# Patient Record
Sex: FEMALE | Race: WHITE | NOT HISPANIC OR LATINO | Employment: OTHER | URBAN - METROPOLITAN AREA
[De-identification: names, ages, dates, MRNs, and addresses within clinical notes are randomized per-mention and may not be internally consistent; named-entity substitution may affect disease eponyms.]

---

## 2017-01-24 ENCOUNTER — OFFICE VISIT (OUTPATIENT)
Dept: ENDOCRINOLOGY | Facility: CLINIC | Age: 82
End: 2017-01-24
Payer: MEDICARE

## 2017-01-24 ENCOUNTER — LAB VISIT (OUTPATIENT)
Dept: LAB | Facility: HOSPITAL | Age: 82
End: 2017-01-24
Attending: INTERNAL MEDICINE
Payer: MEDICARE

## 2017-01-24 VITALS
SYSTOLIC BLOOD PRESSURE: 135 MMHG | HEIGHT: 64 IN | WEIGHT: 210.56 LBS | RESPIRATION RATE: 18 BRPM | DIASTOLIC BLOOD PRESSURE: 76 MMHG | BODY MASS INDEX: 35.95 KG/M2 | HEART RATE: 84 BPM

## 2017-01-24 DIAGNOSIS — I10 ESSENTIAL HYPERTENSION: ICD-10-CM

## 2017-01-24 DIAGNOSIS — E07.9 THYROID DISEASE: ICD-10-CM

## 2017-01-24 DIAGNOSIS — E66.9 OBESITY (BMI 35.0-39.9 WITHOUT COMORBIDITY): ICD-10-CM

## 2017-01-24 DIAGNOSIS — E83.52 HYPERCALCEMIA: ICD-10-CM

## 2017-01-24 DIAGNOSIS — N18.30 CHRONIC KIDNEY DISEASE (CKD), STAGE III (MODERATE): ICD-10-CM

## 2017-01-24 DIAGNOSIS — M85.80 OSTEOPENIA WITH HIGH RISK OF FRACTURE: ICD-10-CM

## 2017-01-24 DIAGNOSIS — E87.6 HYPOKALEMIA: ICD-10-CM

## 2017-01-24 DIAGNOSIS — E55.9 VITAMIN D DEFICIENCY DISEASE: ICD-10-CM

## 2017-01-24 DIAGNOSIS — I10 ESSENTIAL HYPERTENSION: Primary | ICD-10-CM

## 2017-01-24 DIAGNOSIS — E04.1 THYROID NODULE: ICD-10-CM

## 2017-01-24 DIAGNOSIS — E21.0 PRIMARY HYPERPARATHYROIDISM: Primary | ICD-10-CM

## 2017-01-24 DIAGNOSIS — E03.9 ACQUIRED HYPOTHYROIDISM: ICD-10-CM

## 2017-01-24 DIAGNOSIS — E21.0 PRIMARY HYPERPARATHYROIDISM: ICD-10-CM

## 2017-01-24 DIAGNOSIS — E03.8 OTHER SPECIFIED HYPOTHYROIDISM: ICD-10-CM

## 2017-01-24 LAB
25(OH)D3+25(OH)D2 SERPL-MCNC: 66 NG/ML
ALBUMIN SERPL BCP-MCNC: 3.8 G/DL
ALP SERPL-CCNC: 80 U/L
ALT SERPL W/O P-5'-P-CCNC: 22 U/L
ANION GAP SERPL CALC-SCNC: 10 MMOL/L
AST SERPL-CCNC: 34 U/L
BASOPHILS # BLD AUTO: 0.06 K/UL
BASOPHILS NFR BLD: 1 %
BILIRUB SERPL-MCNC: 0.7 MG/DL
BUN SERPL-MCNC: 11 MG/DL
CA-I BLDV-SCNC: 1.29 MMOL/L
CALCIUM SERPL-MCNC: 10.1 MG/DL
CHLORIDE SERPL-SCNC: 96 MMOL/L
CO2 SERPL-SCNC: 29 MMOL/L
CREAT SERPL-MCNC: 0.8 MG/DL
DIFFERENTIAL METHOD: ABNORMAL
EOSINOPHIL # BLD AUTO: 0.2 K/UL
EOSINOPHIL NFR BLD: 3.7 %
ERYTHROCYTE [DISTWIDTH] IN BLOOD BY AUTOMATED COUNT: 13 %
EST. GFR  (AFRICAN AMERICAN): >60 ML/MIN/1.73 M^2
EST. GFR  (NON AFRICAN AMERICAN): >60 ML/MIN/1.73 M^2
GLUCOSE SERPL-MCNC: 116 MG/DL
HCT VFR BLD AUTO: 44.9 %
HGB BLD-MCNC: 15.5 G/DL
LYMPHOCYTES # BLD AUTO: 2.3 K/UL
LYMPHOCYTES NFR BLD: 36.6 %
MAGNESIUM SERPL-MCNC: 1.6 MG/DL
MCH RBC QN AUTO: 32.6 PG
MCHC RBC AUTO-ENTMCNC: 34.5 %
MCV RBC AUTO: 94 FL
MONOCYTES # BLD AUTO: 0.7 K/UL
MONOCYTES NFR BLD: 11.3 %
NEUTROPHILS # BLD AUTO: 3 K/UL
NEUTROPHILS NFR BLD: 47.2 %
PHOSPHATE SERPL-MCNC: 2.5 MG/DL
PLATELET # BLD AUTO: 328 K/UL
PMV BLD AUTO: 10.8 FL
POTASSIUM SERPL-SCNC: 3.2 MMOL/L
PROT SERPL-MCNC: 8.2 G/DL
PTH-INTACT SERPL-MCNC: 150 PG/ML
RBC # BLD AUTO: 4.76 M/UL
SODIUM SERPL-SCNC: 135 MMOL/L
T3 SERPL-MCNC: 99 NG/DL
T3 SERPL-MCNC: 99 NG/DL
T4 FREE SERPL-MCNC: 1.48 NG/DL
T4 FREE SERPL-MCNC: 1.48 NG/DL
TSH SERPL DL<=0.005 MIU/L-ACNC: 2 UIU/ML
URATE SERPL-MCNC: 7.4 MG/DL
WBC # BLD AUTO: 6.26 K/UL

## 2017-01-24 PROCEDURE — 84550 ASSAY OF BLOOD/URIC ACID: CPT

## 2017-01-24 PROCEDURE — 84480 ASSAY TRIIODOTHYRONINE (T3): CPT

## 2017-01-24 PROCEDURE — 36415 COLL VENOUS BLD VENIPUNCTURE: CPT | Mod: PO

## 2017-01-24 PROCEDURE — 82652 VIT D 1 25-DIHYDROXY: CPT

## 2017-01-24 PROCEDURE — 84100 ASSAY OF PHOSPHORUS: CPT

## 2017-01-24 PROCEDURE — 99999 PR PBB SHADOW E&M-EST. PATIENT-LVL III: CPT | Mod: PBBFAC,,, | Performed by: INTERNAL MEDICINE

## 2017-01-24 PROCEDURE — 84439 ASSAY OF FREE THYROXINE: CPT

## 2017-01-24 PROCEDURE — 1160F RVW MEDS BY RX/DR IN RCRD: CPT | Mod: S$GLB,,, | Performed by: INTERNAL MEDICINE

## 2017-01-24 PROCEDURE — 1157F ADVNC CARE PLAN IN RCRD: CPT | Mod: S$GLB,,, | Performed by: INTERNAL MEDICINE

## 2017-01-24 PROCEDURE — 84443 ASSAY THYROID STIM HORMONE: CPT

## 2017-01-24 PROCEDURE — 1125F AMNT PAIN NOTED PAIN PRSNT: CPT | Mod: S$GLB,,, | Performed by: INTERNAL MEDICINE

## 2017-01-24 PROCEDURE — 1159F MED LIST DOCD IN RCRD: CPT | Mod: S$GLB,,, | Performed by: INTERNAL MEDICINE

## 2017-01-24 PROCEDURE — 85025 COMPLETE CBC W/AUTO DIFF WBC: CPT

## 2017-01-24 PROCEDURE — 82306 VITAMIN D 25 HYDROXY: CPT

## 2017-01-24 PROCEDURE — 83735 ASSAY OF MAGNESIUM: CPT

## 2017-01-24 PROCEDURE — 82308 ASSAY OF CALCITONIN: CPT

## 2017-01-24 PROCEDURE — 82330 ASSAY OF CALCIUM: CPT

## 2017-01-24 PROCEDURE — 99499 UNLISTED E&M SERVICE: CPT | Mod: S$GLB,,, | Performed by: INTERNAL MEDICINE

## 2017-01-24 PROCEDURE — 80053 COMPREHEN METABOLIC PANEL: CPT

## 2017-01-24 PROCEDURE — 84432 ASSAY OF THYROGLOBULIN: CPT

## 2017-01-24 PROCEDURE — 83970 ASSAY OF PARATHORMONE: CPT

## 2017-01-24 PROCEDURE — 99214 OFFICE O/P EST MOD 30 MIN: CPT | Mod: S$GLB,,, | Performed by: INTERNAL MEDICINE

## 2017-01-24 RX ORDER — LEVOTHYROXINE SODIUM 88 UG/1
88 TABLET ORAL DAILY
Qty: 90 TABLET | Refills: 3 | Status: SHIPPED | OUTPATIENT
Start: 2017-01-24 | End: 2017-09-19 | Stop reason: SDUPTHER

## 2017-01-24 RX ORDER — POTASSIUM CHLORIDE 20 MEQ/1
20 TABLET, EXTENDED RELEASE ORAL 2 TIMES DAILY
Qty: 180 TABLET | Refills: 3 | Status: SHIPPED | OUTPATIENT
Start: 2017-01-24 | End: 2018-02-09 | Stop reason: SDUPTHER

## 2017-01-24 RX ORDER — HYDROCHLOROTHIAZIDE 25 MG/1
TABLET ORAL
Qty: 90 TABLET | Refills: 3 | Status: SHIPPED | OUTPATIENT
Start: 2017-01-24 | End: 2020-03-02 | Stop reason: CLARIF

## 2017-01-24 RX ORDER — ALENDRONATE SODIUM 70 MG/1
70 TABLET ORAL
Qty: 12 TABLET | Refills: 3 | Status: SHIPPED | OUTPATIENT
Start: 2017-01-24 | End: 2018-01-22 | Stop reason: SDUPTHER

## 2017-01-24 NOTE — PROGRESS NOTES
Subjective:      Patient ID: Glenda Gimenez is a 89 y.o. female.    Chief Complaint:  89 yr old lady with hypothyroidism on thyroid hormone repletion as well as presumed primary hyperparathyroidism seen in [facility] today.    History of Present Illness    Patient is an 89 yr old lady with hyperparathyroidism and hypercalcemia seen in [facility] today. Patient has however been seen by Dr Perez for these same problems in the past and had been undergoing clinical surviellance only. She in addition has hypothyroidism on thyroid hormone replacement; 88mcg QD, essential hypertension, thyroid nodular disease, hypovitaminosis D, Obesity and CKD stage 3.  Patient has had a prior kidney stone in the past ~ 5 yrs ago and this was spontaneously passed.  She denies any significant dyspepsia and has no local neck symptoms.  She does have long standing lumbago with recent right sided sciatica and small joint arthralgias.  Patients most recent thyroid USS was from 12/15 and showed stable nodular disease with no evidence of interval nodular growth. Her next thyroid sonogram would be in another 1-2 yrs.  Her most recent DEXA from 12/15 showed progressive osteopenia with a high frax score based on which she was commenced on fosamax. Her [facility] DEXA thus is to be for ~ 12/17.    Patient has had no falls. Patient has not had any episodes of passing kidney stones and has no hematuria.      Patient admits that she often forgets to take her fosamax.  She indicates that her ambulation is more limited. She indicates that this is mainly due to myalgias in both limbs.    Review of Systems   Constitutional: Negative for diaphoresis and fatigue.   HENT: Negative for facial swelling, trouble swallowing and voice change.    Eyes: Negative for visual disturbance.   Respiratory: Negative for cough and shortness of breath.    Cardiovascular: Negative for chest pain and palpitations.   Gastrointestinal: Negative for constipation, nausea and vomiting.  "  Endocrine: Negative for polyuria.   Genitourinary: Negative for flank pain and hematuria.   Musculoskeletal: Positive for arthralgias (Mild chronic lower back pain and knee arthralgias.) and back pain (chronic). Negative for myalgias.   Skin: Negative for color change, pallor and rash.   Neurological: Negative for dizziness, numbness and headaches.   Hematological: Does not bruise/bleed easily.   Psychiatric/Behavioral: Negative for confusion. The patient is not nervous/anxious.        Objective:  Visit Vitals    /76    Pulse 84    Resp 18    Ht 5' 4" (1.626 m)    Wt 95.5 kg (210 lb 8.6 oz)    BMI 36.14 kg/m2        Physical Exam   Constitutional: She is oriented to person, place, and time. She appears well-developed and well-nourished. No distress.   Pleasant elderly lady. Clinically comfortable. Not chronically ill looking and not in apparent acute distress. Not pale, anicteric, afebrile. In excellent spirits   HENT:   Head: Normocephalic and atraumatic.   Eyes: Conjunctivae and EOM are normal. Pupils are equal, round, and reactive to light. No scleral icterus.   Neck: Normal range of motion. Neck supple. No JVD present.   Cardiovascular: Normal rate, regular rhythm and normal heart sounds.    Pulmonary/Chest: Effort normal and breath sounds normal. No respiratory distress. She has no wheezes.   Abdominal: Soft. There is no tenderness.   Mildly obese anterior abdominal wall.    Musculoskeletal: Normal range of motion. 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   Gaite is slow but steady.   Neurological: She is alert and oriented to person, place, and time. No cranial nerve deficit.   Skin: Skin is warm and dry. No rash noted. She is not diaphoretic. No erythema. No pallor.   Diffuse cutaneous atrophy   Psychiatric: She has a normal mood and affect. Her behavior is normal. Judgment and thought content normal. "   Vitals reviewed.      Lab Review:     Results for JENNIFER QURESHI (MRN [id number]) as of 1/24/2017 08:12   Ref. Range 9/2/2016 08:53 12/21/2016 09:05   Sodium Latest Ref Range: 136 - 145 mmol/L 137    Potassium Latest Ref Range: 3.5 - 5.1 mmol/L 3.6    Chloride Latest Ref Range: 95 - 110 mmol/L 98    CO2 Latest Ref Range: 23 - 29 mmol/L 29    Anion Gap Latest Ref Range: 8 - 16 mmol/L 10    BUN, Bld Latest Ref Range: 8 - 23 mg/dL 15    Creatinine Latest Ref Range: 0.5 - 1.4 mg/dL 0.8    eGFR if non African American Latest Ref Range: >60 mL/min/1.73 m^2 >60.0    eGFR if African American Latest Ref Range: >60 mL/min/1.73 m^2 >60.0    Glucose Latest Ref Range: 70 - 110 mg/dL 118 (H)    Calcium Latest Ref Range: 8.7 - 10.5 mg/dL 10.2    Calcium, Ion Latest Ref Range: 1.06 - 1.42 mmol/L 1.30    Phosphorus Latest Ref Range: 2.7 - 4.5 mg/dL 2.8    Magnesium Latest Ref Range: 1.6 - 2.6 mg/dL 1.9    Alkaline Phosphatase Latest Ref Range: 55 - 135 U/L 78    Total Protein Latest Ref Range: 6.0 - 8.4 g/dL 8.0    Albumin Latest Ref Range: 3.5 - 5.2 g/dL 3.8    Uric Acid Latest Ref Range: 2.4 - 5.7 mg/dL 7.5 (H)    Total Bilirubin Latest Ref Range: 0.1 - 1.0 mg/dL 0.9    AST Latest Ref Range: 10 - 40 U/L 33    ALT Latest Ref Range: 10 - 44 U/L 27    Vit D, 1,25-Dihydroxy Latest Ref Range: 20 - 79 pg/mL 54    Vit D, 25-Hydroxy Latest Ref Range: 30 - 96 ng/mL 67    Hemoglobin A1C Latest Ref Range: 4.5 - 6.2 % 5.5    Estimated Avg Glucose Latest Ref Range: 68 - 131 mg/dL 111    TSH Latest Ref Range: 0.400 - 4.000 uIU/mL 1.488    PTH Latest Ref Range: 9.0 - 77.0 pg/mL 116.0 (H)        Assessment:     1. Primary hyperparathyroidism  PTH, intact    Magnesium    Phosphorus    Comprehensive metabolic panel    US Soft Tissue Head Neck Thyroid    Calcium, urine, random    Phosphorus, urine, random    Creatinine, urine, random    alendronate (FOSAMAX) 70 MG tablet    Vitamin D    Calcitriol   2. Acquired hypothyroidism  TSH    T4, free     T3    Thyroglobulin    Uric acid    CBC auto differential    US Soft Tissue Head Neck Thyroid   3. Essential hypertension     4. Chronic kidney disease (CKD), stage III (moderate)  CBC auto differential   5. Thyroid disease  Calcitonin    Calcium, ionized    CBC auto differential   6. Thyroid nodule  Calcitonin    Calcium, ionized    US Soft Tissue Head Neck Thyroid   7. Hypercalcemia  PTH, intact    Magnesium    Phosphorus    Comprehensive metabolic panel    Calcium, urine, random    Phosphorus, urine, random    Creatinine, urine, random    alendronate (FOSAMAX) 70 MG tablet   8. Vitamin D deficiency disease  PTH, intact    Magnesium    Phosphorus    Comprehensive metabolic panel    Vitamin D    Calcitriol   9. Osteopenia with high risk of fracture  alendronate (FOSAMAX) 70 MG tablet    Vitamin D    Calcitriol   10. Obesity (BMI 35.0-39.9 without comorbidity)          Regarding hyperparathyroidism; Mild primary hyperparathyroidism; to continue serial conservative watchful survielance. To obtain basic monitoring labs in this regard as detailed above.  Regarding hypovitaminosis D; Will recheck 25 OH vitamin d today and in the interim continue OTC vitamin supplementation as before.  In view of muscle cramps to check full electrolytes and urate levels. Also advised to discuss with her PCP regarding possible physical therapy consultation to assist with improving exercise tolerance and ambulation.  Regarding hypothyroidism; appears clinically euthyroid; will recheck TFTS today and to continue levothyroxine 88mcg QD.  Regarding thyroid nodular disease; to obtain ffup thyroid USs to evaluate the prior noted thyroid nodules.  Regarding hypertension; presently well controlled. To continue present antihypertensive regimen and to continue serial tracking of BP trends. Encouraged patient to continuing ensuring copious free water intake; ~ 100 fl oz daily.  Regarding high risk osteopenia; reiterated fall risk reduction  strategies including obtaining a life alert alarm.  To continue fosamax as before.  Regarding Grade 2 obesity; weight essentially stable. No active interventions regarding weight loss at this time but to continue prior efforts at portion size control to enable weight maintenance as before.     Plan:     FFup in ~ 6mths

## 2017-01-24 NOTE — PROGRESS NOTES
Patient, Glenda Gimenez (MRN #[id number]), presented with a recorded BMI of 36.14 kg/m^2 and a documented comorbidity(s):  - Hypertension  to which the severe obesity is a contributing factor. This is consistent with the definition of severe obesity (BMI 35.0-35.9) with comorbidity (ICD-10 E66.01, Z68.35). The patient's severe obesity was monitored, evaluated, addressed and/or treated. This addendum to the medical record is made on 01/24/2017.

## 2017-01-24 NOTE — MR AVS SNAPSHOT
[place] [facility]  [address]  [place] [address]  Phone: [phone]                  Glenda Gimenez   2017 8:00 AM   Office Visit    Description:  Female : 1927   Provider:  Damir Fraire MD   Department:  [place] [facility]           Diagnoses this Visit        Comments    Primary hyperparathyroidism    -  Primary     Acquired hypothyroidism         Essential hypertension         Chronic kidney disease (CKD), stage III (moderate)         Thyroid disease         Thyroid nodule         Hypercalcemia         Vitamin D deficiency disease         Osteopenia with high risk of fracture                To Do List           Future Appointments        Provider Department Dept Phone    2017 10:45 AM LAB, [facility] Lab [phone]    2017 11:00 AM LAB, [facility] - Lab [phone]    2017 1:00 PM [facility] Ultrasound [phone]    2017 11:30 AM Damir Fraire MD [place] [facility] [phone]      Goals (5 Years of Data)     None       These Medications        Disp Refills Start End    levothyroxine (SYNTHROID) 88 MCG tablet 90 tablet 3 2017    Take 1 tablet (88 mcg total) by mouth once daily. - Oral    [facility]: [facility] [address] [place] [address] [address] [facility] Ph #: [phone]       alendronate (FOSAMAX) 70 MG tablet 12 tablet 3 2017    Take 1 tablet (70 mg total) by mouth every 7 days. - Oral    Pharmacy: [id number] - [place], [place] - [address] AT [facility] Ph #: [phone]         [facility]     [facility] On Call Nurse Care Line -  Assistance  Registered nurses in the [facility] Center provide clinical advisement, health education, appointment booking, and other advisory services.  Call for this free service at [phone].             Medications          "  Message regarding Medications     Verify the changes and/or additions to your medication regime listed below are the same as discussed with your clinician today.  If any of these changes or additions are incorrect, please notify your healthcare provider.             Verify that the below list of medications is an accurate representation of the medications you are currently taking.  If none reported, the list may be blank. If incorrect, please contact your healthcare provider. Carry this list with you in case of emergency.           Current Medications     acidophilus (LACTINEX) 100 million cell packet Take 1 tablet by mouth 2 (two) times daily.    amlodipine (NORVASC) 5 MG tablet Take 1 tablet (5 mg total) by mouth once daily.    aspirin 81 MG Chew Take 81 mg by mouth once daily.    conjugated estrogens (PREMARIN) vaginal cream Place 0.5 g vaginally twice a week.    hydrochlorothiazide (HYDRODIURIL) 25 MG tablet 1-2 tablets PO daily.    hydrocortisone (PROCTOZONE-HC) 2.5 % rectal cream Place rectally 2 (two) times daily.    MAGNESIUM ORAL Take 1 tablet by mouth once daily.    potassium chloride (KLOR-CON) 10 MEQ TbSR Take 1 tablet (10 mEq total) by mouth 2 (two) times daily.    alendronate (FOSAMAX) 70 MG tablet Take 1 tablet (70 mg total) by mouth every 7 days.    levothyroxine (SYNTHROID) 88 MCG tablet Take 1 tablet (88 mcg total) by mouth once daily.           Clinical Reference Information           Vital Signs - Last Recorded  Most recent update: 1/24/2017  8:01 AM by Maryan Morales RN    BP Pulse Resp Ht Wt BMI    135/76 84 18 5' 4" (1.626 m) 95.5 kg (210 lb 8.6 oz) 36.14 kg/m2      Blood Pressure          Most Recent Value    BP  135/76      Allergies as of 1/24/2017     Atenolol    Betamethasone Sodium Phosphate    Cortisone    Lisinopril    Naproxen    Triamterene-hydrochlorothiazid      Immunizations Administered on Date of Encounter - 1/24/2017     None      Orders Placed During Today's Visit     Future " Labs/Procedures Expected by Expires    Calcitonin  1/24/2017 3/25/2018    Calcitriol  1/24/2017 3/25/2018    Calcium, ionized  1/24/2017 3/25/2018    Calcium, urine, random  1/24/2017 3/25/2018    CBC auto differential  1/24/2017 3/25/2018    Comprehensive metabolic panel  1/24/2017 3/25/2018    Creatinine, urine, random  1/24/2017 3/25/2018    Magnesium  1/24/2017 3/25/2018    Phosphorus  1/24/2017 3/25/2018    PTH, intact  1/24/2017 3/25/2018    T3  1/24/2017 3/25/2018    T4, free  1/24/2017 3/25/2018    Thyroglobulin  1/24/2017 3/25/2018    TSH  1/24/2017 3/25/2018    Uric acid  1/24/2017 3/25/2018    US Soft Tissue Head Neck Thyroid  1/24/2017 1/24/2018    Vitamin D  1/24/2017 3/25/2018    Phosphorus, urine, random  As directed 1/24/2018

## 2017-01-25 LAB
CALCIT SERPL-MCNC: <5 PG/ML
THRYOGLOBULIN INTERPRETATION: ABNORMAL
THYROGLOB AB SERPL-ACNC: 14 IU/ML
THYROGLOB SERPL-MCNC: 0.8 NG/ML

## 2017-01-26 LAB — 1,25(OH)2D3 SERPL-MCNC: 55 PG/ML

## 2017-01-27 ENCOUNTER — TELEPHONE (OUTPATIENT)
Dept: ENDOCRINOLOGY | Facility: CLINIC | Age: 82
End: 2017-01-27

## 2017-01-27 ENCOUNTER — HOSPITAL ENCOUNTER (OUTPATIENT)
Dept: RADIOLOGY | Facility: CLINIC | Age: 82
Discharge: HOME | End: 2017-01-27
Attending: INTERNAL MEDICINE
Payer: MEDICARE

## 2017-01-27 DIAGNOSIS — E04.1 THYROID NODULE: ICD-10-CM

## 2017-01-27 DIAGNOSIS — E21.0 PRIMARY HYPERPARATHYROIDISM: ICD-10-CM

## 2017-01-27 DIAGNOSIS — E03.9 ACQUIRED HYPOTHYROIDISM: ICD-10-CM

## 2017-01-27 DIAGNOSIS — E87.6 HYPOKALEMIA: Primary | ICD-10-CM

## 2017-01-27 PROCEDURE — 76536 US EXAM OF HEAD AND NECK: CPT | Mod: 26,,, | Performed by: RADIOLOGY

## 2017-01-27 PROCEDURE — 76536 US EXAM OF HEAD AND NECK: CPT | Mod: TC,PO

## 2017-01-27 NOTE — TELEPHONE ENCOUNTER
Patient came in today stated she is currently on Potassium 10 meq bid. She would like to confirm that she is to take Potassium 20 meq twice a day. Stated she does not want to take too much potassium.. I advised it was low.. Would like to know if will be rechecked..

## 2017-02-27 ENCOUNTER — LAB VISIT (OUTPATIENT)
Dept: LAB | Facility: HOSPITAL | Age: 82
End: 2017-02-27
Attending: INTERNAL MEDICINE
Payer: MEDICARE

## 2017-02-27 DIAGNOSIS — E87.6 HYPOKALEMIA: ICD-10-CM

## 2017-02-27 LAB
ANION GAP SERPL CALC-SCNC: 9 MMOL/L
BUN SERPL-MCNC: 16 MG/DL
CALCIUM SERPL-MCNC: 10.2 MG/DL
CHLORIDE SERPL-SCNC: 98 MMOL/L
CO2 SERPL-SCNC: 29 MMOL/L
CREAT SERPL-MCNC: 0.8 MG/DL
EST. GFR  (AFRICAN AMERICAN): >60 ML/MIN/1.73 M^2
EST. GFR  (NON AFRICAN AMERICAN): >60 ML/MIN/1.73 M^2
GLUCOSE SERPL-MCNC: 106 MG/DL
POTASSIUM SERPL-SCNC: 3.8 MMOL/L
SODIUM SERPL-SCNC: 136 MMOL/L

## 2017-02-27 PROCEDURE — 36415 COLL VENOUS BLD VENIPUNCTURE: CPT | Mod: PO

## 2017-02-27 PROCEDURE — 80048 BASIC METABOLIC PNL TOTAL CA: CPT

## 2017-07-05 ENCOUNTER — TELEPHONE (OUTPATIENT)
Dept: ENDOCRINOLOGY | Facility: CLINIC | Age: 82
End: 2017-07-05

## 2017-07-05 NOTE — TELEPHONE ENCOUNTER
----- Message from Ashlie Wan sent at 7/5/2017  9:00 AM CDT -----  Patient is returning nurse's (Maryan) call/please call patient back at [phone] or [phone].

## 2017-07-12 ENCOUNTER — TELEPHONE (OUTPATIENT)
Dept: ENDOCRINOLOGY | Facility: CLINIC | Age: 82
End: 2017-07-12

## 2017-07-12 DIAGNOSIS — E04.1 NODULAR THYROID DISEASE: ICD-10-CM

## 2017-07-12 DIAGNOSIS — E21.3 HYPERPARATHYROIDISM: ICD-10-CM

## 2017-07-12 DIAGNOSIS — E03.9 HYPOTHYROIDISM (ACQUIRED): Primary | ICD-10-CM

## 2017-07-12 NOTE — TELEPHONE ENCOUNTER
----- Message from Bibiana Guzman sent at 7/12/2017 12:00 PM CDT -----  Contact: Patient  Patient states that labs were to be done for an appointment that was cancelled for Friday, 07/14/2017 but there weren't any labs scheduled.   Can you please put the orders in the system so the labs can be scheduled and please call the patient back [phone].  Thank you

## 2017-07-13 DIAGNOSIS — E79.0 HYPERURICEMIA: Primary | ICD-10-CM

## 2017-07-18 ENCOUNTER — LAB VISIT (OUTPATIENT)
Dept: LAB | Facility: HOSPITAL | Age: 82
End: 2017-07-18
Attending: INTERNAL MEDICINE
Payer: MEDICARE

## 2017-07-18 DIAGNOSIS — E04.1 NODULAR THYROID DISEASE: ICD-10-CM

## 2017-07-18 DIAGNOSIS — E79.0 HYPERURICEMIA: ICD-10-CM

## 2017-07-18 DIAGNOSIS — E21.3 HYPERPARATHYROIDISM: ICD-10-CM

## 2017-07-18 DIAGNOSIS — E03.9 HYPOTHYROIDISM (ACQUIRED): ICD-10-CM

## 2017-07-18 LAB
25(OH)D3+25(OH)D2 SERPL-MCNC: 51 NG/ML
ALBUMIN SERPL BCP-MCNC: 3.7 G/DL
ALP SERPL-CCNC: 77 U/L
ALT SERPL W/O P-5'-P-CCNC: 21 U/L
ANION GAP SERPL CALC-SCNC: 7 MMOL/L
AST SERPL-CCNC: 29 U/L
BILIRUB SERPL-MCNC: 0.7 MG/DL
BUN SERPL-MCNC: 10 MG/DL
CA-I BLDV-SCNC: 1.34 MMOL/L
CALCIUM SERPL-MCNC: 10.3 MG/DL
CHLORIDE SERPL-SCNC: 99 MMOL/L
CHOLEST/HDLC SERPL: 3 {RATIO}
CO2 SERPL-SCNC: 31 MMOL/L
CREAT SERPL-MCNC: 0.8 MG/DL
EST. GFR  (AFRICAN AMERICAN): >60 ML/MIN/1.73 M^2
EST. GFR  (NON AFRICAN AMERICAN): >60 ML/MIN/1.73 M^2
GLUCOSE SERPL-MCNC: 102 MG/DL
HDL/CHOLESTEROL RATIO: 33.5 %
HDLC SERPL-MCNC: 197 MG/DL
HDLC SERPL-MCNC: 66 MG/DL
LDLC SERPL CALC-MCNC: 109.4 MG/DL
MAGNESIUM SERPL-MCNC: 1.7 MG/DL
NONHDLC SERPL-MCNC: 131 MG/DL
PHOSPHATE SERPL-MCNC: 2.4 MG/DL
POTASSIUM SERPL-SCNC: 3.6 MMOL/L
PROT SERPL-MCNC: 8.1 G/DL
PTH-INTACT SERPL-MCNC: 110 PG/ML
SODIUM SERPL-SCNC: 137 MMOL/L
T3 SERPL-MCNC: 85 NG/DL
T4 FREE SERPL-MCNC: 1.4 NG/DL
TRIGL SERPL-MCNC: 108 MG/DL
TSH SERPL DL<=0.005 MIU/L-ACNC: 1.82 UIU/ML
URATE SERPL-MCNC: 6.6 MG/DL

## 2017-07-18 PROCEDURE — 82330 ASSAY OF CALCIUM: CPT

## 2017-07-18 PROCEDURE — 86316 IMMUNOASSAY TUMOR OTHER: CPT

## 2017-07-18 PROCEDURE — 84100 ASSAY OF PHOSPHORUS: CPT

## 2017-07-18 PROCEDURE — 82306 VITAMIN D 25 HYDROXY: CPT

## 2017-07-18 PROCEDURE — 84439 ASSAY OF FREE THYROXINE: CPT

## 2017-07-18 PROCEDURE — 83970 ASSAY OF PARATHORMONE: CPT

## 2017-07-18 PROCEDURE — 80053 COMPREHEN METABOLIC PANEL: CPT

## 2017-07-18 PROCEDURE — 84550 ASSAY OF BLOOD/URIC ACID: CPT

## 2017-07-18 PROCEDURE — 84443 ASSAY THYROID STIM HORMONE: CPT

## 2017-07-18 PROCEDURE — 83735 ASSAY OF MAGNESIUM: CPT

## 2017-07-18 PROCEDURE — 84480 ASSAY TRIIODOTHYRONINE (T3): CPT

## 2017-07-18 PROCEDURE — 80061 LIPID PANEL: CPT

## 2017-07-18 PROCEDURE — 86800 THYROGLOBULIN ANTIBODY: CPT

## 2017-07-19 LAB
THRYOGLOBULIN INTERPRETATION: ABNORMAL
THYROGLOB AB SERPL-ACNC: 6.3 IU/ML
THYROGLOB SERPL-MCNC: 0.8 NG/ML

## 2017-07-22 LAB — CGA SERPL-MCNC: 14 NG/ML

## 2017-09-13 ENCOUNTER — OFFICE VISIT (OUTPATIENT)
Dept: PODIATRY | Facility: CLINIC | Age: 82
End: 2017-09-13
Payer: MEDICARE

## 2017-09-13 ENCOUNTER — HOSPITAL ENCOUNTER (OUTPATIENT)
Dept: RADIOLOGY | Facility: CLINIC | Age: 82
Discharge: HOME | End: 2017-09-13
Attending: PODIATRIST
Payer: MEDICARE

## 2017-09-13 VITALS — WEIGHT: 205.5 LBS | BODY MASS INDEX: 35.08 KG/M2 | HEIGHT: 64 IN

## 2017-09-13 DIAGNOSIS — M77.9 CAPSULITIS: Primary | ICD-10-CM

## 2017-09-13 DIAGNOSIS — M19.079 ARTHRITIS OF FOOT: ICD-10-CM

## 2017-09-13 DIAGNOSIS — M77.9 CAPSULITIS: ICD-10-CM

## 2017-09-13 DIAGNOSIS — M24.573 EQUINUS CONTRACTURE OF ANKLE: ICD-10-CM

## 2017-09-13 PROCEDURE — 99999 PR PBB SHADOW E&M-EST. PATIENT-LVL III: CPT | Mod: PBBFAC,,, | Performed by: PODIATRIST

## 2017-09-13 PROCEDURE — 73630 X-RAY EXAM OF FOOT: CPT | Mod: 26,LT,S$GLB, | Performed by: RADIOLOGY

## 2017-09-13 PROCEDURE — 73630 X-RAY EXAM OF FOOT: CPT | Mod: 50,TC,PO

## 2017-09-13 PROCEDURE — 73630 X-RAY EXAM OF FOOT: CPT | Mod: 26,RT,S$GLB, | Performed by: RADIOLOGY

## 2017-09-13 PROCEDURE — 29540 STRAPPING ANKLE &/FOOT: CPT | Mod: 50,S$GLB,, | Performed by: PODIATRIST

## 2017-09-13 PROCEDURE — 3008F BODY MASS INDEX DOCD: CPT | Mod: S$GLB,,, | Performed by: PODIATRIST

## 2017-09-13 PROCEDURE — 99203 OFFICE O/P NEW LOW 30 MIN: CPT | Mod: 25,S$GLB,, | Performed by: PODIATRIST

## 2017-09-13 PROCEDURE — 73610 X-RAY EXAM OF ANKLE: CPT | Mod: 26,LT,S$GLB, | Performed by: RADIOLOGY

## 2017-09-13 PROCEDURE — 73610 X-RAY EXAM OF ANKLE: CPT | Mod: 50,TC,PO

## 2017-09-13 PROCEDURE — 73610 X-RAY EXAM OF ANKLE: CPT | Mod: 26,RT,S$GLB, | Performed by: RADIOLOGY

## 2017-09-13 PROCEDURE — 1159F MED LIST DOCD IN RCRD: CPT | Mod: S$GLB,,, | Performed by: PODIATRIST

## 2017-09-13 PROCEDURE — 1125F AMNT PAIN NOTED PAIN PRSNT: CPT | Mod: S$GLB,,, | Performed by: PODIATRIST

## 2017-09-13 RX ORDER — LIDOCAINE HYDROCHLORIDE 20 MG/ML
JELLY TOPICAL
Qty: 30 ML | Refills: 2 | Status: SHIPPED | OUTPATIENT
Start: 2017-09-13 | End: 2018-01-22

## 2017-09-13 NOTE — PROGRESS NOTES
Subjective:      Patient ID: Glenda Gimenez is a 89 y.o. female.    Chief Complaint: Foot Pain (denae ); Ankle Pain (denae ); and Nail Problem (fungus Lt great toe )    Deep aching sometimes sharp pain forefoot and ankle bilateral.  Gradual onset, worsening over past several weeks, aggravated by increased weight bearing, shoe gear, pressure.  No previous medical treatment.  OTC pain med not helping.  Denies trauma.  Relates toe surgery right years ago.      Review of Systems   Constitution: Negative for chills, diaphoresis, fever, malaise/fatigue and night sweats.   Cardiovascular: Negative for claudication, cyanosis, leg swelling and syncope.   Skin: Negative for color change, dry skin, nail changes, rash, suspicious lesions and unusual hair distribution.   Musculoskeletal: Positive for joint pain. Negative for falls, joint swelling, muscle cramps, muscle weakness and stiffness.   Gastrointestinal: Negative for constipation, diarrhea, nausea and vomiting.   Neurological: Negative for brief paralysis, disturbances in coordination, focal weakness, numbness, paresthesias, sensory change and tremors.           Objective:      Physical Exam   Constitutional: She is oriented to person, place, and time. She appears well-developed and well-nourished. She is cooperative.   Oriented to time, place, and person.   Cardiovascular:   Pulses:       Dorsalis pedis pulses are 1+ on the right side, and 1+ on the left side.        Posterior tibial pulses are 1+ on the right side, and 1+ on the left side.   Capillary fill time 3-5 seconds.  All toes warm to touch.      Negative lower extremity edema bilateral.    Negative elevational pallor and dependent rubor bilateral.     Musculoskeletal:   Pain to palpation inferior mtpj 1-5 bilateral without evidence of trauma or infection.     Deep aching pain both ankles with prolonged stance and ambulation without pain to palpation or being elicited in wb or nwb office exam today.    Ankle  dorsiflexion decreased at <10 degrees bilateral with moderate increase with knee flexion bilateral.    Otherwise, Normal angle, base, station of gait. All ten toes without clubbing, cyanosis, or signs of ischemia.  No pain to palpation bilateral lower extremities.  Range of motion, stability, muscle strength, and muscle tone normal bilateral feet and legs.    Ankle bilateral has negative anterior drawer sign, negative posterior drawer sign, negative external rotation test, negative squeeze test.       Lymphadenopathy:   Negative lymphadenopathy bilateral popliteal fossa and tarsal tunnel.     Neurological: She is alert and oriented to person, place, and time. She has normal strength. She is not disoriented. She displays no atrophy and no tremor. No sensory deficit. She exhibits normal muscle tone.   Reflex Scores:       Patellar reflexes are 2+ on the right side and 2+ on the left side.       Achilles reflexes are 2+ on the right side and 2+ on the left side.  Negative tinel sign to percussion sural, superficial peroneal, deep peroneal, saphenous, and posterior tibial nerves right and left ankles and feet.       Skin: Skin is warm, dry and intact. No abrasion, no bruising, no burn, no ecchymosis, no laceration, no lesion, no petechiae and no rash noted. She is not diaphoretic. No cyanosis or erythema. No pallor. Nails show no clubbing.   Skin thin, atrophic, with decreased density and distribution of pedal hair bilateral, but without hyperpigmentation, yoana discoloration,  ulcers, masses, nodules or cords palpated bilateral feet and legs.                   Assessment:       Encounter Diagnoses   Name Primary?    Capsulitis Yes    Equinus contracture of ankle     Arthritis of foot          Plan:       Glenda was seen today for foot pain, ankle pain and nail problem.    Diagnoses and all orders for this visit:    Capsulitis  -     X-Ray Foot Complete Bilateral; Future  -     X-Ray Ankle Complete Bilateral;  Future    Equinus contracture of ankle  -     X-Ray Foot Complete Bilateral; Future  -     X-Ray Ankle Complete Bilateral; Future    Arthritis of foot  -     X-Ray Foot Complete Bilateral; Future  -     X-Ray Ankle Complete Bilateral; Future    Other orders  -     lidocaine HCL 2% (XYLOCAINE) 2 % jelly; Apply topically as needed. Apply topically once nightly to affected area right and left ankles.      I counseled the patient on her conditions, their implications and medical management.    Patient will stretch the tendo achilles complex three times daily as demonstrated in the office.  Literature was dispensed illustrating proper stretching technique.    I applied a plantar rest strapping to the patient's right and left foot to offload symptomatic area, support the arch, and relieve pain.    Patient will obtain over the counter arch supports and wear them in shoes whenever possible.  Athletic shoes intended for walking or running are usually best.    Discussed conservative treatment with shoes of adequate dimensions, material, and style to alleviate symptoms and delay or prevent surgical intervention.    Rx xrays, lidocaine gel.            Return in about 1 month (around 10/13/2017).

## 2017-09-13 NOTE — LETTER
September 13, 2017      James Yao MD  [address]  [place] [address]           [place] [facility]  [address]  [facility] [address]  Phone: [phone]          Patient: Glenda Gimenez   MR Number: [id number]   YOB: 1927   Date of Visit: 9/13/2017       Dear Dr. James Yao:    Thank you for referring Glenda Gimenez to me for evaluation. Attached you will find relevant portions of my assessment and plan of care.    If you have questions, please do not hesitate to call me. I look forward to following Glenda Gimenez along with you.    Sincerely,    Marco Antonio Mcclain, DPHUMZA    Enclosure  CC:  No Recipients    If you would like to receive this communication electronically, please contact [email or web address] or [phone] to request more information on [employer] access.    For providers and/or their staff who would like to refer a patient to [facility], please contact us through our one-stop-shop provider referral line, [facility] , at [phone].    If you feel you have received this communication in error or would no longer like to receive these types of communications, please e-mail [email or web address]

## 2017-09-19 RX ORDER — LEVOTHYROXINE SODIUM 88 UG/1
TABLET ORAL
Qty: 90 TABLET | Refills: 3 | Status: SHIPPED | OUTPATIENT
Start: 2017-09-19 | End: 2018-09-05 | Stop reason: SDUPTHER

## 2017-10-18 ENCOUNTER — OFFICE VISIT (OUTPATIENT)
Dept: PODIATRY | Facility: CLINIC | Age: 82
End: 2017-10-18
Payer: MEDICARE

## 2017-10-18 VITALS — BODY MASS INDEX: 35.08 KG/M2 | WEIGHT: 205.5 LBS | HEIGHT: 64 IN

## 2017-10-18 DIAGNOSIS — M24.573 EQUINUS CONTRACTURE OF ANKLE: ICD-10-CM

## 2017-10-18 DIAGNOSIS — M79.672 FOOT PAIN, LEFT: ICD-10-CM

## 2017-10-18 DIAGNOSIS — M19.079 ARTHRITIS OF FOOT: Primary | ICD-10-CM

## 2017-10-18 PROCEDURE — 99999 PR PBB SHADOW E&M-EST. PATIENT-LVL III: CPT | Mod: PBBFAC,,, | Performed by: PODIATRIST

## 2017-10-18 PROCEDURE — 99212 OFFICE O/P EST SF 10 MIN: CPT | Mod: S$GLB,,, | Performed by: PODIATRIST

## 2017-10-18 RX ORDER — POTASSIUM CHLORIDE 20 MEQ/1
TABLET, EXTENDED RELEASE ORAL
COMMUNITY
Start: 2017-09-19 | End: 2018-06-11 | Stop reason: SDUPTHER

## 2017-10-18 NOTE — PROGRESS NOTES
Subjective:      Patient ID: Glenda Gimenez is a 89 y.o. female.    Chief Complaint: Follow-up    Deep aching sometimes sharp pain forefoot and ankle bilateral.  Forefoot symptoms resolved with stretches, inserts, athletic type shoes.  Rearfoot/ankle pain remains, though improved.  xrays negative acute injury.    Review of Systems   Constitution: Negative for chills, diaphoresis, fever, malaise/fatigue and night sweats.   Cardiovascular: Negative for claudication, cyanosis, leg swelling and syncope.   Skin: Negative for color change, dry skin, nail changes, rash, suspicious lesions and unusual hair distribution.   Musculoskeletal: Positive for joint pain. Negative for falls, joint swelling, muscle cramps, muscle weakness and stiffness.   Gastrointestinal: Negative for constipation, diarrhea, nausea and vomiting.   Neurological: Negative for brief paralysis, disturbances in coordination, focal weakness, numbness, paresthesias, sensory change and tremors.           Objective:      Physical Exam   Constitutional: She is oriented to person, place, and time. She appears well-developed and well-nourished. She is cooperative.   Oriented to time, place, and person.   Cardiovascular:   Pulses:       Dorsalis pedis pulses are 1+ on the right side, and 1+ on the left side.        Posterior tibial pulses are 1+ on the right side, and 1+ on the left side.   Capillary fill time 3-5 seconds.  All toes warm to touch.      Negative lower extremity edema bilateral.    Negative elevational pallor and dependent rubor bilateral.     Musculoskeletal:   No pain to palpation inferior mtpj 1-5 bilateral without evidence of trauma or infection.     Deep aching pain both rearfoot/ankles with prolonged stance and ambulation without pain to palpation or being elicited in wb or nwb office exam today.    Ankle dorsiflexion decreased at <10 degrees bilateral with moderate increase with knee flexion bilateral.    Otherwise, Normal angle, base,  station of gait. All ten toes without clubbing, cyanosis, or signs of ischemia.  No pain to palpation bilateral lower extremities.  Range of motion, stability, muscle strength, and muscle tone normal bilateral feet and legs.    Ankle bilateral has negative anterior drawer sign, negative posterior drawer sign, negative external rotation test, negative squeeze test.       Lymphadenopathy:   Negative lymphadenopathy bilateral popliteal fossa and tarsal tunnel.     Neurological: She is alert and oriented to person, place, and time. She has normal strength. She is not disoriented. She displays no atrophy and no tremor. No sensory deficit. She exhibits normal muscle tone.   Reflex Scores:       Patellar reflexes are 2+ on the right side and 2+ on the left side.       Achilles reflexes are 2+ on the right side and 2+ on the left side.  Negative tinel sign to percussion sural, superficial peroneal, deep peroneal, saphenous, and posterior tibial nerves right and left ankles and feet.       Skin: Skin is warm, dry and intact. No abrasion, no bruising, no burn, no ecchymosis, no laceration, no lesion, no petechiae and no rash noted. She is not diaphoretic. No cyanosis or erythema. No pallor. Nails show no clubbing.   Skin thin, atrophic, with decreased density and distribution of pedal hair bilateral, but without hyperpigmentation, yoana discoloration,  ulcers, masses, nodules or cords palpated bilateral feet and legs.                   Assessment:       Encounter Diagnoses   Name Primary?    Arthritis of foot Yes    Equinus contracture of ankle     Foot pain, left          Plan:       Glenda was seen today for follow-up.    Diagnoses and all orders for this visit:    Arthritis of foot    Equinus contracture of ankle    Foot pain, left      I counseled the patient on her conditions, their implications and medical management.    Continue stretches, inserts, athletic shoes.  Continue, lidocaine gel.    Declines orthotics,  PT.        Return in about 1 month (around 11/18/2017).

## 2017-11-07 ENCOUNTER — HOSPITAL ENCOUNTER (EMERGENCY)
Facility: HOSPITAL | Age: 82
Discharge: HOME | End: 2017-11-07
Attending: EMERGENCY MEDICINE
Payer: MEDICARE

## 2017-11-07 VITALS
SYSTOLIC BLOOD PRESSURE: 154 MMHG | WEIGHT: 198 LBS | OXYGEN SATURATION: 96 % | HEIGHT: 64 IN | DIASTOLIC BLOOD PRESSURE: 67 MMHG | RESPIRATION RATE: 14 BRPM | HEART RATE: 88 BPM | TEMPERATURE: 98 F | BODY MASS INDEX: 33.8 KG/M2

## 2017-11-07 DIAGNOSIS — S02.2XXA FRACTURE OF NASAL BONES, INITIAL ENCOUNTER FOR CLOSED FRACTURE: ICD-10-CM

## 2017-11-07 DIAGNOSIS — S00.83XA TRAUMATIC HEMATOMA OF FOREHEAD, INITIAL ENCOUNTER: ICD-10-CM
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
## 2017-11-07 DIAGNOSIS — T07.XXXA MULTIPLE ABRASIONS: ICD-10-CM

## 2017-11-07 DIAGNOSIS — S61.411A LACERATION OF RIGHT HAND WITHOUT FOREIGN BODY, INITIAL ENCOUNTER: ICD-10-CM

## 2017-11-07 PROCEDURE — 25000003 PHARM REV CODE 250: Performed by: EMERGENCY MEDICINE

## 2017-11-07 PROCEDURE — 63600175 PHARM REV CODE 636 W HCPCS: Performed by: EMERGENCY MEDICINE

## 2017-11-07 PROCEDURE — 12041 INTMD RPR N-HF/GENIT 2.5CM/<: CPT

## 2017-11-07 PROCEDURE — 99284 EMERGENCY DEPT VISIT MOD MDM: CPT | Mod: 25

## 2017-11-07 PROCEDURE — 90471 IMMUNIZATION ADMIN: CPT | Performed by: EMERGENCY MEDICINE

## 2017-11-07 PROCEDURE — 90715 TDAP VACCINE 7 YRS/> IM: CPT | Performed by: EMERGENCY MEDICINE

## 2017-11-07 RX ORDER — LIDOCAINE HYDROCHLORIDE 10 MG/ML
INJECTION, SOLUTION EPIDURAL; INFILTRATION; INTRACAUDAL; PERINEURAL
Status: DISCONTINUED
Start: 2017-11-07 | End: 2017-11-07 | Stop reason: HOSPADM

## 2017-11-07 RX ORDER — LIDOCAINE HYDROCHLORIDE 10 MG/ML
10 INJECTION, SOLUTION EPIDURAL; INFILTRATION; INTRACAUDAL; PERINEURAL
Status: COMPLETED | OUTPATIENT
Start: 2017-11-07 | End: 2017-11-07

## 2017-11-07 RX ORDER — CEPHALEXIN 500 MG/1
500 CAPSULE ORAL 4 TIMES DAILY
Qty: 20 CAPSULE | Refills: 0 | Status: SHIPPED | OUTPATIENT
Start: 2017-11-07 | End: 2017-11-12

## 2017-11-07 RX ADMIN — LIDOCAINE HYDROCHLORIDE 100 MG: 10 INJECTION, SOLUTION EPIDURAL; INFILTRATION; INTRACAUDAL; PERINEURAL at 01:11

## 2017-11-07 RX ADMIN — BACITRACIN ZINC NEOMYCIN SULFATE POLYMYXIN B SULFATE 15 G: 400; 3.5; 5 OINTMENT TOPICAL at 11:11

## 2017-11-07 RX ADMIN — CLOSTRIDIUM TETANI TOXOID ANTIGEN (FORMALDEHYDE INACTIVATED), CORYNEBACTERIUM DIPHTHERIAE TOXOID ANTIGEN (FORMALDEHYDE INACTIVATED), BORDETELLA PERTUSSIS TOXOID ANTIGEN (GLUTARALDEHYDE INACTIVATED), BORDETELLA PERTUSSIS FILAMENTOUS HEMAGGLUTININ ANTIGEN (FORMALDEHYDE INACTIVATED), BORDETELLA PERTUSSIS PERTACTIN ANTIGEN, AND BORDETELLA PERTUSSIS FIMBRIAE 2/3 ANTIGEN 0.5 ML: 5; 2; 2.5; 5; 3; 5 INJECTION, SUSPENSION INTRAMUSCULAR at 01:11

## 2017-11-07 NOTE — ED PROVIDER NOTES
Encounter Date: 11/7/2017    SCRIBE #1 NOTE: Celsa RAY, am scribing for, and in the presence of, Dr. Singer.       History     Chief Complaint   Patient presents with    Fall     Tripped on parking bumper falling forward onto face. Denies loc. A, a, o x3.       11/07/2017 11:29 AM     Chief complaint: Fall      Glenda Gimenez is a 89 y.o. female who presents to the ED for evaluation after a fall that occurred immediately PTA. The patient reports that she tripped over a curb in a parking lot and fell forward with outstretched hands. She states that she feels no pain other than burning due to a laceration on her right hand. She denies LOC, neck pain, numbness, visual disturbance, and all other symptoms at this time. She does not know when her most recent tetanus shot occurred, and is not currently taking blood thinners other than a baby aspirin once a day. The patient's PMHx includes HTN and anticoagulant long-term use. There is no pertinent SHx noted.       The history is provided by the patient.     Review of patient's allergies indicates:   Allergen Reactions    Atenolol      Other reaction(s): itch    Betamethasone sodium phosphate      Other reaction(s): Flushing (skin)    Cortisone      Other reaction(s): blurry vision  Other reaction(s): Rash    Lisinopril      Other reaction(s): COUGH    Naproxen      Other reaction(s): body shut down    Triamterene-hydrochlorothiazid      Other reaction(s): itch     Past Medical History:   Diagnosis Date    Anticoagulant long-term use     Dislocation of right shoulder joint     Hypertension     Shoulder disorder     right    Thyroid disease      Past Surgical History:   Procedure Laterality Date    HYSTERECTOMY      PARTIAL HYSTERECTOMY      ARTUR, ovaries in place, uterine prolapse     Family History   Problem Relation Age of Onset    Cancer Mother      breast cancer    Cancer Brother      lung cancer    Cancer Maternal Aunt      unknown cancer     Cancer Maternal Uncle      pancreatic cancer    Heart disease Neg Hx      Social History   Substance Use Topics    Smoking status: Never Smoker    Smokeless tobacco: Never Used    Alcohol use Yes      Comment: wine occasionally      Review of Systems   Constitutional: Negative for fever.   HENT: Negative for congestion.    Eyes: Negative for visual disturbance.   Respiratory: Negative for wheezing.    Cardiovascular: Negative for chest pain.   Gastrointestinal: Negative for abdominal pain.   Genitourinary: Negative for dysuria.   Musculoskeletal: Negative for joint swelling and neck pain.   Skin: Positive for wound (right hand, forehead, nose). Negative for rash.   Neurological: Negative for syncope and numbness.   Hematological: Does not bruise/bleed easily.   Psychiatric/Behavioral: Negative for confusion.       Physical Exam     Initial Vitals [11/07/17 1103]   BP Pulse Resp Temp SpO2   (!) 154/67 88 14 97.9 °F (36.6 °C) 96 %      MAP       96         Physical Exam    Nursing note and vitals reviewed.  Constitutional: She appears well-nourished.   HENT:   Mouth/Throat: Oropharynx is clear and moist.   Hematoma over right medial forehead. Abrasion and swelling to nasal bridge without severe lateral deviation.  No septal hematoma   Eyes: Conjunctivae and EOM are normal.   Neck: Normal range of motion. Neck supple. No thyroid mass present.   Cardiovascular: Normal rate and regular rhythm.   Abdominal: Soft. Normal appearance and bowel sounds are normal. There is no tenderness.   Musculoskeletal: Normal range of motion. She exhibits edema and tenderness.   Neurological: She is alert and oriented to person, place, and time. She has normal strength. No cranial nerve deficit or sensory deficit.   Skin: Skin is warm and dry. No rash noted.   Superficial abrasion to right knee with no bleeding. Laceration to lateral aspect of right 5th metacarpal joint.    Psychiatric: She has a normal mood and affect. Her speech is  normal. Cognition and memory are normal.         ED Course   Lac Repair  Date/Time: 11/10/2017 12:32 PM  Performed by: KEYLA GOODSON  Authorized by: KEYLA GOODSON   Consent Done: Yes  Consent: Verbal consent obtained.  Risks and benefits: risks, benefits and alternatives were discussed  Consent given by: patient  Body area: upper extremity  Location details: right hand  Laceration length: 2 cm  Foreign bodies: no foreign bodies  Tendon involvement: none  Nerve involvement: none  Vascular damage: no  Anesthesia: local infiltration    Anesthesia:  Local Anesthetic: lidocaine 1% without epinephrine  Anesthetic total: 15 mL  Patient sedated: no  Preparation: Patient was prepped and draped in the usual sterile fashion.  Irrigation solution: saline  Irrigation method: jet lavage  Amount of cleaning: extensive  Debridement: none  Degree of undermining: none  Skin closure: 4-0 Prolene  Number of sutures: 6  Technique: simple  Approximation: close  Approximation difficulty: simple  Dressing: antibiotic ointment and adhesive bandage  Patient tolerance: Patient tolerated the procedure well with no immediate complications        Labs Reviewed - No data to display     Imaging Results          CT Maxillofacial Without Contrast (Final result)  Result time 11/07/17 12:41:21    Final result by Blas Lux MD (11/07/17 12:41:21)                 Impression:      1. Bilateral minimally displaced nasal bone fractures.  2. Large frontal scalp hematoma.      Electronically signed by: Blas Lux MD  Date:     11/07/17  Time:    12:41              Narrative:    Axial images through the face were acquired without contrast with multiplanar reformatted images created    No comparison    FINDINGS: A large frontal scalp hematoma is present. There are bilateral nasal bone fractures with minimal leftward displacement. The remaining maxillofacial bones are intact. The mandible is intact and located. Heavy calcification of the  intracranial ICAs is noted.                             CT Head Without Contrast (Final result)  Result time 17 12:31:47    Final result by Jordana Keene MD (17 12:31:47)                 Impression:        Large frontal scalp hematoma.  No acute intracranial findings.  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 There is  density in white matter consistent with microvascular ischemic changes, and there are old basal ganglia lacunar infarcts.  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  There is no acute intracranial hemorrhage. There is no intracranial mass effect. There is no acute major vascular territory infarct.  There is large right frontal scalp hematoma.  The calvarium appears intact, visualized paranasal sinuses clear and mastoids pneumatized                                 Medical Decision Making:   History:   Old Medical Records: I decided to obtain old medical records.  Initial Assessment:   This patient was interviewed and examined and is found be in no acute distress.  She has evidence for head hematoma an old right hand laceration.  Right hand laceration was closed without complication.  CT of the head and face will be obtained patient was provided tetanus update and all of her abrasions were cleaned and bacitracin was applied.  The patient only has superficial abrasions on the right knee and I do not think radiographs of the hand with any are currently warranted, as there is no swelling and full range of motion without pain is  intact.  Clinical Tests:   Radiological Study: Ordered and Reviewed  ED Management:  CT of the face indicates a large frontal scalp hematoma with bilaterally minimally displaced nasal bone fractures.  She and her family were informed that she will need to follow-up with ear nose and throat specialist within 7-10 days if she will wish to have the nose reset.  At this point I do not think resetting the patient's nose currently is warranted and will exacerbate pain and  swelling.  Patient was educated about wound care regarding the laceration and will be asked to have the sutures removed within 7-10 days.  She was educated about signs of infection to look out for and is asked to return for any of these.  Because of her age and falling on the dirty pavement, she'll be started on a short course of oral Keflex while these abrasions and lacerations are healing.  She is asked to take over-the-counter pain medications as needed and rest over the next few days.  Otherwise asked to return to the ER for any new, concerning, or worsening symptoms.  Patient and family were agreeable with the plan for follow-up & she was discharged in stable condition with her son and grandson as 's.            Scribe Attestation:   Scribe #1: I performed the above scribed service and the documentation accurately describes the services I performed. I attest to the accuracy of the note.    *   ED Course      Clinical Impression:   The primary encounter diagnosis was Fall on same level from stumbling, initial encounter. Diagnoses of Laceration of right hand without foreign body, initial encounter, Multiple abrasions, Traumatic hematoma of forehead, initial encounter, and Fracture of nasal bones, initial encounter for closed fracture were also pertinent to this visit.    Disposition:   Disposition: Discharged  Condition: Stable       I, Dr. Santiago Singer, personally performed the services described in this documentation. All medical record  entries made by the scribe were at my direction and in my presence.  I have reviewed the chart and agree that the record reflects my personal performance and is accurate and complete. Santiago Singer MD.  12:37 PM 11/10/2017                  Santiago Singer MD  11/10/17 1370

## 2017-12-28 ENCOUNTER — HOSPITAL ENCOUNTER (OUTPATIENT)
Dept: RADIOLOGY | Facility: CLINIC | Age: 82
Discharge: HOME | End: 2017-12-28
Payer: MEDICARE

## 2017-12-28 DIAGNOSIS — Z12.39 ENCOUNTER FOR SPECIAL SCREENING EXAMINATION FOR NEOPLASM OF BREAST: ICD-10-CM

## 2017-12-28 PROCEDURE — 77067 SCR MAMMO BI INCL CAD: CPT | Mod: TC,PO

## 2017-12-28 PROCEDURE — 77067 SCR MAMMO BI INCL CAD: CPT | Mod: 26,,, | Performed by: RADIOLOGY

## 2017-12-28 PROCEDURE — 77063 BREAST TOMOSYNTHESIS BI: CPT | Mod: 26,,, | Performed by: RADIOLOGY

## 2018-01-22 ENCOUNTER — OFFICE VISIT (OUTPATIENT)
Dept: ENDOCRINOLOGY | Facility: CLINIC | Age: 83
End: 2018-01-22
Payer: MEDICARE

## 2018-01-22 ENCOUNTER — LAB VISIT (OUTPATIENT)
Dept: LAB | Facility: HOSPITAL | Age: 83
End: 2018-01-22
Attending: INTERNAL MEDICINE
Payer: MEDICARE

## 2018-01-22 VITALS
WEIGHT: 206.81 LBS | DIASTOLIC BLOOD PRESSURE: 79 MMHG | HEART RATE: 86 BPM | HEIGHT: 64 IN | SYSTOLIC BLOOD PRESSURE: 141 MMHG | RESPIRATION RATE: 18 BRPM | BODY MASS INDEX: 35.31 KG/M2

## 2018-01-22 DIAGNOSIS — N18.30 CHRONIC KIDNEY DISEASE (CKD), STAGE III (MODERATE): ICD-10-CM

## 2018-01-22 DIAGNOSIS — E21.0 PRIMARY HYPERPARATHYROIDISM: ICD-10-CM

## 2018-01-22 DIAGNOSIS — E04.1 NODULAR THYROID DISEASE: ICD-10-CM

## 2018-01-22 DIAGNOSIS — E66.9 OBESITY (BMI 30-39.9): ICD-10-CM

## 2018-01-22 DIAGNOSIS — E04.1 THYROID NODULE: ICD-10-CM

## 2018-01-22 DIAGNOSIS — I10 ESSENTIAL HYPERTENSION: ICD-10-CM

## 2018-01-22 DIAGNOSIS — E55.9 VITAMIN D DEFICIENCY DISEASE: ICD-10-CM

## 2018-01-22 DIAGNOSIS — R73.9 HYPERGLYCEMIA: ICD-10-CM

## 2018-01-22 DIAGNOSIS — E03.9 ACQUIRED HYPOTHYROIDISM: ICD-10-CM

## 2018-01-22 DIAGNOSIS — E88.810 DYSMETABOLIC SYNDROME: ICD-10-CM

## 2018-01-22 DIAGNOSIS — Z78.0 POSTMENOPAUSAL: ICD-10-CM

## 2018-01-22 DIAGNOSIS — M85.80 OSTEOPENIA WITH HIGH RISK OF FRACTURE: ICD-10-CM

## 2018-01-22 DIAGNOSIS — E03.9 ACQUIRED HYPOTHYROIDISM: Primary | ICD-10-CM

## 2018-01-22 DIAGNOSIS — E83.52 HYPERCALCEMIA: ICD-10-CM

## 2018-01-22 LAB
25(OH)D3+25(OH)D2 SERPL-MCNC: 46 NG/ML
CA-I BLDV-SCNC: 1.33 MMOL/L
CHOLEST SERPL-MCNC: 197 MG/DL
CHOLEST/HDLC SERPL: 3 {RATIO}
ESTIMATED AVG GLUCOSE: 97 MG/DL
HBA1C MFR BLD HPLC: 5 %
HDLC SERPL-MCNC: 65 MG/DL
HDLC SERPL: 33 %
LDLC SERPL CALC-MCNC: 115 MG/DL
MAGNESIUM SERPL-MCNC: 1.5 MG/DL
NONHDLC SERPL-MCNC: 132 MG/DL
PHOSPHATE SERPL-MCNC: 2.4 MG/DL
PTH-INTACT SERPL-MCNC: 123 PG/ML
T3 SERPL-MCNC: 91 NG/DL
T4 FREE SERPL-MCNC: 1.19 NG/DL
TRIGL SERPL-MCNC: 85 MG/DL
TSH SERPL DL<=0.005 MIU/L-ACNC: 2.37 UIU/ML
URATE SERPL-MCNC: 6.3 MG/DL

## 2018-01-22 PROCEDURE — 86316 IMMUNOASSAY TUMOR OTHER: CPT

## 2018-01-22 PROCEDURE — 83970 ASSAY OF PARATHORMONE: CPT

## 2018-01-22 PROCEDURE — 36415 COLL VENOUS BLD VENIPUNCTURE: CPT | Mod: PO

## 2018-01-22 PROCEDURE — 99999 PR PBB SHADOW E&M-EST. PATIENT-LVL IV: CPT | Mod: PBBFAC,,, | Performed by: INTERNAL MEDICINE

## 2018-01-22 PROCEDURE — 99214 OFFICE O/P EST MOD 30 MIN: CPT | Mod: S$GLB,,, | Performed by: INTERNAL MEDICINE

## 2018-01-22 PROCEDURE — 84480 ASSAY TRIIODOTHYRONINE (T3): CPT

## 2018-01-22 PROCEDURE — 82330 ASSAY OF CALCIUM: CPT

## 2018-01-22 PROCEDURE — 84100 ASSAY OF PHOSPHORUS: CPT

## 2018-01-22 PROCEDURE — 84550 ASSAY OF BLOOD/URIC ACID: CPT

## 2018-01-22 PROCEDURE — 86800 THYROGLOBULIN ANTIBODY: CPT

## 2018-01-22 PROCEDURE — 83036 HEMOGLOBIN GLYCOSYLATED A1C: CPT

## 2018-01-22 PROCEDURE — 82306 VITAMIN D 25 HYDROXY: CPT

## 2018-01-22 PROCEDURE — 82308 ASSAY OF CALCITONIN: CPT

## 2018-01-22 PROCEDURE — 80061 LIPID PANEL: CPT

## 2018-01-22 PROCEDURE — 84443 ASSAY THYROID STIM HORMONE: CPT

## 2018-01-22 PROCEDURE — 83735 ASSAY OF MAGNESIUM: CPT

## 2018-01-22 PROCEDURE — 84439 ASSAY OF FREE THYROXINE: CPT

## 2018-01-22 RX ORDER — ALENDRONATE SODIUM 70 MG/1
70 TABLET ORAL
Qty: 12 TABLET | Refills: 3 | Status: SHIPPED | OUTPATIENT
Start: 2018-01-22 | End: 2018-06-11 | Stop reason: SDUPTHER

## 2018-01-22 NOTE — PROGRESS NOTES
Patient, Glenda Gimenez (MRN #[id number]), presented with a recorded BMI of 35.5 kg/m^2 and a documented comorbidity(s):  - Hypertension  to which the severe obesity is a contributing factor. This is consistent with the definition of severe obesity (BMI 35.0-35.9) with comorbidity (ICD-10 E66.01, Z68.35). The patient's severe obesity was monitored, evaluated, addressed and/or treated. This addendum to the medical record is made on 01/22/2018.

## 2018-01-23 LAB
THRYOGLOBULIN INTERPRETATION: ABNORMAL
THYROGLOB AB SERPL-ACNC: 7.2 IU/ML
THYROGLOB SERPL-MCNC: 1.7 NG/ML

## 2018-01-24 LAB — CALCIT SERPL-MCNC: <5 PG/ML

## 2018-01-26 ENCOUNTER — HOSPITAL ENCOUNTER (OUTPATIENT)
Dept: RADIOLOGY | Facility: CLINIC | Age: 83
Discharge: HOME | End: 2018-01-26
Attending: INTERNAL MEDICINE
Payer: MEDICARE

## 2018-01-26 ENCOUNTER — TELEPHONE (OUTPATIENT)
Dept: ENDOCRINOLOGY | Facility: CLINIC | Age: 83
End: 2018-01-26

## 2018-01-26 DIAGNOSIS — N18.30 CHRONIC KIDNEY DISEASE (CKD), STAGE III (MODERATE): ICD-10-CM

## 2018-01-26 DIAGNOSIS — M85.80 OSTEOPENIA WITH HIGH RISK OF FRACTURE: ICD-10-CM

## 2018-01-26 DIAGNOSIS — Z78.0 POSTMENOPAUSAL: ICD-10-CM

## 2018-01-26 LAB — CGA SERPL-MCNC: 76 NG/ML (ref 0–95)

## 2018-01-26 PROCEDURE — 77080 DXA BONE DENSITY AXIAL: CPT | Mod: TC,PO

## 2018-01-26 PROCEDURE — 77080 DXA BONE DENSITY AXIAL: CPT | Mod: 26,,, | Performed by: RADIOLOGY

## 2018-01-26 NOTE — TELEPHONE ENCOUNTER
----- Message from Goldie Bellamy sent at 1/26/2018  4:17 PM CST -----  Contact: self   Patient missed a call from office   Please call back [phone] (hgxz)

## 2018-01-26 NOTE — TELEPHONE ENCOUNTER
----- Message from Linnette Atkins sent at 1/25/2018  2:21 PM CST -----  Contact: pt  Pt calling regards to test results.[phone] (fdol)..

## 2018-02-09 DIAGNOSIS — E87.6 HYPOKALEMIA: ICD-10-CM

## 2018-02-09 RX ORDER — POTASSIUM CHLORIDE 20 MEQ/1
TABLET, EXTENDED RELEASE ORAL
Qty: 180 TABLET | Refills: 3 | Status: SHIPPED | OUTPATIENT
Start: 2018-02-09 | End: 2020-08-06 | Stop reason: SDUPTHER

## 2018-02-23 ENCOUNTER — HOSPITAL ENCOUNTER (OUTPATIENT)
Dept: RADIOLOGY | Facility: CLINIC | Age: 83
Discharge: HOME | End: 2018-02-23
Attending: INTERNAL MEDICINE
Payer: MEDICARE

## 2018-02-23 DIAGNOSIS — M54.17 LUMBOSACRAL RADICULOPATHY: ICD-10-CM

## 2018-02-23 DIAGNOSIS — M54.17 RADICULITIS, LUMBOSACRAL: ICD-10-CM

## 2018-02-23 DIAGNOSIS — M54.17 RADICULITIS, LUMBOSACRAL: Primary | ICD-10-CM

## 2018-02-23 PROCEDURE — 73610 X-RAY EXAM OF ANKLE: CPT | Mod: 26,LT,S$GLB, | Performed by: RADIOLOGY

## 2018-02-23 PROCEDURE — 72100 X-RAY EXAM L-S SPINE 2/3 VWS: CPT | Mod: 26,,, | Performed by: RADIOLOGY

## 2018-02-23 PROCEDURE — 73610 X-RAY EXAM OF ANKLE: CPT | Mod: TC,FY,PO,LT

## 2018-02-23 PROCEDURE — 72100 X-RAY EXAM L-S SPINE 2/3 VWS: CPT | Mod: TC,FY,PO

## 2018-03-20 ENCOUNTER — LAB VISIT (OUTPATIENT)
Dept: LAB | Facility: HOSPITAL | Age: 83
End: 2018-03-20
Attending: INTERNAL MEDICINE
Payer: MEDICARE

## 2018-03-20 DIAGNOSIS — E83.42 HYPOMAGNESEMIA: ICD-10-CM

## 2018-03-20 DIAGNOSIS — E83.42 HYPOMAGNESEMIA: Primary | ICD-10-CM

## 2018-03-20 LAB — MAGNESIUM SERPL-MCNC: 1.3 MG/DL

## 2018-03-20 PROCEDURE — 36415 COLL VENOUS BLD VENIPUNCTURE: CPT | Mod: PO

## 2018-03-20 PROCEDURE — 83735 ASSAY OF MAGNESIUM: CPT

## 2018-06-11 ENCOUNTER — LAB VISIT (OUTPATIENT)
Dept: LAB | Facility: HOSPITAL | Age: 83
End: 2018-06-11
Attending: INTERNAL MEDICINE
Payer: MEDICARE

## 2018-06-11 ENCOUNTER — OFFICE VISIT (OUTPATIENT)
Dept: ENDOCRINOLOGY | Facility: CLINIC | Age: 83
End: 2018-06-11
Payer: MEDICARE

## 2018-06-11 VITALS
HEIGHT: 64 IN | DIASTOLIC BLOOD PRESSURE: 71 MMHG | SYSTOLIC BLOOD PRESSURE: 140 MMHG | WEIGHT: 207.69 LBS | RESPIRATION RATE: 18 BRPM | HEART RATE: 85 BPM | BODY MASS INDEX: 35.46 KG/M2

## 2018-06-11 DIAGNOSIS — E21.0 PRIMARY HYPERPARATHYROIDISM: ICD-10-CM

## 2018-06-11 DIAGNOSIS — M85.80 OSTEOPENIA WITH HIGH RISK OF FRACTURE: ICD-10-CM

## 2018-06-11 DIAGNOSIS — E83.52 HYPERCALCEMIA: ICD-10-CM

## 2018-06-11 DIAGNOSIS — I10 ESSENTIAL HYPERTENSION: ICD-10-CM

## 2018-06-11 DIAGNOSIS — Z78.0 POSTMENOPAUSAL: ICD-10-CM

## 2018-06-11 DIAGNOSIS — E03.9 ACQUIRED HYPOTHYROIDISM: ICD-10-CM

## 2018-06-11 DIAGNOSIS — E88.810 DYSMETABOLIC SYNDROME: ICD-10-CM

## 2018-06-11 DIAGNOSIS — E55.9 VITAMIN D DEFICIENCY DISEASE: ICD-10-CM

## 2018-06-11 DIAGNOSIS — E66.9 OBESITY (BMI 35.0-39.9 WITHOUT COMORBIDITY): ICD-10-CM

## 2018-06-11 DIAGNOSIS — N18.30 CHRONIC KIDNEY DISEASE (CKD), STAGE III (MODERATE): ICD-10-CM

## 2018-06-11 DIAGNOSIS — E04.1 NODULAR THYROID DISEASE: ICD-10-CM

## 2018-06-11 DIAGNOSIS — E03.9 ACQUIRED HYPOTHYROIDISM: Primary | ICD-10-CM

## 2018-06-11 LAB
25(OH)D3+25(OH)D2 SERPL-MCNC: 47 NG/ML
ALBUMIN SERPL BCP-MCNC: 3.7 G/DL
ALP SERPL-CCNC: 78 U/L
ALT SERPL W/O P-5'-P-CCNC: 23 U/L
ANION GAP SERPL CALC-SCNC: 11 MMOL/L
AST SERPL-CCNC: 30 U/L
BASOPHILS # BLD AUTO: 0.06 K/UL
BASOPHILS NFR BLD: 1.3 %
BILIRUB SERPL-MCNC: 0.5 MG/DL
BUN SERPL-MCNC: 16 MG/DL
CA-I BLDV-SCNC: 1.32 MMOL/L
CALCIUM SERPL-MCNC: 10.4 MG/DL
CHLORIDE SERPL-SCNC: 103 MMOL/L
CO2 SERPL-SCNC: 25 MMOL/L
CREAT SERPL-MCNC: 0.8 MG/DL
DIFFERENTIAL METHOD: ABNORMAL
EOSINOPHIL # BLD AUTO: 0.2 K/UL
EOSINOPHIL NFR BLD: 5.1 %
ERYTHROCYTE [DISTWIDTH] IN BLOOD BY AUTOMATED COUNT: 13.4 %
EST. GFR  (AFRICAN AMERICAN): >60 ML/MIN/1.73 M^2
EST. GFR  (NON AFRICAN AMERICAN): >60 ML/MIN/1.73 M^2
GLUCOSE SERPL-MCNC: 106 MG/DL
HCT VFR BLD AUTO: 44 %
HGB BLD-MCNC: 14.7 G/DL
IMM GRANULOCYTES # BLD AUTO: 0 K/UL
IMM GRANULOCYTES NFR BLD AUTO: 0 %
LYMPHOCYTES # BLD AUTO: 1.9 K/UL
LYMPHOCYTES NFR BLD: 41.4 %
MAGNESIUM SERPL-MCNC: 1.7 MG/DL
MCH RBC QN AUTO: 31.7 PG
MCHC RBC AUTO-ENTMCNC: 33.4 G/DL
MCV RBC AUTO: 95 FL
MONOCYTES # BLD AUTO: 0.6 K/UL
MONOCYTES NFR BLD: 12.6 %
NEUTROPHILS # BLD AUTO: 1.8 K/UL
NEUTROPHILS NFR BLD: 39.6 %
NRBC BLD-RTO: 0 /100 WBC
PHOSPHATE SERPL-MCNC: 2.8 MG/DL
PLATELET # BLD AUTO: 304 K/UL
PMV BLD AUTO: 10.6 FL
POTASSIUM SERPL-SCNC: 3.7 MMOL/L
PROT SERPL-MCNC: 7.9 G/DL
PTH-INTACT SERPL-MCNC: 80 PG/ML
RBC # BLD AUTO: 4.63 M/UL
SODIUM SERPL-SCNC: 139 MMOL/L
T3 SERPL-MCNC: 80 NG/DL
T4 FREE SERPL-MCNC: 1.22 NG/DL
TSH SERPL DL<=0.005 MIU/L-ACNC: 1.81 UIU/ML
URATE SERPL-MCNC: 6.9 MG/DL
WBC # BLD AUTO: 4.54 K/UL

## 2018-06-11 PROCEDURE — 82308 ASSAY OF CALCITONIN: CPT

## 2018-06-11 PROCEDURE — 99214 OFFICE O/P EST MOD 30 MIN: CPT | Mod: S$GLB,,, | Performed by: INTERNAL MEDICINE

## 2018-06-11 PROCEDURE — 82306 VITAMIN D 25 HYDROXY: CPT

## 2018-06-11 PROCEDURE — 83735 ASSAY OF MAGNESIUM: CPT

## 2018-06-11 PROCEDURE — 99999 PR PBB SHADOW E&M-EST. PATIENT-LVL III: CPT | Mod: PBBFAC,,, | Performed by: INTERNAL MEDICINE

## 2018-06-11 PROCEDURE — 84550 ASSAY OF BLOOD/URIC ACID: CPT

## 2018-06-11 PROCEDURE — 84100 ASSAY OF PHOSPHORUS: CPT

## 2018-06-11 PROCEDURE — 83018 HEAVY METAL QUAN EACH NES: CPT

## 2018-06-11 PROCEDURE — 80053 COMPREHEN METABOLIC PANEL: CPT

## 2018-06-11 PROCEDURE — 84480 ASSAY TRIIODOTHYRONINE (T3): CPT

## 2018-06-11 PROCEDURE — 84443 ASSAY THYROID STIM HORMONE: CPT

## 2018-06-11 PROCEDURE — 84439 ASSAY OF FREE THYROXINE: CPT

## 2018-06-11 PROCEDURE — 85025 COMPLETE CBC W/AUTO DIFF WBC: CPT

## 2018-06-11 PROCEDURE — 82330 ASSAY OF CALCIUM: CPT

## 2018-06-11 PROCEDURE — 86316 IMMUNOASSAY TUMOR OTHER: CPT

## 2018-06-11 PROCEDURE — 84432 ASSAY OF THYROGLOBULIN: CPT

## 2018-06-11 PROCEDURE — 83970 ASSAY OF PARATHORMONE: CPT

## 2018-06-11 RX ORDER — ALENDRONATE SODIUM 70 MG/1
70 TABLET ORAL
Qty: 12 TABLET | Refills: 3 | Status: SHIPPED | OUTPATIENT
Start: 2018-06-11 | End: 2018-11-26 | Stop reason: SDUPTHER

## 2018-06-11 NOTE — PROGRESS NOTES
Subjective:      Patient ID: Glenda Gimenez is a 89 y.o. female.    Chief Complaint:     89 yr old postmenopausal lady with hypothyroidism on thyroid hormone repletion as well as presumed primary hyperparathyroidism seen in ffup today    History of Present Illness    Patient is an 89 yr old postmenopausal  lady with hyperparathyroidism and hypercalcemia seen in ffup today. Patient has however been seen by Dr Perez for these same problems in the past and had been undergoing clinical surviellance only. She in addition has hypothyroidism on thyroid hormone replacement; 88mcg QD, essential hypertension, thyroid nodular disease, hypovitaminosis D, Obesity and CKD stage 3.  Patient has had a prior kidney stone in the past ~ 5 yrs ago and this was spontaneously passed.  She denies any significant dyspepsia and has no local neck symptoms.  She does have long standing lumbago with recent right sided sciatica and small joint arthralgias.  Patients most recent thyroid USS was from 01/17 and showed stable nodular disease with no evidence of interval nodular growth. Her next thyroid sonogram would be in another 1-2 yrs.    Her DEXA from 12/15 showed progressive osteopenia with a high frax score based on which she was commenced on fosamax. Her most recent  ffup DEXA from 01/18 showed stable osteopenia with non significant evidence of progression and thus her next ffup DEXA should be for  thus is to be for ~ 01/20.     Patients last fall was in November 2017 but no associated fracture. Patient has not had any episodes of passing kidney stones and has no hematuria.  Patient had a recent fall in November 2017 when she had a concussion and nasal fracture. She has also been having left sided hip and radicular pain.         She indicates that her ambulation is more limited. She indicates that this is mainly due to myalgias in both limbs.    Review of Systems   Constitutional: Negative for diaphoresis and fatigue.   HENT:  "Negative for facial swelling, trouble swallowing and voice change.    Eyes: Negative for visual disturbance.   Respiratory: Negative for cough and shortness of breath.    Cardiovascular: Negative for chest pain and palpitations.   Gastrointestinal: Negative for constipation, nausea and vomiting.   Endocrine: Negative for polyuria.   Genitourinary: Negative for flank pain and hematuria.   Musculoskeletal: Positive for arthralgias (Mild chronic lower back pain and knee arthralgias.), back pain (chronic) and gait problem (Consequent on back and knee pains and so walks with aid of walking cane but this is chronic and not progressive.). Negative for myalgias.   Skin: Negative for color change, pallor and rash.   Neurological: Positive for numbness (radicular pain down the left thigh from lower back). Negative for dizziness and headaches.   Hematological: Does not bruise/bleed easily.   Psychiatric/Behavioral: Negative for confusion. The patient is not nervous/anxious.        Objective: Ht 5' 4" (1.626 m)   Wt 94.2 kg (207 lb 10.8 oz)   BMI 35.65 kg/m²  Body surface area is 2.06 meters squared. BP (!) 140/71   Pulse 85   Resp 18   Ht 5' 4" (1.626 m)   Wt 94.2 kg (207 lb 10.8 oz)   BMI 35.65 kg/m²              Physical Exam   Constitutional: She is oriented to person, place, and time. She appears well-developed and well-nourished. No distress.   Pleasant elderly lady. Clinically comfortable. Not chronically ill looking and not in apparent acute distress. Not pale, anicteric, afebrile. In excellent spirits   HENT:   Head: Normocephalic and atraumatic.   Eyes: Conjunctivae and EOM are normal. Pupils are equal, round, and reactive to light. No scleral icterus.   Neck: Normal range of motion. Neck supple. No JVD present. No thyromegaly present.   Cardiovascular: Normal rate, regular rhythm and normal heart sounds.    No murmur heard.  Pulmonary/Chest: Effort normal and breath sounds normal. No respiratory distress. She " has no wheezes.   Abdominal: Soft. There is no tenderness.   Mildly obese anterior abdominal wall.    Musculoskeletal: Normal range of motion. 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 She exhibits no edema or tenderness.   Gaite is slow but steady. Walks with the aid of a 4 pronged walking stick.   Neurological: She is alert and oriented to person, place, and time. No cranial nerve deficit.   Skin: Skin is warm and dry. No rash noted. She is not diaphoretic. No erythema. No pallor.   Diffuse cutaneous atrophy   Psychiatric: She has a normal mood and affect. Her behavior is normal. Judgment and thought content normal.   Vitals reviewed.      Lab Review:     Results for JENNIFER QURESHI (MRN [id number]) as of 6/11/2018 08:38   Ref. Range 1/22/2018 09:28 1/26/2018 13:29 2/23/2018 10:05 2/23/2018 10:07 3/20/2018 09:08   Calcium, Ion Latest Ref Range: 1.06 - 1.42 mmol/L 1.33       Phosphorus Latest Ref Range: 2.7 - 4.5 mg/dL 2.4 (L)       Magnesium Latest Ref Range: 1.6 - 2.6 mg/dL 1.5 (L)    1.3 (L)   Uric Acid Latest Ref Range: 2.4 - 5.7 mg/dL 6.3 (H)       Triglycerides Latest Ref Range: 30 - 150 mg/dL 85       Cholesterol Latest Ref Range: 120 - 199 mg/dL 197       HDL Latest Ref Range: 40 - 75 mg/dL 65       LDL Cholesterol Latest Ref Range: 63.0 - 159.0 mg/dL 115.0       Total Cholesterol/HDL Ratio Latest Ref Range: 2.0 - 5.0  3.0       Vit D, 25-Hydroxy Latest Ref Range: 30 - 96 ng/mL 46       Hemoglobin A1C Latest Ref Range: 4.0 - 5.6 % 5.0       Estimated Avg Glucose Latest Ref Range: 68 - 131 mg/dL 97       TSH Latest Ref Range: 0.400 - 4.000 uIU/mL 2.372       T3, Total Latest Ref Range: 60 - 180 ng/dL 91       Free T4 Latest Ref Range: 0.71 - 1.51 ng/dL 1.19       Thyroglobulin Interpretation Unknown SEE BELOW       Thyroglobulin Antibody Screen Latest Ref Range: <4.0 IU/mL 7.2 (H)       Thyroglobulin, Tumor Marker Latest  Units: ng/mL 1.7 (H)       PTH Latest Ref Range: 9.0 - 77.0 pg/mL 123.0 (H)       Calcitonin Latest Ref Range: <=7.6 pg/mL <5.0       Chromogranin A Latest Ref Range: 0 - 95 ng/mL 76       XR ANKLE COMPLETE 3 VIEW LEFT Unknown    Rpt    XR LUMBAR SPINE AP AND LATERAL Unknown   Rpt     DEXA BONE DENSITY SPINE HIP Unknown  Rpt      HDL/Chol Ratio Latest Ref Range: 20.0 - 50.0 % 33.0       Non-HDL Cholesterol Latest Units: mg/dL 132           Assessment:     1. Acquired hypothyroidism  CBC auto differential    Uric acid    T4, free    T3    TSH    Thyroglobulin   2. Essential hypertension  Comprehensive metabolic panel    Uric acid   3. Dysmetabolic syndrome     4. Vitamin D deficiency disease  Vitamin D   5. Primary hyperparathyroidism  Calcium, ionized    Phosphorus    Magnesium    PTH, intact    Comprehensive metabolic panel   6. Obesity (BMI 35.0-39.9 without comorbidity)     7. Nodular thyroid disease  US Soft Tissue Head Neck Thyroid    Thyroglobulin    Calcitonin    Chromogranin A    Iodine, Serum   8. Osteopenia with high risk of fracture     9. Postmenopausal     10. Chronic kidney disease (CKD), stage III (moderate)          Regarding hyperparathyroidism; Mild primary hyperparathyroidism; to continue serial conservative watchful survielance. To obtain basic monitoring labs in this regard as detailed above.  Regarding hypovitaminosis D; Will recheck 25 OH vitamin d today and in the interim continue OTC vitamin supplementation as before.  Regarding hypothyroidism; appears clinically euthyroid; will recheck TFTS today and to continue levothyroxine 88mcg QD.  Regarding thyroid nodular disease; to obtain ffup thyroid USs to evaluate the prior noted thyroid nodules.  Regarding hypertension; presently well controlled. To continue present antihypertensive regimen and to continue serial tracking of BP trends. Encouraged patient to continuing ensuring copious free water intake; ~ 70 fl oz daily.  Regarding high risk  osteopenia; reiterated fall risk reduction strategies including obtaining a life alert alarm.  To continue fosamax as before.  Regarding essential hypertension; BP fairly well controlled. To continue antihypertensives as before.  Regarding dysmetabolic syndrome; to obtain screening HBA1c and will continue to track serial trends  Regarding Grade 2 obesity; weight essentially stable. No active interventions regarding weight loss at this time but to continue prior efforts at portion size control to enable weight maintenance as before.   Regarding sciatica; encouraged to continue back strengthening exercises which she has been given by PT group and to also try and sleep in her bed rather than couch as she presently does.    Plan:     FFup in ~ 6mths

## 2018-06-12 LAB
CALCIT SERPL-MCNC: <5 PG/ML
THRYOGLOBULIN INTERPRETATION: ABNORMAL
THYROGLOB AB SERPL-ACNC: 16 IU/ML
THYROGLOB SERPL-MCNC: 1 NG/ML

## 2018-06-13 LAB — IODINE SERPL-MCNC: 78 NG/ML (ref 40–92)

## 2018-06-14 LAB — CGA SERPL-MCNC: 72 NG/ML (ref 0–95)

## 2018-06-18 ENCOUNTER — TELEPHONE (OUTPATIENT)
Dept: ENDOCRINOLOGY | Facility: CLINIC | Age: 83
End: 2018-06-18

## 2018-06-18 NOTE — TELEPHONE ENCOUNTER
----- Message from Goldie Bellamy sent at 6/18/2018  1:38 PM CDT -----  Contact: Self   [Patient needs to Maryan about her test results      Type:  Test Results    Who Called:  Patient   Name of Test (Labs   Date of Test:  06/11  Ordering Provider:  Juno   Where the test was performed:  [facility]   Best Call Back Number:    [phone] or   [phone]     Additional Information:

## 2018-08-07 ENCOUNTER — LAB VISIT (OUTPATIENT)
Dept: LAB | Facility: HOSPITAL | Age: 83
End: 2018-08-07
Attending: INTERNAL MEDICINE
Payer: MEDICARE

## 2018-08-07 DIAGNOSIS — E83.42 HYPOMAGNESEMIA: ICD-10-CM

## 2018-08-07 DIAGNOSIS — I10 ESSENTIAL HYPERTENSION, MALIGNANT: ICD-10-CM

## 2018-08-07 DIAGNOSIS — R73.9 BLOOD GLUCOSE ELEVATED: ICD-10-CM

## 2018-08-07 DIAGNOSIS — I10 ESSENTIAL HYPERTENSION, MALIGNANT: Primary | ICD-10-CM

## 2018-08-07 LAB
ALBUMIN SERPL BCP-MCNC: 3.8 G/DL
ALP SERPL-CCNC: 82 U/L
ALT SERPL W/O P-5'-P-CCNC: 27 U/L
ANION GAP SERPL CALC-SCNC: 7 MMOL/L
AST SERPL-CCNC: 33 U/L
BASOPHILS # BLD AUTO: 0.06 K/UL
BASOPHILS NFR BLD: 1.3 %
BILIRUB SERPL-MCNC: 0.8 MG/DL
BUN SERPL-MCNC: 12 MG/DL
CALCIUM SERPL-MCNC: 10.4 MG/DL
CHLORIDE SERPL-SCNC: 102 MMOL/L
CO2 SERPL-SCNC: 29 MMOL/L
CREAT SERPL-MCNC: 0.8 MG/DL
DIFFERENTIAL METHOD: ABNORMAL
EOSINOPHIL # BLD AUTO: 0.2 K/UL
EOSINOPHIL NFR BLD: 3.3 %
ERYTHROCYTE [DISTWIDTH] IN BLOOD BY AUTOMATED COUNT: 12.9 %
EST. GFR  (AFRICAN AMERICAN): >60 ML/MIN/1.73 M^2
EST. GFR  (NON AFRICAN AMERICAN): >60 ML/MIN/1.73 M^2
GLUCOSE SERPL-MCNC: 111 MG/DL
HCT VFR BLD AUTO: 44.7 %
HGB BLD-MCNC: 15 G/DL
IMM GRANULOCYTES # BLD AUTO: 0.02 K/UL
IMM GRANULOCYTES NFR BLD AUTO: 0.4 %
LYMPHOCYTES # BLD AUTO: 1.7 K/UL
LYMPHOCYTES NFR BLD: 36.7 %
MAGNESIUM SERPL-MCNC: 1.9 MG/DL
MCH RBC QN AUTO: 32 PG
MCHC RBC AUTO-ENTMCNC: 33.6 G/DL
MCV RBC AUTO: 95 FL
MONOCYTES # BLD AUTO: 0.7 K/UL
MONOCYTES NFR BLD: 14.2 %
NEUTROPHILS # BLD AUTO: 2 K/UL
NEUTROPHILS NFR BLD: 44.1 %
NRBC BLD-RTO: 0 /100 WBC
PLATELET # BLD AUTO: 288 K/UL
PMV BLD AUTO: 10.7 FL
POTASSIUM SERPL-SCNC: 3.9 MMOL/L
PROT SERPL-MCNC: 8 G/DL
RBC # BLD AUTO: 4.69 M/UL
SODIUM SERPL-SCNC: 138 MMOL/L
WBC # BLD AUTO: 4.58 K/UL

## 2018-08-07 PROCEDURE — 36415 COLL VENOUS BLD VENIPUNCTURE: CPT | Mod: PO

## 2018-08-07 PROCEDURE — 80053 COMPREHEN METABOLIC PANEL: CPT

## 2018-08-07 PROCEDURE — 85025 COMPLETE CBC W/AUTO DIFF WBC: CPT

## 2018-08-07 PROCEDURE — 83735 ASSAY OF MAGNESIUM: CPT

## 2018-09-06 RX ORDER — LEVOTHYROXINE SODIUM 88 UG/1
TABLET ORAL
Qty: 90 TABLET | Refills: 3 | Status: SHIPPED | OUTPATIENT
Start: 2018-09-06 | End: 2019-05-03 | Stop reason: SDUPTHER

## 2018-09-06 RX ORDER — LEVOTHYROXINE SODIUM 88 UG/1
TABLET ORAL
Qty: 90 TABLET | Refills: 3 | Status: SHIPPED | OUTPATIENT
Start: 2018-09-06 | End: 2020-09-16

## 2018-09-06 NOTE — TELEPHONE ENCOUNTER
----- Message from Ambarmanasa Charlton sent at 9/6/2018 10:45 AM CDT -----  Contact: Patient  Patient wants to make sure the refill for levothyroxine (SYNTHROID) 88 MCG tablet 90 tablet   Is sent to her new pharmacy.    [id number] - [place], [place] - [address] AT [facility] [address]  [address] [place] [address]  Phone: [phone] Fax: [phone]    She says she forgot to mention that and wants to make certain the information is correct.  Please call her once its called in so she can feel at ease.  [phone] or [phone].  Thank you

## 2018-09-11 ENCOUNTER — HOSPITAL ENCOUNTER (OUTPATIENT)
Dept: RADIOLOGY | Facility: CLINIC | Age: 83
Discharge: HOME | End: 2018-09-11
Attending: INTERNAL MEDICINE
Payer: MEDICARE

## 2018-09-11 DIAGNOSIS — E04.1 NODULAR THYROID DISEASE: ICD-10-CM

## 2018-09-11 PROCEDURE — 76536 US EXAM OF HEAD AND NECK: CPT | Mod: TC,PO

## 2018-09-11 PROCEDURE — 76536 US EXAM OF HEAD AND NECK: CPT | Mod: 26,,, | Performed by: RADIOLOGY

## 2018-11-26 ENCOUNTER — LAB VISIT (OUTPATIENT)
Dept: LAB | Facility: HOSPITAL | Age: 83
End: 2018-11-26
Attending: INTERNAL MEDICINE
Payer: MEDICARE

## 2018-11-26 ENCOUNTER — OFFICE VISIT (OUTPATIENT)
Dept: ENDOCRINOLOGY | Facility: CLINIC | Age: 83
End: 2018-11-26
Payer: MEDICARE

## 2018-11-26 VITALS
SYSTOLIC BLOOD PRESSURE: 154 MMHG | TEMPERATURE: 97 F | HEART RATE: 90 BPM | HEIGHT: 64 IN | WEIGHT: 212.94 LBS | DIASTOLIC BLOOD PRESSURE: 81 MMHG | BODY MASS INDEX: 36.35 KG/M2

## 2018-11-26 DIAGNOSIS — I10 ESSENTIAL HYPERTENSION: ICD-10-CM

## 2018-11-26 DIAGNOSIS — E03.9 ACQUIRED HYPOTHYROIDISM: Primary | ICD-10-CM

## 2018-11-26 DIAGNOSIS — E21.0 PRIMARY HYPERPARATHYROIDISM: ICD-10-CM

## 2018-11-26 DIAGNOSIS — M85.80 OSTEOPENIA WITH HIGH RISK OF FRACTURE: ICD-10-CM

## 2018-11-26 DIAGNOSIS — E55.9 VITAMIN D DEFICIENCY DISEASE: ICD-10-CM

## 2018-11-26 DIAGNOSIS — E83.52 HYPERCALCEMIA: ICD-10-CM

## 2018-11-26 DIAGNOSIS — E88.810 DYSMETABOLIC SYNDROME: ICD-10-CM

## 2018-11-26 DIAGNOSIS — E66.9 OBESITY (BMI 35.0-39.9 WITHOUT COMORBIDITY): ICD-10-CM

## 2018-11-26 DIAGNOSIS — E04.1 NODULAR THYROID DISEASE: ICD-10-CM

## 2018-11-26 DIAGNOSIS — Z78.0 POSTMENOPAUSAL: ICD-10-CM

## 2018-11-26 DIAGNOSIS — E03.9 ACQUIRED HYPOTHYROIDISM: ICD-10-CM

## 2018-11-26 LAB
25(OH)D3+25(OH)D2 SERPL-MCNC: 43 NG/ML
CA-I BLDV-SCNC: 1.36 MMOL/L
MAGNESIUM SERPL-MCNC: 1.9 MG/DL
PHOSPHATE SERPL-MCNC: 2.4 MG/DL
PTH-INTACT SERPL-MCNC: 76 PG/ML
T3 SERPL-MCNC: 89 NG/DL
T4 FREE SERPL-MCNC: 1.43 NG/DL
TSH SERPL DL<=0.005 MIU/L-ACNC: 1.83 UIU/ML
URATE SERPL-MCNC: 6.4 MG/DL

## 2018-11-26 PROCEDURE — 84439 ASSAY OF FREE THYROXINE: CPT

## 2018-11-26 PROCEDURE — 82306 VITAMIN D 25 HYDROXY: CPT

## 2018-11-26 PROCEDURE — 82330 ASSAY OF CALCIUM: CPT

## 2018-11-26 PROCEDURE — 1101F PT FALLS ASSESS-DOCD LE1/YR: CPT | Mod: CPTII,S$GLB,, | Performed by: INTERNAL MEDICINE

## 2018-11-26 PROCEDURE — 84480 ASSAY TRIIODOTHYRONINE (T3): CPT

## 2018-11-26 PROCEDURE — 99999 PR PBB SHADOW E&M-EST. PATIENT-LVL III: CPT | Mod: PBBFAC,,, | Performed by: INTERNAL MEDICINE

## 2018-11-26 PROCEDURE — 83970 ASSAY OF PARATHORMONE: CPT

## 2018-11-26 PROCEDURE — 84550 ASSAY OF BLOOD/URIC ACID: CPT

## 2018-11-26 PROCEDURE — 99214 OFFICE O/P EST MOD 30 MIN: CPT | Mod: S$GLB,,, | Performed by: INTERNAL MEDICINE

## 2018-11-26 PROCEDURE — 84432 ASSAY OF THYROGLOBULIN: CPT

## 2018-11-26 PROCEDURE — 83735 ASSAY OF MAGNESIUM: CPT

## 2018-11-26 PROCEDURE — 84443 ASSAY THYROID STIM HORMONE: CPT

## 2018-11-26 PROCEDURE — 84100 ASSAY OF PHOSPHORUS: CPT

## 2018-11-26 RX ORDER — ALENDRONATE SODIUM 70 MG/1
70 TABLET ORAL
Qty: 12 TABLET | Refills: 3 | Status: SHIPPED | OUTPATIENT
Start: 2018-11-26 | End: 2020-03-02 | Stop reason: CLARIF

## 2018-11-26 NOTE — PROGRESS NOTES
Subjective:      Patient ID: Glenda Gimenez is a 89 y.o. female.    Chief Complaint:      89 yr old postmenopausal lady with hypothyroidism on thyroid hormone repletion as well as presumed primary hyperparathyroidism seen in ffup today           History of Present Illness;     Patient is an 89 yr old postmenopausal  lady with hyperparathyroidism and hypercalcemia seen in up today. Patient has however been seen by Dr Perez for these same problems in the past and had been undergoing clinical surviellance only. She in addition has hypothyroidism on thyroid hormone replacement; 88mcg QD, essential hypertension, thyroid nodular disease, hypovitaminosis D, Obesity and CKD stage 3.  Patient has had a prior kidney stone in the past ~ 5 yrs ago and this was spontaneously passed.  She denies any significant dyspepsia and has no local neck symptoms.  She does have long standing lumbago with recent right sided sciatica and small joint arthralgias.  Patients most recent thyroid USS was from 01/17 and showed stable nodular disease with no evidence of interval nodular growth. Her next thyroid sonogram would be in another 1-2 yrs.     Her DEXA from 12/15 showed progressive osteopenia with a high frax score based on which she was commenced on fosamax. Her most recent  ffup DEXA from 01/18 showed stable osteopenia with non significant evidence of progression and thus her next ffup DEXA should be for  thus is to be for ~ 01/20.     Patients last fall was in November 2017 but no associated fracture. Patient has not had any episodes of passing kidney stones and has no hematuria.  Patient had a recent fall in November 2017 when she had a concussion and nasal fracture. She has also been having left sided hip and radicular pain.         She indicates that her ambulation is more limited. She indicates that this is mainly due to myalgias in both limbs.        Review of Systems   Constitutional: Negative for diaphoresis and fatigue.  "  HENT: Negative for facial swelling, trouble swallowing and voice change.    Eyes: Negative for visual disturbance.   Respiratory: Negative for cough and shortness of breath.    Cardiovascular: Negative for chest pain and palpitations.   Gastrointestinal: Negative for constipation, nausea and vomiting.   Endocrine: Negative for polyuria.   Genitourinary: Negative for flank pain and hematuria.   Musculoskeletal: Positive for arthralgias (Mild chronic lower back pain and knee arthralgias.), back pain (chronic) and gait problem (Consequent on back and knee pains and so walks with aid of walking cane but this is chronic and not progressive.). Negative for myalgias.   Skin: Negative for color change, pallor and rash.   Neurological: Positive for numbness (radicular pain down the left thigh from lower back). Negative for dizziness and headaches.   Hematological: Does not bruise/bleed easily.   Psychiatric/Behavioral: Negative for confusion. The patient is not nervous/anxious.        Objective:   BP (!) 154/81 (BP Location: Left arm, Patient Position: Sitting, BP Method: Medium (Automatic))   Pulse 90   Temp 97.4 °F (36.3 °C) (Oral) Comment (Src): missed blood pressure medication last night.  Ht 5' 4" (1.626 m)   Wt 96.6 kg (212 lb 15.4 oz)   BMI 36.56 kg/m²  Body surface area is 2.09 meters squared.         Physical Exam   Constitutional: She is oriented to person, place, and time. She appears well-developed and well-nourished. No distress.   Pleasant elderly lady. Clinically comfortable. Not chronically ill looking and not in apparent acute distress. Not pale, anicteric, afebrile. In excellent spirits   HENT:   Head: Normocephalic and atraumatic.   Eyes: Conjunctivae and EOM are normal. Pupils are equal, round, and reactive to light. No scleral icterus.   Neck: Normal range of motion. Neck supple. No JVD present. No thyromegaly present.   Cardiovascular: Normal rate, regular rhythm and normal heart sounds.   No " murmur heard.  Pulmonary/Chest: Effort normal and breath sounds normal. No respiratory distress. She has no wheezes.   Abdominal: Soft. There is no tenderness.   Mildly obese anterior abdominal wall.    Musculoskeletal: Normal range of motion. 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 She exhibits no edema or tenderness.   Gaite is slow but steady. Walks with the aid of a 4 pronged walking stick.   Neurological: She is alert and oriented to person, place, and time. No cranial nerve deficit.   Skin: Skin is warm and dry. No rash noted. She is not diaphoretic. No erythema. No pallor.   Diffuse cutaneous atrophy   Psychiatric: She has a normal mood and affect. Her behavior is normal. Judgment and thought content normal.   Vitals reviewed.      Lab Review:     Results for JENNIFER QURESHI (MRN [id number]) as of 11/26/2018 08:44   Ref. Range 6/11/2018 09:12 8/7/2018 10:22 9/11/2018 09:36   WBC Latest Ref Range: 3.90 - 12.70 K/uL 4.54 4.58    RBC Latest Ref Range: 4.00 - 5.40 M/uL 4.63 4.69    Hemoglobin Latest Ref Range: 12.0 - 16.0 g/dL 14.7 15.0    Hematocrit Latest Ref Range: 37.0 - 48.5 % 44.0 44.7    MCV Latest Ref Range: 82 - 98 fL 95 95    MCH Latest Ref Range: 27.0 - 31.0 pg 31.7 (H) 32.0 (H)    MCHC Latest Ref Range: 32.0 - 36.0 g/dL 33.4 33.6    RDW Latest Ref Range: 11.5 - 14.5 % 13.4 12.9    Platelets Latest Ref Range: 150 - 350 K/uL 304 288    MPV Latest Ref Range: 9.2 - 12.9 fL 10.6 10.7    Gran% Latest Ref Range: 38.0 - 73.0 % 39.6 44.1    Gran # (ANC) Latest Ref Range: 1.8 - 7.7 K/uL 1.8 2.0    Immature Granulocytes Latest Ref Range: 0.0 - 0.5 % 0.0 0.4    Immature Grans (Abs) Latest Ref Range: 0.00 - 0.04 K/uL 0.00 0.02    Lymph% Latest Ref Range: 18.0 - 48.0 % 41.4 36.7    Lymph # Latest Ref Range: 1.0 - 4.8 K/uL 1.9 1.7    Mono% Latest Ref Range: 4.0 - 15.0 % 12.6 14.2    Mono # Latest Ref Range: 0.3 - 1.0 K/uL 0.6  0.7    Eosinophil% Latest Ref Range: 0.0 - 8.0 % 5.1 3.3    Eos # Latest Ref Range: 0.0 - 0.5 K/uL 0.2 0.2    Basophil% Latest Ref Range: 0.0 - 1.9 % 1.3 1.3    Baso # Latest Ref Range: 0.00 - 0.20 K/uL 0.06 0.06    nRBC Latest Ref Range: 0 /100 WBC 0 0    Differential Method Unknown Automated Automated    Sodium Latest Ref Range: 136 - 145 mmol/L 139 138    Potassium Latest Ref Range: 3.5 - 5.1 mmol/L 3.7 3.9    Chloride Latest Ref Range: 95 - 110 mmol/L 103 102    CO2 Latest Ref Range: 23 - 29 mmol/L 25 29    Anion Gap Latest Ref Range: 8 - 16 mmol/L 11 7 (L)    BUN, Bld Latest Ref Range: 10 - 30 mg/dL 16 12    Creatinine Latest Ref Range: 0.5 - 1.4 mg/dL 0.8 0.8    eGFR if non African American Latest Ref Range: >60 mL/min/1.73 m^2 >60.0 >60.0    eGFR if African American Latest Ref Range: >60 mL/min/1.73 m^2 >60.0 >60.0    Glucose Latest Ref Range: 70 - 110 mg/dL 106 111 (H)    Calcium Latest Ref Range: 8.7 - 10.5 mg/dL 10.4 10.4    Calcium, Ion Latest Ref Range: 1.06 - 1.42 mmol/L 1.32     Phosphorus Latest Ref Range: 2.7 - 4.5 mg/dL 2.8     Magnesium Latest Ref Range: 1.6 - 2.6 mg/dL 1.7 1.9    Alkaline Phosphatase Latest Ref Range: 55 - 135 U/L 78 82    Total Protein Latest Ref Range: 6.0 - 8.4 g/dL 7.9 8.0    Albumin Latest Ref Range: 3.5 - 5.2 g/dL 3.7 3.8    Uric Acid Latest Ref Range: 2.4 - 5.7 mg/dL 6.9 (H)     Total Bilirubin Latest Ref Range: 0.1 - 1.0 mg/dL 0.5 0.8    AST Latest Ref Range: 10 - 40 U/L 30 33    ALT Latest Ref Range: 10 - 44 U/L 23 27    Iodine, Serum Latest Ref Range: 40 - 92 ng/mL 78     Vit D, 25-Hydroxy Latest Ref Range: 30 - 96 ng/mL 47     TSH Latest Ref Range: 0.400 - 4.000 uIU/mL 1.808     T3, Total Latest Ref Range: 60 - 180 ng/dL 80     Free T4 Latest Ref Range: 0.71 - 1.51 ng/dL 1.22     Thyroglobulin Interpretation Unknown SEE BELOW     Thyroglobulin Antibody Screen Latest Ref Range: <4.0 IU/mL 16 (H)     Thyroglobulin, Tumor Marker Latest Units: ng/mL 1.0 (H)     PTH Latest  Ref Range: 9.0 - 77.0 pg/mL 80.0 (H)     Calcitonin Latest Ref Range: <=7.6 pg/mL <5.0     Chromogranin A Latest Ref Range: 0 - 95 ng/mL 72     US SOFT TISSUE HEAD NECK THYROID Unknown   Rpt       Assessment:     1. Acquired hypothyroidism  T4, free    Thyroglobulin    T3    TSH   2. Nodular thyroid disease     3. Vitamin D deficiency disease  Calcium, ionized    Magnesium    Phosphorus    PTH, intact    Vitamin D   4. Primary hyperparathyroidism  PTH, intact    alendronate (FOSAMAX) 70 MG tablet   5. Obesity (BMI 35.0-39.9 without comorbidity)     6. Dysmetabolic syndrome  Uric acid   7. Osteopenia with high risk of fracture  Calcium, ionized    Magnesium    Phosphorus    PTH, intact    alendronate (FOSAMAX) 70 MG tablet   8. Postmenopausal     9. Essential hypertension     10. Hypercalcemia  alendronate (FOSAMAX) 70 MG tablet        Regarding hyperparathyroidism; Mild primary hyperparathyroidism; to continue serial conservative watchful survielance. To obtain basic monitoring labs in this regard as detailed above.  Regarding hypovitaminosis D; Will recheck 25 OH vitamin d today and in the interim continue OTC vitamin supplementation as before.  Regarding hypothyroidism; appears clinically euthyroid; will recheck TFTS today and to continue levothyroxine 88mcg QD.  Regarding thyroid nodular disease; to obtain ffup thyroid USs for ~ 09/19  to evaluate the prior noted thyroid nodules.  Regarding hypertension; presently well controlled. To continue present antihypertensive regimen and to continue serial tracking of BP trends. Encouraged patient to continuing ensuring copious free water intake; ~ 70 fl oz daily.  Regarding high risk osteopenia; reiterated fall risk reduction strategies including obtaining a life alert alarm.  To continue fosamax as before.  Regarding essential hypertension; BP fairly well controlled. To continue antihypertensives as before.  Regarding dysmetabolic syndrome; to obtain screening HBA1c and  will continue to track serial trends  Regarding Grade 2 obesity; weight essentially stable. No active interventions regarding weight loss at this time but to continue prior efforts at portion size control to enable weight maintenance as before.   Regarding sciatica; encouraged to continue back strengthening exercises which she has been given by PT group and to also try and sleep in her bed rather than couch as she presently does.        Plan:     FFup in ~ 4mths

## 2018-11-28 LAB
THRYOGLOBULIN INTERPRETATION: ABNORMAL
THYROGLOB AB SERPL-ACNC: 27 IU/ML
THYROGLOB SERPL-MCNC: 0.9 NG/ML

## 2018-12-14 ENCOUNTER — LAB VISIT (OUTPATIENT)
Dept: LAB | Facility: HOSPITAL | Age: 83
End: 2018-12-14
Attending: INTERNAL MEDICINE
Payer: MEDICARE

## 2018-12-14 DIAGNOSIS — M10.9 GOUT, UNSPECIFIED: Primary | ICD-10-CM

## 2018-12-14 LAB
ALBUMIN SERPL BCP-MCNC: 3.9 G/DL
ALP SERPL-CCNC: 89 U/L
ALT SERPL W/O P-5'-P-CCNC: 30 U/L
ANION GAP SERPL CALC-SCNC: 9 MMOL/L
AST SERPL-CCNC: 40 U/L
BILIRUB SERPL-MCNC: 0.8 MG/DL
BUN SERPL-MCNC: 14 MG/DL
CALCIUM SERPL-MCNC: 10.6 MG/DL
CHLORIDE SERPL-SCNC: 99 MMOL/L
CO2 SERPL-SCNC: 30 MMOL/L
CREAT SERPL-MCNC: 0.8 MG/DL
EST. GFR  (AFRICAN AMERICAN): >60 ML/MIN/1.73 M^2
EST. GFR  (NON AFRICAN AMERICAN): >60 ML/MIN/1.73 M^2
GLUCOSE SERPL-MCNC: 120 MG/DL
POTASSIUM SERPL-SCNC: 3.9 MMOL/L
PROT SERPL-MCNC: 8.5 G/DL
SODIUM SERPL-SCNC: 138 MMOL/L
URATE SERPL-MCNC: 6.9 MG/DL

## 2018-12-14 PROCEDURE — 84550 ASSAY OF BLOOD/URIC ACID: CPT

## 2018-12-14 PROCEDURE — 36415 COLL VENOUS BLD VENIPUNCTURE: CPT | Mod: PO

## 2018-12-14 PROCEDURE — 80053 COMPREHEN METABOLIC PANEL: CPT

## 2019-05-03 ENCOUNTER — OFFICE VISIT (OUTPATIENT)
Dept: ENDOCRINOLOGY | Facility: CLINIC | Age: 84
End: 2019-05-03
Payer: MEDICARE

## 2019-05-03 VITALS
DIASTOLIC BLOOD PRESSURE: 81 MMHG | HEIGHT: 64 IN | WEIGHT: 217.81 LBS | SYSTOLIC BLOOD PRESSURE: 141 MMHG | RESPIRATION RATE: 16 BRPM | BODY MASS INDEX: 37.19 KG/M2 | HEART RATE: 92 BPM | TEMPERATURE: 98 F

## 2019-05-03 DIAGNOSIS — E66.9 OBESITY (BMI 35.0-39.9 WITHOUT COMORBIDITY): ICD-10-CM

## 2019-05-03 DIAGNOSIS — E55.9 VITAMIN D DEFICIENCY DISEASE: ICD-10-CM

## 2019-05-03 DIAGNOSIS — R53.82 CHRONIC FATIGUE: ICD-10-CM

## 2019-05-03 DIAGNOSIS — I77.89 OTHER SPECIFIED DISORDERS OF ARTERIES AND ARTERIOLES: ICD-10-CM

## 2019-05-03 DIAGNOSIS — E04.1 NODULAR THYROID DISEASE: ICD-10-CM

## 2019-05-03 DIAGNOSIS — E83.52 HYPERCALCEMIA: ICD-10-CM

## 2019-05-03 DIAGNOSIS — Z78.0 POSTMENOPAUSAL: ICD-10-CM

## 2019-05-03 DIAGNOSIS — R01.1 HEART MURMUR: ICD-10-CM

## 2019-05-03 DIAGNOSIS — R73.9 HYPERGLYCEMIA: ICD-10-CM

## 2019-05-03 DIAGNOSIS — E03.9 ACQUIRED HYPOTHYROIDISM: Primary | ICD-10-CM

## 2019-05-03 DIAGNOSIS — E88.810 DYSMETABOLIC SYNDROME: ICD-10-CM

## 2019-05-03 DIAGNOSIS — E21.0 PRIMARY HYPERPARATHYROIDISM: ICD-10-CM

## 2019-05-03 DIAGNOSIS — I10 ESSENTIAL HYPERTENSION: ICD-10-CM

## 2019-05-03 DIAGNOSIS — E04.9 GOITER: ICD-10-CM

## 2019-05-03 DIAGNOSIS — M85.80 OSTEOPENIA WITH HIGH RISK OF FRACTURE: ICD-10-CM

## 2019-05-03 DIAGNOSIS — Z72.820 SLEEP DEPRIVATION: ICD-10-CM

## 2019-05-03 PROCEDURE — 1101F PR PT FALLS ASSESS DOC 0-1 FALLS W/OUT INJ PAST YR: ICD-10-PCS | Mod: CPTII,S$GLB,, | Performed by: INTERNAL MEDICINE

## 2019-05-03 PROCEDURE — 99999 PR PBB SHADOW E&M-EST. PATIENT-LVL III: ICD-10-PCS | Mod: PBBFAC,,, | Performed by: INTERNAL MEDICINE

## 2019-05-03 PROCEDURE — 99999 PR PBB SHADOW E&M-EST. PATIENT-LVL III: CPT | Mod: PBBFAC,,, | Performed by: INTERNAL MEDICINE

## 2019-05-03 PROCEDURE — 99214 PR OFFICE/OUTPT VISIT, EST, LEVL IV, 30-39 MIN: ICD-10-PCS | Mod: S$GLB,,, | Performed by: INTERNAL MEDICINE

## 2019-05-03 PROCEDURE — 99499 RISK ADDL DX/OHS AUDIT: ICD-10-PCS | Mod: S$GLB,,, | Performed by: INTERNAL MEDICINE

## 2019-05-03 PROCEDURE — 1101F PT FALLS ASSESS-DOCD LE1/YR: CPT | Mod: CPTII,S$GLB,, | Performed by: INTERNAL MEDICINE

## 2019-05-03 PROCEDURE — 99499 UNLISTED E&M SERVICE: CPT | Mod: S$GLB,,, | Performed by: INTERNAL MEDICINE

## 2019-05-03 PROCEDURE — 99214 OFFICE O/P EST MOD 30 MIN: CPT | Mod: S$GLB,,, | Performed by: INTERNAL MEDICINE

## 2019-05-03 NOTE — PROGRESS NOTES
Patient, Glenda Gimenez (MRN #[id number]), presented with a recorded BMI of 37.39 kg/m^2 and a documented comorbidity(s):  - Hypertension  to which the severe obesity is a contributing factor. This is consistent with the definition of severe obesity (BMI 35.0-39.9) with comorbidity (ICD-10 E66.01, Z68.35). The patient's severe obesity was monitored, evaluated, addressed and/or treated. This addendum to the medical record is made on 05/03/2019.

## 2019-05-03 NOTE — PROGRESS NOTES
Subjective:      Patient ID: Glenda Gimenez is a 89 y.o. female.    Chief Complaint:      89 yr old postmenopausal lady with hypothyroidism on thyroid hormone repletion as well as presumed primary hyperparathyroidism seen in up today        History of Present Illness    Patient is an 89 yr old postmenopausal  lady with hyperparathyroidism and hypercalcemia seen in up today. Patient has however been seen by Dr Perez for these same problems in the past and had been undergoing clinical surviellance only. She in addition has hypothyroidism on thyroid hormone replacement; 88mcg QD, essential hypertension, thyroid nodular disease, hypovitaminosis D, Obesity and CKD stage 3.  Patient has had a prior kidney stone in the past ~ 5 yrs ago and this was spontaneously passed.  She denies any significant dyspepsia and has no local neck symptoms.  She does have long standing lumbago with recent right sided sciatica and small joint arthralgias.  Patients most recent thyroid USS was from 09/18 and showed stable nodular disease with no evidence of interval nodular growth. Her next thyroid sonogram would be for ~ 09/20.     Her DEXA from 12/15 showed progressive osteopenia with a high frax score based on which she was commenced on fosamax. Her most recent  ffup DEXA from 01/18 showed stable osteopenia with non significant evidence of progression and thus her next ffup DEXA should be for  thus is to be for ~ 01/20.     Patients last fall was in November 2017 but no associated fracture. Patient has not had any episodes of passing kidney stones and has no hematuria.  Patient hasnt had any had any falls since her last visit.       She indicates that her ambulation is more limited. She indicates that this is mainly due to myalgias in both limbs.        Review of Systems   Constitutional: Negative for diaphoresis and fatigue.   HENT: Negative for facial swelling, trouble swallowing and voice change.    Eyes: Negative for visual  "disturbance.   Respiratory: Negative for cough and shortness of breath.    Cardiovascular: Negative for chest pain and palpitations.   Gastrointestinal: Negative for constipation, nausea and vomiting.   Endocrine: Negative for polyuria.   Genitourinary: Negative for flank pain and hematuria.   Musculoskeletal: Positive for arthralgias (Mild chronic lower back pain and knee arthralgias.), back pain (chronic) and gait problem (Consequent on back and knee pains and so walks with aid of walking cane but this is chronic and not progressive.). Negative for myalgias.   Skin: Negative for color change, pallor and rash.   Neurological: Positive for numbness (radicular pain down the left thigh from lower back). Negative for dizziness and headaches.   Hematological: Does not bruise/bleed easily.   Psychiatric/Behavioral: Negative for confusion. The patient is not nervous/anxious.        Objective: BP (!) 141/81 (BP Location: Right arm, Patient Position: Sitting, BP Method: Medium (Automatic))   Pulse 92   Temp 97.5 °F (36.4 °C) (Oral)   Resp 16   Ht 5' 4" (1.626 m)   Wt 98.8 kg (217 lb 13 oz)   BMI 37.39 kg/m²     Ht 5' 4" (1.626 m)   Wt 98.8 kg (217 lb 13 oz)   BMI 37.39 kg/m²        Physical Exam   Constitutional: She is oriented to person, place, and time. She appears well-developed and well-nourished. No distress.   Pleasant elderly lady. Clinically comfortable. Not chronically ill looking and not in apparent acute distress. Not pale, anicteric, afebrile. In excellent spirits   HENT:   Head: Normocephalic and atraumatic.   Eyes: Pupils are equal, round, and reactive to light. Conjunctivae and EOM are normal. No scleral icterus.   Neck: Trachea normal and normal range of motion. Neck supple. No JVD present. No thyromegaly present.       Cardiovascular: Normal rate and regular rhythm.   Murmur (ESM ~ 2-3/6 with ?? mild radiation to the neck) heard.  Pulmonary/Chest: Effort normal and breath sounds normal. No " respiratory distress. She has no wheezes.   Abdominal: Soft. There is no tenderness.   Mildly obese anterior abdominal wall.    Musculoskeletal: Normal range of motion. 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 She exhibits no edema or tenderness.   Gaite is slow but steady. Walks with the aid of a 4 pronged walking stick.   Neurological: She is alert and oriented to person, place, and time. No cranial nerve deficit.   Skin: Skin is warm and dry. No rash noted. She is not diaphoretic. No erythema. No pallor.   Diffuse cutaneous atrophy   Psychiatric: She has a normal mood and affect. Her behavior is normal. Judgment and thought content normal.   Vitals reviewed.      Lab Review:     Results for JENNIFER QURESHI (MRN [id number]) as of 5/3/2019 11:03   Ref. Range 11/26/2018 10:00 12/14/2018 09:46   Sodium Latest Ref Range: 136 - 145 mmol/L  138   Potassium Latest Ref Range: 3.5 - 5.1 mmol/L  3.9   Chloride Latest Ref Range: 95 - 110 mmol/L  99   CO2 Latest Ref Range: 23 - 29 mmol/L  30 (H)   Anion Gap Latest Ref Range: 8 - 16 mmol/L  9   BUN, Bld Latest Ref Range: 10 - 30 mg/dL  14   Creatinine Latest Ref Range: 0.5 - 1.4 mg/dL  0.8   eGFR if non African American Latest Ref Range: >60 mL/min/1.73 m^2  >60.0   eGFR if African American Latest Ref Range: >60 mL/min/1.73 m^2  >60.0   Glucose Latest Ref Range: 70 - 110 mg/dL  120 (H)   Calcium Latest Ref Range: 8.7 - 10.5 mg/dL  10.6 (H)   Calcium, Ion Latest Ref Range: 1.06 - 1.42 mmol/L 1.36    Phosphorus Latest Ref Range: 2.7 - 4.5 mg/dL 2.4 (L)    Magnesium Latest Ref Range: 1.6 - 2.6 mg/dL 1.9    Alkaline Phosphatase Latest Ref Range: 55 - 135 U/L  89   PROTEIN TOTAL Latest Ref Range: 6.0 - 8.4 g/dL  8.5 (H)   Albumin Latest Ref Range: 3.5 - 5.2 g/dL  3.9   Uric Acid Latest Ref Range: 2.4 - 5.7 mg/dL 6.4 (H) 6.9 (H)   BILIRUBIN TOTAL Latest Ref Range: 0.1 - 1.0 mg/dL  0.8   AST  Latest Ref Range: 10 - 40 U/L  40   ALT Latest Ref Range: 10 - 44 U/L  30   Vit D, 25-Hydroxy Latest Ref Range: 30 - 96 ng/mL 43    TSH Latest Ref Range: 0.400 - 4.000 uIU/mL 1.835    T3, Total Latest Ref Range: 60 - 180 ng/dL 89    Free T4 Latest Ref Range: 0.71 - 1.51 ng/dL 1.43    Thyroglobulin Interpretation Unknown SEE BELOW    Thyroglobulin Antibody Screen Latest Ref Range: <4.0 IU/mL 27 (H)    Thyroglobulin, Tumor Marker Latest Units: ng/mL 0.9 (H)    PTH Latest Ref Range: 9.0 - 77.0 pg/mL 76.0        Assessment:     1. Acquired hypothyroidism  T4, free    T3    TSH    Uric acid    CBC auto differential    Lipid panel   2. Nodular thyroid disease  Chromogranin A    Calcitonin    Iodine, Serum    Thyroglobulin   3. Goiter     4. Primary hyperparathyroidism     5. Vitamin D deficiency disease  Vitamin D    PTH, intact    Calcium, ionized    Comprehensive metabolic panel   6. Dysmetabolic syndrome  Comprehensive metabolic panel    Lipid panel    Hemoglobin A1c   7. Postmenopausal     8. Hypercalcemia     9. Essential hypertension  Microalbumin/creatinine urine ratio    Urinalysis   10. Obesity (BMI 35.0-39.9 without comorbidity)     11. Osteopenia with high risk of fracture     12. Hyperglycemia  Hemoglobin A1c   13. Sleep deprivation  Melatonin        Regarding hyperparathyroidism; Mild primary hyperparathyroidism; to continue serial conservative watchful survielance. To obtain basic monitoring labs in this regard as detailed above.  Regarding hypovitaminosis D; Will recheck 25 OH vitamin d today and in the interim continue OTC vitamin supplementation as before.  Regarding hypothyroidism; appears clinically euthyroid; will recheck TFTS today and to continue levothyroxine 88mcg QD.  Regarding thyroid nodular disease; to obtain ffup thyroid USs for ~ 09/19  to evaluate the prior noted thyroid nodules.  Regarding hypertension; presently well controlled. To continue present antihypertensive regimen and to continue  serial tracking of BP trends. Encouraged patient to continuing ensuring copious free water intake; ~ 70 fl oz daily.  Regarding high risk osteopenia; reiterated fall risk reduction strategies including obtaining a life alert alarm.  To continue fosamax as before.  Regarding essential hypertension; BP fairly well controlled. To continue antihypertensives as before.  Regarding dysmetabolic syndrome; to obtain screening HBA1c and will continue to track serial trends  Regarding Grade 2 obesity; weight essentially stable. No active interventions regarding weight loss at this time but to continue prior efforts at portion size control to enable weight maintenance as before.   Regarding sciatica; encouraged to continue back strengthening exercises which she has been given by PT group and to also try and sleep in her bed rather than couch as she presently does.  Regarding fatigue with newly noted heart murmur and neck bruit; to obtain echo, carotid USS and cardiology consultation.        Plan:       FFup in ~  4mths

## 2019-05-07 ENCOUNTER — TELEPHONE (OUTPATIENT)
Dept: ENDOCRINOLOGY | Facility: CLINIC | Age: 84
End: 2019-05-07

## 2019-05-07 DIAGNOSIS — I25.10 ASCVD (ARTERIOSCLEROTIC CARDIOVASCULAR DISEASE): ICD-10-CM

## 2019-05-07 DIAGNOSIS — I10 ESSENTIAL HYPERTENSION: Primary | ICD-10-CM

## 2019-05-07 NOTE — TELEPHONE ENCOUNTER
----- Message from Juan Cee sent at 5/7/2019  2:22 PM CDT -----  Type: Needs Medical Advice    Who Called:  Patient  Best Call Back Number: [phone] (home)   Additional Information: Is asking that Maryan give her a call back

## 2019-05-08 NOTE — TELEPHONE ENCOUNTER
Spoke to [facility] and the patients order of her Ultrasound has the wrong CPT code. They are requesting a new order be placed with correct CPT code.

## 2019-05-13 ENCOUNTER — HOSPITAL ENCOUNTER (OUTPATIENT)
Dept: RADIOLOGY | Facility: HOSPITAL | Age: 84
Discharge: HOME | End: 2019-05-13
Attending: INTERNAL MEDICINE
Payer: MEDICARE

## 2019-05-13 DIAGNOSIS — I10 ESSENTIAL HYPERTENSION: ICD-10-CM

## 2019-05-13 DIAGNOSIS — I25.10 ASCVD (ARTERIOSCLEROTIC CARDIOVASCULAR DISEASE): ICD-10-CM

## 2019-05-13 DIAGNOSIS — I25.10 ASCVD (ARTERIOSCLEROTIC CARDIOVASCULAR DISEASE): Primary | ICD-10-CM

## 2019-05-13 PROCEDURE — 93880 US CAROTID BILATERAL: ICD-10-PCS | Mod: 26,,, | Performed by: RADIOLOGY

## 2019-05-13 PROCEDURE — 93880 EXTRACRANIAL BILAT STUDY: CPT | Mod: 26,,, | Performed by: RADIOLOGY

## 2019-05-13 PROCEDURE — 93880 EXTRACRANIAL BILAT STUDY: CPT | Mod: TC

## 2019-05-13 NOTE — TELEPHONE ENCOUNTER
Spoke to [facility] and they are requesting a new order for the patients US Coratid bilateral because order needs US in front of order instead of CV. Consulted with Dr. Fraire and new order placed.

## 2019-06-20 ENCOUNTER — LAB VISIT (OUTPATIENT)
Dept: LAB | Facility: HOSPITAL | Age: 84
End: 2019-06-20
Attending: INTERNAL MEDICINE
Payer: MEDICARE

## 2019-06-20 DIAGNOSIS — I10 ESSENTIAL HYPERTENSION, MALIGNANT: Primary | ICD-10-CM

## 2019-06-20 LAB
ALBUMIN SERPL BCP-MCNC: 3.8 G/DL (ref 3.5–5.2)
ALP SERPL-CCNC: 92 U/L (ref 55–135)
ALT SERPL W/O P-5'-P-CCNC: 20 U/L (ref 10–44)
ANION GAP SERPL CALC-SCNC: 11 MMOL/L (ref 8–16)
AST SERPL-CCNC: 30 U/L (ref 10–40)
BASOPHILS # BLD AUTO: 0.06 K/UL (ref 0–0.2)
BASOPHILS NFR BLD: 1.1 % (ref 0–1.9)
BILIRUB SERPL-MCNC: 0.8 MG/DL (ref 0.1–1)
BUN SERPL-MCNC: 15 MG/DL (ref 10–30)
CALCIUM SERPL-MCNC: 10.5 MG/DL (ref 8.7–10.5)
CHLORIDE SERPL-SCNC: 102 MMOL/L (ref 95–110)
CO2 SERPL-SCNC: 25 MMOL/L (ref 23–29)
CREAT SERPL-MCNC: 0.8 MG/DL (ref 0.5–1.4)
DIFFERENTIAL METHOD: ABNORMAL
EOSINOPHIL # BLD AUTO: 0.2 K/UL (ref 0–0.5)
EOSINOPHIL NFR BLD: 3.3 % (ref 0–8)
ERYTHROCYTE [DISTWIDTH] IN BLOOD BY AUTOMATED COUNT: 12.9 % (ref 11.5–14.5)
EST. GFR  (AFRICAN AMERICAN): >60 ML/MIN/1.73 M^2
EST. GFR  (NON AFRICAN AMERICAN): >60 ML/MIN/1.73 M^2
GLUCOSE SERPL-MCNC: 97 MG/DL (ref 70–110)
HCT VFR BLD AUTO: 45.2 % (ref 37–48.5)
HGB BLD-MCNC: 15.2 G/DL (ref 12–16)
IMM GRANULOCYTES # BLD AUTO: 0.01 K/UL (ref 0–0.04)
IMM GRANULOCYTES NFR BLD AUTO: 0.2 % (ref 0–0.5)
LYMPHOCYTES # BLD AUTO: 2.4 K/UL (ref 1–4.8)
LYMPHOCYTES NFR BLD: 44.2 % (ref 18–48)
MCH RBC QN AUTO: 32 PG (ref 27–31)
MCHC RBC AUTO-ENTMCNC: 33.6 G/DL (ref 32–36)
MCV RBC AUTO: 95 FL (ref 82–98)
MONOCYTES # BLD AUTO: 0.7 K/UL (ref 0.3–1)
MONOCYTES NFR BLD: 12 % (ref 4–15)
NEUTROPHILS # BLD AUTO: 2.2 K/UL (ref 1.8–7.7)
NEUTROPHILS NFR BLD: 39.2 % (ref 38–73)
NRBC BLD-RTO: 0 /100 WBC
PLATELET # BLD AUTO: 270 K/UL (ref 150–350)
PMV BLD AUTO: 10.6 FL (ref 9.2–12.9)
POTASSIUM SERPL-SCNC: 4.1 MMOL/L (ref 3.5–5.1)
PROT SERPL-MCNC: 8 G/DL (ref 6–8.4)
RBC # BLD AUTO: 4.75 M/UL (ref 4–5.4)
SODIUM SERPL-SCNC: 138 MMOL/L (ref 136–145)
WBC # BLD AUTO: 5.48 K/UL (ref 3.9–12.7)

## 2019-06-20 PROCEDURE — 36415 COLL VENOUS BLD VENIPUNCTURE: CPT | Mod: PO

## 2019-06-20 PROCEDURE — 80053 COMPREHEN METABOLIC PANEL: CPT

## 2019-06-20 PROCEDURE — 85025 COMPLETE CBC W/AUTO DIFF WBC: CPT

## 2019-07-08 DIAGNOSIS — I50.40: Primary | ICD-10-CM

## 2019-07-09 DIAGNOSIS — R06.02 SOB (SHORTNESS OF BREATH): ICD-10-CM

## 2019-07-09 DIAGNOSIS — J44.9 CHRONIC OBSTRUCTIVE PULMONARY DISEASE, UNSPECIFIED COPD TYPE: Primary | ICD-10-CM

## 2019-07-12 ENCOUNTER — HOSPITAL ENCOUNTER (OUTPATIENT)
Dept: RESPIRATORY THERAPY | Facility: HOSPITAL | Age: 84
Discharge: HOME | End: 2019-07-12
Attending: INTERNAL MEDICINE
Payer: MEDICARE

## 2019-07-12 DIAGNOSIS — J44.9 CHRONIC OBSTRUCTIVE PULMONARY DISEASE, UNSPECIFIED COPD TYPE: ICD-10-CM

## 2019-07-12 DIAGNOSIS — R06.02 SOB (SHORTNESS OF BREATH): ICD-10-CM

## 2019-07-12 LAB
BRPFT: ABNORMAL
DLCO ADJ PRE: 16.99 ML/(MIN*MMHG) (ref 12.44–23.9)
DLCO SINGLE BREATH LLN: 12.44
DLCO SINGLE BREATH PRE REF: 93.5 %
DLCO SINGLE BREATH REF: 18.17
DLCOC SBVA LLN: 2.29
DLCOC SBVA PRE REF: 120.1 %
DLCOC SBVA REF: 3.66
DLCOC SINGLE BREATH LLN: 12.44
DLCOC SINGLE BREATH PRE REF: 93.5 %
DLCOC SINGLE BREATH REF: 18.17
DLCOVA LLN: 2.29
DLCOVA PRE REF: 120.1 %
DLCOVA PRE: 4.39 ML/(MIN*MMHG*L) (ref 2.29–5.02)
DLCOVA REF: 3.66
DLVAADJ PRE: 4.39 ML/(MIN*MMHG*L) (ref 2.29–5.02)
ERVN2 LLN: 0.32
ERVN2 PRE REF: 170.8 %
ERVN2 PRE: 0.55 L (ref 0.32–0.32)
ERVN2 REF: 0.32
FEF 25 75 CHG: 16.3 %
FEF 25 75 LLN: 0.43
FEF 25 75 POST REF: 108.6 %
FEF 25 75 PRE REF: 93.3 %
FEF 25 75 REF: 1.83
FET100 CHG: 5.2 %
FEV1 CHG: 4.9 %
FEV1 FVC CHG: 1 %
FEV1 FVC LLN: 60
FEV1 FVC POST REF: 106.4 %
FEV1 FVC PRE REF: 105.4 %
FEV1 FVC REF: 76
FEV1 LLN: 1.11
FEV1 POST REF: 106.8 %
FEV1 PRE REF: 101.8 %
FEV1 REF: 1.67
FRCN2 LLN: 1.92
FRCN2 PRE REF: 94.6 %
FRCN2 REF: 2.74
FVC CHG: 3.8 %
FVC LLN: 1.5
FVC POST REF: 98.2 %
FVC PRE REF: 94.6 %
FVC REF: 2.25
IVC PRE: 1.91 L (ref 1.5–3.01)
IVC SINGLE BREATH LLN: 1.5
IVC SINGLE BREATH PRE REF: 84.7 %
IVC SINGLE BREATH REF: 2.25
MVV LLN: 52
MVV PRE REF: 74.7 %
MVV REF: 62
PEF CHG: -35.7 %
PEF LLN: 1.8
PEF POST REF: 94.2 %
PEF PRE REF: 146.4 %
PEF REF: 3.52
POST FEF 25 75: 1.99 L/S (ref 0.43–3.23)
POST FET 100: 6.29 SEC
POST FEV1 FVC: 80.7 % (ref 59.56–92.08)
POST FEV1: 1.79 L (ref 1.11–2.23)
POST FVC: 2.21 L (ref 1.5–3.01)
POST PEF: 3.32 L/S (ref 1.8–5.24)
PRE DLCO: 16.99 ML/(MIN*MMHG) (ref 12.44–23.9)
PRE FEF 25 75: 1.71 L/S (ref 0.43–3.23)
PRE FET 100: 5.98 SEC
PRE FEV1 FVC: 79.88 % (ref 59.56–92.08)
PRE FEV1: 1.7 L (ref 1.11–2.23)
PRE FRC N2: 2.6 L
PRE FVC: 2.13 L (ref 1.5–3.01)
PRE MVV: 46 L/MIN (ref 52.31–70.77)
PRE PEF: 5.16 L/S (ref 1.8–5.24)
RVN2 LLN: 1.85
RVN2 PRE REF: 70.8 %
RVN2 PRE: 1.72 L (ref 1.85–3)
RVN2 REF: 2.42
RVN2TLCN2 LLN: 40.65
RVN2TLCN2 PRE REF: 88.8 %
RVN2TLCN2 PRE: 44.59 % (ref 40.65–59.83)
RVN2TLCN2 REF: 50.24
TLCN2 LLN: 3.98
TLCN2 PRE REF: 77.5 %
TLCN2 PRE: 3.85 L (ref 3.98–5.96)
TLCN2 REF: 4.97
VA PRE: 3.87 L (ref 4.82–4.82)
VA SINGLE BREATH LLN: 4.82
VA SINGLE BREATH PRE REF: 80.3 %
VA SINGLE BREATH REF: 4.82
VCMAXN2 LLN: 1.5
VCMAXN2 PRE REF: 94.6 %
VCMAXN2 PRE: 2.13 L (ref 1.5–3.01)
VCMAXN2 REF: 2.25

## 2019-07-12 PROCEDURE — 94727 GAS DIL/WSHOT DETER LNG VOL: CPT | Mod: 26,,, | Performed by: INTERNAL MEDICINE

## 2019-07-12 PROCEDURE — 94729 PR C02/MEMBANE DIFFUSE CAPACITY: ICD-10-PCS | Mod: 26,,, | Performed by: INTERNAL MEDICINE

## 2019-07-12 PROCEDURE — 94060 EVALUATION OF WHEEZING: CPT

## 2019-07-12 PROCEDURE — 94727 PR PULM FUNCTION TEST BY GAS: ICD-10-PCS | Mod: 26,,, | Performed by: INTERNAL MEDICINE

## 2019-07-12 PROCEDURE — 94060 EVALUATION OF WHEEZING: CPT | Mod: 26,,, | Performed by: INTERNAL MEDICINE

## 2019-07-12 PROCEDURE — 94060 PR EVAL OF BRONCHOSPASM: ICD-10-PCS | Mod: 26,,, | Performed by: INTERNAL MEDICINE

## 2019-07-12 PROCEDURE — 94729 DIFFUSING CAPACITY: CPT | Mod: 26,,, | Performed by: INTERNAL MEDICINE

## 2019-07-12 PROCEDURE — 94729 DIFFUSING CAPACITY: CPT

## 2019-07-12 PROCEDURE — 94727 GAS DIL/WSHOT DETER LNG VOL: CPT

## 2019-09-19 RX ORDER — LEVOTHYROXINE SODIUM 88 UG/1
TABLET ORAL
Qty: 90 TABLET | Refills: 3 | Status: SHIPPED | OUTPATIENT
Start: 2019-09-19 | End: 2020-03-02 | Stop reason: CLARIF

## 2019-10-18 ENCOUNTER — LAB VISIT (OUTPATIENT)
Dept: LAB | Facility: HOSPITAL | Age: 84
End: 2019-10-18
Attending: INTERNAL MEDICINE
Payer: MEDICARE

## 2019-10-18 ENCOUNTER — OFFICE VISIT (OUTPATIENT)
Dept: ENDOCRINOLOGY | Facility: CLINIC | Age: 84
End: 2019-10-18
Payer: MEDICARE

## 2019-10-18 VITALS
BODY MASS INDEX: 34.59 KG/M2 | HEIGHT: 64 IN | TEMPERATURE: 98 F | SYSTOLIC BLOOD PRESSURE: 138 MMHG | DIASTOLIC BLOOD PRESSURE: 72 MMHG | WEIGHT: 202.63 LBS | HEART RATE: 88 BPM

## 2019-10-18 DIAGNOSIS — E66.9 OBESITY (BMI 30-39.9): ICD-10-CM

## 2019-10-18 DIAGNOSIS — E04.9 GOITER: ICD-10-CM

## 2019-10-18 DIAGNOSIS — I10 ESSENTIAL HYPERTENSION: ICD-10-CM

## 2019-10-18 DIAGNOSIS — M85.80 OSTEOPENIA WITH HIGH RISK OF FRACTURE: ICD-10-CM

## 2019-10-18 DIAGNOSIS — E88.810 DYSMETABOLIC SYNDROME: ICD-10-CM

## 2019-10-18 DIAGNOSIS — E21.0 PRIMARY HYPERPARATHYROIDISM: ICD-10-CM

## 2019-10-18 DIAGNOSIS — N18.30 CHRONIC KIDNEY DISEASE (CKD), STAGE III (MODERATE): ICD-10-CM

## 2019-10-18 DIAGNOSIS — E03.9 ACQUIRED HYPOTHYROIDISM: ICD-10-CM

## 2019-10-18 DIAGNOSIS — K21.9 GASTROESOPHAGEAL REFLUX DISEASE WITHOUT ESOPHAGITIS: ICD-10-CM

## 2019-10-18 DIAGNOSIS — E55.9 VITAMIN D DEFICIENCY DISEASE: ICD-10-CM

## 2019-10-18 DIAGNOSIS — Z78.0 POSTMENOPAUSAL: ICD-10-CM

## 2019-10-18 DIAGNOSIS — E04.1 NODULAR THYROID DISEASE: ICD-10-CM

## 2019-10-18 DIAGNOSIS — E03.9 ACQUIRED HYPOTHYROIDISM: Primary | ICD-10-CM

## 2019-10-18 LAB
25(OH)D3+25(OH)D2 SERPL-MCNC: 45 NG/ML (ref 30–96)
AMYLASE SERPL-CCNC: 29 U/L (ref 20–110)
CA-I BLDV-SCNC: 1.37 MMOL/L (ref 1.06–1.42)
LIPASE SERPL-CCNC: 8 U/L (ref 4–60)
MAGNESIUM SERPL-MCNC: 1.8 MG/DL (ref 1.6–2.6)
PHOSPHATE SERPL-MCNC: 3.4 MG/DL (ref 2.7–4.5)
PTH-INTACT SERPL-MCNC: 94 PG/ML (ref 9–77)
T3 SERPL-MCNC: 79 NG/DL (ref 60–180)
T4 FREE SERPL-MCNC: 1.28 NG/DL (ref 0.71–1.51)
TSH SERPL DL<=0.005 MIU/L-ACNC: 1.79 UIU/ML (ref 0.4–4)

## 2019-10-18 PROCEDURE — 1101F PR PT FALLS ASSESS DOC 0-1 FALLS W/OUT INJ PAST YR: ICD-10-PCS | Mod: CPTII,S$GLB,, | Performed by: INTERNAL MEDICINE

## 2019-10-18 PROCEDURE — 84443 ASSAY THYROID STIM HORMONE: CPT

## 2019-10-18 PROCEDURE — 82652 VIT D 1 25-DIHYDROXY: CPT

## 2019-10-18 PROCEDURE — 99999 PR PBB SHADOW E&M-EST. PATIENT-LVL III: CPT | Mod: PBBFAC,,, | Performed by: INTERNAL MEDICINE

## 2019-10-18 PROCEDURE — 99999 PR PBB SHADOW E&M-EST. PATIENT-LVL III: ICD-10-PCS | Mod: PBBFAC,,, | Performed by: INTERNAL MEDICINE

## 2019-10-18 PROCEDURE — 82330 ASSAY OF CALCIUM: CPT

## 2019-10-18 PROCEDURE — 84100 ASSAY OF PHOSPHORUS: CPT

## 2019-10-18 PROCEDURE — 1101F PT FALLS ASSESS-DOCD LE1/YR: CPT | Mod: CPTII,S$GLB,, | Performed by: INTERNAL MEDICINE

## 2019-10-18 PROCEDURE — 83690 ASSAY OF LIPASE: CPT

## 2019-10-18 PROCEDURE — 82150 ASSAY OF AMYLASE: CPT

## 2019-10-18 PROCEDURE — 84439 ASSAY OF FREE THYROXINE: CPT

## 2019-10-18 PROCEDURE — 99214 PR OFFICE/OUTPT VISIT, EST, LEVL IV, 30-39 MIN: ICD-10-PCS | Mod: S$GLB,,, | Performed by: INTERNAL MEDICINE

## 2019-10-18 PROCEDURE — 83735 ASSAY OF MAGNESIUM: CPT

## 2019-10-18 PROCEDURE — 84480 ASSAY TRIIODOTHYRONINE (T3): CPT

## 2019-10-18 PROCEDURE — 99214 OFFICE O/P EST MOD 30 MIN: CPT | Mod: S$GLB,,, | Performed by: INTERNAL MEDICINE

## 2019-10-18 PROCEDURE — 84432 ASSAY OF THYROGLOBULIN: CPT

## 2019-10-18 PROCEDURE — 83970 ASSAY OF PARATHORMONE: CPT

## 2019-10-18 PROCEDURE — 82306 VITAMIN D 25 HYDROXY: CPT

## 2019-10-18 RX ORDER — FUROSEMIDE 20 MG/1
20 TABLET ORAL DAILY
Refills: 2 | COMMUNITY
Start: 2019-09-19 | End: 2020-08-06 | Stop reason: SDUPTHER

## 2019-10-18 RX ORDER — OLMESARTAN MEDOXOMIL 5 MG/1
5 TABLET ORAL DAILY
Refills: 1 | COMMUNITY
Start: 2019-10-02 | End: 2020-08-06 | Stop reason: SDUPTHER

## 2019-10-18 RX ORDER — OMEPRAZOLE 20 MG/1
20 CAPSULE, DELAYED RELEASE ORAL DAILY
Qty: 90 CAPSULE | Refills: 3 | Status: SHIPPED | OUTPATIENT
Start: 2019-10-18 | End: 2020-03-02 | Stop reason: CLARIF

## 2019-10-18 RX ORDER — CARVEDILOL 3.12 MG/1
1.56 TABLET ORAL 2 TIMES DAILY
Refills: 1 | COMMUNITY
Start: 2019-10-01 | End: 2022-06-06

## 2019-10-18 NOTE — PROGRESS NOTES
Subjective:      Patient ID: Glenda Gimenez is a 89 y.o. female.    Chief Complaint:  Hypothyroidism    89 yr old postmenopausal lady with hypothyroidism on thyroid hormone repletion as well as presumed primary hyperparathyroidism seen in up today    History of Present Illness    Patient is an 89 yr old postmenopausal  lady with hyperparathyroidism and hypercalcemia seen in up today. Patient has however been seen by Dr Perez for these same problems in the past and had been undergoing clinical surviellance only. She in addition has hypothyroidism on thyroid hormone replacement; 88mcg QD, essential hypertension, thyroid nodular disease, hypovitaminosis D, Obesity and CKD stage 3.  Patient has had a prior kidney stone in the past ~ 5 yrs ago and this was spontaneously passed.  She denies any significant dyspepsia and has no local neck symptoms.  She does have long standing lumbago with recent right sided sciatica and small joint arthralgias.  Patients most recent thyroid USS was from 09/18 and showed stable nodular disease with no evidence of interval nodular growth. Her next thyroid sonogram would be for ~ 09/20 since she has had over 5 years of serial neck sonography that has shown stability of the noted thyroid nodules.     Her DEXA from 12/15 showed progressive osteopenia with a high frax score based on which she was commenced on fosamax. Her most recent  ffup DEXA from 01/18 showed stable osteopenia with non significant evidence of progression and thus her next ffup DEXA should be for  thus is to be for ~ 01/20.     Patients last fall was in November 2017 but no associated fracture. Patient has not had any episodes of passing kidney stones and has no hematuria.  Patient hasnt had any had any falls since her last visit.       She indicates that her ambulation is more limited. She indicates that this is mainly due to myalgias in both limbs.  Patient has lost 15lbs since her last visit on account of  "anorexia. Part of this is related to persistent upper abdominal discomfort that radiates to her back akin to dyspepsia.    Review of Systems   Constitutional: Negative for diaphoresis and fatigue.   HENT: Negative for facial swelling, trouble swallowing and voice change.    Eyes: Negative for visual disturbance.   Respiratory: Negative for cough and shortness of breath.    Cardiovascular: Negative for chest pain and palpitations.   Gastrointestinal: Negative for constipation, nausea and vomiting.   Endocrine: Negative for polyuria.   Genitourinary: Negative for flank pain and hematuria.   Musculoskeletal: Positive for arthralgias (Mild chronic lower back pain and knee arthralgias.), back pain (chronic) and gait problem (Consequent on back and knee pains and so walks with aid of walking cane but this is chronic and not progressive.). Negative for myalgias.   Skin: Negative for color change, pallor and rash.   Neurological: Positive for numbness (radicular pain down the left thigh from lower back). Negative for dizziness and headaches.   Hematological: Does not bruise/bleed easily.   Psychiatric/Behavioral: Negative for confusion. The patient is not nervous/anxious.        Objective: /72 (BP Location: Left arm, Patient Position: Sitting, BP Method: Medium (Manual))   Pulse 88   Temp 97.5 °F (36.4 °C) (Oral)   Ht 5' 4" (1.626 m)   Wt 91.9 kg (202 lb 9.6 oz)   BMI 34.78 kg/m²  Body surface area is 2.04 meters squared.         Physical Exam   Constitutional: She is oriented to person, place, and time. She appears well-developed and well-nourished. No distress.   Pleasant elderly lady. Clinically comfortable. Not chronically ill looking and not in apparent acute distress. Not pale, anicteric, afebrile. In excellent spirits   HENT:   Head: Normocephalic and atraumatic.   Eyes: Pupils are equal, round, and reactive to light. Conjunctivae and EOM are normal. No scleral icterus.   Neck: Trachea normal and normal " range of motion. Neck supple. No JVD present. No thyromegaly present.       Cardiovascular: Normal rate and regular rhythm.   Murmur (ESM ~ 2-3/6 with ?? mild radiation to the neck) heard.  Pulmonary/Chest: Effort normal and breath sounds normal. No respiratory distress. She has no wheezes.   Abdominal: Soft. There is no tenderness.   Mildly obese anterior abdominal wall.    Musculoskeletal: Normal range of motion. 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 She exhibits no edema or tenderness.   Gaite is slow but steady. Walks with the aid of a 4 pronged walking stick.   Neurological: She is alert and oriented to person, place, and time. No cranial nerve deficit.   Skin: Skin is warm and dry. No rash noted. She is not diaphoretic. No erythema. No pallor.   Diffuse cutaneous atrophy   Psychiatric: She has a normal mood and affect. Her behavior is normal. Judgment and thought content normal.   Vitals reviewed.      Lab Review:     Results for JENNIFER QURESHI (MRN [id number]) as of 10/18/2019 11:20   Ref. Range 5/3/2019 11:45 5/3/2019 11:55 5/13/2019 10:31 6/20/2019 10:02 7/12/2019 11:21   WBC Latest Ref Range: 3.90 - 12.70 K/uL  6.71  5.48    RBC Latest Ref Range: 4.00 - 5.40 M/uL  4.59  4.75    Hemoglobin Latest Ref Range: 12.0 - 16.0 g/dL  14.7  15.2    Hematocrit Latest Ref Range: 37.0 - 48.5 %  45.8  45.2    MCV Latest Ref Range: 82 - 98 fL  100 (H)  95    MCH Latest Ref Range: 27.0 - 31.0 pg  32.0 (H)  32.0 (H)    MCHC Latest Ref Range: 32.0 - 36.0 g/dL  32.1  33.6    RDW Latest Ref Range: 11.5 - 14.5 %  13.1  12.9    Platelets Latest Ref Range: 150 - 350 K/uL  283  270    MPV Latest Ref Range: 9.2 - 12.9 fL  10.5  10.6    Gran% Latest Ref Range: 38.0 - 73.0 %  57.0  39.2    Gran # (ANC) Latest Ref Range: 1.8 - 7.7 K/uL  3.8  2.2    Lymph% Latest Ref Range: 18.0 - 48.0 %  28.9  44.2    Lymph # Latest Ref Range: 1.0 - 4.8 K/uL   1.9  2.4    Mono% Latest Ref Range: 4.0 - 15.0 %  10.9  12.0    Mono # Latest Ref Range: 0.3 - 1.0 K/uL  0.7  0.7    Eosinophil% Latest Ref Range: 0.0 - 8.0 %  2.2  3.3    Eos # Latest Ref Range: 0.0 - 0.5 K/uL  0.2  0.2    Basophil% Latest Ref Range: 0.0 - 1.9 %  0.9  1.1    Baso # Latest Ref Range: 0.00 - 0.20 K/uL  0.06  0.06    nRBC Latest Ref Range: 0 /100 WBC  0  0    Differential Method Unknown  Automated  Automated    Immature Grans (Abs) Latest Ref Range: 0.00 - 0.04 K/uL  0.01  0.01    Immature Granulocytes Latest Ref Range: 0.0 - 0.5 %  0.1  0.2    Sodium Latest Ref Range: 136 - 145 mmol/L  137  138    Potassium Latest Ref Range: 3.5 - 5.1 mmol/L  3.7  4.1    Chloride Latest Ref Range: 95 - 110 mmol/L  101  102    CO2 Latest Ref Range: 23 - 29 mmol/L  26  25    Anion Gap Latest Ref Range: 8 - 16 mmol/L  10  11    BUN, Bld Latest Ref Range: 10 - 30 mg/dL  19  15    Creatinine Latest Ref Range: 0.5 - 1.4 mg/dL  0.7  0.8    eGFR if non African American Latest Ref Range: >60 mL/min/1.73 m^2  >60.0  >60.0    eGFR if African American Latest Ref Range: >60 mL/min/1.73 m^2  >60.0  >60.0    Glucose Latest Ref Range: 70 - 110 mg/dL  95  97    Calcium Latest Ref Range: 8.7 - 10.5 mg/dL  10.9 (H)  10.5    Calcium, Ion Latest Ref Range: 1.06 - 1.42 mmol/L  1.30      Alkaline Phosphatase Latest Ref Range: 55 - 135 U/L  89  92    PROTEIN TOTAL Latest Ref Range: 6.0 - 8.4 g/dL  8.0  8.0    Albumin Latest Ref Range: 3.5 - 5.2 g/dL  3.8  3.8    Uric Acid Latest Ref Range: 2.4 - 5.7 mg/dL  4.8      BILIRUBIN TOTAL Latest Ref Range: 0.1 - 1.0 mg/dL  0.8  0.8    AST Latest Ref Range: 10 - 40 U/L  29  30    ALT Latest Ref Range: 10 - 44 U/L  22  20    Triglycerides Latest Ref Range: 30 - 150 mg/dL  141      Cholesterol Latest Ref Range: 120 - 199 mg/dL  203 (H)      HDL Latest Ref Range: 40 - 75 mg/dL  60      Hdl/Cholesterol Ratio Latest Ref Range: 20.0 - 50.0 %  29.6      LDL Cholesterol External Latest Ref Range: 63.0 -  159.0 mg/dL  114.8      Non-HDL Cholesterol Latest Units: mg/dL  143      Total Cholesterol/HDL Ratio Latest Ref Range: 2.0 - 5.0   3.4      Iodine, Serum Latest Ref Range: 40 - 92 ng/mL  78      Vit D, 25-Hydroxy Latest Ref Range: 30 - 96 ng/mL  37      Hemoglobin A1C External Latest Ref Range: 4.0 - 5.6 %  5.4      Estimated Avg Glucose Latest Ref Range: 68 - 131 mg/dL  108      TSH Latest Ref Range: 0.400 - 4.000 uIU/mL  2.374      T3, Total Latest Ref Range: 60 - 180 ng/dL  81      Free T4 Latest Ref Range: 0.71 - 1.51 ng/dL  1.15      Thyroglobulin Interpretation Unknown  SEE BELOW      Thyroglobulin Antibody Screen Latest Ref Range: <4.0 IU/mL  79 (H)      Thyroglobulin, Tumor Marker Latest Units: ng/mL  0.7 (H)      PTH Latest Ref Range: 9.0 - 77.0 pg/mL  103.0 (H)      Calcitonin Latest Ref Range: <=7.6 pg/mL  <5.0      Chromogranin A Latest Ref Range: 0 - 95 ng/mL  88          Assessment:     1. Acquired hypothyroidism  T3    T4, free    Thyroglobulin    TSH   2. Vitamin D deficiency disease  Vitamin D    PTH, intact    Calcium, ionized    Magnesium    Phosphorus   3. Postmenopausal     4. Chronic kidney disease (CKD), stage III (moderate)  Magnesium    Phosphorus    Calcitriol   5. Essential hypertension     6. Dysmetabolic syndrome     7. Nodular thyroid disease     8. Primary hyperparathyroidism  Calcitriol   9. Goiter     10. Obesity (BMI 30-39.9)     11. Osteopenia with high risk of fracture  Calcitriol   12. Gastroesophageal reflux disease without esophagitis  Amylase    Lipase    omeprazole (PRILOSEC) 20 MG capsule        Regarding hyperparathyroidism; Mild primary hyperparathyroidism; to continue serial conservative watchful survielance. To obtain basic monitoring labs in this regard as detailed above.  Regarding hypovitaminosis D; Will recheck 25 OH vitamin d today and in the interim continue OTC vitamin supplementation as before.  Regarding hypothyroidism; appears clinically euthyroid; will  recheck TFTS today and to continue levothyroxine 88mcg QD.  Regarding thyroid nodular disease; to obtain ffup thyroid USs for ~ 09/19  to evaluate the prior noted thyroid nodules.  Regarding hypertension; presently well controlled. To continue present antihypertensive regimen and to continue serial tracking of BP trends. Encouraged patient to continuing ensuring copious free water intake; ~ 70 fl oz daily.  Regarding high risk osteopenia; reiterated fall risk reduction strategies including obtaining a life alert alarm.  To continue fosamax as before.  Regarding essential hypertension; BP fairly well controlled. To continue antihypertensives as before.  Regarding dysmetabolic syndrome; to obtain screening HBA1c and will continue to track serial trends  Regarding Grade 2 obesity; weight essentially stable. No active interventions regarding weight loss at this time but to continue prior efforts at portion size control to enable weight maintenance as before.   Regarding sciatica; encouraged to continue back strengthening exercises which she has been given by PT group and to also try and sleep in her bed rather than couch as she presently does.  Regarding fatigue with newly noted heart murmur and neck bruit; to obtain echo, carotid USS and cardiology consultation.  Regarding upper abdomen discomfort; ?? Due to GERD; to commence trial of low dose PPI. If this does not give symptom relief may need to refer to a GI specialist to arrange for upper endoscopy.  Regarding persistent paraesthesiae in hands; this is related to her chronic OA. I have encouraged her to discuss with Dr Yao for a ffup referral to Dr Reid Bruner who she had seen several years before for nerve stimulation therapy.    Plan:     FFup in ~ 4mths

## 2019-10-21 LAB
1,25(OH)2D3 SERPL-MCNC: 50 PG/ML (ref 20–79)
THRYOGLOBULIN INTERPRETATION: ABNORMAL
THYROGLOB AB SERPL-ACNC: 48 IU/ML
THYROGLOB SERPL-MCNC: 0.3 NG/ML

## 2019-11-13 ENCOUNTER — TELEPHONE (OUTPATIENT)
Dept: ENDOCRINOLOGY | Facility: CLINIC | Age: 84
End: 2019-11-13

## 2019-11-13 NOTE — TELEPHONE ENCOUNTER
----- Message from Brooke Hayden sent at 11/13/2019 11:26 AM CST -----  Contact: pt [phone]  Patient called and asked for a call back from Maryan she is  Having some problems with the pharmacy and she needs your help.

## 2020-03-02 ENCOUNTER — HOSPITAL ENCOUNTER (INPATIENT)
Facility: HOSPITAL | Age: 85
LOS: 4 days | Discharge: HOME HEALTH | DRG: 871 | End: 2020-03-06
Attending: EMERGENCY MEDICINE | Admitting: HOSPITALIST
Payer: MEDICARE

## 2020-03-02 DIAGNOSIS — R06.02 SHORTNESS OF BREATH: ICD-10-CM

## 2020-03-02 DIAGNOSIS — E03.9 ACQUIRED HYPOTHYROIDISM: ICD-10-CM

## 2020-03-02 DIAGNOSIS — I10 ESSENTIAL HYPERTENSION: ICD-10-CM

## 2020-03-02 DIAGNOSIS — J96.11 CHRONIC HYPOXEMIC RESPIRATORY FAILURE: Primary | ICD-10-CM

## 2020-03-02 DIAGNOSIS — I50.32 CHRONIC DIASTOLIC CHF (CONGESTIVE HEART FAILURE): ICD-10-CM

## 2020-03-02 DIAGNOSIS — A41.9 SEPSIS: ICD-10-CM

## 2020-03-02 DIAGNOSIS — E66.9 OBESITY (BMI 30-39.9): ICD-10-CM

## 2020-03-02 DIAGNOSIS — R05.9 COUGH: ICD-10-CM

## 2020-03-02 DIAGNOSIS — J44.0 COPD WITH ACUTE LOWER RESPIRATORY INFECTION: ICD-10-CM

## 2020-03-02 DIAGNOSIS — J13 PNEUMONIA OF RIGHT LUNG DUE TO STREPTOCOCCUS PNEUMONIAE, UNSPECIFIED PART OF LUNG: ICD-10-CM

## 2020-03-02 LAB
ALBUMIN SERPL BCP-MCNC: 3 G/DL (ref 3.5–5.2)
ALP SERPL-CCNC: 263 U/L (ref 55–135)
ALT SERPL W/O P-5'-P-CCNC: 54 U/L (ref 10–44)
ANION GAP SERPL CALC-SCNC: 12 MMOL/L (ref 8–16)
AST SERPL-CCNC: 71 U/L (ref 10–40)
BASOPHILS NFR BLD: 0 % (ref 0–1.9)
BILIRUB SERPL-MCNC: 1.1 MG/DL (ref 0.1–1)
BUN SERPL-MCNC: 28 MG/DL (ref 10–30)
CALCIUM SERPL-MCNC: 10.9 MG/DL (ref 8.7–10.5)
CHLORIDE SERPL-SCNC: 104 MMOL/L (ref 95–110)
CO2 SERPL-SCNC: 23 MMOL/L (ref 23–29)
CREAT SERPL-MCNC: 0.8 MG/DL (ref 0.5–1.4)
DIFFERENTIAL METHOD: ABNORMAL
EOSINOPHIL NFR BLD: 0 % (ref 0–8)
ERYTHROCYTE [DISTWIDTH] IN BLOOD BY AUTOMATED COUNT: 13 % (ref 11.5–14.5)
EST. GFR  (AFRICAN AMERICAN): >60 ML/MIN/1.73 M^2
EST. GFR  (NON AFRICAN AMERICAN): >60 ML/MIN/1.73 M^2
GLUCOSE SERPL-MCNC: 114 MG/DL (ref 70–110)
HCT VFR BLD AUTO: 38.4 % (ref 37–48.5)
HGB BLD-MCNC: 12.7 G/DL (ref 12–16)
IMM GRANULOCYTES # BLD AUTO: ABNORMAL K/UL
INFLUENZA A, MOLECULAR: NEGATIVE
INFLUENZA B, MOLECULAR: NEGATIVE
LACTATE SERPL-SCNC: 1.5 MMOL/L (ref 0.5–2.2)
LACTATE SERPL-SCNC: 1.6 MMOL/L (ref 0.5–2.2)
LYMPHOCYTES NFR BLD: 11 % (ref 18–48)
MCH RBC QN AUTO: 32.4 PG (ref 27–31)
MCHC RBC AUTO-ENTMCNC: 33.1 G/DL (ref 32–36)
MCV RBC AUTO: 98 FL (ref 82–98)
MONOCYTES NFR BLD: 7 % (ref 4–15)
NEUTROPHILS NFR BLD: 79 % (ref 38–73)
NEUTS BAND NFR BLD MANUAL: 3 %
NRBC BLD-RTO: 0 /100 WBC
PLATELET # BLD AUTO: 297 K/UL (ref 150–350)
PMV BLD AUTO: 10.4 FL (ref 9.2–12.9)
POTASSIUM SERPL-SCNC: 3.9 MMOL/L (ref 3.5–5.1)
PROCALCITONIN SERPL IA-MCNC: 0.54 NG/ML
PROT SERPL-MCNC: 8.3 G/DL (ref 6–8.4)
RBC # BLD AUTO: 3.92 M/UL (ref 4–5.4)
SODIUM SERPL-SCNC: 139 MMOL/L (ref 136–145)
SPECIMEN SOURCE: NORMAL
TSH SERPL DL<=0.005 MIU/L-ACNC: 1.02 UIU/ML (ref 0.4–4)
WBC # BLD AUTO: 15.85 K/UL (ref 3.9–12.7)

## 2020-03-02 PROCEDURE — 27000221 HC OXYGEN, UP TO 24 HOURS

## 2020-03-02 PROCEDURE — 85007 BL SMEAR W/DIFF WBC COUNT: CPT | Mod: NCS

## 2020-03-02 PROCEDURE — 94761 N-INVAS EAR/PLS OXIMETRY MLT: CPT

## 2020-03-02 PROCEDURE — 94640 AIRWAY INHALATION TREATMENT: CPT

## 2020-03-02 PROCEDURE — 99285 EMERGENCY DEPT VISIT HI MDM: CPT | Mod: 25

## 2020-03-02 PROCEDURE — 84145 PROCALCITONIN (PCT): CPT

## 2020-03-02 PROCEDURE — 63600175 PHARM REV CODE 636 W HCPCS: Performed by: EMERGENCY MEDICINE

## 2020-03-02 PROCEDURE — 25000242 PHARM REV CODE 250 ALT 637 W/ HCPCS: Performed by: HOSPITALIST

## 2020-03-02 PROCEDURE — 96374 THER/PROPH/DIAG INJ IV PUSH: CPT

## 2020-03-02 PROCEDURE — 83605 ASSAY OF LACTIC ACID: CPT

## 2020-03-02 PROCEDURE — 84443 ASSAY THYROID STIM HORMONE: CPT

## 2020-03-02 PROCEDURE — 87502 INFLUENZA DNA AMP PROBE: CPT

## 2020-03-02 PROCEDURE — 87040 BLOOD CULTURE FOR BACTERIA: CPT

## 2020-03-02 PROCEDURE — 83605 ASSAY OF LACTIC ACID: CPT | Mod: 91

## 2020-03-02 PROCEDURE — 25000003 PHARM REV CODE 250: Performed by: HOSPITALIST

## 2020-03-02 PROCEDURE — 93005 ELECTROCARDIOGRAM TRACING: CPT

## 2020-03-02 PROCEDURE — 12000002 HC ACUTE/MED SURGE SEMI-PRIVATE ROOM

## 2020-03-02 PROCEDURE — 63600175 PHARM REV CODE 636 W HCPCS: Performed by: HOSPITALIST

## 2020-03-02 PROCEDURE — 36415 COLL VENOUS BLD VENIPUNCTURE: CPT

## 2020-03-02 PROCEDURE — 80053 COMPREHEN METABOLIC PANEL: CPT

## 2020-03-02 PROCEDURE — 85027 COMPLETE CBC AUTOMATED: CPT

## 2020-03-02 RX ORDER — ISOSORBIDE MONONITRATE 30 MG/1
30 TABLET, EXTENDED RELEASE ORAL DAILY
COMMUNITY
End: 2020-08-06 | Stop reason: SDUPTHER

## 2020-03-02 RX ORDER — L. ACIDOPHILUS/L.BULGARICUS 100MM CELL
1 GRANULES IN PACKET (EA) ORAL 2 TIMES DAILY
Status: DISCONTINUED | OUTPATIENT
Start: 2020-03-02 | End: 2020-03-06 | Stop reason: HOSPADM

## 2020-03-02 RX ORDER — FUROSEMIDE 20 MG/1
20 TABLET ORAL DAILY
Status: DISCONTINUED | OUTPATIENT
Start: 2020-03-03 | End: 2020-03-06 | Stop reason: HOSPADM

## 2020-03-02 RX ORDER — LEVOTHYROXINE SODIUM 88 UG/1
88 TABLET ORAL
Status: DISCONTINUED | OUTPATIENT
Start: 2020-03-03 | End: 2020-03-06 | Stop reason: HOSPADM

## 2020-03-02 RX ORDER — PROMETHAZINE HYDROCHLORIDE 25 MG/1
25 TABLET ORAL EVERY 6 HOURS PRN
Status: DISCONTINUED | OUTPATIENT
Start: 2020-03-02 | End: 2020-03-06 | Stop reason: HOSPADM

## 2020-03-02 RX ORDER — OLMESARTAN MEDOXOMIL 5 MG/1
5 TABLET ORAL DAILY
Status: DISCONTINUED | OUTPATIENT
Start: 2020-03-03 | End: 2020-03-06 | Stop reason: HOSPADM

## 2020-03-02 RX ORDER — ACETAMINOPHEN 325 MG/1
650 TABLET ORAL EVERY 8 HOURS PRN
Status: DISCONTINUED | OUTPATIENT
Start: 2020-03-02 | End: 2020-03-06 | Stop reason: HOSPADM

## 2020-03-02 RX ORDER — ONDANSETRON 4 MG/1
8 TABLET, ORALLY DISINTEGRATING ORAL EVERY 8 HOURS PRN
Status: DISCONTINUED | OUTPATIENT
Start: 2020-03-02 | End: 2020-03-06 | Stop reason: HOSPADM

## 2020-03-02 RX ORDER — NAPROXEN SODIUM 220 MG/1
81 TABLET, FILM COATED ORAL DAILY
Status: DISCONTINUED | OUTPATIENT
Start: 2020-03-03 | End: 2020-03-02

## 2020-03-02 RX ORDER — ASPIRIN 81 MG/1
81 TABLET ORAL DAILY
COMMUNITY

## 2020-03-02 RX ORDER — IPRATROPIUM BROMIDE AND ALBUTEROL SULFATE 2.5; .5 MG/3ML; MG/3ML
3 SOLUTION RESPIRATORY (INHALATION) EVERY 4 HOURS PRN
Status: DISCONTINUED | OUTPATIENT
Start: 2020-03-02 | End: 2020-03-02

## 2020-03-02 RX ORDER — AMLODIPINE BESYLATE 5 MG/1
5 TABLET ORAL DAILY
Status: DISCONTINUED | OUTPATIENT
Start: 2020-03-03 | End: 2020-03-06 | Stop reason: HOSPADM

## 2020-03-02 RX ORDER — HYDROCHLOROTHIAZIDE 12.5 MG/1
25 TABLET ORAL DAILY
Status: DISCONTINUED | OUTPATIENT
Start: 2020-03-03 | End: 2020-03-02

## 2020-03-02 RX ORDER — IPRATROPIUM BROMIDE AND ALBUTEROL SULFATE 2.5; .5 MG/3ML; MG/3ML
3 SOLUTION RESPIRATORY (INHALATION) EVERY 4 HOURS PRN
Status: DISCONTINUED | OUTPATIENT
Start: 2020-03-02 | End: 2020-03-03

## 2020-03-02 RX ORDER — SODIUM CHLORIDE 9 MG/ML
INJECTION, SOLUTION INTRAVENOUS CONTINUOUS
Status: ACTIVE | OUTPATIENT
Start: 2020-03-02 | End: 2020-03-03

## 2020-03-02 RX ORDER — ENOXAPARIN SODIUM 100 MG/ML
40 INJECTION SUBCUTANEOUS EVERY 24 HOURS
Status: DISCONTINUED | OUTPATIENT
Start: 2020-03-02 | End: 2020-03-06 | Stop reason: HOSPADM

## 2020-03-02 RX ADMIN — ENOXAPARIN SODIUM 40 MG: 100 INJECTION SUBCUTANEOUS at 04:03

## 2020-03-02 RX ADMIN — LACTOBACILLUS ACIDOPHILUS / LACTOBACILLUS BULGARICUS 1 EACH: 100 MILLION CFU STRENGTH GRANULES at 08:03

## 2020-03-02 RX ADMIN — ACETAMINOPHEN 650 MG: 325 TABLET ORAL at 04:03

## 2020-03-02 RX ADMIN — SODIUM CHLORIDE: 0.9 INJECTION, SOLUTION INTRAVENOUS at 05:03

## 2020-03-02 RX ADMIN — AZITHROMYCIN MONOHYDRATE 500 MG: 500 INJECTION, POWDER, LYOPHILIZED, FOR SOLUTION INTRAVENOUS at 03:03

## 2020-03-02 RX ADMIN — IPRATROPIUM BROMIDE AND ALBUTEROL SULFATE 3 ML: .5; 2.5 SOLUTION RESPIRATORY (INHALATION) at 11:03

## 2020-03-02 RX ADMIN — CEFTRIAXONE 1 G: 1 INJECTION, SOLUTION INTRAVENOUS at 02:03

## 2020-03-02 NOTE — ED PROVIDER NOTES
Encounter Date: 3/2/2020    SCRIBE #1 NOTE: IJeancarlos am scribing for, and in the presence of, Ottoniel Trammell MD.       History     Chief Complaint   Patient presents with    Weakness     started last thursday     Cough    Shortness of Breath       Time seen by provider: 1:58 PM on 03/02/2020    Glenda Gimenez is a 89 y.o. female who presents to the ED with an onset of generalized weakness, cough, and shortness of breath for four days. The patient reports falling nine days ago with facial bruising to the right cheek. Her daughter endorses observing confused speech during this time. PMHx includes HTN. History of anti-coagulant use. No cardiopulmonary PSHx. Multiple known drug allergies, including Atenolol, Betamethasone, Cortisone, Lisinopril, Naproxen, and THC.    The history is provided by the patient and a relative.     Review of patient's allergies indicates:   Allergen Reactions    Atenolol      Other reaction(s): itch    Betamethasone sodium phosphate      Other reaction(s): Flushing (skin)    Cortisone      Other reaction(s): blurry vision  Other reaction(s): Rash    Lisinopril      Other reaction(s): COUGH    Naproxen      Other reaction(s): body shut down    Triamterene-hydrochlorothiazid      Other reaction(s): itch     Past Medical History:   Diagnosis Date    Anticoagulant long-term use     Dislocation of right shoulder joint     Hypertension     Shoulder disorder     right    Thyroid disease      Past Surgical History:   Procedure Laterality Date    HYSTERECTOMY      PARTIAL HYSTERECTOMY      ARTUR, ovaries in place, uterine prolapse     Family History   Problem Relation Age of Onset    Breast cancer Mother     Cancer Brother         lung cancer    Cancer Maternal Aunt         unknown cancer    Cancer Maternal Uncle         pancreatic cancer    Heart disease Neg Hx      Social History     Tobacco Use    Smoking status: Never Smoker    Smokeless tobacco: Never Used    Substance Use Topics    Alcohol use: Yes     Comment: wine occasionally     Drug use: No     Review of Systems   Constitutional: Negative for fever.   HENT: Negative for sore throat.    Respiratory: Positive for cough. Negative for shortness of breath.    Cardiovascular: Negative for chest pain.   Gastrointestinal: Negative for nausea.   Genitourinary: Negative for dysuria.   Musculoskeletal: Negative for back pain.   Skin: Positive for color change (Bruising). Negative for rash and wound.   Neurological: Positive for weakness.   Hematological: Bruises/bleeds easily.   Psychiatric/Behavioral: Positive for confusion.       Physical Exam     Initial Vitals [03/02/20 1158]   BP Pulse Resp Temp SpO2   (!) 142/101 106 20 98.6 °F (37 °C) (!) 93 %      MAP       --         Physical Exam    Nursing note and vitals reviewed.  Constitutional: She appears well-developed and well-nourished.  Non-toxic appearance. No distress.   Elderly appearance.   HENT:   Head: Normocephalic and atraumatic.   Eyes: EOM are normal. Pupils are equal, round, and reactive to light.   Neck: Normal range of motion. Neck supple. No neck rigidity. No JVD present.   Cardiovascular: Normal rate, regular rhythm, normal heart sounds and intact distal pulses. Exam reveals no gallop and no friction rub.    No murmur heard.  Pulmonary/Chest: She has no wheezes. She has rhonchi. She has no rales.   Rhonchi present to auscultation of the right lung fields. No wheezes or rales.   Abdominal: Soft. Bowel sounds are normal. She exhibits no distension. There is no tenderness. There is no rebound and no guarding.   Musculoskeletal: Normal range of motion.   Neurological: She is alert. She has normal strength and normal reflexes. No cranial nerve deficit or sensory deficit. She exhibits normal muscle tone. Coordination normal. GCS eye subscore is 4. GCS verbal subscore is 4. GCS motor subscore is 6.   Skin: Skin is warm and dry.   Psychiatric: She has a normal  mood and affect. Her speech is normal and behavior is normal. She is not actively hallucinating.         ED Course   Procedures  Labs Reviewed   CBC W/ AUTO DIFFERENTIAL - Abnormal; Notable for the following components:       Result Value    WBC 15.85 (*)     RBC 3.92 (*)     Mean Corpuscular Hemoglobin 32.4 (*)     Gran% 79.0 (*)     Lymph% 11.0 (*)     All other components within normal limits   COMPREHENSIVE METABOLIC PANEL - Abnormal; Notable for the following components:    Glucose 114 (*)     Calcium 10.9 (*)     Albumin 3.0 (*)     Total Bilirubin 1.1 (*)     Alkaline Phosphatase 263 (*)     AST 71 (*)     ALT 54 (*)     All other components within normal limits   INFLUENZA A & B BY MOLECULAR   TSH       ECG Results          EKG 12-lead (In process)  Result time 03/02/20 14:36:28    In process by Interface, Lab In [facility] (03/02/20 14:36:28)                 Narrative:    Test Reason : R06.02    Vent. Rate : 108 BPM     Atrial Rate : 108 BPM     P-R Int : 194 ms          QRS Dur : 120 ms      QT Int : 330 ms       P-R-T Axes : 055 096 011 degrees     QTc Int : 442 ms    Sinus tachycardia  Right bundle branch block  Abnormal ECG  When compared with ECG of 12-JUN-2013 07:51,  Right bundle branch block is now Present    Referred By: [email or web address]   SELF           Confirmed By:                             Imaging Results          X-Ray Chest PA And Lateral (Final result)  Result time 03/02/20 12:47:00    Final result by Jordana Hernandez MD (03/02/20 12:47:00)                 Impression:      Mild patchy right basilar infiltrate and slight stranding in the left lung base new compared to the prior exam      Electronically signed by: Jordana Hernandez MD  Date:    03/02/2020  Time:    12:47             Narrative:    EXAMINATION:  XR CHEST PA AND LATERAL    CLINICAL HISTORY:  Cough    TECHNIQUE:  PA and lateral views of the chest were performed.    COMPARISON:  07/01/2019    FINDINGS:  The cardiomediastinal silhouette is  stable.  There is mild right basilar infiltrate and there is mild stranding in the left lung base.  No consolidation.  No pleural effusion                                 Medical Decision Making:   History:   Old Medical Records: I decided to obtain old medical records.  Initial Assessment:   89-year-old woman presents with generalized weakness, cough, shortness of breath, confusion.  She is found to have a pneumonia meeting sepsis criteria.  Curb 65 score is 3.  Patient is started on Rocephin and azithromycin for pneumonia.  No evidence of severe sepsis at this time with a normal lactic acid, bilirubin less than 2 and normal renal function. Discussed with Hospital Medicine who agrees to admit the patient for IV antibiotic therapy.  Independently Interpreted Test(s):   I have ordered and independently interpreted EKG Reading(s) - see prior notes  Clinical Tests:   Lab Tests: Ordered and Reviewed  Radiological Study: Ordered and Reviewed  Medical Tests: Ordered and Reviewed            Scribe Attestation:   Scribe #1: I performed the above scribed service and the documentation accurately describes the services I performed. I attest to the accuracy of the note.     I, Jp Alvarenga, personally performed the services described in this documentation. All medical record entries made by the scribe were at my direction and in my presence.  I have reviewed the chart and agree that the record reflects my personal performance and is accurate and complete. Ottoniel Trammell MD.                      Clinical Impression:       ICD-10-CM ICD-9-CM   1. Cough R05 786.2   2. Shortness of breath R06.02 786.05   3. Sepsis A41.9 038.9     995.91         Disposition:   Disposition: Admitted     ED Disposition Condition    Admit                           Ottoniel Trammell MD  03/03/20 0051

## 2020-03-03 PROBLEM — J13 PNEUMONIA OF RIGHT LUNG DUE TO STREPTOCOCCUS PNEUMONIAE: Status: ACTIVE | Noted: 2020-03-03

## 2020-03-03 PROBLEM — J96.01 ACUTE HYPOXEMIC RESPIRATORY FAILURE: Status: ACTIVE | Noted: 2020-03-03

## 2020-03-03 PROBLEM — R05.9 COUGH: Status: ACTIVE | Noted: 2020-03-03

## 2020-03-03 PROBLEM — R74.01 TRANSAMINASEMIA: Status: ACTIVE | Noted: 2020-03-03

## 2020-03-03 LAB
ALBUMIN SERPL BCP-MCNC: 2.5 G/DL (ref 3.5–5.2)
ALP SERPL-CCNC: 244 U/L (ref 55–135)
ALT SERPL W/O P-5'-P-CCNC: 59 U/L (ref 10–44)
ANION GAP SERPL CALC-SCNC: 9 MMOL/L (ref 8–16)
AST SERPL-CCNC: 74 U/L (ref 10–40)
BASOPHILS # BLD AUTO: 0.05 K/UL (ref 0–0.2)
BASOPHILS NFR BLD: 0.4 % (ref 0–1.9)
BILIRUB SERPL-MCNC: 0.9 MG/DL (ref 0.1–1)
BUN SERPL-MCNC: 21 MG/DL (ref 10–30)
CALCIUM SERPL-MCNC: 9.9 MG/DL (ref 8.7–10.5)
CHLORIDE SERPL-SCNC: 108 MMOL/L (ref 95–110)
CO2 SERPL-SCNC: 23 MMOL/L (ref 23–29)
CREAT SERPL-MCNC: 0.7 MG/DL (ref 0.5–1.4)
DIFFERENTIAL METHOD: ABNORMAL
EOSINOPHIL # BLD AUTO: 0.1 K/UL (ref 0–0.5)
EOSINOPHIL NFR BLD: 0.4 % (ref 0–8)
ERYTHROCYTE [DISTWIDTH] IN BLOOD BY AUTOMATED COUNT: 13.1 % (ref 11.5–14.5)
EST. GFR  (AFRICAN AMERICAN): >60 ML/MIN/1.73 M^2
EST. GFR  (NON AFRICAN AMERICAN): >60 ML/MIN/1.73 M^2
GLUCOSE SERPL-MCNC: 114 MG/DL (ref 70–110)
HCT VFR BLD AUTO: 33.9 % (ref 37–48.5)
HGB BLD-MCNC: 11.2 G/DL (ref 12–16)
IMM GRANULOCYTES # BLD AUTO: 0.15 K/UL (ref 0–0.04)
LYMPHOCYTES # BLD AUTO: 1 K/UL (ref 1–4.8)
LYMPHOCYTES NFR BLD: 7.7 % (ref 18–48)
MCH RBC QN AUTO: 31.8 PG (ref 27–31)
MCHC RBC AUTO-ENTMCNC: 33 G/DL (ref 32–36)
MCV RBC AUTO: 96 FL (ref 82–98)
MONOCYTES # BLD AUTO: 1.4 K/UL (ref 0.3–1)
MONOCYTES NFR BLD: 10.7 % (ref 4–15)
NEUTROPHILS # BLD AUTO: 10.5 K/UL (ref 1.8–7.7)
NEUTROPHILS NFR BLD: 79.7 % (ref 38–73)
NRBC BLD-RTO: 0 /100 WBC
PLATELET # BLD AUTO: 293 K/UL (ref 150–350)
PMV BLD AUTO: 10.5 FL (ref 9.2–12.9)
POTASSIUM SERPL-SCNC: 3.7 MMOL/L (ref 3.5–5.1)
PROT SERPL-MCNC: 7.2 G/DL (ref 6–8.4)
RBC # BLD AUTO: 3.52 M/UL (ref 4–5.4)
SODIUM SERPL-SCNC: 140 MMOL/L (ref 136–145)
WBC # BLD AUTO: 13.19 K/UL (ref 3.9–12.7)

## 2020-03-03 PROCEDURE — 85025 COMPLETE CBC W/AUTO DIFF WBC: CPT

## 2020-03-03 PROCEDURE — 12000002 HC ACUTE/MED SURGE SEMI-PRIVATE ROOM

## 2020-03-03 PROCEDURE — 87449 NOS EACH ORGANISM AG IA: CPT

## 2020-03-03 PROCEDURE — 96375 TX/PRO/DX INJ NEW DRUG ADDON: CPT

## 2020-03-03 PROCEDURE — 96372 THER/PROPH/DIAG INJ SC/IM: CPT

## 2020-03-03 PROCEDURE — 25000003 PHARM REV CODE 250: Performed by: HOSPITALIST

## 2020-03-03 PROCEDURE — 94761 N-INVAS EAR/PLS OXIMETRY MLT: CPT

## 2020-03-03 PROCEDURE — 80053 COMPREHEN METABOLIC PANEL: CPT

## 2020-03-03 PROCEDURE — 27000221 HC OXYGEN, UP TO 24 HOURS

## 2020-03-03 PROCEDURE — 63600175 PHARM REV CODE 636 W HCPCS: Performed by: HOSPITALIST

## 2020-03-03 PROCEDURE — 96376 TX/PRO/DX INJ SAME DRUG ADON: CPT

## 2020-03-03 PROCEDURE — G0378 HOSPITAL OBSERVATION PER HR: HCPCS

## 2020-03-03 PROCEDURE — 94640 AIRWAY INHALATION TREATMENT: CPT

## 2020-03-03 PROCEDURE — 25000242 PHARM REV CODE 250 ALT 637 W/ HCPCS: Performed by: HOSPITALIST

## 2020-03-03 PROCEDURE — 36415 COLL VENOUS BLD VENIPUNCTURE: CPT

## 2020-03-03 RX ORDER — IPRATROPIUM BROMIDE AND ALBUTEROL SULFATE 2.5; .5 MG/3ML; MG/3ML
3 SOLUTION RESPIRATORY (INHALATION) EVERY 4 HOURS
Status: DISCONTINUED | OUTPATIENT
Start: 2020-03-03 | End: 2020-03-06 | Stop reason: HOSPADM

## 2020-03-03 RX ADMIN — OLMESARTAN MEDOXOMIL 5 MG: 5 TABLET, COATED ORAL at 08:03

## 2020-03-03 RX ADMIN — CEFTRIAXONE 1 G: 1 INJECTION, SOLUTION INTRAVENOUS at 03:03

## 2020-03-03 RX ADMIN — LACTOBACILLUS ACIDOPHILUS / LACTOBACILLUS BULGARICUS 1 EACH: 100 MILLION CFU STRENGTH GRANULES at 09:03

## 2020-03-03 RX ADMIN — AZITHROMYCIN MONOHYDRATE 500 MG: 500 INJECTION, POWDER, LYOPHILIZED, FOR SOLUTION INTRAVENOUS at 04:03

## 2020-03-03 RX ADMIN — IPRATROPIUM BROMIDE AND ALBUTEROL SULFATE 3 ML: .5; 2.5 SOLUTION RESPIRATORY (INHALATION) at 12:03

## 2020-03-03 RX ADMIN — IPRATROPIUM BROMIDE AND ALBUTEROL SULFATE 3 ML: .5; 2.5 SOLUTION RESPIRATORY (INHALATION) at 07:03

## 2020-03-03 RX ADMIN — AMLODIPINE BESYLATE 5 MG: 5 TABLET ORAL at 08:03

## 2020-03-03 RX ADMIN — ENOXAPARIN SODIUM 40 MG: 100 INJECTION SUBCUTANEOUS at 04:03

## 2020-03-03 RX ADMIN — LACTOBACILLUS ACIDOPHILUS / LACTOBACILLUS BULGARICUS 1 EACH: 100 MILLION CFU STRENGTH GRANULES at 08:03

## 2020-03-03 RX ADMIN — FUROSEMIDE 20 MG: 20 TABLET ORAL at 08:03

## 2020-03-03 RX ADMIN — IPRATROPIUM BROMIDE AND ALBUTEROL SULFATE 3 ML: .5; 3 SOLUTION RESPIRATORY (INHALATION) at 07:03

## 2020-03-03 RX ADMIN — LEVOTHYROXINE SODIUM 88 MCG: 88 TABLET ORAL at 06:03

## 2020-03-03 NOTE — H&P
[facility] Medicine  History & Physical    Patient Name: Glenda Gimenez  MRN: [id number]  Admission Date: 3/2/2020  Attending Physician: Josse Wilkerson MD   Primary Care Provider: Crystal Hahn MD         Patient information was obtained from patient, relative(s), past medical records and ER records.     Subjective:     Principal Problem:Pneumonia of right lung due to Streptococcus pneumoniae    Chief Complaint:   Chief Complaint   Patient presents with    Weakness     started last thursday     Cough    Shortness of Breath        HPI: Patient presented to the emergency today complaining of cough and shortness breath.  Her symptoms started 3-4 days ago.  Several days prior to that, patient fell home due to tripping and inability keeping herself upright.  She denies dizziness, lightheadedness, weakness.  Denies difficulty speaking or unusual headaches.  Daughter, however has noticed mom has been a little more confused since falling.  Cough is dry.  Denies any subjective fever.    Past Medical History:   Diagnosis Date    Anticoagulant long-term use     Dislocation of right shoulder joint     Hypertension     Shoulder disorder     right    Thyroid disease        Past Surgical History:   Procedure Laterality Date    HYSTERECTOMY      PARTIAL HYSTERECTOMY      ARTUR, ovaries in place, uterine prolapse       Review of patient's allergies indicates:   Allergen Reactions    Atenolol      Other reaction(s): itch    Betamethasone sodium phosphate      Other reaction(s): Flushing (skin)    Cortisone      Other reaction(s): blurry vision  Other reaction(s): Rash    Lisinopril      Other reaction(s): COUGH    Naproxen      Other reaction(s): body shut down    Triamterene-hydrochlorothiazid      Other reaction(s): itch       No current facility-administered medications on file prior to encounter.      Current Outpatient Medications on File Prior to Encounter   Medication Sig     aspirin (ECOTRIN) 81 MG EC tablet Take 81 mg by mouth once daily.    carvedilol (COREG) 3.125 MG tablet Take 1.5625 mg by mouth 2 (two) times daily.     furosemide (LASIX) 20 MG tablet Take 20 mg by mouth once daily.     isosorbide mononitrate (IMDUR) 30 MG 24 hr tablet Take 30 mg by mouth once daily.    levothyroxine (SYNTHROID) 88 MCG tablet TAKE 1 TABLET BY MOUTH EVERY DAY (Patient taking differently: Take 88 mcg by mouth before breakfast. )    olmesartan (BENICAR) 5 MG Tab Take 5 mg by mouth once daily.     potassium chloride SA (K-DUR,KLOR-CON) 20 MEQ tablet TAKE 1 TABLET(20 MEQ) BY MOUTH TWICE DAILY (Patient taking differently: Take 20 mEq by mouth once daily. )     Family History     Problem Relation (Age of Onset)    Breast cancer Mother    Cancer Brother, Maternal Aunt, Maternal Uncle        Tobacco Use    Smoking status: Never Smoker    Smokeless tobacco: Never Used   Substance and Sexual Activity    Alcohol use: Yes     Comment: wine occasionally     Drug use: No    Sexual activity: Not on file     Review of Systems   Constitutional: Negative for chills, fatigue and fever.   HENT: Negative for congestion and sinus pressure.    Eyes: Negative for pain and visual disturbance.   Respiratory: Positive for cough and shortness of breath. Negative for wheezing.    Cardiovascular: Negative for chest pain, palpitations and leg swelling.   Gastrointestinal: Negative for abdominal pain, diarrhea, nausea and vomiting.   Genitourinary: Negative for difficulty urinating, hematuria, urgency and vaginal discharge.   Musculoskeletal: Negative for arthralgias, joint swelling and myalgias.   Skin: Negative for rash and wound.   Neurological: Negative for dizziness, weakness, light-headedness and headaches.   Hematological: Negative for adenopathy. Does not bruise/bleed easily.   Psychiatric/Behavioral: Negative for confusion and dysphoric mood. The patient is not nervous/anxious.      Objective:     Vital Signs  (Most Recent):  Temp: 96.8 °F (36 °C) (03/02/20 2324)  Pulse: 86 (03/02/20 2349)  Resp: (!) 24 (03/02/20 2349)  BP: 137/61 (03/02/20 2324)  SpO2: (!) 94 % (03/02/20 2349) Vital Signs (24h Range):  Temp:  [96.8 °F (36 °C)-101.6 °F (38.7 °C)] 96.8 °F (36 °C)  Pulse:  [] 86  Resp:  [17-24] 24  SpO2:  [93 %-98 %] 94 %  BP: (100-192)/() 137/61     Weight: 86.2 kg (190 lb)  Body mass index is 32.61 kg/m².    Physical Exam   Constitutional: She is oriented to person, place, and time. She appears well-nourished. She is cooperative. No distress.   HENT:   Head: Normocephalic and atraumatic.   Right Ear: External ear normal.   Left Ear: External ear normal.   Mouth/Throat: No oropharyngeal exudate.   Ecchymoses right lower cheek and inferior aspect of periorbital region.   Eyes: Conjunctivae are normal. Right eye exhibits no discharge. Left eye exhibits no discharge. No scleral icterus.   Neck: Normal range of motion. Neck supple. No JVD present. No thyromegaly present.   Cardiovascular: Normal rate and regular rhythm. Exam reveals no gallop.   Pulmonary/Chest: Effort normal. No accessory muscle usage. Tachypnea noted. She has no wheezes. She has rhonchi in the right middle field and the right lower field.   Abdominal: Soft. Bowel sounds are normal. She exhibits no distension and no mass. There is no hepatomegaly. There is no tenderness.   Musculoskeletal: She exhibits no edema or deformity.   Lymphadenopathy:     She has no cervical adenopathy.   Neurological: She is alert and oriented to person, place, and time.   Skin: Skin is warm and dry. No rash noted. She is not diaphoretic.   Psychiatric: Her behavior is normal.           Significant Labs:   BMP:   Recent Labs   Lab 03/02/20  1249   *      K 3.9      CO2 23   BUN 28   CREATININE 0.8   CALCIUM 10.9*     CBC:   Recent Labs   Lab 03/02/20  1249   WBC 15.85*   HGB 12.7   HCT 38.4        Lactic Acid:   Recent Labs   Lab 03/02/20  1433  03/02/20  1819   LACTATE 1.5 1.6       Significant Imaging:   Results for orders placed during the hospital encounter of 03/02/20   X-Ray Chest PA And Lateral    Narrative EXAMINATION:  XR CHEST PA AND LATERAL    CLINICAL HISTORY:  Cough    TECHNIQUE:  PA and lateral views of the chest were performed.    COMPARISON:  07/01/2019    FINDINGS:  The cardiomediastinal silhouette is stable.  There is mild right basilar infiltrate and there is mild stranding in the left lung base.  No consolidation.  No pleural effusion      Impression Mild patchy right basilar infiltrate and slight stranding in the left lung base new compared to the prior exam      Electronically signed by: Jordana Hernandez MD  Date:    03/02/2020  Time:    12:47         Assessment/Plan:     * Pneumonia of right lung due to Streptococcus pneumoniae  Associated with sepsis.  IVAB.  Culture any sputum.    Antibiotics (From admission, onward)    Start     Stop Route Frequency Ordered    03/03/20 1500  azithromycin 500 mg in dextrose 5 % 250 mL IVPB (ready to mix system)  (Ceftriaxone IV/ Azithromycin IV Panel)      -- IV Every 24 hours (non-standard times) 03/02/20 1627    03/03/20 1400  cefTRIAXone (ROCEPHIN) 1 g in dextrose 5 % 50 mL IVPB  (Ceftriaxone IV/ Azithromycin IV Panel)      -- IV Every 24 hours (non-standard times) 03/02/20 1627            Transaminasemia  Mild elevation.  Will monitor.      Sepsis  Glenda Gimenez is a 89 y.o. female who presents to the Emergency Department       This patient does have evidence of infective focus  My overall impression is sepsis.  Antibiotics given-   Antibiotics (From admission, onward)    Start     Stop Route Frequency Ordered    03/03/20 1500  azithromycin 500 mg in dextrose 5 % 250 mL IVPB (ready to mix system)  (Ceftriaxone IV/ Azithromycin IV Panel)      -- IV Every 24 hours (non-standard times) 03/02/20 1627    03/03/20 1400  cefTRIAXone (ROCEPHIN) 1 g in dextrose 5 % 50 mL IVPB  (Ceftriaxone IV/  Azithromycin IV Panel)      -- IV Every 24 hours (non-standard times) 03/02/20 1627          Obesity (BMI 30-39.9)  Body mass index is 32.61 kg/m². Morbid obesity complicates all aspects of disease management from diagnostic modalities to treatment. Weight loss encouraged and health benefits explained to patient.    Hypertension  Chronic, controlled.  Latest blood pressure and vitals reviewed-   Temp:  [96.8 °F (36 °C)-101.6 °F (38.7 °C)]   Pulse:  []   Resp:  [17-24]   BP: (100-192)/()   SpO2:  [93 %-98 %] .   Home meds for hypertension were reviewed and noted below. Hospital anti-hypertensive changes were made as shown below.  Outpt Hypertension Medications             carvedilol (COREG) 3.125 MG tablet Take 1.5625 mg by mouth 2 (two) times daily.     furosemide (LASIX) 20 MG tablet Take 20 mg by mouth once daily.     isosorbide mononitrate (IMDUR) 30 MG 24 hr tablet Take 30 mg by mouth once daily.    olmesartan (BENICAR) 5 MG Tab Take 5 mg by mouth once daily.       Hospital Medications             amLODIPine tablet 5 mg 5 mg, Oral, Daily    furosemide tablet 20 mg 20 mg, Oral, Daily    olmesartan tablet 5 mg 5 mg, Oral, Daily        Will utilize p.r.n. blood pressure medication only if patient's blood pressure greater than  180/110 and she develops symptoms such as worsening chest pain or shortness of breath.        Hypothyroid  Stable.  Continue thyroid supplement.  Lab Results   Component Value Date    TSH 1.020 03/02/2020             VTE Risk Mitigation (From admission, onward)         Ordered     enoxaparin injection 40 mg  Daily      03/02/20 1627     IP VTE HIGH RISK PATIENT  Once      03/02/20 1627     Place sequential compression device  Until discontinued      03/02/20 1627                   Josse Wilkerson MD  Department of Hospital Medicine   [facility]

## 2020-03-03 NOTE — ASSESSMENT & PLAN NOTE
Stable.  Continue thyroid supplement.  Lab Results   Component Value Date    TSH 1.020 03/02/2020

## 2020-03-03 NOTE — RESPIRATORY THERAPY
Daughter refused ABG & states her mother needs a continuous pulse ox & an O2 mask in place of the NC. O2 sats on NC 3L are 93%. Pt eating dinner & in no apparent distress. Dr. Wilkerson notified via secure chat.

## 2020-03-03 NOTE — HPI
Patient presented to the emergency today complaining of cough and shortness breath.  Her symptoms started 3-4 days ago.  Several days prior to that, patient fell home due to tripping and inability keeping herself upright.  She denies dizziness, lightheadedness, weakness.  Denies difficulty speaking or unusual headaches.  Daughter, however has noticed mom has been a little more confused since falling.  Cough is dry.  Denies any subjective fever.

## 2020-03-03 NOTE — RESPIRATORY THERAPY
03/02/20 7758   Patient Assessment/Suction   Level of Consciousness (AVPU) alert   Respiratory Effort Normal;Unlabored   Expansion/Accessory Muscles/Retractions no use of accessory muscles   All Lung Fields Breath Sounds coarse;rhonchi;wheezes, expiratory   Rhythm/Pattern, Respiratory depth regular;pattern regular;unlabored   Cough Type good;congested   PRE-TX-O2   O2 Device (Oxygen Therapy) nasal cannula   $ Is the patient on Low Flow Oxygen? Yes   Flow (L/min) 2   SpO2 (!) 94 %   Pulse Oximetry Type Intermittent   $ Pulse Oximetry - Multiple Charge Pulse Oximetry - Multiple   Pulse 86   Resp (!) 24   Aerosol Therapy   $ Aerosol Therapy Charges Aerosol Treatment   Daily Review of Necessity (SVN) completed   Respiratory Treatment Status (SVN) given   Treatment Route (SVN) mask   Patient Position (SVN) semi-Yeager's   Post Treatment Assessment (SVN) breath sounds improved   Signs of Intolerance (SVN) none   Breath Sounds Post-Respiratory Treatment   Post-treatment Heart Rate (beats/min) 88   Post-treatment Resp Rate (breaths/min) 26

## 2020-03-03 NOTE — RESPIRATORY THERAPY
03/03/20 1202   Patient Assessment/Suction   Level of Consciousness (AVPU) responds to voice   All Lung Fields Breath Sounds diminished   Cough Frequency no cough   PRE-TX-O2   O2 Device (Oxygen Therapy) nasal cannula   Flow (L/min) 3   SpO2 95 %   Pulse Oximetry Type Intermittent   Pulse 93   Resp 20   Aerosol Therapy   $ Aerosol Therapy Charges Aerosol Treatment   Respiratory Treatment Status (SVN) given   Treatment Route (SVN) mask   Patient Position (SVN) HOB elevated   Post Treatment Assessment (SVN) breath sounds unchanged   Signs of Intolerance (SVN) none   Breath Sounds Post-Respiratory Treatment   Post-treatment Heart Rate (beats/min) 87   Post-treatment Resp Rate (breaths/min) 20     Pts daughter called for tx. Pt sleeping, in no apparent distress. Tx given @ daughters request

## 2020-03-03 NOTE — ASSESSMENT & PLAN NOTE
Body mass index is 32.61 kg/m². Morbid obesity complicates all aspects of disease management from diagnostic modalities to treatment. Weight loss encouraged and health benefits explained to patient.

## 2020-03-03 NOTE — PLAN OF CARE
Plan of care reviewed with pt,verbalized understanding. IV CDI. O2 continues, shallow breathing. Heart monitoring in place.Call light kept within reach, bed in locked and lowest position, side rails up x 2.

## 2020-03-03 NOTE — ASSESSMENT & PLAN NOTE
Glenda Gimenez is a 89 y.o. female who presents to the Emergency Department       This patient does have evidence of infective focus  My overall impression is sepsis.  Antibiotics given-   Antibiotics (From admission, onward)    Start     Stop Route Frequency Ordered    03/03/20 1500  azithromycin 500 mg in dextrose 5 % 250 mL IVPB (ready to mix system)  (Ceftriaxone IV/ Azithromycin IV Panel)      -- IV Every 24 hours (non-standard times) 03/02/20 1627    03/03/20 1400  cefTRIAXone (ROCEPHIN) 1 g in dextrose 5 % 50 mL IVPB  (Ceftriaxone IV/ Azithromycin IV Panel)      -- IV Every 24 hours (non-standard times) 03/02/20 1627

## 2020-03-03 NOTE — ASSESSMENT & PLAN NOTE
Chronic, controlled.  Latest blood pressure and vitals reviewed-   Temp:  [96.8 °F (36 °C)-101.6 °F (38.7 °C)]   Pulse:  []   Resp:  [17-24]   BP: (100-192)/()   SpO2:  [93 %-98 %] .   Home meds for hypertension were reviewed and noted below. Hospital anti-hypertensive changes were made as shown below.  Outpt Hypertension Medications             carvedilol (COREG) 3.125 MG tablet Take 1.5625 mg by mouth 2 (two) times daily.     furosemide (LASIX) 20 MG tablet Take 20 mg by mouth once daily.     isosorbide mononitrate (IMDUR) 30 MG 24 hr tablet Take 30 mg by mouth once daily.    olmesartan (BENICAR) 5 MG Tab Take 5 mg by mouth once daily.       Hospital Medications             amLODIPine tablet 5 mg 5 mg, Oral, Daily    furosemide tablet 20 mg 20 mg, Oral, Daily    olmesartan tablet 5 mg 5 mg, Oral, Daily        Will utilize p.r.n. blood pressure medication only if patient's blood pressure greater than  180/110 and she develops symptoms such as worsening chest pain or shortness of breath.

## 2020-03-03 NOTE — UM SECONDARY REVIEW
Gurpreet requested patient to be changed to Observation.  Discussed with Dr Wilkerson, who was in agreement.  Condition Code 44 completed with Dr Wilkerson, and Dr Patel.  Juliet GRULLON met with patient to have Lange signed.

## 2020-03-03 NOTE — ASSESSMENT & PLAN NOTE
Associated with sepsis.  IVAB.  Culture any sputum.    Antibiotics (From admission, onward)    Start     Stop Route Frequency Ordered    03/03/20 1500  azithromycin 500 mg in dextrose 5 % 250 mL IVPB (ready to mix system)  (Ceftriaxone IV/ Azithromycin IV Panel)      -- IV Every 24 hours (non-standard times) 03/02/20 1627    03/03/20 1400  cefTRIAXone (ROCEPHIN) 1 g in dextrose 5 % 50 mL IVPB  (Ceftriaxone IV/ Azithromycin IV Panel)      -- IV Every 24 hours (non-standard times) 03/02/20 1627

## 2020-03-03 NOTE — SUBJECTIVE & OBJECTIVE
Past Medical History:   Diagnosis Date    Anticoagulant long-term use     Dislocation of right shoulder joint     Hypertension     Shoulder disorder     right    Thyroid disease        Past Surgical History:   Procedure Laterality Date    HYSTERECTOMY      PARTIAL HYSTERECTOMY      ARUTR, ovaries in place, uterine prolapse       Review of patient's allergies indicates:   Allergen Reactions    Atenolol      Other reaction(s): itch    Betamethasone sodium phosphate      Other reaction(s): Flushing (skin)    Cortisone      Other reaction(s): blurry vision  Other reaction(s): Rash    Lisinopril      Other reaction(s): COUGH    Naproxen      Other reaction(s): body shut down    Triamterene-hydrochlorothiazid      Other reaction(s): itch       No current facility-administered medications on file prior to encounter.      Current Outpatient Medications on File Prior to Encounter   Medication Sig    aspirin (ECOTRIN) 81 MG EC tablet Take 81 mg by mouth once daily.    carvedilol (COREG) 3.125 MG tablet Take 1.5625 mg by mouth 2 (two) times daily.     furosemide (LASIX) 20 MG tablet Take 20 mg by mouth once daily.     isosorbide mononitrate (IMDUR) 30 MG 24 hr tablet Take 30 mg by mouth once daily.    levothyroxine (SYNTHROID) 88 MCG tablet TAKE 1 TABLET BY MOUTH EVERY DAY (Patient taking differently: Take 88 mcg by mouth before breakfast. )    olmesartan (BENICAR) 5 MG Tab Take 5 mg by mouth once daily.     potassium chloride SA (K-DUR,KLOR-CON) 20 MEQ tablet TAKE 1 TABLET(20 MEQ) BY MOUTH TWICE DAILY (Patient taking differently: Take 20 mEq by mouth once daily. )     Family History     Problem Relation (Age of Onset)    Breast cancer Mother    Cancer Brother, Maternal Aunt, Maternal Uncle        Tobacco Use    Smoking status: Never Smoker    Smokeless tobacco: Never Used   Substance and Sexual Activity    Alcohol use: Yes     Comment: wine occasionally     Drug use: No    Sexual activity: Not on file      Review of Systems   Constitutional: Negative for chills, fatigue and fever.   HENT: Negative for congestion and sinus pressure.    Eyes: Negative for pain and visual disturbance.   Respiratory: Positive for cough and shortness of breath. Negative for wheezing.    Cardiovascular: Negative for chest pain, palpitations and leg swelling.   Gastrointestinal: Negative for abdominal pain, diarrhea, nausea and vomiting.   Genitourinary: Negative for difficulty urinating, hematuria, urgency and vaginal discharge.   Musculoskeletal: Negative for arthralgias, joint swelling and myalgias.   Skin: Negative for rash and wound.   Neurological: Negative for dizziness, weakness, light-headedness and headaches.   Hematological: Negative for adenopathy. Does not bruise/bleed easily.   Psychiatric/Behavioral: Negative for confusion and dysphoric mood. The patient is not nervous/anxious.      Objective:     Vital Signs (Most Recent):  Temp: 96.8 °F (36 °C) (03/02/20 2324)  Pulse: 86 (03/02/20 2349)  Resp: (!) 24 (03/02/20 2349)  BP: 137/61 (03/02/20 2324)  SpO2: (!) 94 % (03/02/20 2349) Vital Signs (24h Range):  Temp:  [96.8 °F (36 °C)-101.6 °F (38.7 °C)] 96.8 °F (36 °C)  Pulse:  [] 86  Resp:  [17-24] 24  SpO2:  [93 %-98 %] 94 %  BP: (100-192)/() 137/61     Weight: 86.2 kg (190 lb)  Body mass index is 32.61 kg/m².    Physical Exam   Constitutional: She is oriented to person, place, and time. She appears well-nourished. She is cooperative. No distress.   HENT:   Head: Normocephalic and atraumatic.   Right Ear: External ear normal.   Left Ear: External ear normal.   Mouth/Throat: No oropharyngeal exudate.   Ecchymoses right lower cheek and inferior aspect of periorbital region.   Eyes: Conjunctivae are normal. Right eye exhibits no discharge. Left eye exhibits no discharge. No scleral icterus.   Neck: Normal range of motion. Neck supple. No JVD present. No thyromegaly present.   Cardiovascular: Normal rate and regular  rhythm. Exam reveals no gallop.   Pulmonary/Chest: Effort normal. No accessory muscle usage. Tachypnea noted. She has no wheezes. She has rhonchi in the right middle field and the right lower field.   Abdominal: Soft. Bowel sounds are normal. She exhibits no distension and no mass. There is no hepatomegaly. There is no tenderness.   Musculoskeletal: She exhibits no edema or deformity.   Lymphadenopathy:     She has no cervical adenopathy.   Neurological: She is alert and oriented to person, place, and time.   Skin: Skin is warm and dry. No rash noted. She is not diaphoretic.   Psychiatric: Her behavior is normal.           Significant Labs:   BMP:   Recent Labs   Lab 03/02/20  1249   *      K 3.9      CO2 23   BUN 28   CREATININE 0.8   CALCIUM 10.9*     CBC:   Recent Labs   Lab 03/02/20  1249   WBC 15.85*   HGB 12.7   HCT 38.4        Lactic Acid:   Recent Labs   Lab 03/02/20  1433 03/02/20  1819   LACTATE 1.5 1.6       Significant Imaging:   Results for orders placed during the hospital encounter of 03/02/20   X-Ray Chest PA And Lateral    Narrative EXAMINATION:  XR CHEST PA AND LATERAL    CLINICAL HISTORY:  Cough    TECHNIQUE:  PA and lateral views of the chest were performed.    COMPARISON:  07/01/2019    FINDINGS:  The cardiomediastinal silhouette is stable.  There is mild right basilar infiltrate and there is mild stranding in the left lung base.  No consolidation.  No pleural effusion      Impression Mild patchy right basilar infiltrate and slight stranding in the left lung base new compared to the prior exam      Electronically signed by: Jordana Hernandez MD  Date:    03/02/2020  Time:    12:47

## 2020-03-03 NOTE — RESPIRATORY THERAPY
03/03/20 0719   Patient Assessment/Suction   Level of Consciousness (AVPU) alert   All Lung Fields Breath Sounds diminished   Cough Frequency infrequent   Cough Type good   Secretions Amount scant   Secretions Color white   Secretions Characteristics thin   PRE-TX-O2   O2 Device (Oxygen Therapy) nasal cannula   $ Is the patient on Low Flow Oxygen? Yes   Flow (L/min) 2  (increased to 3L)   SpO2 (!) 93 %   Pulse Oximetry Type Intermittent   $ Pulse Oximetry - Multiple Charge Pulse Oximetry - Multiple   Pulse 80   Resp 16   Aerosol Therapy   $ Aerosol Therapy Charges Aerosol Treatment   Respiratory Treatment Status (SVN) given   Treatment Route (SVN) mask   Patient Position (SVN) HOB elevated   Post Treatment Assessment (SVN) breath sounds unchanged   Signs of Intolerance (SVN) none   Breath Sounds Post-Respiratory Treatment   Post-treatment Heart Rate (beats/min) 83   Post-treatment Resp Rate (breaths/min) 16

## 2020-03-04 PROBLEM — R05.9 COUGH: Status: ACTIVE | Noted: 2020-03-04

## 2020-03-04 LAB
ALBUMIN SERPL BCP-MCNC: 2.3 G/DL (ref 3.5–5.2)
ALP SERPL-CCNC: 232 U/L (ref 55–135)
ALT SERPL W/O P-5'-P-CCNC: 75 U/L (ref 10–44)
ANION GAP SERPL CALC-SCNC: 11 MMOL/L (ref 8–16)
AST SERPL-CCNC: 100 U/L (ref 10–40)
BASOPHILS # BLD AUTO: 0.07 K/UL (ref 0–0.2)
BASOPHILS NFR BLD: 0.7 % (ref 0–1.9)
BILIRUB SERPL-MCNC: 0.7 MG/DL (ref 0.1–1)
BUN SERPL-MCNC: 21 MG/DL (ref 10–30)
CALCIUM SERPL-MCNC: 10.1 MG/DL (ref 8.7–10.5)
CHLORIDE SERPL-SCNC: 107 MMOL/L (ref 95–110)
CO2 SERPL-SCNC: 22 MMOL/L (ref 23–29)
CREAT SERPL-MCNC: 0.7 MG/DL (ref 0.5–1.4)
DIFFERENTIAL METHOD: ABNORMAL
EOSINOPHIL # BLD AUTO: 0.1 K/UL (ref 0–0.5)
EOSINOPHIL NFR BLD: 1.1 % (ref 0–8)
ERYTHROCYTE [DISTWIDTH] IN BLOOD BY AUTOMATED COUNT: 13.2 % (ref 11.5–14.5)
EST. GFR  (AFRICAN AMERICAN): >60 ML/MIN/1.73 M^2
EST. GFR  (NON AFRICAN AMERICAN): >60 ML/MIN/1.73 M^2
GLUCOSE SERPL-MCNC: 109 MG/DL (ref 70–110)
HCT VFR BLD AUTO: 34.1 % (ref 37–48.5)
HGB BLD-MCNC: 11.2 G/DL (ref 12–16)
IMM GRANULOCYTES # BLD AUTO: 0.31 K/UL (ref 0–0.04)
L PNEUMO AG UR QL IA: NOT DETECTED
LYMPHOCYTES # BLD AUTO: 1.4 K/UL (ref 1–4.8)
LYMPHOCYTES NFR BLD: 13.6 % (ref 18–48)
MCH RBC QN AUTO: 32 PG (ref 27–31)
MCHC RBC AUTO-ENTMCNC: 32.8 G/DL (ref 32–36)
MCV RBC AUTO: 97 FL (ref 82–98)
MONOCYTES # BLD AUTO: 1.2 K/UL (ref 0.3–1)
MONOCYTES NFR BLD: 11.4 % (ref 4–15)
NEUTROPHILS # BLD AUTO: 7.3 K/UL (ref 1.8–7.7)
NEUTROPHILS NFR BLD: 70.2 % (ref 38–73)
NRBC BLD-RTO: 0 /100 WBC
PLATELET # BLD AUTO: 289 K/UL (ref 150–350)
PMV BLD AUTO: 10.7 FL (ref 9.2–12.9)
POTASSIUM SERPL-SCNC: 3.6 MMOL/L (ref 3.5–5.1)
PROT SERPL-MCNC: 7 G/DL (ref 6–8.4)
RBC # BLD AUTO: 3.5 M/UL (ref 4–5.4)
SODIUM SERPL-SCNC: 140 MMOL/L (ref 136–145)
WBC # BLD AUTO: 10.43 K/UL (ref 3.9–12.7)

## 2020-03-04 PROCEDURE — 25000003 PHARM REV CODE 250: Performed by: HOSPITALIST

## 2020-03-04 PROCEDURE — 99222 PR INITIAL HOSPITAL CARE,LEVL II: ICD-10-PCS | Mod: ,,, | Performed by: INTERNAL MEDICINE

## 2020-03-04 PROCEDURE — 63600175 PHARM REV CODE 636 W HCPCS: Performed by: HOSPITALIST

## 2020-03-04 PROCEDURE — 94640 AIRWAY INHALATION TREATMENT: CPT

## 2020-03-04 PROCEDURE — 36415 COLL VENOUS BLD VENIPUNCTURE: CPT

## 2020-03-04 PROCEDURE — 27000221 HC OXYGEN, UP TO 24 HOURS

## 2020-03-04 PROCEDURE — 25000003 PHARM REV CODE 250

## 2020-03-04 PROCEDURE — 80053 COMPREHEN METABOLIC PANEL: CPT

## 2020-03-04 PROCEDURE — 25000242 PHARM REV CODE 250 ALT 637 W/ HCPCS: Performed by: HOSPITALIST

## 2020-03-04 PROCEDURE — 94761 N-INVAS EAR/PLS OXIMETRY MLT: CPT

## 2020-03-04 PROCEDURE — 85025 COMPLETE CBC W/AUTO DIFF WBC: CPT

## 2020-03-04 PROCEDURE — 12000002 HC ACUTE/MED SURGE SEMI-PRIVATE ROOM

## 2020-03-04 PROCEDURE — 99222 1ST HOSP IP/OBS MODERATE 55: CPT | Mod: ,,, | Performed by: INTERNAL MEDICINE

## 2020-03-04 RX ORDER — GUAIFENESIN/DEXTROMETHORPHAN 100-10MG/5
5 SYRUP ORAL EVERY 4 HOURS PRN
Status: DISCONTINUED | OUTPATIENT
Start: 2020-03-04 | End: 2020-03-06 | Stop reason: HOSPADM

## 2020-03-04 RX ADMIN — LACTOBACILLUS ACIDOPHILUS / LACTOBACILLUS BULGARICUS 1 EACH: 100 MILLION CFU STRENGTH GRANULES at 10:03

## 2020-03-04 RX ADMIN — IPRATROPIUM BROMIDE AND ALBUTEROL SULFATE 3 ML: .5; 3 SOLUTION RESPIRATORY (INHALATION) at 11:03

## 2020-03-04 RX ADMIN — IPRATROPIUM BROMIDE AND ALBUTEROL SULFATE 3 ML: .5; 3 SOLUTION RESPIRATORY (INHALATION) at 07:03

## 2020-03-04 RX ADMIN — ENOXAPARIN SODIUM 40 MG: 100 INJECTION SUBCUTANEOUS at 04:03

## 2020-03-04 RX ADMIN — IPRATROPIUM BROMIDE AND ALBUTEROL SULFATE 3 ML: .5; 3 SOLUTION RESPIRATORY (INHALATION) at 12:03

## 2020-03-04 RX ADMIN — OLMESARTAN MEDOXOMIL 5 MG: 5 TABLET, COATED ORAL at 08:03

## 2020-03-04 RX ADMIN — CEFTRIAXONE 1 G: 1 INJECTION, SOLUTION INTRAVENOUS at 01:03

## 2020-03-04 RX ADMIN — FUROSEMIDE 20 MG: 20 TABLET ORAL at 08:03

## 2020-03-04 RX ADMIN — LEVOTHYROXINE SODIUM 88 MCG: 88 TABLET ORAL at 05:03

## 2020-03-04 RX ADMIN — IPRATROPIUM BROMIDE AND ALBUTEROL SULFATE 3 ML: .5; 3 SOLUTION RESPIRATORY (INHALATION) at 04:03

## 2020-03-04 RX ADMIN — IPRATROPIUM BROMIDE AND ALBUTEROL SULFATE 3 ML: .5; 3 SOLUTION RESPIRATORY (INHALATION) at 03:03

## 2020-03-04 RX ADMIN — IPRATROPIUM BROMIDE AND ALBUTEROL SULFATE 3 ML: .5; 3 SOLUTION RESPIRATORY (INHALATION) at 08:03

## 2020-03-04 RX ADMIN — AMLODIPINE BESYLATE 5 MG: 5 TABLET ORAL at 08:03

## 2020-03-04 RX ADMIN — LACTOBACILLUS ACIDOPHILUS / LACTOBACILLUS BULGARICUS 1 EACH: 100 MILLION CFU STRENGTH GRANULES at 08:03

## 2020-03-04 RX ADMIN — AZITHROMYCIN MONOHYDRATE 500 MG: 500 INJECTION, POWDER, LYOPHILIZED, FOR SOLUTION INTRAVENOUS at 03:03

## 2020-03-04 RX ADMIN — GUAIFENESIN AND DEXTROMETHORPHAN 5 ML: 100; 10 SYRUP ORAL at 07:03

## 2020-03-04 RX ADMIN — ACETAMINOPHEN 650 MG: 325 TABLET ORAL at 04:03

## 2020-03-04 NOTE — RESPIRATORY THERAPY
"   03/04/20 0710   Patient Assessment/Suction   Level of Consciousness (AVPU) responds to voice   All Lung Fields Breath Sounds diminished   Cough Frequency infrequent   Cough Type good;nonproductive   PRE-TX-O2   O2 Device (Oxygen Therapy) nasal cannula   $ Is the patient on Low Flow Oxygen? Yes   Flow (L/min) 4   SpO2 (!) 94 %   Pulse Oximetry Type Continuous   $ Pulse Oximetry - Multiple Charge Pulse Oximetry - Multiple   Pulse 75   Resp 15   Aerosol Therapy   $ Aerosol Therapy Charges Aerosol Treatment   Respiratory Treatment Status (SVN) given   Treatment Route (SVN) mask   Patient Position (SVN) semi-Yeager's   Post Treatment Assessment (SVN) breath sounds unchanged   Signs of Intolerance (SVN) none   Breath Sounds Post-Respiratory Treatment   Post-treatment Heart Rate (beats/min) 82   Post-treatment Resp Rate (breaths/min) 25     Upon entering room pt resting quietly. RR 15, O2 sats 94% on NC 4L. During tx, pt woke. RR increased to 25. Pt stated " I feel fine. I'm breathing ok".  No family @ bedside during tx  "

## 2020-03-04 NOTE — NURSING
Daughter requesting that we take pts temp and continuous pulse ox. Pt temp took 3x and all are 98.9-99.1. Daughter aware. Continuous pulse ox ordered per md.

## 2020-03-04 NOTE — RESPIRATORY THERAPY
03/03/20 1904   Patient Assessment/Suction   Level of Consciousness (AVPU) alert   Respiratory Effort Normal;Unlabored   Expansion/Accessory Muscles/Retractions no use of accessory muscles   All Lung Fields Breath Sounds diminished   Rhythm/Pattern, Respiratory depth regular;pattern regular;unlabored   Cough Type congested   PRE-TX-O2   O2 Device (Oxygen Therapy) nasal cannula   $ Is the patient on Low Flow Oxygen? Yes   Flow (L/min) 3   SpO2 96 %   Pulse Oximetry Type Intermittent   $ Pulse Oximetry - Multiple Charge Pulse Oximetry - Multiple   Pulse 95   Resp (!) 21   Aerosol Therapy   $ Aerosol Therapy Charges Aerosol Treatment   Daily Review of Necessity (SVN) completed   Respiratory Treatment Status (SVN) given   Treatment Route (SVN) mask   Patient Position (SVN) semi-Yeager's   Post Treatment Assessment (SVN) breath sounds improved   Signs of Intolerance (SVN) none   Breath Sounds Post-Respiratory Treatment   Post-treatment Heart Rate (beats/min) 96   Post-treatment Resp Rate (breaths/min) 20

## 2020-03-04 NOTE — NURSING
Pt alert, sob reported. Oxygen on as ordered. Iv antibiotics given. Hoarse voice. Iv intact. VSS. Safety maintained. Pulmonary consult ordered. Continuous o2 ordered. No new symptoms. Updated daughter and pt at bedside. Will continue to monitor.

## 2020-03-04 NOTE — ASSESSMENT & PLAN NOTE
Supplemental oxygen.  Pt is a mouth-breather, so will switch her to venturi mask at 36% and will titrate to keep her sats > 90.  Monitor resp effort.  Family requesting continuous oximetry; will order.  Family requesting pulmonology consultation; seems reasonable; will order.

## 2020-03-04 NOTE — CONSULTS
03/04/2020      Admit Date: 3/2/2020  Glenda Gimenez  New Patient Consult    Chief Complaint   Patient presents with    Weakness     started last thursday     Cough    Shortness of Breath       History of Present Illness:  Pt is a 89 yo female with hypertension, thyroid disease who presented to the ED on 03/02 complaining of cough and shortness of Breath as well as mechanical fall.  Patient of decreased appetite for 3-4 days and cough which is dry now but was productive of phlegm. She had a fall and hit her face. Denies prior lung disease or asthma. She has had some dyspnea going on which was worked up by PCP by some recent PFTs. Says she has been very healthy w/o recurrent infections, inhaler use. She feels better now.      PFSH:  Past Medical History:   Diagnosis Date    Anticoagulant long-term use     Dislocation of right shoulder joint     Hypertension     Shoulder disorder     right    Thyroid disease      Past Surgical History:   Procedure Laterality Date    HYSTERECTOMY      PARTIAL HYSTERECTOMY      ARTUR, ovaries in place, uterine prolapse     Social History     Tobacco Use    Smoking status: Never Smoker    Smokeless tobacco: Never Used   Substance Use Topics    Alcohol use: Yes     Comment: wine occasionally     Drug use: No     Family History   Problem Relation Age of Onset    Breast cancer Mother     Cancer Brother         lung cancer    Cancer Maternal Aunt         unknown cancer    Cancer Maternal Uncle         pancreatic cancer    Heart disease Neg Hx      Review of patient's allergies indicates:   Allergen Reactions    Atenolol      Other reaction(s): itch    Betamethasone sodium phosphate      Other reaction(s): Flushing (skin)    Cortisone      Other reaction(s): blurry vision  Other reaction(s): Rash    Lisinopril      Other reaction(s): COUGH    Naproxen      Other reaction(s): body shut down    Triamterene-hydrochlorothiazid      Other reaction(s): itch  "      Performance Status:Performance Status:The patient's activity level is functions out of house.    Review of Systems:  a review of eleven systems covering constitutional, Psych, Eye, HEENT, Respiratory, Cardiac, GI, , Musculoskeletal, Endocrine, Dermatologicwas negative except the above mentioned abnormalities and for any pertinent findings as listed below:  All negative with pertinent positives as above       Exam:Comprehensive exam done. /63 (Patient Position: Sitting)   Pulse 80   Temp 97.8 °F (36.6 °C) (Oral)   Resp (!) 26   Ht 5' 4" (1.626 m)   Wt 86.2 kg (190 lb)   LMP  (LMP Unknown)   SpO2 97%   Breastfeeding? No   BMI 32.61 kg/m²   Exam included Vitals as listed, and patient's appearance and affect and alertness and mood, oral exam for yeast and hygiene and pharynx lesions and Mallapatti (M) score, neck with inspection for jvd and masses and thyroid abnormalities and lymph nodes (supraclavicular and infraclavicular nodes also examined and noted if abn), chest exam included symmetry and effort and fremitus and percussion and auscultation, cardiac exam included rhythm and gallops and murmur and rubs and jvd and edema, abdominal exam for mass and hepatosplenomegaly and tenderness and hernias and bowel sounds, Musculoskeletal exam with muscle tone and posture and mobility/gait and  strenght, and skin for rashes and cyanosis and pallor and turgor, extremity for clubbing.  Findings were normal except as listed below:  M2, upper tooth missing  Crackles R lower lung zone.  Sitting up in chair, no acute distress    Radiographs reviewed: view by direct vision   CXR 3/2- mediastinal deviation to the right and volume loss on the right; bibasilar increased interstitial markings    PFT 7/12/19- mild restriction, no obstruction      Labs     Recent Labs   Lab 03/04/20  0607   WBC 10.43   HGB 11.2*   HCT 34.1*        Recent Labs   Lab 03/04/20  0607      K 3.6      CO2 22*   BUN " 21   CREATININE 0.7      CALCIUM 10.1   *   ALT 75*   ALKPHOS 232*   BILITOT 0.7   PROT 7.0   ALBUMIN 2.3*   No results for input(s): PH, PCO2, PO2, HCO3 in the last 24 hours.  Microbiology Results (last 7 days)     Procedure Component Value Units Date/Time    Blood culture x two cultures. Draw prior to antibiotics. [[id number]] Collected:  03/02/20 1417    Order Status:  Completed Specimen:  Blood from Antecubital, Right  Arm Updated:  03/03/20 2312     Blood Culture, Routine No Growth to date      No Growth to date    Narrative:       Aerobic and anaerobic    Blood culture x two cultures. Draw prior to antibiotics. [[id number]] Collected:  03/02/20 1418    Order Status:  Completed Specimen:  Blood from Antecubital, Left  Arm Updated:  03/03/20 2312     Blood Culture, Routine No Growth to date      No Growth to date    Narrative:       Aerobic and anaerobic    Culture, Respiratory [[id number]]     Order Status:  No result Specimen:  Respiratory from Sputum     Gram Stain [[id number]]     Order Status:  No result Specimen:  Sputum     Influenza A & B by Molecular [[id number]] Collected:  03/02/20 1344    Order Status:  Completed Specimen:  Nasopharyngeal Swab Updated:  03/02/20 1512     Influenza A, Molecular Negative     Influenza B, Molecular Negative     Flu A & B Source Nasal swab          Impression:  Active Hospital Problems    Diagnosis  POA    *Pneumonia of right lung due to Streptococcus pneumoniae [J13]  Yes    Cough [R05]  Yes    Transaminasemia [R74.0]  Yes    Acute hypoxemic respiratory failure [J96.01]  Yes    Sepsis [A41.9]  Yes    Obesity (BMI 30-39.9) [E66.9]  Yes    Hypertension [I10]  Yes    Hypothyroid [E03.9]  Yes      Resolved Hospital Problems   No resolved problems to display.               Plan:   Pneumonia RLL  Acute hypoxemic resp failure  Mechanical fall  Obesity  Hypothyroidism  HTN    - continue Rocephin and azithromycin  - culture sputum if able  - continue nebulized  bronchodilators  - continue supplemental O2, titrate to keep sat >92%    Ann Busby MD  Pulmonary & Critical Care Medicine

## 2020-03-04 NOTE — PLAN OF CARE
Plan of care reviewed with pt,verbalized understanding. Had low grade fever all night but stayed below 100. frequently breathing and effort after ambulation to bedside commode was noted.  Hourly and Q2h rounds completed on this pt throughout shift. Call light kept within reach, bed in locked and lowest position, side rails up x2.

## 2020-03-04 NOTE — ASSESSMENT & PLAN NOTE
Mild elevation.  Will monitor.    AST   Date Value Ref Range Status   03/03/2020 74 (H) 10 - 40 U/L Final   07/20/2013 27 10 - 40 U/L Final     ALT   Date Value Ref Range Status   03/03/2020 59 (H) 10 - 44 U/L Final

## 2020-03-04 NOTE — PLAN OF CARE
Plan of care reviewed with patient. Patient verbalized complete understanding. IV antibiotics administered as scheduled. UP in chair throughout shift. Tolerating 4L oxygen (94%). All fall precautions maintained. Bed in lowest position, locked, call light within reach. Side rails up x's 2. Slip resistant socks maintained.

## 2020-03-04 NOTE — SUBJECTIVE & OBJECTIVE
Interval History:  Pt still with shortness of breath and hoarseness.  No fevers recorded.  Good O2 sats while on supplemental oxygen.    Review of Systems   Constitutional: Positive for fatigue. Negative for chills and fever.   Respiratory: Positive for cough and shortness of breath. Negative for wheezing.    Cardiovascular: Negative for chest pain and leg swelling.   Gastrointestinal: Negative for abdominal pain, nausea and vomiting.     Objective:     Vital Signs (Most Recent):  Temp: 99.5 °F (37.5 °C) (03/03/20 2002)  Pulse: 94 (03/03/20 2002)  Resp: 17 (03/03/20 2002)  BP: 139/67 (03/03/20 2002)  SpO2: (!) 90 % (03/03/20 2002) Vital Signs (24h Range):  Temp:  [96.8 °F (36 °C)-99.9 °F (37.7 °C)] 99.5 °F (37.5 °C)  Pulse:  [80-95] 94  Resp:  [15-24] 17  SpO2:  [90 %-96 %] 90 %  BP: (130-165)/(61-70) 139/67     Weight: 86.2 kg (190 lb)  Body mass index is 32.61 kg/m².    Intake/Output Summary (Last 24 hours) at 3/3/2020 2245  Last data filed at 3/3/2020 1639  Gross per 24 hour   Intake 820 ml   Output 600 ml   Net 220 ml      Physical Exam   Constitutional: No distress.   HENT:   Mouth/Throat: Oropharynx is clear and moist.   Eyes: Right eye exhibits no discharge. Left eye exhibits no discharge.   Neck: No JVD present.   Cardiovascular: Normal rate and regular rhythm.   Pulmonary/Chest: Effort normal. No accessory muscle usage. Tachypnea noted. She has no decreased breath sounds. She has no wheezes. She has rales in the right middle field, the right lower field and the left lower field.   Abdominal: Soft. Bowel sounds are normal. She exhibits no distension. There is no tenderness.   Musculoskeletal: She exhibits no edema.   Neurological: She is alert.   Skin: Skin is warm. No rash noted.   Vitals reviewed.      Significant Labs: All pertinent labs within the past 24 hours have been reviewed.    Significant Imaging: I have reviewed all pertinent imaging results/findings within the past 24 hours.

## 2020-03-04 NOTE — PLAN OF CARE
Cm completed the assessment with pt and son- Shola at bedside.  Pt lives alone, but son lives next door to pt  PCP - Pt will need a new PCP, because Dr. Hahn is no longer with [facility].  Disposition:  Pt will discharge to home.  Pt may benefit with HH on discharge. Pending PT/OT evaluation.        03/04/20 1110   Discharge Assessment   Assessment Type Discharge Planning Assessment   Confirmed/corrected address and phone number on facesheet? Yes   Assessment information obtained from? Patient;Caregiver  (son)   Communicated expected length of stay with patient/caregiver yes   Prior to hospitilization cognitive status: Alert/Oriented   Prior to hospitalization functional status: Assistive Equipment;Independent   Current cognitive status: Alert/Oriented   Current Functional Status: Independent;Assistive Equipment   Facility Arrived From: home   Lives With alone   Able to Return to Prior Arrangements yes   Is patient able to care for self after discharge? Yes  (family assistance)   Who are your caregiver(s) and their phone number(s)? Marylou [phone]/ sulema Jolley- [phone]   Patient's perception of discharge disposition home or selfcare   Readmission Within the Last 30 Days no previous admission in last 30 days   Patient currently being followed by outpatient case management? Yes   If yes, name of outpatient case management following: insurance company assigned oupatient case management   Patient currently receives any other outside agency services? No   Equipment Currently Used at Home wheelchair;rollator;cane, quad   Do you have any problems affording any of your prescribed medications? No   Is the patient taking medications as prescribed? yes   Does the patient have transportation home? Yes   Transportation Anticipated family or friend will provide   Dialysis Name and Scheduled days na   Does the patient receive services at the [facility]mad[facility] Clinic? No   Discharge Plan A Home with family;Home Health    DME Needed Upon Discharge  none   Patient/Family in Agreement with Plan yes

## 2020-03-04 NOTE — RESPIRATORY THERAPY
03/03/20 1926   PRE-TX-O2   O2 Device (Oxygen Therapy) nasal cannula   $ Is the patient on Low Flow Oxygen? Yes   Flow (L/min) 3   SpO2 (!) 94 %   Pulse Oximetry Type Intermittent   $ Pulse Oximetry - Multiple Charge Pulse Oximetry - Multiple   initial continous pox s/u

## 2020-03-04 NOTE — PROGRESS NOTES
[facility] Hospital Medicine  Progress Note    Patient Name: Glenda Gimenez  MRN: [id number]  Patient Class: OP- Observation   Admission Date: 3/2/2020  Length of Stay: 1 days  Attending Physician: Josse Wilkerson MD  Primary Care Provider: Crystal Hahn MD        Subjective:     Principal Problem:Pneumonia of right lung due to Streptococcus pneumoniae        HPI:  Patient presented to the emergency today complaining of cough and shortness breath.  Her symptoms started 3-4 days ago.  Several days prior to that, patient fell home due to tripping and inability keeping herself upright.  She denies dizziness, lightheadedness, weakness.  Denies difficulty speaking or unusual headaches.  Daughter, however has noticed mom has been a little more confused since falling.  Cough is dry.  Denies any subjective fever.    Overview/Hospital Course:  No notes on file    Interval History:  Pt still with shortness of breath and hoarseness.  No fevers recorded.  Good O2 sats while on supplemental oxygen.    Review of Systems   Constitutional: Positive for fatigue. Negative for chills and fever.   Respiratory: Positive for cough and shortness of breath. Negative for wheezing.    Cardiovascular: Negative for chest pain and leg swelling.   Gastrointestinal: Negative for abdominal pain, nausea and vomiting.     Objective:     Vital Signs (Most Recent):  Temp: 99.5 °F (37.5 °C) (03/03/20 2002)  Pulse: 94 (03/03/20 2002)  Resp: 17 (03/03/20 2002)  BP: 139/67 (03/03/20 2002)  SpO2: (!) 90 % (03/03/20 2002) Vital Signs (24h Range):  Temp:  [96.8 °F (36 °C)-99.9 °F (37.7 °C)] 99.5 °F (37.5 °C)  Pulse:  [80-95] 94  Resp:  [15-24] 17  SpO2:  [90 %-96 %] 90 %  BP: (130-165)/(61-70) 139/67     Weight: 86.2 kg (190 lb)  Body mass index is 32.61 kg/m².    Intake/Output Summary (Last 24 hours) at 3/3/2020 7861  Last data filed at 3/3/2020 1639  Gross per 24 hour   Intake 820 ml   Output 600 ml   Net 220 ml      Physical Exam    Constitutional: No distress.   HENT:   Mouth/Throat: Oropharynx is clear and moist.   Eyes: Right eye exhibits no discharge. Left eye exhibits no discharge.   Neck: No JVD present.   Cardiovascular: Normal rate and regular rhythm.   Pulmonary/Chest: Effort normal. No accessory muscle usage. Tachypnea noted. She has no decreased breath sounds. She has no wheezes. She has rales in the right middle field, the right lower field and the left lower field.   Abdominal: Soft. Bowel sounds are normal. She exhibits no distension. There is no tenderness.   Musculoskeletal: She exhibits no edema.   Neurological: She is alert.   Skin: Skin is warm. No rash noted.   Vitals reviewed.      Significant Labs: All pertinent labs within the past 24 hours have been reviewed.    Significant Imaging: I have reviewed all pertinent imaging results/findings within the past 24 hours.      Assessment/Plan:      * Pneumonia of right lung due to Streptococcus pneumoniae  Associated with sepsis.  IVAB.  Culture any sputum.    Antibiotics (From admission, onward)    Start     Stop Route Frequency Ordered    03/03/20 1500  azithromycin 500 mg in dextrose 5 % 250 mL IVPB (ready to mix system)  (Ceftriaxone IV/ Azithromycin IV Panel)      -- IV Every 24 hours (non-standard times) 03/02/20 1627    03/03/20 1400  cefTRIAXone (ROCEPHIN) 1 g in dextrose 5 % 50 mL IVPB  (Ceftriaxone IV/ Azithromycin IV Panel)      -- IV Every 24 hours (non-standard times) 03/02/20 1627            Acute hypoxemic respiratory failure  Supplemental oxygen.  Pt is a mouth-breather, so will switch her to venturi mask at 36% and will titrate to keep her sats > 90.  Monitor resp effort.  Family requesting continuous oximetry; will order.  Family requesting pulmonology consultation; seems reasonable; will order.    Transaminasemia  Mild elevation.  Will monitor.    AST   Date Value Ref Range Status   03/03/2020 74 (H) 10 - 40 U/L Final   07/20/2013 27 10 - 40 U/L Final     ALT    Date Value Ref Range Status   03/03/2020 59 (H) 10 - 44 U/L Final         Sepsis  Glenda Gimenez is a 89 y.o. female who presents to the Emergency Department       This patient does have evidence of infective focus  My overall impression is sepsis.  Antibiotics given-   Antibiotics (From admission, onward)    Start     Stop Route Frequency Ordered    03/03/20 1500  azithromycin 500 mg in dextrose 5 % 250 mL IVPB (ready to mix system)  (Ceftriaxone IV/ Azithromycin IV Panel)      -- IV Every 24 hours (non-standard times) 03/02/20 1627    03/03/20 1400  cefTRIAXone (ROCEPHIN) 1 g in dextrose 5 % 50 mL IVPB  (Ceftriaxone IV/ Azithromycin IV Panel)      -- IV Every 24 hours (non-standard times) 03/02/20 1627          Obesity (BMI 30-39.9)  Body mass index is 32.61 kg/m². Morbid obesity complicates all aspects of disease management from diagnostic modalities to treatment. Weight loss encouraged and health benefits explained to patient.    Hypertension  Chronic, controlled.  Latest blood pressure and vitals reviewed-   Temp:  [96.8 °F (36 °C)-99.9 °F (37.7 °C)]   Pulse:  [80-95]   Resp:  [15-24]   BP: (130-165)/(61-70)   SpO2:  [90 %-96 %] .   Home meds for hypertension were reviewed and noted below. Hospital anti-hypertensive changes were made as shown below.  Outpt Hypertension Medications             carvedilol (COREG) 3.125 MG tablet Take 1.5625 mg by mouth 2 (two) times daily.     furosemide (LASIX) 20 MG tablet Take 20 mg by mouth once daily.     isosorbide mononitrate (IMDUR) 30 MG 24 hr tablet Take 30 mg by mouth once daily.    olmesartan (BENICAR) 5 MG Tab Take 5 mg by mouth once daily.       Hospital Medications             amLODIPine tablet 5 mg 5 mg, Oral, Daily    furosemide tablet 20 mg 20 mg, Oral, Daily    olmesartan tablet 5 mg 5 mg, Oral, Daily          Hypothyroid  Stable.  Continue thyroid supplement.  Lab Results   Component Value Date    TSH 1.020 03/02/2020             VTE Risk Mitigation  (From admission, onward)         Ordered     enoxaparin injection 40 mg  Daily      03/02/20 1627     IP VTE HIGH RISK PATIENT  Once      03/02/20 1627     Place sequential compression device  Until discontinued      03/02/20 1627                      Josse Wilkerson MD  Department of Hospital Medicine   [facility]

## 2020-03-04 NOTE — ASSESSMENT & PLAN NOTE
Chronic, controlled.  Latest blood pressure and vitals reviewed-   Temp:  [96.8 °F (36 °C)-99.9 °F (37.7 °C)]   Pulse:  [80-95]   Resp:  [15-24]   BP: (130-165)/(61-70)   SpO2:  [90 %-96 %] .   Home meds for hypertension were reviewed and noted below. Hospital anti-hypertensive changes were made as shown below.  Outpt Hypertension Medications             carvedilol (COREG) 3.125 MG tablet Take 1.5625 mg by mouth 2 (two) times daily.     furosemide (LASIX) 20 MG tablet Take 20 mg by mouth once daily.     isosorbide mononitrate (IMDUR) 30 MG 24 hr tablet Take 30 mg by mouth once daily.    olmesartan (BENICAR) 5 MG Tab Take 5 mg by mouth once daily.       Hospital Medications             amLODIPine tablet 5 mg 5 mg, Oral, Daily    furosemide tablet 20 mg 20 mg, Oral, Daily    olmesartan tablet 5 mg 5 mg, Oral, Daily

## 2020-03-05 LAB
ALBUMIN SERPL BCP-MCNC: 2.3 G/DL (ref 3.5–5.2)
ALP SERPL-CCNC: 257 U/L (ref 55–135)
ALT SERPL W/O P-5'-P-CCNC: 112 U/L (ref 10–44)
ANION GAP SERPL CALC-SCNC: 8 MMOL/L (ref 8–16)
AST SERPL-CCNC: 151 U/L (ref 10–40)
BASOPHILS # BLD AUTO: 0.1 K/UL (ref 0–0.2)
BASOPHILS NFR BLD: 1.1 % (ref 0–1.9)
BILIRUB SERPL-MCNC: 0.4 MG/DL (ref 0.1–1)
BUN SERPL-MCNC: 18 MG/DL (ref 10–30)
CALCIUM SERPL-MCNC: 10 MG/DL (ref 8.7–10.5)
CHLORIDE SERPL-SCNC: 106 MMOL/L (ref 95–110)
CO2 SERPL-SCNC: 26 MMOL/L (ref 23–29)
CREAT SERPL-MCNC: 0.6 MG/DL (ref 0.5–1.4)
DIFFERENTIAL METHOD: ABNORMAL
EOSINOPHIL # BLD AUTO: 0.3 K/UL (ref 0–0.5)
EOSINOPHIL NFR BLD: 2.7 % (ref 0–8)
ERYTHROCYTE [DISTWIDTH] IN BLOOD BY AUTOMATED COUNT: 13 % (ref 11.5–14.5)
EST. GFR  (AFRICAN AMERICAN): >60 ML/MIN/1.73 M^2
EST. GFR  (NON AFRICAN AMERICAN): >60 ML/MIN/1.73 M^2
GLUCOSE SERPL-MCNC: 105 MG/DL (ref 70–110)
HCT VFR BLD AUTO: 34.1 % (ref 37–48.5)
HGB BLD-MCNC: 11.1 G/DL (ref 12–16)
IMM GRANULOCYTES # BLD AUTO: 0.49 K/UL (ref 0–0.04)
LYMPHOCYTES # BLD AUTO: 1.7 K/UL (ref 1–4.8)
LYMPHOCYTES NFR BLD: 18.4 % (ref 18–48)
MCH RBC QN AUTO: 32 PG (ref 27–31)
MCHC RBC AUTO-ENTMCNC: 32.6 G/DL (ref 32–36)
MCV RBC AUTO: 98 FL (ref 82–98)
MONOCYTES # BLD AUTO: 1 K/UL (ref 0.3–1)
MONOCYTES NFR BLD: 10.5 % (ref 4–15)
NEUTROPHILS # BLD AUTO: 5.8 K/UL (ref 1.8–7.7)
NEUTROPHILS NFR BLD: 62.1 % (ref 38–73)
NRBC BLD-RTO: 0 /100 WBC
PLATELET # BLD AUTO: 320 K/UL (ref 150–350)
PMV BLD AUTO: 11 FL (ref 9.2–12.9)
POTASSIUM SERPL-SCNC: 3.8 MMOL/L (ref 3.5–5.1)
PROCALCITONIN SERPL IA-MCNC: 0.21 NG/ML
PROT SERPL-MCNC: 7 G/DL (ref 6–8.4)
RBC # BLD AUTO: 3.47 M/UL (ref 4–5.4)
SODIUM SERPL-SCNC: 140 MMOL/L (ref 136–145)
WBC # BLD AUTO: 9.39 K/UL (ref 3.9–12.7)

## 2020-03-05 PROCEDURE — 27000221 HC OXYGEN, UP TO 24 HOURS

## 2020-03-05 PROCEDURE — 12000002 HC ACUTE/MED SURGE SEMI-PRIVATE ROOM

## 2020-03-05 PROCEDURE — 85025 COMPLETE CBC W/AUTO DIFF WBC: CPT

## 2020-03-05 PROCEDURE — 80053 COMPREHEN METABOLIC PANEL: CPT

## 2020-03-05 PROCEDURE — 99231 PR SUBSEQUENT HOSPITAL CARE,LEVL I: ICD-10-PCS | Mod: ,,, | Performed by: INTERNAL MEDICINE

## 2020-03-05 PROCEDURE — 25000003 PHARM REV CODE 250: Performed by: HOSPITALIST

## 2020-03-05 PROCEDURE — 63600175 PHARM REV CODE 636 W HCPCS: Performed by: HOSPITALIST

## 2020-03-05 PROCEDURE — 25000242 PHARM REV CODE 250 ALT 637 W/ HCPCS: Performed by: HOSPITALIST

## 2020-03-05 PROCEDURE — 94640 AIRWAY INHALATION TREATMENT: CPT

## 2020-03-05 PROCEDURE — 25000003 PHARM REV CODE 250

## 2020-03-05 PROCEDURE — 84145 PROCALCITONIN (PCT): CPT

## 2020-03-05 PROCEDURE — 94761 N-INVAS EAR/PLS OXIMETRY MLT: CPT

## 2020-03-05 PROCEDURE — 36415 COLL VENOUS BLD VENIPUNCTURE: CPT

## 2020-03-05 PROCEDURE — 99231 SBSQ HOSP IP/OBS SF/LOW 25: CPT | Mod: ,,, | Performed by: INTERNAL MEDICINE

## 2020-03-05 RX ADMIN — FUROSEMIDE 20 MG: 20 TABLET ORAL at 09:03

## 2020-03-05 RX ADMIN — IPRATROPIUM BROMIDE AND ALBUTEROL SULFATE 3 ML: .5; 3 SOLUTION RESPIRATORY (INHALATION) at 03:03

## 2020-03-05 RX ADMIN — IPRATROPIUM BROMIDE AND ALBUTEROL SULFATE 3 ML: .5; 3 SOLUTION RESPIRATORY (INHALATION) at 08:03

## 2020-03-05 RX ADMIN — AMLODIPINE BESYLATE 5 MG: 5 TABLET ORAL at 09:03

## 2020-03-05 RX ADMIN — LACTOBACILLUS ACIDOPHILUS / LACTOBACILLUS BULGARICUS 1 EACH: 100 MILLION CFU STRENGTH GRANULES at 09:03

## 2020-03-05 RX ADMIN — IPRATROPIUM BROMIDE AND ALBUTEROL SULFATE 3 ML: .5; 3 SOLUTION RESPIRATORY (INHALATION) at 07:03

## 2020-03-05 RX ADMIN — LEVOTHYROXINE SODIUM 88 MCG: 88 TABLET ORAL at 06:03

## 2020-03-05 RX ADMIN — OLMESARTAN MEDOXOMIL 5 MG: 5 TABLET, COATED ORAL at 09:03

## 2020-03-05 RX ADMIN — AZITHROMYCIN MONOHYDRATE 500 MG: 500 INJECTION, POWDER, LYOPHILIZED, FOR SOLUTION INTRAVENOUS at 03:03

## 2020-03-05 RX ADMIN — CEFTRIAXONE 1 G: 1 INJECTION, SOLUTION INTRAVENOUS at 02:03

## 2020-03-05 RX ADMIN — LACTOBACILLUS ACIDOPHILUS / LACTOBACILLUS BULGARICUS 1 EACH: 100 MILLION CFU STRENGTH GRANULES at 10:03

## 2020-03-05 RX ADMIN — IPRATROPIUM BROMIDE AND ALBUTEROL SULFATE 3 ML: .5; 3 SOLUTION RESPIRATORY (INHALATION) at 11:03

## 2020-03-05 RX ADMIN — ENOXAPARIN SODIUM 40 MG: 100 INJECTION SUBCUTANEOUS at 04:03

## 2020-03-05 NOTE — ASSESSMENT & PLAN NOTE
Slightly worse today.  Will monitor.  Will get US of liver/GB tomorrow if transaminases are higher.    AST   Date Value Ref Range Status   03/04/2020 100 (H) 10 - 40 U/L Final   07/20/2013 27 10 - 40 U/L Final     ALT   Date Value Ref Range Status   03/04/2020 75 (H) 10 - 44 U/L Final

## 2020-03-05 NOTE — PLAN OF CARE
Plan of care reviewed with patient. Patient verbalized complete understanding. Chest xray completed. IV antibiotics administered as ordered. Up in chair throughout shift. Tolerating 4L oxygen. All fall precautions maintained. Wheels on chair locked, call light in reach. Slip resistant socks maintained.

## 2020-03-05 NOTE — RESPIRATORY THERAPY
03/05/20 0741   Patient Assessment/Suction   Level of Consciousness (AVPU) alert   Respiratory Effort Mild;Labored   All Lung Fields Breath Sounds diminished   PRE-TX-O2   O2 Device (Oxygen Therapy) nasal cannula w/ humidification   $ Is the patient on Low Flow Oxygen? Yes   Flow (L/min) 4   SpO2 (!) 94 %   Pulse Oximetry Type Continuous   $ Pulse Oximetry - Multiple Charge Pulse Oximetry - Multiple   Pulse 82   Resp (!) 24   Aerosol Therapy   $ Aerosol Therapy Charges Aerosol Treatment   Respiratory Treatment Status (SVN) given   Treatment Route (SVN) mask   Patient Position (SVN) HOB elevated   Signs of Intolerance (SVN) none   Breath Sounds Post-Respiratory Treatment   Throughout All Fields Post-Treatment All Fields   Throughout All Fields Post-Treatment no change;diminished   Post-treatment Heart Rate (beats/min) 87   Post-treatment Resp Rate (breaths/min) 20   Pt rec neb treatments with Duoneb, continuous POX in use at bedside with alarms set and functioning.

## 2020-03-05 NOTE — PLAN OF CARE
Patient AAO. VSS, afebrile. Tele # [id number] monitored. No SOB reported this shift. O2 at 4 L NC. 1-person assist to BSC. All meds administered as prescribed. Patient resting quietly throughout majority of shift. No complaints noted at this time. Hourly/q2 rounds done to promote safety. Bed in lowest position, brakes locked, call light within reach. Continuing to monitor.

## 2020-03-05 NOTE — PROGRESS NOTES
[facility] Hospital Medicine  Progress Note    Patient Name: Glenda Gimenez  MRN: [id number]  Patient Class: IP- Inpatient   Admission Date: 3/2/2020  Length of Stay: 1 days  Attending Physician: Josse Wilkerson MD  Primary Care Provider: Crystal Hahn MD        Subjective:     Principal Problem:Pneumonia of right lung due to Streptococcus pneumoniae        HPI:  Patient presented to the emergency today complaining of cough and shortness breath.  Her symptoms started 3-4 days ago.  Several days prior to that, patient fell home due to tripping and inability keeping herself upright.  She denies dizziness, lightheadedness, weakness.  Denies difficulty speaking or unusual headaches.  Daughter, however has noticed mom has been a little more confused since falling.  Cough is dry.  Denies any subjective fever.    Overview/Hospital Course:  No notes on file    Interval History:  Shortness of breath is less.  Hoarseness is less.  Voice is coming back.  Seen by pulmonologist.    Review of Systems   Constitutional: Positive for fatigue. Negative for chills and fever.   Respiratory: Positive for cough and shortness of breath. Negative for wheezing.    Cardiovascular: Negative for chest pain and leg swelling.   Gastrointestinal: Negative for abdominal pain, nausea and vomiting.     Objective:     Vital Signs (Most Recent):  Temp: 98.7 °F (37.1 °C) (03/04/20 2154)  Pulse: 96 (03/04/20 2154)  Resp: 20 (03/04/20 2154)  BP: 134/60 (03/04/20 2154)  SpO2: (!) 92 % (03/04/20 2154) Vital Signs (24h Range):  Temp:  [97.6 °F (36.4 °C)-100.1 °F (37.8 °C)] 98.7 °F (37.1 °C)  Pulse:  [73-98] 96  Resp:  [15-40] 20  SpO2:  [92 %-97 %] 92 %  BP: (115-174)/(55-69) 134/60     Weight: 86.2 kg (190 lb)  Body mass index is 32.61 kg/m².    Intake/Output Summary (Last 24 hours) at 3/4/2020 2210  Last data filed at 3/4/2020 0600  Gross per 24 hour   Intake 120 ml   Output 600 ml   Net -480 ml      Physical Exam    Constitutional: No distress.   HENT:   Mouth/Throat: Oropharynx is clear and moist.   Eyes: Right eye exhibits no discharge. Left eye exhibits no discharge.   Neck: No JVD present.   Cardiovascular: Normal rate and regular rhythm.   Pulmonary/Chest: Effort normal. No accessory muscle usage. Tachypnea noted. She has no decreased breath sounds. She has no wheezes. She has rales in the right middle field, the right lower field and the left lower field.   Abdominal: Soft. Bowel sounds are normal. She exhibits no distension. There is no tenderness.   Musculoskeletal: She exhibits no edema.   Neurological: She is alert.   Skin: Skin is warm. No rash noted.   Vitals reviewed.      Significant Labs: All pertinent labs within the past 24 hours have been reviewed.    Significant Imaging: I have reviewed all pertinent imaging results/findings within the past 24 hours.      Assessment/Plan:      * Pneumonia of right lung due to Streptococcus pneumoniae  Associated with sepsis.  IVAB.  Culture any sputum.  Pt not able to produce any.    Antibiotics (From admission, onward)    Start     Stop Route Frequency Ordered    03/03/20 1500  azithromycin 500 mg in dextrose 5 % 250 mL IVPB (ready to mix system)  (Ceftriaxone IV/ Azithromycin IV Panel)      -- IV Every 24 hours (non-standard times) 03/02/20 1627    03/03/20 1400  cefTRIAXone (ROCEPHIN) 1 g in dextrose 5 % 50 mL IVPB  (Ceftriaxone IV/ Azithromycin IV Panel)      -- IV Every 24 hours (non-standard times) 03/02/20 1627            Acute hypoxemic respiratory failure  Supplemental oxygen.  Continue supplementing oxygen.  Continue continuous pulse ox.  Pulmonologist following.    Transaminasemia  Slightly worse today.  Will monitor.  Will get US of liver/GB tomorrow if transaminases are higher.    AST   Date Value Ref Range Status   03/04/2020 100 (H) 10 - 40 U/L Final   07/20/2013 27 10 - 40 U/L Final     ALT   Date Value Ref Range Status   03/04/2020 75 (H) 10 - 44 U/L Final          Sepsis  Sepsis has resolved.    Obesity (BMI 30-39.9)  Body mass index is 32.61 kg/m². Morbid obesity complicates all aspects of disease management from diagnostic modalities to treatment. Weight loss encouraged and health benefits explained to patient.    Hypertension  Chronic, controlled.  Latest blood pressure and vitals reviewed-   Temp:  [96.8 °F (36 °C)-99.9 °F (37.7 °C)]   Pulse:  [80-95]   Resp:  [15-24]   BP: (130-165)/(61-70)   SpO2:  [90 %-96 %] .   Home meds for hypertension were reviewed and noted below. Hospital anti-hypertensive changes were made as shown below.  Outpt Hypertension Medications             carvedilol (COREG) 3.125 MG tablet Take 1.5625 mg by mouth 2 (two) times daily.     furosemide (LASIX) 20 MG tablet Take 20 mg by mouth once daily.     isosorbide mononitrate (IMDUR) 30 MG 24 hr tablet Take 30 mg by mouth once daily.    olmesartan (BENICAR) 5 MG Tab Take 5 mg by mouth once daily.       Hospital Medications             amLODIPine tablet 5 mg 5 mg, Oral, Daily    furosemide tablet 20 mg 20 mg, Oral, Daily    olmesartan tablet 5 mg 5 mg, Oral, Daily          Hypothyroid  Stable.  Continue thyroid supplement.  Lab Results   Component Value Date    TSH 1.020 03/02/2020             VTE Risk Mitigation (From admission, onward)         Ordered     enoxaparin injection 40 mg  Daily      03/02/20 1627     IP VTE HIGH RISK PATIENT  Once      03/02/20 1627     Place sequential compression device  Until discontinued      03/02/20 1627                      Josse Wilkerson MD  Department of Hospital Medicine   [facility]

## 2020-03-05 NOTE — ASSESSMENT & PLAN NOTE
Supplemental oxygen.  Continue supplementing oxygen.  Continue continuous pulse ox.  Pulmonologist following.

## 2020-03-05 NOTE — ASSESSMENT & PLAN NOTE
Associated with sepsis.  IVAB.  Culture any sputum.  Pt not able to produce any.    Antibiotics (From admission, onward)    Start     Stop Route Frequency Ordered    03/03/20 1500  azithromycin 500 mg in dextrose 5 % 250 mL IVPB (ready to mix system)  (Ceftriaxone IV/ Azithromycin IV Panel)      -- IV Every 24 hours (non-standard times) 03/02/20 1627    03/03/20 1400  cefTRIAXone (ROCEPHIN) 1 g in dextrose 5 % 50 mL IVPB  (Ceftriaxone IV/ Azithromycin IV Panel)      -- IV Every 24 hours (non-standard times) 03/02/20 1627

## 2020-03-05 NOTE — SUBJECTIVE & OBJECTIVE
Interval History:  Shortness of breath is less.  Hoarseness is less.  Voice is coming back.  Seen by pulmonologist.    Review of Systems   Constitutional: Positive for fatigue. Negative for chills and fever.   Respiratory: Positive for cough and shortness of breath. Negative for wheezing.    Cardiovascular: Negative for chest pain and leg swelling.   Gastrointestinal: Negative for abdominal pain, nausea and vomiting.     Objective:     Vital Signs (Most Recent):  Temp: 98.7 °F (37.1 °C) (03/04/20 2154)  Pulse: 96 (03/04/20 2154)  Resp: 20 (03/04/20 2154)  BP: 134/60 (03/04/20 2154)  SpO2: (!) 92 % (03/04/20 2154) Vital Signs (24h Range):  Temp:  [97.6 °F (36.4 °C)-100.1 °F (37.8 °C)] 98.7 °F (37.1 °C)  Pulse:  [73-98] 96  Resp:  [15-40] 20  SpO2:  [92 %-97 %] 92 %  BP: (115-174)/(55-69) 134/60     Weight: 86.2 kg (190 lb)  Body mass index is 32.61 kg/m².    Intake/Output Summary (Last 24 hours) at 3/4/2020 2210  Last data filed at 3/4/2020 0600  Gross per 24 hour   Intake 120 ml   Output 600 ml   Net -480 ml      Physical Exam   Constitutional: No distress.   HENT:   Mouth/Throat: Oropharynx is clear and moist.   Eyes: Right eye exhibits no discharge. Left eye exhibits no discharge.   Neck: No JVD present.   Cardiovascular: Normal rate and regular rhythm.   Pulmonary/Chest: Effort normal. No accessory muscle usage. Tachypnea noted. She has no decreased breath sounds. She has no wheezes. She has rales in the right middle field, the right lower field and the left lower field.   Abdominal: Soft. Bowel sounds are normal. She exhibits no distension. There is no tenderness.   Musculoskeletal: She exhibits no edema.   Neurological: She is alert.   Skin: Skin is warm. No rash noted.   Vitals reviewed.      Significant Labs: All pertinent labs within the past 24 hours have been reviewed.    Significant Imaging: I have reviewed all pertinent imaging results/findings within the past 24 hours.

## 2020-03-06 VITALS
HEART RATE: 51 BPM | DIASTOLIC BLOOD PRESSURE: 67 MMHG | RESPIRATION RATE: 16 BRPM | TEMPERATURE: 99 F | SYSTOLIC BLOOD PRESSURE: 131 MMHG | OXYGEN SATURATION: 94 % | HEIGHT: 64 IN | BODY MASS INDEX: 32.44 KG/M2 | WEIGHT: 190 LBS

## 2020-03-06 PROBLEM — A41.9 SEPSIS: Status: RESOLVED | Noted: 2020-03-02 | Resolved: 2020-03-06

## 2020-03-06 PROBLEM — J44.0 COPD WITH ACUTE LOWER RESPIRATORY INFECTION: Status: ACTIVE | Noted: 2020-03-06

## 2020-03-06 PROBLEM — J96.01 ACUTE HYPOXEMIC RESPIRATORY FAILURE: Status: RESOLVED | Noted: 2020-03-03 | Resolved: 2020-03-06

## 2020-03-06 PROBLEM — I50.32 CHRONIC DIASTOLIC CHF (CONGESTIVE HEART FAILURE): Status: ACTIVE | Noted: 2020-03-06

## 2020-03-06 PROBLEM — J96.11 CHRONIC HYPOXEMIC RESPIRATORY FAILURE: Status: ACTIVE | Noted: 2020-03-06

## 2020-03-06 LAB
ALBUMIN SERPL BCP-MCNC: 2.4 G/DL (ref 3.5–5.2)
ALP SERPL-CCNC: 243 U/L (ref 55–135)
ALT SERPL W/O P-5'-P-CCNC: 106 U/L (ref 10–44)
ANION GAP SERPL CALC-SCNC: 9 MMOL/L (ref 8–16)
AST SERPL-CCNC: 112 U/L (ref 10–40)
BASOPHILS NFR BLD: 0 % (ref 0–1.9)
BILIRUB SERPL-MCNC: 0.5 MG/DL (ref 0.1–1)
BUN SERPL-MCNC: 15 MG/DL (ref 10–30)
CALCIUM SERPL-MCNC: 10.3 MG/DL (ref 8.7–10.5)
CHLORIDE SERPL-SCNC: 105 MMOL/L (ref 95–110)
CO2 SERPL-SCNC: 24 MMOL/L (ref 23–29)
CREAT SERPL-MCNC: 0.6 MG/DL (ref 0.5–1.4)
DIFFERENTIAL METHOD: ABNORMAL
EOSINOPHIL NFR BLD: 3 % (ref 0–8)
ERYTHROCYTE [DISTWIDTH] IN BLOOD BY AUTOMATED COUNT: 13.1 % (ref 11.5–14.5)
EST. GFR  (AFRICAN AMERICAN): >60 ML/MIN/1.73 M^2
EST. GFR  (NON AFRICAN AMERICAN): >60 ML/MIN/1.73 M^2
GLUCOSE SERPL-MCNC: 99 MG/DL (ref 70–110)
HCT VFR BLD AUTO: 36.8 % (ref 37–48.5)
HGB BLD-MCNC: 11.7 G/DL (ref 12–16)
IMM GRANULOCYTES # BLD AUTO: ABNORMAL K/UL (ref 0–0.04)
LYMPHOCYTES NFR BLD: 26 % (ref 18–48)
MCH RBC QN AUTO: 31.7 PG (ref 27–31)
MCHC RBC AUTO-ENTMCNC: 31.8 G/DL (ref 32–36)
MCV RBC AUTO: 100 FL (ref 82–98)
MONOCYTES NFR BLD: 6 % (ref 4–15)
NEUTROPHILS NFR BLD: 62 % (ref 38–73)
NEUTS BAND NFR BLD MANUAL: 3 %
NRBC BLD-RTO: 0 /100 WBC
PLATELET # BLD AUTO: 376 K/UL (ref 150–350)
PLATELET BLD QL SMEAR: ABNORMAL
PMV BLD AUTO: 11.1 FL (ref 9.2–12.9)
POTASSIUM SERPL-SCNC: 3.8 MMOL/L (ref 3.5–5.1)
PROT SERPL-MCNC: 7.4 G/DL (ref 6–8.4)
RBC # BLD AUTO: 3.69 M/UL (ref 4–5.4)
SODIUM SERPL-SCNC: 138 MMOL/L (ref 136–145)
WBC # BLD AUTO: 9.32 K/UL (ref 3.9–12.7)

## 2020-03-06 PROCEDURE — 94761 N-INVAS EAR/PLS OXIMETRY MLT: CPT

## 2020-03-06 PROCEDURE — 80053 COMPREHEN METABOLIC PANEL: CPT

## 2020-03-06 PROCEDURE — 27000221 HC OXYGEN, UP TO 24 HOURS

## 2020-03-06 PROCEDURE — 36415 COLL VENOUS BLD VENIPUNCTURE: CPT

## 2020-03-06 PROCEDURE — 25000003 PHARM REV CODE 250

## 2020-03-06 PROCEDURE — 85027 COMPLETE CBC AUTOMATED: CPT

## 2020-03-06 PROCEDURE — 25000003 PHARM REV CODE 250: Performed by: HOSPITALIST

## 2020-03-06 PROCEDURE — 94640 AIRWAY INHALATION TREATMENT: CPT

## 2020-03-06 PROCEDURE — 85007 BL SMEAR W/DIFF WBC COUNT: CPT

## 2020-03-06 PROCEDURE — 25000242 PHARM REV CODE 250 ALT 637 W/ HCPCS: Performed by: HOSPITALIST

## 2020-03-06 PROCEDURE — 97161 PT EVAL LOW COMPLEX 20 MIN: CPT

## 2020-03-06 PROCEDURE — 99231 PR SUBSEQUENT HOSPITAL CARE,LEVL I: ICD-10-PCS | Mod: ,,, | Performed by: INTERNAL MEDICINE

## 2020-03-06 PROCEDURE — 99231 SBSQ HOSP IP/OBS SF/LOW 25: CPT | Mod: ,,, | Performed by: INTERNAL MEDICINE

## 2020-03-06 RX ORDER — IPRATROPIUM BROMIDE AND ALBUTEROL SULFATE 2.5; .5 MG/3ML; MG/3ML
3 SOLUTION RESPIRATORY (INHALATION) EVERY 4 HOURS PRN
Qty: 120 BOX | Refills: 11 | Status: SHIPPED | OUTPATIENT
Start: 2020-03-06 | End: 2021-12-01 | Stop reason: ALTCHOICE

## 2020-03-06 RX ORDER — LEVOFLOXACIN 500 MG/1
500 TABLET, FILM COATED ORAL DAILY
Qty: 10 TABLET | Refills: 0 | Status: SHIPPED | OUTPATIENT
Start: 2020-03-06 | End: 2020-03-16

## 2020-03-06 RX ADMIN — IPRATROPIUM BROMIDE AND ALBUTEROL SULFATE 3 ML: .5; 3 SOLUTION RESPIRATORY (INHALATION) at 01:03

## 2020-03-06 RX ADMIN — FUROSEMIDE 20 MG: 20 TABLET ORAL at 09:03

## 2020-03-06 RX ADMIN — AMLODIPINE BESYLATE 5 MG: 5 TABLET ORAL at 09:03

## 2020-03-06 RX ADMIN — IPRATROPIUM BROMIDE AND ALBUTEROL SULFATE 3 ML: .5; 3 SOLUTION RESPIRATORY (INHALATION) at 07:03

## 2020-03-06 RX ADMIN — IPRATROPIUM BROMIDE AND ALBUTEROL SULFATE 3 ML: .5; 3 SOLUTION RESPIRATORY (INHALATION) at 04:03

## 2020-03-06 RX ADMIN — LEVOTHYROXINE SODIUM 88 MCG: 88 TABLET ORAL at 05:03

## 2020-03-06 RX ADMIN — OLMESARTAN MEDOXOMIL 5 MG: 5 TABLET, COATED ORAL at 09:03

## 2020-03-06 RX ADMIN — LACTOBACILLUS ACIDOPHILUS / LACTOBACILLUS BULGARICUS 1 EACH: 100 MILLION CFU STRENGTH GRANULES at 09:03

## 2020-03-06 RX ADMIN — IPRATROPIUM BROMIDE AND ALBUTEROL SULFATE 3 ML: .5; 3 SOLUTION RESPIRATORY (INHALATION) at 11:03

## 2020-03-06 NOTE — PLAN OF CARE
03/06/20 1453   Final Note   Assessment Type Final Discharge Note   Anticipated Discharge Disposition Home-Health   What phone number can be called within the next 1-3 days to see how you are doing after discharge? [phone]   Hospital Follow Up  Appt(s) scheduled? Yes   Discharge plans and expectations educations in teach back method with documentation complete? Yes   Right Care Referral Info   Post Acute Recommendation Home-care   Facility Name [facility]

## 2020-03-06 NOTE — ASSESSMENT & PLAN NOTE
Supplemental oxygen.  Continue supplementing oxygen.  Will get home o2 eval tomorrow to determine need for chronic oxygen therapy.

## 2020-03-06 NOTE — PLAN OF CARE
Patient AAO. VSS, afebrile. Tele # [id number] monitored. No SOB reported this shift. O2 at 4 L NC. 1-person assist to BR. All meds administered as prescribed. Abdominal U/S scheduled for this AM. Patient resting quietly throughout majority of shift. No complaints noted at this time. Hourly/q2 rounds done to promote safety. Bed in lowest position, brakes locked, call light within reach. Continuing to monitor.

## 2020-03-06 NOTE — RESPIRATORY THERAPY
03/06/20 0718   Patient Assessment/Suction   Level of Consciousness (AVPU) alert   All Lung Fields Breath Sounds clear;crackles, fine   PRE-TX-O2   O2 Device (Oxygen Therapy) nasal cannula   $ Is the patient on Low Flow Oxygen? Yes   Flow (L/min) 4   Oxygen Concentration (%) 36   SpO2 98 %   Pulse Oximetry Type Continuous   $ Pulse Oximetry - Multiple Charge Pulse Oximetry - Multiple   Pulse 79   Resp 17   Aerosol Therapy   $ Aerosol Therapy Charges Aerosol Treatment   Respiratory Treatment Status (SVN) given   Treatment Route (SVN) mask   Patient Position (SVN) sitting in chair   Post Treatment Assessment (SVN) breath sounds unchanged   Signs of Intolerance (SVN) none   Breath Sounds Post-Respiratory Treatment   Throughout All Fields Post-Treatment All Fields   Throughout All Fields Post-Treatment aeration increased   Post-treatment Heart Rate (beats/min) 88   Post-treatment Resp Rate (breaths/min) 17   Ready to Wean/Extubation Screen   FIO2<=50 (chart decimal) 0.36

## 2020-03-06 NOTE — PT/OT/SLP EVAL
Physical Therapy Evaluation and Discharge Note    Patient Name:  Glenda Gimenez   MRN:  [id number]    Recommendations:     Discharge Recommendations:  home, home with home health, home health PT   Discharge Equipment Recommendations: none   Barriers to discharge: None    Assessment:     Glenda Gimenez is a 89 y.o. female admitted with a medical diagnosis of Pneumonia of right lung due to Streptococcus pneumoniae.     Recent Surgery: * No surgery found *      Plan:     D/C PT due to D/C from facility.  Please re-consult if situation changes.      Subjective     Patient/Family Comments/goals: Family describes a recent decline in functional ability over last ~ 6 weeks.      Living Environment:  House with ramp or 3 steps to enter    Prior to admission, patients level of function was household ambul with quad cane.  Equipment used at home: cane, quad, rollator.   Upon discharge, patient will have assistance from son lives next door.    Objective:     Communicated with nurse (Mick) prior to session.  Patient found HOB elevated with pulse ox (continuous) upon PT entry to room.    General Precautions: Standard, fall   Orthopedic Precautions:N/A   Braces: N/A     Functional Mobility:  · Bed Mobility:     · Rolling Left:  stand by assistance  · Supine to Sit: stand by assistance  · Transfers:     · Sit to Stand:  stand by assistance with rolling walker and  x 2 reps  · Gait: 175' with RW with SBA/CGA and cues    AM-PAC 6 CLICK MOBILITY  Total Score:22     Patient left seated at eob with family and RT present.    GOALS:   Multidisciplinary Problems     Physical Therapy Goals     Not on file                History:     Past Medical History:   Diagnosis Date    Anticoagulant long-term use     Dislocation of right shoulder joint     Hypertension     Shoulder disorder     right    Thyroid disease        Past Surgical History:   Procedure Laterality Date    HYSTERECTOMY      PARTIAL HYSTERECTOMY      ARTUR, ovaries in  place, uterine prolapse       Time Tracking:     PT Received On: 03/06/20  PT Start Time: 1000     PT Stop Time: 1010  PT Total Time (min): 10 min     Billable Minutes: Evaluation 10      Rogelio Hanson, PT  03/06/2020

## 2020-03-06 NOTE — PLAN OF CARE
· 3/6/2020 12:30:07 PM Accepted  Liz [email or web address] response.  [facility] delivered nebulizer and home O 2 portable tank to patient's hospital room.  Ankita signed delivery ticket and rental agreement.         03/06/20 1230   Post-Acute Status   Post-Acute Authorization HME;Home Health/Hospice   HME Status Set-up Complete   Home Health/Hospice Status Set-up Complete   Discharge Delays None known at this time

## 2020-03-06 NOTE — SUBJECTIVE & OBJECTIVE
Interval History:  Shortness of breath is less.  Has her full voice.  No fevers.  Eager to be released back home.    Review of Systems   Constitutional: Negative for chills, fatigue and fever.   Respiratory: Positive for cough and shortness of breath. Negative for wheezing.    Cardiovascular: Negative for chest pain and leg swelling.   Gastrointestinal: Negative for abdominal pain, nausea and vomiting.     Objective:     Vital Signs (Most Recent):  Temp: 97.7 °F (36.5 °C) (03/05/20 2043)  Pulse: 89 (03/05/20 2046)  Resp: 20 (03/05/20 2046)  BP: 139/65 (03/05/20 2043)  SpO2: 97 % (03/05/20 2046) Vital Signs (24h Range):  Temp:  [96.8 °F (36 °C)-98.6 °F (37 °C)] 97.7 °F (36.5 °C)  Pulse:  [78-90] 89  Resp:  [16-24] 20  SpO2:  [92 %-97 %] 97 %  BP: (130-149)/(60-77) 139/65     Weight: 86.2 kg (190 lb)  Body mass index is 32.61 kg/m².    Intake/Output Summary (Last 24 hours) at 3/5/2020 1769  Last data filed at 3/5/2020 1045  Gross per 24 hour   Intake 120 ml   Output 1 ml   Net 119 ml      Physical Exam   Constitutional: No distress.   HENT:   Mouth/Throat: Oropharynx is clear and moist.   Eyes: Right eye exhibits no discharge. Left eye exhibits no discharge.   Neck: No JVD present.   Cardiovascular: Normal rate and regular rhythm.   Pulmonary/Chest: Effort normal. No accessory muscle usage. No tachypnea. She has no decreased breath sounds. She has no wheezes. She has rales in the right middle field, the right lower field and the left lower field.   Abdominal: Soft. Bowel sounds are normal. She exhibits no distension. There is no tenderness.   Musculoskeletal: She exhibits no edema.   Neurological: She is alert.   Skin: Skin is warm. No rash noted.   Vitals reviewed.      Significant Labs: All pertinent labs within the past 24 hours have been reviewed.    Significant Imaging: I have reviewed all pertinent imaging results/findings within the past 24 hours.

## 2020-03-06 NOTE — PLAN OF CARE
SW met with patient and patient's daughter at bedside regarding home health, home O 2 w/ portable tank, and nebulizer.  Patient preferred daughter Ankita Gimenez to sign paperwork.  Ankita signed patient choice disclosure choosing [facility].  [facility]RI sent patient information to [facility] via [employer] system for authorization and acceptance.  Ok to pull listed DME from [facility] DME closet per Roland with [facility] [phone].         03/06/20 1220   Post-Acute Status   Post-Acute Authorization HME;Home Health/Hospice   HME Status Referrals Sent   Home Health/Hospice Status Referrals Sent

## 2020-03-06 NOTE — PROGRESS NOTES
[facility] Hospital Medicine  Progress Note    Patient Name: Glenda Gimenez  MRN: [id number]  Patient Class: IP- Inpatient   Admission Date: 3/2/2020  Length of Stay: 2 days  Attending Physician: Josse Wilkerson MD  Primary Care Provider: Crystal Hahn MD        Subjective:     Principal Problem:Pneumonia of right lung due to Streptococcus pneumoniae        HPI:  Patient presented to the emergency today complaining of cough and shortness breath.  Her symptoms started 3-4 days ago.  Several days prior to that, patient fell home due to tripping and inability keeping herself upright.  She denies dizziness, lightheadedness, weakness.  Denies difficulty speaking or unusual headaches.  Daughter, however has noticed mom has been a little more confused since falling.  Cough is dry.  Denies any subjective fever.    Overview/Hospital Course:  No notes on file    Interval History:  Shortness of breath is less.  Has her full voice.  No fevers.  Eager to be released back home.    Review of Systems   Constitutional: Negative for chills, fatigue and fever.   Respiratory: Positive for cough and shortness of breath. Negative for wheezing.    Cardiovascular: Negative for chest pain and leg swelling.   Gastrointestinal: Negative for abdominal pain, nausea and vomiting.     Objective:     Vital Signs (Most Recent):  Temp: 97.7 °F (36.5 °C) (03/05/20 2043)  Pulse: 89 (03/05/20 2046)  Resp: 20 (03/05/20 2046)  BP: 139/65 (03/05/20 2043)  SpO2: 97 % (03/05/20 2046) Vital Signs (24h Range):  Temp:  [96.8 °F (36 °C)-98.6 °F (37 °C)] 97.7 °F (36.5 °C)  Pulse:  [78-90] 89  Resp:  [16-24] 20  SpO2:  [92 %-97 %] 97 %  BP: (130-149)/(60-77) 139/65     Weight: 86.2 kg (190 lb)  Body mass index is 32.61 kg/m².    Intake/Output Summary (Last 24 hours) at 3/5/2020 7487  Last data filed at 3/5/2020 1045  Gross per 24 hour   Intake 120 ml   Output 1 ml   Net 119 ml      Physical Exam   Constitutional: No distress.   HENT:    Mouth/Throat: Oropharynx is clear and moist.   Eyes: Right eye exhibits no discharge. Left eye exhibits no discharge.   Neck: No JVD present.   Cardiovascular: Normal rate and regular rhythm.   Pulmonary/Chest: Effort normal. No accessory muscle usage. No tachypnea. She has no decreased breath sounds. She has no wheezes. She has rales in the right middle field, the right lower field and the left lower field.   Abdominal: Soft. Bowel sounds are normal. She exhibits no distension. There is no tenderness.   Musculoskeletal: She exhibits no edema.   Neurological: She is alert.   Skin: Skin is warm. No rash noted.   Vitals reviewed.      Significant Labs: All pertinent labs within the past 24 hours have been reviewed.    Significant Imaging: I have reviewed all pertinent imaging results/findings within the past 24 hours.      Assessment/Plan:      * Pneumonia of right lung due to Streptococcus pneumoniae  Continue IVAB.  Culture any sputum.  Pt not able to produce any.    Antibiotics (From admission, onward)    Start     Stop Route Frequency Ordered    03/03/20 1500  azithromycin 500 mg in dextrose 5 % 250 mL IVPB (ready to mix system)  (Ceftriaxone IV/ Azithromycin IV Panel)      -- IV Every 24 hours (non-standard times) 03/02/20 1627    03/03/20 1400  cefTRIAXone (ROCEPHIN) 1 g in dextrose 5 % 50 mL IVPB  (Ceftriaxone IV/ Azithromycin IV Panel)      -- IV Every 24 hours (non-standard times) 03/02/20 1627          Chronic diastolic CHF (congestive heart failure)  Patient has CHF with preserved EF.  Will continue her daily oral diuretic for maintenance.  Currently she's euvolemic.  Pt will likely need home oxygen to treat chronic hypoxia due to CHF.      Transaminasemia  Slightly worse.  Will monitor.  Will get US of liver/GB tomorrow morning.  I doubt viral hepatitis.  Could be medication induced.    AST   Date Value Ref Range Status   03/05/2020 151 (H) 10 - 40 U/L Final   07/20/2013 27 10 - 40 U/L Final     ALT    Date Value Ref Range Status   03/05/2020 112 (H) 10 - 44 U/L Final         Obesity (BMI 30-39.9)  Body mass index is 32.61 kg/m². Morbid obesity complicates all aspects of disease management from diagnostic modalities to treatment. Weight loss encouraged and health benefits explained to patient.    Hypertension  Chronic, controlled.  Latest blood pressure and vitals reviewed-   Temp:  [96.8 °F (36 °C)-99.9 °F (37.7 °C)]   Pulse:  [80-95]   Resp:  [15-24]   BP: (130-165)/(61-70)   SpO2:  [90 %-96 %] .   Home meds for hypertension were reviewed and noted below. Hospital anti-hypertensive changes were made as shown below.  Outpt Hypertension Medications             carvedilol (COREG) 3.125 MG tablet Take 1.5625 mg by mouth 2 (two) times daily.     furosemide (LASIX) 20 MG tablet Take 20 mg by mouth once daily.     isosorbide mononitrate (IMDUR) 30 MG 24 hr tablet Take 30 mg by mouth once daily.    olmesartan (BENICAR) 5 MG Tab Take 5 mg by mouth once daily.       Hospital Medications             amLODIPine tablet 5 mg 5 mg, Oral, Daily    furosemide tablet 20 mg 20 mg, Oral, Daily    olmesartan tablet 5 mg 5 mg, Oral, Daily          Hypothyroid  Stable.  Continue thyroid supplement.  Lab Results   Component Value Date    TSH 1.020 03/02/2020           VTE Risk Mitigation (From admission, onward)         Ordered     enoxaparin injection 40 mg  Daily      03/02/20 1627     IP VTE HIGH RISK PATIENT  Once      03/02/20 1627     Place sequential compression device  Until discontinued      03/02/20 1627                      Josse Wilkerson MD  Department of Hospital Medicine   [facility]

## 2020-03-06 NOTE — PLAN OF CARE
Cm put in communication for Respiratory to educate pt and family. William spoke with CECE [facility].       03/06/20 1221   Discharge Reassessment   Assessment Type Discharge Planning Reassessment   Provided patient/caregiver education on the expected discharge date and the discharge plan Yes

## 2020-03-06 NOTE — RESPIRATORY THERAPY
Pt and daughter instructed on use and care of O2 tank and nebulizer daughter gave return demostration given of use and care of O2 tank and nebulizer

## 2020-03-06 NOTE — RESPIRATORY THERAPY
03/06/20 1014   Home Oxygen Qualification   Room Air SpO2 At Rest 90 %   Room Air SpO2 on Exertion (!) 82 %   SpO2 During Exertion on O2 97 %   Heart Rate on O2 92 bpm   Exertion O2 LPM 2 LPM   SpO2 on Recovery 98 %   Recovery Heart Rate 88 bpm   Recovery O2 LPM 98 LPM

## 2020-03-06 NOTE — ASSESSMENT & PLAN NOTE
Continue IVAB.  Culture any sputum.  Pt not able to produce any.    Antibiotics (From admission, onward)    Start     Stop Route Frequency Ordered    03/03/20 1500  azithromycin 500 mg in dextrose 5 % 250 mL IVPB (ready to mix system)  (Ceftriaxone IV/ Azithromycin IV Panel)      -- IV Every 24 hours (non-standard times) 03/02/20 1627    03/03/20 1400  cefTRIAXone (ROCEPHIN) 1 g in dextrose 5 % 50 mL IVPB  (Ceftriaxone IV/ Azithromycin IV Panel)      -- IV Every 24 hours (non-standard times) 03/02/20 1627

## 2020-03-06 NOTE — PROGRESS NOTES
Progress Note  PULMONARY    Admit Date: 3/2/2020   03/06/2020      SUBJECTIVE:      3/6- no c/o    PFSH and Allergies reviewed.    OBJECTIVE:     Vitals (Most recent):  Vitals:    03/06/20 1147   BP: 131/67   Pulse: (!) 51   Resp: 16   Temp: 98.7 °F (37.1 °C)       Vitals (24 hour range):  Temp:  [96.8 °F (36 °C)-98.7 °F (37.1 °C)]   Pulse:  [51-95]   Resp:  [16-24]   BP: (125-139)/(61-67)   SpO2:  [94 %-98 %]     No intake or output data in the 24 hours ending 03/06/20 1507       Physical Exam:  The patient's neuro status (alertness,orientation,cognitive function,motor skills,), pharyngeal exam (oral lesions, hygiene, abn dentition,), Neck (jvd,mass,thyroid,nodes in neck and above/below clavicle),RESPIRATORY(symmetry,effort,fremitus,percussion,auscultation),  Cor(rhythm,heart tones including gallops,perfusion,edema)ABD(distention,hepatic&splenomegaly,tenderness,masses), Skin(rash,cyanosis),Psyc(affect,judgement,).  Exam negative except for these pertinent findings:    Improved breath sounds bilaterally  Awake alert sitting in chair  No edema    Radiographs reviewed: view by direct vision   CXR 3/5- similar appearance R sided increased interstitial markings w/ volume loss on right, bibasilar airspace disease      Labs     Recent Labs   Lab 03/06/20  0539   WBC 9.32   HGB 11.7*   HCT 36.8*   *   BAND 3.0     Recent Labs   Lab 03/06/20  0539      K 3.8      CO2 24   BUN 15   CREATININE 0.6   GLU 99   CALCIUM 10.3   *   *   ALKPHOS 243*   BILITOT 0.5   PROT 7.4   ALBUMIN 2.4*   No results for input(s): PH, PCO2, PO2, HCO3 in the last 24 hours.  Microbiology Results (last 7 days)     Procedure Component Value Units Date/Time    Blood culture x two cultures. Draw prior to antibiotics. [[id number]] Collected:  03/02/20 1418    Order Status:  Completed Specimen:  Blood from Antecubital, Left  Arm Updated:  03/05/20 0492     Blood Culture, Routine No Growth to date      No Growth to date       No Growth to date      No Growth to date    Narrative:       Aerobic and anaerobic    Blood culture x two cultures. Draw prior to antibiotics. [[id number]] Collected:  03/02/20 1417    Order Status:  Completed Specimen:  Blood from Antecubital, Right  Arm Updated:  03/05/20 2312     Blood Culture, Routine No Growth to date      No Growth to date      No Growth to date      No Growth to date    Narrative:       Aerobic and anaerobic    Culture, Respiratory [[id number]]     Order Status:  No result Specimen:  Respiratory from Sputum     Gram Stain [[id number]]     Order Status:  No result Specimen:  Sputum     Influenza A & B by Molecular [[id number]] Collected:  03/02/20 1344    Order Status:  Completed Specimen:  Nasopharyngeal Swab Updated:  03/02/20 1512     Influenza A, Molecular Negative     Influenza B, Molecular Negative     Flu A & B Source Nasal swab          Impression:  Active Hospital Problems    Diagnosis  POA    *Pneumonia of right lung due to Streptococcus pneumoniae [J13]  Yes    Chronic diastolic CHF (congestive heart failure) [I50.32]  Yes    Chronic hypoxemic respiratory failure [J96.11]  Yes    COPD with acute lower respiratory infection [J44.0]  Yes    Transaminasemia [R74.0]  Yes    Obesity (BMI 30-39.9) [E66.9]  Yes    Hypertension [I10]  Yes    Hypothyroid [E03.9]  Yes      Resolved Hospital Problems    Diagnosis Date Resolved POA    Acute hypoxemic respiratory failure [J96.01] 03/06/2020 Yes    Sepsis [A41.9] 03/06/2020 Yes               Plan:   Community acquired pneumonia RLL  Acute hypoxemic resp failure  Mechanical fall  Obesity  Hypothyroidism  HTN     - continue Rocephin and azithromycin  - culture sputum if able  - continue nebulized bronchodilators  - continue supplemental O2, titrate to keep sat >92%  - from a pulmonary standpoint may be okay to DC tomorrow on oral antibiotics  - on discharge please evaluate for home O2 and order if needed    Ann Busby MD  Pulmonary &  Critical Care Medicine    Above from Dr Busby and below from Dr Bullock:    3/6 - ready for outpt.  duoneb sent in.

## 2020-03-06 NOTE — ASSESSMENT & PLAN NOTE
Slightly worse.  Will monitor.  Will get US of liver/GB tomorrow morning.  I doubt viral hepatitis.  Could be medication induced.    AST   Date Value Ref Range Status   03/05/2020 151 (H) 10 - 40 U/L Final   07/20/2013 27 10 - 40 U/L Final     ALT   Date Value Ref Range Status   03/05/2020 112 (H) 10 - 44 U/L Final

## 2020-03-06 NOTE — ASSESSMENT & PLAN NOTE
Patient has CHF with preserved EF.  Will continue her daily oral diuretic for maintenance.  Currently she's euvolemic.  Pt will likely need home oxygen to treat chronic hypoxia due to CHF.

## 2020-03-06 NOTE — CARE UPDATE
03/05/20 2046   Patient Assessment/Suction   Level of Consciousness (AVPU) alert   Respiratory Effort Unlabored   Expansion/Accessory Muscles/Retractions no use of accessory muscles   All Lung Fields Breath Sounds crackles;diminished   PRE-TX-O2   O2 Device (Oxygen Therapy) nasal cannula   Flow (L/min) 4   SpO2 97 %   Pulse Oximetry Type Continuous   Pulse 89   Resp 20   Aerosol Therapy   $ Aerosol Therapy Charges Aerosol Treatment   Respiratory Treatment Status (SVN) given   Treatment Route (SVN) mask   Patient Position (SVN) sitting in chair   Signs of Intolerance (SVN) none   Breath Sounds Post-Respiratory Treatment   Throughout All Fields Post-Treatment All Fields   Throughout All Fields Post-Treatment aeration increased   Post-treatment Heart Rate (beats/min) 88   Post-treatment Resp Rate (breaths/min) 20

## 2020-03-07 LAB
BACTERIA BLD CULT: NORMAL
BACTERIA BLD CULT: NORMAL

## 2020-03-08 NOTE — DISCHARGE SUMMARY
[facility] Hospital Medicine  Discharge Summary      Patient Name: Glenda Gimenez  MRN: [id number]  Admission Date: 3/2/2020  Hospital Length of Stay: 3 days  Discharge Date and Time: 3/6/2020  3:00 PM  Attending Physician: No att. providers found   Discharging Provider: Josse Wilkerson MD  Primary Care Provider: Crystal Hahn MD      HPI:   Patient presented to the emergency today complaining of cough and shortness breath.  Her symptoms started 3-4 days ago.  Several days prior to that, patient fell home due to tripping and inability keeping herself upright.  She denies dizziness, lightheadedness, weakness.  Denies difficulty speaking or unusual headaches.  Daughter, however has noticed mom has been a little more confused since falling.  Cough is dry.  Denies any subjective fever.    * No surgery found *      Hospital Course:   Pt was admitted with pneumonia and sepsis.  Placed on IVAB.  Was given supplemental oxygen.  Consulted with pulmonologist.  The sepsis resolved.  Pt's symptoms resolved after four days in the hospital.  Incidentally, pt's liver transaminases were elevated on day one.  They remained elevated; however on day of discharge they started trending downward.  See below.  US of RUQ was unremarkable.  Pt qualified for home oxygen.  Home health services were ordered.    Physical Exam   Constitutional: No distress.   HENT:   Mouth/Throat: Oropharynx is clear and moist.   Eyes: Right eye exhibits no discharge. Left eye exhibits no discharge.   Neck: No JVD present.   Cardiovascular: Normal rate and regular rhythm.   Pulmonary/Chest: Effort normal. No accessory muscle usage. No tachypnea. She has no decreased breath sounds. She has no wheezes. She has rales in the right middle field, the right lower field and the left lower field.      Consults:   Consults (From admission, onward)        Status Ordering Provider     Inpatient consult to Pulmonology  Once     Provider:  Geoff CHING  MD Kobe    Completed [facility]          Chronic hypoxemic respiratory failure        Chronic diastolic CHF (congestive heart failure)  Patient has CHF with preserved EF.  Will continue her daily oral diuretic for maintenance.  Currently she's euvolemic.  Pt will likely need home oxygen to treat chronic hypoxia due to CHF.        Final Active Diagnoses:    Diagnosis Date Noted POA    PRINCIPAL PROBLEM:  Pneumonia of right lung due to Streptococcus pneumoniae [J13] 03/03/2020 Yes    Chronic diastolic CHF (congestive heart failure) [I50.32] 03/06/2020 Yes    Chronic hypoxemic respiratory failure [J96.11] 03/06/2020 Yes    COPD with acute lower respiratory infection [J44.0] 03/06/2020 Yes    Transaminasemia [R74.0] 03/03/2020 Yes    Obesity (BMI 30-39.9) [E66.9] 01/22/2018 Yes    Hypertension [I10] 05/23/2013 Yes    Hypothyroid [E03.9] 11/02/2012 Yes      Problems Resolved During this Admission:    Diagnosis Date Noted Date Resolved POA    Acute hypoxemic respiratory failure [J96.01] 03/03/2020 03/06/2020 Yes    Sepsis [A41.9] 03/02/2020 03/06/2020 Yes       Discharged Condition: stable    Disposition: Home-Health [facility]    Follow Up:  Follow-up Information     Juancarlos Gilmore MD On 3/20/2020.    Specialty:  Family Medicine  Why:  apt in avs  Contact information:  [address]  [place] [address]  [phone]             Ann Busby MD On 5/7/2020.    Specialties:  Pulmonary Disease, Critical Care Medicine  Why:  # [phone]. apt in avs  Contact information:  [address]  [address]  [place] [address]  [phone]             James Yao MD.    Specialty:  Internal Medicine  Why:  office is closed. Please call office on Monday for appointment  Contact information:  [address]  [place] [address] [phone]             [facility].    Specialty:  Home Health Services  Why:  Home Health  Contact information:  [address] [place]  "[id number]  [phone]                 Patient Instructions:      NEBULIZER FOR HOME USE     Order Specific Question Answer Comments   Height: 5' 4" (1.626 m)    Weight: 86.2 kg (190 lb)    Does patient have medical equipment at home? wheelchair    Does patient have medical equipment at home? rollator    Does patient have medical equipment at home? cane, quad    Length of need (1-99 months): 99      OXYGEN FOR HOME USE     Order Specific Question Answer Comments   Liter Flow 2    Duration With activity    Qualifying SpO2: 82    Testing done at: Exercise/Activity    Route nasal cannula    Portable mode: continuous    Device home concentrator with portable tank   Length of need (in months): 12 mos    Patient condition with qualifying saturation CHF    Height: 5' 4" (1.626 m)    Weight: 86.2 kg (190 lb)    Does patient have medical equipment at home? wheelchair    Does patient have medical equipment at home? rollator    Does patient have medical equipment at home? canramsey quad    Alternative treatment measures have been tried or considered and deemed clinically ineffective. Yes      HOSPITAL BED FOR HOME USE     Order Specific Question Answer Comments   Type: Electric    Length of need (1-99 months): 99    Does patient have medical equipment at home? cane, quad    Does patient have medical equipment at home? rollator    Height: 5' 4" (1.626 m)    Weight: 86.2 kg (190 lb)    Please check all that apply: Patient requires the head of bed to be elevated more than 30 degrees most of the time due to congestive heart failure, chronic pulmonary disease, or aspiration.  Pillows and wedges have been considered and ruled out.    Please check all that apply: Patient requires a bed height different than a fixed height hospital bed to permit transfers to chair, wheelchair, or standing.      Ambulatory referral/consult to Home Health   Standing Status: Future   Referral Priority: Routine Referral Type: Home Health   Referral Reason: " Specialty Services Required   Requested Specialty: Home Health Services   Number of Visits Requested: 1     Ambulatory referral/consult to [facility]   Standing Status: Future   Referral Priority: Routine Referral Type: Consultation   Referral Reason: Specialty Services Required   Number of Visits Requested: 1     Diet Adult Regular     Activity as tolerated       Significant Diagnostic Studies:   BMP  Lab Results   Component Value Date     03/06/2020    K 3.8 03/06/2020     03/06/2020    CO2 24 03/06/2020    BUN 15 03/06/2020    CREATININE 0.6 03/06/2020    CALCIUM 10.3 03/06/2020    ANIONGAP 9 03/06/2020    ESTGFRAFRICA >60 03/06/2020    EGFRNONAA >60 03/06/2020     AST   Date Value Ref Range Status   03/06/2020 112 (H) 10 - 40 U/L Final   03/05/2020 151 (H) 10 - 40 U/L Final   03/04/2020 100 (H) 10 - 40 U/L Final   03/03/2020 74 (H) 10 - 40 U/L Final   07/20/2013 27 10 - 40 U/L Final   07/19/2013 28 10 - 40 U/L Final   ]  ALT   Date Value Ref Range Status   03/06/2020 106 (H) 10 - 44 U/L Final   03/05/2020 112 (H) 10 - 44 U/L Final   03/04/2020 75 (H) 10 - 44 U/L Final   03/03/2020 59 (H) 10 - 44 U/L Final   ]  Lab Results   Component Value Date    WBC 9.32 03/06/2020    HGB 11.7 (L) 03/06/2020    HCT 36.8 (L) 03/06/2020     (H) 03/06/2020     (H) 03/06/2020     Results for orders placed during the hospital encounter of 03/02/20   US Abdomen Limited    Narrative EXAMINATION:  US ABDOMEN LIMITED    CLINICAL HISTORY:  elevated transaminases.  examine RUQ;    TECHNIQUE:  Limited ultrasound of the right upper quadrant of the abdomen (including pancreas, liver, gallbladder, common bile duct, and spleen) was performed.    COMPARISON:  None recent.    FINDINGS:  Liver: Normal in size, measuring 12 cm. Homogeneous echotexture. No focal hepatic lesions.    Gallbladder: Not visualized and could be absent, contracted or obscured    Biliary system: The common duct is not dilated,  measuring 3 mm.  No intrahepatic ductal dilatation.    Spleen: Normal in size and echotexture, measuring 9.5 cm.    Miscellaneous: No upper abdominal ascites.    Gallbladder was visualized on CT 11/26/2013.      Impression Nonvisualization of the gallbladder which could be absent, contracted or obscured.  No biliary duct dilatation.      Electronically signed by: Jordana Hernandez MD  Date:    03/06/2020  Time:    09:51         Pending Diagnostic Studies:     None         Medications:  Reconciled Home Medications:      Medication List      START taking these medications    albuterol-ipratropium 2.5 mg-0.5 mg/3 mL nebulizer solution  Commonly known as:  DUO-NEB  Take 3 mLs by nebulization every 4 (four) hours as needed for Wheezing or Shortness of Breath. Rescue     levoFLOXacin 500 MG tablet  Commonly known as:  LEVAQUIN  Take 1 tablet (500 mg total) by mouth once daily. for 10 days        CHANGE how you take these medications    levothyroxine 88 MCG tablet  Commonly known as:  SYNTHROID  TAKE 1 TABLET BY MOUTH EVERY DAY  What changed:  when to take this     potassium chloride SA 20 MEQ tablet  Commonly known as:  K-DUR,KLOR-CON  TAKE 1 TABLET(20 MEQ) BY MOUTH TWICE DAILY  What changed:  See the new instructions.        CONTINUE taking these medications    aspirin 81 MG EC tablet  Commonly known as:  ECOTRIN  Take 81 mg by mouth once daily.     carvediloL 3.125 MG tablet  Commonly known as:  COREG  Take 1.5625 mg by mouth 2 (two) times daily.     furosemide 20 MG tablet  Commonly known as:  LASIX  Take 20 mg by mouth once daily.     isosorbide mononitrate 30 MG 24 hr tablet  Commonly known as:  IMDUR  Take 30 mg by mouth once daily.     olmesartan 5 MG Tab  Commonly known as:  BENICAR  Take 5 mg by mouth once daily.            Indwelling Lines/Drains at time of discharge:   Lines/Drains/Airways     None                 Time spent on the discharge of patient: 27 minutes  Patient was seen and examined on the date of  discharge and determined to be suitable for discharge.         Josse Wilkerson MD  Department of Hospital Medicine  [facility]

## 2020-03-08 NOTE — HOSPITAL COURSE
Pt was admitted with pneumonia and sepsis.  Placed on IVAB.  Was given supplemental oxygen.  Consulted with pulmonologist.  The sepsis resolved.  Pt's symptoms resolved after four days in the hospital.  Incidentally, pt's liver transaminases were elevated on day one.  They remained elevated; however on day of discharge they started trending downward.  See below.  US of RUQ was unremarkable.  Pt qualified for home oxygen.  Home health services were ordered.    Physical Exam   Constitutional: No distress.   HENT:   Mouth/Throat: Oropharynx is clear and moist.   Eyes: Right eye exhibits no discharge. Left eye exhibits no discharge.   Neck: No JVD present.   Cardiovascular: Normal rate and regular rhythm.   Pulmonary/Chest: Effort normal. No accessory muscle usage. No tachypnea. She has no decreased breath sounds. She has no wheezes. She has rales in the right middle field, the right lower field and the left lower field.

## 2020-03-09 ENCOUNTER — TELEPHONE (OUTPATIENT)
Dept: MEDSURG UNIT | Facility: HOSPITAL | Age: 85
End: 2020-03-09

## 2020-03-10 ENCOUNTER — PATIENT OUTREACH (OUTPATIENT)
Dept: ADMINISTRATIVE | Facility: CLINIC | Age: 85
End: 2020-03-10

## 2020-03-10 NOTE — PATIENT INSTRUCTIONS
Pneumonia (Adult)  Pneumonia is an infection deep within the lungs. It is in the small air sacs (alveoli). Pneumonia may be caused by a virus or bacteria. Pneumonia caused by bacteria is usually treated with an antibiotic. Severe cases may need to be treated in the hospital. Milder cases can be treated at home. Symptoms usually start to get better during the first 2 days of treatment.    Home care  Follow these guidelines when caring for yourself at home:  · Rest at home for the first 2 to 3 days, or until you feel stronger. Dont let yourself get overly tired when you go back to your activities.  · Stay away from cigarette smoke - yours or other peoples.  · You may use acetaminophen or ibuprofen to control fever or pain, unless another medicine was prescribed. If you have chronic liver or kidney disease, talk with your healthcare provider before using these medicines. Also talk with your provider if youve had a stomach ulcer or gastrointestinal bleeding. Dont give aspirin to anyone younger than 18 years of age who is ill with a fever. It may cause severe liver damage.  · Your appetite may be poor, so a light diet is fine.  · Drink 6 to 8 glasses of fluids every day to make sure you are getting enough fluids. Beverages can include water, sport drinks, sodas without caffeine, juices, tea, or soup. Fluids will help loosen secretions in the lung. This will make it easier for you to cough up the phlegm (sputum). If you also have heart or kidney disease, check with your healthcare provider before you drink extra fluids.  · Take antibiotic medicine prescribed until it is all gone, even if you are feeling better after a few days.  Follow-up care  Follow up with your healthcare provider in the next 2 to 3 days, or as advised. This is to be sure the medicine is helping you get better.  If you are 65 or older, you should get a pneumococcal vaccine and a yearly flu (influenza) shot. You should also get these vaccines if  you have chronic lung disease like asthma, emphysema, or COPD. Recently, a second type of pneumonia vaccine has become available for everyone over 65 years old. This is in addition to the previous vaccine. Ask your provider about this.  When to seek medical advice  Call your healthcare provider right away if any of these occur:  · You dont get better within the first 48 hours of treatment  · Shortness of breath gets worse  · Rapid breathing (more than 25 breaths per minute)  · Coughing up blood  · Chest pain gets worse with breathing  · Fever of 100.4°F (38°C) or higher that doesnt get better with fever medicine  · Weakness, dizziness, or fainting that gets worse  · Thirst or dry mouth that gets worse  · Sinus pain, headache, or a stiff neck  · Chest pain not caused by coughing    © 3244-8096 [employer]. [address] [place] [address], [place] [address]. All rights reserved. This information is not intended as a substitute for professional medical care. Always follow your healthcare professional's instructions.

## 2020-03-10 NOTE — PROGRESS NOTES
Progress Note  PULMONARY    Admit Date: 3/2/2020   03/05/2020      SUBJECTIVE:     3/5- no new complaints  3/6- cough is improving.  No phlegm.  Remains on 4 L nasal cannula with saturations in the 90s      PFSH and Allergies reviewed.    OBJECTIVE:     Vitals (Most recent):  Vitals:    03/05/20 1217   BP: 130/77   Pulse: 86   Resp: 16   Temp: 97.9 °F (36.6 °C)       Vitals (24 hour range):  Temp:  [97.9 °F (36.6 °C)-99.9 °F (37.7 °C)]   Pulse:  [78-96]   Resp:  [16-24]   BP: (130-174)/(60-77)   SpO2:  [92 %-96 %]       Intake/Output Summary (Last 24 hours) at 3/5/2020 1217  Last data filed at 3/5/2020 0630  Gross per 24 hour   Intake 120 ml   Output --   Net 120 ml          Physical Exam:  The patient's neuro status (alertness,orientation,cognitive function,motor skills,), pharyngeal exam (oral lesions, hygiene, abn dentition,), Neck (jvd,mass,thyroid,nodes in neck and above/below clavicle),RESPIRATORY(symmetry,effort,fremitus,percussion,auscultation),  Cor(rhythm,heart tones including gallops,perfusion,edema)ABD(distention,hepatic&splenomegaly,tenderness,masses), Skin(rash,cyanosis),Psyc(affect,judgement,).  Exam negative except for these pertinent findings:    Improved breath sounds bilaterally  Awake alert sitting in chair  No edema    Radiographs reviewed: view by direct vision   CXR 3/5- similar appearance R sided increased interstitial markings w/ volume loss on right, bibasilar airspace disease      Labs     Recent Labs   Lab 03/05/20  0518   WBC 9.39   HGB 11.1*   HCT 34.1*        Recent Labs   Lab 03/05/20  0518      K 3.8      CO2 26   BUN 18   CREATININE 0.6      CALCIUM 10.0   *   *   ALKPHOS 257*   BILITOT 0.4   PROT 7.0   ALBUMIN 2.3*   PROCAL 0.21   No results for input(s): PH, PCO2, PO2, HCO3 in the last 24 hours.  Microbiology Results (last 7 days)     Procedure Component Value Units Date/Time    Blood culture x two cultures. Draw prior to antibiotics.  [[id number]] Collected:  03/02/20 1417    Order Status:  Completed Specimen:  Blood from Antecubital, Right  Arm Updated:  03/04/20 2312     Blood Culture, Routine No Growth to date      No Growth to date      No Growth to date    Narrative:       Aerobic and anaerobic    Blood culture x two cultures. Draw prior to antibiotics. [[id number]] Collected:  03/02/20 1418    Order Status:  Completed Specimen:  Blood from Antecubital, Left  Arm Updated:  03/04/20 2312     Blood Culture, Routine No Growth to date      No Growth to date      No Growth to date    Narrative:       Aerobic and anaerobic    Culture, Respiratory [[id number]]     Order Status:  No result Specimen:  Respiratory from Sputum     Gram Stain [[id number]]     Order Status:  No result Specimen:  Sputum     Influenza A & B by Molecular [[id number]] Collected:  03/02/20 1344    Order Status:  Completed Specimen:  Nasopharyngeal Swab Updated:  03/02/20 1512     Influenza A, Molecular Negative     Influenza B, Molecular Negative     Flu A & B Source Nasal swab          Impression:  Active Hospital Problems    Diagnosis  POA    *Pneumonia of right lung due to Streptococcus pneumoniae [J13]  Yes    Transaminasemia [R74.0]  Yes    Acute hypoxemic respiratory failure [J96.01]  Yes    Sepsis [A41.9]  Yes    Obesity (BMI 30-39.9) [E66.9]  Yes    Hypertension [I10]  Yes    Hypothyroid [E03.9]  Yes      Resolved Hospital Problems   No resolved problems to display.               Plan:   Community acquired pneumonia RLL  Acute hypoxemic resp failure  Mechanical fall  Obesity  Hypothyroidism  HTN     - continue Rocephin and azithromycin  - culture sputum if able  - continue nebulized bronchodilators  - continue supplemental O2, titrate to keep sat >92%  - from a pulmonary standpoint may be okay to DC tomorrow on oral antibiotics  - on discharge please evaluate for home O2 and order if needed    Ann Busby MD  Pulmonary & Critical Care Medicine       Cephalexin Counseling: I counseled the patient regarding use of cephalexin as an antibiotic for prophylactic and/or therapeutic purposes. Cephalexin (commonly prescribed under brand name Keflex) is a cephalosporin antibiotic which is active against numerous classes of bacteria, including most skin bacteria. Side effects may include nausea, diarrhea, gastrointestinal upset, rash, hives, yeast infections, and in rare cases, hepatitis, kidney disease, seizures, fever, confusion, neurologic symptoms, and others. Patients with severe allergies to penicillin medications are cautioned that there is about a 10% incidence of cross-reactivity with cephalosporins. When possible, patients with penicillin allergies should use alternatives to cephalosporins for antibiotic therapy.

## 2020-03-16 ENCOUNTER — CARE AT HOME (OUTPATIENT)
Dept: HOME HEALTH SERVICES | Facility: CLINIC | Age: 85
End: 2020-03-16
Payer: MEDICARE

## 2020-03-16 VITALS
RESPIRATION RATE: 16 BRPM | DIASTOLIC BLOOD PRESSURE: 62 MMHG | OXYGEN SATURATION: 98 % | HEART RATE: 90 BPM | WEIGHT: 190 LBS | BODY MASS INDEX: 32.61 KG/M2 | TEMPERATURE: 98 F | SYSTOLIC BLOOD PRESSURE: 102 MMHG

## 2020-03-16 DIAGNOSIS — R53.1 WEAKNESS: ICD-10-CM

## 2020-03-16 DIAGNOSIS — J96.11 CHRONIC HYPOXEMIC RESPIRATORY FAILURE: ICD-10-CM

## 2020-03-16 DIAGNOSIS — A41.9 SEPSIS, DUE TO UNSPECIFIED ORGANISM, UNSPECIFIED WHETHER ACUTE ORGAN DYSFUNCTION PRESENT: ICD-10-CM

## 2020-03-16 DIAGNOSIS — J18.9 PNEUMONIA DUE TO INFECTIOUS ORGANISM, UNSPECIFIED LATERALITY, UNSPECIFIED PART OF LUNG: ICD-10-CM

## 2020-03-16 DIAGNOSIS — J44.0 COPD WITH ACUTE LOWER RESPIRATORY INFECTION: ICD-10-CM

## 2020-03-16 DIAGNOSIS — R63.0 DECREASED APPETITE: ICD-10-CM

## 2020-03-16 DIAGNOSIS — Z09 HOSPITAL DISCHARGE FOLLOW-UP: Primary | ICD-10-CM

## 2020-03-16 DIAGNOSIS — Z99.81 SUPPLEMENTAL OXYGEN DEPENDENT: ICD-10-CM

## 2020-03-16 PROCEDURE — 99495 TRANSJ CARE MGMT MOD F2F 14D: CPT | Mod: S$GLB,,, | Performed by: NURSE PRACTITIONER

## 2020-03-16 PROCEDURE — 99495 TCM SERVICES (MODERATE COMPLEXITY): ICD-10-PCS | Mod: S$GLB,,, | Performed by: NURSE PRACTITIONER

## 2020-03-16 NOTE — PROGRESS NOTES
[facility]  Transition of Care Home Visit    Visit Date: 3/16/2020  Encounter Provider: Lesley Tamez NP  PCP:  James Yao MD    PRESENTING HISTORY      Patient ID: Glenda Gimenez is a 89 y.o. female.    Consult Requested By:  Dr. Josse Wilkerson  Reason for Consult:  [facility]/Hospital Discharge Follow Up    Glenda is being seen at home due to physical limitations/family and patient wishing to avoid public contact due to Coronavirus present in community     Chief Complaint: Hospital Follow Up, Sepsis, Pneumonia      History of Present Illness: Ms. Glenda Gimenez is a 89 y.o. female who was recently admitted to the hospital.    Admit: 3/2/2020    Discharge: 3/6/2020  HPI:   Patient presented to the emergency today complaining of cough and shortness breath.  Her symptoms started 3-4 days ago.  Several days prior to that, patient fell home due to tripping and inability keeping herself upright.  She denies dizziness, lightheadedness, weakness.  Denies difficulty speaking or unusual headaches.  Daughter, however has noticed mom has been a little more confused since falling. Cough is dry.  Denies any subjective fever.       Hospital Course:   Pt was admitted with pneumonia and sepsis.  Placed on IVAB.  Was given supplemental oxygen.  Consulted with pulmonologist.  The sepsis resolved.  Pt's symptoms resolved after four days in the hospital.  Incidentally, pt's liver transaminases were elevated on day one.  They remained elevated; however on day of discharge they started trending downward.  See below.  US of RUQ was unremarkable.  Pt qualified for home oxygen.  Home health services were ordered.    ___________________________________________________________________    Today:    HPI:  Glenda is being seen today 10 days after discharge from a hospitalization for pneumonia and sepsis. She reports that she is feeling much better, has 1 dose of Levaquin left to take but is aggravated with having to wear  "oxygen now. She states that the nasal cannula bothers her and she asks if she must wear it at all times. She is encouraged to do so. She also states that taking the Duo-nebs more than twice a day "makes my heart race" and she doesn't like how it makes her feel. She reports that she is only using the nebulizer treatment once a day. She also reports that her appetite is decreased since hospitalization but that she is making herself eat anyway. She has lost 10 lbs since hospitalization.     She denies any fever or chills since being home. She reports her cough and shortness of breath have also improved.         Review of Systems   Constitutional: Positive for activity change (some residual weakness), appetite change (making self eat despite no appetite since hospitalization), fatigue and unexpected weight change (10 lb weight loss since hospital admission). Negative for fever.   HENT: Negative.    Eyes: Negative.    Respiratory: Positive for cough (mild and non-productive) and shortness of breath (mild and occasional with exertion). Negative for chest tightness and wheezing.    Cardiovascular: Negative.    Gastrointestinal: Negative.    Endocrine: Negative.    Genitourinary: Negative.    Musculoskeletal: Negative.    Skin: Negative.    Allergic/Immunologic: Negative.    Neurological: Positive for weakness (mild). Negative for dizziness, tremors and syncope.   Hematological: Bruises/bleeds easily.   Psychiatric/Behavioral: Negative.        Assessments:  · Environmental: clean, adequate lighting and temperature, no foul odors  · Functional Status: independent  · Safety: no issues identified  · Nutritional: adequate  · Home Health/DME/Supplies: [facility], PT/OT/SNV, rollator, oxygen, nebulizer    PAST HISTORY:     Past Medical History:   Diagnosis Date    Anticoagulant long-term use     Dislocation of right shoulder joint     Hypertension     Shoulder disorder     right    Thyroid disease        Past Surgical " History:   Procedure Laterality Date    HYSTERECTOMY      PARTIAL HYSTERECTOMY      ARTUR, ovaries in place, uterine prolapse       Family History   Problem Relation Age of Onset    Breast cancer Mother     Cancer Brother         lung cancer    Cancer Maternal Aunt         unknown cancer    Cancer Maternal Uncle         pancreatic cancer    Heart disease Neg Hx        Social History     Socioeconomic History    Marital status:      Spouse name: Not on file    Number of children: Not on file    Years of education: Not on file    Highest education level: Not on file   Occupational History    Not on file   Social Needs    Financial resource strain: Not on file    Food insecurity:     Worry: Not on file     Inability: Not on file    Transportation needs:     Medical: Not on file     Non-medical: Not on file   Tobacco Use    Smoking status: Never Smoker    Smokeless tobacco: Never Used   Substance and Sexual Activity    Alcohol use: Yes     Comment: wine occasionally     Drug use: No    Sexual activity: Not on file   Lifestyle    Physical activity:     Days per week: Not on file     Minutes per session: Not on file    Stress: Not on file   Relationships    Social connections:     Talks on phone: Not on file     Gets together: Not on file     Attends Jain service: Not on file     Active member of club or organization: Not on file     Attends meetings of clubs or organizations: Not on file     Relationship status: Not on file   Other Topics Concern    Not on file   Social History Narrative    Not on file       MEDICATIONS & ALLERGIES:     Current Outpatient Medications on File Prior to Visit   Medication Sig Dispense Refill    albuterol-ipratropium (DUO-NEB) 2.5 mg-0.5 mg/3 mL nebulizer solution Take 3 mLs by nebulization every 4 (four) hours as needed for Wheezing or Shortness of Breath. Rescue 120 [address]    aspirin (ECOTRIN) 81 MG EC tablet Take 81 mg by mouth once daily.       carvedilol (COREG) 3.125 MG tablet Take 1.5625 mg by mouth 2 (two) times daily.   1    furosemide (LASIX) 20 MG tablet Take 20 mg by mouth once daily.   2    isosorbide mononitrate (IMDUR) 30 MG 24 hr tablet Take 30 mg by mouth once daily.      levoFLOXacin (LEVAQUIN) 500 MG tablet Take 1 tablet (500 mg total) by mouth once daily. for 10 days 10 tablet 0    levothyroxine (SYNTHROID) 88 MCG tablet TAKE 1 TABLET BY MOUTH EVERY DAY (Patient taking differently: Take 88 mcg by mouth before breakfast. ) 90 tablet 3    olmesartan (BENICAR) 5 MG Tab Take 5 mg by mouth once daily.   1    potassium chloride SA (K-DUR,KLOR-CON) 20 MEQ tablet TAKE 1 TABLET(20 MEQ) BY MOUTH TWICE DAILY (Patient taking differently: Take 20 mEq by mouth once daily. ) 180 tablet 3     No current facility-administered medications on file prior to visit.         Review of patient's allergies indicates:   Allergen Reactions    Atenolol      Other reaction(s): itch    Betamethasone sodium phosphate      Other reaction(s): Flushing (skin)    Cortisone      Other reaction(s): blurry vision  Other reaction(s): Rash    Lisinopril      Other reaction(s): COUGH    Naproxen      Other reaction(s): body shut down    Triamterene-hydrochlorothiazid      Other reaction(s): itch       OBJECTIVE:     Vital Signs:  Vitals:    03/16/20 1137   BP: 102/62   Pulse: 90   Resp: 16   Temp: 98.1 °F (36.7 °C)     Body mass index is 32.61 kg/m².     Physical Exam:  Physical Exam   Constitutional: She is oriented to person, place, and time. She appears well-developed and well-nourished.   HENT:   Head: Normocephalic and atraumatic.   Right Ear: External ear normal.   Left Ear: External ear normal.   Nose: Nose normal.   Mouth/Throat: Oropharynx is clear and moist.   Eyes: Pupils are equal, round, and reactive to light. Conjunctivae and EOM are normal.   Neck: Normal range of motion. Neck supple.   Cardiovascular: Normal rate, regular rhythm, normal heart sounds and  intact distal pulses.   Pulmonary/Chest: Effort normal. No stridor. No respiratory distress. She has no wheezes. She has no rales. She exhibits no tenderness.   Wearing 2 liters oxygen via nasal cannula, o2 sats 98%, no distress, lungs CTA   Abdominal: Soft. Bowel sounds are normal.   Musculoskeletal: Normal range of motion. She exhibits no edema, tenderness or deformity.   Neurological: She is alert and oriented to person, place, and time.   Skin: Skin is warm and dry. Capillary refill takes 2 to 3 seconds.   Psychiatric: She has a normal mood and affect. Her behavior is normal. Judgment and thought content normal.       Laboratory  Lab Results   Component Value Date    WBC 9.32 03/06/2020    HGB 11.7 (L) 03/06/2020    HCT 36.8 (L) 03/06/2020     (H) 03/06/2020     (H) 03/06/2020     Lab Results   Component Value Date    INR 1.18 07/19/2013     Lab Results   Component Value Date    HGBA1C 5.4 05/03/2019     No results for input(s): POCTGLUCOSE in the last 72 hours.      TRANSITION OF CARE:     [facility] Contact Note: 3/10/2020    Family and/or Caretaker present at visit?  Yes.  Diagnostic tests reviewed/disposition: No diagnosic tests pending after this hospitalization.  Disease/illness education: Preventing Pneumonia, Sepsis  Home health/community services discussion/referrals: Patient has home health established at [facility]   Establishment or re-establishment of referral orders for community resources: No other necessary community resources.   Discussion with other health care providers: No discussion with other health care providers necessary.     Transition of Care Visit:     I have reviewed and updated the history and problem list.  I have reconciled the medication list.  I have discussed the hospitalization and current medical issues, prognosis and plans with the patient/family.  I  spent more than 50% of time discussing the care with the patient/family.  Total Face-to-Face Encounter:  60 minutes.    Medications Reconciliation:   I have reconciled the patient's home medications and discharge medications with the patient/family. I have updated all changes.  Refer to After-Visit Medication List.    ASSESSMENT & PLAN:     HIGH RISK CONDITION(S):      Glenad was seen today for hospital follow up.    Diagnoses and all orders for this visit:    Hospital discharge follow-up    Chronic hypoxemic respiratory failure  -     Ambulatory referral/consult to [facility]  Stable on 2 liters oxygen via nasal cannula.  Fall precautions and safety with oxygen tubing.  Oxygen safety discussed.    COPD with acute lower respiratory infection  -     Ambulatory referral/consult to [facility]  Stable. Has 1 dose of Levaquin left.  Continue breathing treatments as ordered and oxygen use.   Pneumonia due to infectious organism, unspecified laterality, unspecified part of lung  Stable. Has 1 dose of Levaquin left.  Continue breathing treatments as ordered and oxygen use.   Sepsis, due to unspecified organism, unspecified whether acute organ dysfunction present  Stable. Has 1 dose of Levaquin left.    Supplemental oxygen dependent  Stable on 2 liters oxygen via nasal cannula.  Fall precautions and safety with oxygen tubing.  Oxygen safety discussed.     Weakness  Stable. Continue working with PT/OT.   Fall precautions and safety advised.    Decreased appetite  Eat 3 healthy meals daily. Report any further weight loss or increases loss of appetite.    [facility] contact information left with patient today.    Were controlled substances prescribed?  No    Instructions for the patient:  Take all medications as prescribed.  Keep all follow up appointment. Discuss with MD necessity of appointments at this time due to Covid-19 prevalence.  Keep away from anyone sick.    Scheduled Follow-up :  Future Appointments   Date Time Provider Department Center   4/7/2020  9:00 AM Ann HEREDIA  [facility] [place] [facility]   4/24/2020  2:30 PM Damir Fraire MD [facility] [place]       After Visit Medication List :     Medication List           Accurate as of March 16, 2020  9:02 PM. If you have any questions, ask your nurse or doctor.               CHANGE how you take these medications    levothyroxine 88 MCG tablet  Commonly known as:  SYNTHROID  TAKE 1 TABLET BY MOUTH EVERY DAY  What changed:  when to take this     potassium chloride SA 20 MEQ tablet  Commonly known as:  K-DUR,KLOR-CON  TAKE 1 TABLET(20 MEQ) BY MOUTH TWICE DAILY  What changed:  See the new instructions.        CONTINUE taking these medications    albuterol-ipratropium 2.5 mg-0.5 mg/3 mL nebulizer solution  Commonly known as:  DUO-NEB  Take 3 mLs by nebulization every 4 (four) hours as needed for Wheezing or Shortness of Breath. Rescue     aspirin 81 MG EC tablet  Commonly known as:  ECOTRIN     carvediloL 3.125 MG tablet  Commonly known as:  COREG     furosemide 20 MG tablet  Commonly known as:  LASIX     isosorbide mononitrate 30 MG 24 hr tablet  Commonly known as:  IMDUR     levoFLOXacin 500 MG tablet  Commonly known as:  LEVAQUIN  Take 1 tablet (500 mg total) by mouth once daily. for 10 days     olmesartan 5 MG Tab  Commonly known as:  BENICAR        Consent for visit obtained from patient today.    Signature:  Lesley Tamez NP     Attestation:   Screening criteria to assess the level of the patient's risk for infection with COVID-19 as recommended by the CDC at the time of the above documented home visit concluded appropriateness to proceed. Universal precautions were maintained at all times, including provider use of 60% alcohol gel hand  immediately prior to entry and upon departure of patient's home as well as cleaning of equipment used in home visit with antibacterial/germicidal disposable wipes.

## 2020-03-17 NOTE — PATIENT INSTRUCTIONS
Sepsis     To treat sepsis, antibiotics and fluids may by given through an intravenous (IV) line.     Sepsis happens when your body responds with widespread inflammation to a bad infection or bacteremia--the presence of bacteria in your bloodstream. Sepsis can be deadly. Blood pressure may drop and the lungs and kidneys may start to fail. Emergency care for sepsis is crucial.  Risk factors  Those most at risk for sepsis are:  · Infants or older adults  · People who have an illness that weakens their immune system, such as cancer, AIDS, or diabetes  · People being treated with chemotherapy medicines or radiation, which weakens the immune system  · People who have had a transplant  · People with a very severe infection such as pneumonia, meningitis, or a urinary tract infection  When to go to the emergency department (ED)  Sepsis is an emergency. Go to the nearest ED if you have a fever with any of these symptoms:  · Chills and shaking  · Rapid heartbeat and breathing  · Trouble breathing  · Severe nausea or uncontrolled vomiting  · Confusion, disorientation, drowsiness, or dizziness  · Decreased urination  · Severe pain, including in the back or joints   What to expect in the ED  · Blood and urine tests are done to look for bacteria. They also check for organ failure.  · Blood, urine, or sputum cultures may be taken. The samples are sent to a lab. They are placed in a special container. Any bacteria should grow in 24 hours.  · X-rays or other imaging tests may be done.  A person with sepsis will be admitted to the hospital and treated with antibiotics. Treatment may also include oxygen and intravenous fluids.  Date Last Reviewed: 10/1/2016  © 9239-6680 [employer]. [address] [place] [address], [place] [address]. All rights reserved. This information is not intended as a substitute for professional medical care. Always follow your healthcare professional's instructions.        Preventing  Pneumonia  Pneumonia is an infection in one or both of the lungs. Those most at risk include older adults, smokers, and people with chronic lung diseases. For example, those with conditions like chronic obstructive pulmonary disease (COPD), including emphysema or chronic bronchitis, asthma, and those with weak immune systems. There are some things you can do to lessen the chance that you will get pneumonia.    Avoid infection  · Wash your hands often:  ¨ Use soap and warm water.  ¨ If soap and water aren't available, use a hand  with alcohol in it.  · Avoid touching your face and mouth with your hands.  · Use disposable tissues instead of a handkerchief. Throw used tissues away.  · Avoid people who have a cold or the flu.  · Try to avoid crowded places.  Get vaccinated  Talk with your healthcare provider about vaccinations and whether you should get a yearly flu shot. There are now 2 different pneumonia vaccines. Both of these are needed if you have a chronic disease or fit into the higher risk category.    · Get a flu shot every year as soon as it's available in your area. The flu shot helps prevent you from getting the flu and complications of the flu, such as pneumonia.  · Get pneumococcal pneumonia vaccines. Talk with your healthcare provider about which pneumococcal vaccines are right for you.  Do suggested breathing exercises  Deep breathing and coughing exercises can help clear your lungs. Your healthcare provider may suggest them. If so, you will be shown how to do them. Do them as often as your healthcare provider instructs.  Take care of your body  · Drink at least 6 to 8 glasses of water a day.  · Eat well-balanced, healthy meals.  · Avoid drinking alcohol.  · Dont smoke. Avoid places where people are smoking.  · Moving around helps keep your lungs clear. Ask your healthcare provider what type of activity is best for you. Walking is often a good choice.  · Get enough rest. Sleep at least 8 hours  each night. Rest or nap during the day as needed.  · Ask your healthcare provider when you should schedule follow-up care to make sure the infection is gone.  Date Last Reviewed: 12/1/2016  © 2732-1377 [employer]. [address] [place] [address], [place] [address]. All rights reserved. This information is not intended as a substitute for professional medical care. Always follow your healthcare professional's instructions.

## 2020-04-09 ENCOUNTER — CARE AT HOME (OUTPATIENT)
Dept: HOME HEALTH SERVICES | Facility: CLINIC | Age: 85
End: 2020-04-09
Payer: MEDICARE

## 2020-04-09 VITALS
SYSTOLIC BLOOD PRESSURE: 118 MMHG | BODY MASS INDEX: 31.41 KG/M2 | HEART RATE: 82 BPM | OXYGEN SATURATION: 98 % | RESPIRATION RATE: 16 BRPM | WEIGHT: 183 LBS | DIASTOLIC BLOOD PRESSURE: 76 MMHG | TEMPERATURE: 98 F

## 2020-04-09 DIAGNOSIS — R53.1 WEAKNESS: ICD-10-CM

## 2020-04-09 DIAGNOSIS — M85.80 OSTEOPENIA WITH HIGH RISK OF FRACTURE: ICD-10-CM

## 2020-04-09 DIAGNOSIS — Z51.5 PALLIATIVE CARE ENCOUNTER: Primary | ICD-10-CM

## 2020-04-09 DIAGNOSIS — Z87.01 HISTORY OF RECENT PNEUMONIA: ICD-10-CM

## 2020-04-09 DIAGNOSIS — J44.9 CHRONIC OBSTRUCTIVE PULMONARY DISEASE, UNSPECIFIED COPD TYPE: ICD-10-CM

## 2020-04-09 DIAGNOSIS — R63.0 DECREASED APPETITE: ICD-10-CM

## 2020-04-09 DIAGNOSIS — Z86.19 HISTORY OF SEPSIS: ICD-10-CM

## 2020-04-09 DIAGNOSIS — E21.0 PRIMARY HYPERPARATHYROIDISM: ICD-10-CM

## 2020-04-09 DIAGNOSIS — R63.4 WEIGHT LOSS, UNINTENTIONAL: ICD-10-CM

## 2020-04-09 DIAGNOSIS — I50.32 CHRONIC DIASTOLIC CHF (CONGESTIVE HEART FAILURE): ICD-10-CM

## 2020-04-09 PROCEDURE — 1159F MED LIST DOCD IN RCRD: CPT | Mod: S$GLB,,, | Performed by: NURSE PRACTITIONER

## 2020-04-09 PROCEDURE — 99349 PR HOME VISIT,ESTAB PATIENT,LEVEL III: ICD-10-PCS | Mod: S$GLB,,, | Performed by: NURSE PRACTITIONER

## 2020-04-09 PROCEDURE — 1159F PR MEDICATION LIST DOCUMENTED IN MEDICAL RECORD: ICD-10-PCS | Mod: S$GLB,,, | Performed by: NURSE PRACTITIONER

## 2020-04-09 PROCEDURE — 1101F PR PT FALLS ASSESS DOC 0-1 FALLS W/OUT INJ PAST YR: ICD-10-PCS | Mod: CPTII,S$GLB,, | Performed by: NURSE PRACTITIONER

## 2020-04-09 PROCEDURE — 99349 HOME/RES VST EST MOD MDM 40: CPT | Mod: S$GLB,,, | Performed by: NURSE PRACTITIONER

## 2020-04-09 PROCEDURE — 1126F AMNT PAIN NOTED NONE PRSNT: CPT | Mod: S$GLB,,, | Performed by: NURSE PRACTITIONER

## 2020-04-09 PROCEDURE — 1101F PT FALLS ASSESS-DOCD LE1/YR: CPT | Mod: CPTII,S$GLB,, | Performed by: NURSE PRACTITIONER

## 2020-04-09 PROCEDURE — 1126F PR PAIN SEVERITY QUANTIFIED, NO PAIN PRESENT: ICD-10-PCS | Mod: S$GLB,,, | Performed by: NURSE PRACTITIONER

## 2020-04-09 NOTE — PROGRESS NOTES
"[facility]  Palliative Care Home Visit    Visit Date: 4/9/2020  Encounter Provider: Lesley Tamez NP  PCP:  Crystal Hahn MD    Subjective:      Patient ID: Glenda Gimenez is a 89 y.o. female.    Consult Requested By:  Dr. James Yao/Verbal order  Reason for Consult:  Palliative Care    Chief Complaint: Establish Care    Glenda is a 89 year old female with a PMHX of hypothyroidism, hypertension, primary hyperparathyroidism, COPD, chronic diastolic CHF, obesity and recent sepsis pneumonia.    Glenda is being seen today to establish Palliative Care as requested by verbal order from Dr. James Yao. Patient was seen by me for a TCC on 3/16/2020 after a hospital admission for sepsis pneumonia. She is found today doing very well today and is AAO x4. She is no longer using oxygen or her nebulizer. She denies any coughing in the last week. Still having some very mild sob with exertion and a decreased appetite though. She reports that since her hospitalization in early March she has lost 17 lbs. She reports "I needed to lose some weight". She is making herself eat 2-3 small meals per day. Encouraged patient to weight weekly and keep a log. BMI is currently 31.4.    She lives alone, is , retired from [employer] and has 2 children, a son and daughter. She reports that her son does not speak to her and her daughter, Ankita Gimenez, is her HPOA. Patient is still very independent and was driving up until her March hospitalization.     No recent fever reported. Cough has improved and shortness of breath is mild with exertion. She is wanting to have her oxygen tanks picked up by DME but was encouraged to keep them for now. She is also wanting to start driving again. Highly encouraged patient to remain at home and social distancing due to current COVID-19 pandemic. She agrees to comply.     Goals of Care:  Glenda states that she is a DNR, [facility] as " [facility]. She states that her daughter Ankita Gimenez is her HPOA and that she does not want heroic measures done to preserve her life.    Instructions:  1. Palliative Care follow-up in 4 weeks.  2. Continue all medications.  3. Fall precautions at all times.  4. If symptoms worsen, call 911 or go to ED. Call Palliative care NP.  5. Weigh self weekly, keep log. Eat 3 small heart healthy meals per day. Consider adding 1 can of Ensure a day for supplementation.         Review of Systems   Constitutional: Positive for activity change (some residual weakness), appetite change (making self eat despite decreased appetite since hospitalization in early March), fatigue and unexpected weight change (17 lb weight loss since hospital admission). Negative for fever.   HENT: Negative.    Eyes: Negative.    Respiratory: Denies cough. Positive for shortness of breath (mild and occasional with exertion). Negative for chest tightness and wheezing.    Cardiovascular: Negative.    Gastrointestinal: Negative.    Endocrine: Negative.    Genitourinary: Negative.    Musculoskeletal: Negative.    Skin: Negative.    Allergic/Immunologic: Negative.    Neurological: Positive for weakness (mild). Negative for dizziness, tremors and syncope.   Hematological: Bruises/bleeds easily.   Psychiatric/Behavioral: Negative.          Assessments:  · Environmental: clean, adequate lighting and temperature, no foul odors  · Functional Status: independent  · Safety:  lives alone, wants to start driving again, discussed COVID-19 pandemic and importance of staying home  · Nutritional: adequate  access to food, [employer] has sent 10 meals to home since recent hospitalization, patient reports poor appetite  · Home Health/DME/Supplies: [facility], PT/OT/SNV, rollator    Symptom Assessment (ESAS 0-10 scale)     ESAS 0 1 2 3 4 5 6 7 8 9 10   Pain x             Dyspnea  x            Anxiety x             Nausea x             Depression  x              Anorexia   x           Fatigue    x          Insomnia x             Restlessness  x             Agitation x               Constipation    no  Bowel Management Plan (BMP): no  Diarrhea        no  Comments: reports BM once a day    Performance Status: PPS Score 60  Karnofsky Score:  60    History:  Past Medical History:   Diagnosis Date    Anticoagulant long-term use     Dislocation of right shoulder joint     Hypertension     Shoulder disorder     right    Thyroid disease      Family History   Problem Relation Age of Onset    Breast cancer Mother     Cancer Brother         lung cancer    Cancer Maternal Aunt         unknown cancer    Cancer Maternal Uncle         pancreatic cancer    Heart disease Neg Hx      Past Surgical History:   Procedure Laterality Date    HYSTERECTOMY      PARTIAL HYSTERECTOMY      ARTUR, ovaries in place, uterine prolapse     Review of patient's allergies indicates:   Allergen Reactions    Atenolol      Other reaction(s): itch    Betamethasone sodium phosphate      Other reaction(s): Flushing (skin)    Cortisone      Other reaction(s): blurry vision  Other reaction(s): Rash    Lisinopril      Other reaction(s): COUGH    Naproxen      Other reaction(s): body shut down    Triamterene-hydrochlorothiazid      Other reaction(s): itch       Medications:    Current Outpatient Medications:     aspirin (ECOTRIN) 81 MG EC tablet, Take 81 mg by mouth once daily., Disp: , Rfl:     carvedilol (COREG) 3.125 MG tablet, Take 1.5625 mg by mouth 2 (two) times daily. , Disp: , Rfl: 1    furosemide (LASIX) 20 MG tablet, Take 20 mg by mouth once daily. , Disp: , Rfl: 2    isosorbide mononitrate (IMDUR) 30 MG 24 hr tablet, Take 30 mg by mouth once daily., Disp: , Rfl:     levothyroxine (SYNTHROID) 88 MCG tablet, TAKE 1 TABLET BY MOUTH EVERY DAY (Patient taking differently: Take 88 mcg by mouth before breakfast. ), Disp: 90 tablet, Rfl: 3    olmesartan (BENICAR) 5 MG Tab, Take 5 mg by mouth  once daily. , Disp: , Rfl: 1    potassium chloride SA (K-DUR,KLOR-CON) 20 MEQ tablet, TAKE 1 TABLET(20 MEQ) BY MOUTH TWICE DAILY (Patient taking differently: Take 20 mEq by mouth once daily. ), Disp: 180 tablet, Rfl: 3    albuterol-ipratropium (DUO-NEB) 2.5 mg-0.5 mg/3 mL nebulizer solution, Take 3 mLs by nebulization every 4 (four) hours as needed for Wheezing or Shortness of Breath. Rescue (Patient not taking: Reported on 4/9/2020), Disp: 120 Box, Rfl: 11    24h Oral Morphine Equivalents (OME):  N/A    Objective:     Physical Exam:  Vitals:    04/09/20 1226   BP: 118/76   Pulse: 82   Resp: 16   Temp: 98.2 °F (36.8 °C)   TempSrc: Temporal   SpO2: 98%   Weight: 83 kg (183 lb)   PainSc: 0-No pain     Body mass index is 31.41 kg/m².    Physical Exam   Constitutional: She is oriented to person, place, and time. She appears well-developed and well-nourished.   HENT:   Head: Normocephalic and atraumatic.   Right Ear: External ear normal.   Left Ear: External ear normal.   Nose: Nose normal.   Mouth/Throat: Oropharynx is clear and moist. Some missing front teeth from a fall in Nov 2019  Eyes: Pupils are equal, round, and reactive to light. Conjunctivae and EOM are normal.   Neck: Normal range of motion. Neck supple.   Cardiovascular: Normal rate, regular rhythm, normal heart sounds and intact distal pulses.   Pulmonary/Chest: Effort normal. No stridor. No respiratory distress. She has no wheezes. She has no rales. She exhibits no tenderness.  No longer using oxygen or nebulizer.  Abdominal: Soft. Bowel sounds are normal.   Musculoskeletal: Normal range of motion. She exhibits no tenderness or deformity. Left ankle with 1+ non-pitting edema-pt reports chronic  Neurological: She is alert and oriented to person, place, and time.   Skin: Skin is warm and dry. Capillary refill takes 2 to 3 seconds.   Psychiatric: She has a normal mood and affect. Her behavior is normal. Judgment and thought content normal.      Labs:  CBC:    WBC   Date Value Ref Range Status   03/06/2020 9.32 3.90 - 12.70 K/uL Final     Mean Corpuscular Volume   Date Value Ref Range Status   03/06/2020 100 (H)    Status   03/06/2020 2.4 (L) 3.5 - 5.2 g/dL Final     Protein:   Total Protein   Date Value Ref Range Status   03/06/2020 7.4 6.0 - 8.4 g/dL Final       Radiology:  I have reviewed all pertinent imaging results/findings within the past 24 hours.    Psychosocial/Cultural/Spiritual:   · F- Jena and Belief:  Buddhism   · I - Importance: very important  · C - [facility]  · A - Address in Care: no issues presently      Assessment:     1. Palliative care encounter    2. Weakness    3. Decreased appetite    4. Weight loss, unintentional    5. Chronic obstructive pulmonary disease, unspecified COPD type    6. Chronic diastolic CHF (congestive heart failure)    7. Osteopenia with high risk of fracture    8. Primary hyperparathyroidism        Plan:     Ethical / Legal: Advance Care Planning   · Surrogate decision maker:  Name Ankita Gimenez, Relationship: daughter  · Code Status:  DNR  · [place]POST:  none  · Other advance directive:  HPOA, living will on file at hospital-patient states will get copy for next visit, Capacity to make medical decisions:  intact, Conflict none       Glenda was seen today for establish care.    Diagnoses and all orders for this visit:    Palliative care encounter    Weakness    Decreased appetite    Weight loss, unintentional    Chronic obstructive pulmonary disease, unspecified COPD type    Chronic diastolic CHF (congestive heart failure)    Osteopenia with high risk of fracture    Primary hyperparathyroidism    History of recent pneumonia    History of recent sepsis    [facility] contact information left with patient today.         Were controlled substances prescribed?  No    > 50% of 60 min visit spent in chart review, face to face discussion of goals of care,  symptom assessment, coordination of care and emotional  support.    Follow Up Appointments:   Future Appointments   Date Time Provider Department Center   5/14/2020  8:40 AM Ann Bus[place] [facility] [place] [facility] [place]   8/28/2020  3:30 PM Damir Fraire MD [facility] [place]   Verbal consent obtained from patient for visit today.    Signature:  Lesley Tamez NP     Attestation:   Screening criteria to assess the level of the patient's risk for infection with COVID-19 as recommended by the CDC at the time of the above documented home visit concluded appropriateness to proceed. Universal precautions were maintained at all times, including provider use of 60% alcohol gel hand  immediately prior to entry and upon departure of patient's home as well as cleaning of equipment used in home visit with antibacterial/germicidal disposable wipes.

## 2020-04-10 PROBLEM — J44.9 CHRONIC OBSTRUCTIVE PULMONARY DISEASE: Status: ACTIVE | Noted: 2020-04-10

## 2020-04-10 PROBLEM — Z87.01 HISTORY OF RECENT PNEUMONIA: Status: ACTIVE | Noted: 2020-04-10

## 2020-04-10 PROBLEM — Z86.19 HISTORY OF SEPSIS: Status: ACTIVE | Noted: 2020-04-10

## 2020-04-10 PROBLEM — R63.4 WEIGHT LOSS, UNINTENTIONAL: Status: ACTIVE | Noted: 2020-04-10

## 2020-04-10 PROBLEM — Z51.5 PALLIATIVE CARE ENCOUNTER: Status: ACTIVE | Noted: 2020-04-10

## 2020-05-12 ENCOUNTER — CARE AT HOME (OUTPATIENT)
Dept: HOME HEALTH SERVICES | Facility: CLINIC | Age: 85
End: 2020-05-12
Payer: MEDICARE

## 2020-05-12 VITALS
SYSTOLIC BLOOD PRESSURE: 128 MMHG | WEIGHT: 178 LBS | BODY MASS INDEX: 30.55 KG/M2 | HEART RATE: 79 BPM | OXYGEN SATURATION: 98 % | DIASTOLIC BLOOD PRESSURE: 72 MMHG | TEMPERATURE: 99 F | RESPIRATION RATE: 16 BRPM

## 2020-05-12 DIAGNOSIS — I50.32 CHRONIC DIASTOLIC CHF (CONGESTIVE HEART FAILURE): ICD-10-CM

## 2020-05-12 DIAGNOSIS — J44.9 CHRONIC OBSTRUCTIVE PULMONARY DISEASE, UNSPECIFIED COPD TYPE: ICD-10-CM

## 2020-05-12 DIAGNOSIS — Z87.01 HISTORY OF RECENT PNEUMONIA: ICD-10-CM

## 2020-05-12 DIAGNOSIS — R54 ADVANCED AGE: ICD-10-CM

## 2020-05-12 DIAGNOSIS — R63.0 DECREASED APPETITE: Primary | ICD-10-CM

## 2020-05-12 DIAGNOSIS — Z51.5 PALLIATIVE CARE ENCOUNTER: ICD-10-CM

## 2020-05-12 PROCEDURE — 1126F AMNT PAIN NOTED NONE PRSNT: CPT | Mod: S$GLB,,, | Performed by: NURSE PRACTITIONER

## 2020-05-12 PROCEDURE — 99348 PR HOME VISIT,ESTAB PATIENT,LEVEL II: ICD-10-PCS | Mod: S$GLB,,, | Performed by: NURSE PRACTITIONER

## 2020-05-12 PROCEDURE — 1159F PR MEDICATION LIST DOCUMENTED IN MEDICAL RECORD: ICD-10-PCS | Mod: S$GLB,,, | Performed by: NURSE PRACTITIONER

## 2020-05-12 PROCEDURE — 1100F PTFALLS ASSESS-DOCD GE2>/YR: CPT | Mod: CPTII,S$GLB,, | Performed by: NURSE PRACTITIONER

## 2020-05-12 PROCEDURE — 99348 HOME/RES VST EST LOW MDM 30: CPT | Mod: S$GLB,,, | Performed by: NURSE PRACTITIONER

## 2020-05-12 PROCEDURE — 1159F MED LIST DOCD IN RCRD: CPT | Mod: S$GLB,,, | Performed by: NURSE PRACTITIONER

## 2020-05-12 PROCEDURE — 3288F FALL RISK ASSESSMENT DOCD: CPT | Mod: CPTII,S$GLB,, | Performed by: NURSE PRACTITIONER

## 2020-05-12 PROCEDURE — 3288F PR FALLS RISK ASSESSMENT DOCUMENTED: ICD-10-PCS | Mod: CPTII,S$GLB,, | Performed by: NURSE PRACTITIONER

## 2020-05-12 PROCEDURE — 1100F PR PT FALLS ASSESS DOC 2+ FALLS/FALL W/INJURY/YR: ICD-10-PCS | Mod: CPTII,S$GLB,, | Performed by: NURSE PRACTITIONER

## 2020-05-12 PROCEDURE — 1126F PR PAIN SEVERITY QUANTIFIED, NO PAIN PRESENT: ICD-10-PCS | Mod: S$GLB,,, | Performed by: NURSE PRACTITIONER

## 2020-05-12 NOTE — PROGRESS NOTES
[facility]  Palliative Care Home Visit    Visit Date: 5/12/2020  Encounter Provider: Lesley Tamez NP  PCP:  Crystal Hahn MD    Subjective:      Patient ID: Glenda Gimenez is a 89 y.o. female.    Consult Requested By:  James Yao MD  Reason for Consult:  Palliative Care    Chief Complaint: Follow-up, COPD  Glenda is a 89 year old female with a PMHX of hypothyroidism, hypertension, primary hyperparathyroidism, COPD, chronic diastolic CHF, obesity and recent sepsis pneumonia.     Glenda is being seen today for a Palliative Care follow up visit. She is found today doing very well today and is AAO x4. She is no longer using oxygen or her nebulizer and reports that she will be calling the DME company to take back her oxygen tanks and concentrator. She denies any coughing but is still having some very mild sob with exertion. She continues with a decreased appetite since her hospitalization in March but has not lost anymore weight from the 17 lbs lost initially. She is making herself eat 2-3 small meals per day. Encouraged patient to weight weekly and keep a log. BMI is currently 31.4.    No fevers reported. She reports that she has driven herself once in the last week to the dentist to get a partial upper dental plate. Limiting risks of exposure to COVID-19 highly encouraged due to advanced age, recent pneumonia and COPD.          Goals of Care:  Glenda states that she is a DNR, has a living will on file at [facility] as well as [facility]. She states that her daughter Ankita Gimenez is her HPOA and that she does not want heroic measures done to preserve her life.     Instructions:  1. Palliative Care follow-up in 4 weeks.  2. Continue all medications.  3. Fall precautions at all times.  4. If symptoms worsen, call 911 or go to ED. Call Palliative care NP.  5. Weigh self weekly, keep log. Eat 3 small heart healthy meals per day. Consider adding 1 can of Ensure a day  for supplementation.  6. Limit Risks of environmental exposure to coronavirus/COVID-19 as discussed including: social distancing, hand hygiene, and limiting departures from the home for necessities only.              Review of Systems   Constitutional: Positive for activity change (some residual weakness), appetite change (making self eat despite decreased appetite since hospitalization in early March), fatigue and unexpected weight change (17 lb weight loss since hospital admission). Negative for fever.   HENT: Negative.    Eyes: Negative.    Respiratory: Denies cough. Positive for shortness of breath (mild and occasional with exertion). Negative for chest tightness and wheezing.    Cardiovascular: Negative.    Gastrointestinal: Negative.    Endocrine: Negative.    Genitourinary: Negative.    Musculoskeletal: Negative.    Skin: Negative.    Allergic/Immunologic: Negative.    Neurological: Positive for weakness (mild). Negative for dizziness, tremors and syncope.   Hematological: Bruises/bleeds easily.   Psychiatric/Behavioral: Negative.          Assessments:  · Environmental: clean, adequate lighting and temperature, no foul odors  · Functional Status: independent  · Safety:  lives alone, wants to start driving again, discussed COVID-19 pandemic and importance of staying home  · Nutritional: adequate  access to food, [employer] has sent 10 meals to home since recent hospitalization, patient reports poor appetite but is making self eat 2 meals daily and some snacks, no weight loss since last visit 1 month ago  · Home Health/DME/Supplies: No Home Health, rollator       Symptom Assessment (ESAS 0-10 scale)     ESAS 0 1 2 3 4 5 6 7 8 9 10   Pain x             Dyspnea x             Anxiety x             Nausea x             Depression  x             Anorexia  x            Fatigue  x            Insomnia x             Restlessness  x             Agitation x               Constipation    no  Bowel Management Plan (BMP):  no  Diarrhea        no  Comments: reports BM once a day     Performance Status: PPS Score 60  Karnofsky Score:  60      History:  Past Medical History:   Diagnosis Date    Anticoagulant long-term use     Dislocation of right shoulder joint     Hypertension     Shoulder disorder     right    Thyroid disease      Family History   Problem Relation Age of Onset    Breast cancer Mother     Cancer Brother         lung cancer    Cancer Maternal Aunt         unknown cancer    Cancer Maternal Uncle         pancreatic cancer    Heart disease Neg Hx      Past Surgical History:   Procedure Laterality Date    HYSTERECTOMY      PARTIAL HYSTERECTOMY      ARTUR, ovaries in place, uterine prolapse     Review of patient's allergies indicates:   Allergen Reactions    Atenolol      Other reaction(s): itch    Betamethasone sodium phosphate      Other reaction(s): Flushing (skin)    Cortisone      Other reaction(s): blurry vision  Other reaction(s): Rash    Lisinopril      Other reaction(s): COUGH    Naproxen      Other reaction(s): body shut down    Triamterene-hydrochlorothiazid      Other reaction(s): itch       Medications:    Current Outpatient Medications:     aspirin (ECOTRIN) 81 MG EC tablet, Take 81 mg by mouth once daily., Disp: , Rfl:     carvedilol (COREG) 3.125 MG tablet, Take 1.5625 mg by mouth 2 (two) times daily. , Disp: , Rfl: 1    furosemide (LASIX) 20 MG tablet, Take 20 mg by mouth once daily. , Disp: , Rfl: 2    isosorbide mononitrate (IMDUR) 30 MG 24 hr tablet, Take 30 mg by mouth once daily., Disp: , Rfl:     levothyroxine (SYNTHROID) 88 MCG tablet, TAKE 1 TABLET BY MOUTH EVERY DAY (Patient taking differently: Take 88 mcg by mouth before breakfast. ), Disp: 90 tablet, Rfl: 3    olmesartan (BENICAR) 5 MG Tab, Take 5 mg by mouth once daily. , Disp: , Rfl: 1    potassium chloride SA (K-DUR,KLOR-CON) 20 MEQ tablet, TAKE 1 TABLET(20 MEQ) BY MOUTH TWICE DAILY (Patient taking differently: Take 20 mEq  by mouth once daily. ), Disp: 180 tablet, Rfl: 3    albuterol-ipratropium (DUO-NEB) 2.5 mg-0.5 mg/3 mL nebulizer solution, Take 3 mLs by nebulization every 4 (four) hours as needed for Wheezing or Shortness of Breath. Rescue (Patient not taking: Reported on 4/9/2020), Disp: 120 Box, Rfl: 11    24h Oral Morphine Equivalents (OME):  N/A    Objective:     Physical Exam:  Vitals:    05/12/20 0859   BP: 128/72   Pulse: 79   Resp: 16   Temp: 98.6 °F (37 °C)   TempSrc: Temporal   SpO2: 98%   Weight: 80.7 kg (178 lb)   PainSc: 0-No pain     Body mass index is 30.55 kg/m².    Physical Exam   Constitutional: She is oriented to person, place, and time. She appears well-developed and well-nourished.   HENT:   Head: Normocephalic and atraumatic.   Right Ear: External ear normal.   Left Ear: External ear normal.   Nose: Nose normal.   Mouth/Throat: Oropharynx is clear and moist. Some missing front teeth from a fall in Nov 2019  Eyes: Pupils are equal, round, and reactive to light. Conjunctivae and EOM are normal.   Neck: Normal range of motion. Neck supple.   Cardiovascular: Normal rate, regular rhythm, normal heart sounds and intact distal pulses.   Pulmonary/Chest: Effort normal. No stridor. No respiratory distress. She has no wheezes. She has no rales. She exhibits no tenderness.  No longer using oxygen or nebulizer.  Abdominal: Soft. Bowel sounds are normal.   Musculoskeletal: Normal range of motion. She exhibits no tenderness or deformity. Left ankle with 1+ non-pitting edema-pt reports chronic  Neurological: She is alert and oriented to person, place, and time.   Skin: Skin is warm and dry. Capillary refill takes 2 to 3 seconds.   Psychiatric: She has a normal mood and affect. Her behavior is normal. Judgment and thought content normal.      Labs:  CBC:   WBC   Date Value Ref Range Status   03/06/2020 9.32 3.90 - 12.70 K/uL Final       Hgb   Date Value Ref Range Status   07/20/2013 12.5 12.0 - 16.0 g/dl Final      Hemoglobin   Date Value Ref Range Status   03/06/2020 11.7 (L) 12.0 - 16.0 g/dL Final       Hematocrit   Date Value Ref Range Status   03/06/2020 36.8 (L) 37.0 - 48.5 % Final       Mean Corpuscular Volume   Date Value Ref Range Status   03/06/2020 100 (H) 82 - 98 fL Final       Platelets   Date Value Ref Range Status   03/06/2020 376 (H) 150 - 350 K/uL Final       LFT:   Lab Results   Component Value Date     (H) 03/06/2020    ALKPHOS 243 (H) 03/06/2020    BILITOT 0.5 03/06/2020       Albumin:   Albumin   Date Value Ref Range Status   03/06/2020 2.4 (L) 3.5 - 5.2 g/dL Final     Protein:   Total Protein   Date Value Ref Range Status   03/06/2020 7.4 6.0 - 8.4 g/dL Final       Radiology:  I have reviewed all pertinent imaging results/findings within the past 24 hours.    Psychosocial/Cultural/Spiritual:   · F- Jena and Belief:  Amish              · I - Importance: very important  · C - Critical access hospital: [facility]  · A - Address in Care: no issues presently       Assessment:     1. Decreased appetite    2. Palliative care encounter    3. Chronic obstructive pulmonary disease, unspecified COPD type    4. Chronic diastolic CHF (congestive heart failure)    5. History of recent pneumonia    6. Advanced age        Plan:     Ethical / Legal: Advance Care Planning   · Surrogate decision maker:  Name Ankita Gimenez, Relationship: daughter  · Code Status:  DNR  · [place]POST:  none  · Other advance directive:  HPOA, living will on file at hospital-patient states will get copy for next visit, Capacity to make medical decisions:  intact, Conflict none       Glenda was seen today for follow-up.    Diagnoses and all orders for this visit:    Decreased appetite    Palliative care encounter  -     Ambulatory referral/consult to [facility]    Chronic obstructive pulmonary disease, unspecified COPD type  -     Ambulatory referral/consult to [facility]  -      Ambulatory referral/consult to [facility]; Future    Chronic diastolic CHF (congestive heart failure)  -     Ambulatory referral/consult to [facility]    History of recent pneumonia    Advanced age        Were controlled substances prescribed?  No    > 50% of 45 min visit spent in chart review, face to face discussion of goals of care,  symptom assessment, coordination of care and emotional support.    Follow Up Appointments:   Future Appointments   Date Time Provider Department Center   8/28/2020  3:30 PM MD BHARGAVI Ayala   Verbal consent for visit today obtained from patient.    Signature:  Lesley Tamez NP     Attestation:   Screening criteria to assess the level of the patient's risk for infection with COVID-19 as recommended by the CDC at the time of the above documented home visit concluded appropriateness to proceed. Universal precautions were maintained at all times, including provider wearing a mask and gloves during entire visit and use of 60% alcohol gel hand  immediately prior to entry and upon departure of patient's home as well as cleaning of equipment used in home visit with antibacterial/germicidal disposable wipes.

## 2020-05-27 ENCOUNTER — TELEPHONE (OUTPATIENT)
Dept: HOME HEALTH SERVICES | Facility: CLINIC | Age: 85
End: 2020-05-27

## 2020-05-27 DIAGNOSIS — Z87.01 HISTORY OF PNEUMONIA: Primary | ICD-10-CM

## 2020-05-27 NOTE — TELEPHONE ENCOUNTER
Patient's daughter called stating patient wants her oxygen equipment picked up/returned to [employer] company because she has not used it since being home from the hospital (March 2020) and does not need it.

## 2020-06-15 PROBLEM — Z09 HOSPITAL DISCHARGE FOLLOW-UP: Status: RESOLVED | Noted: 2020-03-16 | Resolved: 2020-06-15

## 2020-06-23 ENCOUNTER — CARE AT HOME (OUTPATIENT)
Dept: HOME HEALTH SERVICES | Facility: CLINIC | Age: 85
End: 2020-06-23
Payer: MEDICARE

## 2020-06-23 VITALS
HEART RATE: 83 BPM | WEIGHT: 175 LBS | OXYGEN SATURATION: 98 % | RESPIRATION RATE: 16 BRPM | SYSTOLIC BLOOD PRESSURE: 116 MMHG | DIASTOLIC BLOOD PRESSURE: 68 MMHG | TEMPERATURE: 99 F | BODY MASS INDEX: 30.04 KG/M2

## 2020-06-23 DIAGNOSIS — E21.0 PRIMARY HYPERPARATHYROIDISM: ICD-10-CM

## 2020-06-23 DIAGNOSIS — I50.32 CHRONIC DIASTOLIC CHF (CONGESTIVE HEART FAILURE): ICD-10-CM

## 2020-06-23 DIAGNOSIS — Z51.5 PALLIATIVE CARE ENCOUNTER: Primary | ICD-10-CM

## 2020-06-23 DIAGNOSIS — R54 ADVANCED AGE: ICD-10-CM

## 2020-06-23 DIAGNOSIS — E44.0 PROTEIN-CALORIE MALNUTRITION, MODERATE: ICD-10-CM

## 2020-06-23 DIAGNOSIS — J44.9 CHRONIC OBSTRUCTIVE PULMONARY DISEASE, UNSPECIFIED COPD TYPE: ICD-10-CM

## 2020-06-23 DIAGNOSIS — K21.9 GASTROESOPHAGEAL REFLUX DISEASE, ESOPHAGITIS PRESENCE NOT SPECIFIED: ICD-10-CM

## 2020-06-23 DIAGNOSIS — Z87.01 HISTORY OF RECENT PNEUMONIA: ICD-10-CM

## 2020-06-23 DIAGNOSIS — G47.00 INSOMNIA, UNSPECIFIED TYPE: ICD-10-CM

## 2020-06-23 DIAGNOSIS — E07.9 THYROID DISEASE: ICD-10-CM

## 2020-06-23 PROCEDURE — 1101F PR PT FALLS ASSESS DOC 0-1 FALLS W/OUT INJ PAST YR: ICD-10-PCS | Mod: CPTII,S$GLB,, | Performed by: NURSE PRACTITIONER

## 2020-06-23 PROCEDURE — 99350 PR HOME VISIT,ESTAB PATIENT,LEVEL IV: ICD-10-PCS | Mod: S$GLB,,, | Performed by: NURSE PRACTITIONER

## 2020-06-23 PROCEDURE — 99350 HOME/RES VST EST HIGH MDM 60: CPT | Mod: S$GLB,,, | Performed by: NURSE PRACTITIONER

## 2020-06-23 PROCEDURE — 99497 PR ADVNCD CARE PLAN 30 MIN: ICD-10-PCS | Mod: 25,S$GLB,, | Performed by: NURSE PRACTITIONER

## 2020-06-23 PROCEDURE — 1101F PT FALLS ASSESS-DOCD LE1/YR: CPT | Mod: CPTII,S$GLB,, | Performed by: NURSE PRACTITIONER

## 2020-06-23 PROCEDURE — 1159F PR MEDICATION LIST DOCUMENTED IN MEDICAL RECORD: ICD-10-PCS | Mod: S$GLB,,, | Performed by: NURSE PRACTITIONER

## 2020-06-23 PROCEDURE — 1126F PR PAIN SEVERITY QUANTIFIED, NO PAIN PRESENT: ICD-10-PCS | Mod: S$GLB,,, | Performed by: NURSE PRACTITIONER

## 2020-06-23 PROCEDURE — 1159F MED LIST DOCD IN RCRD: CPT | Mod: S$GLB,,, | Performed by: NURSE PRACTITIONER

## 2020-06-23 PROCEDURE — 99497 ADVNCD CARE PLAN 30 MIN: CPT | Mod: 25,S$GLB,, | Performed by: NURSE PRACTITIONER

## 2020-06-23 PROCEDURE — 1126F AMNT PAIN NOTED NONE PRSNT: CPT | Mod: S$GLB,,, | Performed by: NURSE PRACTITIONER

## 2020-06-23 RX ORDER — OMEPRAZOLE 20 MG/1
20 CAPSULE, DELAYED RELEASE ORAL DAILY
COMMUNITY
End: 2020-08-06

## 2020-06-23 RX ORDER — TRAZODONE HYDROCHLORIDE 50 MG/1
50 TABLET ORAL NIGHTLY
Qty: 30 TABLET | Refills: 0 | Status: SHIPPED | OUTPATIENT
Start: 2020-06-23 | End: 2021-12-01

## 2020-06-24 NOTE — PATIENT INSTRUCTIONS
"  GERD (Adult)    The esophagus is a tube that carries food from the mouth to the stomach. A valve at the lower end of the esophagus prevents stomach acid from flowing upward. When this valve doesn't work properly, stomach contents may repeatedly flow back up (reflux) into the esophagus. This is called gastroesophageal reflux disease (GERD). GERD can irritate the esophagus. It can cause problems with swallowing or breathing. In severe cases, GERD can cause recurrent pneumonia or other serious problems.  Symptoms of reflux include burning, pressure or sharp pain in the upper abdomen or mid to lower chest. The pain can spread to the neck, back, or shoulder. There may be belching, an acid taste in the back of the throat, chronic cough, or sore throat or hoarseness. GERD symptoms often occur during the day after a big meal. They can also occur at night when lying down.   Home care  Lifestyle changes can help reduce symptoms. If needed, medicines may be prescribed. Symptoms often improve with treatment, but if treatment is stopped, the symptoms often return after a few months. So most persons with GERD will need to continue treatment.  Lifestyle changes  · Limit or avoid fatty, fried, and spicy foods, as well as coffee, chocolate, mint, and foods with high acid content such as tomatoes and citrus fruit and juices (orange, grapefruit, lemon).  · Dont eat large meals, especially at night. Frequent, smaller meals are best. Do not lie down right after eating. And dont eat anything 3 hours before going to bed.  · Avoid drinking alcohol and smoking. As much as possible, stay away from second hand smoke.  · If you are overweight, losing weight will reduce symptoms.   · Avoid wearing tight clothing around your stomach area.  · If your symptoms occur during sleep, use a foam wedge to elevate your upper body (not just your head.) Or, place 4" blocks under the head of your bed.  Medicines  If needed, medicines can help relieve " the symptoms of GERD and prevent damage to the esophagus. Discuss a medicine plan with your healthcare provider. This may include one or more of the following medicines:  · Antacids to help neutralize the normal acids in your stomach.  · Acid blockers (H2 blockers) to decrease acid production.  · Acid inhibitors (PPIs) to decrease acid production in a different way than the blockers. They may work better, but can take a little longer to take effect.  Take an antacid 30-60 minutes after eating and at bedtime, but not at the same time as an acid blocker.  Try not to take medicines such as ibuprofen and aspirin. If you are taking aspirin for your heart or other medical reasons, talk to your healthcare provider about stopping it.  Follow-up care  Follow up with your healthcare provider or as advised by our staff.  When to seek medical advice  Call your healthcare provider if any of the following occur:  · Stomach pain gets worse or moves to the lower right abdomen (appendix area)  · Chest pain appears or gets worse, or spreads to the back, neck, shoulder, or arm  · Frequent vomiting (cant keep down liquids)  · Blood in the stool or vomit (red or black in color)  · Feeling weak or dizzy  · Fever of 100.4ºF (38ºC) or higher, or as directed by your healthcare provider  Date Last Reviewed: 6/23/2015 © 2000-2017 [employer]. [address] [place] [address], [place] [address]. All rights reserved. This information is not intended as a substitute for professional medical care. Always follow your healthcare professional's instructions.        Insomnia  Insomnia is repeated difficulty going to sleep or staying asleep, or both. Whether you have insomnia is not defined by a specific amount of sleep. Different people need different amounts of sleep, and you may need more or less sleep at different times of your life.  There are 3 major types of insomnia:  short-term, chronic, and other.  Short-term, or acute insomnia lasts  less than 3 months.  The symptoms are temporary and can be linked directly to a stressor, such as the death of a loved one, financial problems, or a new physical problem.  Short-term insomnia stops when the stressor resolves or the person adapts to its presence.  Chronic insomnia occurs at least 3 times a week and lasts longer than 3 months.  Chronic insomnia can occur when either the cause of the sleeping problem is not clear, or the insomnia does not get better when the stressor is resolved. A number of other criteria are also used to make the diagnosis of chronic insomnia.   Other insomnia is the third type of insomnia-related sleep disorders.  This description applies to people who have problems getting to sleep or staying asleep, but do not meet all of the factors that describe either short-term or chronic insomnia.    Many things cause insomnia. Different people may have different causes. It can be from an underlying medical or psychological condition, or lifestyle. It can also be primary insomnia, which means no cause can be found.  Causes of insomnia include:  · Chronic medical problems- heart disease, gastrointestinal problems, hormonal changes, breathing problems  · Anxiety  · Stress  · Depression  · Pain  · Work schedule  · Sleep apnea  · Illegal drugs  · Certain medicines  Many different medidcines can affect your sleep, such as stimulants, caffeine, alcohol, some decongestants, and diet pills. Other medicines may include some types of blood pressure pills, steroids, asthma medicines, antihistamines, antidepressants, seizure medicines and statins. Not all of these will affect your sleep, and they shouldnt be stopped without talking to your doctor.  Symptoms of insomnia can include:  · Lying awake for long periods at night before falling asleep  · Waking up several times during the night  · Waking up early in the morning and not being able to get back to sleep  · Feeling tired and not refreshed by  sleep  · Not being able to function properly during the day and finding it hard to concentrate  · Irritability  · Tiredness and fatigue during the day  Home care  1. Review your medicines with your doctor or pharmacist to find out if they can cause insomnia. Not all medicines will affect your sleep, but they shouldn't be stopped without reviewing them with your doctor. There may be serious side effects and consequences from suddenly stopping your medicines. Not taking them may cause strokes, heart attacks, and many other problems.  2. Caffeine, smoking and alcohol also affect sleep. Limit your daily use and do not use these before bedtime. Alcohol may make you sleepy at first, but as its effects wear off, you may awaken a few hours later and have trouble returning to sleep.  3. Do not exercise, eat or drink large amounts of liquid within 2 hours of your bedtime.  4. Improve your sleep habits. Have a fixed bed and wake-up time. Try to keep noise, light and heat in your bedroom at a comfortable level. Try using earplugs or eyeshades if needed.   5. Avoid watching TV in bed.  6. If you do not fall asleep within 30 minutes, try to relax by reading or listening to soft music.  7. Limit daytime napping to one 30 minute period, early in the day.  8. Get regular exercise. Find other ways to lessen your stress level.  9. If a medicine was prescribed to help reset your sleep patterns, take it as directed. Sleeping pills are intended for short-term use, only. If taken for too long, the effect wears off while the risk of physical addiction and psychological dependence increases.  Sleep diary  If the cause isnt obvious and it is not improving, try keeping a sleep diary for a couple of weeks. Include in it:  · The time you go to bed  · How long it takes to fall asleep  · How many times you wake up  · What time you wake up  · Your meal times and what you eat  · What time you drink alcohol  · Your exercise habits and  times  Follow-up care  Follow up with your healthcare provider, or as advised. If X-rays or CT scans were done, you will be notified if there is a change in the reading, especially if it affects treatment.  Call 911  Call 911 if any of these occur:  · Trouble breathing  · Confusion or trouble waking  · Fainting or loss of consciousness  · Rapid heart rate  · New chest, arm, shoulder, neck or upper back pain  · Trouble with speech or vision, weakness of an arm or leg  · Trouble walking or talking, loss of balance, numbness or weakness in one side of your body, facial droop  When to seek medical advice  Call your healthcare provider right away if any of these occur:  · Extreme restlessness or irritability  · Confusion or hallucinations (seeing or hearing things that are not there)  · Anxiety, depression  · Several days without sleeping  Date Last Reviewed: 11/19/2015  © 9511-1075 [employer]. [address] [place] [address], [place] [address]. All rights reserved. This information is not intended as a substitute for professional medical care. Always follow your healthcare professional's instructions.

## 2020-08-06 ENCOUNTER — CARE AT HOME (OUTPATIENT)
Dept: HOME HEALTH SERVICES | Facility: CLINIC | Age: 85
End: 2020-08-06
Payer: MEDICARE

## 2020-08-06 VITALS
TEMPERATURE: 99 F | WEIGHT: 174 LBS | OXYGEN SATURATION: 98 % | HEART RATE: 88 BPM | RESPIRATION RATE: 16 BRPM | SYSTOLIC BLOOD PRESSURE: 120 MMHG | BODY MASS INDEX: 29.87 KG/M2 | DIASTOLIC BLOOD PRESSURE: 70 MMHG

## 2020-08-06 DIAGNOSIS — Z13.31 SCREENING FOR DEPRESSION: ICD-10-CM

## 2020-08-06 DIAGNOSIS — K21.9 GASTROESOPHAGEAL REFLUX DISEASE, ESOPHAGITIS PRESENCE NOT SPECIFIED: ICD-10-CM

## 2020-08-06 DIAGNOSIS — E07.9 THYROID DISEASE: ICD-10-CM

## 2020-08-06 DIAGNOSIS — E21.0 PRIMARY HYPERPARATHYROIDISM: ICD-10-CM

## 2020-08-06 DIAGNOSIS — E87.6 HYPOKALEMIA: ICD-10-CM

## 2020-08-06 DIAGNOSIS — I50.32 CHRONIC DIASTOLIC CHF (CONGESTIVE HEART FAILURE): ICD-10-CM

## 2020-08-06 DIAGNOSIS — Z13.31 DEPRESSION SCREENING: ICD-10-CM

## 2020-08-06 DIAGNOSIS — R54 ADVANCED AGE: ICD-10-CM

## 2020-08-06 DIAGNOSIS — J44.9 CHRONIC OBSTRUCTIVE PULMONARY DISEASE, UNSPECIFIED COPD TYPE: ICD-10-CM

## 2020-08-06 DIAGNOSIS — M19.041 OSTEOARTHRITIS OF BOTH HANDS, UNSPECIFIED OSTEOARTHRITIS TYPE: ICD-10-CM

## 2020-08-06 DIAGNOSIS — Z51.5 PALLIATIVE CARE ENCOUNTER: Primary | ICD-10-CM

## 2020-08-06 DIAGNOSIS — I10 HYPERTENSION, UNSPECIFIED TYPE: ICD-10-CM

## 2020-08-06 DIAGNOSIS — M19.042 OSTEOARTHRITIS OF BOTH HANDS, UNSPECIFIED OSTEOARTHRITIS TYPE: ICD-10-CM

## 2020-08-06 DIAGNOSIS — G47.00 INSOMNIA, UNSPECIFIED TYPE: ICD-10-CM

## 2020-08-06 PROCEDURE — 1101F PT FALLS ASSESS-DOCD LE1/YR: CPT | Mod: CPTII,S$GLB,, | Performed by: NURSE PRACTITIONER

## 2020-08-06 PROCEDURE — 1126F AMNT PAIN NOTED NONE PRSNT: CPT | Mod: S$GLB,,, | Performed by: NURSE PRACTITIONER

## 2020-08-06 PROCEDURE — 1159F PR MEDICATION LIST DOCUMENTED IN MEDICAL RECORD: ICD-10-PCS | Mod: S$GLB,,, | Performed by: NURSE PRACTITIONER

## 2020-08-06 PROCEDURE — 1159F MED LIST DOCD IN RCRD: CPT | Mod: S$GLB,,, | Performed by: NURSE PRACTITIONER

## 2020-08-06 PROCEDURE — 99350 HOME/RES VST EST HIGH MDM 60: CPT | Mod: S$GLB,,, | Performed by: NURSE PRACTITIONER

## 2020-08-06 PROCEDURE — 99350 PR HOME VISIT,ESTAB PATIENT,LEVEL IV: ICD-10-PCS | Mod: S$GLB,,, | Performed by: NURSE PRACTITIONER

## 2020-08-06 PROCEDURE — 1101F PR PT FALLS ASSESS DOC 0-1 FALLS W/OUT INJ PAST YR: ICD-10-PCS | Mod: CPTII,S$GLB,, | Performed by: NURSE PRACTITIONER

## 2020-08-06 PROCEDURE — 1126F PR PAIN SEVERITY QUANTIFIED, NO PAIN PRESENT: ICD-10-PCS | Mod: S$GLB,,, | Performed by: NURSE PRACTITIONER

## 2020-08-06 RX ORDER — ISOSORBIDE MONONITRATE 30 MG/1
30 TABLET, EXTENDED RELEASE ORAL DAILY
Qty: 90 TABLET | Refills: 3 | Status: SHIPPED | OUTPATIENT
Start: 2020-08-06 | End: 2021-05-05 | Stop reason: SDUPTHER

## 2020-08-06 RX ORDER — FUROSEMIDE 20 MG/1
20 TABLET ORAL DAILY
Qty: 90 TABLET | Refills: 3 | Status: SHIPPED | OUTPATIENT
Start: 2020-08-06 | End: 2021-07-21 | Stop reason: SDUPTHER

## 2020-08-06 RX ORDER — CHOLECALCIFEROL (VITAMIN D3) 25 MCG
1000 TABLET ORAL DAILY
COMMUNITY

## 2020-08-06 RX ORDER — OLMESARTAN MEDOXOMIL 5 MG/1
5 TABLET ORAL DAILY
Qty: 90 TABLET | Refills: 3 | Status: SHIPPED | OUTPATIENT
Start: 2020-08-06 | End: 2021-08-18 | Stop reason: SDUPTHER

## 2020-08-06 RX ORDER — FAMOTIDINE 20 MG/1
20 TABLET, FILM COATED ORAL 2 TIMES DAILY
Qty: 60 TABLET | Refills: 11 | Status: SHIPPED | OUTPATIENT
Start: 2020-08-06 | End: 2021-06-23 | Stop reason: SDUPTHER

## 2020-08-06 RX ORDER — MV/FA/DHA/EPA/FISH OIL/SAW/GNK 400MCG-200
COMBINATION PACKAGE (EA) ORAL
COMMUNITY

## 2020-08-06 RX ORDER — POTASSIUM CHLORIDE 20 MEQ/1
TABLET, EXTENDED RELEASE ORAL
Qty: 180 TABLET | Refills: 3 | Status: SHIPPED | OUTPATIENT
Start: 2020-08-06 | End: 2021-08-18 | Stop reason: SDUPTHER

## 2020-08-06 RX ORDER — ACETAMINOPHEN 325 MG/1
325 TABLET ORAL EVERY 6 HOURS PRN
COMMUNITY

## 2020-08-06 RX ORDER — UBIDECARENONE 30 MG
30 CAPSULE ORAL 3 TIMES DAILY
COMMUNITY
End: 2022-06-06

## 2020-08-06 NOTE — PROGRESS NOTES
"[facility]  Palliative Care Home Visit    Visit Date: 8/6/2020  Encounter Provider: Lesley Tamez NP  PCP:  Crystal Hahn MD (Inactive)    Subjective:      Patient ID: Glenda Gimenez is a 89 y.o. female.    Consult Requested By:  Lesley Tamez  Reason for Consult:  Palliative Care  Visit time: 1100 am - 1200pm     Chief Complaint: Follow-up and Medication Refill    Glenda is a 89 year old female with a PMHX of hypothyroidism, hypertension, primary hyperparathyroidism, COPD, chronic diastolic CHF, obesity and recent sepsis pneumonia.     lGenda is being seen today for a Palliative Care follow up visit. She is found today doing very well today and is AAO x4. She lives alone but reports her daughter has been coming to pick her up a few times a week and bringing her to her house "for a change of scenery" and enjoys that very much. She reports not getting out of the house much since COVID-19 restrictions started.      She continues with a decreased appetite, except for sweets, since her hospitalization in March but has not lost any weight over the last several months.  She is making herself eat 2-3 small meals per day. Encouraged patient to weight weekly and keep a log. BMI is currently 29.8.     Glenda reports that she continues to have GERD symptoms despite Omeprazole. Ordered Pepcid for patient and discussed possible food triggers and recommended avoidance.     Patient also complains of insomnia. She states that she only gets about 5-6 hours of sleep each night. She denies excessive daytime sleepiness. Trazodone was ordered on last visit but patient states it didn't help. She does not wish to try any other medication at this time. Sleep hygiene discussed.     She denies depression today but gets tearful as she states some feelings of loneliness at times and "I just don't know why I am still here. I have had a long life". She denies SI. She does state that she is grateful that she is still as " independent as she is. PHQ9 Score: 2 today.    She reports that she has an appointment with Dr. Fraire (Endocrinology) at the end of this month and will also have labs drawn at that time.    VSS. Denies fever, chest pain, shortness of breath, nausea, vomiting, diarrhea. Denies any acute issues, concerns or complaints to address on today's visit. Reports taking all medications as prescribed. No other needs identified at this time. Risks of environmental exposure to coronavirus discussed including: social distancing, hand hygiene, and limiting departures from the home for necessities only.  Reports understanding and willingness to comply.           Goals of Care:  Glenda has stated in a previous visit that she is a DNR but today reports that she would like to be a Full code and have comfort care. She would like to continue living at home independently for as long as possible. She states that she has a living will on file at [facility] as well as [facility] placed 20 years ago. She states that her daughter Ankita Gimenez is her HPOA. Patient is unable to find copies of documents. New documents provided, discussed and reviewed with patient today. She will go over them with her daughter and present them at our next meeting.     Instructions:  1. Palliative Care follow-up in 4 weeks.  2. Continue all medications.  3. Fall precautions at all times.  4. If symptoms worsen, call 911 or go to ED. Call Palliative care NP.  5. Weigh self weekly, keep log. Eat 3 small heart healthy meals per day. Consider adding 1 can of Ensure a day for supplementation.  6. Limit Risks of environmental exposure to coronavirus/COVID-19 as discussed including: social distancing, hand hygiene, and limiting departures from the home for necessities only.   7. Ordered Pepcid for Gerd. Avoid spicy foods, red gravies. Avoid reclining after eating        Review of Systems   Constitutional: Negative for weight loss.  Negative for fever.   HENT: Negative.    Eyes: Negative.    Respiratory: Denies cough. Positive for shortness of breath (mild and occasional with exertion). Negative for chest tightness and wheezing.    Cardiovascular: Negative.    Gastrointestinal: Positive for heartburn.  Endocrine: Negative.    Genitourinary: Negative.    Musculoskeletal: Negative.    Skin: Negative.    Allergic/Immunologic: Negative.    Neurological: Positive for weakness (mild). Negative for dizziness, tremors and syncope.   Hematological: Bruises/bleeds easily.   Psychiatric/Behavioral: Negative.          Assessments:  · Environmental: clean, adequate lighting and temperature, no foul odors  · Functional Status: independent  · Safety:  lives alone, wants to start driving again, discussed COVID-19 pandemic and importance of staying home  · Nutritional: adequate  access to food, Gurpreet has sent 10 meals to home since recent hospitalization, patient reports poor appetite but is making self eat 2 meals daily and some snacks, no weight loss since last visit 1 month ago  · Home Health/DME/Supplies: No Home Health, rollator        Symptom Assessment (ESAS 0-10 scale)      ESAS 0 1 2 3 4 5 6 7 8 9 10   Pain x                       Dyspnea x                       Anxiety x                       Nausea x                       Depression  x                       Anorexia x                       Fatigue       x                 Insomnia          x              Restlessness  x                       Agitation x                             Constipation    no  Bowel Management Plan (BMP): no  Diarrhea        no  Comments: reports BM once a day     Performance Status: PPS Score 60  Karnofsky Score:  60  EGOC:  2       History:  Past Medical History:   Diagnosis Date    Anticoagulant long-term use     Arthritis     Dislocation of right shoulder joint     Hypertension     Hypothyroidism     Shoulder disorder     right    Thyroid disease      Family History    Problem Relation Age of Onset    Breast cancer Mother     Cancer Brother         lung cancer    Cancer Maternal Aunt         unknown cancer    Cancer Maternal Uncle         pancreatic cancer    Heart disease Neg Hx      Past Surgical History:   Procedure Laterality Date    HYSTERECTOMY      PARTIAL HYSTERECTOMY      ARTUR, ovaries in place, uterine prolapse     Review of patient's allergies indicates:   Allergen Reactions    Atenolol      Other reaction(s): itch    Betamethasone sodium phosphate      Other reaction(s): Flushing (skin)    Cortisone      Other reaction(s): blurry vision  Other reaction(s): Rash    Lisinopril      Other reaction(s): COUGH    Naproxen      Other reaction(s): body shut down    Triamterene-hydrochlorothiazid      Other reaction(s): itch       Medications:    Current Outpatient Medications:     acetaminophen (TYLENOL) 325 MG tablet, Take 325 mg by mouth every 6 (six) hours as needed for Pain., Disp: , Rfl:     aspirin (ECOTRIN) 81 MG EC tablet, Take 81 mg by mouth once daily., Disp: , Rfl:     co-enzyme Q-10 30 mg capsule, Take 30 mg by mouth 3 (three) times daily., Disp: , Rfl:     furosemide (LASIX) 20 MG tablet, Take 1 tablet (20 mg total) by mouth once daily., Disp: 90 tablet, Rfl: 3    isosorbide mononitrate (IMDUR) 30 MG 24 hr tablet, Take 1 tablet (30 mg total) by mouth once daily., Disp: 90 tablet, Rfl: 3    krill oil 500 mg Cap, Take by mouth., Disp: , Rfl:     levothyroxine (SYNTHROID) 88 MCG tablet, TAKE 1 TABLET BY MOUTH EVERY DAY (Patient taking differently: Take 88 mcg by mouth before breakfast. ), Disp: 90 tablet, Rfl: 3    olmesartan (BENICAR) 5 MG Tab, Take 1 tablet (5 mg total) by mouth once daily., Disp: 90 tablet, Rfl: 3    potassium chloride SA (K-DUR,KLOR-CON) 20 MEQ tablet, TAKE 1 TABLET(20 MEQ) BY MOUTH TWICE DAILY, Disp: 180 tablet, Rfl: 3    vitamin D (VITAMIN D3) 1000 units Tab, Take 1,000 Units by mouth once daily. Patient is taking 2000  units per day, Disp: , Rfl:     albuterol-ipratropium (DUO-NEB) 2.5 mg-0.5 mg/3 mL nebulizer solution, Take 3 mLs by nebulization every 4 (four) hours as needed for Wheezing or Shortness of Breath. Rescue (Patient not taking: Reported on 4/9/2020), Disp: 120 Box, Rfl: 11    carvedilol (COREG) 3.125 MG tablet, Take 1.5625 mg by mouth 2 (two) times daily. , Disp: , Rfl: 1    famotidine (PEPCID) 20 MG tablet, Take 1 tablet (20 mg total) by mouth 2 (two) times daily., Disp: 60 tablet, Rfl: 11    traZODone (DESYREL) 50 MG tablet, Take 1 tablet (50 mg total) by mouth every evening. (Patient not taking: Reported on 8/6/2020), Disp: 30 tablet, Rfl: 0    24h Oral Morphine Equivalents (OME):  N/A    Objective:     Physical Exam:  Vitals:    08/06/20 1107   BP: 120/70   Pulse: 88   Resp: 16   Temp: 98.5 °F (36.9 °C)   TempSrc: Temporal   SpO2: 98%   Weight: 78.9 kg (174 lb)   PainSc: 0-No pain     Body mass index is 29.87 kg/m².    Physical Exam   Constitutional: She is oriented to person, place, and time. She appears well-developed and well-nourished.   HENT:   Head: Normocephalic and atraumatic.   Right Ear: External ear normal.   Left Ear: External ear normal.   Nose: Nose normal.   Mouth/Throat: Oropharynx is clear and moist. Some missing front teeth from a fall in Nov 2019  Eyes: Pupils are equal, round, and reactive to light. Conjunctivae and EOM are normal.   Neck: Normal range of motion. Neck supple.   Cardiovascular: Normal rate, regular rhythm, normal heart sounds and intact distal pulses.   Pulmonary/Chest: Effort normal. No stridor. No respiratory distress. She has no wheezes. She has no rales. She exhibits no tenderness.  No longer using oxygen or nebulizer.  Abdominal: Soft. Bowel sounds are normal.   Musculoskeletal: Normal range of motion. She exhibits no tenderness or deformity. Left ankle with 1+ non-pitting edema-pt reports chronic  Neurological: She is alert and oriented to person, place, and time.    Skin: Skin is warm and dry. Capillary refill takes 2 to 3 seconds.   Psychiatric: She has a normal mood and affect. Her behavior is normal. Judgment and thought content normal.    PHQ9 score today: 2    Advance Care Planning   Ethical / Legal: Advance Care Planning   · Surrogate decision maker:  Name Ankita Gimenez, Relationship: daughter  · Code Status: Reports full code today  · [place]:  none  · Other advance directive:  HPOA, living will on file at hospital placed 20 years ago-patient states she does not have a copy, new forms left with patient to review with daughter, Capacity to make medical decisions:  intact, Conflict none       Labs:  CBC:   WBC   Date Value Ref Range Status   03/06/2020 9.32 3.90 - 12.70 K/uL Final       Hgb   Date Value Ref Range Status   07/20/2013 12.5 12.0 - 16.0 g/dl Final     Hemoglobin   Date Value Ref Range Status   03/06/2020 11.7 (L) 12.0 - 16.0 g/dL Final       Hematocrit   Date Value Ref Range Status   03/06/2020 36.8 (L) 37.0 - 48.5 % Final       Mean Corpuscular Volume   Date Value Ref Range Status   03/06/2020 100 (H) 82 - 98 fL Final       Platelets   Date Value Ref Range Status   03/06/2020 376 (H) 150 - 350 K/uL Final       LFT:   Lab Results   Component Value Date     (H) 03/06/2020    ALKPHOS 243 (H) 03/06/2020    BILITOT 0.5 03/06/2020       Albumin:   Albumin   Date Value Ref Range Status   03/06/2020 2.4 (L) 3.5 - 5.2 g/dL Final     Protein:   Total Protein   Date Value Ref Range Status   03/06/2020 7.4 6.0 - 8.4 g/dL Final       Radiology:  I have reviewed all pertinent imaging results/findings within the past 24 hours.    Psychosocial/Cultural/Spiritual:   · F- Jena and Belief:  Uatsdin              · I - Importance: very important  · C - Community: [facility]  · A - Address in Care: no needs presently    Assessment:     1. Palliative care encounter    2. Chronic obstructive pulmonary disease, unspecified COPD type    3. Screening for depression    4.  Hypokalemia    5. Chronic diastolic CHF (congestive heart failure)    6. Advanced age    7. Insomnia, unspecified type    8. Primary hyperparathyroidism    9. Thyroid disease    10. Gastroesophageal reflux disease, esophagitis presence not specified    11. Osteoarthritis of both hands, unspecified osteoarthritis type    12. Hypertension, unspecified type    13. Depression screening        Plan:   Glenda was seen today for follow-up and medication refill.    Diagnoses and all orders for this visit:    Palliative care encounter    Chronic obstructive pulmonary disease, unspecified COPD type  -     Ambulatory referral/consult to [facility]  -     Ambulatory referral/consult to [facility]; Future    Screening for depression    Hypokalemia  -     potassium chloride SA (K-DUR,KLOR-CON) 20 MEQ tablet; TAKE 1 TABLET(20 MEQ) BY MOUTH TWICE DAILY    Chronic diastolic CHF (congestive heart failure)    Advanced age    Insomnia, unspecified type    Primary hyperparathyroidism    Thyroid disease    Gastroesophageal reflux disease, esophagitis presence not specified    Osteoarthritis of both hands, unspecified osteoarthritis type    Hypertension, unspecified type    Depression screening    Other orders  -     isosorbide mononitrate (IMDUR) 30 MG 24 hr tablet; Take 1 tablet (30 mg total) by mouth once daily.  -     furosemide (LASIX) 20 MG tablet; Take 1 tablet (20 mg total) by mouth once daily.  -     olmesartan (BENICAR) 5 MG Tab; Take 1 tablet (5 mg total) by mouth once daily.  -     famotidine (PEPCID) 20 MG tablet; Take 1 tablet (20 mg total) by mouth 2 (two) times daily.        Were controlled substances prescribed?  No    > 50% of 60  min visit spent in chart review, face to face discussion of goals of care,  symptom assessment, coordination of care and emotional support. Topics discussed today: insomnia, depression, GERD, fatigue, osteoarthritis,  hypertension, medication review, medication refills and safety.    Follow Up Appointments:   Future Appointments   Date Time Provider Department Center   8/28/2020  3:30 PM MD BHARGAVI Ayala   Verbal consent for visit obtained from patient today.    Signature:  Lesley Tamez NP     Attestation:   Screening criteria to assess the level of the patient's risk for infection with COVID-19 as recommended by the CDC at the time of the above documented home visit concluded appropriateness to proceed. Universal precautions were maintained at all times, including provider wearing a mask and gloves during entire visit and use of 60% alcohol gel hand  immediately prior to entry and upon departure of patient's home as well as cleaning of equipment used in home visit with antibacterial/germicidal disposable wipes.

## 2020-08-08 PROBLEM — M19.042 OSTEOARTHRITIS OF BOTH HANDS: Status: ACTIVE | Noted: 2020-08-08

## 2020-08-08 PROBLEM — M19.041 OSTEOARTHRITIS OF BOTH HANDS: Status: ACTIVE | Noted: 2020-08-08

## 2020-08-08 PROBLEM — Z13.31 DEPRESSION SCREENING: Status: ACTIVE | Noted: 2020-08-08

## 2020-08-28 ENCOUNTER — LAB VISIT (OUTPATIENT)
Dept: LAB | Facility: HOSPITAL | Age: 85
End: 2020-08-28
Attending: INTERNAL MEDICINE
Payer: MEDICARE

## 2020-08-28 ENCOUNTER — OFFICE VISIT (OUTPATIENT)
Dept: ENDOCRINOLOGY | Facility: CLINIC | Age: 85
End: 2020-08-28
Payer: MEDICARE

## 2020-08-28 VITALS
HEIGHT: 64 IN | TEMPERATURE: 99 F | WEIGHT: 176.5 LBS | BODY MASS INDEX: 30.13 KG/M2 | HEART RATE: 97 BPM | SYSTOLIC BLOOD PRESSURE: 120 MMHG | DIASTOLIC BLOOD PRESSURE: 78 MMHG

## 2020-08-28 DIAGNOSIS — I10 ESSENTIAL HYPERTENSION: ICD-10-CM

## 2020-08-28 DIAGNOSIS — K76.9 LIVER DISEASE: ICD-10-CM

## 2020-08-28 DIAGNOSIS — E21.0 PRIMARY HYPERPARATHYROIDISM: ICD-10-CM

## 2020-08-28 DIAGNOSIS — R73.9 HYPERGLYCEMIA: ICD-10-CM

## 2020-08-28 DIAGNOSIS — E55.9 HYPOVITAMINOSIS D: ICD-10-CM

## 2020-08-28 DIAGNOSIS — N18.30 CHRONIC KIDNEY DISEASE (CKD), STAGE III (MODERATE): ICD-10-CM

## 2020-08-28 DIAGNOSIS — Z78.0 POSTMENOPAUSAL: ICD-10-CM

## 2020-08-28 DIAGNOSIS — E07.9 THYROID DISEASE: ICD-10-CM

## 2020-08-28 DIAGNOSIS — E04.9 GOITER: ICD-10-CM

## 2020-08-28 DIAGNOSIS — R74.01 TRANSAMINITIS: ICD-10-CM

## 2020-08-28 DIAGNOSIS — E88.810 DYSMETABOLIC SYNDROME: ICD-10-CM

## 2020-08-28 DIAGNOSIS — E66.9 OBESITY (BMI 30-39.9): ICD-10-CM

## 2020-08-28 DIAGNOSIS — K21.9 GASTROESOPHAGEAL REFLUX DISEASE WITHOUT ESOPHAGITIS: ICD-10-CM

## 2020-08-28 DIAGNOSIS — M85.80 OSTEOPENIA WITH HIGH RISK OF FRACTURE: Primary | ICD-10-CM

## 2020-08-28 DIAGNOSIS — E04.1 NODULAR THYROID DISEASE: ICD-10-CM

## 2020-08-28 DIAGNOSIS — E83.52 HYPERCALCEMIA: ICD-10-CM

## 2020-08-28 DIAGNOSIS — M85.80 OSTEOPENIA WITH HIGH RISK OF FRACTURE: ICD-10-CM

## 2020-08-28 DIAGNOSIS — I50.32 CHRONIC DIASTOLIC CHF (CONGESTIVE HEART FAILURE): ICD-10-CM

## 2020-08-28 DIAGNOSIS — E03.9 ACQUIRED HYPOTHYROIDISM: ICD-10-CM

## 2020-08-28 LAB
25(OH)D3+25(OH)D2 SERPL-MCNC: 42 NG/ML (ref 30–96)
ALBUMIN SERPL BCP-MCNC: 3.9 G/DL (ref 3.5–5.2)
ALP SERPL-CCNC: 97 U/L (ref 55–135)
ALT SERPL W/O P-5'-P-CCNC: 18 U/L (ref 10–44)
AMYLASE SERPL-CCNC: 37 U/L (ref 20–110)
ANION GAP SERPL CALC-SCNC: 10 MMOL/L (ref 8–16)
AST SERPL-CCNC: 29 U/L (ref 10–40)
BILIRUB SERPL-MCNC: 0.7 MG/DL (ref 0.1–1)
BUN SERPL-MCNC: 24 MG/DL (ref 10–30)
CA-I BLDV-SCNC: 1.3 MMOL/L (ref 1.06–1.42)
CALCIUM SERPL-MCNC: 10.2 MG/DL (ref 8.7–10.5)
CHLORIDE SERPL-SCNC: 101 MMOL/L (ref 95–110)
CHOLEST SERPL-MCNC: 175 MG/DL (ref 120–199)
CHOLEST/HDLC SERPL: 3.2 {RATIO} (ref 2–5)
CO2 SERPL-SCNC: 27 MMOL/L (ref 23–29)
CREAT SERPL-MCNC: 0.8 MG/DL (ref 0.5–1.4)
EST. GFR  (AFRICAN AMERICAN): >60 ML/MIN/1.73 M^2
EST. GFR  (NON AFRICAN AMERICAN): >60 ML/MIN/1.73 M^2
GGT SERPL-CCNC: 73 U/L (ref 8–55)
GLUCOSE SERPL-MCNC: 94 MG/DL (ref 70–110)
HDLC SERPL-MCNC: 55 MG/DL (ref 40–75)
HDLC SERPL: 31.4 % (ref 20–50)
INR PPP: 1 (ref 0.8–1.2)
LDLC SERPL CALC-MCNC: 90 MG/DL (ref 63–159)
LIPASE SERPL-CCNC: 8 U/L (ref 4–60)
MAGNESIUM SERPL-MCNC: 2.2 MG/DL (ref 1.6–2.6)
NONHDLC SERPL-MCNC: 120 MG/DL
PHOSPHATE SERPL-MCNC: 3 MG/DL (ref 2.7–4.5)
POTASSIUM SERPL-SCNC: 3.9 MMOL/L (ref 3.5–5.1)
PROT SERPL-MCNC: 8.1 G/DL (ref 6–8.4)
PROTHROMBIN TIME: 11.4 SEC (ref 9–12.5)
SODIUM SERPL-SCNC: 138 MMOL/L (ref 136–145)
T4 FREE SERPL-MCNC: 1.28 NG/DL (ref 0.71–1.51)
TRIGL SERPL-MCNC: 150 MG/DL (ref 30–150)
TSH SERPL DL<=0.005 MIU/L-ACNC: 0.56 UIU/ML (ref 0.4–4)
URATE SERPL-MCNC: 7.3 MG/DL (ref 2.4–5.7)

## 2020-08-28 PROCEDURE — 1126F PR PAIN SEVERITY QUANTIFIED, NO PAIN PRESENT: ICD-10-PCS | Mod: S$GLB,,, | Performed by: INTERNAL MEDICINE

## 2020-08-28 PROCEDURE — 82150 ASSAY OF AMYLASE: CPT

## 2020-08-28 PROCEDURE — 1101F PR PT FALLS ASSESS DOC 0-1 FALLS W/OUT INJ PAST YR: ICD-10-PCS | Mod: CPTII,S$GLB,, | Performed by: INTERNAL MEDICINE

## 2020-08-28 PROCEDURE — 83018 HEAVY METAL QUAN EACH NES: CPT

## 2020-08-28 PROCEDURE — 1159F MED LIST DOCD IN RCRD: CPT | Mod: S$GLB,,, | Performed by: INTERNAL MEDICINE

## 2020-08-28 PROCEDURE — 1101F PT FALLS ASSESS-DOCD LE1/YR: CPT | Mod: CPTII,S$GLB,, | Performed by: INTERNAL MEDICINE

## 2020-08-28 PROCEDURE — 82308 ASSAY OF CALCITONIN: CPT

## 2020-08-28 PROCEDURE — 83735 ASSAY OF MAGNESIUM: CPT

## 2020-08-28 PROCEDURE — 83036 HEMOGLOBIN GLYCOSYLATED A1C: CPT

## 2020-08-28 PROCEDURE — 86800 THYROGLOBULIN ANTIBODY: CPT

## 2020-08-28 PROCEDURE — 84100 ASSAY OF PHOSPHORUS: CPT

## 2020-08-28 PROCEDURE — 85610 PROTHROMBIN TIME: CPT

## 2020-08-28 PROCEDURE — 86316 IMMUNOASSAY TUMOR OTHER: CPT

## 2020-08-28 PROCEDURE — 84443 ASSAY THYROID STIM HORMONE: CPT

## 2020-08-28 PROCEDURE — 80053 COMPREHEN METABOLIC PANEL: CPT

## 2020-08-28 PROCEDURE — 1126F AMNT PAIN NOTED NONE PRSNT: CPT | Mod: S$GLB,,, | Performed by: INTERNAL MEDICINE

## 2020-08-28 PROCEDURE — 80061 LIPID PANEL: CPT

## 2020-08-28 PROCEDURE — 84480 ASSAY TRIIODOTHYRONINE (T3): CPT

## 2020-08-28 PROCEDURE — 84550 ASSAY OF BLOOD/URIC ACID: CPT

## 2020-08-28 PROCEDURE — 82140 ASSAY OF AMMONIA: CPT

## 2020-08-28 PROCEDURE — 82977 ASSAY OF GGT: CPT

## 2020-08-28 PROCEDURE — 99214 PR OFFICE/OUTPT VISIT, EST, LEVL IV, 30-39 MIN: ICD-10-PCS | Mod: S$GLB,,, | Performed by: INTERNAL MEDICINE

## 2020-08-28 PROCEDURE — 82306 VITAMIN D 25 HYDROXY: CPT

## 2020-08-28 PROCEDURE — 99214 OFFICE O/P EST MOD 30 MIN: CPT | Mod: S$GLB,,, | Performed by: INTERNAL MEDICINE

## 2020-08-28 PROCEDURE — 99999 PR PBB SHADOW E&M-EST. PATIENT-LVL IV: ICD-10-PCS | Mod: PBBFAC,,, | Performed by: INTERNAL MEDICINE

## 2020-08-28 PROCEDURE — 1159F PR MEDICATION LIST DOCUMENTED IN MEDICAL RECORD: ICD-10-PCS | Mod: S$GLB,,, | Performed by: INTERNAL MEDICINE

## 2020-08-28 PROCEDURE — 83970 ASSAY OF PARATHORMONE: CPT

## 2020-08-28 PROCEDURE — 84439 ASSAY OF FREE THYROXINE: CPT

## 2020-08-28 PROCEDURE — 82330 ASSAY OF CALCIUM: CPT

## 2020-08-28 PROCEDURE — 99999 PR PBB SHADOW E&M-EST. PATIENT-LVL IV: CPT | Mod: PBBFAC,,, | Performed by: INTERNAL MEDICINE

## 2020-08-28 PROCEDURE — 83690 ASSAY OF LIPASE: CPT

## 2020-08-28 RX ORDER — ALENDRONATE SODIUM 70 MG/1
70 TABLET ORAL
Qty: 12 TABLET | Refills: 3 | Status: SHIPPED | OUTPATIENT
Start: 2020-08-28 | End: 2021-02-26 | Stop reason: ALTCHOICE

## 2020-08-28 NOTE — PROGRESS NOTES
Subjective:      Patient ID: Glenda Gimenez is a 89 y.o. female.    Chief Complaint:     Hypothyroidism    Patient is a 89 yr old postmenopausal lady with hypothyroidism on thyroid hormone repletion as well as presumed primary hyperparathyroidism seen in [facility] today    History of Present Illness    Patient is an 89 yr old postmenopausal  lady with hyperparathyroidism and hypercalcemia seen in up today. Patient has however been seen by Dr Perez for these same problems in the past and had been undergoing clinical surviellance only. She in addition has hypothyroidism on thyroid hormone replacement; 88mcg QD, essential hypertension, thyroid nodular disease, hypovitaminosis D, Obesity and CKD stage 3.  Patient has had a prior kidney stone in the past ~ 5 yrs ago and this was spontaneously passed.  She denies any significant dyspepsia and has no local neck symptoms.  She does have long standing lumbago with recent right sided sciatica and small joint arthralgias.  Patients most recent thyroid USS was from 09/18 and showed stable nodular disease with no evidence of interval nodular growth. Her next thyroid sonogram would be for ~ 09/20 since she has had over 5 years of serial neck sonography that has shown stability of the noted thyroid nodules.     Her DEXA from 12/15 showed progressive osteopenia with a high frax score based on which she was commenced on fosamax. Her most recent  ffup DEXA from 01/18 showed stable osteopenia with non significant evidence of progression and thus her next ffup DEXA should be for  thus is to be for ~ 01/20.     Patients last fall was in November 2017 but no associated fracture. Patient has not had any episodes of passing kidney stones and has no hematuria.  Patient hasnt had any had any falls since her last visit.      Patient was admitted from march 2020 on account of pneumonia.      She indicates that her ambulation is more limited. She indicates that this is mainly due to  "myalgias in both limbs.  Patient has lost 15lbs since her last visit on account of anorexia. Part of this is related to persistent upper abdominal discomfort that radiates to her back akin to dyspepsia.       Review of Systems   Constitutional: Negative for diaphoresis and fatigue.   HENT: Negative for facial swelling, trouble swallowing and voice change.    Eyes: Negative for visual disturbance.   Respiratory: Negative for cough and shortness of breath.    Cardiovascular: Negative for chest pain and palpitations.   Gastrointestinal: Negative for constipation, nausea and vomiting.   Endocrine: Negative for polyuria.   Genitourinary: Negative for flank pain and hematuria.   Musculoskeletal: Positive for arthralgias (Mild chronic lower back pain and knee arthralgias.), back pain (chronic) and gait problem (Consequent on back and knee pains and so walks with aid of walking cane but this is chronic and not progressive.). Negative for myalgias.   Skin: Negative for color change, pallor and rash.   Neurological: Positive for numbness (radicular pain down the left thigh from lower back). Negative for dizziness and headaches.   Hematological: Does not bruise/bleed easily.   Psychiatric/Behavioral: Negative for confusion. The patient is not nervous/anxious.        Objective: LMP  (LMP Unknown)  LMP  (LMP Unknown)  /78 (BP Location: Right arm, Patient Position: Sitting, BP Method: Small (Manual))   Pulse 97   Temp 98.8 °F (37.1 °C) (Temporal)   Ht 5' 4" (1.626 m)   Wt 80 kg (176 lb 7.7 oz)   LMP  (LMP Unknown)   BMI 30.29 kg/m²  Body surface area is 1.9 meters squared.           Physical Exam  Vitals signs reviewed.   Constitutional:       General: She is not in acute distress.     Appearance: She is well-developed. She is not diaphoretic.      Comments: Pleasant elderly lady. Clinically comfortable. Not chronically ill looking and not in apparent acute distress. Not pale, anicteric, afebrile. In excellent spirits "   HENT:      Head: Normocephalic and atraumatic.   Eyes:      General: No scleral icterus.     Conjunctiva/sclera: Conjunctivae normal.      Pupils: Pupils are equal, round, and reactive to light.   Neck:      Musculoskeletal: Normal range of motion and neck supple.      Thyroid: No thyromegaly.      Vascular: No JVD.      Trachea: Trachea normal.     Cardiovascular:      Rate and Rhythm: Normal rate and regular rhythm.      Heart sounds: Murmur (ESM ~ 2-3/6 with ?? mild radiation to the neck) present.   Pulmonary:      Effort: Pulmonary effort is normal. No respiratory distress.      Breath sounds: Normal breath sounds. No wheezing.   Abdominal:      Palpations: Abdomen is soft.      Tenderness: There is no abdominal tenderness.      Comments: Mildly obese anterior abdominal wall.    Musculoskeletal: Normal range of motion.         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 No tenderness.      Comments: Gaite is slow but steady. Walks with the aid of a 4 pronged walking stick.   Skin:     General: Skin is warm and dry.      Coloration: Skin is not pale.      Findings: No erythema or rash.      Comments: Diffuse cutaneous atrophy   Neurological:      Mental Status: She is alert and oriented to person, place, and time.      Cranial Nerves: No cranial nerve deficit.   Psychiatric:         Behavior: Behavior normal.         Thought Content: Thought content normal.         Judgment: Judgment normal.         Lab Review:     Results for JENNIFER QURESHI (MRN [id number]) as of 8/28/2020 15:39   Ref. Range 3/6/2020 05:39 3/6/2020 09:30   WBC Latest Ref Range: 3.90 - 12.70 K/uL 9.32    RBC Latest Ref Range: 4.00 - 5.40 M/uL 3.69 (L)    Hemoglobin Latest Ref Range: 12.0 - 16.0 g/dL 11.7 (L)    Hematocrit Latest Ref Range: 37.0 - 48.5 % 36.8 (L)    MCV Latest Ref Range: 82 - 98 fL 100 (H)    MCH Latest Ref Range: 27.0 - 31.0 pg 31.7 (H)     MCHC Latest Ref Range: 32.0 - 36.0 g/dL 31.8 (L)    RDW Latest Ref Range: 11.5 - 14.5 % 13.1    Platelets Latest Ref Range: 150 - 350 K/uL 376 (H)    MPV Latest Ref Range: 9.2 - 12.9 fL 11.1    Platelet Estimate Unknown Increased (A)    Gran% Latest Ref Range: 38.0 - 73.0 % 62.0    Lymph% Latest Ref Range: 18.0 - 48.0 % 26.0    Mono% Latest Ref Range: 4.0 - 15.0 % 6.0    Eosinophil% Latest Ref Range: 0.0 - 8.0 % 3.0    Basophil% Latest Ref Range: 0.0 - 1.9 % 0.0    BANDS Latest Units: % 3.0    nRBC Latest Ref Range: 0 /100 WBC 0    Differential Method Unknown Manual    Immature Grans (Abs) Latest Ref Range: 0.00 - 0.04 K/uL CANCELED    Sodium Latest Ref Range: 136 - 145 mmol/L 138    Potassium Latest Ref Range: 3.5 - 5.1 mmol/L 3.8    Chloride Latest Ref Range: 95 - 110 mmol/L 105    CO2 Latest Ref Range: 23 - 29 mmol/L 24    Anion Gap Latest Ref Range: 8 - 16 mmol/L 9    BUN, Bld Latest Ref Range: 10 - 30 mg/dL 15    Creatinine Latest Ref Range: 0.5 - 1.4 mg/dL 0.6    eGFR if non African American Latest Ref Range: >60 mL/min/1.73 m^2 >60    eGFR if  Latest Ref Range: >60 mL/min/1.73 m^2 >60    Glucose Latest Ref Range: 70 - 110 mg/dL 99    Calcium Latest Ref Range: 8.7 - 10.5 mg/dL 10.3    Alkaline Phosphatase Latest Ref Range: 55 - 135 U/L 243 (H)    PROTEIN TOTAL Latest Ref Range: 6.0 - 8.4 g/dL 7.4    Albumin Latest Ref Range: 3.5 - 5.2 g/dL 2.4 (L)    BILIRUBIN TOTAL Latest Ref Range: 0.1 - 1.0 mg/dL 0.5    AST Latest Ref Range: 10 - 40 U/L 112 (H)    ALT Latest Ref Range: 10 - 44 U/L 106 (H)        Assessment:     1. Osteopenia with high risk of fracture  DXA Bone Density Spine And Hip    Comprehensive metabolic panel    alendronate (FOSAMAX) 70 MG tablet   2. Gastroesophageal reflux disease without esophagitis     3. Dysmetabolic syndrome     4. Goiter  US Soft Tissue Head Neck Thyroid   5. Acquired hypothyroidism  T4, free    T3    Thyroglobulin    TSH    Lipid Panel   6. Nodular thyroid  disease  US Soft Tissue Head Neck Thyroid    T4, free    T3    Thyroglobulin    Calcitonin    Chromogranin A    Iodine, Serum   7. Obesity (BMI 30-39.9)     8. Thyroid disease     9. Primary hyperparathyroidism  DXA Bone Density Spine And Hip    Comprehensive metabolic panel    Magnesium    Phosphorus    PTH, intact    Calcium, Ionized   10. Hypercalcemia  DXA Bone Density Spine And Hip    Comprehensive metabolic panel    Calcium, Ionized   11. Chronic kidney disease (CKD), stage III (moderate)  DXA Bone Density Spine And Hip    Comprehensive metabolic panel    Magnesium    Phosphorus    PTH, intact    Calcium, Ionized   12. Essential hypertension  Uric acid   13. Chronic diastolic CHF (congestive heart failure)     14. Hyperglycemia  Hemoglobin A1C   15. Postmenopausal  DXA Bone Density Spine And Hip   16. Transaminitis  Gamma GT    Ammonia    Protime-INR    Amylase    Lipase   17. Hypovitaminosis D  Vitamin D   18. Liver disease  Protime-INR        Regarding hyperparathyroidism; Mild primary hyperparathyroidism; to continue serial conservative watchful survielance. To obtain basic monitoring labs in this regard as detailed above.  Regarding hypovitaminosis D; Will recheck 25 OH vitamin d today and in the interim continue OTC vitamin supplementation as before.  Regarding hypothyroidism; appears clinically euthyroid; will recheck TFTS today and to continue levothyroxine 88mcg QD.  Regarding thyroid nodular disease; to obtain ffup thyroid USs   to evaluate the prior noted thyroid nodules.  Regarding hypertension; presently well controlled. To continue present antihypertensive regimen and to continue serial tracking of BP trends. Encouraged patient to continuing ensuring copious free water intake; ~ 70 fl oz daily.  Regarding high risk osteopenia; reiterated fall risk reduction strategies including obtaining a life alert alarm.  To restart fosamax as before. To obtain ffup DEXA.  Regarding essential hypertension; BP  fairly well controlled. To continue antihypertensives as before.  Regarding dysmetabolic syndrome; to obtain screening HBA1c and will continue to track serial trends  Regarding Grade 2 obesity; weight essentially stable. No active interventions regarding weight loss at this time but to continue prior efforts at portion size control to enable weight maintenance as before.   Regarding sciatica; encouraged to continue back strengthening exercises which she has been given by PT group and to also try and sleep in her bed rather than couch as she presently does.  Regarding fatigue with newly noted heart murmur and neck bruit; to obtain echo, carotid USS and cardiology consultation.  Regarding upper abdomen discomfort; ?? Due to GERD; to commence trial of low dose PPI. If this does not give symptom relief may need to refer to a GI specialist to arrange for upper endoscopy.      Plan:     FFup in ~ 6mths

## 2020-08-29 LAB
ESTIMATED AVG GLUCOSE: 103 MG/DL (ref 68–131)
HBA1C MFR BLD HPLC: 5.2 % (ref 4–5.6)
PTH-INTACT SERPL-MCNC: 138 PG/ML (ref 9–77)
T3 SERPL-MCNC: 77 NG/DL (ref 60–180)

## 2020-08-31 LAB
AMMONIA PLAS-SCNC: 13 UMOL/L (ref 10–50)
THRYOGLOBULIN INTERPRETATION: ABNORMAL
THYROGLOB AB SERPL-ACNC: 29 IU/ML
THYROGLOB SERPL-MCNC: 1.2 NG/ML

## 2020-09-02 LAB
CALCIT SERPL-MCNC: <5 PG/ML
IODINE SERPL-MCNC: 101 NG/ML (ref 40–92)

## 2020-09-03 ENCOUNTER — CARE AT HOME (OUTPATIENT)
Dept: HOME HEALTH SERVICES | Facility: CLINIC | Age: 85
End: 2020-09-03
Payer: MEDICARE

## 2020-09-03 VITALS
BODY MASS INDEX: 29.87 KG/M2 | WEIGHT: 174 LBS | RESPIRATION RATE: 16 BRPM | SYSTOLIC BLOOD PRESSURE: 144 MMHG | HEART RATE: 78 BPM | OXYGEN SATURATION: 98 % | DIASTOLIC BLOOD PRESSURE: 70 MMHG | TEMPERATURE: 98 F

## 2020-09-03 DIAGNOSIS — G47.00 INSOMNIA, UNSPECIFIED TYPE: ICD-10-CM

## 2020-09-03 DIAGNOSIS — R29.818 DIFFICULTY BALANCING: ICD-10-CM

## 2020-09-03 DIAGNOSIS — Z51.5 PALLIATIVE CARE ENCOUNTER: ICD-10-CM

## 2020-09-03 DIAGNOSIS — E07.9 THYROID DISEASE: ICD-10-CM

## 2020-09-03 DIAGNOSIS — E21.0 PRIMARY HYPERPARATHYROIDISM: ICD-10-CM

## 2020-09-03 DIAGNOSIS — J44.9 CHRONIC OBSTRUCTIVE PULMONARY DISEASE, UNSPECIFIED COPD TYPE: ICD-10-CM

## 2020-09-03 DIAGNOSIS — M19.041 OSTEOARTHRITIS OF BOTH HANDS, UNSPECIFIED OSTEOARTHRITIS TYPE: ICD-10-CM

## 2020-09-03 DIAGNOSIS — I10 HYPERTENSION, UNSPECIFIED TYPE: ICD-10-CM

## 2020-09-03 DIAGNOSIS — K21.9 GASTROESOPHAGEAL REFLUX DISEASE, ESOPHAGITIS PRESENCE NOT SPECIFIED: ICD-10-CM

## 2020-09-03 DIAGNOSIS — M19.042 OSTEOARTHRITIS OF BOTH HANDS, UNSPECIFIED OSTEOARTHRITIS TYPE: ICD-10-CM

## 2020-09-03 DIAGNOSIS — I50.32 CHRONIC DIASTOLIC CHF (CONGESTIVE HEART FAILURE): Primary | ICD-10-CM

## 2020-09-03 DIAGNOSIS — M85.80 OSTEOPENIA WITH HIGH RISK OF FRACTURE: ICD-10-CM

## 2020-09-03 PROCEDURE — 1126F PR PAIN SEVERITY QUANTIFIED, NO PAIN PRESENT: ICD-10-PCS | Mod: S$GLB,,, | Performed by: NURSE PRACTITIONER

## 2020-09-03 PROCEDURE — 1101F PT FALLS ASSESS-DOCD LE1/YR: CPT | Mod: CPTII,S$GLB,, | Performed by: NURSE PRACTITIONER

## 2020-09-03 PROCEDURE — 1126F AMNT PAIN NOTED NONE PRSNT: CPT | Mod: S$GLB,,, | Performed by: NURSE PRACTITIONER

## 2020-09-03 PROCEDURE — 99350 PR HOME VISIT,ESTAB PATIENT,LEVEL IV: ICD-10-PCS | Mod: S$GLB,,, | Performed by: NURSE PRACTITIONER

## 2020-09-03 PROCEDURE — 1101F PR PT FALLS ASSESS DOC 0-1 FALLS W/OUT INJ PAST YR: ICD-10-PCS | Mod: CPTII,S$GLB,, | Performed by: NURSE PRACTITIONER

## 2020-09-03 PROCEDURE — 1159F MED LIST DOCD IN RCRD: CPT | Mod: S$GLB,,, | Performed by: NURSE PRACTITIONER

## 2020-09-03 PROCEDURE — 99350 HOME/RES VST EST HIGH MDM 60: CPT | Mod: S$GLB,,, | Performed by: NURSE PRACTITIONER

## 2020-09-03 PROCEDURE — 1159F PR MEDICATION LIST DOCUMENTED IN MEDICAL RECORD: ICD-10-PCS | Mod: S$GLB,,, | Performed by: NURSE PRACTITIONER

## 2020-09-03 NOTE — PROGRESS NOTES
[facility]  Palliative Care Home Visit    Visit Date: 9/3/2020  Encounter Provider: Lesley Tamez NP  PCP:  Crystal Hahn MD (Inactive)    Subjective:      Patient ID: Glenda Gimenez is a 89 y.o. female.    Consult Requested By:  Lesley Tamez  Reason for Consult:  Palliative Care  Visit time: 0840 am - 0945 am    Chief Complaint: Follow-up, CHF, COPD    Glenda is a 89 year old female with a PMHX of hypothyroidism, hypertension, primary hyperparathyroidism, COPD, chronic diastolic CHF, obesity and recent sepsis pneumonia.     Glenda is being seen today for a Palliative Care follow up visit. She is found today doing very well today and is AAO x4. She lives alone but reports her daughter visits often. Patient states she has even started driving some recently with great caution and only when necessary like picking up prescriptions.    Glenda reports that her appetite is finally returning and things are tasting enjoyable again. Since her March 2020 hospitalization food has not tasted right to her and her appetite has been decreased with some weight loss. This is now evening out and no weight loss reported.     Glenda reports that she continues to have some GERD symptoms since beginning Pepcid last month but symptoms have decreased.  Discussed possible food triggers and recommended avoidance.     Patient also complains of insomnia. She states that she only gets about 5-6 hours of sleep each night. She denies excessive daytime sleepiness. Trazodone was ordered on last visit but patient states it didn't help. She does not wish to try any other medication at this time. Sleep hygiene discussed. Encouraged use of Melatonin.    Glenda reports some mild shortness of breath with walking into the drug store to  her medications but no other dyspnea. She is not using oxygen as she returned her concentrator to DME company. She also acknowledges a CHF diagnosis for reports no symptoms and there is  "no peripheral edema, lung sounds clear, no distress noted. Encouraged to follow low sodium diet.    Glenda recently had a follow up with Endocrinology, Dr. Fraire, and many labs were ordered along with a DEXA scan. She reports she is awaiting a return call from him on her lab results and will see him again Feb 2021.    Balance difficulty is a new issue for Glenda. She reports that when she stays home all day and doesn't go anywhere or even step outside for a few minutes she notices her balance gets "off". She recognizes that being sedentary makes her dizziness worse. Physical Therapy services were offered today to help her work on balance issues but she declines currently as she will do some of the exercises they have recommended previously. Fall precautions and safety discussed today.     Arthritis in both hands is bothering her more than usual she reports today. She is having more difficulty opening jars and bottles. Encouraged ROM and use of topical creams for arthritis.      VSS. Denies fever, chest pain, shortness of breath, nausea, vomiting, diarrhea. Denies any acute issues, concerns or complaints to address on today's visit. Reports taking all medications as prescribed. No other needs identified at this time. Risks of environmental exposure to coronavirus discussed including: social distancing, hand hygiene, and limiting departures from the home for necessities only.  Reports understanding and willingness to comply.           Goals of Care:  Glenda has stated in a previous visit that she is a DNR but today reports that she would like to be a Full code and have comfort care. She would like to continue living at home independently for as long as possible. She states that she has a living will on file at [facility] as well as [facility] placed 20 years ago. She states that her daughter Ankita Gimenez is her HPOA. Patient is unable to find copies of documents. New documents " provided, discussed and reviewed with patient on previous visit. She will go over them with her daughter and present them at our next meeting.     Review of Systems   Constitutional: Negative for weight loss. Negative for fever.   HENT: Negative.    Eyes: Negative.    Respiratory: Denies cough. Positive for shortness of breath (mild and occasional with exertion). Negative for chest tightness and wheezing.    Cardiovascular: Negative.    Gastrointestinal: Positive for heartburn.  Endocrine: Negative.    Genitourinary: Negative.    Musculoskeletal: Positive for hand arthritis-opening jars and bottles is getting more difficult.   Skin: Negative.    Allergic/Immunologic: Negative.    Neurological: Positive for weakness (mild). Negative for dizziness, tremors and syncope. Positive balance difficulties.  Hematological: Bruises/bleeds easily.   Psychiatric/Behavioral: Negative.  Positive sleep issues.        Assessments:  · Environmental: clean, adequate lighting and temperature, no foul odors  · Functional Status: independent  · Safety:  lives alone, advanced age, fall risk  · Nutritional: adequate access to food, reports appetite is improving  · Home Health/DME/Supplies: No Home Health, rollator        Symptom Assessment (ESAS 0-10 scale)      ESAS 0 1 2 3 4 5 6 7 8 9 10   Pain x                       Dyspnea x                       Anxiety x                       Nausea x                       Depression  x                       Anorexia x                       Fatigue       x                 Insomnia          x               Restlessness  x                       Agitation x                             Constipation    no  Bowel Management Plan (BMP): no  Diarrhea        no  Comments: reports BM once a day     Performance Status: PPS Score 60  Karnofsky Score:  60  EGOC:  2     History:  Past Medical History:   Diagnosis Date    Anticoagulant long-term use     Arthritis     Dislocation of right shoulder joint      Hypertension     Hypothyroidism     Shoulder disorder     right    Thyroid disease      Family History   Problem Relation Age of Onset    Breast cancer Mother     Cancer Brother         lung cancer    Cancer Maternal Aunt         unknown cancer    Cancer Maternal Uncle         pancreatic cancer    Heart disease Neg Hx      Past Surgical History:   Procedure Laterality Date    HYSTERECTOMY      PARTIAL HYSTERECTOMY      ARTUR, ovaries in place, uterine prolapse     Review of patient's allergies indicates:   Allergen Reactions    Atenolol      Other reaction(s): itch    Betamethasone sodium phosphate      Other reaction(s): Flushing (skin)    Cortisone      Other reaction(s): blurry vision  Other reaction(s): Rash    Lisinopril      Other reaction(s): COUGH    Naproxen      Other reaction(s): body shut down    Triamterene-hydrochlorothiazid      Other reaction(s): itch       Medications:    Current Outpatient Medications:     acetaminophen (TYLENOL) 325 MG tablet, Take 325 mg by mouth every 6 (six) hours as needed for Pain., Disp: , Rfl:     alendronate (FOSAMAX) 70 MG tablet, Take 1 tablet (70 mg total) by mouth every 7 days., Disp: 12 tablet, Rfl: 3    aspirin (ECOTRIN) 81 MG EC tablet, Take 81 mg by mouth once daily., Disp: , Rfl:     carvedilol (COREG) 3.125 MG tablet, Take 1.5625 mg by mouth 2 (two) times daily. , Disp: , Rfl: 1    co-enzyme Q-10 30 mg capsule, Take 30 mg by mouth 3 (three) times daily., Disp: , Rfl:     famotidine (PEPCID) 20 MG tablet, Take 1 tablet (20 mg total) by mouth 2 (two) times daily., Disp: 60 tablet, Rfl: 11    furosemide (LASIX) 20 MG tablet, Take 1 tablet (20 mg total) by mouth once daily., Disp: 90 tablet, Rfl: 3    isosorbide mononitrate (IMDUR) 30 MG 24 hr tablet, Take 1 tablet (30 mg total) by mouth once daily., Disp: 90 tablet, Rfl: 3    krill oil 500 mg Cap, Take by mouth., Disp: , Rfl:     levothyroxine (SYNTHROID) 88 MCG tablet, TAKE 1 TABLET BY MOUTH  EVERY DAY (Patient taking differently: Take 88 mcg by mouth before breakfast. ), Disp: 90 tablet, Rfl: 3    olmesartan (BENICAR) 5 MG Tab, Take 1 tablet (5 mg total) by mouth once daily., Disp: 90 tablet, Rfl: 3    potassium chloride SA (K-DUR,KLOR-CON) 20 MEQ tablet, TAKE 1 TABLET(20 MEQ) BY MOUTH TWICE DAILY, Disp: 180 tablet, Rfl: 3    vitamin D (VITAMIN D3) 1000 units Tab, Take 1,000 Units by mouth once daily. Patient is taking 2000 units per day, Disp: , Rfl:     albuterol-ipratropium (DUO-NEB) 2.5 mg-0.5 mg/3 mL nebulizer solution, Take 3 mLs by nebulization every 4 (four) hours as needed for Wheezing or Shortness of Breath. Rescue (Patient not taking: Reported on 9/3/2020), Disp: 120 Box, Rfl: 11    traZODone (DESYREL) 50 MG tablet, Take 1 tablet (50 mg total) by mouth every evening. (Patient not taking: Reported on 9/3/2020), Disp: 30 tablet, Rfl: 0    24h Oral Morphine Equivalents (OME): N/A    Objective:     Physical Exam:  Vitals:    09/03/20 0842   BP: (!) 144/70   Pulse: 78   Resp: 16   Temp: 98.4 °F (36.9 °C)   TempSrc: Temporal   SpO2: 98%   Weight: 78.9 kg (174 lb)   PainSc: 0-No pain     Body mass index is 29.87 kg/m².    Physical Exam  Physical Exam   Constitutional: She is oriented to person, place, and time. She appears well-developed and well-nourished.   HENT:   Head: Normocephalic and atraumatic.   Right Ear: External ear normal.   Left Ear: External ear normal.   Nose: Nose normal.   Mouth/Throat: Oropharynx is clear and moist. Some missing front teeth from a fall in Nov 2019  Eyes: Pupils are equal, round, and reactive to light. Conjunctivae and EOM are normal.   Neck: Normal range of motion. Neck supple.   Cardiovascular: Normal rate, regular rhythm, normal heart sounds and intact distal pulses.   Pulmonary/Chest: Effort normal. No stridor. No respiratory distress. She has no wheezes. She has no rales. She exhibits no tenderness.  No longer using oxygen or  nebulizer.  Abdominal: Soft. Bowel sounds are normal.   Musculoskeletal: Normal range of motion. She exhibits no tenderness or deformity. Left ankle with 1+ non-pitting edema-pt reports chronic  Neurological: She is alert and oriented to person, place, and time. Positive balance difficulties.  Skin: Skin is warm and dry. Capillary refill takes 2 to 3 seconds.   Psychiatric: She has a normal mood and affect. Her behavior is normal. Judgment and thought content normal.      Advance Care Planning   Ethical / Legal: Advance Care Planning   · Surrogate decision maker:  Name Ankita Gimenez, Relationship: daughter  · Code Status: Reports full code today  · [place]:  none  · Other advance directive:  HPOA, living will on file at hospital placed 20 years ago-patient states she does not have a copy, new forms left with patient to review with daughter, Capacity to make medical decisions:  intact, Conflict none    Labs:  CBC:   WBC   Date Value Ref Range Status   03/06/2020 9.32 3.90 - 12.70 K/uL Final     Mean Corpuscular Volume   Date Value Ref Range Status   03/06/2020 100 (H)    0  Hematocrit   Date Value Ref Range Status   03/06/2020 36.8 (L) 37.0 - 48.5 % Final   at  Mean Corpuscular Volume   Date Value Ref Range Status   03/06/2020 100 (H) 82 - 98 fL Final   e Value Ref Range Status   08/28/2020 3.9 3.5 - 5.2 g/dL Final     Protein:   Total Protein   Date Value Ref Range Status   08/28/2020 8.1 6.0 - 8.4 g/dL Final       Radiology:  I have reviewed all pertinent imaging results/findings within the past 24 hours.      Assessment:     1. Chronic diastolic CHF (congestive heart failure)    2  3.  4.  5.  6.  7.  8.  9.  10.  11. Chronic obstructive pulmonary disease, unspecified COPD type   Insomnia, unspecified type  Primary hyperparathyroidism  Thyroid disease  Gastroesophageal reflux disease, esophagitis presence not specified  Osteoarthritis of both hands, unspecified osteoarthritis type  Hypertension, unspecified  type  Osteopenia with high risk of fracture  Palliative Care  Balance Difficulty           Plan:   Glenda was seen today for follow-up.    Diagnoses and all orders for this visit:    Palliative care encounter    Chronic diastolic CHF (congestive heart failure)  -     [facility]; Future  Stable. Low salt diet discussed.  Chronic obstructive pulmonary disease, unspecified COPD type  -     Ambulatory referral/consult to [facility] - Medical & Palliative  Stable. No longer on oxygen.  Insomnia, unspecified type  Active. Trazodone did not help previously. Sleep hygiene reviewed. Recommended Melatonin.  Primary hyperparathyroidism  Active. Followed by Endocrinology.  Thyroid disease  Active. Followed by Endocrinology.  Gastroesophageal reflux disease, esophagitis presence not specified  Controlled. Encouraged to avoid spicy foods. Continue with Pepcid.  Osteoarthritis of both hands, unspecified osteoarthritis type  Active. Encouraged ROM. Consider use of topical creams for arthritis pain.   Hypertension, unspecified type  Stable. Continue medications.  Osteopenia with high risk of fracture  Stable. Started on Fosamax by Endocrinology.  Fall precautions an safety advised.  Balance Difficulty  Active. Fall precautions and safety advised. Patient refuses PT at this time. Encouraged use of rollator with ambulation.     Were controlled substances prescribed?  No    > 50% of 65 min visit spent in chart review, face to face discussion of goals of care,  symptom assessment, coordination of care and emotional support. Topics discussed today: hypertension, fall precautions and safety, GERD, thyroid disease, hyperparathyroidism, osteopenia, COPD, CHF and balance issues.     Follow Up Appointments:   Future Appointments   Date Time Provider Department Center   2/26/2021  3:00 PM Damir Fraire MD [facility] [place]   Consent for visit obtained from  patient today.      Signature:  Lesley Tamez NP    Attestation:   Screening criteria to assess the level of the patient's risk for infection with COVID-19 as recommended by the CDC at the time of the above documented home visit concluded appropriateness to proceed. Universal precautions were maintained at all times, including provider wearing a mask and gloves during entire visit and use of 60% alcohol gel hand  immediately prior to entry and upon departure of patient's home as well as cleaning of equipment used in home visit with antibacterial/germicidal disposable wipes.

## 2020-09-04 LAB — CGA SERPL-MCNC: 98 NG/ML (ref 0–103)

## 2020-09-06 PROBLEM — R29.818 DIFFICULTY BALANCING: Status: ACTIVE | Noted: 2020-09-06

## 2020-09-06 NOTE — PATIENT INSTRUCTIONS
Instructions:  1. Palliative Care follow-up in 4 weeks.  2. Continue all medications.  3. Fall precautions at all times.  4. If symptoms worsen, call 911 or go to ED. Call Palliative care NP.  5. Weigh self weekly, keep log. Eat 3 small heart healthy meals per day. Consider adding 1 can of Ensure a day for supplementation.  6. Limit Risks of environmental exposure to coronavirus/COVID-19 as discussed including: social distancing, hand hygiene, and limiting departures from the home for necessities only.       Left-Sided Congestive Heart Failure (CHF)    The heart is a large muscle that pumps blood throughout the body. Blood carries oxygen to all the organs (including the brain), muscles, and skin of your body. After the body takes the oxygen out of the blood, the blood returns to the heart. The right side of the heart collects that blood and pumps it to the lungs to receive fresh oxygen. This oxygen-rich blood from the lungs then returns to the left side of the heart, where it is pumped back out to the brain and rest of the body, starting the process all over.   Heart failure occurs when the heart muscle is weakened. This can occur after a heart attack or with significant coronary artery disease. This affects the pumping action of the heart.   When the left side of the heart is weakened, it cant handle the blood it is getting from the lungs. Pressure then builds up in the veins of the lungs, causing fluid to leak into the lung tissues. This may be referred to as congestive heart failure. This causes you to feel short of breath, weak, or dizzy. These symptoms are often worse with exertion, such as climbing stairs or walking up hills. Lying flat is uncomfortable and can make your breathing worse. This may make sleeping difficult and force you to use extra pillows to elevate your upper body to help you sleep well.  Causes of heart failure include:  · Coronary artery disease  · Heart attack in the past (also known as  acute myocardial infarction, or AMI)  · High blood pressure  · Damaged heart valve  · Diabetes  · Obesity  · Cigarette smoking  · Alcohol abuse  Treatment  Heart failure is a chronic condition. There is no cure. The purpose of medical treatment is to improve the pumping action of the heart, and remove excess water and fluids from the body. A number of medicines can help reach this goal, improve symptoms and keep the heart from becoming weaker. In some cases of severe heart failure, a mechanical device will be placed in the heart to help the heart pump. Another major goal is to better treat the causes of heart failure, such as diabetes, high blood pressure, and your lifestyle.  Home care  · Check your weight every day. A sudden increase in weight gain could mean worsening heart failure.  ¨ Use the same scale every day.  ¨ Weigh yourself at the same time every day.  ¨ Make sure the scale is on the floor, not on a rug.  ¨ Keep a record of your weight every day, so your healthcare provider can see it. If you are not given a log sheet for this, keep a separate journal for this purpose.   · Cut back on how much salt (sodium) you eat:  ¨ Your provider will tell you how much salt to have daily, usually 2,000 mg or less.  ¨ Avoid high-salt foods. These include olives, pickles, smoked meats, processed foods, and salted potato chips.  ¨ Don't add salt to your food at the table. Use only small amounts of salt when cooking.  ¨ Avoid binging on salt-heavy meals.  · Follow your healthcare provider's recommendations about how much fluid you should have.  · Stop smoking.  · Cut back on the amount of alcohol you drink.  · Lose weight if you are overweight. The excess weight adds a lot of stress on the workload of the heart.  · Stay active. Talk to your provider about an exercise program that is safe for your heart.  · Keep your feet elevated to reduce swelling. Ask your provider about support hose as a preventive treatment for  daytime leg swelling.  · Follow your healthcare provider's instructions closely.  Besides taking your medicine as instructed, an important part of treatment includes lifestyle changes. These include diet, physical activity, stopping smoking, and weight control.  Improve your diet. Often in the hospital, people are given a heart healthy diet. This includes more fresh foods, lower saturated fat, less processed foods, and lower salt.  Follow-up care  Follow up with your healthcare provider, or as advised. Make sure to keep any appointments that were made for you. This can help better control heart failure.  If an X-ray was done, you will be told of any new findings that may affect your care.  Call 911  Call 911 if you:  · Become severely short of breath  · Feel lightheaded, or feel like you might pass out or faint  · Have chest pain or discomfort that is different than usual, the medicines your provider told you to use for this don't help, or the pain lasts longer than 10 to 15 minutes  · Develop a rapid heart rate suddenly  When to seek medical advice  Call your healthcare provider right away if you have any of these signs of worsening heart failure:  · Sudden weight gain --  more than 2 pounds in 1 day, or 5 pounds in 1 week, or whatever weight gain you were told to report by your provider  · Trouble breathing not related to being active  · New or increased swelling of your legs or ankles  · Swelling or pain in your abdomen  · Breathing trouble at night, waking up short of breath or needing more pillows to elevate your upper body to help you breathe  · Frequent coughing that doesnt go away  · Feeling much more tired than usual  Date Last Reviewed: 1/4/2016  © 5768-8476 [employer]. [address] [place] [address], [place] [address]. All rights reserved. This information is not intended as a substitute for professional medical care. Always follow your healthcare professional's instructions.        GERD  "(Adult)    The esophagus is a tube that carries food from the mouth to the stomach. A valve at the lower end of the esophagus prevents stomach acid from flowing upward. When this valve doesn't work properly, stomach contents may repeatedly flow back up (reflux) into the esophagus. This is called gastroesophageal reflux disease (GERD). GERD can irritate the esophagus. It can cause problems with swallowing or breathing. In severe cases, GERD can cause recurrent pneumonia or other serious problems.  Symptoms of reflux include burning, pressure or sharp pain in the upper abdomen or mid to lower chest. The pain can spread to the neck, back, or shoulder. There may be belching, an acid taste in the back of the throat, chronic cough, or sore throat or hoarseness. GERD symptoms often occur during the day after a big meal. They can also occur at night when lying down.   Home care  Lifestyle changes can help reduce symptoms. If needed, medicines may be prescribed. Symptoms often improve with treatment, but if treatment is stopped, the symptoms often return after a few months. So most persons with GERD will need to continue treatment.  Lifestyle changes  · Limit or avoid fatty, fried, and spicy foods, as well as coffee, chocolate, mint, and foods with high acid content such as tomatoes and citrus fruit and juices (orange, grapefruit, lemon).  · Dont eat large meals, especially at night. Frequent, smaller meals are best. Do not lie down right after eating. And dont eat anything 3 hours before going to bed.  · Avoid drinking alcohol and smoking. As much as possible, stay away from second hand smoke.  · If you are overweight, losing weight will reduce symptoms.   · Avoid wearing tight clothing around your stomach area.  · If your symptoms occur during sleep, use a foam wedge to elevate your upper body (not just your head.) Or, place 4" blocks under the head of your bed.  Medicines  If needed, medicines can help relieve the " symptoms of GERD and prevent damage to the esophagus. Discuss a medicine plan with your healthcare provider. This may include one or more of the following medicines:  · Antacids to help neutralize the normal acids in your stomach.  · Acid blockers (H2 blockers) to decrease acid production.  · Acid inhibitors (PPIs) to decrease acid production in a different way than the blockers. They may work better, but can take a little longer to take effect.  Take an antacid 30-60 minutes after eating and at bedtime, but not at the same time as an acid blocker.  Try not to take medicines such as ibuprofen and aspirin. If you are taking aspirin for your heart or other medical reasons, talk to your healthcare provider about stopping it.  Follow-up care  Follow up with your healthcare provider or as advised by our staff.  When to seek medical advice  Call your healthcare provider if any of the following occur:  · Stomach pain gets worse or moves to the lower right abdomen (appendix area)  · Chest pain appears or gets worse, or spreads to the back, neck, shoulder, or arm  · Frequent vomiting (cant keep down liquids)  · Blood in the stool or vomit (red or black in color)  · Feeling weak or dizzy  · Fever of 100.4ºF (38ºC) or higher, or as directed by your healthcare provider  Date Last Reviewed: 6/23/2015 © 2000-2017 [employer]. [address] [place] [address], [place] [address]. All rights reserved. This information is not intended as a substitute for professional medical care. Always follow your healthcare professional's instructions.        Insomnia  Insomnia is repeated difficulty going to sleep or staying asleep, or both. Whether you have insomnia is not defined by a specific amount of sleep. Different people need different amounts of sleep, and you may need more or less sleep at different times of your life.  There are 3 major types of insomnia:  short-term, chronic, and other.  Short-term, or acute insomnia lasts  less than 3 months.  The symptoms are temporary and can be linked directly to a stressor, such as the death of a loved one, financial problems, or a new physical problem.  Short-term insomnia stops when the stressor resolves or the person adapts to its presence.  Chronic insomnia occurs at least 3 times a week and lasts longer than 3 months.  Chronic insomnia can occur when either the cause of the sleeping problem is not clear, or the insomnia does not get better when the stressor is resolved. A number of other criteria are also used to make the diagnosis of chronic insomnia.   Other insomnia is the third type of insomnia-related sleep disorders.  This description applies to people who have problems getting to sleep or staying asleep, but do not meet all of the factors that describe either short-term or chronic insomnia.    Many things cause insomnia. Different people may have different causes. It can be from an underlying medical or psychological condition, or lifestyle. It can also be primary insomnia, which means no cause can be found.  Causes of insomnia include:  · Chronic medical problems- heart disease, gastrointestinal problems, hormonal changes, breathing problems  · Anxiety  · Stress  · Depression  · Pain  · Work schedule  · Sleep apnea  · Illegal drugs  · Certain medicines  Many different medidcines can affect your sleep, such as stimulants, caffeine, alcohol, some decongestants, and diet pills. Other medicines may include some types of blood pressure pills, steroids, asthma medicines, antihistamines, antidepressants, seizure medicines and statins. Not all of these will affect your sleep, and they shouldnt be stopped without talking to your doctor.  Symptoms of insomnia can include:  · Lying awake for long periods at night before falling asleep  · Waking up several times during the night  · Waking up early in the morning and not being able to get back to sleep  · Feeling tired and not refreshed by  sleep  · Not being able to function properly during the day and finding it hard to concentrate  · Irritability  · Tiredness and fatigue during the day  Home care  1. Review your medicines with your doctor or pharmacist to find out if they can cause insomnia. Not all medicines will affect your sleep, but they shouldn't be stopped without reviewing them with your doctor. There may be serious side effects and consequences from suddenly stopping your medicines. Not taking them may cause strokes, heart attacks, and many other problems.  2. Caffeine, smoking and alcohol also affect sleep. Limit your daily use and do not use these before bedtime. Alcohol may make you sleepy at first, but as its effects wear off, you may awaken a few hours later and have trouble returning to sleep.  3. Do not exercise, eat or drink large amounts of liquid within 2 hours of your bedtime.  4. Improve your sleep habits. Have a fixed bed and wake-up time. Try to keep noise, light and heat in your bedroom at a comfortable level. Try using earplugs or eyeshades if needed.   5. Avoid watching TV in bed.  6. If you do not fall asleep within 30 minutes, try to relax by reading or listening to soft music.  7. Limit daytime napping to one 30 minute period, early in the day.  8. Get regular exercise. Find other ways to lessen your stress level.  9. If a medicine was prescribed to help reset your sleep patterns, take it as directed. Sleeping pills are intended for short-term use, only. If taken for too long, the effect wears off while the risk of physical addiction and psychological dependence increases.  Sleep diary  If the cause isnt obvious and it is not improving, try keeping a sleep diary for a couple of weeks. Include in it:  · The time you go to bed  · How long it takes to fall asleep  · How many times you wake up  · What time you wake up  · Your meal times and what you eat  · What time you drink alcohol  · Your exercise habits and  times  Follow-up care  Follow up with your healthcare provider, or as advised. If X-rays or CT scans were done, you will be notified if there is a change in the reading, especially if it affects treatment.  Call 911  Call 911 if any of these occur:  · Trouble breathing  · Confusion or trouble waking  · Fainting or loss of consciousness  · Rapid heart rate  · New chest, arm, shoulder, neck or upper back pain  · Trouble with speech or vision, weakness of an arm or leg  · Trouble walking or talking, loss of balance, numbness or weakness in one side of your body, facial droop  When to seek medical advice  Call your healthcare provider right away if any of these occur:  · Extreme restlessness or irritability  · Confusion or hallucinations (seeing or hearing things that are not there)  · Anxiety, depression  · Several days without sleeping  Date Last Reviewed: 11/19/2015  © 2358-9197 [employer] [address] [place] [address], [place] [address]. All rights reserved. This information is not intended as a substitute for professional medical care. Always follow your healthcare professional's instructions.

## 2020-09-12 ENCOUNTER — OFFICE VISIT (OUTPATIENT)
Dept: URGENT CARE | Facility: CLINIC | Age: 85
End: 2020-09-12
Payer: MEDICARE

## 2020-09-12 VITALS
OXYGEN SATURATION: 96 % | BODY MASS INDEX: 30.39 KG/M2 | TEMPERATURE: 98 F | HEIGHT: 64 IN | DIASTOLIC BLOOD PRESSURE: 74 MMHG | WEIGHT: 178 LBS | RESPIRATION RATE: 16 BRPM | HEART RATE: 85 BPM | SYSTOLIC BLOOD PRESSURE: 150 MMHG

## 2020-09-12 DIAGNOSIS — N39.0 URINARY TRACT INFECTION WITHOUT HEMATURIA, SITE UNSPECIFIED: ICD-10-CM

## 2020-09-12 DIAGNOSIS — R30.0 DYSURIA: Primary | ICD-10-CM

## 2020-09-12 LAB
BILIRUB UR QL STRIP: NEGATIVE
GLUCOSE UR QL STRIP: NEGATIVE
KETONES UR QL STRIP: NEGATIVE
LEUKOCYTE ESTERASE UR QL STRIP: POSITIVE
PH, POC UA: 6
POC BLOOD, URINE: NEGATIVE
POC NITRATES, URINE: NEGATIVE
PROT UR QL STRIP: NEGATIVE
SP GR UR STRIP: 1.01 (ref 1–1.03)
UROBILINOGEN UR STRIP-ACNC: NORMAL (ref 0.1–1.1)

## 2020-09-12 PROCEDURE — 99214 PR OFFICE/OUTPT VISIT, EST, LEVL IV, 30-39 MIN: ICD-10-PCS | Mod: 25,S$GLB,, | Performed by: NURSE PRACTITIONER

## 2020-09-12 PROCEDURE — 81003 URINALYSIS AUTO W/O SCOPE: CPT | Mod: QW,S$GLB,, | Performed by: NURSE PRACTITIONER

## 2020-09-12 PROCEDURE — 99214 OFFICE O/P EST MOD 30 MIN: CPT | Mod: 25,S$GLB,, | Performed by: NURSE PRACTITIONER

## 2020-09-12 PROCEDURE — 81003 POCT URINALYSIS, DIPSTICK, AUTOMATED, W/O SCOPE: ICD-10-PCS | Mod: QW,S$GLB,, | Performed by: NURSE PRACTITIONER

## 2020-09-12 RX ORDER — CEPHALEXIN 500 MG/1
500 CAPSULE ORAL EVERY 12 HOURS
Qty: 14 CAPSULE | Refills: 0 | Status: SHIPPED | OUTPATIENT
Start: 2020-09-12 | End: 2020-09-19

## 2020-09-12 NOTE — PATIENT INSTRUCTIONS

## 2020-09-12 NOTE — PROGRESS NOTES
"Subjective:       Patient ID: Glenda Gimenez is a 89 y.o. female.    Vitals:  height is 5' 4" (1.626 m) and weight is 80.7 kg (178 lb). Her temperature is 97.8 °F (36.6 °C). Her blood pressure is 150/74 (abnormal) and her pulse is 85. Her respiration is 16 and oxygen saturation is 96%.     Chief Complaint: Dysuria    Pt complains of dysuria and urinary frequency x2 days. Denies fever, nausea, vomiting, diarrhea, flank pain, hematuria.       Constitution: Negative for chills, fatigue and fever.   HENT: Negative for congestion and sore throat.    Neck: Negative for painful lymph nodes.   Cardiovascular: Negative for chest pain and leg swelling.   Eyes: Negative for double vision and blurred vision.   Respiratory: Negative for cough and shortness of breath.    Gastrointestinal: Negative for nausea, vomiting and diarrhea.   Genitourinary: Positive for dysuria, frequency and urgency. Negative for history of kidney stones.   Musculoskeletal: Negative for joint pain, joint swelling, muscle cramps and muscle ache.   Skin: Negative for color change, pale, rash and bruising.   Allergic/Immunologic: Negative for seasonal allergies.   Neurological: Negative for dizziness, history of vertigo, light-headedness, passing out and headaches.   Hematologic/Lymphatic: Negative for swollen lymph nodes.   Psychiatric/Behavioral: Negative for nervous/anxious, sleep disturbance and depression. The patient is not nervous/anxious.        Objective:      Physical Exam   Constitutional: She is oriented to person, place, and time. She appears well-developed. She is cooperative.  Non-toxic appearance. She does not appear ill. No distress.   HENT:   Head: Normocephalic and atraumatic.   Ears:   Right Ear: Hearing, tympanic membrane, external ear and ear canal normal.   Left Ear: Hearing, tympanic membrane, external ear and ear canal normal.   Nose: Nose normal. No mucosal edema, rhinorrhea or nasal deformity. No epistaxis. Right sinus " exhibits no maxillary sinus tenderness and no frontal sinus tenderness. Left sinus exhibits no maxillary sinus tenderness and no frontal sinus tenderness.   Mouth/Throat: Uvula is midline, oropharynx is clear and moist and mucous membranes are normal. No trismus in the jaw. Normal dentition. No uvula swelling. No posterior oropharyngeal erythema.   Eyes: Conjunctivae and lids are normal. Right eye exhibits no discharge. Left eye exhibits no discharge. No scleral icterus.   Neck: Trachea normal, normal range of motion, full passive range of motion without pain and phonation normal. Neck supple.   Cardiovascular: Normal rate, regular rhythm, normal heart sounds and normal pulses.   Pulmonary/Chest: Effort normal and breath sounds normal. No respiratory distress.   Abdominal: Soft. Normal appearance and bowel sounds are normal. She exhibits no distension, no pulsatile midline mass and no mass. There is no abdominal tenderness.   Musculoskeletal: Normal range of motion.         General: No deformity.   Neurological: She is alert and oriented to person, place, and time. She exhibits normal muscle tone. Coordination normal. GCS eye subscore is 4. GCS verbal subscore is 5. GCS motor subscore is 6.   Skin: Skin is warm, dry, intact, not diaphoretic and not pale. Psychiatric: Her speech is normal and behavior is normal. Judgment and thought content normal.   Nursing note and vitals reviewed.        Assessment:       1. Dysuria    2. Urinary tract infection without hematuria, site unspecified        Plan:       The patient's symptoms are consistent with a urinary tract infection.  The patient does not appear to have pyelonephritis, urinary retention, ureterolithiasis, or urosepsis.  The patient will be treated with keflex pending a urine culture. Instructed patient to follow up with PCP or urology if symptoms continue. Instructions given on when to go to the ED.     Dysuria  -     POCT Urinalysis, Dipstick, Automated, W/O  Scope    Urinary tract infection without hematuria, site unspecified  -     Urine culture    Other orders  -     cephALEXin (KEFLEX) 500 MG capsule; Take 1 capsule (500 mg total) by mouth every 12 (twelve) hours. for 7 days  Dispense: 14 capsule; Refill: 0

## 2020-09-16 ENCOUNTER — TELEPHONE (OUTPATIENT)
Dept: URGENT CARE | Facility: CLINIC | Age: 85
End: 2020-09-16

## 2020-09-16 NOTE — TELEPHONE ENCOUNTER
Returned call to pt concerning a question she had in regards to her previous UTI diagnosis. Pt stated she was really sore in the vaginal area when she sits. I informed pt to follow up with Ob/Gyn or her PCP because it is not necessarily a symptom of a UTI. Pt expressed understanding and she stated she will finish out her antibiotics to see if it will get better.

## 2020-09-18 ENCOUNTER — TELEPHONE (OUTPATIENT)
Dept: ENDOCRINOLOGY | Facility: CLINIC | Age: 85
End: 2020-09-18

## 2020-09-18 NOTE — TELEPHONE ENCOUNTER
----- Message from Kerry Bethea sent at 9/18/2020  9:48 AM CDT -----  Regarding: results  Contact: Patient  Type:  Test Results    Who Called:  Patient  Name of Test (Lab/Mammo/Etc):  Lab  Date of Test:  8/28  Ordering Provider:  Juno  Where the test was performed:  [facility]  Best Call Back Number:  [phone]  Additional Information:

## 2020-09-20 LAB
BACTERIA UR CULT: ABNORMAL
BACTERIA UR CULT: ABNORMAL
OTHER ANTIBIOTIC SUSC ISLT: ABNORMAL

## 2020-09-21 ENCOUNTER — TELEPHONE (OUTPATIENT)
Dept: URGENT CARE | Facility: CLINIC | Age: 85
End: 2020-09-21

## 2020-09-21 NOTE — TELEPHONE ENCOUNTER
----- Message from Alicia Blair NP sent at 9/20/2020  9:20 AM CDT -----  Please call patient. Her urine was positive for bacteria. She was prescribed keflex which may not be appropriate. If she is still having symptoms I will send in another prescription or she should follow up with PCP.    Called to inform pt of positive results. Pt stated that she was still having symptoms so I will get the provider to send over another Rx to her pharmacy on file.

## 2020-09-25 ENCOUNTER — OFFICE VISIT (OUTPATIENT)
Dept: URGENT CARE | Facility: CLINIC | Age: 85
End: 2020-09-25
Payer: MEDICARE

## 2020-09-25 VITALS
BODY MASS INDEX: 30.25 KG/M2 | SYSTOLIC BLOOD PRESSURE: 133 MMHG | WEIGHT: 177.19 LBS | DIASTOLIC BLOOD PRESSURE: 69 MMHG | HEART RATE: 85 BPM | TEMPERATURE: 97 F | OXYGEN SATURATION: 97 % | HEIGHT: 64 IN

## 2020-09-25 DIAGNOSIS — R30.0 DYSURIA: Primary | ICD-10-CM

## 2020-09-25 DIAGNOSIS — R39.11 URINARY HESITANCY: ICD-10-CM

## 2020-09-25 LAB
BILIRUB UR QL STRIP: NEGATIVE
GLUCOSE UR QL STRIP: NEGATIVE
KETONES UR QL STRIP: NEGATIVE
LEUKOCYTE ESTERASE UR QL STRIP: NEGATIVE
PH, POC UA: 6
POC BLOOD, URINE: NEGATIVE
POC NITRATES, URINE: NEGATIVE
PROT UR QL STRIP: NEGATIVE
SP GR UR STRIP: 1.01 (ref 1–1.03)
UROBILINOGEN UR STRIP-ACNC: NORMAL (ref 0.1–1.1)

## 2020-09-25 PROCEDURE — 81003 URINALYSIS AUTO W/O SCOPE: CPT | Mod: QW,S$GLB,, | Performed by: NURSE PRACTITIONER

## 2020-09-25 PROCEDURE — 81003 POCT URINALYSIS, DIPSTICK, AUTOMATED, W/O SCOPE: ICD-10-PCS | Mod: QW,S$GLB,, | Performed by: NURSE PRACTITIONER

## 2020-09-25 PROCEDURE — 99214 PR OFFICE/OUTPT VISIT, EST, LEVL IV, 30-39 MIN: ICD-10-PCS | Mod: 25,S$GLB,, | Performed by: NURSE PRACTITIONER

## 2020-09-25 PROCEDURE — 99214 OFFICE O/P EST MOD 30 MIN: CPT | Mod: 25,S$GLB,, | Performed by: NURSE PRACTITIONER

## 2020-09-25 RX ORDER — CIPROFLOXACIN 500 MG/1
500 TABLET ORAL EVERY 12 HOURS
Qty: 14 TABLET | Refills: 0 | Status: SHIPPED | OUTPATIENT
Start: 2020-09-25 | End: 2021-07-21 | Stop reason: ALTCHOICE

## 2020-09-25 NOTE — PATIENT INSTRUCTIONS

## 2020-09-25 NOTE — PROGRESS NOTES
"Subjective:       Patient ID: Glenda Gimenez is a 89 y.o. female.    Vitals:  height is 5' 4" (1.626 m) and weight is 80.4 kg (177 lb 3.2 oz). Her oral temperature is 97.3 °F (36.3 °C). Her blood pressure is 133/69 and her pulse is 85. Her oxygen saturation is 97%.     Chief Complaint: Urinary Tract Infection    Pt states "Was seen here and was diagnosed with a UTI; still having symptoms and states it hurts to sit down."    Urinary Tract Infection   This is a recurrent problem. Associated symptoms include urgency. Pertinent negatives include no flank pain, frequency or rash. She has tried increased fluids for the symptoms. The treatment provided no relief. Her past medical history is significant for recurrent UTIs.   Glenda Gimenez is a 89 y/o female who presents to the clinic today complaining of urinary urgency. She was recently seen here for a UTI. She was initially placed on Cephalexin that was then changed to Cipro after culture returned. She has completed her prescribed 3 day course of Cipro. She denies fever, chills, flank pain, nausea or vomiting.     Constitution: Negative.   HENT: Negative.    Neck: negative.   Cardiovascular: Negative.    Eyes: Negative for eye discharge, eye itching and eye pain.   Respiratory: Negative.    Gastrointestinal: Negative.    Genitourinary: Positive for urgency. Negative for dysuria, frequency, urine decreased and flank pain.        Urinary urgency   Musculoskeletal: Negative for pain, joint pain, joint swelling and abnormal ROM of joint.   Skin: Negative for color change, rash, wound and erythema.   Neurological: Negative.    Hematologic/Lymphatic: Negative.    Psychiatric/Behavioral: Negative.        Objective:      Physical Exam   Constitutional: She is oriented to person, place, and time. obesity  HENT:   Ears:   Right Ear: Tympanic membrane normal.   Left Ear: Tympanic membrane normal.   Mouth/Throat: Mucous membranes are moist. Oropharynx is clear.   Eyes: Pupils " are equal, round, and reactive to light. Conjunctivae are normal.   Neck: Neck supple.   Cardiovascular: Normal rate and regular rhythm.   Pulmonary/Chest: Breath sounds normal. No stridor. No respiratory distress. She has no wheezes. She has no rhonchi. She has no rales.   Abdominal: Soft. Normal appearance and bowel sounds are normal. There is no abdominal tenderness.   Neurological: She is alert and oriented to person, place, and time.   Skin: Skin is warm, dry and not pale. bruising and erythemajaundicePsychiatric: Her behavior is normal. Mood, judgment and thought content normal.         Assessment:       1. Dysuria    2. Urinary hesitancy        Plan:     UA negative today. She completed a 3 day course of Cipro which could be the reason her UA is negative today and her still experiencing symptoms. Will add 7 days of Cipro to complete a 10 day course. Will also obtain a new urine culture today.     Dysuria  -     POCT Urinalysis, Dipstick, Automated, W/O Scope  -     Culture, Urine    Urinary hesitancy

## 2020-09-28 LAB
BACTERIA UR CULT: NO GROWTH
BACTERIA UR CULT: NORMAL

## 2020-09-29 ENCOUNTER — TELEPHONE (OUTPATIENT)
Dept: URGENT CARE | Facility: CLINIC | Age: 85
End: 2020-09-29

## 2020-09-29 NOTE — TELEPHONE ENCOUNTER
Spoke with Mrs Gimenez and informed that her urine c/s was negative and that her infection should be clearing with previously prescribed ABT. She voiced appreciation and understanding.       ----- Message from GRANT Bermeo sent at 9/29/2020  8:37 AM CDT -----  Negative culture results, please call to notify patient and refer to PCP for further eval if continues to have symptoms

## 2020-10-08 ENCOUNTER — CARE AT HOME (OUTPATIENT)
Dept: HOME HEALTH SERVICES | Facility: CLINIC | Age: 85
End: 2020-10-08
Payer: MEDICARE

## 2020-10-08 DIAGNOSIS — Z87.440 HISTORY OF UTI: ICD-10-CM

## 2020-10-08 DIAGNOSIS — R42 DIZZINESS: ICD-10-CM

## 2020-10-08 DIAGNOSIS — Z51.5 PALLIATIVE CARE ENCOUNTER: Primary | ICD-10-CM

## 2020-10-08 DIAGNOSIS — I50.32 CHRONIC DIASTOLIC CHF (CONGESTIVE HEART FAILURE): ICD-10-CM

## 2020-10-08 DIAGNOSIS — R29.818 DIFFICULTY BALANCING: ICD-10-CM

## 2020-10-08 PROCEDURE — 3288F PR FALLS RISK ASSESSMENT DOCUMENTED: ICD-10-PCS | Mod: CPTII,S$GLB,, | Performed by: NURSE PRACTITIONER

## 2020-10-08 PROCEDURE — 99350 HOME/RES VST EST HIGH MDM 60: CPT | Mod: S$GLB,,, | Performed by: NURSE PRACTITIONER

## 2020-10-08 PROCEDURE — 3288F FALL RISK ASSESSMENT DOCD: CPT | Mod: CPTII,S$GLB,, | Performed by: NURSE PRACTITIONER

## 2020-10-08 PROCEDURE — 1159F MED LIST DOCD IN RCRD: CPT | Mod: S$GLB,,, | Performed by: NURSE PRACTITIONER

## 2020-10-08 PROCEDURE — 1100F PTFALLS ASSESS-DOCD GE2>/YR: CPT | Mod: CPTII,S$GLB,, | Performed by: NURSE PRACTITIONER

## 2020-10-08 PROCEDURE — 1159F PR MEDICATION LIST DOCUMENTED IN MEDICAL RECORD: ICD-10-PCS | Mod: S$GLB,,, | Performed by: NURSE PRACTITIONER

## 2020-10-08 PROCEDURE — 1126F PR PAIN SEVERITY QUANTIFIED, NO PAIN PRESENT: ICD-10-PCS | Mod: S$GLB,,, | Performed by: NURSE PRACTITIONER

## 2020-10-08 PROCEDURE — 1126F AMNT PAIN NOTED NONE PRSNT: CPT | Mod: S$GLB,,, | Performed by: NURSE PRACTITIONER

## 2020-10-08 PROCEDURE — 99350 PR HOME VISIT,ESTAB PATIENT,LEVEL IV: ICD-10-PCS | Mod: S$GLB,,, | Performed by: NURSE PRACTITIONER

## 2020-10-08 PROCEDURE — 1100F PR PT FALLS ASSESS DOC 2+ FALLS/FALL W/INJURY/YR: ICD-10-PCS | Mod: CPTII,S$GLB,, | Performed by: NURSE PRACTITIONER

## 2020-10-08 NOTE — PROGRESS NOTES
"[facility]  Palliative Care Home Visit    Visit Date: 10/8/2020  Encounter Provider: Lesley Tamez NP  PCP:  Crystal Hahn MD (Inactive)    Subjective:      Patient ID: Glenda Gimenez is a 89 y.o. female.    Consult Requested By:  Lesley Tamez  Reason for Consult:  Palliative Care    Glenda is being seen at home due to physical debility that presents a taxing effort to leave the home, to mitigate high risk of hospital readmission and/or due to the limited availability of reliable or safe options for transportation to the point of access to the provider. Prior to treatment on this visit the chart was reviewed and patient consent was obtained.       Chief Complaint: Dizziness and Follow-up, recent UTI    Glenda is a 89 year old female with a PMHX of hypothyroidism, hypertension, primary hyperparathyroidism, COPD, chronic diastolic CHF, obesity and recent sepsis pneumonia.     Glenda is being seen today for a Palliative Care follow up visit. She is found today doing very well today and is AAO x4. She lives alone but reports her daughter visits often.     Glenda reports that she went to Urgent Care on 9/12/2020 and again on 9/25/2020 due to UTI. She completed antibiotics and denies any current symptoms. Discussed perineal hygiene and patient admits that she has been wiping incorrectly.      Glenda reports some mild shortness of breath with walking occasionally. She also acknowledges a CHF diagnosis for reports no symptoms and there is no peripheral edema, lung sounds clear, no distress noted. Encouraged to follow low sodium diet.     Balance difficulty and some dizziness has been occurring over the last month. No falls have occurred and patient is doing exercises that PT has taught her in the past for this. She reports that when she stays home all day and doesn't go anywhere or even step outside for a few minutes she notices her balance gets "off". She recognizes that being sedentary makes " her dizziness worse. Physical Therapy services were offered today to help her work on balance issues but she declines currently as she will continue to do some of the exercises they have recommended previously. Fall precautions and safety discussed today.      VSS. Denies fever, chest pain, shortness of breath, nausea, vomiting, diarrhea. Denies any acute issues, concerns or complaints to address on today's visit. Reports taking all medications as prescribed. No other needs identified at this time. Risks of environmental exposure to coronavirus discussed including: social distancing, hand hygiene, and limiting departures from the home for necessities only.  Reports understanding and willingness to comply.           Goals of Care:  Glenda has stated in a previous visit that she is a DNR but today reports that she would like to be a Full code and have comfort care. She would like to continue living at home independently for as long as possible. She states that she has a living will on file at [facility] as well as [facility] placed 20 years ago. She states that her daughter Ankita Gimenez is her HPOA. Patient is unable to find copies of documents. Has new documents from previous visit but has not reviewed or completed yet.      Review of Systems   Constitutional: Negative for weight loss. Negative for fever.   HENT: Negative.    Eyes: Negative.    Respiratory: Denies cough. Positive for shortness of breath (mild and occasional with exertion). Negative for chest tightness and wheezing.    Cardiovascular: Negative.    Gastrointestinal: Positive for heartburn.  Endocrine: Negative.    Genitourinary: Negative.    Musculoskeletal: Positive for hand arthritis-opening jars and bottles is getting more difficult.   Skin: Negative.    Allergic/Immunologic: Negative.    Neurological: Positive for weakness (mild). Negative for tremors and syncope. Positive balance difficulties and  dizziness.  Hematological: Bruises/bleeds easily.   Psychiatric/Behavioral: Negative.  Positive sleep issues.        Assessments:  · Environmental: clean, adequate lighting and temperature, no foul odors  · Functional Status: independent  · Safety:  lives alone, advanced age, fall risk  · Nutritional: adequate access to food, reports appetite is improving  · Home Health/DME/Supplies: No Home Health. DME: rollator        Symptom Assessment (ESAS 0-10 scale)      ESAS 0 1 2 3 4 5 6 7 8 9 10   Pain x                       Dyspnea x                       Anxiety x                       Nausea x                       Depression  x                       Anorexia x                       Fatigue    x                    Insomnia    x                     Restlessness  x                       Agitation x                             Constipation    no  Bowel Management Plan (BMP): no  Diarrhea        no  Comments: reports BM once a day     Performance Status: PPS Score 60  Karnofsky Score:  60  EGOC:  2            History:  Past Medical History:   Diagnosis Date    Anticoagulant long-term use     Arthritis     Dislocation of right shoulder joint     Hypertension     Hypothyroidism     Shoulder disorder     right    Thyroid disease      Family History   Problem Relation Age of Onset    Breast cancer Mother     Cancer Brother         lung cancer    Cancer Maternal Aunt         unknown cancer    Cancer Maternal Uncle         pancreatic cancer    Heart disease Neg Hx      Past Surgical History:   Procedure Laterality Date    HYSTERECTOMY      PARTIAL HYSTERECTOMY      ARTRU, ovaries in place, uterine prolapse     Review of patient's allergies indicates:   Allergen Reactions    Atenolol      Other reaction(s): itch    Betamethasone sodium phosphate      Other reaction(s): Flushing (skin)    Cortisone      Other reaction(s): blurry vision  Other reaction(s): Rash    Lisinopril      Other reaction(s): COUGH     Naproxen      Other reaction(s): body shut down    Triamterene-hydrochlorothiazid      Other reaction(s): itch       Medications:    Current Outpatient Medications:     acetaminophen (TYLENOL) 325 MG tablet, Take 325 mg by mouth every 6 (six) hours as needed for Pain., Disp: , Rfl:     alendronate (FOSAMAX) 70 MG tablet, Take 1 tablet (70 mg total) by mouth every 7 days., Disp: 12 tablet, Rfl: 3    aspirin (ECOTRIN) 81 MG EC tablet, Take 81 mg by mouth once daily., Disp: , Rfl:     carvedilol (COREG) 3.125 MG tablet, Take 1.5625 mg by mouth 2 (two) times daily. , Disp: , Rfl: 1    co-enzyme Q-10 30 mg capsule, Take 30 mg by mouth 3 (three) times daily., Disp: , Rfl:     famotidine (PEPCID) 20 MG tablet, Take 1 tablet (20 mg total) by mouth 2 (two) times daily., Disp: 60 tablet, Rfl: 11    furosemide (LASIX) 20 MG tablet, Take 1 tablet (20 mg total) by mouth once daily., Disp: 90 tablet, Rfl: 3    isosorbide mononitrate (IMDUR) 30 MG 24 hr tablet, Take 1 tablet (30 mg total) by mouth once daily., Disp: 90 tablet, Rfl: 3    krill oil 500 mg Cap, Take by mouth., Disp: , Rfl:     levothyroxine (SYNTHROID) 88 MCG tablet, TAKE 1 TABLET BY MOUTH EVERY DAY, Disp: 90 tablet, Rfl: 3    olmesartan (BENICAR) 5 MG Tab, Take 1 tablet (5 mg total) by mouth once daily., Disp: 90 tablet, Rfl: 3    potassium chloride SA (K-DUR,KLOR-CON) 20 MEQ tablet, TAKE 1 TABLET(20 MEQ) BY MOUTH TWICE DAILY (Patient taking differently: 20 mEq once. TAKE 1 TABLET(20 MEQ) BY MOUTH ONCE  DAILY), Disp: 180 tablet, Rfl: 3    traZODone (DESYREL) 50 MG tablet, Take 1 tablet (50 mg total) by mouth every evening., Disp: 30 tablet, Rfl: 0    vitamin D (VITAMIN D3) 1000 units Tab, Take 1,000 Units by mouth once daily. Patient is taking 2000 units per day, Disp: , Rfl:     albuterol-ipratropium (DUO-NEB) 2.5 mg-0.5 mg/3 mL nebulizer solution, Take 3 mLs by nebulization every 4 (four) hours as needed for Wheezing or Shortness of Breath. Rescue  (Patient not taking: Reported on 10/8/2020), Disp: 120 Box, Rfl: 11    ciprofloxacin HCl (CIPRO) 500 MG tablet, Take 1 tablet (500 mg total) by mouth every 12 (twelve) hours. (Patient not taking: Reported on 10/8/2020), Disp: 14 tablet, Rfl: 0    24h Oral Morphine Equivalents (OME):  N/A    Objective:     Physical Exam:  Vitals:    10/08/20 0845   BP: (P) 118/70   Pulse: (P) 77   Resp: 16   Temp: 98.6 °F (37 °C)   TempSrc: Temporal   SpO2: (P) 98%   Weight: 78.5 kg (173 lb)   PainSc: 0-No pain     Body mass index is 29.7 kg/m².     Physical Exam   Constitutional: She is oriented to person, place, and time. She appears well-developed and well-nourished.   HENT:   Head: Normocephalic and atraumatic.   Right Ear: External ear normal.   Left Ear: External ear normal.   Nose: Nose normal.   Mouth/Throat: Oropharynx is clear and moist. Some missing front teeth from a fall in Nov 2019  Eyes: Pupils are equal, round, and reactive to light. Conjunctivae and EOM are normal.   Neck: Normal range of motion. Neck supple.   Cardiovascular: Normal rate, regular rhythm, normal heart sounds and intact distal pulses.   Pulmonary/Chest: Effort normal. No stridor. No respiratory distress. She has no wheezes. She has no rales. She exhibits no tenderness.  No longer using oxygen or nebulizer.  Abdominal: Soft. Bowel sounds are normal.   Musculoskeletal: Normal range of motion. She exhibits no tenderness or deformity. Left ankle with 1+ non-pitting edema-pt reports chronic  Neurological: She is alert and oriented to person, place, and time. Positive balance difficulties.  Skin: Skin is warm and dry. Capillary refill takes 2 to 3 seconds.   Psychiatric: She has a normal mood and affect. Her behavior is normal. Judgment and thought content normal.       Advance Care Planning   Ethical / Legal: Advance Care Planning   · Surrogate decision maker:  Name Ankita Gimenez, Relationship: daughter  · Code Status: Reports full code today  · [place]:   none  Other advance directive:  HPOA, living will on file at hospital placed 20 years ago-patient states she does not have a copy, new forms left with patient to review with daughter, Capacity to make medical decisions:  intact, Conflict none         Labs:  CBC:   WBC   Date Value Ref Range Status   03/06/2020 9.32 3.90 - 12.70 K/uL Final       Hgb   Date Value Ref Range Status   07/20/2013 12.5 12.0 - 16.0 g/dl Final     Hemoglobin   Date Value Ref Range Status   03/06/2020 11.7 (L) 12.0 - 16.0 g/dL Final       Hematocrit   Date Value Ref Range Status   03/06/2020 36.8 (L) 37.0 - 48.5 % Final       Mean Corpuscular Volume   Date Value Ref Range Status   03/06/2020 100 (H) 82 - 98 fL Final       Platelets   Date Value Ref Range Status   03/06/2020 376 (H) 150 - 350 K/uL Final       LFT:   Lab Results   Component Value Date    AST 29 08/28/2020    GGT 73 (H) 08/28/2020    ALKPHOS 97 08/28/2020    BILITOT 0.7 08/28/2020       Albumin:   Albumin   Date Value Ref Range Status   08/28/2020 3.9 3.5 - 5.2 g/dL Final     Protein:   Total Protein   Date Value Ref Range Status   08/28/2020 8.1 6.0 - 8.4 g/dL Final       Radiology:  I have reviewed all pertinent imaging results/findings within the past 24 hours.      Assessment:     1. Palliative care encounter    2. Chronic diastolic CHF (congestive heart failure)    3. History of UTI    4. Dizziness    5. Difficulty balancing        Plan:   Glenda was seen today for dizziness and follow-up.    Diagnoses and all orders for this visit:    Palliative care encounter    Chronic diastolic CHF (congestive heart failure)  -     Ambulatory referral/consult to [facility]  Chronic and stable.  Weigh daily and record.  Regular activity within patient's limitations.  Low salt, low fat and low choleterol diet and restrict fluid < 2L per day.  Contact for SOB, chest pain, weight gain > 2-3 lbs per day and/or 5-6 lbs per week.   No smoking. Annual  influenza vaccine required. Take all medications as prescribed.  Reinforced importance of medication and diet compliance.  History of UTI  Stable. 2 recent UTIs. Discussed perineal hygiene, adequate hydration.  Dizziness  Active. Refuses PT currently. Will notify me if she changes her mind. Do exercised taught previously by PT. Fall precautions and safety advised. Use rollator for ambulatory support.   Difficulty balancing  Active. Refuses PT currently. Will notify me if she changes her mind. Do exercised taught previously by PT. Fall precautions and safety advised. Use rollator for ambulatory support.       Were controlled substances prescribed?  No    > 50% of 60 min visit spent in chart review, face to face discussion of goals of care,  symptom assessment, coordination of care and emotional support. Topics discussed today include: fall prevention, dizziness, balance difficulties, recent UTIs.    Follow Up Appointments:   Future Appointments   Date Time Provider Department Center   2/26/2021  3:00 PM MD BHARGAVI Ayala   Verbal consent for visit obtained from patient today.    Signature:  Lesley Tamez NP     Attestation:   Screening criteria to assess the level of the patient's risk for infection with COVID-19 as recommended by the CDC at the time of the above documented home visit concluded appropriateness to proceed. Universal precautions were maintained at all times, including provider wearing a mask and gloves during entire visit and use of 60% alcohol gel hand  immediately prior to entry and upon departure of patient's home as well as cleaning of equipment used in home visit with antibacterial/germicidal disposable wipes.

## 2020-10-11 VITALS — TEMPERATURE: 99 F | WEIGHT: 173 LBS | RESPIRATION RATE: 16 BRPM | BODY MASS INDEX: 29.7 KG/M2

## 2020-10-11 PROBLEM — R42 DIZZINESS: Status: ACTIVE | Noted: 2020-10-11

## 2020-10-11 PROBLEM — Z87.440 HISTORY OF UTI: Status: ACTIVE | Noted: 2020-10-11

## 2020-10-11 NOTE — PATIENT INSTRUCTIONS
"Patient Instructions:  - [facility] Nurse Practitioner to schedule home follow-up visit with patient in 4-6 weeks or as needed.  - Continue all medications, treatments and therapies as ordered.   - Follow all instructions, recommendations as discussed.  - Maintain Safety Precautions at all times.  - Attend all medical appointments as scheduled.  - For worsening symptoms: call Primary Care Physician or Nurse Practitioner.  - For emergencies, call 911 or immediately report to the nearest emergency room.  - Limit Risks of environmental exposure to coronavirus/COVID-19 as discussed including: social distancing, hand hygiene, and limiting departures from the home for necessities only.       Bladder Infection, Female (Adult)    Urine is normally doesn't have any bacteria in it. But bacteria can get into the urinary tract from the skin around the rectum. Or they can travel in the blood from elsewhere in the body. Once they are in your urinary tract, they can cause infection in the urethra (urethritis), the bladder (cystitis), or the kidneys (pyelonephritis).  The most common place for an infection is in the bladder. This is called a bladder infection. This is one of the most common infections in women. Most bladder infections are easily treated. They are not serious unless the infection spreads to the kidney.  The phrases "bladder infection," "UTI," and "cystitis" are often used to describe the same thing. But they are not always the same. Cystitis is an inflammation of the bladder. The most common cause of cystitis is an infection.  Symptoms  The infection causes inflammation in the urethra and bladder. This causes many of the symptoms. The most common symptoms of a bladder infection are:  · Pain or burning when urinating  · Having to urinate more often than usual  · Urgent need to urinate  · Only a small amount of urine comes out  · Blood in urine  · Abdominal discomfort. This is usually in the lower abdomen above the pubic " bone.  · Cloudy urine  · Strong- or bad-smelling urine  · Unable to urinate (urinary retention)  · Unable to hold urine in (urinary incontinence)  · Fever  · Loss of appetite  · Confusion (in older adults)  Causes  Bladder infections are not contagious. You can't get one from someone else, from a toilet seat, or from sharing a bath.  The most common cause of bladder infections is bacteria from the bowels. The bacteria get onto the skin around the opening of the urethra. From there, they can get into the urine and travel up to the bladder, causing inflammation and infection. This usually happens because of:  · Wiping improperly after urinating. Always wipe from front to back.  · Bowel incontinence  · Pregnancy  · Procedures such as having a catheter inserted  · Older age  · Not emptying your bladder. This can allow bacteria a chance to grow in your urine.  · Dehydration  · Constipation  · Sex  · Use of a diaphragm for birth control   Treatment  Bladder infections are diagnosed by a urine test. They are treated with antibiotics and usually clear up quickly without complications. Treatment helps prevent a more serious kidney infection.  Medicines  Medicines can help in the treatment of a bladder infection:  · Take antibiotics until they are used up, even if you feel better. It is important to finish them to make sure the infection has cleared.  · You can use acetaminophen or ibuprofen for pain, fever, or discomfort, unless another medicine was prescribed. If you have chronic liver or kidney disease, talk with your healthcare provider before using these medicines. Also talk with your provider if you've ever had a stomach ulcer or gastrointestinal bleeding, or are taking blood-thinner medicines.  · If you are given phenazopydridine to reduce burning with urination, it will cause your urine to become a bright orange color. This can stain clothing.  Care and prevention  These self-care steps can help prevent future  infections:  · Drink plenty of fluids to prevent dehydration and flush out your bladder. Do this unless you must restrict fluids for other health reasons, or your doctor told you not to.  · Proper cleaning after going to the bathroom is important. Wipe from front to back after using the toilet to prevent the spread of bacteria.  · Urinate more often. Don't try to hold urine in for a long time.  · Wear loose-fitting clothes and cotton underwear. Avoid tight-fitting pants.  · Improve your diet and prevent constipation. Eat more fresh fruit and vegetables, and fiber, and less junk and fatty foods.  · Avoid sex until your symptoms are gone.  · Avoid caffeine, alcohol, and spicy foods. These can irritate your bladder.  · Urinate right after intercourse to flush out your bladder.  · If you use birth control pills and have frequent bladder infections, discuss it with your doctor.  Follow-up care  Call your healthcare provider if all symptoms are not gone after 3 days of treatment. This is especially important if you have repeat infections.  If a culture was done, you will be told if your treatment needs to be changed. If directed, you can call to find out the results.  If X-rays were done, you will be told if the results will affect your treatment.  Call 911  Call 911 if any of the following occur:  · Trouble breathing  · Hard to wake up or confusion  · Fainting or loss of consciousness  · Rapid heart rate  When to seek medical advice  Call your healthcare provider right away if any of these occur:  · Fever of 100.4ºF (38.0ºC) or higher, or as directed by your healthcare provider  · Symptoms are not better by the third day of treatment  · Back or belly (abdominal) pain that gets worse  · Repeated vomiting, or unable to keep medicine down  · Weakness or dizziness  · Vaginal discharge  · Pain, redness, or swelling in the outer vaginal area (labia)  Date Last Reviewed: 10/1/2016  © 4479-4278 [employer] [address]  [place] [address]. All rights reserved. This information is not intended as a substitute for professional medical care. Always follow your healthcare professional's instructions.        Fall Prevention  Falls often occur due to slipping, tripping or losing your balance. Millions of people fall every year and injure themselves. Here are ways to reduce your risk of falling again.  · Think about your fall, was there anything that caused your fall that can be fixed, removed, or replaced?  · Make your home safe by keeping walkways clear of objects you may trip over.  · Use non-slip pads under rugs. Do not use area rugs or small throw rugs.  · Use non-slip mats in bathtubs and showers.  · Install handrails and lights on staircases.  · Do not walk in poorly lit areas.  · Do not stand on chairs or wobbly ladders.  · Use caution when reaching overhead or looking upward. This position can cause a loss of balance.  · Be sure your shoes fit properly, have non-slip bottoms and are in good condition.   · Wear shoes both inside and out. Avoid going barefoot or wearing slippers.  · Be cautious when going up and down stairs, curbs, and when walking on uneven sidewalks.  · If your balance is poor, consider using a cane or walker.  · If your fall was related to alcohol use, stop or limit alcohol intake.   · If your fall was related to use of sleeping medicines, talk to your doctor about this. You may need to reduce your dosage at bedtime if you awaken during the night to go to the bathroom.    · To reduce the need for nighttime bathroom trips:  ¨ Avoid drinking fluids for several hours before going to bed  ¨ Empty your bladder before going to bed  ¨ Men can keep a urinal at the bedside  · Stay as active as you can. Balance, flexibility, strength, and endurance all come from exercise. They all play a role in preventing falls. Ask your healthcare provider which types of activity are right for you.  · Get your vision  checked on a regular basis.  · If you have pets, know where they are before you stand up or walk so you don't trip over them.  · Use night lights.  Date Last Reviewed: 11/5/2015 © 2000-2017 [employer]. [address] [place] [address]. All rights reserved. This information is not intended as a substitute for professional medical care. Always follow your healthcare professional's instructions.        Understanding Dizziness, Balance Problems, and Fainting     The eyes, inner ear, joints, and muscles send signals to the brain to achieve balance.     Balance is a group effort of the eyes, inner ear, joints, and muscles. They each send signals to the brain about body position and head movement. Then the brain uses this information to achieve balance. When the brain receives conflicting signals, or when there is a problem with blood flow, dizziness or fainting can happen.  Vertigo  Vertigo is the feeling of spinning. It may happen if the brain receives conflicting balance signals. Vertigo is often caused by a problem in the inner ear. Problems include changes in inner ear structures, infection, swelling, or excess fluid. Sometimes vertigo is due to a brain problem, such as migraine.   Dysequilibrium  Dysequilibrium is the feeling of imbalance without a sense of spinning. It may happen if the signal path between the body and brain is disrupted. There are many causes of dysequilibrium, including diabetes, anemia, head injury, and aging.  Syncope  Syncope is losing consciousness or fainting. The brain needs oxygen-rich blood to function. The heart pumps that blood to the brain. If there is a problem with the heart, blood flow (such as low blood pressure), or blood vessels, faintness may happen.  Date Last Reviewed: 10/6/2015  © 8922-5623 [employer]. [address] [place] [address]. All rights reserved. This information is not intended as a substitute for professional medical  care. Always follow your healthcare professional's instructions.

## 2020-11-19 ENCOUNTER — CARE AT HOME (OUTPATIENT)
Dept: HOME HEALTH SERVICES | Facility: CLINIC | Age: 85
End: 2020-11-19
Payer: MEDICARE

## 2020-11-19 VITALS
DIASTOLIC BLOOD PRESSURE: 70 MMHG | SYSTOLIC BLOOD PRESSURE: 130 MMHG | RESPIRATION RATE: 16 BRPM | TEMPERATURE: 99 F | WEIGHT: 170 LBS | BODY MASS INDEX: 29.18 KG/M2 | HEART RATE: 75 BPM | OXYGEN SATURATION: 98 %

## 2020-11-19 DIAGNOSIS — R53.83 FATIGUE, UNSPECIFIED TYPE: ICD-10-CM

## 2020-11-19 DIAGNOSIS — R29.818 DIFFICULTY BALANCING: ICD-10-CM

## 2020-11-19 DIAGNOSIS — R53.1 WEAKNESS: ICD-10-CM

## 2020-11-19 DIAGNOSIS — Z60.4 SOCIAL ISOLATION: ICD-10-CM

## 2020-11-19 DIAGNOSIS — Z51.5 PALLIATIVE CARE ENCOUNTER: Primary | ICD-10-CM

## 2020-11-19 DIAGNOSIS — K21.9 GASTROESOPHAGEAL REFLUX DISEASE WITHOUT ESOPHAGITIS: ICD-10-CM

## 2020-11-19 DIAGNOSIS — J44.9 CHRONIC OBSTRUCTIVE PULMONARY DISEASE, UNSPECIFIED COPD TYPE: ICD-10-CM

## 2020-11-19 DIAGNOSIS — I10 HYPERTENSION, UNSPECIFIED TYPE: ICD-10-CM

## 2020-11-19 DIAGNOSIS — R42 DIZZINESS: ICD-10-CM

## 2020-11-19 PROCEDURE — 99350 HOME/RES VST EST HIGH MDM 60: CPT | Mod: S$GLB,,, | Performed by: NURSE PRACTITIONER

## 2020-11-19 PROCEDURE — 1159F PR MEDICATION LIST DOCUMENTED IN MEDICAL RECORD: ICD-10-PCS | Mod: S$GLB,,, | Performed by: NURSE PRACTITIONER

## 2020-11-19 PROCEDURE — 1159F MED LIST DOCD IN RCRD: CPT | Mod: S$GLB,,, | Performed by: NURSE PRACTITIONER

## 2020-11-19 PROCEDURE — 99350 PR HOME VISIT,ESTAB PATIENT,LEVEL IV: ICD-10-PCS | Mod: S$GLB,,, | Performed by: NURSE PRACTITIONER

## 2020-11-19 SDOH — SOCIAL DETERMINANTS OF HEALTH (SDOH): SOCIAL EXCLUSION AND REJECTION: Z60.4

## 2020-11-19 NOTE — PROGRESS NOTES
"[facility]  Palliative Care Home Visit    Visit Date: 11/19/2020  Encounter Provider: Lesley Tamez NP  PCP:  Crystal Hahn MD (Inactive)    Subjective:      Patient ID: Glenda Gimenez is a 89 y.o. female.    Consult Requested By:  No ref. provider found  Reason for Consult:  Palliative Care    Glenda is being seen at home due to physical debility that presents a taxing effort to leave the home, to mitigate high risk of hospital readmission and/or due to the limited availability of reliable or safe options for transportation to the point of access to the provider. Prior to treatment on this visit the chart was reviewed and patient consent was obtained.       Chief Complaint: Follow-up    Glenda is a 89 year old female with a PMHX of hypothyroidism, hypertension, primary hyperparathyroidism, COPD, chronic diastolic CHF, obesity and recent sepsis pneumonia.     Glenda is being seen today for a Palliative Care follow up visit. She is found today doing very well today and is AAO x4. She lives alone but reports her daughter visits often.      Glenda reports she is doing fairly well except she is so tired of being "isolated" from others because of COVID-19. She wishes she could visit family once a week like she did previously but is very cautious and stays home. She denies depression but reports some sadness related to not being able to visit with loved ones.      Glenda reports some mild shortness of breath with walking occasionally. She reports a COPD diagnosis but uses no oxygen or nebulizer treatments.    She reports that her GERD symptoms are much better since being on Famotidine instead of Omeprazole. Food triggers and avoidance discussed today.      Balance difficulty and some dizziness continue to be an issue for her over the last few months. No falls have occurred and patient is doing exercises that PT has taught her in the past for this. She reports that when she stays home all day and " "doesn't go anywhere or even step outside for a few minutes she notices her balance gets "off". She recognizes that being sedentary makes her dizziness worse. Physical Therapy services were offered again today to help her work on balance issues but she declines currently as she will continue to do some of the exercises they have recommended previously. Fall precautions and safety discussed today.      VSS. Denies fever, chest pain, nausea, vomiting, diarrhea. Denies any acute issues, concerns or complaints to address on today's visit. Reports taking all medications as prescribed. No other needs identified at this time. Risks of environmental exposure to coronavirus discussed including: social distancing, hand hygiene, and limiting departures from the home for necessities only.  Reports understanding and willingness to comply.           Goals of Care:  Glenda would like to be a Full code and have comfort care. She would like to continue living at home independently for as long as possible. She states that she has a living will on file at [facility] as well as [facility] placed 20 years ago. She states that her daughter Ankita Gimenez is her HPOA. Patient is unable to find copies of documents. Has new documents from previous visit but has not reviewed or completed yet.      Review of Systems   Constitutional: Negative for weight loss. Negative for fever.   HENT: Negative.    Eyes: Negative.    Respiratory: Denies cough. Positive for shortness of breath (mild and occasional with exertion). Negative for chest tightness and wheezing.    Cardiovascular: Negative.    Gastrointestinal: Positive for heartburn.  Endocrine: Negative.    Genitourinary: Negative.    Musculoskeletal: Positive for hand arthritis-opening jars and bottles is getting more difficult.   Skin: Negative.    Allergic/Immunologic: Negative.    Neurological: Positive for weakness (mild). Negative for tremors and " "syncope. Positive balance difficulties and dizziness.  Hematological: Bruises/bleeds easily.   Psychiatric/Behavioral: Negative.  Positive sleep issues.        Assessments:  · Environmental: lives alone in a single story raised home that is clean, adequate lighting and temperature, no foul odors  · Functional Status: independent  · Safety:  lives alone, advanced age, fall risk  · Nutritional: adequate access to food, reports appetite "good"  · Home Health/DME/Supplies: No Home Health. DME: rollator        Symptom Assessment (ESAS 0-10 scale)      ESAS 0 1 2 3 4 5 6 7 8 9 10   Pain x                       Dyspnea x                       Anxiety x                       Nausea x                       Depression  x                       Anorexia x                       Fatigue          x               Insomnia    x                     Restlessness  x                       Agitation x                             Constipation    no  Bowel Management Plan (BMP): no  Diarrhea        no  Comments: reports BM once a day     Performance Status: PPS Score 60  Karnofsky Score:  60  EGOC:  2     History:  Past Medical History:   Diagnosis Date    Anticoagulant long-term use     Arthritis     Dislocation of right shoulder joint     Hypertension     Hypothyroidism     Shoulder disorder     right    Thyroid disease      Family History   Problem Relation Age of Onset    Breast cancer Mother     Cancer Brother         lung cancer    Cancer Maternal Aunt         unknown cancer    Cancer Maternal Uncle         pancreatic cancer    Heart disease Neg Hx      Past Surgical History:   Procedure Laterality Date    HYSTERECTOMY      PARTIAL HYSTERECTOMY      ARTUR, ovaries in place, uterine prolapse     Review of patient's allergies indicates:   Allergen Reactions    Atenolol      Other reaction(s): itch    Betamethasone sodium phosphate      Other reaction(s): Flushing (skin)    Cortisone      Other reaction(s): blurry " vision  Other reaction(s): Rash    Lisinopril      Other reaction(s): COUGH    Naproxen      Other reaction(s): body shut down    Triamterene-hydrochlorothiazid      Other reaction(s): itch       Medications:    Current Outpatient Medications:     acetaminophen (TYLENOL) 325 MG tablet, Take 325 mg by mouth every 6 (six) hours as needed for Pain., Disp: , Rfl:     alendronate (FOSAMAX) 70 MG tablet, Take 1 tablet (70 mg total) by mouth every 7 days., Disp: 12 tablet, Rfl: 3    aspirin (ECOTRIN) 81 MG EC tablet, Take 81 mg by mouth once daily., Disp: , Rfl:     carvedilol (COREG) 3.125 MG tablet, Take 1.5625 mg by mouth 2 (two) times daily. , Disp: , Rfl: 1    co-enzyme Q-10 30 mg capsule, Take 30 mg by mouth 3 (three) times daily., Disp: , Rfl:     famotidine (PEPCID) 20 MG tablet, Take 1 tablet (20 mg total) by mouth 2 (two) times daily., Disp: 60 tablet, Rfl: 11    furosemide (LASIX) 20 MG tablet, Take 1 tablet (20 mg total) by mouth once daily., Disp: 90 tablet, Rfl: 3    isosorbide mononitrate (IMDUR) 30 MG 24 hr tablet, Take 1 tablet (30 mg total) by mouth once daily., Disp: 90 tablet, Rfl: 3    krill oil 500 mg Cap, Take by mouth., Disp: , Rfl:     levothyroxine (SYNTHROID) 88 MCG tablet, TAKE 1 TABLET BY MOUTH EVERY DAY, Disp: 90 tablet, Rfl: 3    olmesartan (BENICAR) 5 MG Tab, Take 1 tablet (5 mg total) by mouth once daily., Disp: 90 tablet, Rfl: 3    potassium chloride SA (K-DUR,KLOR-CON) 20 MEQ tablet, TAKE 1 TABLET(20 MEQ) BY MOUTH TWICE DAILY (Patient taking differently: 20 mEq once. TAKE 1 TABLET(20 MEQ) BY MOUTH ONCE  DAILY), Disp: 180 tablet, Rfl: 3    traZODone (DESYREL) 50 MG tablet, Take 1 tablet (50 mg total) by mouth every evening., Disp: 30 tablet, Rfl: 0    vitamin D (VITAMIN D3) 1000 units Tab, Take 1,000 Units by mouth once daily. Patient is taking 2000 units per day, Disp: , Rfl:     albuterol-ipratropium (DUO-NEB) 2.5 mg-0.5 mg/3 mL nebulizer solution, Take 3 mLs by  nebulization every 4 (four) hours as needed for Wheezing or Shortness of Breath. Rescue (Patient not taking: Reported on 10/8/2020), Disp: 120 Box, Rfl: 11    ciprofloxacin HCl (CIPRO) 500 MG tablet, Take 1 tablet (500 mg total) by mouth every 12 (twelve) hours. (Patient not taking: Reported on 10/8/2020), Disp: 14 tablet, Rfl: 0     24h Oral Morphine Equivalents (OME):  N/A    Objective:     Physical Exam:  Vitals:    11/19/20 0845   BP: 130/70   Pulse: 75   Resp: 16   Temp: 98.7 °F (37.1 °C)   TempSrc: Temporal   SpO2: 98%   Weight: 77.1 kg (170 lb)   PainSc: 0-No pain     Body mass index is 29.18 kg/m².     Physical Exam   Constitutional: She is oriented to person, place, and time. She appears well-developed and well-nourished.   HENT:   Head: Normocephalic and atraumatic.   Right Ear: External ear normal.   Left Ear: External ear normal.   Nose: Nose normal.   Mouth/Throat: Oropharynx is clear and moist. Some missing front teeth from a fall in Nov 2019  Eyes: Pupils are equal, round, and reactive to light. Conjunctivae and EOM are normal.   Neck: Normal range of motion. Neck supple.   Cardiovascular: Normal rate, regular rhythm, normal heart sounds and intact distal pulses.   Pulmonary/Chest: Effort normal. No stridor. No respiratory distress. She has no wheezes. She has no rales. She exhibits no tenderness.  No longer using oxygen or nebulizer.  Abdominal: Soft. Bowel sounds are normal.   Musculoskeletal: Normal range of motion. She exhibits no tenderness or deformity. Left ankle with 1+ non-pitting edema-pt reports chronic  Neurological: She is alert and oriented to person, place, and time. Positive balance difficulties.  Skin: Skin is warm and dry. Capillary refill takes 2 to 3 seconds.   Psychiatric: She has a normal mood and affect. Her behavior is normal. Judgment and thought content normal.       Advance Care Planning   Ethical / Legal: Advance Care Planning   · Surrogate decision maker:  Name Ankita  Ammy, Relationship: daughter  · Code Status: Reports full code today  · [place]:  none  Other advance directive:  HPOA, living will on file at hospital placed 20 years ago-patient states she does not have a copy, new forms left with patient to review with daughter, Capacity to make medical decisions:  intact, Conflict none       Labs:  CBC:   WBC   Date Value Ref Range Status   03/06/2020 9.32 3.90 - 12.70 K/uL Final     MCV   Date Value Ref Range Status   03/06/2020 100 (H) 82 - 98 fL Final       Hematocrit   Date Value Ref Range Status   03/06/2020 36.8 (L) 37.0 - 48.5 % Final   St  MCV   Date Value Ref Range Status   03/06/2020 100 (H) 82 - 98 fL Final   atus   08/28/2020 3.9 3.5 - 5.2 g/dL Final     Protein:   Total Protein   Date Value Ref Range Status   08/28/2020 8.1 6.0 - 8.4 g/dL Final       Radiology:  I have reviewed all pertinent imaging results/findings within the past 24 hours.        Assessment:     1. Palliative care encounter    2. Weakness    3. Fatigue, unspecified type    4.  5.  6.  7. Gastroesophageal reflux disease without esophagitis   COPD  Dizziness  Social isolation       Plan:   Glenda was seen today for follow-up.    Diagnoses and all orders for this visit:    Palliative care encounter    Weakness    Fatigue, unspecified type    Gastroesophageal reflux disease without esophagitis    Gastroesophageal reflux disease without esophagitis     COPD    Dizziness    Social isolation         Problem List Items Addressed This Visit        Neuro    Difficulty balancing     Ongoing. Refuses PT.  Fall precautions and safety advised.             Psychiatric    Social isolation     Active and related to COVID-19 pandemic.  Denies depression.  Encouraged support from family.            Pulmonary    Chronic obstructive pulmonary disease     Stable on no meds.  Continue to monitor.            Cardiac/Vascular    Hypertension     Controlled on medication.  Continue to monitor.            GI     Gastroesophageal reflux disease     Controlled on medication.  Discussed food triggers and avoidance.            Other    Fatigue     Chronic but stable.  Continue to monitor.          Weakness     Chronic.  Fall precautions and safety advised.  Refused PT.          Palliative care encounter - Primary     Full code.          Dizziness     Chronic.   Refuses PT.  Fall precautions and safety advised.                    Were controlled substances prescribed?  No    > 50% of 60 min visit spent in chart review, face to face discussion of goals of care,  symptom assessment, coordination of care and emotional support. Topics discussed today include: fall prevention, dizziness, balance difficulties, social isolation, depression prevention.    Follow Up Appointments:   Future Appointments   Date Time Provider Department Center   2/26/2021  3:00 PM MD BHARGAVI Ayala   Verbal consent for visit obtained from patient today.     Signature:  Lesley Tamez NP     Attestation:   Screening criteria to assess the level of the patient's risk for infection with COVID-19 as recommended by the CDC at the time of the above documented home visit concluded appropriateness to proceed. Universal precautions were maintained at all times, including provider wearing a mask and gloves during entire visit and use of 60% alcohol gel hand  immediately prior to entry and upon departure of patient's home as well as cleaning of equipment used in home visit with antibacterial/germicidal disposable wipes.

## 2020-11-22 PROBLEM — Z60.4 SOCIAL ISOLATION: Status: ACTIVE | Noted: 2020-11-22

## 2020-11-22 NOTE — PATIENT INSTRUCTIONS
Patient Instructions:  - [facility] Nurse Practitioner to schedule home follow-up visit with patient in 4-6 weeks or as needed.  - Continue all medications, treatments and therapies as ordered.   - Follow all instructions, recommendations as discussed.  - Maintain Safety Precautions at all times.  - Attend all medical appointments as scheduled.  - For worsening symptoms: call Primary Care Physician or Nurse Practitioner.  - For emergencies, call 911 or immediately report to the nearest emergency room.  - Limit Risks of environmental exposure to coronavirus/COVID-19 as discussed including: social distancing, hand hygiene, and limiting departures from the home for necessities only.       Lifestyle Changes for Controlling GERD  When you have GERD, stomach acid feels as if its backing up toward your mouth. Whether or not you take medicine to control your GERD, your symptoms can often be improved with lifestyle changes. Talk to your healthcare provider about the following suggestions. These suggestions may help you get relief from your symptoms.      Raise your head  Reflux is more likely to strike when youre lying down flat, because stomach fluid can flow backward more easily. Raising the head of your bed 4 to 6 inches can help. To do this:  · Slide blocks or books under the legs at the head of your bed. Or, place a wedge under the mattress. [employer] can make a suitable wedge for you. The wedge should run from your waist to the top of your head.  · Dont just prop your head on several pillows. This increases pressure on your stomach. It can make GERD worse.  Watch your eating habits  Certain foods may increase the acid in your stomach or relax the lower esophageal sphincter. This makes GERD more likely. Its best to avoid the following if they cause you symptoms:  · Coffee, tea, and carbonated drinks (with and without caffeine)  · Fatty, fried, or spicy food  · Mint, chocolate, onions, and  tomatoes  · Peppermint  · Any other foods that seem to irritate your stomach or cause you pain  Relieve the pressure  Tips include the following:  · Eat smaller meals, even if you have to eat more often.  · Dont lie down right after you eat. Wait a few hours for your stomach to empty.  · Avoid tight belts and tight-fitting clothes.  · Lose excess weight.  Tobacco and alcohol  Avoid smoking tobacco and drinking alcohol. They can make GERD symptoms worse.  Date Last Reviewed: 7/1/2016  © 2017-0876 [employer]. [address] [place] [address], [place] [address]. All rights reserved. This information is not intended as a substitute for professional medical care. Always follow your healthcare professional's instructions.        Fall Prevention  Falls often occur due to slipping, tripping or losing your balance. Millions of people fall every year and injure themselves. Here are ways to reduce your risk of falling again.  · Think about your fall, was there anything that caused your fall that can be fixed, removed, or replaced?  · Make your home safe by keeping walkways clear of objects you may trip over.  · Use non-slip pads under rugs. Do not use area rugs or small throw rugs.  · Use non-slip mats in bathtubs and showers.  · Install handrails and lights on staircases.  · Do not walk in poorly lit areas.  · Do not stand on chairs or wobbly ladders.  · Use caution when reaching overhead or looking upward. This position can cause a loss of balance.  · Be sure your shoes fit properly, have non-slip bottoms and are in good condition.   · Wear shoes both inside and out. Avoid going barefoot or wearing slippers.  · Be cautious when going up and down stairs, curbs, and when walking on uneven sidewalks.  · If your balance is poor, consider using a cane or walker.  · If your fall was related to alcohol use, stop or limit alcohol intake.   · If your fall was related to use of sleeping medicines, talk to your doctor about  this. You may need to reduce your dosage at bedtime if you awaken during the night to go to the bathroom.    · To reduce the need for nighttime bathroom trips:  ¨ Avoid drinking fluids for several hours before going to bed  ¨ Empty your bladder before going to bed  ¨ Men can keep a urinal at the bedside  · Stay as active as you can. Balance, flexibility, strength, and endurance all come from exercise. They all play a role in preventing falls. Ask your healthcare provider which types of activity are right for you.  · Get your vision checked on a regular basis.  · If you have pets, know where they are before you stand up or walk so you don't trip over them.  · Use night lights.  Date Last Reviewed: 11/5/2015 © 2000-2017 [employer] [address] [place] [address]. All rights reserved. This information is not intended as a substitute for professional medical care. Always follow your healthcare professional's instructions.        Understanding Dizziness, Balance Problems, and Fainting     The eyes, inner ear, joints, and muscles send signals to the brain to achieve balance.     Balance is a group effort of the eyes, inner ear, joints, and muscles. They each send signals to the brain about body position and head movement. Then the brain uses this information to achieve balance. When the brain receives conflicting signals, or when there is a problem with blood flow, dizziness or fainting can happen.  Vertigo  Vertigo is the feeling of spinning. It may happen if the brain receives conflicting balance signals. Vertigo is often caused by a problem in the inner ear. Problems include changes in inner ear structures, infection, swelling, or excess fluid. Sometimes vertigo is due to a brain problem, such as migraine.   Dysequilibrium  Dysequilibrium is the feeling of imbalance without a sense of spinning. It may happen if the signal path between the body and brain is disrupted. There are many causes of  dysequilibrium, including diabetes, anemia, head injury, and aging.  Syncope  Syncope is losing consciousness or fainting. The brain needs oxygen-rich blood to function. The heart pumps that blood to the brain. If there is a problem with the heart, blood flow (such as low blood pressure), or blood vessels, faintness may happen.  Date Last Reviewed: 10/6/2015  © 3809-4406 [employer]. [address] [place] [address], [place] [address]. All rights reserved. This information is not intended as a substitute for professional medical care. Always follow your healthcare professional's instructions.

## 2020-12-18 ENCOUNTER — CARE AT HOME (OUTPATIENT)
Dept: HOME HEALTH SERVICES | Facility: CLINIC | Age: 85
End: 2020-12-18
Payer: MEDICARE

## 2020-12-18 VITALS
BODY MASS INDEX: 29.18 KG/M2 | WEIGHT: 170 LBS | HEART RATE: 82 BPM | DIASTOLIC BLOOD PRESSURE: 80 MMHG | RESPIRATION RATE: 16 BRPM | OXYGEN SATURATION: 98 % | SYSTOLIC BLOOD PRESSURE: 142 MMHG | TEMPERATURE: 99 F

## 2020-12-18 DIAGNOSIS — R42 DIZZINESS: ICD-10-CM

## 2020-12-18 DIAGNOSIS — K21.9 GASTROESOPHAGEAL REFLUX DISEASE WITHOUT ESOPHAGITIS: ICD-10-CM

## 2020-12-18 DIAGNOSIS — Z60.4 SOCIAL ISOLATION: ICD-10-CM

## 2020-12-18 DIAGNOSIS — J44.9 CHRONIC OBSTRUCTIVE PULMONARY DISEASE, UNSPECIFIED COPD TYPE: ICD-10-CM

## 2020-12-18 DIAGNOSIS — Z51.5 PALLIATIVE CARE ENCOUNTER: ICD-10-CM

## 2020-12-18 DIAGNOSIS — I10 HYPERTENSION, UNSPECIFIED TYPE: ICD-10-CM

## 2020-12-18 DIAGNOSIS — R29.818 DIFFICULTY BALANCING: Primary | ICD-10-CM

## 2020-12-18 PROCEDURE — 99350 HOME/RES VST EST HIGH MDM 60: CPT | Mod: S$GLB,,, | Performed by: NURSE PRACTITIONER

## 2020-12-18 PROCEDURE — 1159F MED LIST DOCD IN RCRD: CPT | Mod: S$GLB,,, | Performed by: NURSE PRACTITIONER

## 2020-12-18 PROCEDURE — 99350 PR HOME VISIT,ESTAB PATIENT,LEVEL IV: ICD-10-PCS | Mod: S$GLB,,, | Performed by: NURSE PRACTITIONER

## 2020-12-18 PROCEDURE — 1159F PR MEDICATION LIST DOCUMENTED IN MEDICAL RECORD: ICD-10-PCS | Mod: S$GLB,,, | Performed by: NURSE PRACTITIONER

## 2020-12-18 SDOH — SOCIAL DETERMINANTS OF HEALTH (SDOH): SOCIAL EXCLUSION AND REJECTION: Z60.4

## 2020-12-21 NOTE — PATIENT INSTRUCTIONS
Patient Instructions:  - [facility] Nurse Practitioner to schedule home follow-up visit with patient in 4-6 weeks or as needed.  - Continue all medications, treatments and therapies as ordered.   - Follow all instructions, recommendations as discussed.  - Maintain Safety Precautions at all times.  - Attend all medical appointments as scheduled.  - For worsening symptoms: call Primary Care Physician or Nurse Practitioner.  - For emergencies, call 911 or immediately report to the nearest emergency room.  - Limit Risks of environmental exposure to coronavirus/COVID-19 as discussed including: social distancing, hand hygiene, and limiting departures from the home for necessities only.       Fall Prevention  Falls often occur due to slipping, tripping or losing your balance. Millions of people fall every year and injure themselves. Here are ways to reduce your risk of falling again.  · Think about your fall, was there anything that caused your fall that can be fixed, removed, or replaced?  · Make your home safe by keeping walkways clear of objects you may trip over.  · Use non-slip pads under rugs. Do not use area rugs or small throw rugs.  · Use non-slip mats in bathtubs and showers.  · Install handrails and lights on staircases.  · Do not walk in poorly lit areas.  · Do not stand on chairs or wobbly ladders.  · Use caution when reaching overhead or looking upward. This position can cause a loss of balance.  · Be sure your shoes fit properly, have non-slip bottoms and are in good condition.   · Wear shoes both inside and out. Avoid going barefoot or wearing slippers.  · Be cautious when going up and down stairs, curbs, and when walking on uneven sidewalks.  · If your balance is poor, consider using a cane or walker.  · If your fall was related to alcohol use, stop or limit alcohol intake.   · If your fall was related to use of sleeping medicines, talk to your doctor about this. You may need to reduce your dosage at bedtime  if you awaken during the night to go to the bathroom.    · To reduce the need for nighttime bathroom trips:  ¨ Avoid drinking fluids for several hours before going to bed  ¨ Empty your bladder before going to bed  ¨ Men can keep a urinal at the bedside  · Stay as active as you can. Balance, flexibility, strength, and endurance all come from exercise. They all play a role in preventing falls. Ask your healthcare provider which types of activity are right for you.  · Get your vision checked on a regular basis.  · If you have pets, know where they are before you stand up or walk so you don't trip over them.  · Use night lights.  Date Last Reviewed: 11/5/2015  © 6147-1030 [employer]. [address] [place] [address], [place] [address]. All rights reserved. This information is not intended as a substitute for professional medical care. Always follow your healthcare professional's instructions.        Understanding Dizziness, Balance Problems, and Fainting     The eyes, inner ear, joints, and muscles send signals to the brain to achieve balance.     Balance is a group effort of the eyes, inner ear, joints, and muscles. They each send signals to the brain about body position and head movement. Then the brain uses this information to achieve balance. When the brain receives conflicting signals, or when there is a problem with blood flow, dizziness or fainting can happen.  Vertigo  Vertigo is the feeling of spinning. It may happen if the brain receives conflicting balance signals. Vertigo is often caused by a problem in the inner ear. Problems include changes in inner ear structures, infection, swelling, or excess fluid. Sometimes vertigo is due to a brain problem, such as migraine.   Dysequilibrium  Dysequilibrium is the feeling of imbalance without a sense of spinning. It may happen if the signal path between the body and brain is disrupted. There are many causes of dysequilibrium, including diabetes, anemia, head  injury, and aging.  Syncope  Syncope is losing consciousness or fainting. The brain needs oxygen-rich blood to function. The heart pumps that blood to the brain. If there is a problem with the heart, blood flow (such as low blood pressure), or blood vessels, faintness may happen.  Date Last Reviewed: 10/6/2015  © 2817-1365 [employer]. [address] [place] [address], [place] [address]. All rights reserved. This information is not intended as a substitute for professional medical care. Always follow your healthcare professional's instructions.        Lifestyle Changes for Controlling GERD  When you have GERD, stomach acid feels as if its backing up toward your mouth. Whether or not you take medicine to control your GERD, your symptoms can often be improved with lifestyle changes. Talk to your healthcare provider about the following suggestions. These suggestions may help you get relief from your symptoms.      Raise your head  Reflux is more likely to strike when youre lying down flat, because stomach fluid can flow backward more easily. Raising the head of your bed 4 to 6 inches can help. To do this:  · Slide blocks or books under the legs at the head of your bed. Or, place a wedge under the mattress. Many [employer] can make a suitable wedge for you. The wedge should run from your waist to the top of your head.  · Dont just prop your head on several pillows. This increases pressure on your stomach. It can make GERD worse.  Watch your eating habits  Certain foods may increase the acid in your stomach or relax the lower esophageal sphincter. This makes GERD more likely. Its best to avoid the following if they cause you symptoms:  · Coffee, tea, and carbonated drinks (with and without caffeine)  · Fatty, fried, or spicy food  · Mint, chocolate, onions, and tomatoes  · Peppermint  · Any other foods that seem to irritate your stomach or cause you pain  Relieve the pressure  Tips include the following:  · Eat  smaller meals, even if you have to eat more often.  · Dont lie down right after you eat. Wait a few hours for your stomach to empty.  · Avoid tight belts and tight-fitting clothes.  · Lose excess weight.  Tobacco and alcohol  Avoid smoking tobacco and drinking alcohol. They can make GERD symptoms worse.  Date Last Reviewed: 7/1/2016  © 4529-5400 [employer]. [address] [place] [address], [place] [address]. All rights reserved. This information is not intended as a substitute for professional medical care. Always follow your healthcare professional's instructions.

## 2020-12-21 NOTE — ASSESSMENT & PLAN NOTE
Full code. ACP documents left on previous visit, patient has not been able to sit with daughter to fill out forms.

## 2021-01-17 ENCOUNTER — HOSPITAL ENCOUNTER (EMERGENCY)
Facility: HOSPITAL | Age: 86
Discharge: HOME | End: 2021-01-17
Attending: EMERGENCY MEDICINE
Payer: MEDICARE

## 2021-01-17 VITALS
SYSTOLIC BLOOD PRESSURE: 146 MMHG | DIASTOLIC BLOOD PRESSURE: 65 MMHG | OXYGEN SATURATION: 96 % | HEIGHT: 64 IN | WEIGHT: 170 LBS | RESPIRATION RATE: 20 BRPM | BODY MASS INDEX: 29.02 KG/M2 | HEART RATE: 97 BPM | TEMPERATURE: 98 F

## 2021-01-17 DIAGNOSIS — R07.81 RIB PAIN: Primary | ICD-10-CM

## 2021-01-17 DIAGNOSIS — R06.02 SHORTNESS OF BREATH: ICD-10-CM

## 2021-01-17 LAB
ALBUMIN SERPL BCP-MCNC: 3.2 G/DL (ref 3.5–5.2)
ALP SERPL-CCNC: 114 U/L (ref 55–135)
ALT SERPL W/O P-5'-P-CCNC: 30 U/L (ref 10–44)
ANION GAP SERPL CALC-SCNC: 12 MMOL/L (ref 8–16)
AST SERPL-CCNC: 40 U/L (ref 10–40)
BASOPHILS # BLD AUTO: 0.03 K/UL (ref 0–0.2)
BASOPHILS NFR BLD: 0.3 % (ref 0–1.9)
BILIRUB SERPL-MCNC: 1.1 MG/DL (ref 0.1–1)
BUN SERPL-MCNC: 18 MG/DL (ref 10–30)
CALCIUM SERPL-MCNC: 9.8 MG/DL (ref 8.7–10.5)
CHLORIDE SERPL-SCNC: 101 MMOL/L (ref 95–110)
CO2 SERPL-SCNC: 22 MMOL/L (ref 23–29)
CREAT SERPL-MCNC: 0.8 MG/DL (ref 0.5–1.4)
DIFFERENTIAL METHOD: ABNORMAL
EOSINOPHIL # BLD AUTO: 0 K/UL (ref 0–0.5)
EOSINOPHIL NFR BLD: 0.2 % (ref 0–8)
ERYTHROCYTE [DISTWIDTH] IN BLOOD BY AUTOMATED COUNT: 13 % (ref 11.5–14.5)
EST. GFR  (AFRICAN AMERICAN): >60 ML/MIN/1.73 M^2
EST. GFR  (NON AFRICAN AMERICAN): >60 ML/MIN/1.73 M^2
GLUCOSE SERPL-MCNC: 144 MG/DL (ref 70–110)
HCT VFR BLD AUTO: 41 % (ref 37–48.5)
HGB BLD-MCNC: 13.6 G/DL (ref 12–16)
IMM GRANULOCYTES # BLD AUTO: 0.05 K/UL (ref 0–0.04)
IMM GRANULOCYTES NFR BLD AUTO: 0.5 % (ref 0–0.5)
LYMPHOCYTES # BLD AUTO: 0.7 K/UL (ref 1–4.8)
LYMPHOCYTES NFR BLD: 6 % (ref 18–48)
MCH RBC QN AUTO: 33 PG (ref 27–31)
MCHC RBC AUTO-ENTMCNC: 33.2 G/DL (ref 32–36)
MCV RBC AUTO: 100 FL (ref 82–98)
MONOCYTES # BLD AUTO: 1.2 K/UL (ref 0.3–1)
MONOCYTES NFR BLD: 11 % (ref 4–15)
NEUTROPHILS # BLD AUTO: 9 K/UL (ref 1.8–7.7)
NEUTROPHILS NFR BLD: 82 % (ref 38–73)
NRBC BLD-RTO: 0 /100 WBC
PLATELET # BLD AUTO: 232 K/UL (ref 150–350)
PMV BLD AUTO: 9.8 FL (ref 9.2–12.9)
POTASSIUM SERPL-SCNC: 4 MMOL/L (ref 3.5–5.1)
PROT SERPL-MCNC: 7.6 G/DL (ref 6–8.4)
RBC # BLD AUTO: 4.12 M/UL (ref 4–5.4)
SODIUM SERPL-SCNC: 135 MMOL/L (ref 136–145)
TROPONIN I SERPL DL<=0.01 NG/ML-MCNC: <0.006 NG/ML (ref 0–0.03)
WBC # BLD AUTO: 11 K/UL (ref 3.9–12.7)

## 2021-01-17 PROCEDURE — 36415 COLL VENOUS BLD VENIPUNCTURE: CPT

## 2021-01-17 PROCEDURE — 93010 EKG 12-LEAD: ICD-10-PCS | Mod: ,,, | Performed by: INTERNAL MEDICINE

## 2021-01-17 PROCEDURE — 25000003 PHARM REV CODE 250: Performed by: PHYSICIAN ASSISTANT

## 2021-01-17 PROCEDURE — 93005 ELECTROCARDIOGRAM TRACING: CPT

## 2021-01-17 PROCEDURE — 84484 ASSAY OF TROPONIN QUANT: CPT

## 2021-01-17 PROCEDURE — 85025 COMPLETE CBC W/AUTO DIFF WBC: CPT

## 2021-01-17 PROCEDURE — 99285 EMERGENCY DEPT VISIT HI MDM: CPT | Mod: 25

## 2021-01-17 PROCEDURE — 80053 COMPREHEN METABOLIC PANEL: CPT

## 2021-01-17 PROCEDURE — 93010 ELECTROCARDIOGRAM REPORT: CPT | Mod: ,,, | Performed by: INTERNAL MEDICINE

## 2021-01-17 RX ORDER — LIDOCAINE 50 MG/G
1 PATCH TOPICAL
Status: DISCONTINUED | OUTPATIENT
Start: 2021-01-17 | End: 2021-01-17 | Stop reason: HOSPADM

## 2021-01-17 RX ORDER — LIDOCAINE 50 MG/G
1 PATCH TOPICAL DAILY
Qty: 15 PATCH | Refills: 0 | Status: SHIPPED | OUTPATIENT
Start: 2021-01-17 | End: 2021-09-26 | Stop reason: ALTCHOICE

## 2021-01-17 RX ADMIN — LIDOCAINE 1 PATCH: 50 PATCH TOPICAL at 11:01

## 2021-01-19 PROBLEM — E44.1 MILD PROTEIN-CALORIE MALNUTRITION: Status: ACTIVE | Noted: 2021-01-19

## 2021-01-19 PROBLEM — R07.81 RIB PAIN: Status: ACTIVE | Noted: 2021-01-19

## 2021-01-20 ENCOUNTER — CARE AT HOME (OUTPATIENT)
Dept: HOME HEALTH SERVICES | Facility: CLINIC | Age: 86
End: 2021-01-20
Payer: MEDICARE

## 2021-01-20 VITALS
SYSTOLIC BLOOD PRESSURE: 130 MMHG | DIASTOLIC BLOOD PRESSURE: 70 MMHG | RESPIRATION RATE: 16 BRPM | BODY MASS INDEX: 30.73 KG/M2 | WEIGHT: 179 LBS | TEMPERATURE: 99 F | HEART RATE: 93 BPM | OXYGEN SATURATION: 98 %

## 2021-01-20 DIAGNOSIS — R06.00 DYSPNEA, UNSPECIFIED TYPE: ICD-10-CM

## 2021-01-20 DIAGNOSIS — E44.1 MILD PROTEIN-CALORIE MALNUTRITION: ICD-10-CM

## 2021-01-20 DIAGNOSIS — Z51.5 PALLIATIVE CARE ENCOUNTER: Primary | ICD-10-CM

## 2021-01-20 DIAGNOSIS — R07.81 RIB PAIN: ICD-10-CM

## 2021-01-20 DIAGNOSIS — R42 DIZZINESS: ICD-10-CM

## 2021-01-20 DIAGNOSIS — M85.80 OSTEOPENIA WITH HIGH RISK OF FRACTURE: ICD-10-CM

## 2021-01-20 DIAGNOSIS — R53.1 WEAKNESS: ICD-10-CM

## 2021-01-20 DIAGNOSIS — J44.9 CHRONIC OBSTRUCTIVE PULMONARY DISEASE, UNSPECIFIED COPD TYPE: ICD-10-CM

## 2021-01-20 DIAGNOSIS — R63.0 DECREASED APPETITE: ICD-10-CM

## 2021-01-20 DIAGNOSIS — R29.818 DIFFICULTY BALANCING: ICD-10-CM

## 2021-01-20 PROCEDURE — 99350 PR HOME VISIT,ESTAB PATIENT,LEVEL IV: ICD-10-PCS | Mod: S$GLB,,, | Performed by: NURSE PRACTITIONER

## 2021-01-20 PROCEDURE — 1159F PR MEDICATION LIST DOCUMENTED IN MEDICAL RECORD: ICD-10-PCS | Mod: S$GLB,,, | Performed by: NURSE PRACTITIONER

## 2021-01-20 PROCEDURE — 1159F MED LIST DOCD IN RCRD: CPT | Mod: S$GLB,,, | Performed by: NURSE PRACTITIONER

## 2021-01-20 PROCEDURE — 99350 HOME/RES VST EST HIGH MDM 60: CPT | Mod: S$GLB,,, | Performed by: NURSE PRACTITIONER

## 2021-01-20 RX ORDER — LIDOCAINE 560 MG/1
PATCH PERCUTANEOUS; TOPICAL; TRANSDERMAL
COMMUNITY
End: 2021-12-01 | Stop reason: ALTCHOICE

## 2021-01-27 ENCOUNTER — IMMUNIZATION (OUTPATIENT)
Dept: PRIMARY CARE CLINIC | Facility: CLINIC | Age: 86
End: 2021-01-27
Payer: MEDICARE

## 2021-01-27 DIAGNOSIS — Z23 NEED FOR VACCINATION: Primary | ICD-10-CM

## 2021-01-27 PROCEDURE — 0001A COVID-19, MRNA, LNP-S, PF, 30 MCG/0.3 ML DOSE VACCINE: CPT | Mod: S$GLB,,, | Performed by: FAMILY MEDICINE

## 2021-01-27 PROCEDURE — 91300 COVID-19, MRNA, LNP-S, PF, 30 MCG/0.3 ML DOSE VACCINE: CPT | Mod: S$GLB,,, | Performed by: FAMILY MEDICINE

## 2021-01-27 PROCEDURE — 0001A COVID-19, MRNA, LNP-S, PF, 30 MCG/0.3 ML DOSE VACCINE: ICD-10-PCS | Mod: S$GLB,,, | Performed by: FAMILY MEDICINE

## 2021-01-27 PROCEDURE — 91300 COVID-19, MRNA, LNP-S, PF, 30 MCG/0.3 ML DOSE VACCINE: ICD-10-PCS | Mod: S$GLB,,, | Performed by: FAMILY MEDICINE

## 2021-01-28 ENCOUNTER — EXTERNAL HOME HEALTH (OUTPATIENT)
Dept: HOME HEALTH SERVICES | Facility: HOSPITAL | Age: 86
End: 2021-01-28
Payer: MEDICARE

## 2021-02-17 ENCOUNTER — IMMUNIZATION (OUTPATIENT)
Dept: PRIMARY CARE CLINIC | Facility: CLINIC | Age: 86
End: 2021-02-17
Payer: MEDICARE

## 2021-02-17 DIAGNOSIS — Z23 NEED FOR VACCINATION: Primary | ICD-10-CM

## 2021-02-17 PROCEDURE — 0002A COVID-19, MRNA, LNP-S, PF, 30 MCG/0.3 ML DOSE VACCINE: CPT | Mod: S$GLB,,, | Performed by: FAMILY MEDICINE

## 2021-02-17 PROCEDURE — 91300 COVID-19, MRNA, LNP-S, PF, 30 MCG/0.3 ML DOSE VACCINE: CPT | Mod: S$GLB,,, | Performed by: FAMILY MEDICINE

## 2021-02-17 PROCEDURE — 0002A COVID-19, MRNA, LNP-S, PF, 30 MCG/0.3 ML DOSE VACCINE: ICD-10-PCS | Mod: S$GLB,,, | Performed by: FAMILY MEDICINE

## 2021-02-17 PROCEDURE — 91300 COVID-19, MRNA, LNP-S, PF, 30 MCG/0.3 ML DOSE VACCINE: ICD-10-PCS | Mod: S$GLB,,, | Performed by: FAMILY MEDICINE

## 2021-02-24 ENCOUNTER — CARE AT HOME (OUTPATIENT)
Dept: HOME HEALTH SERVICES | Facility: CLINIC | Age: 86
End: 2021-02-24
Payer: MEDICARE

## 2021-02-24 DIAGNOSIS — G47.00 INSOMNIA, UNSPECIFIED TYPE: ICD-10-CM

## 2021-02-24 DIAGNOSIS — I10 HYPERTENSION, UNSPECIFIED TYPE: ICD-10-CM

## 2021-02-24 DIAGNOSIS — R29.818 DIFFICULTY BALANCING: ICD-10-CM

## 2021-02-24 DIAGNOSIS — M19.042 OSTEOARTHRITIS OF BOTH HANDS, UNSPECIFIED OSTEOARTHRITIS TYPE: ICD-10-CM

## 2021-02-24 DIAGNOSIS — R32 URINARY INCONTINENCE, UNSPECIFIED TYPE: ICD-10-CM

## 2021-02-24 DIAGNOSIS — E66.9 OBESITY (BMI 30-39.9): ICD-10-CM

## 2021-02-24 DIAGNOSIS — Z60.4 SOCIAL ISOLATION: Primary | ICD-10-CM

## 2021-02-24 DIAGNOSIS — R53.83 FATIGUE, UNSPECIFIED TYPE: ICD-10-CM

## 2021-02-24 DIAGNOSIS — J44.9 CHRONIC OBSTRUCTIVE PULMONARY DISEASE, UNSPECIFIED COPD TYPE: ICD-10-CM

## 2021-02-24 DIAGNOSIS — E07.9 THYROID DISEASE: ICD-10-CM

## 2021-02-24 DIAGNOSIS — K21.9 GASTROESOPHAGEAL REFLUX DISEASE WITHOUT ESOPHAGITIS: ICD-10-CM

## 2021-02-24 DIAGNOSIS — M19.041 OSTEOARTHRITIS OF BOTH HANDS, UNSPECIFIED OSTEOARTHRITIS TYPE: ICD-10-CM

## 2021-02-24 DIAGNOSIS — Z51.5 PALLIATIVE CARE ENCOUNTER: ICD-10-CM

## 2021-02-24 DIAGNOSIS — H35.30 MACULAR DEGENERATION OF RIGHT EYE, UNSPECIFIED TYPE: ICD-10-CM

## 2021-02-24 PROCEDURE — 1159F MED LIST DOCD IN RCRD: CPT | Mod: S$GLB,,, | Performed by: NURSE PRACTITIONER

## 2021-02-24 PROCEDURE — 1159F PR MEDICATION LIST DOCUMENTED IN MEDICAL RECORD: ICD-10-PCS | Mod: S$GLB,,, | Performed by: NURSE PRACTITIONER

## 2021-02-24 PROCEDURE — 99350 PR HOME VISIT,ESTAB PATIENT,LEVEL IV: ICD-10-PCS | Mod: S$GLB,,, | Performed by: NURSE PRACTITIONER

## 2021-02-24 PROCEDURE — 99350 HOME/RES VST EST HIGH MDM 60: CPT | Mod: S$GLB,,, | Performed by: NURSE PRACTITIONER

## 2021-02-24 SDOH — SOCIAL DETERMINANTS OF HEALTH (SDOH): SOCIAL EXCLUSION AND REJECTION: Z60.4

## 2021-02-26 ENCOUNTER — OFFICE VISIT (OUTPATIENT)
Dept: ENDOCRINOLOGY | Facility: CLINIC | Age: 86
End: 2021-02-26
Attending: INTERNAL MEDICINE
Payer: MEDICARE

## 2021-02-26 ENCOUNTER — LAB VISIT (OUTPATIENT)
Dept: LAB | Facility: HOSPITAL | Age: 86
End: 2021-02-26
Attending: INTERNAL MEDICINE
Payer: MEDICARE

## 2021-02-26 VITALS
SYSTOLIC BLOOD PRESSURE: 110 MMHG | BODY MASS INDEX: 30.63 KG/M2 | WEIGHT: 179.38 LBS | HEIGHT: 64 IN | TEMPERATURE: 98 F | HEART RATE: 94 BPM | OXYGEN SATURATION: 95 % | DIASTOLIC BLOOD PRESSURE: 70 MMHG

## 2021-02-26 DIAGNOSIS — I10 ESSENTIAL HYPERTENSION: ICD-10-CM

## 2021-02-26 DIAGNOSIS — I50.32 CHRONIC DIASTOLIC CHF (CONGESTIVE HEART FAILURE): ICD-10-CM

## 2021-02-26 DIAGNOSIS — E04.1 NODULAR THYROID DISEASE: ICD-10-CM

## 2021-02-26 DIAGNOSIS — E21.0 PRIMARY HYPERPARATHYROIDISM: ICD-10-CM

## 2021-02-26 DIAGNOSIS — E88.810 DYSMETABOLIC SYNDROME: ICD-10-CM

## 2021-02-26 DIAGNOSIS — M85.80 OSTEOPENIA WITH HIGH RISK OF FRACTURE: ICD-10-CM

## 2021-02-26 DIAGNOSIS — E03.9 ACQUIRED HYPOTHYROIDISM: ICD-10-CM

## 2021-02-26 DIAGNOSIS — E87.1 HYPONATREMIA: ICD-10-CM

## 2021-02-26 DIAGNOSIS — E55.9 VITAMIN D DEFICIENCY DISEASE: ICD-10-CM

## 2021-02-26 DIAGNOSIS — R73.9 HYPERGLYCEMIA: ICD-10-CM

## 2021-02-26 DIAGNOSIS — E03.9 ACQUIRED HYPOTHYROIDISM: Primary | ICD-10-CM

## 2021-02-26 DIAGNOSIS — Z78.0 POSTMENOPAUSAL: ICD-10-CM

## 2021-02-26 DIAGNOSIS — J44.9 CHRONIC OBSTRUCTIVE PULMONARY DISEASE, UNSPECIFIED COPD TYPE: ICD-10-CM

## 2021-02-26 DIAGNOSIS — E04.9 GOITER: ICD-10-CM

## 2021-02-26 PROCEDURE — 36415 COLL VENOUS BLD VENIPUNCTURE: CPT | Mod: PO

## 2021-02-26 PROCEDURE — 84550 ASSAY OF BLOOD/URIC ACID: CPT

## 2021-02-26 PROCEDURE — 83018 HEAVY METAL QUAN EACH NES: CPT

## 2021-02-26 PROCEDURE — 83970 ASSAY OF PARATHORMONE: CPT

## 2021-02-26 PROCEDURE — 84588 ASSAY OF VASOPRESSIN: CPT

## 2021-02-26 PROCEDURE — 99999 PR PBB SHADOW E&M-EST. PATIENT-LVL IV: ICD-10-PCS | Mod: PBBFAC,,, | Performed by: INTERNAL MEDICINE

## 2021-02-26 PROCEDURE — 83880 ASSAY OF NATRIURETIC PEPTIDE: CPT | Mod: 91

## 2021-02-26 PROCEDURE — 1101F PR PT FALLS ASSESS DOC 0-1 FALLS W/OUT INJ PAST YR: ICD-10-PCS | Mod: CPTII,S$GLB,, | Performed by: INTERNAL MEDICINE

## 2021-02-26 PROCEDURE — 84443 ASSAY THYROID STIM HORMONE: CPT

## 2021-02-26 PROCEDURE — 82330 ASSAY OF CALCIUM: CPT

## 2021-02-26 PROCEDURE — 3288F PR FALLS RISK ASSESSMENT DOCUMENTED: ICD-10-PCS | Mod: CPTII,S$GLB,, | Performed by: INTERNAL MEDICINE

## 2021-02-26 PROCEDURE — 83930 ASSAY OF BLOOD OSMOLALITY: CPT

## 2021-02-26 PROCEDURE — 99214 OFFICE O/P EST MOD 30 MIN: CPT | Mod: S$GLB,,, | Performed by: INTERNAL MEDICINE

## 2021-02-26 PROCEDURE — 84100 ASSAY OF PHOSPHORUS: CPT

## 2021-02-26 PROCEDURE — 82652 VIT D 1 25-DIHYDROXY: CPT

## 2021-02-26 PROCEDURE — 84480 ASSAY TRIIODOTHYRONINE (T3): CPT

## 2021-02-26 PROCEDURE — 86800 THYROGLOBULIN ANTIBODY: CPT

## 2021-02-26 PROCEDURE — 84439 ASSAY OF FREE THYROXINE: CPT

## 2021-02-26 PROCEDURE — 82308 ASSAY OF CALCITONIN: CPT

## 2021-02-26 PROCEDURE — 83036 HEMOGLOBIN GLYCOSYLATED A1C: CPT

## 2021-02-26 PROCEDURE — 82306 VITAMIN D 25 HYDROXY: CPT

## 2021-02-26 PROCEDURE — 80061 LIPID PANEL: CPT

## 2021-02-26 PROCEDURE — 3288F FALL RISK ASSESSMENT DOCD: CPT | Mod: CPTII,S$GLB,, | Performed by: INTERNAL MEDICINE

## 2021-02-26 PROCEDURE — 1126F PR PAIN SEVERITY QUANTIFIED, NO PAIN PRESENT: ICD-10-PCS | Mod: S$GLB,,, | Performed by: INTERNAL MEDICINE

## 2021-02-26 PROCEDURE — 99214 PR OFFICE/OUTPT VISIT, EST, LEVL IV, 30-39 MIN: ICD-10-PCS | Mod: S$GLB,,, | Performed by: INTERNAL MEDICINE

## 2021-02-26 PROCEDURE — 1101F PT FALLS ASSESS-DOCD LE1/YR: CPT | Mod: CPTII,S$GLB,, | Performed by: INTERNAL MEDICINE

## 2021-02-26 PROCEDURE — 86316 IMMUNOASSAY TUMOR OTHER: CPT

## 2021-02-26 PROCEDURE — 84244 ASSAY OF RENIN: CPT

## 2021-02-26 PROCEDURE — 82088 ASSAY OF ALDOSTERONE: CPT

## 2021-02-26 PROCEDURE — 99999 PR PBB SHADOW E&M-EST. PATIENT-LVL IV: CPT | Mod: PBBFAC,,, | Performed by: INTERNAL MEDICINE

## 2021-02-26 PROCEDURE — 83880 ASSAY OF NATRIURETIC PEPTIDE: CPT

## 2021-02-26 PROCEDURE — 1159F PR MEDICATION LIST DOCUMENTED IN MEDICAL RECORD: ICD-10-PCS | Mod: S$GLB,,, | Performed by: INTERNAL MEDICINE

## 2021-02-26 PROCEDURE — 1126F AMNT PAIN NOTED NONE PRSNT: CPT | Mod: S$GLB,,, | Performed by: INTERNAL MEDICINE

## 2021-02-26 PROCEDURE — 1159F MED LIST DOCD IN RCRD: CPT | Mod: S$GLB,,, | Performed by: INTERNAL MEDICINE

## 2021-02-26 PROCEDURE — 83735 ASSAY OF MAGNESIUM: CPT

## 2021-02-26 RX ORDER — IBANDRONATE SODIUM 150 MG/1
150 TABLET, FILM COATED ORAL
Qty: 12 TABLET | Refills: 1 | Status: SHIPPED | OUTPATIENT
Start: 2021-02-26 | End: 2021-09-25 | Stop reason: SDUPTHER

## 2021-02-27 LAB
25(OH)D3+25(OH)D2 SERPL-MCNC: 39 NG/ML (ref 30–96)
BNP SERPL-MCNC: 61 PG/ML (ref 0–99)
CA-I BLDV-SCNC: 1.35 MMOL/L (ref 1.06–1.42)
CHOLEST SERPL-MCNC: 181 MG/DL (ref 120–199)
CHOLEST/HDLC SERPL: 2.9 {RATIO} (ref 2–5)
ESTIMATED AVG GLUCOSE: 103 MG/DL (ref 68–131)
HBA1C MFR BLD: 5.2 % (ref 4–5.6)
HDLC SERPL-MCNC: 62 MG/DL (ref 40–75)
HDLC SERPL: 34.3 % (ref 20–50)
LDLC SERPL CALC-MCNC: 100 MG/DL (ref 63–159)
MAGNESIUM SERPL-MCNC: 2.2 MG/DL (ref 1.6–2.6)
NONHDLC SERPL-MCNC: 119 MG/DL
OSMOLALITY SERPL: 294 MOSM/KG (ref 275–295)
PHOSPHATE SERPL-MCNC: 3.5 MG/DL (ref 2.7–4.5)
PTH-INTACT SERPL-MCNC: 132 PG/ML (ref 9–77)
T3 SERPL-MCNC: 80 NG/DL (ref 60–180)
T4 FREE SERPL-MCNC: 1.24 NG/DL (ref 0.71–1.51)
TRIGL SERPL-MCNC: 95 MG/DL (ref 30–150)
TSH SERPL DL<=0.005 MIU/L-ACNC: 0.74 UIU/ML (ref 0.4–4)
URATE SERPL-MCNC: 6.1 MG/DL (ref 2.4–5.7)

## 2021-02-28 VITALS
WEIGHT: 175 LBS | TEMPERATURE: 99 F | SYSTOLIC BLOOD PRESSURE: 140 MMHG | DIASTOLIC BLOOD PRESSURE: 72 MMHG | RESPIRATION RATE: 16 BRPM | HEART RATE: 85 BPM | HEIGHT: 64 IN | BODY MASS INDEX: 29.88 KG/M2 | OXYGEN SATURATION: 96 %

## 2021-02-28 PROBLEM — R32 URINARY INCONTINENCE: Status: ACTIVE | Noted: 2021-02-28

## 2021-02-28 PROBLEM — R63.4 WEIGHT LOSS, UNINTENTIONAL: Status: RESOLVED | Noted: 2020-04-10 | Resolved: 2021-02-28

## 2021-02-28 PROBLEM — R63.0 DECREASED APPETITE: Status: RESOLVED | Noted: 2020-03-16 | Resolved: 2021-02-28

## 2021-02-28 PROBLEM — J13 PNEUMONIA OF RIGHT LUNG DUE TO STREPTOCOCCUS PNEUMONIAE: Status: RESOLVED | Noted: 2020-03-03 | Resolved: 2021-02-28

## 2021-02-28 PROBLEM — J18.9 PNEUMONIA DUE TO INFECTIOUS ORGANISM: Status: RESOLVED | Noted: 2020-03-16 | Resolved: 2021-02-28

## 2021-02-28 PROBLEM — Z86.19 HISTORY OF SEPSIS: Status: RESOLVED | Noted: 2020-04-10 | Resolved: 2021-02-28

## 2021-02-28 PROBLEM — J44.9 CHRONIC OBSTRUCTIVE PULMONARY DISEASE: Status: RESOLVED | Noted: 2020-04-10 | Resolved: 2021-02-28

## 2021-02-28 PROBLEM — Z87.01 HISTORY OF RECENT PNEUMONIA: Status: RESOLVED | Noted: 2020-04-10 | Resolved: 2021-02-28

## 2021-02-28 PROBLEM — H35.30 MACULAR DEGENERATION OF RIGHT EYE: Status: ACTIVE | Noted: 2021-02-28

## 2021-02-28 PROBLEM — Z99.81 SUPPLEMENTAL OXYGEN DEPENDENT: Status: RESOLVED | Noted: 2020-03-16 | Resolved: 2021-02-28

## 2021-02-28 PROBLEM — A41.9 SEPSIS: Status: RESOLVED | Noted: 2020-03-16 | Resolved: 2021-02-28

## 2021-02-28 PROBLEM — J44.0 COPD WITH ACUTE LOWER RESPIRATORY INFECTION: Status: RESOLVED | Noted: 2020-03-06 | Resolved: 2021-02-28

## 2021-02-28 PROBLEM — E44.0 PROTEIN-CALORIE MALNUTRITION, MODERATE: Status: RESOLVED | Noted: 2020-06-23 | Resolved: 2021-02-28

## 2021-02-28 PROBLEM — R74.01 TRANSAMINASEMIA: Status: RESOLVED | Noted: 2020-03-03 | Resolved: 2021-02-28

## 2021-02-28 PROBLEM — J96.11 CHRONIC HYPOXEMIC RESPIRATORY FAILURE: Status: RESOLVED | Noted: 2020-03-06 | Resolved: 2021-02-28

## 2021-02-28 PROBLEM — Z13.31 SCREENING FOR DEPRESSION: Status: RESOLVED | Noted: 2020-08-06 | Resolved: 2021-02-28

## 2021-02-28 PROBLEM — R07.81 RIB PAIN: Status: RESOLVED | Noted: 2021-01-19 | Resolved: 2021-02-28

## 2021-02-28 PROBLEM — Z13.31 DEPRESSION SCREENING: Status: RESOLVED | Noted: 2020-08-08 | Resolved: 2021-02-28

## 2021-03-01 LAB — ALDOST SERPL-MCNC: 8.6 NG/DL

## 2021-03-02 LAB
CALCIT SERPL-MCNC: <5 PG/ML
NT-PROBNP SERPL-MCNC: 190 PG/ML
THRYOGLOBULIN INTERPRETATION: ABNORMAL
THYROGLOB AB SERPL-ACNC: 20 IU/ML
THYROGLOB SERPL-MCNC: 0.3 NG/ML

## 2021-03-03 LAB
1,25(OH)2D3 SERPL-MCNC: 60 PG/ML (ref 20–79)
IODINE SERPL-MCNC: 122 NG/ML (ref 40–92)

## 2021-03-05 LAB
CGA SERPL-MCNC: 93 NG/ML (ref 0–103)
RENIN PLAS-CCNC: 9.8 NG/ML/H

## 2021-03-07 LAB — VASOPRESSIN SERPL-MCNC: 1.7 PG/ML (ref 0–6.9)

## 2021-03-24 ENCOUNTER — CARE AT HOME (OUTPATIENT)
Dept: HOME HEALTH SERVICES | Facility: CLINIC | Age: 86
End: 2021-03-24
Payer: MEDICARE

## 2021-03-24 VITALS
RESPIRATION RATE: 16 BRPM | WEIGHT: 176 LBS | TEMPERATURE: 99 F | SYSTOLIC BLOOD PRESSURE: 108 MMHG | BODY MASS INDEX: 30.05 KG/M2 | HEIGHT: 64 IN | OXYGEN SATURATION: 98 % | HEART RATE: 83 BPM | DIASTOLIC BLOOD PRESSURE: 68 MMHG

## 2021-03-24 DIAGNOSIS — J44.9 CHRONIC OBSTRUCTIVE PULMONARY DISEASE, UNSPECIFIED COPD TYPE: ICD-10-CM

## 2021-03-24 DIAGNOSIS — R54 ADVANCED AGE: ICD-10-CM

## 2021-03-24 DIAGNOSIS — E21.0 PRIMARY HYPERPARATHYROIDISM: ICD-10-CM

## 2021-03-24 DIAGNOSIS — I10 HYPERTENSION, UNSPECIFIED TYPE: ICD-10-CM

## 2021-03-24 DIAGNOSIS — Z51.5 PALLIATIVE CARE ENCOUNTER: ICD-10-CM

## 2021-03-24 DIAGNOSIS — G47.00 INSOMNIA, UNSPECIFIED TYPE: ICD-10-CM

## 2021-03-24 DIAGNOSIS — M85.80 OSTEOPENIA WITH HIGH RISK OF FRACTURE: ICD-10-CM

## 2021-03-24 DIAGNOSIS — E07.9 THYROID DISEASE: ICD-10-CM

## 2021-03-24 DIAGNOSIS — H35.30 MACULAR DEGENERATION OF RIGHT EYE, UNSPECIFIED TYPE: Primary | ICD-10-CM

## 2021-03-24 DIAGNOSIS — K21.9 GASTROESOPHAGEAL REFLUX DISEASE WITHOUT ESOPHAGITIS: ICD-10-CM

## 2021-03-24 DIAGNOSIS — R32 URINARY INCONTINENCE, UNSPECIFIED TYPE: ICD-10-CM

## 2021-03-24 DIAGNOSIS — E66.9 OBESITY (BMI 30-39.9): ICD-10-CM

## 2021-03-24 PROCEDURE — 1159F PR MEDICATION LIST DOCUMENTED IN MEDICAL RECORD: ICD-10-PCS | Mod: S$GLB,,, | Performed by: NURSE PRACTITIONER

## 2021-03-24 PROCEDURE — 1159F MED LIST DOCD IN RCRD: CPT | Mod: S$GLB,,, | Performed by: NURSE PRACTITIONER

## 2021-03-24 PROCEDURE — 99350 HOME/RES VST EST HIGH MDM 60: CPT | Mod: S$GLB,,, | Performed by: NURSE PRACTITIONER

## 2021-03-24 PROCEDURE — 99350 PR HOME VISIT,ESTAB PATIENT,LEVEL IV: ICD-10-PCS | Mod: S$GLB,,, | Performed by: NURSE PRACTITIONER

## 2021-05-05 ENCOUNTER — CARE AT HOME (OUTPATIENT)
Dept: HOME HEALTH SERVICES | Facility: CLINIC | Age: 86
End: 2021-05-05
Payer: MEDICARE

## 2021-05-05 DIAGNOSIS — J44.9 CHRONIC OBSTRUCTIVE PULMONARY DISEASE, UNSPECIFIED COPD TYPE: Primary | ICD-10-CM

## 2021-05-05 DIAGNOSIS — Z60.4 SOCIAL ISOLATION: ICD-10-CM

## 2021-05-05 DIAGNOSIS — E21.0 PRIMARY HYPERPARATHYROIDISM: ICD-10-CM

## 2021-05-05 DIAGNOSIS — I25.118 CORONARY ARTERY DISEASE OF NATIVE ARTERY OF NATIVE HEART WITH STABLE ANGINA PECTORIS: ICD-10-CM

## 2021-05-05 DIAGNOSIS — Z13.31 NEGATIVE DEPRESSION SCREENING: ICD-10-CM

## 2021-05-05 DIAGNOSIS — G47.00 INSOMNIA, UNSPECIFIED TYPE: ICD-10-CM

## 2021-05-05 DIAGNOSIS — I10 HYPERTENSION, UNSPECIFIED TYPE: ICD-10-CM

## 2021-05-05 DIAGNOSIS — E07.9 THYROID DISEASE: ICD-10-CM

## 2021-05-05 DIAGNOSIS — R32 URINARY INCONTINENCE, UNSPECIFIED TYPE: ICD-10-CM

## 2021-05-05 DIAGNOSIS — R54 ADVANCED AGE: ICD-10-CM

## 2021-05-05 DIAGNOSIS — K64.8 OTHER HEMORRHOIDS: ICD-10-CM

## 2021-05-05 PROCEDURE — 1159F MED LIST DOCD IN RCRD: CPT | Mod: S$GLB,ICN,, | Performed by: NURSE PRACTITIONER

## 2021-05-05 PROCEDURE — 99350 PR HOME VISIT,ESTAB PATIENT,LEVEL IV: ICD-10-PCS | Mod: S$GLB,ICN,, | Performed by: NURSE PRACTITIONER

## 2021-05-05 PROCEDURE — 99350 HOME/RES VST EST HIGH MDM 60: CPT | Mod: S$GLB,ICN,, | Performed by: NURSE PRACTITIONER

## 2021-05-05 PROCEDURE — 96127 PR BRIEF EMOTIONAL/BEHAV ASSMT: ICD-10-PCS | Mod: S$GLB,ICN,, | Performed by: NURSE PRACTITIONER

## 2021-05-05 PROCEDURE — 1159F PR MEDICATION LIST DOCUMENTED IN MEDICAL RECORD: ICD-10-PCS | Mod: S$GLB,ICN,, | Performed by: NURSE PRACTITIONER

## 2021-05-05 PROCEDURE — 96127 BRIEF EMOTIONAL/BEHAV ASSMT: CPT | Mod: S$GLB,ICN,, | Performed by: NURSE PRACTITIONER

## 2021-05-05 RX ORDER — ISOSORBIDE MONONITRATE 30 MG/1
30 TABLET, EXTENDED RELEASE ORAL DAILY
Qty: 90 TABLET | Refills: 3 | Status: SHIPPED | OUTPATIENT
Start: 2021-05-05 | End: 2021-07-21 | Stop reason: SDUPTHER

## 2021-05-05 SDOH — SOCIAL DETERMINANTS OF HEALTH (SDOH): SOCIAL EXCLUSION AND REJECTION: Z60.4

## 2021-05-11 VITALS — RESPIRATION RATE: 16 BRPM | TEMPERATURE: 98 F | OXYGEN SATURATION: 97 % | HEART RATE: 76 BPM

## 2021-05-11 PROBLEM — K64.8 OTHER HEMORRHOIDS: Status: ACTIVE | Noted: 2021-05-11

## 2021-05-11 PROBLEM — Z13.31 NEGATIVE DEPRESSION SCREENING: Status: ACTIVE | Noted: 2021-05-11

## 2021-05-11 PROBLEM — I25.118 CORONARY ARTERY DISEASE OF NATIVE ARTERY OF NATIVE HEART WITH STABLE ANGINA PECTORIS: Status: ACTIVE | Noted: 2021-05-11

## 2021-05-19 ENCOUNTER — PATIENT MESSAGE (OUTPATIENT)
Dept: ENDOCRINOLOGY | Facility: CLINIC | Age: 86
End: 2021-05-19

## 2021-05-25 DIAGNOSIS — E04.1 NODULAR THYROID DISEASE: Primary | ICD-10-CM

## 2021-05-25 DIAGNOSIS — M85.80 OSTEOPENIA WITH HIGH RISK OF FRACTURE: ICD-10-CM

## 2021-05-25 DIAGNOSIS — Z78.0 POSTMENOPAUSAL: ICD-10-CM

## 2021-05-26 ENCOUNTER — TELEPHONE (OUTPATIENT)
Dept: ENDOCRINOLOGY | Facility: CLINIC | Age: 86
End: 2021-05-26

## 2021-06-03 ENCOUNTER — OFFICE VISIT (OUTPATIENT)
Dept: URGENT CARE | Facility: CLINIC | Age: 86
End: 2021-06-03
Payer: MEDICARE

## 2021-06-03 VITALS
TEMPERATURE: 97 F | DIASTOLIC BLOOD PRESSURE: 73 MMHG | HEART RATE: 85 BPM | BODY MASS INDEX: 31.41 KG/M2 | OXYGEN SATURATION: 96 % | HEIGHT: 64 IN | WEIGHT: 184 LBS | SYSTOLIC BLOOD PRESSURE: 130 MMHG

## 2021-06-03 DIAGNOSIS — R39.9 UTI SYMPTOMS: Primary | ICD-10-CM

## 2021-06-03 DIAGNOSIS — N39.0 URINARY TRACT INFECTION WITHOUT HEMATURIA, SITE UNSPECIFIED: ICD-10-CM

## 2021-06-03 DIAGNOSIS — L30.4 INTERTRIGO: ICD-10-CM

## 2021-06-03 LAB
BILIRUB UR QL STRIP: NEGATIVE
GLUCOSE UR QL STRIP: NEGATIVE
KETONES UR QL STRIP: NEGATIVE
LEUKOCYTE ESTERASE UR QL STRIP: POSITIVE
PH, POC UA: 6.5
POC BLOOD, URINE: NEGATIVE
POC NITRATES, URINE: NEGATIVE
PROT UR QL STRIP: NEGATIVE
SP GR UR STRIP: 1.02 (ref 1–1.03)
UROBILINOGEN UR STRIP-ACNC: NORMAL (ref 0.1–1.1)

## 2021-06-03 PROCEDURE — 99214 PR OFFICE/OUTPT VISIT, EST, LEVL IV, 30-39 MIN: ICD-10-PCS | Mod: S$GLB,,, | Performed by: NURSE PRACTITIONER

## 2021-06-03 PROCEDURE — 81003 URINALYSIS AUTO W/O SCOPE: CPT | Mod: QW,S$GLB,, | Performed by: NURSE PRACTITIONER

## 2021-06-03 PROCEDURE — 99214 OFFICE O/P EST MOD 30 MIN: CPT | Mod: S$GLB,,, | Performed by: NURSE PRACTITIONER

## 2021-06-03 PROCEDURE — 81003 POCT URINALYSIS, DIPSTICK, AUTOMATED, W/O SCOPE: ICD-10-PCS | Mod: QW,S$GLB,, | Performed by: NURSE PRACTITIONER

## 2021-06-03 RX ORDER — CLOTRIMAZOLE 1 %
CREAM (GRAM) TOPICAL 2 TIMES DAILY
Qty: 113 G | Refills: 1 | Status: SHIPPED | OUTPATIENT
Start: 2021-06-03 | End: 2021-12-01

## 2021-06-03 RX ORDER — NYSTATIN 100000 U/G
CREAM TOPICAL 2 TIMES DAILY
Qty: 60 G | Refills: 0 | Status: SHIPPED | OUTPATIENT
Start: 2021-06-03 | End: 2021-06-03 | Stop reason: CLARIF

## 2021-06-03 RX ORDER — CIPROFLOXACIN 250 MG/1
250 TABLET, FILM COATED ORAL EVERY 12 HOURS
Qty: 6 TABLET | Refills: 0 | Status: SHIPPED | OUTPATIENT
Start: 2021-06-03 | End: 2021-06-06

## 2021-06-04 ENCOUNTER — HOSPITAL ENCOUNTER (OUTPATIENT)
Dept: RADIOLOGY | Facility: HOSPITAL | Age: 86
Discharge: HOME | End: 2021-06-04
Attending: INTERNAL MEDICINE
Payer: MEDICARE

## 2021-06-04 DIAGNOSIS — M85.80 OSTEOPENIA WITH HIGH RISK OF FRACTURE: ICD-10-CM

## 2021-06-04 DIAGNOSIS — E04.1 NODULAR THYROID DISEASE: ICD-10-CM

## 2021-06-04 DIAGNOSIS — Z78.0 POSTMENOPAUSAL: ICD-10-CM

## 2021-06-04 PROCEDURE — 76536 US EXAM OF HEAD AND NECK: CPT | Mod: TC

## 2021-06-04 PROCEDURE — 76536 US SOFT TISSUE HEAD NECK THYROID: ICD-10-PCS | Mod: 26,,, | Performed by: RADIOLOGY

## 2021-06-04 PROCEDURE — 77080 DXA BONE DENSITY AXIAL: CPT | Mod: 26,,, | Performed by: RADIOLOGY

## 2021-06-04 PROCEDURE — 76536 US EXAM OF HEAD AND NECK: CPT | Mod: 26,,, | Performed by: RADIOLOGY

## 2021-06-04 PROCEDURE — 77080 DEXA BONE DENSITY SPINE HIP: ICD-10-PCS | Mod: 26,,, | Performed by: RADIOLOGY

## 2021-06-04 PROCEDURE — 77080 DXA BONE DENSITY AXIAL: CPT | Mod: TC

## 2021-06-08 LAB
BACTERIA UR CULT: ABNORMAL
BACTERIA UR CULT: ABNORMAL
OTHER ANTIBIOTIC SUSC ISLT: ABNORMAL

## 2021-06-09 ENCOUNTER — CARE AT HOME (OUTPATIENT)
Dept: HOME HEALTH SERVICES | Facility: CLINIC | Age: 86
End: 2021-06-09
Payer: MEDICARE

## 2021-06-09 VITALS
TEMPERATURE: 98 F | HEART RATE: 78 BPM | HEIGHT: 64 IN | OXYGEN SATURATION: 98 % | SYSTOLIC BLOOD PRESSURE: 138 MMHG | WEIGHT: 181 LBS | RESPIRATION RATE: 16 BRPM | DIASTOLIC BLOOD PRESSURE: 64 MMHG | BODY MASS INDEX: 30.9 KG/M2

## 2021-06-09 DIAGNOSIS — F33.1 MAJOR DEPRESSIVE DISORDER, RECURRENT EPISODE, MODERATE: ICD-10-CM

## 2021-06-09 DIAGNOSIS — Z87.440 HISTORY OF UTI: ICD-10-CM

## 2021-06-09 DIAGNOSIS — F41.9 ANXIETY: ICD-10-CM

## 2021-06-09 DIAGNOSIS — Z13.31 DEPRESSION SCREEN: ICD-10-CM

## 2021-06-09 DIAGNOSIS — G47.00 INSOMNIA, UNSPECIFIED TYPE: ICD-10-CM

## 2021-06-09 DIAGNOSIS — I25.118 CORONARY ARTERY DISEASE OF NATIVE ARTERY OF NATIVE HEART WITH STABLE ANGINA PECTORIS: ICD-10-CM

## 2021-06-09 DIAGNOSIS — I10 HYPERTENSION, UNSPECIFIED TYPE: ICD-10-CM

## 2021-06-09 DIAGNOSIS — R53.1 WEAKNESS: ICD-10-CM

## 2021-06-09 DIAGNOSIS — K21.9 GASTROESOPHAGEAL REFLUX DISEASE WITHOUT ESOPHAGITIS: ICD-10-CM

## 2021-06-09 DIAGNOSIS — R54 ADVANCED AGE: ICD-10-CM

## 2021-06-09 DIAGNOSIS — Z51.5 PALLIATIVE CARE ENCOUNTER: ICD-10-CM

## 2021-06-09 DIAGNOSIS — Z60.4 SOCIAL ISOLATION: Primary | ICD-10-CM

## 2021-06-09 PROBLEM — E66.9 OBESITY (BMI 35.0-39.9 WITHOUT COMORBIDITY): Status: RESOLVED | Noted: 2017-01-24 | Resolved: 2021-06-09

## 2021-06-09 PROCEDURE — 99350 HOME/RES VST EST HIGH MDM 60: CPT | Mod: S$GLB,ICN,, | Performed by: NURSE PRACTITIONER

## 2021-06-09 PROCEDURE — 96127 PR BRIEF EMOTIONAL/BEHAV ASSMT: ICD-10-PCS | Mod: S$GLB,ICN,, | Performed by: NURSE PRACTITIONER

## 2021-06-09 PROCEDURE — 96127 BRIEF EMOTIONAL/BEHAV ASSMT: CPT | Mod: S$GLB,ICN,, | Performed by: NURSE PRACTITIONER

## 2021-06-09 PROCEDURE — 1159F MED LIST DOCD IN RCRD: CPT | Mod: S$GLB,ICN,, | Performed by: NURSE PRACTITIONER

## 2021-06-09 PROCEDURE — 99350 PR HOME VISIT,ESTAB PATIENT,LEVEL IV: ICD-10-PCS | Mod: S$GLB,ICN,, | Performed by: NURSE PRACTITIONER

## 2021-06-09 PROCEDURE — 1159F PR MEDICATION LIST DOCUMENTED IN MEDICAL RECORD: ICD-10-PCS | Mod: S$GLB,ICN,, | Performed by: NURSE PRACTITIONER

## 2021-06-09 RX ORDER — BUSPIRONE HYDROCHLORIDE 5 MG/1
5 TABLET ORAL 2 TIMES DAILY
Qty: 60 TABLET | Refills: 1 | Status: SHIPPED | OUTPATIENT
Start: 2021-06-09 | End: 2021-06-23 | Stop reason: SDUPTHER

## 2021-06-09 RX ORDER — SERTRALINE HYDROCHLORIDE 25 MG/1
25 TABLET, FILM COATED ORAL DAILY
Qty: 30 TABLET | Refills: 1 | Status: SHIPPED | OUTPATIENT
Start: 2021-06-09 | End: 2021-06-23 | Stop reason: DRUGHIGH

## 2021-06-09 SDOH — SOCIAL DETERMINANTS OF HEALTH (SDOH): SOCIAL EXCLUSION AND REJECTION: Z60.4

## 2021-06-11 ENCOUNTER — HOSPITAL ENCOUNTER (EMERGENCY)
Facility: HOSPITAL | Age: 86
Discharge: HOME | End: 2021-06-11
Attending: EMERGENCY MEDICINE
Payer: MEDICARE

## 2021-06-11 ENCOUNTER — TELEPHONE (OUTPATIENT)
Dept: HOME HEALTH SERVICES | Facility: CLINIC | Age: 86
End: 2021-06-11

## 2021-06-11 VITALS
HEART RATE: 78 BPM | BODY MASS INDEX: 30.68 KG/M2 | HEIGHT: 64 IN | DIASTOLIC BLOOD PRESSURE: 65 MMHG | TEMPERATURE: 97 F | WEIGHT: 179.69 LBS | OXYGEN SATURATION: 97 % | RESPIRATION RATE: 20 BRPM | SYSTOLIC BLOOD PRESSURE: 146 MMHG

## 2021-06-11 DIAGNOSIS — R10.13 EPIGASTRIC PAIN: ICD-10-CM

## 2021-06-11 DIAGNOSIS — K29.70 GASTRITIS, PRESENCE OF BLEEDING UNSPECIFIED, UNSPECIFIED CHRONICITY, UNSPECIFIED GASTRITIS TYPE: Primary | ICD-10-CM

## 2021-06-11 LAB
ALBUMIN SERPL BCP-MCNC: 3.9 G/DL (ref 3.5–5.2)
ALP SERPL-CCNC: 84 U/L (ref 55–135)
ALT SERPL W/O P-5'-P-CCNC: 34 U/L (ref 10–44)
ANION GAP SERPL CALC-SCNC: 8 MMOL/L (ref 8–16)
AST SERPL-CCNC: 37 U/L (ref 10–40)
BACTERIA #/AREA URNS HPF: ABNORMAL /HPF
BASOPHILS # BLD AUTO: 0.04 K/UL (ref 0–0.2)
BASOPHILS NFR BLD: 0.7 % (ref 0–1.9)
BILIRUB SERPL-MCNC: 0.7 MG/DL (ref 0.1–1)
BILIRUB UR QL STRIP: NEGATIVE
BNP SERPL-MCNC: 138 PG/ML (ref 0–99)
BUN SERPL-MCNC: 14 MG/DL (ref 10–30)
CALCIUM SERPL-MCNC: 10.3 MG/DL (ref 8.7–10.5)
CHLORIDE SERPL-SCNC: 98 MMOL/L (ref 95–110)
CLARITY UR: CLEAR
CO2 SERPL-SCNC: 25 MMOL/L (ref 23–29)
COLOR UR: YELLOW
CREAT SERPL-MCNC: 0.8 MG/DL (ref 0.5–1.4)
DIFFERENTIAL METHOD: ABNORMAL
EOSINOPHIL # BLD AUTO: 0.1 K/UL (ref 0–0.5)
EOSINOPHIL NFR BLD: 1.8 % (ref 0–8)
ERYTHROCYTE [DISTWIDTH] IN BLOOD BY AUTOMATED COUNT: 12.1 % (ref 11.5–14.5)
EST. GFR  (AFRICAN AMERICAN): >60 ML/MIN/1.73 M^2
EST. GFR  (NON AFRICAN AMERICAN): >60 ML/MIN/1.73 M^2
GLUCOSE SERPL-MCNC: 109 MG/DL (ref 70–110)
GLUCOSE UR QL STRIP: NEGATIVE
HCT VFR BLD AUTO: 38.9 % (ref 37–48.5)
HGB BLD-MCNC: 13.1 G/DL (ref 12–16)
HGB UR QL STRIP: NEGATIVE
IMM GRANULOCYTES # BLD AUTO: 0.02 K/UL (ref 0–0.04)
IMM GRANULOCYTES NFR BLD AUTO: 0.4 % (ref 0–0.5)
KETONES UR QL STRIP: ABNORMAL
LEUKOCYTE ESTERASE UR QL STRIP: ABNORMAL
LIPASE SERPL-CCNC: 14 U/L (ref 4–60)
LYMPHOCYTES # BLD AUTO: 1.7 K/UL (ref 1–4.8)
LYMPHOCYTES NFR BLD: 29.6 % (ref 18–48)
MCH RBC QN AUTO: 32.3 PG (ref 27–31)
MCHC RBC AUTO-ENTMCNC: 33.7 G/DL (ref 32–36)
MCV RBC AUTO: 96 FL (ref 82–98)
MICROSCOPIC COMMENT: ABNORMAL
MONOCYTES # BLD AUTO: 0.7 K/UL (ref 0.3–1)
MONOCYTES NFR BLD: 12.3 % (ref 4–15)
NEUTROPHILS # BLD AUTO: 3.1 K/UL (ref 1.8–7.7)
NEUTROPHILS NFR BLD: 55.2 % (ref 38–73)
NITRITE UR QL STRIP: NEGATIVE
NRBC BLD-RTO: 0 /100 WBC
PH UR STRIP: 6 [PH] (ref 5–8)
PLATELET # BLD AUTO: 263 K/UL (ref 150–450)
PMV BLD AUTO: 9.4 FL (ref 9.2–12.9)
POTASSIUM SERPL-SCNC: 4.9 MMOL/L (ref 3.5–5.1)
PROT SERPL-MCNC: 8 G/DL (ref 6–8.4)
PROT UR QL STRIP: NEGATIVE
RBC # BLD AUTO: 4.05 M/UL (ref 4–5.4)
SODIUM SERPL-SCNC: 131 MMOL/L (ref 136–145)
SP GR UR STRIP: 1.02 (ref 1–1.03)
SQUAMOUS #/AREA URNS HPF: 4 /HPF
TROPONIN I SERPL DL<=0.01 NG/ML-MCNC: 0.01 NG/ML (ref 0–0.03)
URN SPEC COLLECT METH UR: ABNORMAL
UROBILINOGEN UR STRIP-ACNC: NEGATIVE EU/DL
WBC # BLD AUTO: 5.6 K/UL (ref 3.9–12.7)
WBC #/AREA URNS HPF: 5 /HPF (ref 0–5)

## 2021-06-11 PROCEDURE — 83690 ASSAY OF LIPASE: CPT | Performed by: PHYSICIAN ASSISTANT

## 2021-06-11 PROCEDURE — 80053 COMPREHEN METABOLIC PANEL: CPT | Performed by: PHYSICIAN ASSISTANT

## 2021-06-11 PROCEDURE — 36415 COLL VENOUS BLD VENIPUNCTURE: CPT | Performed by: PHYSICIAN ASSISTANT

## 2021-06-11 PROCEDURE — 84484 ASSAY OF TROPONIN QUANT: CPT | Performed by: PHYSICIAN ASSISTANT

## 2021-06-11 PROCEDURE — 99285 EMERGENCY DEPT VISIT HI MDM: CPT | Mod: 25

## 2021-06-11 PROCEDURE — 93005 ELECTROCARDIOGRAM TRACING: CPT

## 2021-06-11 PROCEDURE — 81000 URINALYSIS NONAUTO W/SCOPE: CPT | Performed by: PHYSICIAN ASSISTANT

## 2021-06-11 PROCEDURE — 83880 ASSAY OF NATRIURETIC PEPTIDE: CPT | Performed by: NURSE PRACTITIONER

## 2021-06-11 PROCEDURE — 93010 EKG 12-LEAD: ICD-10-PCS | Mod: ,,, | Performed by: INTERNAL MEDICINE

## 2021-06-11 PROCEDURE — 25000003 PHARM REV CODE 250: Performed by: NURSE PRACTITIONER

## 2021-06-11 PROCEDURE — 85025 COMPLETE CBC W/AUTO DIFF WBC: CPT | Performed by: PHYSICIAN ASSISTANT

## 2021-06-11 PROCEDURE — 93010 ELECTROCARDIOGRAM REPORT: CPT | Mod: ,,, | Performed by: INTERNAL MEDICINE

## 2021-06-11 PROCEDURE — 36415 COLL VENOUS BLD VENIPUNCTURE: CPT | Performed by: NURSE PRACTITIONER

## 2021-06-11 RX ORDER — ALPRAZOLAM 0.25 MG/1
0.25 TABLET ORAL NIGHTLY PRN
Qty: 30 TABLET | Refills: 0 | Status: SHIPPED | OUTPATIENT
Start: 2021-06-11 | End: 2021-07-21 | Stop reason: SDUPTHER

## 2021-06-11 RX ADMIN — DICYCLOMINE HYDROCHLORIDE: 10 SOLUTION ORAL at 10:06

## 2021-06-23 ENCOUNTER — CARE AT HOME (OUTPATIENT)
Dept: HOME HEALTH SERVICES | Facility: CLINIC | Age: 86
End: 2021-06-23
Payer: MEDICARE

## 2021-06-23 DIAGNOSIS — F33.1 MAJOR DEPRESSIVE DISORDER, RECURRENT EPISODE, MODERATE: ICD-10-CM

## 2021-06-23 DIAGNOSIS — K21.9 GASTROESOPHAGEAL REFLUX DISEASE WITHOUT ESOPHAGITIS: Primary | ICD-10-CM

## 2021-06-23 DIAGNOSIS — R63.0 DECREASED APPETITE: ICD-10-CM

## 2021-06-23 DIAGNOSIS — F41.9 ANXIETY: ICD-10-CM

## 2021-06-23 DIAGNOSIS — G47.00 INSOMNIA, UNSPECIFIED TYPE: ICD-10-CM

## 2021-06-23 DIAGNOSIS — Z51.5 PALLIATIVE CARE ENCOUNTER: ICD-10-CM

## 2021-06-23 PROCEDURE — 99350 PR HOME VISIT,ESTAB PATIENT,LEVEL IV: ICD-10-PCS | Mod: S$GLB,,, | Performed by: NURSE PRACTITIONER

## 2021-06-23 PROCEDURE — 1159F MED LIST DOCD IN RCRD: CPT | Mod: S$GLB,,, | Performed by: NURSE PRACTITIONER

## 2021-06-23 PROCEDURE — 1159F PR MEDICATION LIST DOCUMENTED IN MEDICAL RECORD: ICD-10-PCS | Mod: S$GLB,,, | Performed by: NURSE PRACTITIONER

## 2021-06-23 PROCEDURE — 99350 HOME/RES VST EST HIGH MDM 60: CPT | Mod: S$GLB,,, | Performed by: NURSE PRACTITIONER

## 2021-06-23 RX ORDER — BUSPIRONE HYDROCHLORIDE 5 MG/1
5 TABLET ORAL DAILY PRN
Qty: 30 TABLET | Refills: 1 | Status: SHIPPED | OUTPATIENT
Start: 2021-06-23 | End: 2021-08-18 | Stop reason: SDUPTHER

## 2021-06-23 RX ORDER — FAMOTIDINE 20 MG/1
20 TABLET, FILM COATED ORAL 2 TIMES DAILY
Qty: 180 TABLET | Refills: 3 | Status: SHIPPED | OUTPATIENT
Start: 2021-06-23 | End: 2022-08-15

## 2021-06-23 RX ORDER — SERTRALINE HYDROCHLORIDE 50 MG/1
50 TABLET, FILM COATED ORAL DAILY
Qty: 30 TABLET | Refills: 1 | Status: SHIPPED | OUTPATIENT
Start: 2021-06-23 | End: 2021-08-18 | Stop reason: SDUPTHER

## 2021-06-24 VITALS
OXYGEN SATURATION: 97 % | WEIGHT: 177 LBS | DIASTOLIC BLOOD PRESSURE: 76 MMHG | SYSTOLIC BLOOD PRESSURE: 140 MMHG | RESPIRATION RATE: 16 BRPM | HEIGHT: 64 IN | TEMPERATURE: 99 F | BODY MASS INDEX: 30.22 KG/M2 | HEART RATE: 68 BPM

## 2021-06-27 PROBLEM — R63.0 DECREASED APPETITE: Status: ACTIVE | Noted: 2021-06-27

## 2021-07-21 ENCOUNTER — CARE AT HOME (OUTPATIENT)
Dept: HOME HEALTH SERVICES | Facility: CLINIC | Age: 86
End: 2021-07-21
Payer: MEDICARE

## 2021-07-21 DIAGNOSIS — F41.9 ANXIETY: Primary | ICD-10-CM

## 2021-07-21 DIAGNOSIS — R54 ADVANCED AGE: ICD-10-CM

## 2021-07-21 DIAGNOSIS — Z51.5 PALLIATIVE CARE ENCOUNTER: ICD-10-CM

## 2021-07-21 DIAGNOSIS — F33.0 MAJOR DEPRESSIVE DISORDER, RECURRENT EPISODE, MILD: ICD-10-CM

## 2021-07-21 DIAGNOSIS — R29.818 DIFFICULTY BALANCING: ICD-10-CM

## 2021-07-21 DIAGNOSIS — I25.118 CORONARY ARTERY DISEASE OF NATIVE ARTERY OF NATIVE HEART WITH STABLE ANGINA PECTORIS: ICD-10-CM

## 2021-07-21 DIAGNOSIS — I10 HYPERTENSION, UNSPECIFIED TYPE: ICD-10-CM

## 2021-07-21 DIAGNOSIS — J44.9 CHRONIC OBSTRUCTIVE PULMONARY DISEASE, UNSPECIFIED COPD TYPE: ICD-10-CM

## 2021-07-21 DIAGNOSIS — H35.30 MACULAR DEGENERATION OF RIGHT EYE, UNSPECIFIED TYPE: ICD-10-CM

## 2021-07-21 PROBLEM — Z91.81 HISTORY OF RECENT FALL: Status: ACTIVE | Noted: 2021-07-21

## 2021-07-21 PROCEDURE — 99350 HOME/RES VST EST HIGH MDM 60: CPT | Mod: S$GLB,,, | Performed by: NURSE PRACTITIONER

## 2021-07-21 PROCEDURE — 96127 BRIEF EMOTIONAL/BEHAV ASSMT: CPT | Mod: S$GLB,,, | Performed by: NURSE PRACTITIONER

## 2021-07-21 PROCEDURE — 96127 PR BRIEF EMOTIONAL/BEHAV ASSMT: ICD-10-PCS | Mod: S$GLB,,, | Performed by: NURSE PRACTITIONER

## 2021-07-21 PROCEDURE — 99350 PR HOME VISIT,ESTAB PATIENT,LEVEL IV: ICD-10-PCS | Mod: S$GLB,,, | Performed by: NURSE PRACTITIONER

## 2021-07-21 RX ORDER — FUROSEMIDE 20 MG/1
20 TABLET ORAL DAILY
Qty: 90 TABLET | Refills: 3 | Status: SHIPPED | OUTPATIENT
Start: 2021-07-21 | End: 2021-08-18 | Stop reason: SDUPTHER

## 2021-07-21 RX ORDER — ISOSORBIDE MONONITRATE 30 MG/1
30 TABLET, EXTENDED RELEASE ORAL DAILY
Qty: 90 TABLET | Refills: 3 | Status: SHIPPED | OUTPATIENT
Start: 2021-07-21 | End: 2022-08-15

## 2021-07-21 RX ORDER — ALPRAZOLAM 0.25 MG/1
0.25 TABLET ORAL NIGHTLY PRN
Qty: 30 TABLET | Refills: 1 | Status: SHIPPED | OUTPATIENT
Start: 2021-07-21 | End: 2021-12-01

## 2021-08-02 VITALS
HEIGHT: 64 IN | OXYGEN SATURATION: 98 % | BODY MASS INDEX: 30.05 KG/M2 | WEIGHT: 176 LBS | TEMPERATURE: 98 F | HEART RATE: 70 BPM | SYSTOLIC BLOOD PRESSURE: 120 MMHG | RESPIRATION RATE: 16 BRPM | DIASTOLIC BLOOD PRESSURE: 68 MMHG

## 2021-08-18 ENCOUNTER — CARE AT HOME (OUTPATIENT)
Dept: HOME HEALTH SERVICES | Facility: CLINIC | Age: 86
End: 2021-08-18
Payer: MEDICARE

## 2021-08-18 DIAGNOSIS — I10 HYPERTENSION, UNSPECIFIED TYPE: ICD-10-CM

## 2021-08-18 DIAGNOSIS — R26.81 UNSTEADY GAIT: ICD-10-CM

## 2021-08-18 DIAGNOSIS — Z91.81 HISTORY OF RECENT FALL: ICD-10-CM

## 2021-08-18 DIAGNOSIS — E87.6 HYPOKALEMIA: ICD-10-CM

## 2021-08-18 DIAGNOSIS — R53.1 WEAKNESS: ICD-10-CM

## 2021-08-18 DIAGNOSIS — F41.9 ANXIETY: ICD-10-CM

## 2021-08-18 DIAGNOSIS — F33.0 MAJOR DEPRESSIVE DISORDER, RECURRENT EPISODE, MILD: ICD-10-CM

## 2021-08-18 DIAGNOSIS — R29.818 DIFFICULTY BALANCING: Primary | ICD-10-CM

## 2021-08-18 DIAGNOSIS — Z51.5 PALLIATIVE CARE ENCOUNTER: ICD-10-CM

## 2021-08-18 PROCEDURE — 99350 PR HOME VISIT,ESTAB PATIENT,LEVEL IV: ICD-10-PCS | Mod: S$GLB,,, | Performed by: NURSE PRACTITIONER

## 2021-08-18 PROCEDURE — 99350 HOME/RES VST EST HIGH MDM 60: CPT | Mod: S$GLB,,, | Performed by: NURSE PRACTITIONER

## 2021-08-18 RX ORDER — OLMESARTAN MEDOXOMIL 5 MG/1
5 TABLET ORAL DAILY
Qty: 90 TABLET | Refills: 3 | Status: SHIPPED | OUTPATIENT
Start: 2021-08-18 | End: 2021-11-03 | Stop reason: SDUPTHER

## 2021-08-18 RX ORDER — POTASSIUM CHLORIDE 20 MEQ/1
20 TABLET, EXTENDED RELEASE ORAL ONCE
Qty: 90 TABLET | Refills: 3 | Status: SHIPPED | OUTPATIENT
Start: 2021-08-18 | End: 2022-11-14

## 2021-08-18 RX ORDER — SERTRALINE HYDROCHLORIDE 50 MG/1
50 TABLET, FILM COATED ORAL DAILY
Qty: 30 TABLET | Refills: 1 | Status: SHIPPED | OUTPATIENT
Start: 2021-08-18 | End: 2021-09-28

## 2021-08-18 RX ORDER — FUROSEMIDE 20 MG/1
20 TABLET ORAL DAILY
Qty: 90 TABLET | Refills: 3 | Status: SHIPPED | OUTPATIENT
Start: 2021-08-18 | End: 2021-09-16

## 2021-08-18 RX ORDER — BUSPIRONE HYDROCHLORIDE 5 MG/1
5 TABLET ORAL DAILY PRN
Qty: 30 TABLET | Refills: 1 | Status: SHIPPED | OUTPATIENT
Start: 2021-08-18 | End: 2021-11-03 | Stop reason: SDUPTHER

## 2021-08-20 PROCEDURE — G0179 MD RECERTIFICATION HHA PT: HCPCS | Mod: ,,, | Performed by: NURSE PRACTITIONER

## 2021-08-20 PROCEDURE — G0179 PR HOME HEALTH MD RECERTIFICATION: ICD-10-PCS | Mod: ,,, | Performed by: NURSE PRACTITIONER

## 2021-08-22 VITALS
HEIGHT: 64 IN | DIASTOLIC BLOOD PRESSURE: 74 MMHG | SYSTOLIC BLOOD PRESSURE: 136 MMHG | HEART RATE: 74 BPM | WEIGHT: 179 LBS | OXYGEN SATURATION: 98 % | TEMPERATURE: 99 F | RESPIRATION RATE: 16 BRPM | BODY MASS INDEX: 30.56 KG/M2

## 2021-08-25 ENCOUNTER — TELEPHONE (OUTPATIENT)
Dept: ENDOCRINOLOGY | Facility: CLINIC | Age: 86
End: 2021-08-25

## 2021-08-25 DIAGNOSIS — E21.0 PRIMARY HYPERPARATHYROIDISM: ICD-10-CM

## 2021-08-25 DIAGNOSIS — E55.9 VITAMIN D DEFICIENCY DISEASE: ICD-10-CM

## 2021-08-25 DIAGNOSIS — E87.1 HYPONATREMIA: ICD-10-CM

## 2021-08-25 DIAGNOSIS — E03.9 ACQUIRED HYPOTHYROIDISM: Primary | ICD-10-CM

## 2021-08-25 DIAGNOSIS — I10 ESSENTIAL HYPERTENSION: ICD-10-CM

## 2021-08-31 ENCOUNTER — PATIENT MESSAGE (OUTPATIENT)
Dept: ENDOCRINOLOGY | Facility: CLINIC | Age: 86
End: 2021-08-31

## 2021-08-31 DIAGNOSIS — E03.9 ACQUIRED HYPOTHYROIDISM: Primary | ICD-10-CM

## 2021-09-07 ENCOUNTER — EXTERNAL HOME HEALTH (OUTPATIENT)
Dept: HOME HEALTH SERVICES | Facility: HOSPITAL | Age: 86
End: 2021-09-07
Payer: MEDICARE

## 2021-09-07 RX ORDER — LEVOTHYROXINE SODIUM 88 UG/1
88 TABLET ORAL DAILY
Qty: 90 TABLET | Refills: 3 | Status: SHIPPED | OUTPATIENT
Start: 2021-09-07 | End: 2021-09-21 | Stop reason: SDUPTHER

## 2021-09-15 ENCOUNTER — LAB VISIT (OUTPATIENT)
Dept: LAB | Facility: HOSPITAL | Age: 86
End: 2021-09-15
Attending: INTERNAL MEDICINE
Payer: MEDICARE

## 2021-09-15 ENCOUNTER — CARE AT HOME (OUTPATIENT)
Dept: HOME HEALTH SERVICES | Facility: CLINIC | Age: 86
End: 2021-09-15
Payer: MEDICARE

## 2021-09-15 VITALS
BODY MASS INDEX: 28.88 KG/M2 | OXYGEN SATURATION: 98 % | DIASTOLIC BLOOD PRESSURE: 64 MMHG | HEART RATE: 74 BPM | RESPIRATION RATE: 16 BRPM | HEIGHT: 64 IN | SYSTOLIC BLOOD PRESSURE: 128 MMHG | WEIGHT: 169.19 LBS | TEMPERATURE: 99 F

## 2021-09-15 DIAGNOSIS — R63.0 DECREASED APPETITE: ICD-10-CM

## 2021-09-15 DIAGNOSIS — F41.9 ANXIETY: Primary | ICD-10-CM

## 2021-09-15 DIAGNOSIS — R54 ADVANCED AGE: ICD-10-CM

## 2021-09-15 DIAGNOSIS — Z51.5 PALLIATIVE CARE ENCOUNTER: ICD-10-CM

## 2021-09-15 DIAGNOSIS — R63.4 WEIGHT LOSS, UNINTENTIONAL: ICD-10-CM

## 2021-09-15 DIAGNOSIS — I10 ESSENTIAL HYPERTENSION: ICD-10-CM

## 2021-09-15 DIAGNOSIS — E87.1 HYPONATREMIA: ICD-10-CM

## 2021-09-15 DIAGNOSIS — R53.1 WEAKNESS: ICD-10-CM

## 2021-09-15 DIAGNOSIS — E21.0 PRIMARY HYPERPARATHYROIDISM: ICD-10-CM

## 2021-09-15 DIAGNOSIS — E55.9 VITAMIN D DEFICIENCY DISEASE: ICD-10-CM

## 2021-09-15 DIAGNOSIS — E03.9 ACQUIRED HYPOTHYROIDISM: ICD-10-CM

## 2021-09-15 LAB
25(OH)D3+25(OH)D2 SERPL-MCNC: 53 NG/ML (ref 30–96)
ALBUMIN SERPL BCP-MCNC: 3.5 G/DL (ref 3.5–5.2)
ALP SERPL-CCNC: 99 U/L (ref 55–135)
ALT SERPL W/O P-5'-P-CCNC: 20 U/L (ref 10–44)
ANION GAP SERPL CALC-SCNC: 14 MMOL/L (ref 8–16)
AST SERPL-CCNC: 34 U/L (ref 10–40)
BILIRUB SERPL-MCNC: 0.7 MG/DL (ref 0.1–1)
BNP SERPL-MCNC: 48 PG/ML (ref 0–99)
BUN SERPL-MCNC: 17 MG/DL (ref 10–30)
CALCIUM SERPL-MCNC: 10.6 MG/DL (ref 8.7–10.5)
CHLORIDE SERPL-SCNC: 84 MMOL/L (ref 95–110)
CO2 SERPL-SCNC: 22 MMOL/L (ref 23–29)
CORTIS SERPL-MCNC: 16.3 UG/DL
CREAT SERPL-MCNC: 0.7 MG/DL (ref 0.5–1.4)
EST. GFR  (AFRICAN AMERICAN): >60 ML/MIN/1.73 M^2
EST. GFR  (NON AFRICAN AMERICAN): >60 ML/MIN/1.73 M^2
GLUCOSE SERPL-MCNC: 101 MG/DL (ref 70–110)
POTASSIUM SERPL-SCNC: 4.3 MMOL/L (ref 3.5–5.1)
PROT SERPL-MCNC: 8.1 G/DL (ref 6–8.4)
PTH-INTACT SERPL-MCNC: 61.7 PG/ML (ref 9–77)
SODIUM SERPL-SCNC: 120 MMOL/L (ref 136–145)
T3 SERPL-MCNC: 56 NG/DL (ref 60–180)
T4 FREE SERPL-MCNC: 1.28 NG/DL (ref 0.71–1.51)
TSH SERPL DL<=0.005 MIU/L-ACNC: 1.36 UIU/ML (ref 0.4–4)

## 2021-09-15 PROCEDURE — 83930 ASSAY OF BLOOD OSMOLALITY: CPT | Mod: HCNC | Performed by: INTERNAL MEDICINE

## 2021-09-15 PROCEDURE — 83880 ASSAY OF NATRIURETIC PEPTIDE: CPT | Mod: 91,HCNC | Performed by: INTERNAL MEDICINE

## 2021-09-15 PROCEDURE — 82088 ASSAY OF ALDOSTERONE: CPT | Mod: HCNC | Performed by: INTERNAL MEDICINE

## 2021-09-15 PROCEDURE — 84588 ASSAY OF VASOPRESSIN: CPT | Mod: HCNC | Performed by: INTERNAL MEDICINE

## 2021-09-15 PROCEDURE — 82330 ASSAY OF CALCIUM: CPT | Mod: HCNC | Performed by: INTERNAL MEDICINE

## 2021-09-15 PROCEDURE — 84439 ASSAY OF FREE THYROXINE: CPT | Mod: HCNC | Performed by: INTERNAL MEDICINE

## 2021-09-15 PROCEDURE — 83880 ASSAY OF NATRIURETIC PEPTIDE: CPT | Mod: HCNC | Performed by: INTERNAL MEDICINE

## 2021-09-15 PROCEDURE — 86255 FLUORESCENT ANTIBODY SCREEN: CPT | Performed by: INTERNAL MEDICINE

## 2021-09-15 PROCEDURE — 99350 PR HOME VISIT,ESTAB PATIENT,LEVEL IV: ICD-10-PCS | Mod: S$GLB,,, | Performed by: NURSE PRACTITIONER

## 2021-09-15 PROCEDURE — 99350 HOME/RES VST EST HIGH MDM 60: CPT | Mod: S$GLB,,, | Performed by: NURSE PRACTITIONER

## 2021-09-15 PROCEDURE — 30000890 MISCELLANEOUS SENDOUT TEST, BLOOD: Performed by: INTERNAL MEDICINE

## 2021-09-15 PROCEDURE — 86800 THYROGLOBULIN ANTIBODY: CPT | Mod: HCNC | Performed by: INTERNAL MEDICINE

## 2021-09-15 PROCEDURE — 82024 ASSAY OF ACTH: CPT | Mod: HCNC | Performed by: INTERNAL MEDICINE

## 2021-09-15 PROCEDURE — 84443 ASSAY THYROID STIM HORMONE: CPT | Mod: HCNC | Performed by: INTERNAL MEDICINE

## 2021-09-15 PROCEDURE — 82306 VITAMIN D 25 HYDROXY: CPT | Mod: HCNC | Performed by: INTERNAL MEDICINE

## 2021-09-15 PROCEDURE — 82533 TOTAL CORTISOL: CPT | Mod: HCNC | Performed by: INTERNAL MEDICINE

## 2021-09-15 PROCEDURE — 80053 COMPREHEN METABOLIC PANEL: CPT | Mod: HCNC | Performed by: INTERNAL MEDICINE

## 2021-09-15 PROCEDURE — 83970 ASSAY OF PARATHORMONE: CPT | Mod: HCNC | Performed by: INTERNAL MEDICINE

## 2021-09-15 PROCEDURE — 84480 ASSAY TRIIODOTHYRONINE (T3): CPT | Mod: HCNC | Performed by: INTERNAL MEDICINE

## 2021-09-15 PROCEDURE — 84244 ASSAY OF RENIN: CPT | Mod: HCNC | Performed by: INTERNAL MEDICINE

## 2021-09-16 ENCOUNTER — TELEPHONE (OUTPATIENT)
Dept: HOME HEALTH SERVICES | Facility: CLINIC | Age: 86
End: 2021-09-16

## 2021-09-16 DIAGNOSIS — E87.1 HYPONATREMIA: Primary | ICD-10-CM

## 2021-09-16 LAB
CA-I BLDV-SCNC: 1.31 MMOL/L (ref 1.06–1.42)
OSMOLALITY SERPL: 263 MOSM/KG (ref 275–295)

## 2021-09-16 RX ORDER — CIPROFLOXACIN 250 MG/1
250 TABLET, FILM COATED ORAL EVERY 12 HOURS
Qty: 10 TABLET | Refills: 0 | Status: SHIPPED | OUTPATIENT
Start: 2021-09-16 | End: 2021-09-30 | Stop reason: ALTCHOICE

## 2021-09-17 LAB
ACTH PLAS-MCNC: 28 PG/ML (ref 0–46)
THRYOGLOBULIN INTERPRETATION: ABNORMAL
THYROGLOB AB SERPL-ACNC: 10 IU/ML
THYROGLOB SERPL-MCNC: 0.1 NG/ML

## 2021-09-19 PROBLEM — N28.89: Status: ACTIVE | Noted: 2021-09-19

## 2021-09-19 LAB — NT-PROBNP SERPL-MCNC: 169 PG/ML

## 2021-09-20 LAB — ALDOST SERPL-MCNC: 16.4 NG/DL

## 2021-09-21 ENCOUNTER — PATIENT MESSAGE (OUTPATIENT)
Dept: ENDOCRINOLOGY | Facility: CLINIC | Age: 86
End: 2021-09-21

## 2021-09-21 DIAGNOSIS — E03.9 ACQUIRED HYPOTHYROIDISM: ICD-10-CM

## 2021-09-21 LAB — RENIN PLAS-CCNC: 32 NG/ML/H

## 2021-09-21 RX ORDER — LEVOTHYROXINE SODIUM 88 UG/1
88 TABLET ORAL DAILY
Qty: 90 TABLET | Refills: 3 | Status: SHIPPED | OUTPATIENT
Start: 2021-09-21 | End: 2022-09-12

## 2021-09-22 ENCOUNTER — TELEPHONE (OUTPATIENT)
Dept: ENDOCRINOLOGY | Facility: CLINIC | Age: 86
End: 2021-09-22

## 2021-09-25 ENCOUNTER — OFFICE VISIT (OUTPATIENT)
Dept: ENDOCRINOLOGY | Facility: CLINIC | Age: 86
End: 2021-09-25
Payer: MEDICARE

## 2021-09-25 DIAGNOSIS — E66.9 OBESITY (BMI 30-39.9): ICD-10-CM

## 2021-09-25 DIAGNOSIS — E88.810 DYSMETABOLIC SYNDROME: ICD-10-CM

## 2021-09-25 DIAGNOSIS — E03.9 ACQUIRED HYPOTHYROIDISM: Primary | ICD-10-CM

## 2021-09-25 DIAGNOSIS — M85.80 OSTEOPENIA WITH HIGH RISK OF FRACTURE: ICD-10-CM

## 2021-09-25 DIAGNOSIS — E87.1 CHRONIC HYPONATREMIA: ICD-10-CM

## 2021-09-25 DIAGNOSIS — K21.9 GASTROESOPHAGEAL REFLUX DISEASE WITHOUT ESOPHAGITIS: ICD-10-CM

## 2021-09-25 DIAGNOSIS — E55.9 VITAMIN D DEFICIENCY DISEASE: ICD-10-CM

## 2021-09-25 DIAGNOSIS — E04.1 NODULAR THYROID DISEASE: ICD-10-CM

## 2021-09-25 DIAGNOSIS — F33.1 MAJOR DEPRESSIVE DISORDER, RECURRENT EPISODE, MODERATE: ICD-10-CM

## 2021-09-25 DIAGNOSIS — N28.89: ICD-10-CM

## 2021-09-25 DIAGNOSIS — E21.0 PRIMARY HYPERPARATHYROIDISM: ICD-10-CM

## 2021-09-25 DIAGNOSIS — I50.32 CHRONIC DIASTOLIC CHF (CONGESTIVE HEART FAILURE): ICD-10-CM

## 2021-09-25 DIAGNOSIS — I10 ESSENTIAL HYPERTENSION: ICD-10-CM

## 2021-09-25 DIAGNOSIS — Z78.0 POSTMENOPAUSAL: ICD-10-CM

## 2021-09-25 PROCEDURE — 99214 OFFICE O/P EST MOD 30 MIN: CPT | Mod: HCNC,95,, | Performed by: INTERNAL MEDICINE

## 2021-09-25 PROCEDURE — 99499 RISK ADDL DX/OHS AUDIT: ICD-10-PCS | Mod: 95,,, | Performed by: INTERNAL MEDICINE

## 2021-09-25 PROCEDURE — 99499 UNLISTED E&M SERVICE: CPT | Mod: 95,,, | Performed by: INTERNAL MEDICINE

## 2021-09-25 PROCEDURE — 1159F PR MEDICATION LIST DOCUMENTED IN MEDICAL RECORD: ICD-10-PCS | Mod: HCNC,CPTII,95, | Performed by: INTERNAL MEDICINE

## 2021-09-25 PROCEDURE — 99214 PR OFFICE/OUTPT VISIT, EST, LEVL IV, 30-39 MIN: ICD-10-PCS | Mod: HCNC,95,, | Performed by: INTERNAL MEDICINE

## 2021-09-25 PROCEDURE — 1159F MED LIST DOCD IN RCRD: CPT | Mod: HCNC,CPTII,95, | Performed by: INTERNAL MEDICINE

## 2021-09-25 PROCEDURE — 1160F PR REVIEW ALL MEDS BY PRESCRIBER/CLIN PHARMACIST DOCUMENTED: ICD-10-PCS | Mod: HCNC,CPTII,95, | Performed by: INTERNAL MEDICINE

## 2021-09-25 PROCEDURE — 1160F RVW MEDS BY RX/DR IN RCRD: CPT | Mod: HCNC,CPTII,95, | Performed by: INTERNAL MEDICINE

## 2021-09-25 RX ORDER — IBANDRONATE SODIUM 150 MG/1
150 TABLET, FILM COATED ORAL
Qty: 12 TABLET | Refills: 3 | Status: SHIPPED | OUTPATIENT
Start: 2021-09-25 | End: 2022-06-06

## 2021-09-26 PROBLEM — R63.4 WEIGHT LOSS, UNINTENTIONAL: Status: ACTIVE | Noted: 2021-09-26

## 2021-09-26 LAB — VASOPRESSIN SERPL-MCNC: <0.5 PG/ML (ref 0–6.9)

## 2021-09-27 ENCOUNTER — TELEPHONE (OUTPATIENT)
Dept: ENDOCRINOLOGY | Facility: CLINIC | Age: 86
End: 2021-09-27
Payer: MEDICARE

## 2021-09-27 DIAGNOSIS — F41.9 ANXIETY: ICD-10-CM

## 2021-09-27 DIAGNOSIS — R35.0 URINARY FREQUENCY: Primary | ICD-10-CM

## 2021-09-27 DIAGNOSIS — R30.0 DYSURIA: ICD-10-CM

## 2021-09-28 LAB
MISCELLANEOUS TEST NAME: NORMAL
REFERENCE LAB: NORMAL
SPECIMEN TYPE: NORMAL
TEST RESULT: NORMAL

## 2021-09-28 RX ORDER — SERTRALINE HYDROCHLORIDE 50 MG/1
TABLET, FILM COATED ORAL
Qty: 30 TABLET | Refills: 1 | Status: SHIPPED | OUTPATIENT
Start: 2021-09-28 | End: 2021-11-03

## 2021-09-29 ENCOUNTER — LAB VISIT (OUTPATIENT)
Dept: LAB | Facility: HOSPITAL | Age: 86
End: 2021-09-29
Attending: NURSE PRACTITIONER
Payer: MEDICARE

## 2021-09-29 DIAGNOSIS — R82.79 URINE CULTURE POSITIVE: ICD-10-CM

## 2021-09-29 DIAGNOSIS — R82.90 NONSPECIFIC FINDING ON EXAMINATION OF URINE: Primary | ICD-10-CM

## 2021-09-29 PROCEDURE — 81001 URINALYSIS AUTO W/SCOPE: CPT | Mod: HCNC | Performed by: NURSE PRACTITIONER

## 2021-09-29 PROCEDURE — 87086 URINE CULTURE/COLONY COUNT: CPT | Mod: HCNC | Performed by: NURSE PRACTITIONER

## 2021-09-30 ENCOUNTER — TELEPHONE (OUTPATIENT)
Dept: HOME HEALTH SERVICES | Facility: CLINIC | Age: 86
End: 2021-09-30

## 2021-09-30 LAB
BACTERIA #/AREA URNS AUTO: ABNORMAL /HPF
BILIRUB UR QL STRIP: NEGATIVE
CLARITY UR REFRACT.AUTO: ABNORMAL
COLOR UR AUTO: ABNORMAL
GLUCOSE UR QL STRIP: NEGATIVE
HGB UR QL STRIP: NEGATIVE
KETONES UR QL STRIP: NEGATIVE
LEUKOCYTE ESTERASE UR QL STRIP: ABNORMAL
MICROSCOPIC COMMENT: ABNORMAL
NITRITE UR QL STRIP: NEGATIVE
PH UR STRIP: 5 [PH] (ref 5–8)
PROT UR QL STRIP: NEGATIVE
SP GR UR STRIP: 1.01 (ref 1–1.03)
SQUAMOUS #/AREA URNS AUTO: 44 /HPF
URN SPEC COLLECT METH UR: ABNORMAL
WBC #/AREA URNS AUTO: >100 /HPF (ref 0–5)

## 2021-09-30 RX ORDER — NITROFURANTOIN 25; 75 MG/1; MG/1
100 CAPSULE ORAL 2 TIMES DAILY
Qty: 20 CAPSULE | Refills: 0 | Status: SHIPPED | OUTPATIENT
Start: 2021-09-30 | End: 2021-12-01 | Stop reason: ALTCHOICE

## 2021-10-01 ENCOUNTER — LAB VISIT (OUTPATIENT)
Dept: LAB | Facility: HOSPITAL | Age: 86
End: 2021-10-01
Attending: INTERNAL MEDICINE
Payer: MEDICARE

## 2021-10-01 DIAGNOSIS — E87.1 HYPONATREMIA: ICD-10-CM

## 2021-10-01 LAB
ANION GAP SERPL CALC-SCNC: 15 MMOL/L (ref 8–16)
BACTERIA UR CULT: NORMAL
BACTERIA UR CULT: NORMAL
BUN SERPL-MCNC: 9 MG/DL (ref 10–30)
CALCIUM SERPL-MCNC: 10.3 MG/DL (ref 8.7–10.5)
CHLORIDE SERPL-SCNC: 97 MMOL/L (ref 95–110)
CO2 SERPL-SCNC: 23 MMOL/L (ref 23–29)
CREAT SERPL-MCNC: 0.6 MG/DL (ref 0.5–1.4)
EST. GFR  (AFRICAN AMERICAN): >60 ML/MIN/1.73 M^2
EST. GFR  (NON AFRICAN AMERICAN): >60 ML/MIN/1.73 M^2
GLUCOSE SERPL-MCNC: 96 MG/DL (ref 70–110)
POTASSIUM SERPL-SCNC: 3.8 MMOL/L (ref 3.5–5.1)
SODIUM SERPL-SCNC: 135 MMOL/L (ref 136–145)

## 2021-10-01 PROCEDURE — 80048 BASIC METABOLIC PNL TOTAL CA: CPT | Mod: HCNC | Performed by: INTERNAL MEDICINE

## 2021-10-01 PROCEDURE — 36415 COLL VENOUS BLD VENIPUNCTURE: CPT | Mod: HCNC,PO | Performed by: INTERNAL MEDICINE

## 2021-10-02 ENCOUNTER — PATIENT MESSAGE (OUTPATIENT)
Dept: ENDOCRINOLOGY | Facility: CLINIC | Age: 86
End: 2021-10-02

## 2021-10-06 ENCOUNTER — DOCUMENT SCAN (OUTPATIENT)
Dept: HOME HEALTH SERVICES | Facility: HOSPITAL | Age: 86
End: 2021-10-06
Payer: MEDICARE

## 2021-10-13 ENCOUNTER — CARE AT HOME (OUTPATIENT)
Dept: HOME HEALTH SERVICES | Facility: CLINIC | Age: 86
End: 2021-10-13
Payer: MEDICARE

## 2021-10-13 VITALS
DIASTOLIC BLOOD PRESSURE: 90 MMHG | HEART RATE: 94 BPM | RESPIRATION RATE: 16 BRPM | HEIGHT: 64 IN | BODY MASS INDEX: 28.09 KG/M2 | OXYGEN SATURATION: 98 % | WEIGHT: 164.5 LBS | SYSTOLIC BLOOD PRESSURE: 150 MMHG | TEMPERATURE: 98 F

## 2021-10-13 DIAGNOSIS — E87.1 HYPONATREMIA: ICD-10-CM

## 2021-10-13 DIAGNOSIS — R54 ADVANCED AGE: ICD-10-CM

## 2021-10-13 DIAGNOSIS — R68.89 FORGETFULNESS: ICD-10-CM

## 2021-10-13 DIAGNOSIS — R63.4 WEIGHT LOSS, UNINTENTIONAL: ICD-10-CM

## 2021-10-13 DIAGNOSIS — E21.0 PRIMARY HYPERPARATHYROIDISM: ICD-10-CM

## 2021-10-13 DIAGNOSIS — R41.0 CONFUSION: ICD-10-CM

## 2021-10-13 DIAGNOSIS — F33.0 MAJOR DEPRESSIVE DISORDER, RECURRENT EPISODE, MILD: Primary | ICD-10-CM

## 2021-10-13 DIAGNOSIS — Z51.5 PALLIATIVE CARE ENCOUNTER: ICD-10-CM

## 2021-10-13 DIAGNOSIS — I25.118 CORONARY ARTERY DISEASE OF NATIVE ARTERY OF NATIVE HEART WITH STABLE ANGINA PECTORIS: ICD-10-CM

## 2021-10-13 DIAGNOSIS — I10 HYPERTENSION, UNSPECIFIED TYPE: ICD-10-CM

## 2021-10-13 DIAGNOSIS — R53.1 WEAKNESS: ICD-10-CM

## 2021-10-13 PROCEDURE — 99350 PR HOME VISIT,ESTAB PATIENT,LEVEL IV: ICD-10-PCS | Mod: S$GLB,,, | Performed by: NURSE PRACTITIONER

## 2021-10-13 PROCEDURE — 99350 HOME/RES VST EST HIGH MDM 60: CPT | Mod: S$GLB,,, | Performed by: NURSE PRACTITIONER

## 2021-10-19 ENCOUNTER — TELEPHONE (OUTPATIENT)
Dept: NEUROLOGY | Facility: CLINIC | Age: 86
End: 2021-10-19

## 2021-10-22 PROBLEM — R41.0 CONFUSION: Status: ACTIVE | Noted: 2021-10-22

## 2021-10-22 PROBLEM — E87.1 HYPONATREMIA: Status: ACTIVE | Noted: 2021-10-22

## 2021-10-27 ENCOUNTER — EXTERNAL HOME HEALTH (OUTPATIENT)
Dept: HOME HEALTH SERVICES | Facility: HOSPITAL | Age: 86
End: 2021-10-27
Payer: MEDICARE

## 2021-11-03 ENCOUNTER — CARE AT HOME (OUTPATIENT)
Dept: HOME HEALTH SERVICES | Facility: CLINIC | Age: 86
End: 2021-11-03
Payer: MEDICARE

## 2021-11-03 VITALS
SYSTOLIC BLOOD PRESSURE: 142 MMHG | DIASTOLIC BLOOD PRESSURE: 74 MMHG | RESPIRATION RATE: 16 BRPM | WEIGHT: 162 LBS | OXYGEN SATURATION: 98 % | BODY MASS INDEX: 27.66 KG/M2 | HEART RATE: 98 BPM | HEIGHT: 64 IN | TEMPERATURE: 98 F

## 2021-11-03 DIAGNOSIS — R63.4 WEIGHT LOSS, UNINTENTIONAL: ICD-10-CM

## 2021-11-03 DIAGNOSIS — F41.9 ANXIETY: ICD-10-CM

## 2021-11-03 DIAGNOSIS — Z51.5 PALLIATIVE CARE ENCOUNTER: ICD-10-CM

## 2021-11-03 DIAGNOSIS — I25.118 CORONARY ARTERY DISEASE OF NATIVE ARTERY OF NATIVE HEART WITH STABLE ANGINA PECTORIS: ICD-10-CM

## 2021-11-03 DIAGNOSIS — I10 HYPERTENSION, UNSPECIFIED TYPE: Primary | ICD-10-CM

## 2021-11-03 DIAGNOSIS — F33.0 MAJOR DEPRESSIVE DISORDER, RECURRENT EPISODE, MILD: ICD-10-CM

## 2021-11-03 DIAGNOSIS — R53.1 WEAKNESS: ICD-10-CM

## 2021-11-03 DIAGNOSIS — E87.1 HYPONATREMIA: ICD-10-CM

## 2021-11-03 PROCEDURE — 99350 PR HOME VISIT,ESTAB PATIENT,LEVEL IV: ICD-10-PCS | Mod: S$GLB,,, | Performed by: NURSE PRACTITIONER

## 2021-11-03 PROCEDURE — 99350 HOME/RES VST EST HIGH MDM 60: CPT | Mod: S$GLB,,, | Performed by: NURSE PRACTITIONER

## 2021-11-03 RX ORDER — SERTRALINE HYDROCHLORIDE 25 MG/1
25 TABLET, FILM COATED ORAL DAILY
Qty: 90 TABLET | Refills: 3 | Status: SHIPPED | OUTPATIENT
Start: 2021-11-03 | End: 2021-11-16 | Stop reason: SDUPTHER

## 2021-11-03 RX ORDER — OLMESARTAN MEDOXOMIL 5 MG/1
5 TABLET ORAL DAILY
Qty: 90 TABLET | Refills: 3 | Status: SHIPPED | OUTPATIENT
Start: 2021-11-03 | End: 2021-11-16 | Stop reason: SDUPTHER

## 2021-11-03 RX ORDER — BUSPIRONE HYDROCHLORIDE 5 MG/1
5 TABLET ORAL DAILY PRN
Qty: 90 TABLET | Refills: 1 | Status: SHIPPED | OUTPATIENT
Start: 2021-11-03 | End: 2021-11-16 | Stop reason: SDUPTHER

## 2021-11-04 ENCOUNTER — LAB VISIT (OUTPATIENT)
Dept: LAB | Facility: HOSPITAL | Age: 86
End: 2021-11-04
Attending: NURSE PRACTITIONER
Payer: MEDICARE

## 2021-11-04 DIAGNOSIS — I25.118 ATHSCL HEART DISEASE OF NATIVE COR ART W OTH ANG PCTRS: Primary | ICD-10-CM

## 2021-11-04 DIAGNOSIS — E87.1 HYPOSMOLALITY SYNDROME: ICD-10-CM

## 2021-11-04 PROCEDURE — 80048 BASIC METABOLIC PNL TOTAL CA: CPT | Performed by: NURSE PRACTITIONER

## 2021-11-05 LAB
ANION GAP SERPL CALC-SCNC: 12 MMOL/L (ref 8–16)
BUN SERPL-MCNC: 6 MG/DL (ref 10–30)
CALCIUM SERPL-MCNC: 9.1 MG/DL (ref 8.7–10.5)
CHLORIDE SERPL-SCNC: 102 MMOL/L (ref 95–110)
CO2 SERPL-SCNC: 23 MMOL/L (ref 23–29)
CREAT SERPL-MCNC: 0.7 MG/DL (ref 0.5–1.4)
EST. GFR  (AFRICAN AMERICAN): >60 ML/MIN/1.73 M^2
EST. GFR  (NON AFRICAN AMERICAN): >60 ML/MIN/1.73 M^2
GLUCOSE SERPL-MCNC: 120 MG/DL (ref 70–110)
POTASSIUM SERPL-SCNC: 4.2 MMOL/L (ref 3.5–5.1)
SODIUM SERPL-SCNC: 137 MMOL/L (ref 136–145)

## 2021-11-10 ENCOUNTER — DOCUMENT SCAN (OUTPATIENT)
Dept: HOME HEALTH SERVICES | Facility: HOSPITAL | Age: 86
End: 2021-11-10
Payer: MEDICARE

## 2021-11-13 ENCOUNTER — PATIENT MESSAGE (OUTPATIENT)
Dept: ENDOCRINOLOGY | Facility: CLINIC | Age: 86
End: 2021-11-13
Payer: MEDICARE

## 2021-11-15 ENCOUNTER — TELEPHONE (OUTPATIENT)
Dept: PRIMARY CARE CLINIC | Facility: CLINIC | Age: 86
End: 2021-11-15
Payer: MEDICARE

## 2021-11-16 DIAGNOSIS — F33.0 MAJOR DEPRESSIVE DISORDER, RECURRENT EPISODE, MILD: ICD-10-CM

## 2021-11-16 DIAGNOSIS — I10 HYPERTENSION, UNSPECIFIED TYPE: ICD-10-CM

## 2021-11-16 DIAGNOSIS — F41.9 ANXIETY: ICD-10-CM

## 2021-11-16 RX ORDER — OLMESARTAN MEDOXOMIL 5 MG/1
5 TABLET ORAL DAILY
Qty: 90 TABLET | Refills: 3 | Status: SHIPPED | OUTPATIENT
Start: 2021-11-16 | End: 2022-10-07

## 2021-11-16 RX ORDER — BUSPIRONE HYDROCHLORIDE 5 MG/1
5 TABLET ORAL DAILY PRN
Qty: 90 TABLET | Refills: 1 | Status: SHIPPED | OUTPATIENT
Start: 2021-11-16 | End: 2022-03-03

## 2021-11-16 RX ORDER — SERTRALINE HYDROCHLORIDE 25 MG/1
25 TABLET, FILM COATED ORAL DAILY
Qty: 90 TABLET | Refills: 3 | Status: SHIPPED | OUTPATIENT
Start: 2021-11-16 | End: 2022-12-04

## 2021-12-01 ENCOUNTER — LAB VISIT (OUTPATIENT)
Dept: LAB | Facility: HOSPITAL | Age: 86
End: 2021-12-01
Attending: NURSE PRACTITIONER
Payer: MEDICARE

## 2021-12-01 ENCOUNTER — PATIENT MESSAGE (OUTPATIENT)
Dept: ENDOCRINOLOGY | Facility: CLINIC | Age: 86
End: 2021-12-01
Payer: MEDICARE

## 2021-12-01 ENCOUNTER — CARE AT HOME (OUTPATIENT)
Dept: HOME HEALTH SERVICES | Facility: CLINIC | Age: 86
End: 2021-12-01
Payer: MEDICARE

## 2021-12-01 DIAGNOSIS — R41.3 MEMORY LOSS: ICD-10-CM

## 2021-12-01 DIAGNOSIS — E87.1 HYPONATREMIA: ICD-10-CM

## 2021-12-01 DIAGNOSIS — I10 ESSENTIAL HYPERTENSION: ICD-10-CM

## 2021-12-01 DIAGNOSIS — E87.6 HYPOKALEMIA: ICD-10-CM

## 2021-12-01 DIAGNOSIS — Z51.5 PALLIATIVE CARE ENCOUNTER: ICD-10-CM

## 2021-12-01 DIAGNOSIS — R53.83 FATIGUE, UNSPECIFIED TYPE: ICD-10-CM

## 2021-12-01 DIAGNOSIS — E87.1 HYPONATREMIA: Primary | ICD-10-CM

## 2021-12-01 LAB
ANION GAP SERPL CALC-SCNC: 9 MMOL/L (ref 8–16)
BUN SERPL-MCNC: 10 MG/DL (ref 10–30)
CALCIUM SERPL-MCNC: 10.2 MG/DL (ref 8.7–10.5)
CHLORIDE SERPL-SCNC: 102 MMOL/L (ref 95–110)
CO2 SERPL-SCNC: 27 MMOL/L (ref 23–29)
CREAT SERPL-MCNC: 0.7 MG/DL (ref 0.5–1.4)
EST. GFR  (AFRICAN AMERICAN): >60 ML/MIN/1.73 M^2
EST. GFR  (NON AFRICAN AMERICAN): >60 ML/MIN/1.73 M^2
GLUCOSE SERPL-MCNC: 90 MG/DL (ref 70–110)
POTASSIUM SERPL-SCNC: 3.9 MMOL/L (ref 3.5–5.1)
SODIUM SERPL-SCNC: 138 MMOL/L (ref 136–145)

## 2021-12-01 PROCEDURE — 99350 PR HOME VISIT,ESTAB PATIENT,LEVEL IV: ICD-10-PCS | Mod: S$GLB,,, | Performed by: NURSE PRACTITIONER

## 2021-12-01 PROCEDURE — 36000 PLACE NEEDLE IN VEIN: CPT | Mod: S$GLB,,, | Performed by: NURSE PRACTITIONER

## 2021-12-01 PROCEDURE — 80048 BASIC METABOLIC PNL TOTAL CA: CPT | Mod: HCNC | Performed by: NURSE PRACTITIONER

## 2021-12-01 PROCEDURE — 99000 SPECIMEN HANDLING OFFICE-LAB: CPT | Mod: S$GLB,,, | Performed by: NURSE PRACTITIONER

## 2021-12-01 PROCEDURE — 99350 HOME/RES VST EST HIGH MDM 60: CPT | Mod: S$GLB,,, | Performed by: NURSE PRACTITIONER

## 2021-12-01 PROCEDURE — 99000 PR SPECIMEN HANDLING,DR OFF->LAB: ICD-10-PCS | Mod: S$GLB,,, | Performed by: NURSE PRACTITIONER

## 2021-12-01 PROCEDURE — 36000 PR PLACE NEEDLE IN VEIN: ICD-10-PCS | Mod: S$GLB,,, | Performed by: NURSE PRACTITIONER

## 2021-12-01 RX ORDER — HYDROCORTISONE 25 MG/G
CREAM TOPICAL 2 TIMES DAILY
Status: CANCELLED
Start: 2021-12-01

## 2021-12-01 RX ORDER — TRIAMCINOLONE ACETONIDE 1 MG/G
OINTMENT TOPICAL 2 TIMES DAILY
Status: CANCELLED
Start: 2021-12-01

## 2021-12-01 RX ORDER — POTASSIUM CHLORIDE 20 MEQ/1
20 TABLET, EXTENDED RELEASE ORAL 2 TIMES DAILY
COMMUNITY
End: 2021-12-02 | Stop reason: SDUPTHER

## 2021-12-02 DIAGNOSIS — E87.6 HYPOKALEMIA: Primary | ICD-10-CM

## 2021-12-02 RX ORDER — POTASSIUM CHLORIDE 20 MEQ/1
20 TABLET, EXTENDED RELEASE ORAL 2 TIMES DAILY
Qty: 180 TABLET | Refills: 3 | Status: SHIPPED | OUTPATIENT
Start: 2021-12-02 | End: 2022-01-31

## 2021-12-12 VITALS
DIASTOLIC BLOOD PRESSURE: 80 MMHG | BODY MASS INDEX: 27.8 KG/M2 | WEIGHT: 162.81 LBS | SYSTOLIC BLOOD PRESSURE: 140 MMHG | HEIGHT: 64 IN | OXYGEN SATURATION: 98 % | TEMPERATURE: 98 F | HEART RATE: 83 BPM | RESPIRATION RATE: 16 BRPM

## 2021-12-12 PROBLEM — R41.3 MEMORY LOSS: Status: ACTIVE | Noted: 2021-12-12

## 2021-12-20 ENCOUNTER — OFFICE VISIT (OUTPATIENT)
Dept: PRIMARY CARE CLINIC | Facility: CLINIC | Age: 86
End: 2021-12-20
Payer: MEDICARE

## 2021-12-20 DIAGNOSIS — F33.0 MAJOR DEPRESSIVE DISORDER, RECURRENT EPISODE, MILD: ICD-10-CM

## 2021-12-20 DIAGNOSIS — Z86.73 HISTORY OF STROKE: ICD-10-CM

## 2021-12-20 DIAGNOSIS — R41.89 COGNITIVE DEFICITS: ICD-10-CM

## 2021-12-20 PROCEDURE — 99215 OFFICE O/P EST HI 40 MIN: CPT | Mod: HCNC,95,, | Performed by: INTERNAL MEDICINE

## 2021-12-20 PROCEDURE — 99215 PR OFFICE/OUTPT VISIT, EST, LEVL V, 40-54 MIN: ICD-10-PCS | Mod: HCNC,95,, | Performed by: INTERNAL MEDICINE

## 2021-12-21 ENCOUNTER — OFFICE VISIT (OUTPATIENT)
Dept: NEUROLOGY | Facility: CLINIC | Age: 86
End: 2021-12-21
Payer: MEDICARE

## 2021-12-21 VITALS
RESPIRATION RATE: 18 BRPM | DIASTOLIC BLOOD PRESSURE: 94 MMHG | WEIGHT: 172.31 LBS | BODY MASS INDEX: 29.57 KG/M2 | SYSTOLIC BLOOD PRESSURE: 170 MMHG | HEART RATE: 84 BPM

## 2021-12-21 DIAGNOSIS — R68.89 FORGETFULNESS: ICD-10-CM

## 2021-12-21 DIAGNOSIS — R41.3 MEMORY LOSS: Primary | ICD-10-CM

## 2021-12-21 DIAGNOSIS — R54 ADVANCED AGE: ICD-10-CM

## 2021-12-21 DIAGNOSIS — Z86.73 HISTORY OF STROKE: ICD-10-CM

## 2021-12-21 PROBLEM — R41.89 COGNITIVE DEFICITS: Status: RESOLVED | Noted: 2021-12-20 | Resolved: 2021-12-21

## 2021-12-21 PROCEDURE — 99999 PR PBB SHADOW E&M-EST. PATIENT-LVL V: CPT | Mod: PBBFAC,HCNC,, | Performed by: NURSE PRACTITIONER

## 2021-12-21 PROCEDURE — 99205 OFFICE O/P NEW HI 60 MIN: CPT | Mod: HCNC,S$GLB,, | Performed by: NURSE PRACTITIONER

## 2021-12-21 PROCEDURE — 99999 PR PBB SHADOW E&M-EST. PATIENT-LVL V: ICD-10-PCS | Mod: PBBFAC,HCNC,, | Performed by: NURSE PRACTITIONER

## 2021-12-21 PROCEDURE — 99205 PR OFFICE/OUTPT VISIT, NEW, LEVL V, 60-74 MIN: ICD-10-PCS | Mod: HCNC,S$GLB,, | Performed by: NURSE PRACTITIONER

## 2021-12-21 RX ORDER — POLYETHYLENE GLYCOL 400 AND PROPYLENE GLYCOL 4; 3 MG/ML; MG/ML
SOLUTION/ DROPS OPHTHALMIC
COMMUNITY
Start: 2021-08-20

## 2021-12-23 ENCOUNTER — OFFICE VISIT (OUTPATIENT)
Dept: PRIMARY CARE CLINIC | Facility: CLINIC | Age: 86
End: 2021-12-23
Payer: MEDICARE

## 2021-12-23 DIAGNOSIS — R41.3 MEMORY LOSS: Primary | ICD-10-CM

## 2021-12-23 PROCEDURE — 99442 PR PHYSICIAN TELEPHONE EVALUATION 11-20 MIN: CPT | Mod: HCNC,95,, | Performed by: INTERNAL MEDICINE

## 2021-12-23 PROCEDURE — 99442 PR PHYSICIAN TELEPHONE EVALUATION 11-20 MIN: ICD-10-PCS | Mod: HCNC,95,, | Performed by: INTERNAL MEDICINE

## 2022-01-06 ENCOUNTER — PATIENT MESSAGE (OUTPATIENT)
Dept: NEUROLOGY | Facility: CLINIC | Age: 87
End: 2022-01-06
Payer: MEDICARE

## 2022-01-28 ENCOUNTER — CARE AT HOME (OUTPATIENT)
Dept: HOME HEALTH SERVICES | Facility: CLINIC | Age: 87
End: 2022-01-28
Payer: MEDICARE

## 2022-01-28 DIAGNOSIS — Z51.5 PALLIATIVE CARE ENCOUNTER: ICD-10-CM

## 2022-01-28 DIAGNOSIS — F33.0 MAJOR DEPRESSIVE DISORDER, RECURRENT EPISODE, MILD: ICD-10-CM

## 2022-01-28 DIAGNOSIS — F41.9 ANXIETY: ICD-10-CM

## 2022-01-28 DIAGNOSIS — I10 HYPERTENSION, UNSPECIFIED TYPE: ICD-10-CM

## 2022-01-28 DIAGNOSIS — E87.1 HYPONATREMIA: Primary | ICD-10-CM

## 2022-01-28 DIAGNOSIS — R41.3 MEMORY LOSS: ICD-10-CM

## 2022-01-28 PROCEDURE — 99350 PR HOME VISIT,ESTAB PATIENT,LEVEL IV: ICD-10-PCS | Mod: S$GLB,,, | Performed by: NURSE PRACTITIONER

## 2022-01-28 PROCEDURE — 99350 HOME/RES VST EST HIGH MDM 60: CPT | Mod: S$GLB,,, | Performed by: NURSE PRACTITIONER

## 2022-01-30 NOTE — PROGRESS NOTES
"[facility]  Palliative Care Home Visit    Visit Date: 2/6/2022  Encounter Provider: Lesley Tamez NP  PCP:  Brooke Carroll MD    Subjective:      Patient ID: Glenda Gimenez is a 89 y.o. female.    Consult Requested By:  Dr. James Yao  Reason for Consult:  Palliative Care    Glenda is being seen at home due to physical debility that presents a taxing effort to leave the home, to mitigate high risk of hospital readmission and/or due to the limited availability of reliable or safe options for transportation to the point of access to the provider. Prior to treatment on this visit the chart was reviewed and patient verbal consent was obtained.      Chief Complaint: Follow-up and Memory Loss    Glenda is a 89 year old female with a PMHX of hypothyroidism, hypertension, primary hyperparathyroidism, COPD, chronic diastolic CHF, obesity and sepsis pneumonia in 2020.     Glenda is being seen today for a Palliative Care follow up visit. She is found at home alone. Daughter Ankita cannot be present today due to work obligations but did speak with her via telephone. She states that patient has been doing ok, doesn't want to leave the house whenever she offers to take her anywhere. Still forgetful but as a family they do not want to begin Aricept at this time due to patient's age and possible side effects.      Patient appears frail, is AAO x 3 however is forgetful and still repeats herself frequently. She reports "I have nothing to do but stare at these 4 walls everyday". She denies any pain, having any trouble sleeping. Reports appetite is "fair". Meals are provided by family and patient only has to heat them up in the microwave. Weight has increased 7 lbs since early December home visit.     Glenda is still taking salt tabs TID for hyponatremia since 9/2021 and is followed by Dr. Fraire.   .   Patient denies feeling depressed but is taking buspar daily, anxiety has improved significantly since " September when her hyponatremia was found and she was also having increased confusion and weakness. Patient's daughter Ankita fills her pill box for her.        VSS. Denies fever, chest pain, nausea, vomiting, diarrhea. Reports taking all medications as prescribed. No other needs identified at this time. Risks of environmental exposure to coronavirus discussed including: social distancing, hand hygiene, and limiting departures from the home for necessities only. Reports understanding and willingness to comply.           Goals of Care:  Glenda would like to be a Full code and have comfort care. She would like to continue living at home independently for as long as possible. She states that she has a living will on file at [facility] as well as [facility] placed 20 years ago. She states that her daughter Ankita Gimenez is her HPOA. Patient is unable to find copies of documents. Has new documents from previous visit but has not reviewed or completed yet.      Review of Systems   Constitutional: Positive for fatigue. Weight loss and appetite stable. Negative for fever.   HENT: Negative.    Eyes: Vision loss related to macular degeneration.  Respiratory: Denies cough, shortness of breath. Negative for chest tightness and wheezing.   Cardiovascular: Negative.    Gastrointestinal: Positive for heartburn at times.  Endocrine: Negative.    Genitourinary: Urinary incontinence.   Musculoskeletal: Positive for hand arthritis-opening jars and bottles is getting more difficult.   Skin: Negative.    Allergic/Immunologic: Negative.    Neurological: Positive for weakness. Negative for tremors and syncope. Positive balance difficulties and dizziness.  Hematological: Bruises/bleeds easily.   Psychiatric/Behavioral: Negative.  Negative sleep issues. Negative for depression. Anxiety improved. Forgetfulness,confusion at times.         Assessments:  · Environmental: lives alone in a single story raised home  that is clean, adequate lighting and temperature, no foul odors  · Functional Status: independent mostly, meals provided so she doesn't have to cook  · Safety:  lives alone, advanced age, fall risk  · Nutritional: adequate access to food, reports appetite improved  · Home Health/DME/Supplies: No Home Health. DME: rollator          History:  Past Medical History:   Diagnosis Date    Anticoagulant long-term use     Arthritis     Dislocation of right shoulder joint     Hypertension     Hypothyroidism     Shoulder disorder     right    Thyroid disease      Family History   Problem Relation Age of Onset    Breast cancer Mother     Cancer Brother         lung cancer    Cancer Maternal Aunt         unknown cancer    Cancer Maternal Uncle         pancreatic cancer    Heart disease Neg Hx      Past Surgical History:   Procedure Laterality Date    HYSTERECTOMY      PARTIAL HYSTERECTOMY      ARTUR, ovaries in place, uterine prolapse     Review of patient's allergies indicates:   Allergen Reactions    Amlodipine Swelling     Other reaction(s): Angioedema    Atenolol      Other reaction(s): itch    Betamethasone sodium phosphate      Other reaction(s): Flushing (skin)    Cortisone      Other reaction(s): blurry vision  Other reaction(s): Rash    Lisinopril      Other reaction(s): COUGH    Naproxen      Other reaction(s): body shut down    Prednisone     Triamterene-hydrochlorothiazid      Other reaction(s): itch       Medications:    Current Outpatient Medications:     acetaminophen (TYLENOL) 325 MG tablet, Take 325 mg by mouth every 6 (six) hours as needed for Pain., Disp: , Rfl:     aspirin (ECOTRIN) 81 MG EC tablet, Take 81 mg by mouth once daily., Disp: , Rfl:     busPIRone (BUSPAR) 5 MG Tab, Take 1 tablet (5 mg total) by mouth daily as needed (anxiety). (Patient taking differently: Take 5 mg by mouth once daily.), Disp: 90 tablet, Rfl: 1    carvedilol (COREG) 3.125 MG tablet, Take 1.5625 mg by mouth  2 (two) times daily. , Disp: , Rfl: 1    co-enzyme Q-10 30 mg capsule, Take 30 mg by mouth 3 (three) times daily., Disp: , Rfl:     famotidine (PEPCID) 20 MG tablet, Take 1 tablet (20 mg total) by mouth 2 (two) times daily., Disp: 180 tablet, Rfl: 3    ibandronate (BONIVA) 150 mg tablet, Take 1 tablet (150 mg total) by mouth every 30 days., Disp: 12 tablet, Rfl: 3    isosorbide mononitrate (IMDUR) 30 MG 24 hr tablet, Take 1 tablet (30 mg total) by mouth once daily., Disp: 90 tablet, Rfl: 3    krill oil 500 mg Cap, Take by mouth., Disp: , Rfl:     Lactobac no.41/Bifidobact no.7 (PROBIOTIC-10 ORAL), 1 capsule DAILY (route: oral), Disp: , Rfl:     levothyroxine (SYNTHROID) 88 MCG tablet, Take 1 tablet (88 mcg total) by mouth once daily., Disp: 90 tablet, Rfl: 3    olmesartan (BENICAR) 5 MG Tab, Take 1 tablet (5 mg total) by mouth once daily., Disp: 90 tablet, Rfl: 3    peg 400-propylene glycol, PF, (SYSTANE HYDRATION PF) 0.4-0.3 % Drop, 1 drops DAILY (route: ophthalmic (eye)), Disp: , Rfl:     potassium chloride SA (K-DUR,KLOR-CON) 20 MEQ tablet, Take 1 tablet (20 mEq total) by mouth 3 (three) times daily., Disp: 270 tablet, Rfl: 3    sertraline (ZOLOFT) 25 MG tablet, Take 1 tablet (25 mg total) by mouth once daily., Disp: 90 tablet, Rfl: 3    sodium chloride 1 gram tablet, Take 1 g by mouth 3 (three) times daily. 2 tabs TID, Disp: , Rfl:     vit A/vit C/vit E/zinc/copper (ICAPS AREDS ORAL), 1 capsule 2 TIMES DAILY (route: oral), Disp: , Rfl:     vitamin D (VITAMIN D3) 1000 units Tab, Take 1,000 Units by mouth once daily. Patient is taking 2000 units per day, Disp: , Rfl:     24h Oral Morphine Equivalents (OME):  N/A    Objective:     Physical Exam:  Vitals:    01/26/22 1300   BP: (!) 140/80   Pulse: 85   Resp: 16   Temp: 98.2 °F (36.8 °C)   TempSrc: Temporal   SpO2: 98%   Weight: 77.3 kg (170 lb 6.4 oz)   PainSc: 0-No pain     Body mass index is 29.25 kg/m².        Physical Exam   Constitutional: She  is oriented to person, place, and time. She appears well-developed and well-nourished.   HENT:   Head: Normocephalic and atraumatic.   Right Ear: External ear normal.   Left Ear: External ear normal.   Nose: Nose normal.   Mouth/Throat: Oropharynx is clear and moist.   Eyes: Pupils are equal, round, and reactive to light. Conjunctivae and EOM are normal.   Neck: Normal range of motion. Neck supple.   Cardiovascular: Normal rate, regular rhythm, normal heart sounds and intact distal pulses.   Pulmonary/Chest: Effort normal. No stridor. No respiratory distress. She has no wheezes. She has no rales. She exhibits no tenderness.    Abdominal: Soft. Bowel sounds are normal.   Musculoskeletal: Normal range of motion. She exhibits no tenderness or deformity. Left ankle with 1+ non-pitting edema-pt reports chronic  Neurological: She is alert and oriented to person, place, and time. Positive balance difficulties.  Skin: Skin is warm and dry. Capillary refill takes 2 to 3 seconds.   Psychiatric: She has a normal mood and affect. Her behavior is normal. Some short term memory deficits noted. Repeats self.      Symptom Assessment (ESAS 0-10 scale)      ESAS 0 1 2 3 4 5 6 7 8 9 10   Pain x                       Dyspnea x                       Anxiety    x                     Nausea x                       Depression       x                   Anorexia  x                       Fatigue          x               Insomnia  x                       Restlessness  x                       Agitation x                             Constipation    no  Bowel Management Plan (BMP): no  Diarrhea        no  Comments: reports BM once a day     Performance Status: PPS Score 60  Karnofsky Score:  60  EGOC:  2     Advance Care Planning   Ethical / Legal: Advance Care Planning   · Surrogate decision maker:  Name Ankita Gimenez, Relationship: daughter  · Code Status: Reports full code today  · [place]:  none  Other advance directive:  HPOA, living will on  file at hospital placed 20 years ago-patient states she does not have a copy, new forms left with patient to review with daughter, Capacity to make medical decisions:  intact, Conflict none            Labs:  CBC:   WBC   Date Value Ref Range Status   06/11/2021 5.60 3.90 - 12.70 K/uL Final       Hgb   Date Value Ref Range Status   07/20/2013 12.5 12.0 - 16.0 g/dl Final     Hemoglobin   Date Value Ref Range Status   06/11/2021 13.1 12.0 - 16.0 g/dL Final         Hematocrit   Date Value Ref Range Status   06/11/2021 38.9 37.0 - 48.5 % Final       MCV   Date Value Ref Range Status   06/11/2021 96 82 - 98 fL Final         Platelets   Date Value Ref Range Status   06/11/2021 263 150 - 450 K/uL Final       LFT:   Lab Results   Component Value Date    AST 34 09/15/2021    GGT 73 (H) 08/28/2020    ALKPHOS 99 09/15/2021    BILITOT 0.7 09/15/2021       Albumin:   Albumin   Date Value Ref Range Status   09/15/2021 3.5 3.5 - 5.2 g/dL Final     Protein:   Total Protein   Date Value Ref Range Status   09/15/2021 8.1 6.0 - 8.4 g/dL Final       Radiology:  I have reviewed all pertinent imaging results/findings within the past 24 hours.      Assessment:     1. Hyponatremia    2. Memory loss    3. Anxiety    4. Major depressive disorder, recurrent episode, mild    5. Hypertension, unspecified type    6. Palliative care encounter        Plan:   Glenda was seen today for follow-up and memory loss.    Diagnoses and all orders for this visit:    Hyponatremia  -     Basic Metabolic Panel; Future    Memory loss    Anxiety    Major depressive disorder, recurrent episode, mild    Hypertension, unspecified type    Palliative care encounter      Problem List Items Addressed This Visit        Neuro    Memory loss     Chronic over last few months.  Followed by Neurology.  Aricept offered to patient but daughter does not want to purse.            Psychiatric    Major depressive disorder, recurrent episode, mild     Stable on sertraline.  Denies  SI/HI or plans.   Continue to monitor.         Anxiety     Currently controlled.  No longer taking alprazolam-reports it made her too sleepy.  Buspar qam helps.  Continue to monitor.            Cardiac/Vascular    Hypertension     Chronic and stable.  Continue medication.  Continue to monitor.  Encouraged to follow low salt diet.            Renal/    Hyponatremia - Primary     Chronic.  Still on salt tabs TID.  Followed by Endocrinology.         Relevant Orders    Basic Metabolic Panel (Completed)       Other    Palliative care encounter     Full code. ACP documents left on previous visit.  Daughter reminded to obtain already completed forms for submission.                 Were controlled substances prescribed?  No    > 50% of 60 min visit spent in chart review, face to face discussion of goals of care,  symptom assessment, coordination of care and emotional support.    Follow Up Appointments:   Future Appointments   Date Time Provider Department Center   2/15/2022  3:00 PM Brooke Carroll [facility]   4/9/2022  9:30 AM Damir Fraire MD [facility] [place]       Signature:  Lesley Tamez NP    Attestation:   Screening criteria to assess the level of the patient's risk for infection with COVID-19 as recommended by the CDC at the time of the above documented home visit concluded appropriateness to proceed. Universal precautions were maintained at all times, including provider wearing a mask and gloves during entire visit and use of 60% alcohol gel hand  immediately prior to entry and upon departure of patient's home as well as cleaning of equipment used in home visit with antibacterial/germicidal disposable wipes.

## 2022-01-31 ENCOUNTER — LAB VISIT (OUTPATIENT)
Dept: LAB | Facility: HOSPITAL | Age: 87
End: 2022-01-31
Payer: MEDICARE

## 2022-01-31 DIAGNOSIS — E87.1 HYPONATREMIA: ICD-10-CM

## 2022-01-31 DIAGNOSIS — E87.6 HYPOKALEMIA: ICD-10-CM

## 2022-01-31 LAB
ANION GAP SERPL CALC-SCNC: 10 MMOL/L (ref 8–16)
BUN SERPL-MCNC: 9 MG/DL (ref 10–30)
CALCIUM SERPL-MCNC: 10.1 MG/DL (ref 8.7–10.5)
CHLORIDE SERPL-SCNC: 100 MMOL/L (ref 95–110)
CO2 SERPL-SCNC: 29 MMOL/L (ref 23–29)
CREAT SERPL-MCNC: 0.7 MG/DL (ref 0.5–1.4)
EST. GFR  (AFRICAN AMERICAN): >60 ML/MIN/1.73 M^2
EST. GFR  (NON AFRICAN AMERICAN): >60 ML/MIN/1.73 M^2
GLUCOSE SERPL-MCNC: 152 MG/DL (ref 70–110)
POTASSIUM SERPL-SCNC: 2.8 MMOL/L (ref 3.5–5.1)
SODIUM SERPL-SCNC: 139 MMOL/L (ref 136–145)

## 2022-01-31 PROCEDURE — 80048 BASIC METABOLIC PNL TOTAL CA: CPT | Mod: HCNC | Performed by: NURSE PRACTITIONER

## 2022-01-31 RX ORDER — POTASSIUM CHLORIDE 20 MEQ/1
20 TABLET, EXTENDED RELEASE ORAL 3 TIMES DAILY
Qty: 270 TABLET | Refills: 3 | Status: SHIPPED | OUTPATIENT
Start: 2022-01-31 | End: 2022-05-01

## 2022-02-06 VITALS
SYSTOLIC BLOOD PRESSURE: 140 MMHG | WEIGHT: 170.38 LBS | RESPIRATION RATE: 16 BRPM | BODY MASS INDEX: 29.25 KG/M2 | DIASTOLIC BLOOD PRESSURE: 80 MMHG | HEART RATE: 85 BPM | TEMPERATURE: 98 F | OXYGEN SATURATION: 98 %

## 2022-02-07 NOTE — ASSESSMENT & PLAN NOTE
Chronic and stable.  Continue medication.  Continue to monitor.  Encouraged to follow low salt diet.

## 2022-02-07 NOTE — ASSESSMENT & PLAN NOTE
Currently controlled.  No longer taking alprazolam-reports it made her too sleepy.  Buspar qayefri helps.  Continue to monitor.

## 2022-02-07 NOTE — ASSESSMENT & PLAN NOTE
Chronic over last few months.  Followed by Neurology.  Aricept offered to patient but daughter does not want to purse.

## 2022-02-07 NOTE — PATIENT INSTRUCTIONS
Instructions for the patient:  - Continue all medications, treatments and therapies as ordered.   - Follow all instructions, recommendations as discussed.  - Maintain Safety Precautions at all times.  - Attend all medical appointments as scheduled.  - For worsening symptoms: call Primary Care Physician or Nurse Practitioner.  - For emergencies, call 911 or immediately report to the nearest emergency room.  - Limit Risks of environmental exposure to coronavirus/COVID-19 as discussed including: social distancing, hand hygiene, and limiting departures from the home for necessities only.     Patient Education       Amnesia   About this topic   Amnesia is a condition in which you lose all or parts of your memory. This loss may last for a short time or it may last forever. Amnesia comes in many forms. You may have problems with:  · Confusion and memory loss after a traumatic brain injury. Memories from prior to the event are easily recalled. This is post-traumatic amnesia.  · Making new memories after the event that caused the amnesia. It may happen with dementia as well as brain injury, encephalitis, trauma or drug intake. This is anterograde amnesia. It is the most common kind.  · Calling to mind things from the past. This may happen after a brain injury, trauma or dementia. This is retrograde amnesia.  · Remembering everything for only a short time. This condition may only last for a few hours. It often happens with strokes or seizures. It may also happen when you have physical or emotional stress. This is transient-global amnesia.  · Calling to mind things about your own identity and personal information. There is no damage to your brain. It is often related to severe stress and emotional trauma. This is dissociative amnesia.  What are the causes?   Part of your brain works to store memories. You may have amnesia if this part of your brain has been harmed. Many illnesses and injuries may cause harm to your brain, such  as:  · Alzheimer disease or dementia  · Low supply of oxygen to your brain  · Head injury or trauma  · Brain infection or encephalitis  · Brain tumor or surgery on your brain  · Seizure  · Stroke  · Drugs, alcohol, or substance abuse  What are the main signs?   · Not able to remember new information  · Not able to recall past knowledge  · Confusion  · Trouble functioning day to day  How does the doctor diagnose this health problem?   · Your doctor will take your history and do an exam. The doctor will check reflexes, senses, and balance. The doctor will ask questions to learn more about your memory and brain problems. The doctor will also check for other health problems that may cause memory loss.  · It is important that the doctor also talks to your family and friends to understand more about your signs. You may not know about your memory problem.  · Tests may help the doctor learn more about your memory problems. You may need to have:  ? Blood tests  ? CT or MRI scan  ? Cerebral angiography  ? Electroencephalogram or EEG  ? Lumbar puncture  How does the doctor treat this health problem?   There is no exact treatment for amnesia. Your doctor will work with you to help you cope with the signs. Your care will focus on helping your memory loss. It will also help you manage your everyday life.  Are there other health problems to treat?   If your memory loss is due to other health problems, the doctor may suggest other care to treat the cause.  What lifestyle changes are needed?   · Keeping calm will make it easier for you to remember. Stress and worry can cause you to have more trouble with your memory. You may find that playing soft music will help to keep you calm.  · Write down notes of what has happened through the day. Make lists or notes to use as reminders. This may help you keep track of everyday life and the tasks that need to happen.  · Keep a calendar with all therapy and doctor visits. Try not to miss any  "sessions.  · Make sure you rest or sleep when you are tired. Getting the right amount of sleep can help with your memory.  · Keep dim lights on at night. This may help to avoid worry if you wake up at night.  · Talk with your doctor to help decide if it is safe to stay by yourself. Also, ask about driving.  · Wear an ID necklace or bracelet. It should say "Memory Impaired." It should also have a name, phone number, and address for you and a caregiver.   · For caregivers:  ? Remain calm and patient when talking to a person with memory loss. Use simple words. Give them time to think about what has been said and to ask questions.  ? Make sure to lock doors to stop wandering out of the home. Alarms or bells on doors may be a way to alert that someone is leaving.  Will there be any other care needed?   Your doctor may have you go see a therapist. They can help you learn new skills and help with your memory. The doctor may order:  · Occupational therapy ?To help you with skills of daily life and to learn new information  · Speech therapy ? To help with memory and speech skills  · Psychotherapy ? Can help you cope with your amnesia. Not being able to remember may cause you to feel sad and distressed. Talking with someone may help.  Helpful tips   · Make your family and friends aware of your memory problems. Talk with them about how to help.  · Join support groups to meet people and families who have coped with amnesia.  Where can I learn more?   NHS Choices  http://www.nhs.uk/conditions/memory-loss/Pages/Introduction.aspx   Last Reviewed Date   2021-09-20  Consumer Information Use and Disclaimer   This information is not specific medical advice and does not replace information you receive from your health care provider. This is only a brief summary of general information. It does NOT include all information about conditions, illnesses, injuries, tests, procedures, treatments, therapies, discharge instructions or life-style " choices that may apply to you. You must talk with your health care provider for complete information about your health and treatment options. This information should not be used to decide whether or not to accept your health care providers advice, instructions or recommendations. Only your health care provider has the knowledge and training to provide advice that is right for you.  Copyright   Copyright © 2021 [employer] and its affiliates and/or licensors. All rights reserved.

## 2022-02-07 NOTE — ASSESSMENT & PLAN NOTE
Full code. ACP documents left on previous visit.  Daughter reminded to obtain already completed forms for submission.

## 2022-02-08 ENCOUNTER — TELEPHONE (OUTPATIENT)
Dept: PRIMARY CARE CLINIC | Facility: CLINIC | Age: 87
End: 2022-02-08
Payer: MEDICARE

## 2022-02-15 ENCOUNTER — OFFICE VISIT (OUTPATIENT)
Dept: PRIMARY CARE CLINIC | Facility: CLINIC | Age: 87
End: 2022-02-15
Payer: MEDICARE

## 2022-02-15 DIAGNOSIS — F33.1 MAJOR DEPRESSIVE DISORDER, RECURRENT EPISODE, MODERATE: ICD-10-CM

## 2022-02-15 DIAGNOSIS — R73.9 HYPERGLYCEMIA: ICD-10-CM

## 2022-02-15 DIAGNOSIS — E87.6 HYPOKALEMIA: ICD-10-CM

## 2022-02-15 DIAGNOSIS — F41.9 ANXIETY: ICD-10-CM

## 2022-02-15 DIAGNOSIS — H35.3211 EXUDATIVE AGE-RELATED MACULAR DEGENERATION OF RIGHT EYE WITH ACTIVE CHOROIDAL NEOVASCULARIZATION: ICD-10-CM

## 2022-02-15 DIAGNOSIS — E87.1 HYPONATREMIA: ICD-10-CM

## 2022-02-15 DIAGNOSIS — R41.3 MEMORY LOSS: ICD-10-CM

## 2022-02-15 DIAGNOSIS — I50.32 CHRONIC DIASTOLIC CHF (CONGESTIVE HEART FAILURE): ICD-10-CM

## 2022-02-15 DIAGNOSIS — Z91.81 HISTORY OF RECENT FALL: Primary | ICD-10-CM

## 2022-02-15 PROCEDURE — 99214 OFFICE O/P EST MOD 30 MIN: CPT | Mod: HCNC,95,, | Performed by: INTERNAL MEDICINE

## 2022-02-15 PROCEDURE — 99214 PR OFFICE/OUTPT VISIT, EST, LEVL IV, 30-39 MIN: ICD-10-PCS | Mod: HCNC,95,, | Performed by: INTERNAL MEDICINE

## 2022-02-15 NOTE — PROGRESS NOTES
Primary Care Provider Appointment - [facility]  SHARED NOTE: Santi Silva (Student), Dr. Carroll (Attending)    Subjective:      Patient ID: Glenda Gimenez is a 89 y.o. female with  hypothyroidism, hypertension, primary hyperparathyroidism, COPD, chronic diastolic CHF, obesity and sepsis pneumonia in 2020       Chief Complaint: Follow-up            Past Surgical History:   Procedure Laterality Date    HYSTERECTOMY      PARTIAL HYSTERECTOMY      ARTUR, ovaries in place, uterine prolapse       Past Medical History:   Diagnosis Date    Anticoagulant long-term use     Arthritis     Dislocation of right shoulder joint     Hypertension     Hypothyroidism     Shoulder disorder     right    Thyroid disease        Social History     Socioeconomic History    Marital status:    Tobacco Use    Smoking status: Never Smoker    Smokeless tobacco: Never Used   Substance and Sexual Activity    Alcohol use: Yes     Alcohol/week: 2.0 standard drinks     Types: 2 Glasses of wine per week     Comment: 2 small glasses of red wine with dinner    Drug use: No    Sexual activity: Not Currently       Review of Systems   Constitutional: Positive for activity change. Negative for unexpected weight change.   HENT: Negative for hearing loss, rhinorrhea and trouble swallowing.    Eyes: Negative for discharge and visual disturbance.   Respiratory: Negative for chest tightness and wheezing.    Cardiovascular: Negative for chest pain and palpitations.   Gastrointestinal: Negative for blood in stool, constipation, diarrhea and vomiting.   Endocrine: Negative for polydipsia and polyuria.   Genitourinary: Negative for difficulty urinating, dysuria, hematuria and menstrual problem.   Musculoskeletal: Negative for arthralgias, joint swelling and neck pain.   Neurological: Positive for weakness. Negative for headaches.   Psychiatric/Behavioral: Positive for confusion. Negative for dysphoric mood.       Objective:   LMP  (LMP  Unknown)     Physical Exam  Constitutional:       General: She is not in acute distress.     Appearance: She is not ill-appearing or toxic-appearing.      Comments: Frail appearing older female   Pleasant    HENT:      Head: Normocephalic.   Eyes:      Extraocular Movements: Extraocular movements intact.      Conjunctiva/sclera: Conjunctivae normal.   Pulmonary:      Effort: No respiratory distress.   Neurological:      Mental Status: She is alert. Mental status is at baseline.   Psychiatric:         Mood and Affect: Mood normal.         Behavior: Behavior normal.             Lab Results   Component Value Date    WBC 5.60 06/11/2021    HGB 13.1 06/11/2021    HCT 38.9 06/11/2021     06/11/2021    CHOL 181 02/26/2021    TRIG 95 02/26/2021    HDL 62 02/26/2021    ALT 20 09/15/2021    AST 34 09/15/2021     01/31/2022    K 2.8 (L) 01/31/2022     01/31/2022    CREATININE 0.7 01/31/2022    BUN 9 (L) 01/31/2022    CO2 29 01/31/2022    TSH 1.359 09/15/2021    INR 1.0 08/28/2020    HGBA1C 5.2 02/26/2021     Medication List with Changes/Refills   Current Medications    ACETAMINOPHEN (TYLENOL) 325 MG TABLET    Take 325 mg by mouth every 6 (six) hours as needed for Pain.    ASPIRIN (ECOTRIN) 81 MG EC TABLET    Take 81 mg by mouth once daily.    BUSPIRONE (BUSPAR) 5 MG TAB    Take 1 tablet (5 mg total) by mouth daily as needed (anxiety).    CARVEDILOL (COREG) 3.125 MG TABLET    Take 1.5625 mg by mouth 2 (two) times daily.     CO-ENZYME Q-10 30 MG CAPSULE    Take 30 mg by mouth 3 (three) times daily.    FAMOTIDINE (PEPCID) 20 MG TABLET    Take 1 tablet (20 mg total) by mouth 2 (two) times daily.    IBANDRONATE (BONIVA) 150 MG TABLET    Take 1 tablet (150 mg total) by mouth every 30 days.    ISOSORBIDE MONONITRATE (IMDUR) 30 MG 24 HR TABLET    Take 1 tablet (30 mg total) by mouth once daily.    KRILL  MG CAP    Take by mouth.    LACTOBAC NO.41/BIFIDOBACT NO.7 (PROBIOTIC-10 ORAL)    1 capsule DAILY (route:  oral)    LEVOTHYROXINE (SYNTHROID) 88 MCG TABLET    Take 1 tablet (88 mcg total) by mouth once daily.    OLMESARTAN (BENICAR) 5 MG TAB    Take 1 tablet (5 mg total) by mouth once daily.    -PROPYLENE GLYCOL, PF, (SYSTANE HYDRATION PF) 0.4-0.3 % DROP    1 drops DAILY (route: ophthalmic (eye))    POTASSIUM CHLORIDE SA (K-DUR,KLOR-CON) 20 MEQ TABLET    Take 1 tablet (20 mEq total) by mouth 3 (three) times daily.    SERTRALINE (ZOLOFT) 25 MG TABLET    Take 1 tablet (25 mg total) by mouth once daily.    SODIUM CHLORIDE 1 GRAM TABLET    Take 1 g by mouth 3 (three) times daily. 2 tabs TID    VIT A/VIT C/VIT E/ZINC/COPPER (ICAPS AREDS ORAL)    1 capsule 2 TIMES DAILY (route: oral)    VITAMIN D (VITAMIN D3) 1000 UNITS TAB    Take 1,000 Units by mouth once daily. Patient is taking 2000 units per day         Assessment:   89 y.o. female with multiple co-morbid illnesses here to follow-up with PCP and continue work-up of chronic issues notably ***.     Plan:     Problem List Items Addressed This Visit        Neuro    Memory loss    Relevant Orders    Hemoglobin A1C    Lipid Panel    CBC Auto Differential       Ophtho    Macular degeneration of right eye    Relevant Orders    Lipid Panel    WHEELCHAIR FOR HOME USE       Cardiac/Vascular    Chronic diastolic CHF (congestive heart failure)    Relevant Orders    Lipid Panel    WHEELCHAIR FOR HOME USE       Renal/    Hypokalemia    Relevant Orders    Basic Metabolic Panel    Lipid Panel       Endocrine    Hyperglycemia    Relevant Orders    Lipid Panel       Other    History of recent fall - Primary    Relevant Orders    Lipid Panel    WHEELCHAIR FOR HOME USE          Health Maintenance       Date Due Completion Date    Shingles Vaccine (1 of 2) Never done ---    Pneumococcal Vaccines (Age 65+) (1 of 2 - PPSV23) 03/15/2016 1/19/2016    COVID-19 Vaccine (3 - Booster for Pfizer series) 08/17/2021 2/17/2021    Influenza Vaccine (1) 09/01/2021 11/6/2020    Lipid Panel  02/26/2026 2/26/2021    TETANUS VACCINE 11/07/2027 11/7/2017              No follow-ups on file.  *** minutes of total time spent on the encounter, which includes face to face time and non-face to face time preparing to see the patient (eg, review of tests), Obtaining and/or reviewing separately obtained history, Documenting clinical information in the electronic or other health record, Independently interpreting results (not separately reported) and communicating results to the patient/family/caregiver, or Care coordination (not separately reported).    *** (student or resident)    Brooke Carroll MD  Internal Medicine- Geriatrics  [facility] - [place]

## 2022-02-16 ENCOUNTER — DOCUMENTATION ONLY (OUTPATIENT)
Dept: PRIMARY CARE CLINIC | Facility: CLINIC | Age: 87
End: 2022-02-16
Payer: MEDICARE

## 2022-02-17 NOTE — ASSESSMENT & PLAN NOTE
Pt noticeably in better mood when around family but is hesistant to leave her home  Advised would encourage more socialization   Continue zoloft

## 2022-02-17 NOTE — ASSESSMENT & PLAN NOTE
Instructed to schedule f/u with neurology once MRI is scheduled   No aricept for now   Reviewed recommendation for pt to move in with family due to risk of falls and for benefit of increased socialization - pt to consider, but to start with once a week having dinner with Ana and family

## 2022-02-17 NOTE — PROGRESS NOTES
Primary Care Provider Telemedicine Appointment      The patient location is:  Patient Home   The chief complaint leading to consultation is: follow-up   Total time spent with patient: 20 minutes     Visit type: Virtual visit with synchronous audio only and video  Each patient to whom he or she provides medical services by telemedicine is:  (1) informed of the relationship between the physician and patient and the respective role of any other health care provider with respect to management of the patient; and (2) notified that he or she may decline to receive medical services by telemedicine and may withdraw from such care at any time.      Subjective:      Patient ID: Glenda Gimenez is a 89 y.o. female hypothyroidism, hypertension, primary hyperparathyroidism, COPD, chronic diastolic CHF, obesity and sepsis pneumonia in 2020    Chief Complaint: Follow-up  Visit done with daughter Ankita rivas   Pt has no concerns     Takes buspar daily in the am, works well with zoloft - no anxiety flares.   Depression about the same     Fall 3 weeks ago , pt initially did not recall event     Unsure if needs to take salt tablets     Decided  not to take aricept due to side effect profile   Due to recent covid wave, did not have MRI Brain scan completed, does not have neurology f/u scheduled     Also with covid wave, did not start the once weekly dinner with family, pt did spend Sunday with family and was a positive experience     Prior to this visit, patient's last encounter with PCP was 12/23/2021.    Social History  Components    Tobacco (-)     Alcohol (+) rare    Ililcit drugs (-)    Sex (+) , exclusive, one male partner    Employment (-) Disability         Past Surgical History:   Procedure Laterality Date    HYSTERECTOMY      PARTIAL HYSTERECTOMY      ARTUR, ovaries in place, uterine prolapse       Past Medical History:   Diagnosis Date    Anticoagulant long-term use     Arthritis     Dislocation of right  shoulder joint     Hypertension     Hypothyroidism     Shoulder disorder     right    Thyroid disease        Review of Systems   Constitutional: Positive for activity change. Negative for unexpected weight change.   HENT: Negative for hearing loss, rhinorrhea and trouble swallowing.    Eyes: Negative for discharge and visual disturbance.   Respiratory: Negative for chest tightness and wheezing.    Cardiovascular: Negative for chest pain and palpitations.   Gastrointestinal: Negative for blood in stool, constipation, diarrhea and vomiting.   Endocrine: Negative for polydipsia and polyuria.   Genitourinary: Negative for difficulty urinating, dysuria, hematuria and menstrual problem.   Musculoskeletal: Negative for arthralgias, joint swelling and neck pain.   Neurological: Positive for weakness. Negative for headaches.   Psychiatric/Behavioral: Positive for confusion. Negative for dysphoric mood.       Objective:   LMP  (LMP Unknown)     Physical Exam  Constitutional:       General: She is not in acute distress.     Appearance: She is not ill-appearing or toxic-appearing.      Comments: Frail appearing older female    HENT:      Head: Normocephalic.   Eyes:      Extraocular Movements: Extraocular movements intact.      Conjunctiva/sclera: Conjunctivae normal.   Pulmonary:      Effort: No respiratory distress.   Neurological:      Mental Status: She is alert. Mental status is at baseline.   Psychiatric:         Mood and Affect: Mood normal.         Behavior: Behavior normal.         Lab Results   Component Value Date    WBC 5.60 06/11/2021    HGB 13.1 06/11/2021    HCT 38.9 06/11/2021     06/11/2021    CHOL 181 02/26/2021    TRIG 95 02/26/2021    HDL 62 02/26/2021    ALT 20 09/15/2021    AST 34 09/15/2021     01/31/2022    K 2.8 (L) 01/31/2022     01/31/2022    CREATININE 0.7 01/31/2022    BUN 9 (L) 01/31/2022    CO2 29 01/31/2022    TSH 1.359 09/15/2021    INR 1.0 08/28/2020    HGBA1C 5.2  02/26/2021         Current Outpatient Medications:     acetaminophen (TYLENOL) 325 MG tablet, Take 325 mg by mouth every 6 (six) hours as needed for Pain., Disp: , Rfl:     aspirin (ECOTRIN) 81 MG EC tablet, Take 81 mg by mouth once daily., Disp: , Rfl:     busPIRone (BUSPAR) 5 MG Tab, Take 1 tablet (5 mg total) by mouth daily as needed (anxiety). (Patient taking differently: Take 5 mg by mouth once daily.), Disp: 90 tablet, Rfl: 1    carvedilol (COREG) 3.125 MG tablet, Take 1.5625 mg by mouth 2 (two) times daily. , Disp: , Rfl: 1    co-enzyme Q-10 30 mg capsule, Take 30 mg by mouth 3 (three) times daily., Disp: , Rfl:     famotidine (PEPCID) 20 MG tablet, Take 1 tablet (20 mg total) by mouth 2 (two) times daily., Disp: 180 tablet, Rfl: 3    ibandronate (BONIVA) 150 mg tablet, Take 1 tablet (150 mg total) by mouth every 30 days., Disp: 12 tablet, Rfl: 3    isosorbide mononitrate (IMDUR) 30 MG 24 hr tablet, Take 1 tablet (30 mg total) by mouth once daily., Disp: 90 tablet, Rfl: 3    krill oil 500 mg Cap, Take by mouth., Disp: , Rfl:     Lactobac no.41/Bifidobact no.7 (PROBIOTIC-10 ORAL), 1 capsule DAILY (route: oral), Disp: , Rfl:     levothyroxine (SYNTHROID) 88 MCG tablet, Take 1 tablet (88 mcg total) by mouth once daily., Disp: 90 tablet, Rfl: 3    olmesartan (BENICAR) 5 MG Tab, Take 1 tablet (5 mg total) by mouth once daily., Disp: 90 tablet, Rfl: 3    peg 400-propylene glycol, PF, (SYSTANE HYDRATION PF) 0.4-0.3 % Drop, 1 drops DAILY (route: ophthalmic (eye)), Disp: , Rfl:     potassium chloride SA (K-DUR,KLOR-CON) 20 MEQ tablet, Take 1 tablet (20 mEq total) by mouth 3 (three) times daily., Disp: 270 tablet, Rfl: 3    sertraline (ZOLOFT) 25 MG tablet, Take 1 tablet (25 mg total) by mouth once daily., Disp: 90 tablet, Rfl: 3    sodium chloride 1 gram tablet, Take 1 g by mouth 3 (three) times daily. 2 tabs TID, Disp: , Rfl:     vit A/vit C/vit E/zinc/copper (ICAPS AREDS ORAL), 1 capsule 2 TIMES  DAILY (route: oral), Disp: , Rfl:     vitamin D (VITAMIN D3) 1000 units Tab, Take 1,000 Units by mouth once daily. Patient is taking 2000 units per day, Disp: , Rfl:     Assessment:   89 y.o. female with multiple co-morbid illnesses here to continue work-up of chronic issues notably memory loss, depression    Plan:     Problem List Items Addressed This Visit        Neuro    Memory loss     Instructed to schedule f/u with neurology once MRI is scheduled   No aricept for now   Reviewed recommendation for pt to move in with family due to risk of falls and for benefit of increased socialization - pt to consider, but to start with once a week having dinner with Ana and family           Relevant Orders    Hemoglobin A1C    Lipid Panel    CBC Auto Differential       Psychiatric    Major depressive disorder, recurrent episode, moderate     Pt noticeably in better mood when around family but is hesistant to leave her home  Advised would encourage more socialization   Continue zoloft            Anxiety     Stable on buspar               Ophtho    Macular degeneration of right eye    Relevant Orders    Lipid Panel    WHEELCHAIR FOR HOME USE       Cardiac/Vascular    Chronic diastolic CHF (congestive heart failure)    Relevant Orders    Lipid Panel    WHEELCHAIR FOR HOME USE       Renal/    Hyponatremia     Will check labs but suspect pt will likely need to stay using them since her last labs have been in normal range            Hypokalemia    Relevant Orders    Basic Metabolic Panel    Lipid Panel       Endocrine    Hyperglycemia    Relevant Orders    Lipid Panel       Other    History of recent fall - Primary     No injury   Has home health            Relevant Orders    Lipid Panel    WHEELCHAIR FOR HOME USE          Health Maintenance       Date Due Completion Date    Shingles Vaccine (1 of 2) Never done ---    Pneumococcal Vaccines (Age 65+) (1 of 2 - PPSV23) 03/15/2016 1/19/2016    COVID-19 Vaccine (3 - Booster for  Pfizer series) 08/17/2021 2/17/2021    Influenza Vaccine (1) 09/01/2021 11/6/2020    Lipid Panel 02/26/2026 2/26/2021    TETANUS VACCINE 11/07/2027 11/7/2017          No follow-ups on file. . 25 minutes of total time spent on the encounter, which includes face to face time and non-face to face time preparing to see the patient (eg, review of tests), Obtaining and/or reviewing separately obtained history, Documenting clinical information in the electronic or other health record, Independently interpreting results (not separately reported) and communicating results to the patient/family/caregiver, or Care coordination (not separately reported).      Brooke Carroll MD  Internal Medicine- Geriatrics  [facility]- [place]

## 2022-02-20 NOTE — ASSESSMENT & PLAN NOTE
Will check labs but suspect pt will likely need to stay using them since her last labs have been in normal range

## 2022-02-21 ENCOUNTER — TELEPHONE (OUTPATIENT)
Dept: NEUROSURGERY | Facility: CLINIC | Age: 87
End: 2022-02-21
Payer: MEDICARE

## 2022-02-21 ENCOUNTER — HOSPITAL ENCOUNTER (EMERGENCY)
Facility: HOSPITAL | Age: 87
Discharge: HOME | End: 2022-02-21
Attending: EMERGENCY MEDICINE
Payer: MEDICARE

## 2022-02-21 ENCOUNTER — PATIENT MESSAGE (OUTPATIENT)
Dept: PRIMARY CARE CLINIC | Facility: CLINIC | Age: 87
End: 2022-02-21
Payer: MEDICARE

## 2022-02-21 VITALS
SYSTOLIC BLOOD PRESSURE: 178 MMHG | RESPIRATION RATE: 16 BRPM | DIASTOLIC BLOOD PRESSURE: 88 MMHG | HEART RATE: 80 BPM | TEMPERATURE: 99 F | OXYGEN SATURATION: 100 % | HEIGHT: 64 IN | WEIGHT: 165 LBS | BODY MASS INDEX: 28.17 KG/M2

## 2022-02-21 DIAGNOSIS — S32.020A COMPRESSION FRACTURE OF L2 VERTEBRA, INITIAL ENCOUNTER: Primary | ICD-10-CM

## 2022-02-21 DIAGNOSIS — S32.000D COMPRESSION FRACTURE OF LUMBAR VERTEBRA WITH ROUTINE HEALING, UNSPECIFIED LUMBAR VERTEBRAL LEVEL, SUBSEQUENT ENCOUNTER: Primary | ICD-10-CM

## 2022-02-21 DIAGNOSIS — Z01.818 PRE-OP TESTING: ICD-10-CM

## 2022-02-21 DIAGNOSIS — S32.010A COMPRESSION FRACTURE OF L1 VERTEBRA, INITIAL ENCOUNTER: ICD-10-CM

## 2022-02-21 LAB
ALBUMIN SERPL BCP-MCNC: 3.4 G/DL (ref 3.5–5.2)
ALP SERPL-CCNC: 107 U/L (ref 55–135)
ALT SERPL W/O P-5'-P-CCNC: 23 U/L (ref 10–44)
ANION GAP SERPL CALC-SCNC: 11 MMOL/L (ref 8–16)
AST SERPL-CCNC: 51 U/L (ref 10–40)
BASOPHILS # BLD AUTO: 0.07 K/UL (ref 0–0.2)
BASOPHILS NFR BLD: 0.8 % (ref 0–1.9)
BILIRUB SERPL-MCNC: 0.8 MG/DL (ref 0.1–1)
BILIRUB UR QL STRIP: NEGATIVE
BUN SERPL-MCNC: 9 MG/DL (ref 10–30)
CALCIUM SERPL-MCNC: 9.8 MG/DL (ref 8.7–10.5)
CHLORIDE SERPL-SCNC: 103 MMOL/L (ref 95–110)
CLARITY UR: CLEAR
CO2 SERPL-SCNC: 25 MMOL/L (ref 23–29)
COLOR UR: YELLOW
CREAT SERPL-MCNC: 0.7 MG/DL (ref 0.5–1.4)
DIFFERENTIAL METHOD: ABNORMAL
EOSINOPHIL # BLD AUTO: 0 K/UL (ref 0–0.5)
EOSINOPHIL NFR BLD: 0 % (ref 0–8)
ERYTHROCYTE [DISTWIDTH] IN BLOOD BY AUTOMATED COUNT: 13.3 % (ref 11.5–14.5)
EST. GFR  (AFRICAN AMERICAN): >60 ML/MIN/1.73 M^2
EST. GFR  (NON AFRICAN AMERICAN): >60 ML/MIN/1.73 M^2
ETHANOL SERPL-MCNC: <10 MG/DL
GLUCOSE SERPL-MCNC: 109 MG/DL (ref 70–110)
GLUCOSE UR QL STRIP: NEGATIVE
HCT VFR BLD AUTO: 44.2 % (ref 37–48.5)
HGB BLD-MCNC: 14.3 G/DL (ref 12–16)
HGB UR QL STRIP: NEGATIVE
IMM GRANULOCYTES # BLD AUTO: 0.05 K/UL (ref 0–0.04)
IMM GRANULOCYTES NFR BLD AUTO: 0.6 % (ref 0–0.5)
KETONES UR QL STRIP: NEGATIVE
LEUKOCYTE ESTERASE UR QL STRIP: NEGATIVE
LYMPHOCYTES # BLD AUTO: 1.2 K/UL (ref 1–4.8)
LYMPHOCYTES NFR BLD: 13 % (ref 18–48)
MCH RBC QN AUTO: 31.2 PG (ref 27–31)
MCHC RBC AUTO-ENTMCNC: 32.4 G/DL (ref 32–36)
MCV RBC AUTO: 97 FL (ref 82–98)
MONOCYTES # BLD AUTO: 1 K/UL (ref 0.3–1)
MONOCYTES NFR BLD: 11.1 % (ref 4–15)
NEUTROPHILS # BLD AUTO: 6.6 K/UL (ref 1.8–7.7)
NEUTROPHILS NFR BLD: 74.5 % (ref 38–73)
NITRITE UR QL STRIP: NEGATIVE
NRBC BLD-RTO: 0 /100 WBC
PH UR STRIP: 7 [PH] (ref 5–8)
PLATELET # BLD AUTO: 285 K/UL (ref 150–450)
PMV BLD AUTO: 10.9 FL (ref 9.2–12.9)
POTASSIUM SERPL-SCNC: 4.4 MMOL/L (ref 3.5–5.1)
PROT SERPL-MCNC: 8.2 G/DL (ref 6–8.4)
PROT UR QL STRIP: NEGATIVE
RBC # BLD AUTO: 4.58 M/UL (ref 4–5.4)
SODIUM SERPL-SCNC: 139 MMOL/L (ref 136–145)
SP GR UR STRIP: 1.01 (ref 1–1.03)
URN SPEC COLLECT METH UR: NORMAL
UROBILINOGEN UR STRIP-ACNC: 1 EU/DL
WBC # BLD AUTO: 8.83 K/UL (ref 3.9–12.7)

## 2022-02-21 PROCEDURE — 80053 COMPREHEN METABOLIC PANEL: CPT | Mod: HCNC | Performed by: EMERGENCY MEDICINE

## 2022-02-21 PROCEDURE — 36415 COLL VENOUS BLD VENIPUNCTURE: CPT | Mod: HCNC | Performed by: EMERGENCY MEDICINE

## 2022-02-21 PROCEDURE — 82077 ASSAY SPEC XCP UR&BREATH IA: CPT | Mod: HCNC | Performed by: EMERGENCY MEDICINE

## 2022-02-21 PROCEDURE — 81003 URINALYSIS AUTO W/O SCOPE: CPT | Mod: HCNC | Performed by: EMERGENCY MEDICINE

## 2022-02-21 PROCEDURE — 25000003 PHARM REV CODE 250: Mod: HCNC | Performed by: EMERGENCY MEDICINE

## 2022-02-21 PROCEDURE — 93010 ELECTROCARDIOGRAM REPORT: CPT | Mod: HCNC,,, | Performed by: GENERAL PRACTICE

## 2022-02-21 PROCEDURE — 99285 EMERGENCY DEPT VISIT HI MDM: CPT | Mod: 25,HCNC

## 2022-02-21 PROCEDURE — 93010 EKG 12-LEAD: ICD-10-PCS | Mod: HCNC,,, | Performed by: GENERAL PRACTICE

## 2022-02-21 PROCEDURE — 85025 COMPLETE CBC W/AUTO DIFF WBC: CPT | Mod: HCNC | Performed by: EMERGENCY MEDICINE

## 2022-02-21 PROCEDURE — 93005 ELECTROCARDIOGRAM TRACING: CPT | Mod: HCNC

## 2022-02-21 RX ORDER — ACETAMINOPHEN 500 MG
1000 TABLET ORAL
Status: COMPLETED | OUTPATIENT
Start: 2022-02-21 | End: 2022-02-21

## 2022-02-21 RX ORDER — HYDROCODONE BITARTRATE AND ACETAMINOPHEN 7.5; 325 MG/1; MG/1
1 TABLET ORAL EVERY 6 HOURS PRN
Status: DISCONTINUED | OUTPATIENT
Start: 2022-02-21 | End: 2022-02-21 | Stop reason: HOSPADM

## 2022-02-21 RX ORDER — HYDROCODONE BITARTRATE AND ACETAMINOPHEN 7.5; 325 MG/1; MG/1
1 TABLET ORAL EVERY 6 HOURS PRN
Qty: 12 TABLET | Refills: 0 | Status: SHIPPED | OUTPATIENT
Start: 2022-02-21 | End: 2022-02-25 | Stop reason: SDUPTHER

## 2022-02-21 RX ADMIN — ACETAMINOPHEN 1000 MG: 500 TABLET ORAL at 08:02

## 2022-02-21 RX ADMIN — HYDROCODONE BITARTRATE AND ACETAMINOPHEN 1 TABLET: 7.5; 325 TABLET ORAL at 11:02

## 2022-02-21 NOTE — ED NOTES
Called [facility] stated they would have someone out with the [facility] brace

## 2022-02-21 NOTE — TELEPHONE ENCOUNTER
----- Message from Stacey M Lefort sent at 2/21/2022  1:15 PM CST -----  Daughter Ankita Gimenez is calling regarding making an appt for her mother with Dr Shea. Pt fell and went to the ER today - they want her to follow up with Dr Shea. Ankita can be reached at [phone].  Thank you.

## 2022-02-21 NOTE — ED NOTES
Patient resting in bed with eyes closed, chest rise is symmetrical, respirations are regular and unlabored. KENNETH MORTON

## 2022-02-21 NOTE — ED NOTES
Patient has been updated that DME will arrive with back brace at approximately 1600.  Call light within reach, bed in lowest position, side rails up x1 and brakes locked on bed . No needs or questions at this time. Patient does not have any c/o pain.

## 2022-02-21 NOTE — ED NOTES
Called [phone] advised it would be 3:30-4pm for brace fitting     Gave patient and her son Shola and update on DME arrival, Shola went home and requested a call once patient is available for

## 2022-02-21 NOTE — ED PROVIDER NOTES
Encounter Date: 2/21/2022    SCRIBE #1 NOTE: I, Ena Steele, am scribing for, and in the presence of, Ottoniel Trammell MD.       History     Chief Complaint   Patient presents with    Fall     No loc     Time seen by provider: 7:48 AM on 02/21/2022    Glenda Gimenez is a 89 y.o. female who presents to the ED via EMS s/p fall with an onset of lower back pain. Patient is not sure what happened but states she recalls walking to the bathroom ~6 hrs ago when she believes she fell. However, son states story does not correlate because patient was found on the kitchen floor. Patient denies hitting her head or LOC. Her son was reportedly contacted ~2.5 hrs ago, but there was no phone found near patient as she was laying on the kitchen floor. Son states when he arrived on scene to help lift patient up, he noticed her legs were weak, but patient denies any lower extremity weakness currently. She states she was unable to ambulate after as she has been experiencing lower back pain. At baseline, she ambulates with aid of a walker. The patient denies numbness, tingling, fever, chills, cough, N/V, SOB or any other symptoms at this time. No recent medication changes. Nurse mentions that daughter recently found out a family friend has been giving patient alcohol which is believed to be contributing to patient's fall. Son is aware that patient is being given alcohol but is not sure who the individual that has been supplying patient with alcohol is as patient is not able to drive and obtain the alcohol herself. Patient endorses drinking one glass of port wine last night. PMHx of HTN, hypothyroidism, arthritis, depression, anxiety, memory loss. No pertinent PSHx.    The history is provided by the patient, the EMS personnel and a relative.     Review of patient's allergies indicates:   Allergen Reactions    Amlodipine Swelling     Other reaction(s): Angioedema    Atenolol      Other reaction(s): itch    Betamethasone sodium  phosphate      Other reaction(s): Flushing (skin)    Cortisone      Other reaction(s): blurry vision  Other reaction(s): Rash    Lisinopril      Other reaction(s): COUGH    Naproxen      Other reaction(s): body shut down    Prednisone     Triamterene-hydrochlorothiazid      Other reaction(s): itch     Past Medical History:   Diagnosis Date    Anticoagulant long-term use     Arthritis     Dislocation of right shoulder joint     Hypertension     Hypothyroidism     Shoulder disorder     right    Thyroid disease      Past Surgical History:   Procedure Laterality Date    HYSTERECTOMY      PARTIAL HYSTERECTOMY      ARTUR, ovaries in place, uterine prolapse     Family History   Problem Relation Age of Onset    Breast cancer Mother     Cancer Brother         lung cancer    Cancer Maternal Aunt         unknown cancer    Cancer Maternal Uncle         pancreatic cancer    Heart disease Neg Hx      Social History     Tobacco Use    Smoking status: Never Smoker    Smokeless tobacco: Never Used   Substance Use Topics    Alcohol use: Yes     Alcohol/week: 2.0 standard drinks     Types: 2 Glasses of wine per week     Comment: 2 small glasses of red wine with dinner    Drug use: No     Review of Systems   Constitutional: Negative for chills and fever.   HENT: Negative for sore throat.    Respiratory: Negative for cough and shortness of breath.    Cardiovascular: Negative for chest pain.   Gastrointestinal: Negative for nausea and vomiting.   Genitourinary: Negative for dysuria.   Musculoskeletal: Positive for back pain and gait problem.   Skin: Negative for rash.   Neurological: Negative for syncope, weakness and numbness.   Hematological: Does not bruise/bleed easily.       Physical Exam     Initial Vitals [02/21/22 0632]   BP Pulse Resp Temp SpO2   (!) 144/88 88 18 98 °F (36.7 °C) 98 %      MAP       --         Physical Exam    Nursing note and vitals reviewed.  Constitutional: She appears well-developed and  well-nourished.  Non-toxic appearance. No distress.   HENT:   Head: Normocephalic and atraumatic.   Eyes: EOM are normal. Pupils are equal, round, and reactive to light.   Neck: Neck supple. No JVD present.   Normal range of motion.  Cardiovascular: Normal rate, regular rhythm, normal heart sounds and intact distal pulses. Exam reveals no gallop and no friction rub.    No murmur heard.  Pulmonary/Chest: Breath sounds normal. She has no wheezes. She has no rhonchi. She has no rales.   Abdominal: Abdomen is soft. Bowel sounds are normal. There is no abdominal tenderness. There is no rebound and no guarding.   Musculoskeletal:         General: Tenderness present.      Cervical back: Normal range of motion and neck supple. No rigidity.      Comments: Tenderness to L2 midline. No step off or deformity.     Neurological: She is alert and oriented to person, place, and time. She has normal reflexes. No cranial nerve deficit or sensory deficit. GCS eye subscore is 4. GCS verbal subscore is 5. GCS motor subscore is 6.   Guarded gait. Requires assistance.   Skin: Skin is warm and dry.   Skin tear over left elbow.   Psychiatric: She has a normal mood and affect. Her speech is normal and behavior is normal. She is not actively hallucinating.         ED Course   Procedures  Labs Reviewed   COMPREHENSIVE METABOLIC PANEL - Abnormal; Notable for the following components:       Result Value    BUN 9 (*)     Albumin 3.4 (*)     AST 51 (*)     All other components within normal limits   CBC W/ AUTO DIFFERENTIAL - Abnormal; Notable for the following components:    MCH 31.2 (*)     Immature Granulocytes 0.6 (*)     Immature Grans (Abs) 0.05 (*)     Gran % 74.5 (*)     Lymph % 13.0 (*)     All other components within normal limits   URINALYSIS, REFLEX TO URINE CULTURE    Narrative:     Specimen Source->Urine   ALCOHOL,MEDICAL (ETHANOL)     EKG Readings: (Independently Interpreted)   Wandering baseline sinus rhythm at ventricular rate of  96 bpm. Normal ST segment and T waves. 1st degree AV block. No STEMI.     ECG Results          EKG 12-lead (In process)  Result time 02/21/22 08:58:08    In process by Interface, Lab In [facility] (02/21/22 08:58:08)                 Narrative:    Test Reason : Z01.818,    Vent. Rate : 096 BPM     Atrial Rate : 096 BPM     P-R Int : 234 ms          QRS Dur : 080 ms      QT Int : 378 ms       P-R-T Axes : 064 043 058 degrees     QTc Int : 477 ms    Sinus rhythm with 1st degree A-V block  ST elevation, consider early repolarization, pericarditis, or injury  Abnormal ECG  When compared with ECG of 11-JUN-2021 09:52,  Non-specific change in ST segment in Lateral leads    Referred By: [email or web address]   SELF           Confirmed By:                             Imaging Results          CT Head Without Contrast (Final result)  Result time 02/21/22 08:51:27    Final result by Blas Lux MD (02/21/22 08:51:27)                 Impression:      No acute intracranial process.  Moderate chronic white matter change and small chronic right basal ganglia lacunar infarcts.      Electronically signed by: Blas Lux MD  Date:    02/21/2022  Time:    08:51             Narrative:    EXAMINATION:  CT HEAD WITHOUT CONTRAST    CLINICAL HISTORY:  Head trauma, minor (Age >= 65y);    TECHNIQUE:  Low dose axial images were obtained through the head.  Coronal and sagittal reformations were also performed. Contrast was not administered.    COMPARISON:  11/07/2017    FINDINGS:  Moderate generalized cerebral volume loss is present.  There are moderate chronic white matter changes.  There are a few chronic right basal ganglia lacunar infarcts.  There is no intracranial hemorrhage.  No mass or midline shift.  The ventricles are nondilated.  There is no extra-axial collection.  The calvarium is intact.  There is heavy calcification of the intracranial ICAs.                               X-Ray Lumbar Spine Ap And Lateral (Final result)  Result  time 02/21/22 08:47:25    Final result by Blas Lux MD (02/21/22 08:47:25)                 Impression:      Multiple new lumbar compression fractures ranging from mild to moderate as above.      Electronically signed by: Blas Lux MD  Date:    02/21/2022  Time:    08:47             Narrative:    EXAMINATION:  XR LUMBAR SPINE AP AND LATERAL    CLINICAL HISTORY:  low back pain;    TECHNIQUE:  AP, lateral and spot images were performed of the lumbar spine.    COMPARISON:  02/23/2018    FINDINGS:  There are multiple new lumbar compression fractures.  These include a mild superior endplate compression fracture of L1, a moderate compression fracture of L2, and a mild compression fracture of L4.  Mild retropulsion is present at L2 and L4.  There is grade 1 anterolisthesis of L5 on S1 without significant change.  Moderate lower lumbar facet arthropathy is present.  Severe calcification of the aorta is present.                                 Medications   acetaminophen tablet 1,000 mg (1,000 mg Oral Given 2/21/22 0853)     Medical Decision Making:   History:   Old Medical Records: I decided to obtain old medical records.  Initial Assessment:   Patient is a 89-year-old woman status post unwitnessed fall sometime in the middle night when getting up to use the bathroom.  Patient complains of low back pain from the fall.  Family reports significant alcohol use and that alcohol is being brought over by someone to the patient's house since the patient is unable to go shopping for herself.  She lives next to her son who watches over her.  She has no focal neurological deficits.  X-rays reveal multiple mild compression fractures and no one moderate compression fracture in the L2 region.  Discussed with neuro surgery who will follow her in clinic.  TLSO brace was fitted and placed on the patient and she will be provided with pain medication.  She is neurologically intact.  Her son will have her come stay with him  in the interim.  Return precautions discussed.  Discharged in no acute distress.  Independently Interpreted Test(s):   I have ordered and independently interpreted EKG Reading(s) - see prior notes  Clinical Tests:   Lab Tests: Reviewed and Ordered  Radiological Study: Ordered and Reviewed  Medical Tests: Reviewed and Ordered          Scribe Attestation:   Scribe #1: I performed the above scribed service and the documentation accurately describes the services I performed. I attest to the accuracy of the note.               I, Jp Alvarenga, personally performed the services described in this documentation. All medical record entries made by the scribe were at my direction and in my presence.  I have reviewed the chart and agree that the record reflects my personal performance and is accurate and complete. Ottoniel Trammell MD.  Clinical Impression:   Final diagnoses:  [Z01.818] Pre-op testing  [S32.020A] Compression fracture of L2 vertebra, initial encounter (Primary)  [S32.010A] Compression fracture of L1 vertebra, initial encounter          ED Disposition Condition    Discharge Stable        ED Prescriptions     Medication Sig Dispense Start Date End Date Auth. Provider    HYDROcodone-acetaminophen (NORCO) 7.5-325 mg per tablet Take 1 tablet by mouth every 6 (six) hours as needed for Pain. 12 tablet 2/21/2022  Ottoniel Trammell MD        Follow-up Information     Follow up With Specialties Details Why Contact Info    Raymundo Shea, DO Neurosurgery Schedule an appointment as soon as possible for a visit   [address] [place] [address]  [address]  [facility] [address]  [phone]      [facility] Emergency Dept Emergency Medicine  As needed, If symptoms worsen [address] [place] [address] [id number] [phone]           Ottoniel Trammell MD  02/22/22 0301

## 2022-02-22 ENCOUNTER — PATIENT MESSAGE (OUTPATIENT)
Dept: NEUROSURGERY | Facility: CLINIC | Age: 87
End: 2022-02-22
Payer: MEDICARE

## 2022-02-22 ENCOUNTER — TELEPHONE (OUTPATIENT)
Dept: PRIMARY CARE CLINIC | Facility: CLINIC | Age: 87
End: 2022-02-22
Payer: MEDICARE

## 2022-02-22 NOTE — TELEPHONE ENCOUNTER
----- Message from Stacey M Lefort sent at 2/22/2022  8:55 AM CST -----  Pt's daughter Ankita called - I gave her the appt time and told her you left a vm for her ( which she did get). She has additional questions for you including home health and short term PT for strength before her appt. She asked for a return callback early in the day rather than later. She would like a callback at [phone].  Thank you.

## 2022-02-22 NOTE — TELEPHONE ENCOUNTER
"Daughter called to inform us that the pt fell and was diagnosed with "several compression Fx, was eval by neuro and sent home with a f/u Neurologist appt for 2/28.  Daughter requesting HH eval for home care and possible home PT.  Informed daughter that HH would need a eval to see if the pt qualified. Pt did rec pain medication with d/c from ED.   "

## 2022-02-22 NOTE — TELEPHONE ENCOUNTER
Returned call to pt's daughter, Ankita. She requested home health eval because pt lives alone and will require daily assistance with ADLs. Order placed. Scheduled CT prior to upcoming appt with Dr. Shea. She voiced understanding to appt details.

## 2022-02-22 NOTE — ED NOTES
Back brace has been placed on patient. Patient states that she feels better and ready to go home.   Upon discharge, patient is AAOx4, no cardiac or respiratory complications. Follow up care reviewed with patient and has been instructed to return to the ER if needed. Patient verbalized understanding and was assisted to vehicle via wheel chair.  KENNETH MORTON

## 2022-02-23 ENCOUNTER — PATIENT MESSAGE (OUTPATIENT)
Dept: NEUROSURGERY | Facility: CLINIC | Age: 87
End: 2022-02-23
Payer: MEDICARE

## 2022-02-23 DIAGNOSIS — S34.109D CLOSED FRACTURE OF LUMBAR VERTEBRA WITH ROUTINE HEALING AND SPINAL CORD INJURY, SUBSEQUENT ENCOUNTER: Primary | ICD-10-CM

## 2022-02-23 DIAGNOSIS — S32.009D CLOSED FRACTURE OF LUMBAR VERTEBRA WITH ROUTINE HEALING AND SPINAL CORD INJURY, SUBSEQUENT ENCOUNTER: Primary | ICD-10-CM

## 2022-02-24 ENCOUNTER — PATIENT MESSAGE (OUTPATIENT)
Dept: PRIMARY CARE CLINIC | Facility: CLINIC | Age: 87
End: 2022-02-24
Payer: MEDICARE

## 2022-02-24 PROCEDURE — G0180 PR HOME HEALTH MD CERTIFICATION: ICD-10-PCS | Mod: ,,, | Performed by: NEUROLOGICAL SURGERY

## 2022-02-24 PROCEDURE — G0180 MD CERTIFICATION HHA PATIENT: HCPCS | Mod: ,,, | Performed by: NEUROLOGICAL SURGERY

## 2022-02-25 ENCOUNTER — PATIENT MESSAGE (OUTPATIENT)
Dept: NEUROSURGERY | Facility: CLINIC | Age: 87
End: 2022-02-25
Payer: MEDICARE

## 2022-02-25 NOTE — TELEPHONE ENCOUNTER
No new care gaps identified.  Powered by [employer]. Reference number: [id number].   2/25/2022 8:23:35 AM CST

## 2022-02-28 ENCOUNTER — PATIENT MESSAGE (OUTPATIENT)
Dept: HOME HEALTH SERVICES | Facility: CLINIC | Age: 87
End: 2022-02-28
Payer: MEDICARE

## 2022-02-28 ENCOUNTER — OFFICE VISIT (OUTPATIENT)
Dept: NEUROSURGERY | Facility: CLINIC | Age: 87
End: 2022-02-28
Payer: MEDICARE

## 2022-02-28 ENCOUNTER — HOSPITAL ENCOUNTER (OUTPATIENT)
Dept: RADIOLOGY | Facility: HOSPITAL | Age: 87
Discharge: HOME | End: 2022-02-28
Attending: NEUROLOGICAL SURGERY
Payer: MEDICARE

## 2022-02-28 VITALS — HEART RATE: 88 BPM | DIASTOLIC BLOOD PRESSURE: 82 MMHG | SYSTOLIC BLOOD PRESSURE: 155 MMHG

## 2022-02-28 DIAGNOSIS — S32.000D COMPRESSION FRACTURE OF LUMBAR VERTEBRA WITH ROUTINE HEALING, UNSPECIFIED LUMBAR VERTEBRAL LEVEL, SUBSEQUENT ENCOUNTER: ICD-10-CM

## 2022-02-28 DIAGNOSIS — S32.020A COMPRESSION FRACTURE OF L2 VERTEBRA, INITIAL ENCOUNTER: Primary | ICD-10-CM

## 2022-02-28 DIAGNOSIS — Z91.81 HISTORY OF RECENT FALL: ICD-10-CM

## 2022-02-28 DIAGNOSIS — R41.3 MEMORY LOSS: Primary | ICD-10-CM

## 2022-02-28 PROCEDURE — 1100F PR PT FALLS ASSESS DOC 2+ FALLS/FALL W/INJURY/YR: ICD-10-PCS | Mod: CPTII,S$GLB,, | Performed by: NEUROLOGICAL SURGERY

## 2022-02-28 PROCEDURE — 99204 PR OFFICE/OUTPT VISIT, NEW, LEVL IV, 45-59 MIN: ICD-10-PCS | Mod: S$GLB,,, | Performed by: NEUROLOGICAL SURGERY

## 2022-02-28 PROCEDURE — 72131 CT LUMBAR SPINE W/O DYE: CPT | Mod: TC,HCNC

## 2022-02-28 PROCEDURE — 1100F PTFALLS ASSESS-DOCD GE2>/YR: CPT | Mod: CPTII,S$GLB,, | Performed by: NEUROLOGICAL SURGERY

## 2022-02-28 PROCEDURE — 1125F PR PAIN SEVERITY QUANTIFIED, PAIN PRESENT: ICD-10-PCS | Mod: CPTII,S$GLB,, | Performed by: NEUROLOGICAL SURGERY

## 2022-02-28 PROCEDURE — 1159F PR MEDICATION LIST DOCUMENTED IN MEDICAL RECORD: ICD-10-PCS | Mod: CPTII,S$GLB,, | Performed by: NEUROLOGICAL SURGERY

## 2022-02-28 PROCEDURE — 72131 CT LUMBAR SPINE W/O DYE: CPT | Mod: 26,HCNC,, | Performed by: RADIOLOGY

## 2022-02-28 PROCEDURE — 3288F FALL RISK ASSESSMENT DOCD: CPT | Mod: CPTII,S$GLB,, | Performed by: NEUROLOGICAL SURGERY

## 2022-02-28 PROCEDURE — 99204 OFFICE O/P NEW MOD 45 MIN: CPT | Mod: S$GLB,,, | Performed by: NEUROLOGICAL SURGERY

## 2022-02-28 PROCEDURE — 72131 CT LUMBAR SPINE WITHOUT CONTRAST: ICD-10-PCS | Mod: 26,HCNC,, | Performed by: RADIOLOGY

## 2022-02-28 PROCEDURE — 3288F PR FALLS RISK ASSESSMENT DOCUMENTED: ICD-10-PCS | Mod: CPTII,S$GLB,, | Performed by: NEUROLOGICAL SURGERY

## 2022-02-28 PROCEDURE — 1159F MED LIST DOCD IN RCRD: CPT | Mod: CPTII,S$GLB,, | Performed by: NEUROLOGICAL SURGERY

## 2022-02-28 PROCEDURE — 1125F AMNT PAIN NOTED PAIN PRSNT: CPT | Mod: CPTII,S$GLB,, | Performed by: NEUROLOGICAL SURGERY

## 2022-02-28 RX ORDER — HYDROCODONE BITARTRATE AND ACETAMINOPHEN 7.5; 325 MG/1; MG/1
1 TABLET ORAL EVERY 6 HOURS PRN
Qty: 12 TABLET | Refills: 0 | Status: SHIPPED | OUTPATIENT
Start: 2022-02-28 | End: 2022-03-03 | Stop reason: SDUPTHER

## 2022-02-28 NOTE — PROGRESS NOTES
The patient was referred by the [facility]      HPI:  Is a very pleasant 89-year-old woman who fell at home on Monday February 21, 2022. She presented with back pain.  Plain films of the lumbar spine demonstrated L1 mild superior endplate compression fracture, L2 moderate compression fracture, and moderate L4 compression fracture.  She was provided a TLSO brace and analgesics and referred for outpatient neurosurgical evaluation.  She endorses ongoing midline lumbar pain which has not significantly worsened.  The pain is aggravated with standing or walking.  She is utilizing a TLSO brace when ambulating.  She denies leg pain, paresthesias, saddle anesthesia, bowel or bladder incontinence.    Plain films of the lumbar spine reviewed today.  A CT of the lumbar spine was obtained today and reviewed as well.    She has a history of osteopenia involving the hip, normal bone density spine.  On Boniva.  Smoking status:    Past Medical History:   Diagnosis Date    Anticoagulant long-term use     Arthritis     Dislocation of right shoulder joint     Hypertension     Hypothyroidism     Shoulder disorder     right    Thyroid disease       Past Surgical History:   Procedure Laterality Date    HYSTERECTOMY      PARTIAL HYSTERECTOMY      ARTUR, ovaries in place, uterine prolapse     Social History     Tobacco Use    Smoking status: Never Smoker    Smokeless tobacco: Never Used   Substance Use Topics    Alcohol use: Yes     Alcohol/week: 2.0 standard drinks     Types: 2 Glasses of wine per week     Comment: 2 small glasses of red wine with dinner    Drug use: No       Review of Systems: All systems reviewed and are as above or otherwise negative.    General: well developed, well nourished, no distress.   Head: normocephalic, atraumatic  Eyes: pupils equal, round, reactive to light with accomodation, EOMI.   Neck: trachea midline. No JVD  Cardiovascular: No LE edema   Pulmonary: normal  respirations, no signs of respiratory distress  Abdomen: soft, non-distended, not tender to palpation  Sensory: intact to light touch throughout  Extremities: No bruising, deformity  Skin: Skin is warm, dry and intact.    Motor Strength: Moves all extremities spontaneously with good tone.  Full strength upper and lower extremities. No abnormal movements seen.     Strength  Deltoids Triceps Biceps Wrist Extension Wrist Flexion Hand    Upper: R 5/5 5/5 5/5 5/5 5/5 5/5    L 5/5 5/5 5/5 5/5 5/5 5/5     Iliopsoas Quadriceps Knee  Flexion Tibialis  anterior Gastro- cnemius EHL   Lower: R 5/5 5/5 5/5 5/5 5/5 5/5    L 5/5 5/5 5/5 5/5 5/5 5/5     Neurologic: Alert and oriented. Thought content appropriate.  GCS: Motor: 6/Verbal: 5/Eyes: 4 GCS Total: 15  Mental Status: Awake, Alert, Oriented x 4  Language: No aphasia  Speech: No dysarthria  Cranial nerves: face symmetric, tongue midline, CN II-XII grossly intact.     Pronator Drift: no drift noted  Finger-to-nose: Intact bilaterally  DTR's: 2 + and symmetric in UE and LE  Carter: absent  Clonus: absent  Babinski: absent    Gait: normal    Tandem Gait: No difficulty           Able to walk on heels & toes    Cervical Spine  ROM: full    Nontender to palpation    Lumbar Spine   ROM: full   Nontender to palpation    Pain on Hip ROM: Negative  Straight leg raise: negative bilaterally     SI Joint tenderness: Negative bilaterally   JUAQUIN: Negative bilaterally  Thigh Thrust: Negative bilaterally  Gaenslen: Negative bilaterally    Significant Exam Findings:  Motor and sensory intact.  No hyperreflexia.  TLSO brace in place.  Significant Radiology Findings:  Mild superior endplate compression fracture L1, moderate L2 and L4 compression fractures with 30-40% vertebral body height loss, superior posterior retropulsion 4-5 mm.  Grade 1 L5 on S1 degenerative spondylolisthesis.  Analysis:  She has no symptoms or signs relative to the mild spinal canal stenosis secondary to the  fractures.  I recommended continued conservative management with bracing.  I counseled the patient and her daughter to wear the TLSO brace when ambulatory.  Continue narcotic analgesics for 2-4 weeks as needed.  Add Robaxin 751 every 8 hours as needed.  She may also benefit from treatment with Forteo and vitamin-D/calcium supplementation.  The patient and her daughter voiced understanding.  The daughter states she will contact her primary care physician regarding these medications.  We will see her back in 4 weeks with repeat plain x-rays of the lumbar spine.  Mercedes was seen today for back pain.    Diagnoses and all orders for this visit:    Compression fracture of L2 vertebra, initial encounter

## 2022-03-03 ENCOUNTER — CARE AT HOME (OUTPATIENT)
Dept: HOME HEALTH SERVICES | Facility: CLINIC | Age: 87
End: 2022-03-03
Payer: MEDICARE

## 2022-03-03 DIAGNOSIS — R41.3 MEMORY LOSS: Primary | ICD-10-CM

## 2022-03-03 DIAGNOSIS — E87.1 HYPONATREMIA: ICD-10-CM

## 2022-03-03 DIAGNOSIS — Z51.5 PALLIATIVE CARE ENCOUNTER: ICD-10-CM

## 2022-03-03 DIAGNOSIS — Z91.81 HISTORY OF RECENT FALL: ICD-10-CM

## 2022-03-03 DIAGNOSIS — F41.9 ANXIETY: ICD-10-CM

## 2022-03-03 DIAGNOSIS — S32.029D CLOSED FRACTURE OF SECOND LUMBAR VERTEBRA WITH ROUTINE HEALING, UNSPECIFIED FRACTURE MORPHOLOGY, SUBSEQUENT ENCOUNTER: ICD-10-CM

## 2022-03-03 DIAGNOSIS — I10 ESSENTIAL HYPERTENSION: ICD-10-CM

## 2022-03-03 PROCEDURE — 99350 HOME/RES VST EST HIGH MDM 60: CPT | Mod: S$GLB,,, | Performed by: NURSE PRACTITIONER

## 2022-03-03 PROCEDURE — 99350 PR HOME VISIT,ESTAB PATIENT,LEVEL IV: ICD-10-PCS | Mod: S$GLB,,, | Performed by: NURSE PRACTITIONER

## 2022-03-03 RX ORDER — HYDROCODONE BITARTRATE AND ACETAMINOPHEN 7.5; 325 MG/1; MG/1
1 TABLET ORAL EVERY 8 HOURS PRN
Qty: 12 TABLET | Refills: 0 | Status: SHIPPED | OUTPATIENT
Start: 2022-03-04 | End: 2022-03-08

## 2022-03-03 RX ORDER — BUSPIRONE HYDROCHLORIDE 5 MG/1
5 TABLET ORAL 2 TIMES DAILY
Qty: 60 TABLET | Refills: 1 | Status: SHIPPED | OUTPATIENT
Start: 2022-03-03 | End: 2022-03-23

## 2022-03-03 NOTE — PROGRESS NOTES
"[facility]  Palliative Care Home Visit    Visit Date: 3/3/2022  Encounter Provider: Lesley Tamez NP  PCP:  Brooke Carroll MD    Subjective:      Patient ID: Glenda Gimenez is a 89 y.o. female.    Consult Requested By:  Dr. James Yao  Reason for Consult:  Palliative Care    Glenda is being seen at home due to physical debility that presents a taxing effort to leave the home, to mitigate high risk of hospital readmission and/or due to the limited availability of reliable or safe options for transportation to the point of access to the provider. Prior to treatment on this visit the chart was reviewed and patient verbal consent was obtained.      Chief Complaint: Follow-up, Low-back Pain, and Anxiety    Glenda is a 89 year old female with a PMHX of hypothyroidism, hypertension, primary hyperparathyroidism, COPD, chronic diastolic CHF, obesity and sepsis pneumonia in 2020.     Glenda is being seen today for a Palliative Care follow up visit. She is found at home alone. Daughter Ankita cannot be present today due to work obligations but did speak with her via telephone. She states that patient has been doing ok, doesn't want to leave the house whenever she offers to take her anywhere. Still forgetful but as a family they do not want to begin Aricept at this time due to patient's age and possible side effects.        Patient appears frail, is AAO x 3 however is forgetful and still repeats herself frequently. She reports "I have nothing to do but stare at these 4 walls everyday". She denies any pain, having any trouble sleeping. Reports appetite is "fair". Meals are provided by family and patient only has to heat them up in the microwave. Weight has increased 7 lbs since early December home visit.      Glenda is still taking salt tabs TID for hyponatremia since 9/2021 and is followed by Dr. Fraire.   .   Patient denies feeling depressed but is taking buspar daily, anxiety has improved " significantly since September when her hyponatremia was found and she was also having increased confusion and weakness. Patient's daughter Ankita fills her pill box for her.         VSS. Denies fever, chest pain, nausea, vomiting, diarrhea. Reports taking all medications as prescribed. No other needs identified at this time. Risks of environmental exposure to coronavirus discussed including: social distancing, hand hygiene, and limiting departures from the home for necessities only. Reports understanding and willingness to comply.           Goals of Care:  Glenda would like to be a Full code and have comfort care. She would like to continue living at home independently for as long as possible. She states that she has a living will on file at [facility] as well as [facility] placed 20 years ago. She states that her daughter Ankita Gimenez is her HPOA. Patient is unable to find copies of documents. Has new documents from previous visit but has not reviewed or completed yet.      Review of Systems   Constitutional: Positive for fatigue. Weight loss and appetite stable. Negative for fever.   HENT: Negative.    Eyes: Vision loss related to macular degeneration.  Respiratory: Denies cough, shortness of breath. Negative for chest tightness and wheezing.   Cardiovascular: Negative.    Gastrointestinal: Positive for heartburn at times.  Endocrine: Negative.    Genitourinary: Urinary incontinence.   Musculoskeletal: Positive for hand arthritis-opening jars and bottles is getting more difficult.   Skin: Negative.    Allergic/Immunologic: Negative.    Neurological: Positive for weakness. Negative for tremors and syncope. Positive balance difficulties and dizziness.  Hematological: Bruises/bleeds easily.   Psychiatric/Behavioral: Negative.  Negative sleep issues. Negative for depression. Anxiety improved. Forgetfulness,confusion at times.         Assessments:  · Environmental: lives alone in a  single story raised home that is clean, adequate lighting and temperature, no foul odors  · Functional Status: independent mostly, meals provided so she doesn't have to cook  · Safety:  lives alone, advanced age, fall risk  · Nutritional: adequate access to food, reports appetite improved  · Home Health/DME/Supplies: [facility]. DME: rollator     History:  Past Medical History:   Diagnosis Date    Anticoagulant long-term use     Arthritis     Dislocation of right shoulder joint     Hypertension     Hypothyroidism     Shoulder disorder     right    Thyroid disease      Family History   Problem Relation Age of Onset    Breast cancer Mother     Cancer Brother         lung cancer    Cancer Maternal Aunt         unknown cancer    Cancer Maternal Uncle         pancreatic cancer    Heart disease Neg Hx      Past Surgical History:   Procedure Laterality Date    HYSTERECTOMY      PARTIAL HYSTERECTOMY      ARTUR, ovaries in place, uterine prolapse     Review of patient's allergies indicates:   Allergen Reactions    Amlodipine Swelling     Other reaction(s): Angioedema    Atenolol      Other reaction(s): itch    Betamethasone sodium phosphate      Other reaction(s): Flushing (skin)    Cortisone      Other reaction(s): blurry vision  Other reaction(s): Rash    Lisinopril      Other reaction(s): COUGH    Naproxen      Other reaction(s): body shut down    Prednisone     Triamterene-hydrochlorothiazid      Other reaction(s): itch       Medications:    Current Outpatient Medications:     acetaminophen (TYLENOL) 325 MG tablet, Take 325 mg by mouth every 6 (six) hours as needed for Pain., Disp: , Rfl:     aspirin (ECOTRIN) 81 MG EC tablet, Take 81 mg by mouth once daily., Disp: , Rfl:     carvedilol (COREG) 3.125 MG tablet, Take 1.5625 mg by mouth 2 (two) times daily. , Disp: , Rfl: 1    co-enzyme Q-10 30 mg capsule, Take 30 mg by mouth 3 (three) times daily., Disp: , Rfl:     famotidine (PEPCID) 20  MG tablet, Take 1 tablet (20 mg total) by mouth 2 (two) times daily., Disp: 180 tablet, Rfl: 3    ibandronate (BONIVA) 150 mg tablet, Take 1 tablet (150 mg total) by mouth every 30 days., Disp: 12 tablet, Rfl: 3    isosorbide mononitrate (IMDUR) 30 MG 24 hr tablet, Take 1 tablet (30 mg total) by mouth once daily., Disp: 90 tablet, Rfl: 3    krill oil 500 mg Cap, Take by mouth., Disp: , Rfl:     Lactobac no.41/Bifidobact no.7 (PROBIOTIC-10 ORAL), 1 capsule DAILY (route: oral), Disp: , Rfl:     levothyroxine (SYNTHROID) 88 MCG tablet, Take 1 tablet (88 mcg total) by mouth once daily., Disp: 90 tablet, Rfl: 3    olmesartan (BENICAR) 5 MG Tab, Take 1 tablet (5 mg total) by mouth once daily., Disp: 90 tablet, Rfl: 3    peg 400-propylene glycol, PF, (SYSTANE HYDRATION PF) 0.4-0.3 % Drop, 1 drops DAILY (route: ophthalmic (eye)), Disp: , Rfl:     potassium chloride SA (K-DUR,KLOR-CON) 20 MEQ tablet, Take 1 tablet (20 mEq total) by mouth 3 (three) times daily., Disp: 270 tablet, Rfl: 3    sertraline (ZOLOFT) 25 MG tablet, Take 1 tablet (25 mg total) by mouth once daily., Disp: 90 tablet, Rfl: 3    sodium chloride 1 gram tablet, Take 1 g by mouth 3 (three) times daily. 2 tabs TID, Disp: , Rfl:     vit A/vit C/vit E/zinc/copper (ICAPS AREDS ORAL), 1 capsule 2 TIMES DAILY (route: oral), Disp: , Rfl:     vitamin D (VITAMIN D3) 1000 units Tab, Take 1,000 Units by mouth once daily. Patient is taking 2000 units per day, Disp: , Rfl:     busPIRone (BUSPAR) 5 MG Tab, Take 1 tablet (5 mg total) by mouth 2 (two) times daily., Disp: 60 tablet, Rfl: 1    HYDROcodone-acetaminophen (NORCO) 7.5-325 mg per tablet, Take 1 tablet by mouth every 8 (eight) hours as needed for Pain., Disp: 12 tablet, Rfl: 0     24h Oral Morphine Equivalents (OME):  N/A    Objective:     Physical Exam:  Vitals:    03/03/22 1557   BP: (!) 148/80   Pulse: 75   Resp: 16   Temp: 97.9 °F (36.6 °C)   TempSrc: Temporal   SpO2: 97%   PainSc:   6   PainLoc:  Back     There is no height or weight on file to calculate BMI.      Physical Exam   Constitutional: She is oriented to person, place, and time. She appears well-developed and well-nourished.   HENT:   Head: Normocephalic and atraumatic.   Right Ear: External ear normal.   Left Ear: External ear normal.   Nose: Nose normal.   Mouth/Throat: Oropharynx is clear and moist.   Eyes: Pupils are equal, round, and reactive to light. Conjunctivae and EOM are normal.   Neck: Normal range of motion. Neck supple.   Cardiovascular: Normal rate, regular rhythm, normal heart sounds and intact distal pulses.   Pulmonary/Chest: Effort normal. No stridor. No respiratory distress. She has no wheezes. She has no rales. She exhibits no tenderness.    Abdominal: Soft. Bowel sounds are normal.   Musculoskeletal: Normal range of motion. She exhibits no tenderness or deformity. Left ankle with 1+ non-pitting edema-pt reports chronic  Neurological: She is alert and oriented to person, place, and time. Positive balance difficulties.  Skin: Skin is warm and dry. Capillary refill takes 2 to 3 seconds.   Psychiatric: She has a normal mood and affect. Her behavior is normal. Some short term memory deficits noted. Repeats self.      Symptom Assessment (ESAS 0-10 scale)      ESAS 0 1 2 3 4 5 6 7 8 9 10   Pain x                       Dyspnea x                       Anxiety    x                     Nausea x                       Depression       x                   Anorexia  x                       Fatigue          x               Insomnia  x                       Restlessness  x                       Agitation x                             Constipation    no  Bowel Management Plan (BMP): no  Diarrhea        no  Comments: reports BM once a day     Performance Status: PPS Score 60  Karnofsky Score:  60  EGOC:  2     Advance Care Planning   Ethical / Legal: Advance Care Planning   · Surrogate decision maker:  Name Ankita Gimenez, Relationship:  daughter  · Code Status: Reports full code today  · [place]:  none  Other advance directive:  HPOA, living will on file at hospital placed 20 years ago-patient states she does not have a copy, new forms left with patient to review with daughter, Capacity to make medical decisions:  questionable, Conflict none         Labs:  CBC:   WBC   Date Value Ref Range Status   02/21/2022 8.83 3.90 - 12.70 K/uL Final       Hgb   Date Value Ref Range Status   07/20/2013 12.5 12.0 - 16.0 g/dl Final     Hemoglobin   Date Value Ref Range Status   02/21/2022 14.3 12.0 - 16.0 g/dL Final         Hematocrit   Date Value Ref Range Status   02/21/2022 44.2 37.0 - 48.5 % Final       MCV   Date Value Ref Range Status   02/21/2022 97 82 - 98 fL Final         Platelets   Date Value Ref Range Status   02/21/2022 285 150 - 450 K/uL Final       LFT:   Lab Results   Component Value Date    AST 51 (H) 02/21/2022    GGT 73 (H) 08/28/2020    ALKPHOS 107 02/21/2022    BILITOT 0.8 02/21/2022       Albumin:   Albumin   Date Value Ref Range Status   02/21/2022 3.4 (L) 3.5 - 5.2 g/dL Final     Protein:   Total Protein   Date Value Ref Range Status   02/21/2022 8.2 6.0 - 8.4 g/dL Final       Radiology:  I have reviewed all pertinent imaging results/findings within the past 24 hours.      Assessment:     1. Memory loss    2. Anxiety    3. Essential hypertension    4. Hyponatremia    5. Palliative care encounter    6. History of recent fall    7. Closed fracture of second lumbar vertebra with routine healing, unspecified fracture morphology, subsequent encounter        Plan:   Glenda was seen today for follow-up, low-back pain and anxiety.    Diagnoses and all orders for this visit:    Memory loss    Anxiety    Essential hypertension    Hyponatremia    Palliative care encounter    History of recent fall    Closed fracture of second lumbar vertebra with routine healing, unspecified fracture morphology, subsequent encounter    Other orders  -     busPIRone  (BUSPAR) 5 MG Tab; Take 1 tablet (5 mg total) by mouth 2 (two) times daily.  -     HYDROcodone-acetaminophen (NORCO) 7.5-325 mg per tablet; Take 1 tablet by mouth every 8 (eight) hours as needed for Pain.      Problem List Items Addressed This Visit        Neuro    Memory loss - Primary     Chronic over last few months.  Followed by Neurology.  Aricept offered to patient but daughter does not want to pursue medication.             Closed fracture of second lumbar vertebra with routine healing     S/p fall 2/21/22.  Followed by Neurosurgery/Dr. Shea.  Wearing TLSO brace with ambulation.   Has pain with sitting.  Needs refill on Norco,  reviewed, no signs of abuse.  Continue with PT/OT through .                 Psychiatric    Anxiety     Not controlled since fall and living with daughter now.  Increase Buspar to 5mg bid.  Continue to monitor.               Cardiac/Vascular    Essential hypertension     Chronic and stable.  Continue to monitor.               Renal/    Hyponatremia     Chronic and followed by Endocrinology.                 Other    Palliative care encounter     Full code. ACP documents left on previous visit.  Daughter reminded to obtain already completed forms for submission.           History of recent fall     Fal 2/21/22.  Sustained L2 vertebrae fracture.  Doesn't remember what lead to the fall.                    Were controlled substances prescribed?  No    > 50% of 60 min visit spent in chart review, face to face discussion of goals of care,  symptom assessment, coordination of care and emotional support.    Follow Up Appointments:   Future Appointments   Date Time Provider Department Center   3/28/2022  1:00 PM [facility] X[facility]2 [facility]   3/28/2022  1:30 PM Raymundo Shea, DO [facility] NEUROSU [facility]   4/9/2022  9:30 AM Damir Fraire MD [facility]       Signature:  Lesley Tamez NP    Attestation:   Screening criteria to assess the level of the patient's risk  for infection with COVID-19 as recommended by the CDC at the time of the above documented home visit concluded appropriateness to proceed. Universal precautions were maintained at all times, including provider wearing a mask and gloves during entire visit and use of 60% alcohol gel hand  immediately prior to entry and upon departure of patient's home as well as cleaning of equipment used in home visit with antibacterial/germicidal disposable wipes.

## 2022-03-06 ENCOUNTER — EXTERNAL HOME HEALTH (OUTPATIENT)
Dept: HOME HEALTH SERVICES | Facility: HOSPITAL | Age: 87
End: 2022-03-06
Payer: MEDICARE

## 2022-03-06 VITALS
HEART RATE: 75 BPM | TEMPERATURE: 98 F | RESPIRATION RATE: 16 BRPM | SYSTOLIC BLOOD PRESSURE: 148 MMHG | OXYGEN SATURATION: 97 % | DIASTOLIC BLOOD PRESSURE: 80 MMHG

## 2022-03-06 PROBLEM — S32.029D CLOSED FRACTURE OF SECOND LUMBAR VERTEBRA WITH ROUTINE HEALING: Status: ACTIVE | Noted: 2022-03-06

## 2022-03-06 NOTE — PATIENT INSTRUCTIONS
Instructions for the patient:  - Continue all medications, treatments and therapies as ordered.   - Follow all instructions, recommendations as discussed.  - Maintain Safety Precautions at all times.  - Attend all medical appointments as scheduled.  - For worsening symptoms: call Primary Care Physician or Nurse Practitioner.  - For emergencies, call 911 or immediately report to the nearest emergency room.  - Limit Risks of environmental exposure to coronavirus/COVID-19 as discussed including: social distancing, hand hygiene, and limiting departures from the home for necessities only.

## 2022-03-06 NOTE — ASSESSMENT & PLAN NOTE
Chronic over last few months.  Followed by Neurology.  Aricept offered to patient but daughter does not want to pursue medication.

## 2022-03-06 NOTE — ASSESSMENT & PLAN NOTE
S/p fall 2/21/22.  Followed by Neurosurgery/Dr. Shea.  Wearing TLSO brace with ambulation.   Has pain with sitting.  Needs refill on Norco,  reviewed, no signs of abuse.  Continue with PT/OT through .

## 2022-03-06 NOTE — ASSESSMENT & PLAN NOTE
Not controlled since fall and living with daughter now.  Increase Buspar to 5mg bid.  Continue to monitor.

## 2022-03-07 ENCOUNTER — TELEPHONE (OUTPATIENT)
Dept: NEUROSURGERY | Facility: CLINIC | Age: 87
End: 2022-03-07
Payer: MEDICARE

## 2022-03-07 NOTE — TELEPHONE ENCOUNTER
----- Message from Eleanor Kidd LPN sent at 3/4/2022  4:29 PM CST -----  Contact: Jesi    ----- Message -----  From: Gareth Butterfield  Sent: 3/4/2022   4:27 PM CST  To: [facility] Staff    Who Called: PT  Regarding: Jesi with [facility] called stating they received an order that isn't signed for the pt. Please contact the facility.   Would the patient rather a call back or a response via [facility]? Call back  Best Call Back Number: [phone]  Additional Information: order number: [id number]

## 2022-03-07 NOTE — TELEPHONE ENCOUNTER
Attempted to return call to [employer] unsuccessfully. I was not able to speak with anyone. Will try again.

## 2022-03-08 ENCOUNTER — OUTPATIENT CASE MANAGEMENT (OUTPATIENT)
Dept: ADMINISTRATIVE | Facility: OTHER | Age: 87
End: 2022-03-08
Payer: MEDICARE

## 2022-03-08 NOTE — TELEPHONE ENCOUNTER
Spoke with [facility] employee. Informed her that Dr. Shea did not perform surgery on pt and did not order wound supplies. She voiced understanding.

## 2022-03-09 NOTE — PROGRESS NOTES
03/09/22 Attempt assessment with patient/caregiver. No answer. Left message requesting call back. RN OPCM first assessment attempt  3/10/22 Spoke with pt's daughter. She declined OCPM at this time. Will mail RN info and OPCM brochure. RN OPCM

## 2022-03-14 ENCOUNTER — DOCUMENT SCAN (OUTPATIENT)
Dept: HOME HEALTH SERVICES | Facility: HOSPITAL | Age: 87
End: 2022-03-14
Payer: MEDICARE

## 2022-03-23 ENCOUNTER — CARE AT HOME (OUTPATIENT)
Dept: HOME HEALTH SERVICES | Facility: CLINIC | Age: 87
End: 2022-03-23
Payer: MEDICARE

## 2022-03-23 DIAGNOSIS — Z91.81 HISTORY OF RECENT FALL: ICD-10-CM

## 2022-03-23 DIAGNOSIS — Z51.5 PALLIATIVE CARE ENCOUNTER: ICD-10-CM

## 2022-03-23 DIAGNOSIS — R54 ADVANCED AGE: ICD-10-CM

## 2022-03-23 DIAGNOSIS — S32.029D CLOSED FRACTURE OF SECOND LUMBAR VERTEBRA WITH ROUTINE HEALING, UNSPECIFIED FRACTURE MORPHOLOGY, SUBSEQUENT ENCOUNTER: Primary | ICD-10-CM

## 2022-03-23 DIAGNOSIS — F41.9 ANXIETY: ICD-10-CM

## 2022-03-23 PROCEDURE — 99350 HOME/RES VST EST HIGH MDM 60: CPT | Mod: S$GLB,,, | Performed by: NURSE PRACTITIONER

## 2022-03-23 PROCEDURE — 99350 PR HOME VISIT,ESTAB PATIENT,LEVEL IV: ICD-10-PCS | Mod: S$GLB,,, | Performed by: NURSE PRACTITIONER

## 2022-03-23 NOTE — PROGRESS NOTES
"[facility]  Palliative Care Home Visit    Visit Date: 3/23/2022  Encounter Provider: Lesley Tamez NP  PCP:  Brooke Carroll MD    Subjective:      Patient ID: Glenda Gimenez is a 89 y.o. female.    Consult Requested By:  Dr. Carroll  Reason for Consult:  Palliative Care    Glenda is being seen at home due to physical debility that presents a taxing effort to leave the home, to mitigate high risk of hospital readmission and/or due to the limited availability of reliable or safe options for transportation to the point of access to the provider. Prior to treatment on this visit the chart was reviewed and patient verbal consent was obtained.      Chief Complaint: Follow-up, Low-back Pain and Anxiety    Glenda is a 89 year old female with a PMHX of hypothyroidism, hypertension, primary hyperparathyroidism, COPD, chronic diastolic CHF, obesity and sepsis pneumonia in 2020.     Glenda is being seen today for a Palliative Care follow up visit. Patient is found at her daughter's home alone today and has trouble opening the front door to let me in. Had to go around to the back door. Once inside she is very anxious and frustrated that she cannot go back to her own home. She is staying with her daughter and her family since she fell and fractured her L2 vertebrae a few weeks ago. Spoke with daughter Ankita prior to visit and she states that her mother's anxiety has gotten "severe". She follows her around the house demanding to go home, she even paces the floors. Ankita doesn't feel it is safe for patient to live alone in her home. Patient has a son that lives next door to her and she will spend some time there too but she was found "sneaking" out his house and trying to get into her home. Patient is 89 yrs old and has been living independently up until the fall with L2 fracture 2/21/22. She was driving even 6-8 months ago until her daughter took the car away because of memory deficits. Additionally, Ankita " "noted that patient would have an empty bottle of wine in the garbage can every 2 days or so. She was concerned that her mother may have been drinking too much wine. At this time she has not had any alcohol since her fall 2/21/22. Patient gets extremely angry today when asked about her drinking. She states "oh you too think I am an alcoholic". She acknowledges being anxious today but it is because she wants to go to her own home.     Patient is taking Buspar 5 mg po bid. Discussed with daughter via telephone to increase to 10 mg bid. Patient has new patient appointment with Dr. Ledezma coming up soon as Dr. Dunham has left [facility].     Patient also has follow up with Neurosurgery 3/28/22 for L2 fracture. She currently reports no pain and daughter confirms she is not taking any pain medications. She is still wearing a TLSO brace with ambulation and working with Home Health PT/OT.     VSS. Denies fever, chest pain, nausea, vomiting, diarrhea. Reports taking all medications as prescribed. No other needs identified at this time. Risks of environmental exposure to coronavirus discussed including: social distancing, hand hygiene, and limiting departures from the home for necessities only. Reports understanding and willingness to comply.           Goals of Care:  Glenda would like to be a Full code and have comfort care. She would like to continue living at home independently for as long as possible. She states that she has a living will on file at [facility], [facility] as well as [facility] placed 20 years ago. She states that her daughter Ankita Gimenez is her HPOA. Patient is unable to find copies of documents. Has new documents from previous visit but has not reviewed or completed yet.      Review of Systems   Constitutional: Positive for fatigue. Weight loss and appetite stable. Negative for fever.   HENT: Negative.    Eyes: Vision loss related to macular degeneration.  Respiratory: Denies cough, " shortness of breath. Negative for chest tightness and wheezing.   Cardiovascular: Negative.    Gastrointestinal: Positive for heartburn at times.  Endocrine: Negative.    Genitourinary: Urinary incontinence.   Musculoskeletal: Positive for hand arthritis-opening jars and bottles is getting more difficult.   Skin: Negative.    Allergic/Immunologic: Negative.    Neurological: Positive for weakness. Negative for tremors and syncope. Positive balance difficulties and dizziness.  Hematological: Bruises/bleeds easily.   Psychiatric/Behavioral: Negative.  Negative sleep issues. Negative for depression. Anxiety improved. Forgetfulness,confusion at times.         Assessments:  · Environmental: lives alone in a single story raised home that is clean, adequate lighting and temperature, no foul odors  · Functional Status: independent mostly, meals provided so she doesn't have to cook  · Safety:  lives alone, advanced age, fall risk  · Nutritional: adequate access to food, reports appetite improved  · Home Health/DME/Supplies: [facility]. DME: rollator         History:  Past Medical History:   Diagnosis Date    Anticoagulant long-term use     Arthritis     Dislocation of right shoulder joint     Hypertension     Hypothyroidism     Shoulder disorder     right    Thyroid disease      Family History   Problem Relation Age of Onset    Breast cancer Mother     Cancer Brother         lung cancer    Cancer Maternal Aunt         unknown cancer    Cancer Maternal Uncle         pancreatic cancer    Heart disease Neg Hx      Past Surgical History:   Procedure Laterality Date    HYSTERECTOMY      PARTIAL HYSTERECTOMY      ARTUR, ovaries in place, uterine prolapse     Review of patient's allergies indicates:   Allergen Reactions    Amlodipine Swelling     Other reaction(s): Angioedema    Atenolol      Other reaction(s): itch    Betamethasone sodium phosphate      Other reaction(s): Flushing (skin)    Cortisone       Other reaction(s): blurry vision  Other reaction(s): Rash    Lisinopril      Other reaction(s): COUGH    Naproxen      Other reaction(s): body shut down    Prednisone     Triamterene-hydrochlorothiazid      Other reaction(s): itch       Medications:    Current Outpatient Medications:     acetaminophen (TYLENOL) 325 MG tablet, Take 325 mg by mouth every 6 (six) hours as needed for Pain., Disp: , Rfl:     aspirin (ECOTRIN) 81 MG EC tablet, Take 81 mg by mouth once daily., Disp: , Rfl:     carvedilol (COREG) 3.125 MG tablet, Take 1.5625 mg by mouth 2 (two) times daily. , Disp: , Rfl: 1    co-enzyme Q-10 30 mg capsule, Take 30 mg by mouth 3 (three) times daily., Disp: , Rfl:     famotidine (PEPCID) 20 MG tablet, Take 1 tablet (20 mg total) by mouth 2 (two) times daily., Disp: 180 tablet, Rfl: 3    ibandronate (BONIVA) 150 mg tablet, Take 1 tablet (150 mg total) by mouth every 30 days., Disp: 12 tablet, Rfl: 3    isosorbide mononitrate (IMDUR) 30 MG 24 hr tablet, Take 1 tablet (30 mg total) by mouth once daily., Disp: 90 tablet, Rfl: 3    krill oil 500 mg Cap, Take by mouth., Disp: , Rfl:     Lactobac no.41/Bifidobact no.7 (PROBIOTIC-10 ORAL), 1 capsule DAILY (route: oral), Disp: , Rfl:     levothyroxine (SYNTHROID) 88 MCG tablet, Take 1 tablet (88 mcg total) by mouth once daily., Disp: 90 tablet, Rfl: 3    olmesartan (BENICAR) 5 MG Tab, Take 1 tablet (5 mg total) by mouth once daily., Disp: 90 tablet, Rfl: 3    peg 400-propylene glycol, PF, (SYSTANE HYDRATION PF) 0.4-0.3 % Drop, 1 drops DAILY (route: ophthalmic (eye)), Disp: , Rfl:     potassium chloride SA (K-DUR,KLOR-CON) 20 MEQ tablet, Take 1 tablet (20 mEq total) by mouth 3 (three) times daily., Disp: 270 tablet, Rfl: 3    sertraline (ZOLOFT) 25 MG tablet, Take 1 tablet (25 mg total) by mouth once daily., Disp: 90 tablet, Rfl: 3    sodium chloride 1 gram tablet, Take 1 g by mouth 3 (three) times daily. 2 tabs TID, Disp: , Rfl:     vit A/vit C/vit  E/zinc/copper (ICAPS AREDS ORAL), 1 capsule 2 TIMES DAILY (route: oral), Disp: , Rfl:     vitamin D (VITAMIN D3) 1000 units Tab, Take 1,000 Units by mouth once daily. Patient is taking 2000 units per day, Disp: , Rfl:     busPIRone (BUSPAR) 10 MG tablet, Take 1 tablet (10 mg total) by mouth 2 (two) times daily., Disp: 60 tablet, Rfl: 1    24h Oral Morphine Equivalents (OME):  N/A    Objective:     Physical Exam:  Vitals:    03/23/22 1345   BP: (!) 148/90   Pulse: 90   Resp: 16   Temp: 98.7 °F (37.1 °C)   TempSrc: Temporal   SpO2: 98%   PainSc: 0-No pain     There is no height or weight on file to calculate BMI.      Physical Exam   Constitutional: She is oriented to person, place, and time. She appears well-developed and well-nourished.   HENT:   Head: Normocephalic and atraumatic.   Right Ear: External ear normal.   Left Ear: External ear normal.   Nose: Nose normal.   Mouth/Throat: Oropharynx is clear and moist.   Eyes: Pupils are equal, round, and reactive to light. Conjunctivae and EOM are normal.   Neck: Normal range of motion. Neck supple.   Cardiovascular: Normal rate, regular rhythm, normal heart sounds and intact distal pulses.   Pulmonary/Chest: Effort normal. No stridor. No respiratory distress. She has no wheezes. She has no rales. She exhibits no tenderness.    Abdominal: Soft. Bowel sounds are normal.   Musculoskeletal: Normal range of motion. She exhibits no tenderness or deformity. Left ankle with 1+ non-pitting edema-pt reports chronic. Wearing TLSO brace.  Neurological: She is alert and oriented to person, place, and time. Positive balance difficulties.  Skin: Skin is warm and dry. Capillary refill takes 2 to 3 seconds.   Psychiatric: She has a normal mood and affect. Her behavior is anxious. Some short term memory deficits noted. Repeats self.      Symptom Assessment (ESAS 0-10 scale)      ESAS 0 1 2 3 4 5 6 7 8 9 10   Pain x                       Dyspnea x                       Anxiety    x                      Nausea x                       Depression       x                   Anorexia  x                       Fatigue          x               Insomnia  x                       Restlessness  x                       Agitation x                             Constipation    no  Bowel Management Plan (BMP): no  Diarrhea        no  Comments: reports BM once a day     Performance Status: PPS Score 60  Karnofsky Score:  60  EGOC:  2     Advance Care Planning   Ethical / Legal: Advance Care Planning         Surrogate decision maker:  Name Ankita Gimenez, Relationship:                         daughter                                                                 Code Status: Reports full code today                                                                  [place]:  none  Other advance directive:  HPOA, living will on file at hospital placed 20 years ago-patient states she does not have a copy, new forms left with patient to review with daughter, Capacity to make medical decisions:  questionable, Conflict none                        Labs:  CBC:   WBC   Date Value Ref Range Status   02/21/2022 8.83 3.90 - 12.70 K/uL Final       Hgb   Date Value Ref Range Status   07/20/2013 12.5 12.0 - 16.0 g/dl Final     Hemoglobin   Date Value Ref Range Status   02/21/2022 14.3 12.0 - 16.0 g/dL Final         Hematocrit   Date Value Ref Range Status   02/21/2022 44.2 37.0 - 48.5 % Final       MCV   Date Value Ref Range Status   02/21/2022 97 82 - 98 fL Final         Platelets   Date Value Ref Range Status   02/21/2022 285 150 - 450 K/uL Final       LFT:   Lab Results   Component Value Date    AST 51 (H) 02/21/2022    GGT 73 (H) 08/28/2020    ALKPHOS 107 02/21/2022    BILITOT 0.8 02/21/2022       Albumin:   Albumin   Date Value Ref Range Status   02/21/2022 3.4 (L) 3.5 - 5.2 g/dL Final     Protein:   Total Protein   Date Value Ref Range Status   02/21/2022 8.2 6.0 - 8.4 g/dL Final       Radiology:  I have reviewed all pertinent  imaging results/findings within the past 24 hours.      Assessment:     1. Closed fracture of second lumbar vertebra with routine healing, unspecified fracture morphology, subsequent encounter    2. Anxiety    3. Palliative care encounter    4. History of recent fall    5. Advanced age        Plan:   Glenda was seen today for follow-up, low-back pain and anxiety.    Diagnoses and all orders for this visit:    Closed fracture of second lumbar vertebra with routine healing, unspecified fracture morphology, subsequent encounter    Anxiety  -     busPIRone (BUSPAR) 10 MG tablet; Take 1 tablet (10 mg total) by mouth 2 (two) times daily.    Palliative care encounter    History of recent fall    Advanced age      Problem List Items Addressed This Visit        Neuro    Closed fracture of second lumbar vertebra with routine healing - Primary     S/p fall 2/21/22.  Followed by Neurosurgery/Dr. Shea, to see 3/28/22.  Wearing TLSO brace with ambulation.   Has no pain, not taking pain medications.   Continue with PT/OT through .               Psychiatric    Anxiety     Not controlled since fall and living with daughter now.  Increase Buspar to 10 mg bid.  Also on sertaline 25 mg qd.   Continue to monitor.            Relevant Medications    busPIRone (BUSPAR) 10 MG tablet       Other    Palliative care encounter     Full code           History of recent fall     Fall 2/21/22.  Sustained L2 vertebrae fracture.  Continue working with HH PT/OT           Advanced age          Were controlled substances prescribed?  No    > 50% of 65 min visit spent in chart review, face to face discussion of goals of care,  symptom assessment, coordination of care and emotional support.    Follow Up Appointments:   Future Appointments   Date Time Provider Department Center   4/7/2022 11:00 AM William Olivia MD [facility] [place]   4/9/2022  9:30 AM MD BHARGAVI Ayala ENDOCRN [place]   5/12/2022  2:30 PM [facility] XR2 [facility]    5/12/2022  3:00 PM Raymundo Shea, DO [facility]       Signature:  Lesley Tamez NP

## 2022-03-28 ENCOUNTER — HOSPITAL ENCOUNTER (OUTPATIENT)
Dept: RADIOLOGY | Facility: HOSPITAL | Age: 87
Discharge: HOME | End: 2022-03-28
Attending: NEUROLOGICAL SURGERY
Payer: MEDICARE

## 2022-03-28 ENCOUNTER — OFFICE VISIT (OUTPATIENT)
Dept: NEUROSURGERY | Facility: CLINIC | Age: 87
End: 2022-03-28
Payer: MEDICARE

## 2022-03-28 VITALS
SYSTOLIC BLOOD PRESSURE: 148 MMHG | OXYGEN SATURATION: 98 % | TEMPERATURE: 99 F | RESPIRATION RATE: 16 BRPM | HEART RATE: 90 BPM | DIASTOLIC BLOOD PRESSURE: 90 MMHG

## 2022-03-28 VITALS — SYSTOLIC BLOOD PRESSURE: 92 MMHG | HEART RATE: 94 BPM | DIASTOLIC BLOOD PRESSURE: 59 MMHG

## 2022-03-28 DIAGNOSIS — S32.020A COMPRESSION FRACTURE OF L2 VERTEBRA, INITIAL ENCOUNTER: ICD-10-CM

## 2022-03-28 DIAGNOSIS — S32.020A COMPRESSION FRACTURE OF L2 VERTEBRA, INITIAL ENCOUNTER: Primary | ICD-10-CM

## 2022-03-28 PROCEDURE — 3288F PR FALLS RISK ASSESSMENT DOCUMENTED: ICD-10-PCS | Mod: CPTII,S$GLB,, | Performed by: NEUROLOGICAL SURGERY

## 2022-03-28 PROCEDURE — 1101F PT FALLS ASSESS-DOCD LE1/YR: CPT | Mod: CPTII,S$GLB,, | Performed by: NEUROLOGICAL SURGERY

## 2022-03-28 PROCEDURE — 99213 PR OFFICE/OUTPT VISIT, EST, LEVL III, 20-29 MIN: ICD-10-PCS | Mod: S$GLB,,, | Performed by: NEUROLOGICAL SURGERY

## 2022-03-28 PROCEDURE — 99213 OFFICE O/P EST LOW 20 MIN: CPT | Mod: S$GLB,,, | Performed by: NEUROLOGICAL SURGERY

## 2022-03-28 PROCEDURE — 72100 XR LUMBAR SPINE AP AND LATERAL: ICD-10-PCS | Mod: 26,,, | Performed by: RADIOLOGY

## 2022-03-28 PROCEDURE — 1159F PR MEDICATION LIST DOCUMENTED IN MEDICAL RECORD: ICD-10-PCS | Mod: CPTII,S$GLB,, | Performed by: NEUROLOGICAL SURGERY

## 2022-03-28 PROCEDURE — 1126F AMNT PAIN NOTED NONE PRSNT: CPT | Mod: CPTII,S$GLB,, | Performed by: NEUROLOGICAL SURGERY

## 2022-03-28 PROCEDURE — 72100 X-RAY EXAM L-S SPINE 2/3 VWS: CPT | Mod: TC,FY

## 2022-03-28 PROCEDURE — 3288F FALL RISK ASSESSMENT DOCD: CPT | Mod: CPTII,S$GLB,, | Performed by: NEUROLOGICAL SURGERY

## 2022-03-28 PROCEDURE — 1101F PR PT FALLS ASSESS DOC 0-1 FALLS W/OUT INJ PAST YR: ICD-10-PCS | Mod: CPTII,S$GLB,, | Performed by: NEUROLOGICAL SURGERY

## 2022-03-28 PROCEDURE — 1126F PR PAIN SEVERITY QUANTIFIED, NO PAIN PRESENT: ICD-10-PCS | Mod: CPTII,S$GLB,, | Performed by: NEUROLOGICAL SURGERY

## 2022-03-28 PROCEDURE — 72100 X-RAY EXAM L-S SPINE 2/3 VWS: CPT | Mod: 26,,, | Performed by: RADIOLOGY

## 2022-03-28 PROCEDURE — 1159F MED LIST DOCD IN RCRD: CPT | Mod: CPTII,S$GLB,, | Performed by: NEUROLOGICAL SURGERY

## 2022-03-28 RX ORDER — BUSPIRONE HYDROCHLORIDE 10 MG/1
10 TABLET ORAL 2 TIMES DAILY
Qty: 60 TABLET | Refills: 1 | Status: SHIPPED | OUTPATIENT
Start: 2022-03-28 | End: 2022-05-10

## 2022-03-28 NOTE — PROGRESS NOTES
HPI:   This is a very pleasant 89-year-old woman who fell at home on Monday February 21, 2022. She presented with back pain.  Plain films of the lumbar spine demonstrated L1 mild superior endplate compression fracture, L2 moderate compression fracture, and moderate L4 compression fracture.  She was provided a TLSO brace and analgesics and referred for outpatient neurosurgical evaluation.  She endorses ongoing midline lumbar pain which has not significantly worsened.  The pain is aggravated with standing or walking.  She is utilizing a TLSO brace when ambulating.  She denies leg pain, paresthesias, saddle anesthesia, bowel or bladder incontinence.     Plain films of the lumbar spine reviewed today.  A CT of the lumbar spine was obtained today and reviewed as well.     She has a history of osteopenia involving the hip, normal bone density spine.  On Boniva.    Interval history March 28th, 2022:  She returns today with lumbar x-rays reviewed.  She is accompanied by her daughter reports that she has no back pain 0, leg pain, numbness, tingling weakness.  She is wearing the TLSO in ambulatory.  She ambulates with a rolling walker.  Her daughter reports overall that she is doing very well.    Lumbar x-rays obtained today reviewed demonstrate progression of the L2 compression deformity to 50% vertebral body height loss.  Stable moderate L4 compression deformity.  No retropulsion.  Alignment maintained      Exam:    Awake, alert, oriented to person place.  Pleasantly confused   Cranial nerves 2-12 grossly intact.    Strength is graded 5/5 in all muscle groups.    Sensation is grossly intact throughout.    Gait not examined.  TLSO in place    Analysis:  Will continue conservative management with bracing.  Recommended she continue vitamin-D and calcium supplementation.  Recommended she continue TLSO brace when ambulatory.  We will see her back in 6 weeks with repeat x-rays.  If clinically and radiographically stable,  discontinue TLSO brace.

## 2022-03-28 NOTE — ASSESSMENT & PLAN NOTE
Not controlled since fall and living with daughter now.  Increase Buspar to 10 mg bid.  Also on sertaline 25 mg qd.   Continue to monitor.

## 2022-03-28 NOTE — ASSESSMENT & PLAN NOTE
S/p fall 2/21/22.  Followed by Neurosurgery/Dr. Shea, to see 3/28/22.  Wearing TLSO brace with ambulation.   Has no pain, not taking pain medications.   Continue with PT/OT through .

## 2022-03-31 ENCOUNTER — LAB VISIT (OUTPATIENT)
Dept: LAB | Facility: HOSPITAL | Age: 87
End: 2022-03-31
Attending: NURSE PRACTITIONER
Payer: MEDICARE

## 2022-03-31 ENCOUNTER — TELEPHONE (OUTPATIENT)
Dept: HOME HEALTH SERVICES | Facility: CLINIC | Age: 87
End: 2022-03-31

## 2022-03-31 DIAGNOSIS — N39.0 URINARY TRACT INFECTION, SITE NOT SPECIFIED: Primary | ICD-10-CM

## 2022-03-31 DIAGNOSIS — E87.1 HYPONATREMIA: ICD-10-CM

## 2022-03-31 DIAGNOSIS — R41.0 CONFUSION: Primary | ICD-10-CM

## 2022-03-31 DIAGNOSIS — E87.1 HYPOSMOLALITY SYNDROME: ICD-10-CM

## 2022-03-31 LAB
ALBUMIN SERPL BCP-MCNC: 3.3 G/DL (ref 3.5–5.2)
ALP SERPL-CCNC: 75 U/L (ref 55–135)
ALT SERPL W/O P-5'-P-CCNC: 10 U/L (ref 10–44)
ANION GAP SERPL CALC-SCNC: 11 MMOL/L (ref 8–16)
AST SERPL-CCNC: 22 U/L (ref 10–40)
BASOPHILS # BLD AUTO: 0.07 K/UL (ref 0–0.2)
BASOPHILS NFR BLD: 1.2 % (ref 0–1.9)
BILIRUB SERPL-MCNC: 0.6 MG/DL (ref 0.1–1)
BILIRUB UR QL STRIP: NEGATIVE
BUN SERPL-MCNC: 9 MG/DL (ref 10–30)
CALCIUM SERPL-MCNC: 10.1 MG/DL (ref 8.7–10.5)
CHLORIDE SERPL-SCNC: 104 MMOL/L (ref 95–110)
CLARITY UR: CLEAR
CO2 SERPL-SCNC: 27 MMOL/L (ref 23–29)
COLOR UR: YELLOW
CREAT SERPL-MCNC: 0.7 MG/DL (ref 0.5–1.4)
DIFFERENTIAL METHOD: ABNORMAL
EOSINOPHIL # BLD AUTO: 0.2 K/UL (ref 0–0.5)
EOSINOPHIL NFR BLD: 3 % (ref 0–8)
ERYTHROCYTE [DISTWIDTH] IN BLOOD BY AUTOMATED COUNT: 13.4 % (ref 11.5–14.5)
EST. GFR  (AFRICAN AMERICAN): >60 ML/MIN/1.73 M^2
EST. GFR  (NON AFRICAN AMERICAN): >60 ML/MIN/1.73 M^2
GLUCOSE SERPL-MCNC: 109 MG/DL (ref 70–110)
GLUCOSE UR QL STRIP: NEGATIVE
HCT VFR BLD AUTO: 39.5 % (ref 37–48.5)
HGB BLD-MCNC: 13 G/DL (ref 12–16)
HGB UR QL STRIP: NEGATIVE
IMM GRANULOCYTES # BLD AUTO: 0.01 K/UL (ref 0–0.04)
IMM GRANULOCYTES NFR BLD AUTO: 0.2 % (ref 0–0.5)
KETONES UR QL STRIP: NEGATIVE
LEUKOCYTE ESTERASE UR QL STRIP: NEGATIVE
LYMPHOCYTES # BLD AUTO: 1.5 K/UL (ref 1–4.8)
LYMPHOCYTES NFR BLD: 24.4 % (ref 18–48)
MCH RBC QN AUTO: 31.8 PG (ref 27–31)
MCHC RBC AUTO-ENTMCNC: 32.9 G/DL (ref 32–36)
MCV RBC AUTO: 97 FL (ref 82–98)
MONOCYTES # BLD AUTO: 0.6 K/UL (ref 0.3–1)
MONOCYTES NFR BLD: 9.3 % (ref 4–15)
NEUTROPHILS # BLD AUTO: 3.7 K/UL (ref 1.8–7.7)
NEUTROPHILS NFR BLD: 61.9 % (ref 38–73)
NITRITE UR QL STRIP: NEGATIVE
NRBC BLD-RTO: 0 /100 WBC
PH UR STRIP: 7 [PH] (ref 5–8)
PLATELET # BLD AUTO: 225 K/UL (ref 150–450)
PMV BLD AUTO: 11.8 FL (ref 9.2–12.9)
POTASSIUM SERPL-SCNC: 3.6 MMOL/L (ref 3.5–5.1)
PROT SERPL-MCNC: 7 G/DL (ref 6–8.4)
PROT UR QL STRIP: NEGATIVE
RBC # BLD AUTO: 4.09 M/UL (ref 4–5.4)
SODIUM SERPL-SCNC: 142 MMOL/L (ref 136–145)
SP GR UR STRIP: 1.02 (ref 1–1.03)
URN SPEC COLLECT METH UR: NORMAL
WBC # BLD AUTO: 6.03 K/UL (ref 3.9–12.7)

## 2022-03-31 PROCEDURE — 85025 COMPLETE CBC W/AUTO DIFF WBC: CPT | Performed by: NURSE PRACTITIONER

## 2022-03-31 PROCEDURE — 81003 URINALYSIS AUTO W/O SCOPE: CPT | Mod: PO | Performed by: NURSE PRACTITIONER

## 2022-03-31 PROCEDURE — 87086 URINE CULTURE/COLONY COUNT: CPT | Performed by: NURSE PRACTITIONER

## 2022-03-31 PROCEDURE — 80053 COMPREHEN METABOLIC PANEL: CPT | Performed by: NURSE PRACTITIONER

## 2022-04-01 LAB
BACTERIA UR CULT: NORMAL
BACTERIA UR CULT: NORMAL

## 2022-04-05 ENCOUNTER — PATIENT MESSAGE (OUTPATIENT)
Dept: ENDOCRINOLOGY | Facility: CLINIC | Age: 87
End: 2022-04-05
Payer: MEDICARE

## 2022-04-05 DIAGNOSIS — E21.0 PRIMARY HYPERPARATHYROIDISM: ICD-10-CM

## 2022-04-05 DIAGNOSIS — R73.9 HYPERGLYCEMIA: ICD-10-CM

## 2022-04-05 DIAGNOSIS — E88.810 DYSMETABOLIC SYNDROME: ICD-10-CM

## 2022-04-05 DIAGNOSIS — E03.9 ACQUIRED HYPOTHYROIDISM: Primary | ICD-10-CM

## 2022-04-05 DIAGNOSIS — E04.1 NODULAR THYROID DISEASE: ICD-10-CM

## 2022-04-05 DIAGNOSIS — I10 ESSENTIAL HYPERTENSION: ICD-10-CM

## 2022-04-05 DIAGNOSIS — E55.9 VITAMIN D DEFICIENCY DISEASE: ICD-10-CM

## 2022-04-06 ENCOUNTER — PATIENT MESSAGE (OUTPATIENT)
Dept: ENDOCRINOLOGY | Facility: CLINIC | Age: 87
End: 2022-04-06
Payer: MEDICARE

## 2022-04-07 ENCOUNTER — LAB VISIT (OUTPATIENT)
Dept: LAB | Facility: HOSPITAL | Age: 87
End: 2022-04-07
Attending: INTERNAL MEDICINE
Payer: MEDICARE

## 2022-04-07 ENCOUNTER — OFFICE VISIT (OUTPATIENT)
Dept: FAMILY MEDICINE | Facility: CLINIC | Age: 87
End: 2022-04-07
Payer: MEDICARE

## 2022-04-07 VITALS
SYSTOLIC BLOOD PRESSURE: 112 MMHG | HEART RATE: 85 BPM | DIASTOLIC BLOOD PRESSURE: 48 MMHG | RESPIRATION RATE: 18 BRPM | BODY MASS INDEX: 27.33 KG/M2 | WEIGHT: 160.06 LBS | OXYGEN SATURATION: 95 % | HEIGHT: 64 IN

## 2022-04-07 DIAGNOSIS — R27.0 ATAXIA: ICD-10-CM

## 2022-04-07 DIAGNOSIS — E21.0 PRIMARY HYPERPARATHYROIDISM: ICD-10-CM

## 2022-04-07 DIAGNOSIS — R73.9 HYPERGLYCEMIA: ICD-10-CM

## 2022-04-07 DIAGNOSIS — E03.9 ACQUIRED HYPOTHYROIDISM: ICD-10-CM

## 2022-04-07 DIAGNOSIS — I25.118 CORONARY ARTERY DISEASE OF NATIVE ARTERY OF NATIVE HEART WITH STABLE ANGINA PECTORIS: ICD-10-CM

## 2022-04-07 DIAGNOSIS — F01.50 VASCULAR DEMENTIA WITHOUT BEHAVIORAL DISTURBANCE: Primary | ICD-10-CM

## 2022-04-07 DIAGNOSIS — E04.1 NODULAR THYROID DISEASE: ICD-10-CM

## 2022-04-07 DIAGNOSIS — E88.810 DYSMETABOLIC SYNDROME: ICD-10-CM

## 2022-04-07 DIAGNOSIS — I77.89 OTHER SPECIFIED DISORDERS OF ARTERIES AND ARTERIOLES: ICD-10-CM

## 2022-04-07 DIAGNOSIS — N18.30 STAGE 3 CHRONIC KIDNEY DISEASE, UNSPECIFIED WHETHER STAGE 3A OR 3B CKD: ICD-10-CM

## 2022-04-07 DIAGNOSIS — J44.9 CHRONIC OBSTRUCTIVE PULMONARY DISEASE, UNSPECIFIED COPD TYPE: ICD-10-CM

## 2022-04-07 DIAGNOSIS — E55.9 VITAMIN D DEFICIENCY DISEASE: ICD-10-CM

## 2022-04-07 LAB
25(OH)D3+25(OH)D2 SERPL-MCNC: 56 NG/ML (ref 30–96)
ESTIMATED AVG GLUCOSE: 105 MG/DL (ref 68–131)
HBA1C MFR BLD: 5.3 % (ref 4–5.6)
PTH-INTACT SERPL-MCNC: 96.6 PG/ML (ref 9–77)
TSH SERPL DL<=0.005 MIU/L-ACNC: 0.8 UIU/ML (ref 0.4–4)
URATE SERPL-MCNC: 5.4 MG/DL (ref 2.4–5.7)
VIT B12 SERPL-MCNC: 474 PG/ML (ref 210–950)

## 2022-04-07 PROCEDURE — 1126F PR PAIN SEVERITY QUANTIFIED, NO PAIN PRESENT: ICD-10-PCS | Mod: CPTII,S$GLB,, | Performed by: STUDENT IN AN ORGANIZED HEALTH CARE EDUCATION/TRAINING PROGRAM

## 2022-04-07 PROCEDURE — 1101F PT FALLS ASSESS-DOCD LE1/YR: CPT | Mod: CPTII,S$GLB,, | Performed by: STUDENT IN AN ORGANIZED HEALTH CARE EDUCATION/TRAINING PROGRAM

## 2022-04-07 PROCEDURE — 82306 VITAMIN D 25 HYDROXY: CPT | Performed by: INTERNAL MEDICINE

## 2022-04-07 PROCEDURE — 1126F AMNT PAIN NOTED NONE PRSNT: CPT | Mod: CPTII,S$GLB,, | Performed by: STUDENT IN AN ORGANIZED HEALTH CARE EDUCATION/TRAINING PROGRAM

## 2022-04-07 PROCEDURE — 1159F PR MEDICATION LIST DOCUMENTED IN MEDICAL RECORD: ICD-10-PCS | Mod: CPTII,S$GLB,, | Performed by: STUDENT IN AN ORGANIZED HEALTH CARE EDUCATION/TRAINING PROGRAM

## 2022-04-07 PROCEDURE — 84443 ASSAY THYROID STIM HORMONE: CPT | Performed by: INTERNAL MEDICINE

## 2022-04-07 PROCEDURE — 1159F MED LIST DOCD IN RCRD: CPT | Mod: CPTII,S$GLB,, | Performed by: STUDENT IN AN ORGANIZED HEALTH CARE EDUCATION/TRAINING PROGRAM

## 2022-04-07 PROCEDURE — 83970 ASSAY OF PARATHORMONE: CPT | Performed by: INTERNAL MEDICINE

## 2022-04-07 PROCEDURE — 1101F PR PT FALLS ASSESS DOC 0-1 FALLS W/OUT INJ PAST YR: ICD-10-PCS | Mod: CPTII,S$GLB,, | Performed by: STUDENT IN AN ORGANIZED HEALTH CARE EDUCATION/TRAINING PROGRAM

## 2022-04-07 PROCEDURE — 3288F PR FALLS RISK ASSESSMENT DOCUMENTED: ICD-10-PCS | Mod: CPTII,S$GLB,, | Performed by: STUDENT IN AN ORGANIZED HEALTH CARE EDUCATION/TRAINING PROGRAM

## 2022-04-07 PROCEDURE — 99499 UNLISTED E&M SERVICE: CPT | Mod: S$GLB,,, | Performed by: STUDENT IN AN ORGANIZED HEALTH CARE EDUCATION/TRAINING PROGRAM

## 2022-04-07 PROCEDURE — 99999 PR PBB SHADOW E&M-EST. PATIENT-LVL V: ICD-10-PCS | Mod: PBBFAC,,, | Performed by: STUDENT IN AN ORGANIZED HEALTH CARE EDUCATION/TRAINING PROGRAM

## 2022-04-07 PROCEDURE — 83036 HEMOGLOBIN GLYCOSYLATED A1C: CPT | Performed by: INTERNAL MEDICINE

## 2022-04-07 PROCEDURE — 99214 OFFICE O/P EST MOD 30 MIN: CPT | Mod: S$GLB,,, | Performed by: STUDENT IN AN ORGANIZED HEALTH CARE EDUCATION/TRAINING PROGRAM

## 2022-04-07 PROCEDURE — 1160F RVW MEDS BY RX/DR IN RCRD: CPT | Mod: CPTII,S$GLB,, | Performed by: STUDENT IN AN ORGANIZED HEALTH CARE EDUCATION/TRAINING PROGRAM

## 2022-04-07 PROCEDURE — 1160F PR REVIEW ALL MEDS BY PRESCRIBER/CLIN PHARMACIST DOCUMENTED: ICD-10-PCS | Mod: CPTII,S$GLB,, | Performed by: STUDENT IN AN ORGANIZED HEALTH CARE EDUCATION/TRAINING PROGRAM

## 2022-04-07 PROCEDURE — 3288F FALL RISK ASSESSMENT DOCD: CPT | Mod: CPTII,S$GLB,, | Performed by: STUDENT IN AN ORGANIZED HEALTH CARE EDUCATION/TRAINING PROGRAM

## 2022-04-07 PROCEDURE — 82607 VITAMIN B-12: CPT | Performed by: STUDENT IN AN ORGANIZED HEALTH CARE EDUCATION/TRAINING PROGRAM

## 2022-04-07 PROCEDURE — 99499 RISK ADDL DX/OHS AUDIT: ICD-10-PCS | Mod: S$GLB,,, | Performed by: STUDENT IN AN ORGANIZED HEALTH CARE EDUCATION/TRAINING PROGRAM

## 2022-04-07 PROCEDURE — 36415 COLL VENOUS BLD VENIPUNCTURE: CPT | Mod: PO | Performed by: INTERNAL MEDICINE

## 2022-04-07 PROCEDURE — 84550 ASSAY OF BLOOD/URIC ACID: CPT | Performed by: INTERNAL MEDICINE

## 2022-04-07 PROCEDURE — 99214 PR OFFICE/OUTPT VISIT, EST, LEVL IV, 30-39 MIN: ICD-10-PCS | Mod: S$GLB,,, | Performed by: STUDENT IN AN ORGANIZED HEALTH CARE EDUCATION/TRAINING PROGRAM

## 2022-04-07 PROCEDURE — 82330 ASSAY OF CALCIUM: CPT | Performed by: INTERNAL MEDICINE

## 2022-04-07 PROCEDURE — 99999 PR PBB SHADOW E&M-EST. PATIENT-LVL V: CPT | Mod: PBBFAC,,, | Performed by: STUDENT IN AN ORGANIZED HEALTH CARE EDUCATION/TRAINING PROGRAM

## 2022-04-07 RX ORDER — FUROSEMIDE 20 MG/1
TABLET ORAL
COMMUNITY
Start: 2022-03-23 | End: 2022-10-04

## 2022-04-07 RX ORDER — QUETIAPINE FUMARATE 50 MG/1
50 TABLET, FILM COATED ORAL NIGHTLY PRN
Qty: 30 TABLET | Refills: 11 | Status: SHIPPED | OUTPATIENT
Start: 2022-04-07 | End: 2022-05-02 | Stop reason: ALTCHOICE

## 2022-04-07 RX ORDER — IBUPROFEN 200 MG
2 CAPSULE ORAL DAILY
COMMUNITY
End: 2022-05-10

## 2022-04-07 NOTE — PROGRESS NOTES
[facility] MEDICINE CLINIC NOTE    Patient Name: Glenda Gimenez  YOB: 1927    PRESENTING HISTORY   Chief Complaint:   Chief Complaint   Patient presents with    [facility]        History of Present Illness:  Ms. Glenda Gimenez is a 89 y.o. female      Recurrent UTIs.     Hypothyroidism.     Multiple falls- mult compression fxs.     Dementia is rapidly worsening.    Not recognizing daughter sometimes.     Saw Neurology somewhat remotely.     Moved out of her house, in with family     Using walker all the time.     Wearing a brace.                                              Review of Systems   All other systems reviewed and are negative.        PAST HISTORY:     Past Medical History:   Diagnosis Date    Anticoagulant long-term use     Arthritis     Dislocation of right shoulder joint     Hypertension     Hypothyroidism     Shoulder disorder     right    Thyroid disease        Past Surgical History:   Procedure Laterality Date    HYSTERECTOMY      PARTIAL HYSTERECTOMY      ARTUR, ovaries in place, uterine prolapse       Family History   Problem Relation Age of Onset    Breast cancer Mother     Cancer Brother         lung cancer    Cancer Maternal Aunt         unknown cancer    Cancer Maternal Uncle         pancreatic cancer    Heart disease Neg Hx        Social History     Socioeconomic History    Marital status:    Tobacco Use    Smoking status: Never Smoker    Smokeless tobacco: Never Used   Substance and Sexual Activity    Alcohol use: Yes     Alcohol/week: 2.0 standard drinks     Types: 2 Glasses of wine per week     Comment: 2 small glasses of red wine with dinner    Drug use: No    Sexual activity: Not Currently       MEDICATIONS & ALLERGIES:     Current Outpatient Medications on File Prior to Visit   Medication Sig    acetaminophen (TYLENOL) 325 MG tablet Take 325 mg by mouth every 6 (six) hours as needed for Pain.    aspirin (ECOTRIN) 81 MG EC tablet  Take 81 mg by mouth once daily.    busPIRone (BUSPAR) 10 MG tablet Take 1 tablet (10 mg total) by mouth 2 (two) times daily.    calcium citrate (CALCITRATE) 200 mg (950 mg) tablet Take 2 tablets by mouth once daily.    co-enzyme Q-10 30 mg capsule Take 30 mg by mouth 3 (three) times daily.    famotidine (PEPCID) 20 MG tablet Take 1 tablet (20 mg total) by mouth 2 (two) times daily.    furosemide (LASIX) 20 MG tablet     ibandronate (BONIVA) 150 mg tablet Take 1 tablet (150 mg total) by mouth every 30 days.    isosorbide mononitrate (IMDUR) 30 MG 24 hr tablet Take 1 tablet (30 mg total) by mouth once daily.    krill oil 500 mg Cap Take by mouth.    levothyroxine (SYNTHROID) 88 MCG tablet Take 1 tablet (88 mcg total) by mouth once daily.    olmesartan (BENICAR) 5 MG Tab Take 1 tablet (5 mg total) by mouth once daily.    peg 400-propylene glycol, PF, (SYSTANE HYDRATION PF) 0.4-0.3 % Drop 1 drops DAILY (route: ophthalmic (eye))    potassium chloride SA (K-DUR,KLOR-CON) 20 MEQ tablet Take 1 tablet (20 mEq total) by mouth 3 (three) times daily.    sertraline (ZOLOFT) 25 MG tablet Take 1 tablet (25 mg total) by mouth once daily.    sodium chloride 1 gram tablet Take 1 g by mouth 3 (three) times daily. 2 tabs TID    vit A/vit C/vit E/zinc/copper (ICAPS AREDS ORAL) 1 capsule 2 TIMES DAILY (route: oral)    vitamin D (VITAMIN D3) 1000 units Tab Take 1,000 Units by mouth once daily. Patient is taking 2000 units per day    carvedilol (COREG) 3.125 MG tablet Take 1.5625 mg by mouth 2 (two) times daily.     Lactobac no.41/Bifidobact no.7 (PROBIOTIC-10 ORAL) 1 capsule DAILY (route: oral)     No current facility-administered medications on file prior to visit.       Review of patient's allergies indicates:   Allergen Reactions    Amlodipine Swelling     Other reaction(s): Angioedema    Atenolol      Other reaction(s): itch    Betamethasone sodium phosphate      Other reaction(s): Flushing (skin)    Cortisone   "    Other reaction(s): blurry vision  Other reaction(s): Rash    Lisinopril      Other reaction(s): COUGH    Naproxen      Other reaction(s): body shut down    Prednisone     Triamterene-hydrochlorothiazid      Other reaction(s): itch       OBJECTIVE:   Vital Signs:  Vitals:    04/07/22 1102   BP: 126/64   Pulse: 85   Resp: 18   SpO2: 95%   Weight: 72.6 kg (160 lb 0.9 oz)   Height: 5' 4" (1.626 m)       No results found for this or any previous visit (from the past 24 hour(s)).      Physical Exam  Vitals and nursing note reviewed.   Constitutional:       General: She is not in acute distress.     Appearance: She is not toxic-appearing or diaphoretic.   HENT:      Head: Normocephalic and atraumatic.      Right Ear: External ear normal.      Left Ear: External ear normal.   Eyes:      General: No scleral icterus.     Conjunctiva/sclera: Conjunctivae normal.      Pupils: Pupils are equal, round, and reactive to light.   Neck:      Thyroid: No thyromegaly.   Cardiovascular:      Rate and Rhythm: Normal rate and regular rhythm.      Heart sounds: Normal heart sounds. No murmur heard.  Pulmonary:      Effort: Pulmonary effort is normal. No respiratory distress.      Breath sounds: Normal breath sounds. No wheezing or rales.   Musculoskeletal:         General: No tenderness or deformity. Normal range of motion.      Cervical back: Normal range of motion and neck supple.      Comments: TLSO brace in place   Lymphadenopathy:      Cervical: No cervical adenopathy.   Skin:     General: Skin is warm and dry.      Findings: No erythema or rash.   Neurological:      Mental Status: She is alert and oriented to person, place, and time.      Gait: Gait is intact.      Comments: Able to stand from sitting with minimal use of hands. Leg strength grossly intact. Gait somewhat ataxic.    Psychiatric:         Mood and Affect: Mood and affect normal.         Cognition and Memory: Memory normal.         Judgment: Judgment normal. "         ASSESSMENT & PLAN:     Vascular dementia without behavioral disturbance  -     QUEtiapine (SEROQUEL) 50 MG tablet; Take 1 tablet (50 mg total) by mouth nightly as needed.  Dispense: 30 tablet; Refill: 11  Occasionally gets episodes of anxiety, previously used bzd for this. Will try seroquel when needed.     Ataxia  -     Vitamin B12; Future; Expected date: 04/07/2022  Suspect will not find easily reversible pathology, but check B12    Other specified disorders of arteries and arterioles  CAD    Primary hyperparathyroidism  Following with Dr. Holt, has assessment upcoming.     Coronary artery disease of native artery of native heart with stable angina pectoris  Continue current meds for now.     Chronic obstructive pulmonary disease, unspecified COPD type  Monitored    Stage 3 chronic kidney disease, unspecified whether stage 3a or 3b CKD  Monitored      Discussed what I think will be natural history of disease. Recommended against feeding tube should she have difficulty/stop eating.   Push calories.     Monitor BP closely, may need reduction in medication. Plan to stop ARB, then nitrate (due to CAD will do this second) stepwise.       William Olivia MD   Internal Medicine    This note was created using Dragon voice recognition software that occasionally misinterprets phrases or words.

## 2022-04-08 LAB — CA-I BLDV-SCNC: 1.44 MMOL/L (ref 1.06–1.42)

## 2022-04-18 ENCOUNTER — DOCUMENT SCAN (OUTPATIENT)
Dept: HOME HEALTH SERVICES | Facility: HOSPITAL | Age: 87
End: 2022-04-18
Payer: MEDICARE

## 2022-04-20 ENCOUNTER — TELEPHONE (OUTPATIENT)
Dept: FAMILY MEDICINE | Facility: CLINIC | Age: 87
End: 2022-04-20
Payer: MEDICARE

## 2022-04-25 PROCEDURE — G0179 MD RECERTIFICATION HHA PT: HCPCS | Mod: ,,, | Performed by: STUDENT IN AN ORGANIZED HEALTH CARE EDUCATION/TRAINING PROGRAM

## 2022-04-25 PROCEDURE — G0179 PR HOME HEALTH MD RECERTIFICATION: ICD-10-PCS | Mod: ,,, | Performed by: STUDENT IN AN ORGANIZED HEALTH CARE EDUCATION/TRAINING PROGRAM

## 2022-04-27 ENCOUNTER — CARE AT HOME (OUTPATIENT)
Dept: HOME HEALTH SERVICES | Facility: CLINIC | Age: 87
End: 2022-04-27
Payer: MEDICARE

## 2022-04-27 VITALS
SYSTOLIC BLOOD PRESSURE: 140 MMHG | HEIGHT: 64 IN | RESPIRATION RATE: 16 BRPM | WEIGHT: 156.63 LBS | HEART RATE: 77 BPM | BODY MASS INDEX: 26.74 KG/M2 | DIASTOLIC BLOOD PRESSURE: 72 MMHG | TEMPERATURE: 98 F | OXYGEN SATURATION: 96 %

## 2022-04-27 DIAGNOSIS — Z51.5 PALLIATIVE CARE ENCOUNTER: ICD-10-CM

## 2022-04-27 DIAGNOSIS — F03.91 DEMENTIA WITH BEHAVIORAL DISTURBANCE, UNSPECIFIED DEMENTIA TYPE: ICD-10-CM

## 2022-04-27 DIAGNOSIS — E87.1 HYPONATREMIA: Primary | ICD-10-CM

## 2022-04-27 DIAGNOSIS — F41.9 ANXIETY: ICD-10-CM

## 2022-04-27 PROCEDURE — 99350 PR HOME VISIT,ESTAB PATIENT,LEVEL IV: ICD-10-PCS | Mod: S$GLB,,, | Performed by: NURSE PRACTITIONER

## 2022-04-27 PROCEDURE — 99350 HOME/RES VST EST HIGH MDM 60: CPT | Mod: S$GLB,,, | Performed by: NURSE PRACTITIONER

## 2022-04-27 NOTE — PROGRESS NOTES
[facility]  Palliative Care Home Visit    Visit Date: 4/27/2022  Encounter Provider: Lesley Tamez NP  PCP:  William Olivia MD    Subjective:      Patient ID: Glenda Gimenez is a 89 y.o. female.    Consult Requested By:  Dr. Carroll  Reason for Consult:  Palliative Care    Glenda is being seen at home due to physical debility that presents a taxing effort to leave the home, to mitigate high risk of hospital readmission and/or due to the limited availability of reliable or safe options for transportation to the point of access to the provider. Prior to treatment on this visit the chart was reviewed and patient verbal consent was obtained.      Chief Complaint: Follow-up, Low-back Pain and Anxiety    Glenda is a 89 year old female with a PMHX of hypothyroidism, hypertension, primary hyperparathyroidism, COPD, chronic diastolic CHF, obesity and sepsis pneumonia in 2020.     Glenda is being seen today for a Palliative Care follow up visit. Patient is found at her son's home today. She has been staying with son, Shola and his wife Mary now since daughter, Ankita, works. Patient has not been left alone since her fall in Feb 2022. Patient keeps requesting to go home alone but family does not feel this is safe, in agreement with this decision. Patient gets angry over this but her memory continues to decline. She no longer reports lower back pain from her fall and L2 fracture.    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 Patient seems to be enjoying staying with son and daughter in law now. Both son and daughter in law do observe increased memory deterioration in patient as she does recall even recent meals she has eaten. They report some behavioral changes in the early afternoons,evenings lately including increased confusion, agitation, restlessness, sundowning behaviors and she is not sleeping much at night. They report  hearing her talking in her sleep. Patient is taking Buspar 10 mg po bid. Family is in agreement with beginning seroquel.    Family is concerned about patient still being on salt tablets. BMP ordered today for Home Health to draw on next visit. Patient is followed by Endocrinology for hyponatremia.    VSS. Denies fever, chest pain, nausea, vomiting, diarrhea. Reports taking all medications as prescribed. No other needs identified at this time. Risks of environmental exposure to coronavirus discussed including: social distancing, hand hygiene, and limiting departures from the home for necessities only. Reports understanding and willingness to comply.           Goals of Care:  Glenda would like to be a Full code and have comfort care. She would like to continue living at home independently for as long as possible. She states that she has a living will on [facility] as well as [facility] placed 20 years ago. She states that her daughter Ankita Gimenez is her HPOA. Patient is unable to find copies of documents. Has new documents from previous visit but has not reviewed or completed yet.      Review of Systems   Constitutional: Positive for fatigue. Weight loss and appetite stable. Negative for fever.   HENT: Negative.    Eyes: Vision loss related to macular degeneration.  Respiratory: Denies cough, shortness of breath. Negative for chest tightness and wheezing.   Cardiovascular: Negative.    Gastrointestinal: Positive for heartburn at times.  Endocrine: Negative.    Genitourinary: Urinary incontinence.   Musculoskeletal: Positive for hand arthritis-opening jars and bottles is getting more difficult.   Skin: Negative.    Allergic/Immunologic: Negative.    Neurological: Positive for weakness. Negative for tremors and syncope. Positive balance difficulties and dizziness.  Hematological: Bruises/bleeds easily.   Psychiatric/Behavioral: Negative.  Positive for sleep issues. Negative for  depression. Anxiety improved. Forgetfulness,confusion with some sundowning behaviors.         Assessments:  · Environmental: lives alone in a single story raised home that is clean, adequate lighting and temperature, no foul odors  · Functional Status: independent mostly, meals provided so she doesn't have to cook  · Safety:  lives alone, advanced age, fall risk  · Nutritional: adequate access to food, reports appetite improved  · Home Health/DME/Supplies: [facility]. DME: rollator         History:  Past Medical History:   Diagnosis Date    Anticoagulant long-term use     Arthritis     Dislocation of right shoulder joint     Hypertension     Hypothyroidism     Shoulder disorder     right    Thyroid disease      Family History   Problem Relation Age of Onset    Breast cancer Mother     Cancer Brother         lung cancer    Cancer Maternal Aunt         unknown cancer    Cancer Maternal Uncle         pancreatic cancer    Heart disease Neg Hx      Past Surgical History:   Procedure Laterality Date    HYSTERECTOMY      PARTIAL HYSTERECTOMY      ARTUR, ovaries in place, uterine prolapse     Review of patient's allergies indicates:   Allergen Reactions    Amlodipine Swelling     Other reaction(s): Angioedema    Atenolol      Other reaction(s): itch    Betamethasone sodium phosphate      Other reaction(s): Flushing (skin)    Cortisone      Other reaction(s): blurry vision  Other reaction(s): Rash    Lisinopril      Other reaction(s): COUGH    Naproxen      Other reaction(s): body shut down    Prednisone     Triamterene-hydrochlorothiazid      Other reaction(s): itch       Medications:    Current Outpatient Medications:     acetaminophen (TYLENOL) 325 MG tablet, Take 325 mg by mouth every 6 (six) hours as needed for Pain., Disp: , Rfl:     aspirin (ECOTRIN) 81 MG EC tablet, Take 81 mg by mouth once daily., Disp: , Rfl:     carvedilol (COREG) 3.125 MG tablet, Take 1.5625 mg by mouth 2 (two)  times daily. , Disp: , Rfl: 1    co-enzyme Q-10 30 mg capsule, Take 30 mg by mouth 3 (three) times daily., Disp: , Rfl:     famotidine (PEPCID) 20 MG tablet, Take 1 tablet (20 mg total) by mouth 2 (two) times daily., Disp: 180 tablet, Rfl: 3    furosemide (LASIX) 20 MG tablet, , Disp: , Rfl:     ibandronate (BONIVA) 150 mg tablet, Take 1 tablet (150 mg total) by mouth every 30 days., Disp: 12 tablet, Rfl: 3    isosorbide mononitrate (IMDUR) 30 MG 24 hr tablet, Take 1 tablet (30 mg total) by mouth once daily., Disp: 90 tablet, Rfl: 3    krill oil 500 mg Cap, Take by mouth., Disp: , Rfl:     Lactobac no.41/Bifidobact no.7 (PROBIOTIC-10 ORAL), 1 capsule DAILY (route: oral), Disp: , Rfl:     levothyroxine (SYNTHROID) 88 MCG tablet, Take 1 tablet (88 mcg total) by mouth once daily., Disp: 90 tablet, Rfl: 3    olmesartan (BENICAR) 5 MG Tab, Take 1 tablet (5 mg total) by mouth once daily., Disp: 90 tablet, Rfl: 3    peg 400-propylene glycol, PF, (SYSTANE HYDRATION PF) 0.4-0.3 % Drop, 1 drops DAILY (route: ophthalmic (eye)), Disp: , Rfl:     sertraline (ZOLOFT) 25 MG tablet, Take 1 tablet (25 mg total) by mouth once daily., Disp: 90 tablet, Rfl: 3    sodium chloride 1 gram tablet, Take 1 g by mouth 3 (three) times daily. 2 tabs TID, Disp: , Rfl:     vit A/vit C/vit E/zinc/copper (ICAPS AREDS ORAL), 1 capsule 2 TIMES DAILY (route: oral), Disp: , Rfl:     vitamin D (VITAMIN D3) 1000 units Tab, Take 1,000 Units by mouth once daily. Patient is taking 2000 units per day, Disp: , Rfl:     busPIRone (BUSPAR) 10 MG tablet, Take 1 tablet (10 mg total) by mouth 3 (three) times daily., Disp: 90 tablet, Rfl: 1    ondansetron (ZOFRAN-ODT) 4 MG TbDL, Take 1 tablet (4 mg total) by mouth every 8 (eight) hours as needed (nausea)., Disp: 21 tablet, Rfl: 0    pulse oximeter (PULSE OXIMETER) device, by Apply Externally route 2 (two) times a day. Use twice daily at 8 AM and 3 PM and record the value in MyChart as directed.,  "Disp: 1 each, Rfl: 0    pulse oximeter (PULSE OXIMETER) device, by Apply Externally route 2 (two) times a day. Use twice daily at 8 AM and 3 PM and record the value in MyChart as directed., Disp: 1 each, Rfl: 0    QUEtiapine (SEROQUEL) 50 MG tablet, Take 1 tablet (50 mg total) by mouth every evening., Disp: 30 tablet, Rfl: 11    24h Oral Morphine Equivalents (OME):  N/A    Objective:     Physical Exam:  Vitals:    04/27/22 1315   BP: (!) 140/72   Pulse: 77   Resp: 16   Temp: 98.1 °F (36.7 °C)   TempSrc: Temporal   SpO2: 96%   Weight: 71 kg (156 lb 9.6 oz)   Height: 5' 4" (1.626 m)   PainSc: 0-No pain     Body mass index is 26.88 kg/m².      Physical Exam   Constitutional: She is oriented to person, place, and time. She appears well-developed and well-nourished.   HENT:   Head: Normocephalic and atraumatic.   Right Ear: External ear normal.   Left Ear: External ear normal.   Nose: Nose normal.   Mouth/Throat: Oropharynx is clear and moist.   Eyes: Pupils are equal, round, and reactive to light. Conjunctivae and EOM are normal.   Neck: Normal range of motion. Neck supple.   Cardiovascular: Normal rate, regular rhythm, normal heart sounds and intact distal pulses.   Pulmonary/Chest: Effort normal. No stridor. No respiratory distress. She has no wheezes. She has no rales. She exhibits no tenderness.    Abdominal: Soft. Bowel sounds are normal.   Musculoskeletal: Normal range of motion. She exhibits no tenderness or deformity. Left ankle with 1+ non-pitting edema-pt reports chronic. Wearing TLSO brace.  Neurological: She is alert and oriented to person, place, and time. Positive balance difficulties.  Skin: Skin is warm and dry. Capillary refill takes 2 to 3 seconds.   Psychiatric: She has a normal mood and affect. Her behavior is anxious. Some short term memory deficits noted. Repeats self.      Symptom Assessment (ESAS 0-10 scale)      ESAS 0 1 2 3 4 5 6 7 8 9 10   Pain x                       Dyspnea x               "         Anxiety      x                   Nausea x                       Depression   x                       Anorexia  x                       Fatigue          x               Insomnia      x                   Restlessness      x                   Agitation     x                         Constipation    no  Bowel Management Plan (BMP): no  Diarrhea        no  Comments: reports BM once a day     Performance Status: PPS Score 50  Karnofsky Score:  50  EGOC:  2     Advance Care Planning   Ethical / Legal: Advance Care Planning         Surrogate decision maker:  Name Ankita Gimenez, Relationship:                         daughter                                                                 Code Status: Reports full code today                                                                  [place]:  none  Other advance directive:  HPOA, living will on file at hospital placed 20 years ago-patient states she does not have a copy, new forms left with patient to review with daughter, Capacity to make medical decisions:  questionable, Conflict none       Labs:  CBC:   WBC   Date Value Ref Range Status   05/09/2022 6.95 3.90 - 12.70 K/uL Final       Hgb   Date Value Ref Range Status   07/20/2013 12.5 12.0 - 16.0 g/dl Final     Hemoglobin   Date Value Ref Range Status   05/09/2022 13.2 12.0 - 16.0 g/dL Final         Hematocrit   Date Value Ref Range Status   05/09/2022 39.1 37.0 - 48.5 % Final       MCV   Date Value Ref Range Status   05/09/2022 94 82 - 98 fL Final         Platelets   Date Value Ref Range Status   05/09/2022 277 150 - 450 K/uL Final       LFT:   Lab Results   Component Value Date    AST 35 05/09/2022    GGT 73 (H) 08/28/2020    ALKPHOS 77 05/09/2022    BILITOT 0.4 05/09/2022       Albumin:   Albumin   Date Value Ref Range Status   05/09/2022 3.6 3.5 - 5.2 g/dL Final     Protein:   Total Protein   Date Value Ref Range Status   05/09/2022 8.3 6.0 - 8.4 g/dL Final       Radiology:  I have reviewed all pertinent  imaging results/findings within the past 24 hours.      Assessment:     1. Hyponatremia    2. Anxiety    3. Palliative care encounter    4. Dementia with behavioral disturbance, unspecified dementia type        Plan:   Glenda was seen today for follow-up, anxiety and dementia.    Diagnoses and all orders for this visit:    Hyponatremia  -     Basic Metabolic Panel; Future    Anxiety    Palliative care encounter    Dementia with behavioral disturbance, unspecified dementia type      Problem List Items Addressed This Visit        Neuro    Dementia with behavioral disturbance     Chronic and worsening over last several months.  Gets agitated, some sundowning and not sleeping well lately.  No longer able to be left alone.  Begin seroquel, continue buspar.  Continue to monitor.               Psychiatric    Anxiety     Chronic.  Continue Buspar 10 mg tid.  Continue to monitor.               Renal/    Hyponatremia - Primary     Chronic and followed by Endocrinology.   Needs up to date lab.  Ordered for  to draw on next visit.           Relevant Orders    Basic Metabolic Panel       Palliative Care    Palliative care encounter     Full code. ACP documents left on previous visit.  Daughter reminded to obtain already completed forms for submission                 Were controlled substances prescribed?  No    > 50% of 65 min visit spent in chart review, face to face discussion of goals of care,  symptom assessment, coordination of care and emotional support.    Follow Up Appointments:   Future Appointments   Date Time Provider Department Center   5/23/2022  8:00 AM Lesley Tamez NP [facility] [place]   6/9/2022 10:30 AM [facility] XR2 [facility] X[facility]   6/9/2022 11:00 AM Raymundo Shea DO [facility]   6/25/2022  8:00 AM Damir Fraire MD [facility]OCR[facility] [place]   8/3/2022  3:20 PM William Olivia MD [facility] FAM MED [place]       Signature:  Lesley Tamez NP

## 2022-04-28 ENCOUNTER — EXTERNAL HOME HEALTH (OUTPATIENT)
Dept: HOME HEALTH SERVICES | Facility: HOSPITAL | Age: 87
End: 2022-04-28
Payer: MEDICARE

## 2022-05-02 ENCOUNTER — HOSPITAL ENCOUNTER (EMERGENCY)
Facility: HOSPITAL | Age: 87
Discharge: HOME | End: 2022-05-02
Attending: EMERGENCY MEDICINE
Payer: MEDICARE

## 2022-05-02 ENCOUNTER — PATIENT MESSAGE (OUTPATIENT)
Dept: FAMILY MEDICINE | Facility: CLINIC | Age: 87
End: 2022-05-02
Payer: MEDICARE

## 2022-05-02 ENCOUNTER — CARE AT HOME (OUTPATIENT)
Dept: HOME HEALTH SERVICES | Facility: CLINIC | Age: 87
End: 2022-05-02
Payer: MEDICARE

## 2022-05-02 VITALS
SYSTOLIC BLOOD PRESSURE: 130 MMHG | OXYGEN SATURATION: 95 % | RESPIRATION RATE: 16 BRPM | HEART RATE: 88 BPM | TEMPERATURE: 100 F | DIASTOLIC BLOOD PRESSURE: 70 MMHG

## 2022-05-02 VITALS
OXYGEN SATURATION: 97 % | BODY MASS INDEX: 28.52 KG/M2 | TEMPERATURE: 99 F | HEART RATE: 80 BPM | RESPIRATION RATE: 18 BRPM | WEIGHT: 155 LBS | DIASTOLIC BLOOD PRESSURE: 66 MMHG | HEIGHT: 62 IN | SYSTOLIC BLOOD PRESSURE: 136 MMHG

## 2022-05-02 DIAGNOSIS — R50.9 FEVER, UNSPECIFIED FEVER CAUSE: ICD-10-CM

## 2022-05-02 DIAGNOSIS — U07.1 COVID-19: Primary | ICD-10-CM

## 2022-05-02 DIAGNOSIS — J02.9 SORE THROAT: ICD-10-CM

## 2022-05-02 DIAGNOSIS — R11.2 NAUSEA AND VOMITING, INTRACTABILITY OF VOMITING NOT SPECIFIED, UNSPECIFIED VOMITING TYPE: Primary | ICD-10-CM

## 2022-05-02 DIAGNOSIS — R11.2 NAUSEA AND VOMITING, INTRACTABILITY OF VOMITING NOT SPECIFIED, UNSPECIFIED VOMITING TYPE: ICD-10-CM

## 2022-05-02 LAB
ALBUMIN SERPL BCP-MCNC: 3.6 G/DL (ref 3.5–5.2)
ALP SERPL-CCNC: 82 U/L (ref 55–135)
ALT SERPL W/O P-5'-P-CCNC: 24 U/L (ref 10–44)
ANION GAP SERPL CALC-SCNC: 11 MMOL/L (ref 8–16)
AST SERPL-CCNC: 33 U/L (ref 10–40)
BASOPHILS # BLD AUTO: 0.06 K/UL (ref 0–0.2)
BASOPHILS NFR BLD: 0.9 % (ref 0–1.9)
BILIRUB SERPL-MCNC: 0.6 MG/DL (ref 0.1–1)
BUN SERPL-MCNC: 14 MG/DL (ref 10–30)
CALCIUM SERPL-MCNC: 10.2 MG/DL (ref 8.7–10.5)
CHLORIDE SERPL-SCNC: 101 MMOL/L (ref 95–110)
CO2 SERPL-SCNC: 24 MMOL/L (ref 23–29)
CREAT SERPL-MCNC: 0.8 MG/DL (ref 0.5–1.4)
DIFFERENTIAL METHOD: ABNORMAL
EOSINOPHIL # BLD AUTO: 0 K/UL (ref 0–0.5)
EOSINOPHIL NFR BLD: 0.2 % (ref 0–8)
ERYTHROCYTE [DISTWIDTH] IN BLOOD BY AUTOMATED COUNT: 13.2 % (ref 11.5–14.5)
EST. GFR  (AFRICAN AMERICAN): >60 ML/MIN/1.73 M^2
EST. GFR  (NON AFRICAN AMERICAN): >60 ML/MIN/1.73 M^2
GLUCOSE SERPL-MCNC: 115 MG/DL (ref 70–110)
HCT VFR BLD AUTO: 37.5 % (ref 37–48.5)
HGB BLD-MCNC: 12.6 G/DL (ref 12–16)
IMM GRANULOCYTES # BLD AUTO: 0.02 K/UL (ref 0–0.04)
IMM GRANULOCYTES NFR BLD AUTO: 0.3 % (ref 0–0.5)
INFLUENZA A, MOLECULAR: NEGATIVE
INFLUENZA B, MOLECULAR: NEGATIVE
LIPASE SERPL-CCNC: 4 U/L (ref 4–60)
LYMPHOCYTES # BLD AUTO: 1 K/UL (ref 1–4.8)
LYMPHOCYTES NFR BLD: 15.2 % (ref 18–48)
MCH RBC QN AUTO: 31.1 PG (ref 27–31)
MCHC RBC AUTO-ENTMCNC: 33.6 G/DL (ref 32–36)
MCV RBC AUTO: 93 FL (ref 82–98)
MONOCYTES # BLD AUTO: 0.7 K/UL (ref 0.3–1)
MONOCYTES NFR BLD: 10.7 % (ref 4–15)
NEUTROPHILS # BLD AUTO: 4.7 K/UL (ref 1.8–7.7)
NEUTROPHILS NFR BLD: 72.7 % (ref 38–73)
NRBC BLD-RTO: 0 /100 WBC
PLATELET # BLD AUTO: 242 K/UL (ref 150–450)
PMV BLD AUTO: 10.5 FL (ref 9.2–12.9)
POTASSIUM SERPL-SCNC: 3.9 MMOL/L (ref 3.5–5.1)
PROT SERPL-MCNC: 8.1 G/DL (ref 6–8.4)
RBC # BLD AUTO: 4.05 M/UL (ref 4–5.4)
SARS-COV-2 RDRP RESP QL NAA+PROBE: POSITIVE
SODIUM SERPL-SCNC: 136 MMOL/L (ref 136–145)
SPECIMEN SOURCE: NORMAL
WBC # BLD AUTO: 6.52 K/UL (ref 3.9–12.7)

## 2022-05-02 PROCEDURE — 99350 HOME/RES VST EST HIGH MDM 60: CPT | Mod: S$GLB,,, | Performed by: NURSE PRACTITIONER

## 2022-05-02 PROCEDURE — 99284 EMERGENCY DEPT VISIT MOD MDM: CPT | Mod: 25

## 2022-05-02 PROCEDURE — 87502 INFLUENZA DNA AMP PROBE: CPT | Performed by: EMERGENCY MEDICINE

## 2022-05-02 PROCEDURE — U0002 COVID-19 LAB TEST NON-CDC: HCPCS | Performed by: EMERGENCY MEDICINE

## 2022-05-02 PROCEDURE — 83690 ASSAY OF LIPASE: CPT | Performed by: EMERGENCY MEDICINE

## 2022-05-02 PROCEDURE — 96374 THER/PROPH/DIAG INJ IV PUSH: CPT

## 2022-05-02 PROCEDURE — 80053 COMPREHEN METABOLIC PANEL: CPT | Performed by: EMERGENCY MEDICINE

## 2022-05-02 PROCEDURE — 36415 COLL VENOUS BLD VENIPUNCTURE: CPT | Performed by: EMERGENCY MEDICINE

## 2022-05-02 PROCEDURE — 96361 HYDRATE IV INFUSION ADD-ON: CPT

## 2022-05-02 PROCEDURE — 85025 COMPLETE CBC W/AUTO DIFF WBC: CPT | Performed by: EMERGENCY MEDICINE

## 2022-05-02 PROCEDURE — 99350 PR HOME VISIT,ESTAB PATIENT,LEVEL IV: ICD-10-PCS | Mod: S$GLB,,, | Performed by: NURSE PRACTITIONER

## 2022-05-02 PROCEDURE — 25000003 PHARM REV CODE 250: Performed by: EMERGENCY MEDICINE

## 2022-05-02 PROCEDURE — 63600175 PHARM REV CODE 636 W HCPCS: Performed by: EMERGENCY MEDICINE

## 2022-05-02 RX ORDER — ONDANSETRON 2 MG/ML
4 INJECTION INTRAMUSCULAR; INTRAVENOUS
Status: COMPLETED | OUTPATIENT
Start: 2022-05-02 | End: 2022-05-02

## 2022-05-02 RX ORDER — ONDANSETRON 4 MG/1
4 TABLET, ORALLY DISINTEGRATING ORAL EVERY 8 HOURS PRN
Qty: 21 TABLET | Refills: 0 | Status: SHIPPED | OUTPATIENT
Start: 2022-05-02 | End: 2022-06-06

## 2022-05-02 RX ORDER — AMOXICILLIN AND CLAVULANATE POTASSIUM 500; 125 MG/1; MG/1
1 TABLET, FILM COATED ORAL 2 TIMES DAILY
Qty: 14 TABLET | Refills: 0 | Status: SHIPPED | OUTPATIENT
Start: 2022-05-02 | End: 2022-05-10

## 2022-05-02 RX ADMIN — ONDANSETRON 4 MG: 2 INJECTION INTRAMUSCULAR; INTRAVENOUS at 05:05

## 2022-05-02 RX ADMIN — SODIUM CHLORIDE 1000 ML: 0.9 INJECTION, SOLUTION INTRAVENOUS at 05:05

## 2022-05-02 NOTE — ED NOTES
Patient identifiers for Glenda Gimenez checked and correct.  LOC:  Glenda Gimenez is awake, alert, and aware of environment with an appropriate affect. She is oriented x 3 and speaking appropriately.  APPEARANCE:  She is resting comfortably and in no acute distress. She is clean and well groomed, patient's clothing is properly fastened.  SKIN:  The skin is warm and dry. She has normal skin turgor and moist mucus membranes. Skin is intact; no bruising or breakdown noted.  MUSCULOSKELETAL:  She is moving all extremities well, no obvious deformities noted. Pulses intact.   RESPIRATORY:  Airway is open and patent. Respirations are spontaneous and non-labored with normal effort and rate.  Sore throat  CARDIAC:  She has a normal rate and rhythm. No peripheral edema noted. Capillary refill < 3 seconds.  ABDOMEN:  No distention noted.  Soft and non-tender upon palpation.  N&V  NEUROLOGICAL:  PERRL. Facial expression is symmetrical. Hand grasps are equal bilaterally. Normal sensation in all extremities when touched with finger.  Allergies reported:    Review of patient's allergies indicates:   Allergen Reactions    Amlodipine Swelling     Other reaction(s): Angioedema    Atenolol      Other reaction(s): itch    Betamethasone sodium phosphate      Other reaction(s): Flushing (skin)    Cortisone      Other reaction(s): blurry vision  Other reaction(s): Rash    Lisinopril      Other reaction(s): COUGH    Naproxen      Other reaction(s): body shut down    Prednisone     Triamterene-hydrochlorothiazid      Other reaction(s): itch     OTHER NOTES:

## 2022-05-02 NOTE — TELEPHONE ENCOUNTER
I spoke with pts daughter via phone, I advised her that Dr. Olivia does not have any upcoming appointments.    Will see Dr. Dubon tomorrow for 8:00 all understanding voiced of date time and location.

## 2022-05-03 ENCOUNTER — NURSE TRIAGE (OUTPATIENT)
Dept: ADMINISTRATIVE | Facility: CLINIC | Age: 87
End: 2022-05-03
Payer: MEDICARE

## 2022-05-03 ENCOUNTER — PATIENT MESSAGE (OUTPATIENT)
Dept: ADMINISTRATIVE | Facility: CLINIC | Age: 87
End: 2022-05-03
Payer: MEDICARE

## 2022-05-03 ENCOUNTER — INFUSION (OUTPATIENT)
Dept: INFECTIOUS DISEASES | Facility: HOSPITAL | Age: 87
End: 2022-05-03
Attending: EMERGENCY MEDICINE
Payer: MEDICARE

## 2022-05-03 ENCOUNTER — PATIENT MESSAGE (OUTPATIENT)
Dept: FAMILY MEDICINE | Facility: CLINIC | Age: 87
End: 2022-05-03
Payer: MEDICARE

## 2022-05-03 VITALS
DIASTOLIC BLOOD PRESSURE: 71 MMHG | HEART RATE: 75 BPM | SYSTOLIC BLOOD PRESSURE: 129 MMHG | RESPIRATION RATE: 20 BRPM | TEMPERATURE: 98 F | OXYGEN SATURATION: 95 %

## 2022-05-03 DIAGNOSIS — U07.1 COVID-19: ICD-10-CM

## 2022-05-03 PROCEDURE — 63600175 PHARM REV CODE 636 W HCPCS: Mod: PN | Performed by: INTERNAL MEDICINE

## 2022-05-03 PROCEDURE — M0222 HC IV INJECTION, BEBTELOVIMAB, INCL POST ADMIN MONIT: HCPCS | Performed by: INTERNAL MEDICINE

## 2022-05-03 RX ORDER — DIPHENHYDRAMINE HYDROCHLORIDE 50 MG/ML
25 INJECTION INTRAMUSCULAR; INTRAVENOUS
Status: ACTIVE | OUTPATIENT
Start: 2022-05-03 | End: 2022-05-04

## 2022-05-03 RX ORDER — BEBTELOVIMAB 87.5 MG/ML
175 INJECTION, SOLUTION INTRAVENOUS
Status: COMPLETED | OUTPATIENT
Start: 2022-05-03 | End: 2022-05-03

## 2022-05-03 RX ORDER — ALBUTEROL SULFATE 90 UG/1
2 AEROSOL, METERED RESPIRATORY (INHALATION)
Status: ACTIVE | OUTPATIENT
Start: 2022-05-03 | End: 2022-05-06

## 2022-05-03 RX ORDER — ACETAMINOPHEN 325 MG/1
650 TABLET ORAL
Status: ACTIVE | OUTPATIENT
Start: 2022-05-03 | End: 2022-05-04

## 2022-05-03 RX ORDER — EPINEPHRINE 0.3 MG/.3ML
0.3 INJECTION SUBCUTANEOUS
Status: ACTIVE | OUTPATIENT
Start: 2022-05-03 | End: 2022-05-06

## 2022-05-03 RX ORDER — ONDANSETRON 4 MG/1
4 TABLET, ORALLY DISINTEGRATING ORAL
Status: ACTIVE | OUTPATIENT
Start: 2022-05-03 | End: 2022-05-04

## 2022-05-03 RX ADMIN — BEBTELOVIMAB 175 MG: 87.5 INJECTION, SOLUTION INTRAVENOUS at 11:05

## 2022-05-03 NOTE — TELEPHONE ENCOUNTER
Pt called for enrollment call and spoke to the son he said that they would like a call in the am she just got finished eating supper and she looks like she is doing well. Pt was told about OOC and to reach out of any issues and he will try to take a look at her my chart. Enrollment call #2 and will call back tomorrow    Reason for Disposition   Health Information question, no triage required and triager able to answer question    Protocols used: INFORMATION ONLY CALL - NO TRIAGE-A-

## 2022-05-03 NOTE — TELEPHONE ENCOUNTER
Called and spoke to daughter about enrollment for surveillance pt is going to get infusion today at 11 and told I will reach out between 5-6 to discuss the program and they will reach out if any problems.  Reason for Disposition   Message left with person in household    Protocols used: NO CONTACT OR DUPLICATE CONTACT CALL-A-[place]

## 2022-05-03 NOTE — ED PROVIDER NOTES
Encounter Date: 5/2/2022       History     Chief Complaint   Patient presents with    Vomiting     With abdominal pain beginning today; sore throat x 2-3 days     This patient is a 89 yr old female presenting with her son with complaints of one episode of vomiting just PTA. She additionally reports mild sore throat without diff breathing. Currently she denies abd pain, CP, fever, change in strength or sensation. No meds provided PTA and they deny knowledge of known sick contacts.         Review of patient's allergies indicates:   Allergen Reactions    Amlodipine Swelling     Other reaction(s): Angioedema    Atenolol      Other reaction(s): itch    Betamethasone sodium phosphate      Other reaction(s): Flushing (skin)    Cortisone      Other reaction(s): blurry vision  Other reaction(s): Rash    Lisinopril      Other reaction(s): COUGH    Naproxen      Other reaction(s): body shut down    Prednisone     Triamterene-hydrochlorothiazid      Other reaction(s): itch     Past Medical History:   Diagnosis Date    Anticoagulant long-term use     Arthritis     Dislocation of right shoulder joint     Hypertension     Hypothyroidism     Shoulder disorder     right    Thyroid disease      Past Surgical History:   Procedure Laterality Date    HYSTERECTOMY      PARTIAL HYSTERECTOMY      ARTUR, ovaries in place, uterine prolapse     Family History   Problem Relation Age of Onset    Breast cancer Mother     Cancer Brother         lung cancer    Cancer Maternal Aunt         unknown cancer    Cancer Maternal Uncle         pancreatic cancer    Heart disease Neg Hx      Social History     Tobacco Use    Smoking status: Never Smoker    Smokeless tobacco: Never Used   Substance Use Topics    Alcohol use: Yes     Alcohol/week: 2.0 standard drinks     Types: 2 Glasses of wine per week     Comment: 2 small glasses of red wine with dinner    Drug use: No     Review of Systems   Constitutional: Negative for fever.    HENT: Positive for sore throat.    Respiratory: Negative for shortness of breath.    Cardiovascular: Negative for chest pain.   Gastrointestinal: Positive for vomiting.   Genitourinary: Negative for dysuria.   Musculoskeletal: Negative for joint swelling.   Skin: Negative for rash.   Allergic/Immunologic: Positive for immunocompromised state.        Advanced age   Neurological: Negative for weakness.   Psychiatric/Behavioral: Negative for confusion.       Physical Exam     Initial Vitals [05/02/22 1628]   BP Pulse Resp Temp SpO2   (!) 141/63 96 20 98.8 °F (37.1 °C) 97 %      MAP       --         Physical Exam    Nursing note and vitals reviewed.  Constitutional: She appears well-nourished.   HENT:   Head: Normocephalic and atraumatic.   Mild oropharyngeal erythema without PTA or exudates, stable airway   Eyes: Conjunctivae and EOM are normal.   Neck: Neck supple. No thyroid mass present.   Normal range of motion.  Cardiovascular: Normal rate, regular rhythm and normal heart sounds. Exam reveals no gallop and no friction rub.    No murmur heard.  Pulmonary/Chest: Breath sounds normal. She has no wheezes. She has no rhonchi. She has no rales.   Abdominal: Abdomen is soft. Bowel sounds are normal. There is no abdominal tenderness.   Musculoskeletal:         General: No edema. Normal range of motion.      Cervical back: Normal range of motion and neck supple.     Neurological: She is alert and oriented to person, place, and time. She has normal strength. No cranial nerve deficit or sensory deficit.   Skin: Skin is warm and dry. No rash noted. No erythema.   Psychiatric: She has a normal mood and affect. Her speech is normal. Cognition and memory are normal.         ED Course   Procedures  Labs Reviewed   CBC W/ AUTO DIFFERENTIAL - Abnormal; Notable for the following components:       Result Value    MCH 31.1 (*)     Lymph % 15.2 (*)     All other components within normal limits   COMPREHENSIVE METABOLIC PANEL -  Abnormal; Notable for the following components:    Glucose 115 (*)     All other components within normal limits   SARS-COV-2 RNA AMPLIFICATION, QUAL - Abnormal; Notable for the following components:    SARS-CoV-2 RNA, Amplification, Qual Positive (*)     All other components within normal limits   INFLUENZA A & B BY MOLECULAR   LIPASE          Imaging Results    None          Medications   sodium chloride 0.9% bolus 1,000 mL (0 mLs Intravenous Stopped 5/2/22 1841)   ondansetron injection 4 mg (4 mg Intravenous Given 5/2/22 1747)     Medical Decision Making:   ED Management:  Pt rapidly assessed in presence of son. VSS. Alert and nontoxic appearance. No abd pain. Labs WNL with exception of COVID pos probe. Current constellation of symptoms appear to be congruent with this Dx. She has received immunization but not booster. Will initiate paxlovid therapy and son is asked to dc Seroquel therapy until she is done with COVID therapy. She is enrolled in home COVID surveillance program and asked to f/u with PCP asap or return to the ED immed for any new, worsening or concerning symptoms. Pt and son agreeable and she was dc'd in stable condition.                      Clinical Impression:   Final diagnoses:  [R11.2] Nausea and vomiting, intractability of vomiting not specified, unspecified vomiting type  [U07.1] COVID-19 (Primary)          ED Disposition Condition    Discharge Stable        ED Prescriptions     Medication Sig Dispense Start Date End Date Auth. Provider    pulse oximeter (PULSE OXIMETER) device by Apply Externally route 2 (two) times a day. Use twice daily at 8 AM and 3 PM and record the value in "[employer] as directed. 1 each 5/2/2022  Santiago Singer MD    nirmatrelvir-ritonavir 150 mg x 2- 100 mg copackaged tablets (EUA) Take 2 tablets by mouth 2 (two) times daily for 5 days. Each dose contains 1 nirmatrelvir (pink tablet) and 1 ritonavir (white tablet). Take both tablets together 20 tablet 5/2/2022 5/7/2022  Santiago Singer MD    pulse oximeter (PULSE OXIMETER) device by Apply Externally route 2 (two) times a day. Use twice daily at 8 AM and 3 PM and record the value in MyChart as directed. 1 each 5/2/2022  Santiago Singer MD        Follow-up Information     Follow up With Specialties Details Why Contact Info    William Olivia MD Family Medicine Schedule an appointment as soon as possible for a visit   [address] [facility] [address]  [phone]             Santiago Singer MD  05/03/22 1059

## 2022-05-03 NOTE — PATIENT INSTRUCTIONS
You should isolate for at least 5 days. To calculate your 5-day isolation period, day 0 is your first day of symptoms. Day 1 is the first full day after your symptoms developed. You can leave isolation after 5 full days.    You can end isolation after 5 full days if you are fever-free for 24 hours without the use of fever-reducing medication and your other symptoms have improved (Loss of taste and smell may persist for weeks or months after recovery and need not delay the end of isolation).    You should continue to wear a well-fitting mask around others at home and in public for 5 additional days (day 6 through day 10) after the end of your 5-day isolation period. If you are unable to wear a mask when around others, you should continue to isolate for a full 10 days. Avoid people who have weakened immune systems or are more likely to get very sick from COVID-19, and nursing homes and other high-risk settings, until after at least 10 days.    If you continue to have fever or your other symptoms have not improved after 5 days of isolation, you should wait to end your isolation until you are fever-free for 24 hours without the use of fever-reducing medication and your other symptoms have improved. Continue to wear a well-fitting mask through day 10. Contact your healthcare provider if you have questions.

## 2022-05-03 NOTE — TELEPHONE ENCOUNTER
I spoke with pts daughter via phone.   I advised her that Dr. Olivia would like for her to do a virtual visit today or tomorrow. ( Just to ensure that she is okay)  Dr. Olivia would like for her to follow up in clinic on Tuesday.   All understanding voiced by pts daughter.

## 2022-05-04 ENCOUNTER — PATIENT MESSAGE (OUTPATIENT)
Dept: ADMINISTRATIVE | Facility: OTHER | Age: 87
End: 2022-05-04
Payer: MEDICARE

## 2022-05-04 ENCOUNTER — NURSE TRIAGE (OUTPATIENT)
Dept: ADMINISTRATIVE | Facility: CLINIC | Age: 87
End: 2022-05-04
Payer: MEDICARE

## 2022-05-04 ENCOUNTER — OFFICE VISIT (OUTPATIENT)
Dept: FAMILY MEDICINE | Facility: CLINIC | Age: 87
End: 2022-05-04
Payer: MEDICARE

## 2022-05-04 ENCOUNTER — PATIENT MESSAGE (OUTPATIENT)
Dept: ADMINISTRATIVE | Facility: CLINIC | Age: 87
End: 2022-05-04
Payer: MEDICARE

## 2022-05-04 DIAGNOSIS — U07.1 COVID-19: Primary | ICD-10-CM

## 2022-05-04 PROCEDURE — 1159F PR MEDICATION LIST DOCUMENTED IN MEDICAL RECORD: ICD-10-PCS | Mod: CPTII,95,,

## 2022-05-04 PROCEDURE — 99213 OFFICE O/P EST LOW 20 MIN: CPT | Mod: 95,,,

## 2022-05-04 PROCEDURE — 1160F PR REVIEW ALL MEDS BY PRESCRIBER/CLIN PHARMACIST DOCUMENTED: ICD-10-PCS | Mod: CPTII,95,,

## 2022-05-04 PROCEDURE — 1160F RVW MEDS BY RX/DR IN RCRD: CPT | Mod: CPTII,95,,

## 2022-05-04 PROCEDURE — 99213 PR OFFICE/OUTPT VISIT, EST, LEVL III, 20-29 MIN: ICD-10-PCS | Mod: 95,,,

## 2022-05-04 PROCEDURE — 1159F MED LIST DOCD IN RCRD: CPT | Mod: CPTII,95,,

## 2022-05-04 NOTE — TELEPHONE ENCOUNTER
Pt escalated due to no response. Pt called no contact made. Mychart message sent. EC's called will ask for pt's S to be put in.     Reason for Disposition   Message left on unidentified voice mail.  Phone number verified.    Protocols used: NO CONTACT OR DUPLICATE CONTACT CALL-A-MERLE

## 2022-05-04 NOTE — PROGRESS NOTES
Subjective:       Patient ID: Glenda Gimenez is a 89 y.o. female.    Chief Complaint: No chief complaint on file.    Glenda Gimenez is a 89 yo F pt w/ Mhx listed below that presents to this virtual appt for COVID check up. She does admit to being mildly symptomatic. She is experiencing hoarse voice and decreased appetite today. She had some diarrhea and vomiting yesterday but has since recovered. She denies fever or low O2 saturations. She had an infusion completed yesterday and has started to feel better since.     The patient location is: [place]  The chief complaint leading to consultation is: COVID    Visit type: audiovisual      Face to Face time with patient: 10  20 minutes of total time spent on the encounter, which includes face to face time and non-face to face time preparing to see the patient (eg, review of tests), Obtaining and/or reviewing separately obtained history, Documenting clinical information in the electronic or other health record, Independently interpreting results (not separately reported) and communicating results to the patient/family/caregiver, or Care coordination (not separately reported).     Each patient to whom he or she provides medical services by telemedicine is:  (1) informed of the relationship between the physician and patient and the respective role of any other health care provider with respect to management of the patient; and (2) notified that he or she may decline to receive medical services by telemedicine and may withdraw from such care at any time.      Past Medical History:   Diagnosis Date    Anticoagulant long-term use     Arthritis     Dislocation of right shoulder joint     Hypertension     Hypothyroidism     Shoulder disorder     right    Thyroid disease        Review of patient's allergies indicates:   Allergen Reactions    Amlodipine Swelling     Other reaction(s): Angioedema    Atenolol      Other reaction(s): itch    Betamethasone sodium  phosphate      Other reaction(s): Flushing (skin)    Cortisone      Other reaction(s): blurry vision  Other reaction(s): Rash    Lisinopril      Other reaction(s): COUGH    Naproxen      Other reaction(s): body shut down    Prednisone     Triamterene-hydrochlorothiazid      Other reaction(s): itch         Current Outpatient Medications:     acetaminophen (TYLENOL) 325 MG tablet, Take 325 mg by mouth every 6 (six) hours as needed for Pain., Disp: , Rfl:     amoxicillin-clavulanate 500-125mg (AUGMENTIN) 500-125 mg Tab, Take 1 tablet (500 mg total) by mouth 2 (two) times daily. for 7 days, Disp: 14 tablet, Rfl: 0    aspirin (ECOTRIN) 81 MG EC tablet, Take 81 mg by mouth once daily., Disp: , Rfl:     busPIRone (BUSPAR) 10 MG tablet, Take 1 tablet (10 mg total) by mouth 2 (two) times daily., Disp: 60 tablet, Rfl: 1    calcium citrate (CALCITRATE) 200 mg (950 mg) tablet, Take 2 tablets by mouth once daily., Disp: , Rfl:     carvedilol (COREG) 3.125 MG tablet, Take 1.5625 mg by mouth 2 (two) times daily. , Disp: , Rfl: 1    co-enzyme Q-10 30 mg capsule, Take 30 mg by mouth 3 (three) times daily., Disp: , Rfl:     famotidine (PEPCID) 20 MG tablet, Take 1 tablet (20 mg total) by mouth 2 (two) times daily., Disp: 180 tablet, Rfl: 3    furosemide (LASIX) 20 MG tablet, , Disp: , Rfl:     ibandronate (BONIVA) 150 mg tablet, Take 1 tablet (150 mg total) by mouth every 30 days., Disp: 12 tablet, Rfl: 3    isosorbide mononitrate (IMDUR) 30 MG 24 hr tablet, Take 1 tablet (30 mg total) by mouth once daily., Disp: 90 tablet, Rfl: 3    krill oil 500 mg Cap, Take by mouth., Disp: , Rfl:     Lactobac no.41/Bifidobact no.7 (PROBIOTIC-10 ORAL), 1 capsule DAILY (route: oral), Disp: , Rfl:     levothyroxine (SYNTHROID) 88 MCG tablet, Take 1 tablet (88 mcg total) by mouth once daily., Disp: 90 tablet, Rfl: 3    nirmatrelvir-ritonavir 150 mg x 2- 100 mg copackaged tablets (EUA), Take 2 tablets by mouth 2 (two) times daily  for 5 days. Each dose contains 1 nirmatrelvir (pink tablet) and 1 ritonavir (white tablet). Take both tablets together, Disp: 20 tablet, Rfl: 0    olmesartan (BENICAR) 5 MG Tab, Take 1 tablet (5 mg total) by mouth once daily., Disp: 90 tablet, Rfl: 3    ondansetron (ZOFRAN-ODT) 4 MG TbDL, Take 1 tablet (4 mg total) by mouth every 8 (eight) hours as needed (nausea)., Disp: 21 tablet, Rfl: 0    peg 400-propylene glycol, PF, (SYSTANE HYDRATION PF) 0.4-0.3 % Drop, 1 drops DAILY (route: ophthalmic (eye)), Disp: , Rfl:     pulse oximeter (PULSE OXIMETER) device, by Apply Externally route 2 (two) times a day. Use twice daily at 8 AM and 3 PM and record the value in MyChart as directed., Disp: 1 each, Rfl: 0    pulse oximeter (PULSE OXIMETER) device, by Apply Externally route 2 (two) times a day. Use twice daily at 8 AM and 3 PM and record the value in MyChart as directed., Disp: 1 each, Rfl: 0    sertraline (ZOLOFT) 25 MG tablet, Take 1 tablet (25 mg total) by mouth once daily., Disp: 90 tablet, Rfl: 3    sodium chloride 1 gram tablet, Take 1 g by mouth 3 (three) times daily. 2 tabs TID, Disp: , Rfl:     vit A/vit C/vit E/zinc/copper (ICAPS AREDS ORAL), 1 capsule 2 TIMES DAILY (route: oral), Disp: , Rfl:     vitamin D (VITAMIN D3) 1000 units Tab, Take 1,000 Units by mouth once daily. Patient is taking 2000 units per day, Disp: , Rfl:     Current Facility-Administered Medications:     albuterol inhaler 2 puff, 2 puff, Inhalation, PRN, Aubree Sandra MD    EPINEPHrine (EPIPEN) 0.3 mg/0.3 mL pen injection 0.3 mg, 0.3 mg, Intramuscular, PRN, Aubree Sandra MD    Review of Systems   Constitutional: Positive for appetite change. Negative for activity change, chills, diaphoresis, fatigue, fever and unexpected weight change.   HENT: Positive for voice change. Negative for congestion, ear pain, postnasal drip, rhinorrhea, sinus pressure, sneezing, sore throat and trouble swallowing.    Eyes: Negative for pain,  itching and visual disturbance.   Respiratory: Negative for cough, chest tightness, shortness of breath and wheezing.    Cardiovascular: Negative for chest pain, palpitations and leg swelling.   Gastrointestinal: Positive for diarrhea and vomiting. Negative for abdominal distention, abdominal pain, blood in stool, constipation and nausea.   Endocrine: Negative for cold intolerance and heat intolerance.   Genitourinary: Negative for difficulty urinating, dysuria, frequency, hematuria and urgency.   Musculoskeletal: Negative for arthralgias, back pain, myalgias and neck pain.   Skin: Negative for color change, pallor, rash and wound.   Neurological: Negative for dizziness, syncope, weakness, numbness and headaches.   Hematological: Negative for adenopathy.   Psychiatric/Behavioral: Negative for behavioral problems. The patient is not nervous/anxious.        Objective:      LMP  (LMP Unknown)   Physical Exam  Constitutional:       General: She is not in acute distress.     Appearance: Normal appearance. She is not ill-appearing or toxic-appearing.      Comments: PE limited due to this being a virtual appt.    Neurological:      Mental Status: She is alert.         Assessment:       1. COVID-19        Plan:       COVID-19       -    Patient doing well. Hospital or CXR not indicated at this time.  Instructed to contact clinic if her symptoms acutely worsen or do not continue to improve.         Patient has close follow up w/ Dr. Olivia scheduled.        Toño Esteevs PA-C  Family Medicine Physician Assistant       I spent a total of 20 minutes on the day of the visit.This includes face to face time and non-face to face time preparing to see the patient (eg, review of tests), obtaining and/or reviewing separately obtained history, documenting clinical information in the electronic or other health record, independently interpreting results and communicating results to the patient/family/caregiver, or care  coordinator.      We have addressed [3] Low: 2 or more self-limited or minor problems / 1 stable chronic illness / 1 acute, uncomplicated illness or injury  The complexity of the data reviewed and analyzed for this visit was [2] Minimal or None  The risk of complications and/or morbidity or mortality are [3] Low risk   The level of Medical Decision Making for this visit is [3] Low

## 2022-05-04 NOTE — TELEPHONE ENCOUNTER
3rd enrollment call attempted with contact made. Spoke with Ankita, and reviewed surveillance program.States that she will input VS for pt. Advised can call OOC back for further assistance. Verbalized understanding.        Called Ankita back  Due to pt escalating. Who states that Pt has no previous entries so put yes due to initially having temp. But denies temp at this time, and not temp yesterday. No further needs .       hReason for Disposition   Information only question and nurse able to answer    Protocols used: INFORMATION ONLY CALL - NO TRIAGE-A-OH

## 2022-05-05 ENCOUNTER — PATIENT MESSAGE (OUTPATIENT)
Dept: ADMINISTRATIVE | Facility: OTHER | Age: 87
End: 2022-05-05
Payer: MEDICARE

## 2022-05-05 ENCOUNTER — NURSE TRIAGE (OUTPATIENT)
Dept: ADMINISTRATIVE | Facility: CLINIC | Age: 87
End: 2022-05-05
Payer: MEDICARE

## 2022-05-05 ENCOUNTER — PATIENT MESSAGE (OUTPATIENT)
Dept: ADMINISTRATIVE | Facility: CLINIC | Age: 87
End: 2022-05-05
Payer: MEDICARE

## 2022-05-05 NOTE — TELEPHONE ENCOUNTER
Pt. Escalated due to reporting o2 sat of 94%.Spoke with Ms. Luciano( daughter) who states o2 sat was 97% upon recheck. No further needs at this time    Reason for Disposition   Information only question and nurse able to answer    Protocols used: INFORMATION ONLY CALL - NO TRIAGE-A-OH

## 2022-05-05 NOTE — TELEPHONE ENCOUNTER
Called for no response for covid surveillance and no answer to EC and called son and he said that he was driving a school bus and couldn't talk. Told to reach out to OOC if any problems  Reason for Disposition   Message left on unidentified voice mail. Phone number verified.    Protocols used: NO CONTACT OR DUPLICATE CONTACT CALL-A-OH

## 2022-05-06 ENCOUNTER — NURSE TRIAGE (OUTPATIENT)
Dept: ADMINISTRATIVE | Facility: CLINIC | Age: 87
End: 2022-05-06
Payer: MEDICARE

## 2022-05-06 ENCOUNTER — PATIENT MESSAGE (OUTPATIENT)
Dept: ADMINISTRATIVE | Facility: OTHER | Age: 87
End: 2022-05-06
Payer: MEDICARE

## 2022-05-06 ENCOUNTER — PATIENT MESSAGE (OUTPATIENT)
Dept: ADMINISTRATIVE | Facility: CLINIC | Age: 87
End: 2022-05-06
Payer: MEDICARE

## 2022-05-06 NOTE — TELEPHONE ENCOUNTER
"Pt called for no response to cc push. Pt and Ankita did not answer, message left for them to put in VS asap or if having a medical emergency to call 911 or go to the nearest ER now.  Shola Gimenez answered and said he "was in his school bus and would do it (vs) when he got home."    Reason for Disposition   Message left with person in household    Protocols used: NO CONTACT OR DUPLICATE CONTACT CALL-A-OH      "

## 2022-05-07 ENCOUNTER — NURSE TRIAGE (OUTPATIENT)
Dept: ADMINISTRATIVE | Facility: CLINIC | Age: 87
End: 2022-05-07
Payer: MEDICARE

## 2022-05-07 ENCOUNTER — PATIENT MESSAGE (OUTPATIENT)
Dept: ADMINISTRATIVE | Facility: OTHER | Age: 87
End: 2022-05-07
Payer: MEDICARE

## 2022-05-07 ENCOUNTER — PATIENT MESSAGE (OUTPATIENT)
Dept: ADMINISTRATIVE | Facility: CLINIC | Age: 87
End: 2022-05-07
Payer: MEDICARE

## 2022-05-07 NOTE — TELEPHONE ENCOUNTER
Surveillance pt escalated for no response. Unable to contact pt or EC listed. VM message left including OOC number. MyChart message previously sent.    Reason for Disposition   Message left on unidentified voice mail.  Phone number verified.    Additional Information   Negative: Caller is angry or rude (e.g., hangs up, verbally abusive, yelling)   Negative: Caller hangs up   Negative: Caller has already spoken with the PCP and has no further questions.   Negative: Caller has already spoken with another triager and has no further questions.   Negative: Caller has already spoken with another triager or PCP AND has further questions AND triager able to answer questions.   Negative: Busy signal.  First attempt to contact caller.  Follow-up call scheduled within 15 minutes.   Negative: No answer.  First attempt to contact caller.  Follow-up call scheduled within 15 minutes.   Negative: Message left on identified voice mail    Protocols used: NO CONTACT OR DUPLICATE CONTACT CALL-A-

## 2022-05-07 NOTE — TELEPHONE ENCOUNTER
Reason for Disposition   HIGH RISK for severe COVID complications (e.g., age > 64 years, obesity with BMI > 25, pregnant, chronic lung disease or other chronic medical condition)  (Exception: Already seen by PCP and no new or worsening symptoms.)    Additional Information   Negative: SEVERE difficulty breathing (e.g., struggling for each breath, speaks in single words)   Negative: Difficult to awaken or acting confused (e.g., disoriented, slurred speech)   Negative: Bluish (or gray) lips or face now   Negative: Shock suspected (e.g., cold/pale/clammy skin, too weak to stand, low BP, rapid pulse)   Negative: Sounds like a life-threatening emergency to the triager   Negative: [1] Diagnosed with COVID-19 AND [2] symptoms lasting 3 or more weeks   Negative: [1] COVID-19 exposure AND [2] no symptoms   Negative: COVID-19 vaccine reaction suspected (e.g., fever, headache, muscle aches) occurring 1 to 3 days after getting vaccine   Negative: COVID-19 vaccine, questions about   Negative: [1] Lives with someone known to have influenza (flu test positive) AND [2] flu-like symptoms (e.g., cough, runny nose, sore throat, SOB; with or without fever)   Negative: [1] Adult with possible COVID-19 symptoms AND [2] triager concerned about severity of symptoms or other causes   Negative: COVID-19 and breastfeeding, questions about   Negative: SEVERE or constant chest pain or pressure (Exception: mild central chest pain, present only when coughing)   Negative: MODERATE difficulty breathing (e.g., speaks in phrases, SOB even at rest, pulse 100-120)   Negative: [1] Headache AND [2] stiff neck (can't touch chin to chest)   Negative: Chest pain or pressure   Negative: Patient sounds very sick or weak to the triager   Negative: MILD difficulty breathing (e.g., minimal/no SOB at rest, SOB with walking, pulse <100)   Negative: Fever > 103 F (39.4 C)   Negative: [1] Fever > 101 F (38.3 C) AND [2] age > 60 years    pt to return for tx only. Negative: [1] Fever > 100.0 F (37.8 C) AND [2] bedridden (e.g., nursing home patient, CVA, chronic illness, recovering from surgery)    Protocols used: CORONAVIRUS (COVID-19) DIAGNOSED OR [id number]  relative called re pt's O2 level low 92% walking. sitting is 97%. pt has covid. afeb. denies sob at rest. only c/o being tired. had covid since Monday. hx dementia. still eating, drinking. hx COPD in the past. had covid March 2020. Offered to contact MD on call   Pt already had infusion and doing paxlovid. seeing dr bautista. Call back with questions.

## 2022-05-08 ENCOUNTER — PATIENT MESSAGE (OUTPATIENT)
Dept: ADMINISTRATIVE | Facility: OTHER | Age: 87
End: 2022-05-08
Payer: MEDICARE

## 2022-05-08 ENCOUNTER — NURSE TRIAGE (OUTPATIENT)
Dept: ADMINISTRATIVE | Facility: CLINIC | Age: 87
End: 2022-05-08
Payer: MEDICARE

## 2022-05-08 ENCOUNTER — PATIENT MESSAGE (OUTPATIENT)
Dept: ADMINISTRATIVE | Facility: CLINIC | Age: 87
End: 2022-05-08
Payer: MEDICARE

## 2022-05-08 NOTE — TELEPHONE ENCOUNTER
"Pt called for no response to cc push.    VM left for Pt x2 to put vs into [facility]ista asap or if having a medical emergency to call 911 or go to the nearest ER.    Spoke to EC Shola Gimenez, when asked to pass a message for Pt to put in vs he said "alright, we will do it."        Reason for Disposition   Message left with person in household.    Protocols used: NO CONTACT OR DUPLICATE CONTACT CALL-A-AH      "

## 2022-05-09 ENCOUNTER — PATIENT MESSAGE (OUTPATIENT)
Dept: ADMINISTRATIVE | Facility: CLINIC | Age: 87
End: 2022-05-09
Payer: MEDICARE

## 2022-05-09 ENCOUNTER — PATIENT MESSAGE (OUTPATIENT)
Dept: FAMILY MEDICINE | Facility: CLINIC | Age: 87
End: 2022-05-09
Payer: MEDICARE

## 2022-05-09 ENCOUNTER — HOSPITAL ENCOUNTER (EMERGENCY)
Facility: HOSPITAL | Age: 87
Discharge: HOME | End: 2022-05-09
Attending: EMERGENCY MEDICINE
Payer: MEDICARE

## 2022-05-09 ENCOUNTER — NURSE TRIAGE (OUTPATIENT)
Dept: ADMINISTRATIVE | Facility: CLINIC | Age: 87
End: 2022-05-09
Payer: MEDICARE

## 2022-05-09 ENCOUNTER — PATIENT MESSAGE (OUTPATIENT)
Dept: ADMINISTRATIVE | Facility: OTHER | Age: 87
End: 2022-05-09
Payer: MEDICARE

## 2022-05-09 VITALS
RESPIRATION RATE: 18 BRPM | HEIGHT: 62 IN | OXYGEN SATURATION: 97 % | HEART RATE: 93 BPM | DIASTOLIC BLOOD PRESSURE: 92 MMHG | BODY MASS INDEX: 28.52 KG/M2 | WEIGHT: 155 LBS | SYSTOLIC BLOOD PRESSURE: 190 MMHG | TEMPERATURE: 98 F

## 2022-05-09 DIAGNOSIS — R41.82 CHANGE IN MENTAL STATUS: ICD-10-CM

## 2022-05-09 LAB
ALBUMIN SERPL BCP-MCNC: 3.6 G/DL (ref 3.5–5.2)
ALP SERPL-CCNC: 77 U/L (ref 55–135)
ALT SERPL W/O P-5'-P-CCNC: 29 U/L (ref 10–44)
ANION GAP SERPL CALC-SCNC: 11 MMOL/L (ref 8–16)
AST SERPL-CCNC: 35 U/L (ref 10–40)
BASOPHILS # BLD AUTO: 0.05 K/UL (ref 0–0.2)
BASOPHILS NFR BLD: 0.7 % (ref 0–1.9)
BILIRUB SERPL-MCNC: 0.4 MG/DL (ref 0.1–1)
BILIRUB UR QL STRIP: NEGATIVE
BUN SERPL-MCNC: 14 MG/DL (ref 10–30)
CALCIUM SERPL-MCNC: 10.5 MG/DL (ref 8.7–10.5)
CHLORIDE SERPL-SCNC: 104 MMOL/L (ref 95–110)
CLARITY UR: CLEAR
CO2 SERPL-SCNC: 26 MMOL/L (ref 23–29)
COLOR UR: YELLOW
CREAT SERPL-MCNC: 0.8 MG/DL (ref 0.5–1.4)
DIFFERENTIAL METHOD: ABNORMAL
EOSINOPHIL # BLD AUTO: 0.2 K/UL (ref 0–0.5)
EOSINOPHIL NFR BLD: 3.5 % (ref 0–8)
ERYTHROCYTE [DISTWIDTH] IN BLOOD BY AUTOMATED COUNT: 13.2 % (ref 11.5–14.5)
EST. GFR  (AFRICAN AMERICAN): >60 ML/MIN/1.73 M^2
EST. GFR  (NON AFRICAN AMERICAN): >60 ML/MIN/1.73 M^2
GLUCOSE SERPL-MCNC: 87 MG/DL (ref 70–110)
GLUCOSE UR QL STRIP: NEGATIVE
HCT VFR BLD AUTO: 39.1 % (ref 37–48.5)
HGB BLD-MCNC: 13.2 G/DL (ref 12–16)
HGB UR QL STRIP: NEGATIVE
IMM GRANULOCYTES # BLD AUTO: 0.02 K/UL (ref 0–0.04)
IMM GRANULOCYTES NFR BLD AUTO: 0.3 % (ref 0–0.5)
KETONES UR QL STRIP: NEGATIVE
LEUKOCYTE ESTERASE UR QL STRIP: NEGATIVE
LYMPHOCYTES # BLD AUTO: 2.6 K/UL (ref 1–4.8)
LYMPHOCYTES NFR BLD: 37.3 % (ref 18–48)
MCH RBC QN AUTO: 31.6 PG (ref 27–31)
MCHC RBC AUTO-ENTMCNC: 33.8 G/DL (ref 32–36)
MCV RBC AUTO: 94 FL (ref 82–98)
MONOCYTES # BLD AUTO: 0.7 K/UL (ref 0.3–1)
MONOCYTES NFR BLD: 9.5 % (ref 4–15)
NEUTROPHILS # BLD AUTO: 3.4 K/UL (ref 1.8–7.7)
NEUTROPHILS NFR BLD: 48.7 % (ref 38–73)
NITRITE UR QL STRIP: NEGATIVE
NRBC BLD-RTO: 0 /100 WBC
PH UR STRIP: 8 [PH] (ref 5–8)
PLATELET # BLD AUTO: 277 K/UL (ref 150–450)
PMV BLD AUTO: 10.6 FL (ref 9.2–12.9)
POTASSIUM SERPL-SCNC: 4 MMOL/L (ref 3.5–5.1)
PROT SERPL-MCNC: 8.3 G/DL (ref 6–8.4)
PROT UR QL STRIP: NEGATIVE
RBC # BLD AUTO: 4.18 M/UL (ref 4–5.4)
SODIUM SERPL-SCNC: 141 MMOL/L (ref 136–145)
SP GR UR STRIP: 1.02 (ref 1–1.03)
URN SPEC COLLECT METH UR: NORMAL
UROBILINOGEN UR STRIP-ACNC: NEGATIVE EU/DL
WBC # BLD AUTO: 6.95 K/UL (ref 3.9–12.7)

## 2022-05-09 PROCEDURE — 93010 ELECTROCARDIOGRAM REPORT: CPT | Mod: ,,, | Performed by: GENERAL PRACTICE

## 2022-05-09 PROCEDURE — 81003 URINALYSIS AUTO W/O SCOPE: CPT | Performed by: NURSE PRACTITIONER

## 2022-05-09 PROCEDURE — 93010 EKG 12-LEAD: ICD-10-PCS | Mod: ,,, | Performed by: GENERAL PRACTICE

## 2022-05-09 PROCEDURE — G0427 INPT/ED TELECONSULT70: HCPCS | Mod: 95,,, | Performed by: PSYCHIATRY & NEUROLOGY

## 2022-05-09 PROCEDURE — 93005 ELECTROCARDIOGRAM TRACING: CPT

## 2022-05-09 PROCEDURE — 80053 COMPREHEN METABOLIC PANEL: CPT | Performed by: NURSE PRACTITIONER

## 2022-05-09 PROCEDURE — 99285 EMERGENCY DEPT VISIT HI MDM: CPT | Mod: 25

## 2022-05-09 PROCEDURE — 36415 COLL VENOUS BLD VENIPUNCTURE: CPT | Performed by: NURSE PRACTITIONER

## 2022-05-09 PROCEDURE — 25000003 PHARM REV CODE 250: Performed by: EMERGENCY MEDICINE

## 2022-05-09 PROCEDURE — G0427 PR INPT TELEHEALTH CON 70/>M: ICD-10-PCS | Mod: 95,,, | Performed by: PSYCHIATRY & NEUROLOGY

## 2022-05-09 PROCEDURE — 85025 COMPLETE CBC W/AUTO DIFF WBC: CPT | Performed by: NURSE PRACTITIONER

## 2022-05-09 RX ORDER — ALPRAZOLAM 0.25 MG/1
0.25 TABLET ORAL
Status: COMPLETED | OUTPATIENT
Start: 2022-05-09 | End: 2022-05-09

## 2022-05-09 RX ORDER — QUETIAPINE FUMARATE 25 MG/1
50 TABLET, FILM COATED ORAL ONCE
Status: COMPLETED | OUTPATIENT
Start: 2022-05-09 | End: 2022-05-09

## 2022-05-09 RX ADMIN — QUETIAPINE 50 MG: 25 TABLET ORAL at 09:05

## 2022-05-09 RX ADMIN — ALPRAZOLAM 0.25 MG: 0.25 TABLET ORAL at 09:05

## 2022-05-09 NOTE — TELEPHONE ENCOUNTER
Please see my chart encounter.   I spoke with pts daughter via phone, will f/u in clinic with Dr. Olivia on 5/10/2022, unless symptoms get worse.

## 2022-05-09 NOTE — TELEPHONE ENCOUNTER
Pt escalated for no response. Attempted to contact pt with no success, however pt submitted vitals after leaving voicemail. All vitals submitted were WNL so no further outreach is needed at this time. Will continue to monitor.    Reason for Disposition   Message left on unidentified voice mail. Phone number verified.    Protocols used: NO CONTACT OR DUPLICATE CONTACT CALL-A-OH

## 2022-05-09 NOTE — TELEPHONE ENCOUNTER
Pt. Escalated due to no response. Pt called, spoke with son who denies further needs at this time.    Reason for Disposition   Information only question and nurse able to answer    Protocols used: INFORMATION ONLY CALL - NO TRIAGE-A-OH

## 2022-05-09 NOTE — TELEPHONE ENCOUNTER
I spoke with Dr. Olivia, he states that the pt can wait until tomorrow.   If the confusion worsens then the pt should go to the ED.  I spoke with pts daughter advised her of Dr. Olivia's recommendations, all understanding.

## 2022-05-09 NOTE — FIRST PROVIDER EVALUATION
Emergency Department TeleTriage Encounter Note      CHIEF COMPLAINT    Chief Complaint   Patient presents with    behavior issues        VITAL SIGNS   Initial Vitals [05/09/22 1759]   BP Pulse Resp Temp SpO2   (!) 190/92 93 18 97.8 °F (36.6 °C) 97 %      MAP       --            ALLERGIES    Review of patient's allergies indicates:   Allergen Reactions    Amlodipine Swelling     Other reaction(s): Angioedema    Atenolol      Other reaction(s): itch    Betamethasone sodium phosphate      Other reaction(s): Flushing (skin)    Cortisone      Other reaction(s): blurry vision  Other reaction(s): Rash    Lisinopril      Other reaction(s): COUGH    Naproxen      Other reaction(s): body shut down    Prednisone     Triamterene-hydrochlorothiazid      Other reaction(s): itch       PROVIDER TRIAGE NOTE  Male guest states pt having trouble with memory, is disoriented. ua was neg. Has waxed and waned.  Has appt tomorrow for covid f/u.       ORDERS  Labs Reviewed - No data to display    ED Orders (720h ago, onward)    Start Ordered     Status Ordering Provider    05/09/22 1807 05/09/22 1806  Cardiac Monitoring - Adult  Continuous        Comments: Notify Physician If:    Ordered OTRI WOOD    05/09/22 1807 05/09/22 1806  Pulse Oximetry Continuous  Continuous         Ordered TORI WOOD    Unscheduled 05/09/22 1806  Complete Blood Count (CBC)  STAT         Ordered TORI WOOD    Unscheduled 05/09/22 1806  Comprehensive Metabolic Panel (CMP)  STAT         Ordered TORI WOOD    Unscheduled 05/09/22 1806  Urinalysis, Reflex to Urine Culture Urine, Clean Catch  STAT         Ordered TORI WOOD    Unscheduled 05/09/22 1806  Insert Saline lock IV  Once         Ordered TORI WOOD    Unscheduled 05/09/22 1806  EKG 12-lead  Once         Ordered TORI WOOD    Unscheduled 05/09/22 1806  CT Head Without Contrast  1 time imaging         Ordered TORI WOOD             Virtual Visit Note: The provider triage portion of this emergency department evaluation and documentation was performed via [employer], a HIPAA-compliant telemedicine application, in concert with a tele-presenter in the room. A face to face patient evaluation with one of my colleagues will occur once the patient is placed in an emergency department room.      DISCLAIMER: This note was prepared with [employer] voice recognition transcription software. Garbled syntax, mangled pronouns, and other bizarre constructions may be attributed to that software system.

## 2022-05-10 ENCOUNTER — PATIENT MESSAGE (OUTPATIENT)
Dept: ADMINISTRATIVE | Facility: OTHER | Age: 87
End: 2022-05-10
Payer: MEDICARE

## 2022-05-10 ENCOUNTER — PATIENT MESSAGE (OUTPATIENT)
Dept: ADMINISTRATIVE | Facility: CLINIC | Age: 87
End: 2022-05-10
Payer: MEDICARE

## 2022-05-10 ENCOUNTER — NURSE TRIAGE (OUTPATIENT)
Dept: ADMINISTRATIVE | Facility: CLINIC | Age: 87
End: 2022-05-10
Payer: MEDICARE

## 2022-05-10 ENCOUNTER — PATIENT MESSAGE (OUTPATIENT)
Dept: NEUROSURGERY | Facility: CLINIC | Age: 87
End: 2022-05-10
Payer: MEDICARE

## 2022-05-10 ENCOUNTER — OFFICE VISIT (OUTPATIENT)
Dept: FAMILY MEDICINE | Facility: CLINIC | Age: 87
End: 2022-05-10
Payer: MEDICARE

## 2022-05-10 VITALS
OXYGEN SATURATION: 95 % | BODY MASS INDEX: 28.24 KG/M2 | RESPIRATION RATE: 18 BRPM | WEIGHT: 153.44 LBS | DIASTOLIC BLOOD PRESSURE: 60 MMHG | HEIGHT: 62 IN | SYSTOLIC BLOOD PRESSURE: 110 MMHG | HEART RATE: 86 BPM

## 2022-05-10 DIAGNOSIS — F01.518 VASCULAR DEMENTIA WITH BEHAVIOR DISTURBANCE: Primary | ICD-10-CM

## 2022-05-10 DIAGNOSIS — F41.9 ANXIETY: ICD-10-CM

## 2022-05-10 PROCEDURE — 99499 UNLISTED E&M SERVICE: CPT | Mod: HCNC,S$GLB,, | Performed by: STUDENT IN AN ORGANIZED HEALTH CARE EDUCATION/TRAINING PROGRAM

## 2022-05-10 PROCEDURE — 99214 PR OFFICE/OUTPT VISIT, EST, LEVL IV, 30-39 MIN: ICD-10-PCS | Mod: S$GLB,,, | Performed by: STUDENT IN AN ORGANIZED HEALTH CARE EDUCATION/TRAINING PROGRAM

## 2022-05-10 PROCEDURE — 1101F PR PT FALLS ASSESS DOC 0-1 FALLS W/OUT INJ PAST YR: ICD-10-PCS | Mod: CPTII,S$GLB,, | Performed by: STUDENT IN AN ORGANIZED HEALTH CARE EDUCATION/TRAINING PROGRAM

## 2022-05-10 PROCEDURE — 3288F FALL RISK ASSESSMENT DOCD: CPT | Mod: CPTII,S$GLB,, | Performed by: STUDENT IN AN ORGANIZED HEALTH CARE EDUCATION/TRAINING PROGRAM

## 2022-05-10 PROCEDURE — 1101F PT FALLS ASSESS-DOCD LE1/YR: CPT | Mod: CPTII,S$GLB,, | Performed by: STUDENT IN AN ORGANIZED HEALTH CARE EDUCATION/TRAINING PROGRAM

## 2022-05-10 PROCEDURE — 1126F AMNT PAIN NOTED NONE PRSNT: CPT | Mod: CPTII,S$GLB,, | Performed by: STUDENT IN AN ORGANIZED HEALTH CARE EDUCATION/TRAINING PROGRAM

## 2022-05-10 PROCEDURE — 99214 OFFICE O/P EST MOD 30 MIN: CPT | Mod: S$GLB,,, | Performed by: STUDENT IN AN ORGANIZED HEALTH CARE EDUCATION/TRAINING PROGRAM

## 2022-05-10 PROCEDURE — 1126F PR PAIN SEVERITY QUANTIFIED, NO PAIN PRESENT: ICD-10-PCS | Mod: CPTII,S$GLB,, | Performed by: STUDENT IN AN ORGANIZED HEALTH CARE EDUCATION/TRAINING PROGRAM

## 2022-05-10 PROCEDURE — 3288F PR FALLS RISK ASSESSMENT DOCUMENTED: ICD-10-PCS | Mod: CPTII,S$GLB,, | Performed by: STUDENT IN AN ORGANIZED HEALTH CARE EDUCATION/TRAINING PROGRAM

## 2022-05-10 PROCEDURE — 1159F PR MEDICATION LIST DOCUMENTED IN MEDICAL RECORD: ICD-10-PCS | Mod: CPTII,S$GLB,, | Performed by: STUDENT IN AN ORGANIZED HEALTH CARE EDUCATION/TRAINING PROGRAM

## 2022-05-10 PROCEDURE — 1160F PR REVIEW ALL MEDS BY PRESCRIBER/CLIN PHARMACIST DOCUMENTED: ICD-10-PCS | Mod: CPTII,S$GLB,, | Performed by: STUDENT IN AN ORGANIZED HEALTH CARE EDUCATION/TRAINING PROGRAM

## 2022-05-10 PROCEDURE — 99999 PR PBB SHADOW E&M-EST. PATIENT-LVL V: CPT | Mod: PBBFAC,,, | Performed by: STUDENT IN AN ORGANIZED HEALTH CARE EDUCATION/TRAINING PROGRAM

## 2022-05-10 PROCEDURE — 99999 PR PBB SHADOW E&M-EST. PATIENT-LVL V: ICD-10-PCS | Mod: PBBFAC,,, | Performed by: STUDENT IN AN ORGANIZED HEALTH CARE EDUCATION/TRAINING PROGRAM

## 2022-05-10 PROCEDURE — 1159F MED LIST DOCD IN RCRD: CPT | Mod: CPTII,S$GLB,, | Performed by: STUDENT IN AN ORGANIZED HEALTH CARE EDUCATION/TRAINING PROGRAM

## 2022-05-10 PROCEDURE — 99499 RISK ADDL DX/OHS AUDIT: ICD-10-PCS | Mod: HCNC,S$GLB,, | Performed by: STUDENT IN AN ORGANIZED HEALTH CARE EDUCATION/TRAINING PROGRAM

## 2022-05-10 PROCEDURE — 1160F RVW MEDS BY RX/DR IN RCRD: CPT | Mod: CPTII,S$GLB,, | Performed by: STUDENT IN AN ORGANIZED HEALTH CARE EDUCATION/TRAINING PROGRAM

## 2022-05-10 RX ORDER — BUSPIRONE HYDROCHLORIDE 10 MG/1
10 TABLET ORAL 3 TIMES DAILY
Qty: 90 TABLET | Refills: 1
Start: 2022-05-10 | End: 2022-05-24 | Stop reason: SDUPTHER

## 2022-05-10 RX ORDER — QUETIAPINE FUMARATE 50 MG/1
50 TABLET, FILM COATED ORAL NIGHTLY
Qty: 30 TABLET | Refills: 11 | Status: SHIPPED | OUTPATIENT
Start: 2022-05-10 | End: 2023-04-04

## 2022-05-10 NOTE — CONSULTS
[facility]  Psychiatry  Telepsychiatry Consult Note        Patient and her family are agreeable to consultation via telepsychiatry.    Tele-Consultation from Psychiatry started: 5/9/2022 at 9:25 PM   The chief complaint leading to psychiatric consultation is: Dementia with Behavioral Disturbance   This consultation was requested by Carl Costello MD, the Emergency Department attending physician.  The location of the consulting psychiatrist is [place], [place].  The patient location is  [facility] EMERGENCY DEPARTMENT   The patient arrived at the ED at: unknown   Also present with the patient at the time of the consultation: nurse/tech and patient's son & daughter     Patient Identification:   Glenda Gimenez is a 89 y.o. female.    Patient information was obtained from patient, past medical records, patient's son & daughter, and ED MD.  Patient presented voluntarily to the Emergency Department by private vehicle with her family.      Inpatient consult to Telemedicine - Psych  Consult performed by: Sin Yuan MD  Consult ordered by: Carl Costello MD        Consult Start Time: 05/09/2022 21:25 CDT  Consult End Time: 05/09/2022 22:37 CDT      HISTORY    Per ED MD:  89-year-old female with a history of dementia presents to the emergency room with agitation tonight worse than usual.  Family reports that she was diagnosed with COVID 1 week ago and was started on Paxlovid.  Primary care told them to stop taking Seroquel while she was on this medication.  She has not had her Seroquel in 1 week.  She is on BuSpar as well for anxiety.  The dosage of this has been increased recently.  She has been very agitated at home upset with family members, threatening family.  All of the history is from the daughter Ankita, son Shola. The history is provided by the patient and a relative. The history is limited by the condition of the patient.       Chief Complaint / Reason for Psychiatry Consult: Dementia with Behavioral  Disturbance       HPI   Glenda Gimenez is a 89 y.o. female with a past medical history as noted above/below, and a past psychiatric history of dementia with behavioral disturbance and anxiety, currently in the ED as noted above.  Psychiatry was originally consulted as noted above.  The patient was seen and examined.  The chart was reviewed.  On examination today, the patient was alert and oriented to person and type of place only.  She was unable / unwilling to participate in CAM-ICU delirium testing.  She appeared confused and guarded / irritable during the assessment attempt.  She denies any current/recent passive/active SI/HI.  She denies any AH / VH / TH.  She did not appear to be responding to internal stimuli.  She was verbally redirectable.  She denies any adverse effects to their current medication regimen.  She denies any current medical/physical complaints at this time.  NAD was observed during the examination.  Despite multiple attempts, the comprehensive psychiatric assessment was limited due to the patient's dementia / AMS.  Psychotherapy was implemented as noted below with a focus on behavioral modification, re-orientation, and reality testing / orientation.  See collateral info below.  See A/P below.      Collateral:   I spoke to the patient's son and daughter who were present with the patient.  They state that the patient was diagnosed with dementia about 1 year ago and began having notable worsening memory, confusion, paranoia, agitation, and sundowning around December or November 2021.  The patient was doing fairly well on a regimen from her PCP of Quetiapine 50 mg PO QHS, Zoloft 25 mg PO daily, and Buspar 10 mg PO TID, but she was instructed to stop taking the Quetiapine about 1 week ago when she was prescribed Paxlovid for her Covid infection (due to concerns of Paxlovid causing increased / potentially toxic levels of Quetiapine in relation to liver metabolism).  Her behavioral disturbances  worsened over the past week without the Quetiapine.  Her family states that she finished the Paxlovid with the last dose taken 48+ hours ago.        Psychiatric Review Of Systems - Currently, the patient is endorsing and/or denying the following:  Attempted but unable to assess due to dementia        ROS  General ROS: negative for - chills, fatigue, fever or night sweats  Ophthalmic ROS: negative for - blurry vision, double vision or eye pain  ENT ROS: negative for - sinus pain, headaches, sore throat or visual changes  Allergy and Immunology ROS: negative for - hives, itchy/watery eyes or nasal congestion  Hematological and Lymphatic ROS: negative for - bleeding problems, bruising, jaundice or pallor  Endocrine ROS: negative for - galactorrhea, hot flashes, mood swings, palpitations or temperature intolerance  Respiratory ROS: negative for - cough, hemoptysis, shortness of breath, tachypnea or wheezing  Cardiovascular ROS: negative for - chest pain, dyspnea on exertion, loss of consciousness, palpitations, rapid heart rate or shortness of breath  Gastrointestinal ROS: negative for - appetite loss, nausea, abdominal pain, blood in stools, change in bowel habits, constipation or diarrhea  Genito-Urinary ROS: negative for - incontinence, nocturia or pelvic pain  Musculoskeletal ROS: negative for - joint stiffness, joint swelling, joint pain or muscle pain   Neurological ROS: negative for - dizziness, numbness/tingling or seizures; positive for behavioral changes, confusion, & memory loss  Dermatological ROS: negative for dry skin, hair changes, pruritus or rash  Psychiatric ROS: see detailed psychiatric ROS above in history section       PSYCHOTHERAPY     Participants: Met with patient    Therapeutic Intervention Type: behavior modifying psychotherapy, supportive psychotherapy  Why chosen therapy is appropriate versus another modality: relevant to diagnosis, patient responds to this modality, evidence based  practice    Target symptoms: behavioral disturbances in dementia, disorientation, and confusion   Primary focus: behavioral modification, re-orientation, and reality testing / orientation    Psychotherapeutic techniques: supportive and psychodynamic techniques; psycho-education; re-orientation; behavioral modification; problem solving techniques and managing life/medical stressors    Outcome monitoring methods: self-report, observation    Patient's response to intervention:  The patient's response to intervention is accepting / limited.     Progress toward goals:  The patient's progress toward goals is fair / limited.      Past Psychiatric History:  Previous Medication Trials: most recently Seroquel, Buspar, and Zoloft   Previous Psychiatric Hospitalizations: denies   Previous Suicide Attempts: denies   History of Violence: denies   Outpatient Psychiatrist: denies (above-listed meds managed by PCP)  Hx of Depression: denies   Hx of Anxiety: yes  Hx of Tatianna: denies   Hx of Psychosis: paranoia / agitation in context of dementia-related behavioral disturbances     Social History:  Housing Status: lives with son in [place], [place]   History of phys/sexual abuse: denies   Access to gun: denies     Family Psychiatric History: denies     Substance Abuse History:  Recreational Drugs: denies   Use of Alcohol: denies   Rehab History:denies   Tobacco Use: denies     Legal History:  Past Charges/Incarcerations: denies   Pending charges: denies     Psychosocial Stressors: health / Covid dx / dementia dx   Functioning Relationships: good support system in family  Strengths AND Liabilities  Strength: Patient has positive support network., Liability: Patient has neurocognitive impairment., Liability: Patient lacks coping skills.      PAST MEDICAL & SURGICAL HISTORY   Past Medical History:   Diagnosis Date    Anticoagulant long-term use     Arthritis     Dislocation of right shoulder joint     Hypertension     Hypothyroidism      Shoulder disorder     right    Thyroid disease      Past Surgical History:   Procedure Laterality Date    HYSTERECTOMY      PARTIAL HYSTERECTOMY      ARTUR, ovaries in place, uterine prolapse       NEUROLOGIC HISTORY  Seizures: denies   Head trauma: denies  CVA: denies     FAMILY HISTORY   Family History   Problem Relation Age of Onset    Breast cancer Mother     Cancer Brother         lung cancer    Cancer Maternal Aunt         unknown cancer    Cancer Maternal Uncle         pancreatic cancer    Heart disease Neg Hx        ALLERGIES   Review of patient's allergies indicates:   Allergen Reactions    Amlodipine Swelling     Other reaction(s): Angioedema    Atenolol      Other reaction(s): itch    Betamethasone sodium phosphate      Other reaction(s): Flushing (skin)    Cortisone      Other reaction(s): blurry vision  Other reaction(s): Rash    Lisinopril      Other reaction(s): COUGH    Naproxen      Other reaction(s): body shut down    Prednisone     Triamterene-hydrochlorothiazid      Other reaction(s): itch       CURRENT MEDICATION REGIMEN   Home Meds:   Prior to Admission medications    Medication Sig Start Date End Date Taking? Authorizing Provider   acetaminophen (TYLENOL) 325 MG tablet Take 325 mg by mouth every 6 (six) hours as needed for Pain.    Historical Provider   amoxicillin-clavulanate 500-125mg (AUGMENTIN) 500-125 mg Tab Take 1 tablet (500 mg total) by mouth 2 (two) times daily. for 7 days 5/2/22 5/9/22  Lesley Tamez NP   aspirin (ECOTRIN) 81 MG EC tablet Take 81 mg by mouth once daily.    Historical Provider   busPIRone (BUSPAR) 10 MG tablet Take 1 tablet (10 mg total) by mouth 2 (two) times daily. 3/28/22 3/28/23  Lesley Tamez NP   calcium citrate (CALCITRATE) 200 mg (950 mg) tablet Take 2 tablets by mouth once daily.    Historical Provider   carvedilol (COREG) 3.125 MG tablet Take 1.5625 mg by mouth 2 (two) times daily.  10/1/19   Historical Provider   co-enzyme Q-10 30 mg  capsule Take 30 mg by mouth 3 (three) times daily.    Historical Provider   famotidine (PEPCID) 20 MG tablet Take 1 tablet (20 mg total) by mouth 2 (two) times daily. 6/23/21 6/23/22  Lesley Tmaez NP   furosemide (LASIX) 20 MG tablet  3/23/22   Historical Provider   ibandronate (BONIVA) 150 mg tablet Take 1 tablet (150 mg total) by mouth every 30 days. 9/25/21 9/25/22  Damir Fraire MD   isosorbide mononitrate (IMDUR) 30 MG 24 hr tablet Take 1 tablet (30 mg total) by mouth once daily. 7/21/21   Lesley Tamez NP   krill oil 500 mg Cap Take by mouth.    Historical Provider   Lactobac no.41/Bifidobact no.7 (PROBIOTIC-10 ORAL) 1 capsule DAILY (route: oral) 8/20/21   Historical Provider   levothyroxine (SYNTHROID) 88 MCG tablet Take 1 tablet (88 mcg total) by mouth once daily. 9/21/21   Damir Fraire MD   olmesartan (BENICAR) 5 MG Tab Take 1 tablet (5 mg total) by mouth once daily. 11/16/21   Lesley Tamez NP   ondansetron (ZOFRAN-ODT) 4 MG TbDL Take 1 tablet (4 mg total) by mouth every 8 (eight) hours as needed (nausea). 5/2/22   Lesley Tamez NP   peg 400-propylene glycol, PF, (SYSTANE HYDRATION PF) 0.4-0.3 % Drop 1 drops DAILY (route: ophthalmic (eye)) 8/20/21   Historical Provider   pulse oximeter (PULSE OXIMETER) device by Apply Externally route 2 (two) times a day. Use twice daily at 8 AM and 3 PM and record the value in MyChart as directed. 5/2/22   Santiago Singer MD   pulse oximeter (PULSE OXIMETER) device by Apply Externally route 2 (two) times a day. Use twice daily at 8 AM and 3 PM and record the value in MyChart as directed. 5/2/22   Santiago Singer MD   sertraline (ZOLOFT) 25 MG tablet Take 1 tablet (25 mg total) by mouth once daily. 11/16/21 11/16/22  Lesley Tamez, NP   sodium chloride 1 gram tablet Take 1 g by mouth 3 (three) times daily. 2 tabs TID    Historical Provider   vit A/vit C/vit E/zinc/copper (ICAPS AREDS ORAL) 1 capsule 2 TIMES DAILY (route: oral) 8/20/21    Historical Provider   vitamin D (VITAMIN D3) 1000 units Tab Take 1,000 Units by mouth once daily. Patient is taking 2000 units per day    Historical Provider       OTC Meds: denies     Scheduled Meds:    PRN Meds:    Psychotherapeutics (From admission, onward)            None          LABORATORY DATA   Recent Results (from the past 72 hour(s))   Urinalysis    Collection Time: 05/09/22  3:20 PM   Result Value Ref Range    Specimen UA Urine, Clean Catch     Color, UA Yellow Yellow, Straw, Kary    Appearance, UA Clear Clear    pH, UA 7.0 5.0 - 8.0    Specific Gravity, UA 1.010 1.005 - 1.030    Protein, UA Negative Negative    Glucose, UA Negative Negative    Ketones, UA Negative Negative    Bilirubin (UA) Negative Negative    Occult Blood UA Negative Negative    Nitrite, UA Negative Negative    Urobilinogen, UA Negative <2.0 EU/dL    Leukocytes, UA Negative Negative   Complete Blood Count (CBC)    Collection Time: 05/09/22  6:22 PM   Result Value Ref Range    WBC 6.95 3.90 - 12.70 K/uL    RBC 4.18 4.00 - 5.40 M/uL    Hemoglobin 13.2 12.0 - 16.0 g/dL    Hematocrit 39.1 37.0 - 48.5 %    MCV 94 82 - 98 fL    MCH 31.6 (H) 27.0 - 31.0 pg    MCHC 33.8 32.0 - 36.0 g/dL    RDW 13.2 11.5 - 14.5 %    Platelets 277 150 - 450 K/uL    MPV 10.6 9.2 - 12.9 fL    Immature Granulocytes 0.3 0.0 - 0.5 %    Gran # (ANC) 3.4 1.8 - 7.7 K/uL    Immature Grans (Abs) 0.02 0.00 - 0.04 K/uL    Lymph # 2.6 1.0 - 4.8 K/uL    Mono # 0.7 0.3 - 1.0 K/uL    Eos # 0.2 0.0 - 0.5 K/uL    Baso # 0.05 0.00 - 0.20 K/uL    nRBC 0 0 /100 WBC    Gran % 48.7 38.0 - 73.0 %    Lymph % 37.3 18.0 - 48.0 %    Mono % 9.5 4.0 - 15.0 %    Eosinophil % 3.5 0.0 - 8.0 %    Basophil % 0.7 0.0 - 1.9 %    Differential Method Automated    Comprehensive Metabolic Panel (CMP)    Collection Time: 05/09/22  6:22 PM   Result Value Ref Range    Sodium 141 136 - 145 mmol/L    Potassium 4.0 3.5 - 5.1 mmol/L    Chloride 104 95 - 110 mmol/L    CO2 26 23 - 29 mmol/L    Glucose 87 70 -  "110 mg/dL    BUN 14 10 - 30 mg/dL    Creatinine 0.8 0.5 - 1.4 mg/dL    Calcium 10.5 8.7 - 10.5 mg/dL    Total Protein 8.3 6.0 - 8.4 g/dL    Albumin 3.6 3.5 - 5.2 g/dL    Total Bilirubin 0.4 0.1 - 1.0 mg/dL    Alkaline Phosphatase 77 55 - 135 U/L    AST 35 10 - 40 U/L    ALT 29 10 - 44 U/L    Anion Gap 11 8 - 16 mmol/L    eGFR if African American >60 >60 mL/min/1.73 m^2    eGFR if non African American >60 >60 mL/min/1.73 m^2   Urinalysis, Reflex to Urine Culture Urine, Clean Catch    Collection Time: 05/09/22  6:45 PM    Specimen: Urine   Result Value Ref Range    Specimen UA Urine, Clean Catch     Color, UA Yellow Yellow, Straw, Kary    Appearance, UA Clear Clear    pH, UA 8.0 5.0 - 8.0    Specific Gravity, UA 1.020 1.005 - 1.030    Protein, UA Negative Negative    Glucose, UA Negative Negative    Ketones, UA Negative Negative    Bilirubin (UA) Negative Negative    Occult Blood UA Negative Negative    Nitrite, UA Negative Negative    Urobilinogen, UA Negative <2.0 EU/dL    Leukocytes, UA Negative Negative      No results found for: PHENYTOIN, PHENOBARB, VALPROATE, CBMZ      EXAMINATION    VITALS   Vitals:    05/09/22 1759   BP: (!) 190/92   Pulse: 93   Resp: 18   Temp: 97.8 °F (36.6 °C)   TempSrc: Oral   SpO2: 97%   Weight: 70.3 kg (155 lb)   Height: 5' 2" (1.575 m)        CONSTITUTIONAL  General Appearance: NAD, unremarkable, age appropriate, overweight, seated on chair     MUSCULOSKELETAL  Muscle Strength and Tone: WNL    Abnormal Involuntary Movements: none observed   Gait and Station: Attempted but unable to assess due to medical acuity / dementia     PSYCHIATRIC   Behavior/Cooperation:  cooperative, eye contact normal, calm with intermittent irritability   Speech:  normal tone, normal rate, normal pitch, normal volume  Language: grossly intact, able to name with spontaneous speech  Mood: "OK"  Affect:  Mildly irritable   Associations: +ROMÁN  Thought Process: Linear with intermittent confusion   Thought Content: " + mild paranoia; denies SI, HI, AH, VH, TH (no RIS observed)   Sensorium:  Awake  Alert and Oriented: to person and type of place   Memory: Attempted but unable to assess due to dementia / AMS   Attention/concentration: Limited / Impaired.    Similarities: Limited / Intact (difference between apple and orange?)  Abstract reasoning: Limited  Fund of Knowledge: Attempted but unable to assess due to dementia / AMS   Insight: Limited  Judgment: Limited     CAM ICU Delirium Assessment - Attempted but unable to assess due to dementia / AMS       Is the patient aware of the biomedical complications associated with substance abuse and mental illness?   Attempted but unable to assess due to dementia / AMS         MEDICAL DECISION MAKING    ASSESSMENT      Unspecified Dementia with Behavioral Disturbance   Unspecified Anxiety Disorder      RECOMMENDATIONS       - Patient does not currently meet PEC criteria due to not being an imminent threat to self/others and not being gravely disabled 2/2 mental illness (patient lives with her son who assists her with her ADLs).       - Due to patient being 48+ hours since her last Paxlovid dose (roughly 6 hour half life), she can resume her most recent outpatient psychiatric medication regimen of Seroquel 50 mg PO QHS for behavioral disturbances in dementia, Zoloft 25 mg PO daily for anxiety, and Buspar 10 mg PO TID for anxiety (discussed risks/benefits/alt vs no treatment with patient and her family)     - Psychotherapy was performed with patient as noted above with a focus on behavioral modification, re-orientation, and reality testing / orientation.    - Patient's most recent labs, imaging, and EKG were reviewed today.      - Patient and family are in agreement with having her PCP continue to manage her psychiatric medication regimen (she has a PCP appt tomorrow per her daughter) ; they are aware that they can escalate to outpatient or inpatient geriatric psychiatry if needed in the  future.       - Patient and her family were instructed to call 911 and return to the nearest ED if the patient begins feeling suicidal, homicidal, or gravely disabled.      - Thank you for this consult         Total time spent with patient and/or managing/coordinating patient's care today (including the time spent on psychotherapy): 72 minutes       More than 50% of the time was spent counseling/coordinating care.     Consulting clinician was informed of the encounter and consult note.     Consultation ended: 5/9/2022 at 10:37 PM        STAFF:  Sin Yuan MD  [facility]   5/9/2022

## 2022-05-10 NOTE — TELEPHONE ENCOUNTER
"Pt called for no response to VS for afternoon push.    VM left for Pt to call 911 or go to the nearest ER if having a medical emergency but if not please put in your afternoon VS asap.     Infinio message sent by No CABRERA.     Shola Gimenez notified about missing VS and states simply, "thanks you."    Reason for Disposition   Message left with person in household.    Protocols used: NO CONTACT OR DUPLICATE CONTACT CALL-A-AH      "

## 2022-05-10 NOTE — DISCHARGE INSTRUCTIONS
Resume previous medications at previous dosages.  Keep outpatient appointments.  Return to the emergency room for any concerns.

## 2022-05-10 NOTE — ED PROVIDER NOTES
Encounter Date: 5/9/2022       History     Chief Complaint   Patient presents with    behavior issues      89-year-old female with a history of dementia presents to the emergency room with agitation tonight worse than usual.  Family reports that she was diagnosed with COVID 1 week ago and was started on Paxlovid.  Primary care told them to stop taking Seroquel while she was on this medication.  She has not had her Seroquel in 1 week.  She is on BuSpar as well for anxiety.  The dosage of this has been increased recently.  She has been very agitated at home upset with family members, threatening family.  All of the history is from the daughter Ankita, son Shola.    The history is provided by the patient and a relative. The history is limited by the condition of the patient.     Review of patient's allergies indicates:   Allergen Reactions    Amlodipine Swelling     Other reaction(s): Angioedema    Atenolol      Other reaction(s): itch    Betamethasone sodium phosphate      Other reaction(s): Flushing (skin)    Cortisone      Other reaction(s): blurry vision  Other reaction(s): Rash    Lisinopril      Other reaction(s): COUGH    Naproxen      Other reaction(s): body shut down    Prednisone     Triamterene-hydrochlorothiazid      Other reaction(s): itch     Past Medical History:   Diagnosis Date    Anticoagulant long-term use     Arthritis     Dislocation of right shoulder joint     Hypertension     Hypothyroidism     Shoulder disorder     right    Thyroid disease      Past Surgical History:   Procedure Laterality Date    HYSTERECTOMY      PARTIAL HYSTERECTOMY      ARTUR, ovaries in place, uterine prolapse     Family History   Problem Relation Age of Onset    Breast cancer Mother     Cancer Brother         lung cancer    Cancer Maternal Aunt         unknown cancer    Cancer Maternal Uncle         pancreatic cancer    Heart disease Neg Hx      Social History     Tobacco Use    Smoking status: Never  Smoker    Smokeless tobacco: Never Used   Substance Use Topics    Alcohol use: Yes     Alcohol/week: 2.0 standard drinks     Types: 2 Glasses of wine per week     Comment: 2 small glasses of red wine with dinner    Drug use: No     Review of Systems   Unable to perform ROS: Psychiatric disorder       Physical Exam     Initial Vitals [05/09/22 1759]   BP Pulse Resp Temp SpO2   (!) 190/92 93 18 97.8 °F (36.6 °C) 97 %      MAP       --         Physical Exam    Nursing note and vitals reviewed.  Constitutional: She appears well-developed and well-nourished.   HENT:   Head: Normocephalic and atraumatic.   Eyes: EOM are normal.   Neck: Neck supple.   Normal range of motion.  Cardiovascular: Normal rate, regular rhythm and normal heart sounds. Exam reveals no gallop and no friction rub.    No murmur heard.  Pulmonary/Chest: Breath sounds normal. No respiratory distress. She has no wheezes. She has no rhonchi. She has no rales.   Musculoskeletal:         General: Normal range of motion.      Cervical back: Normal range of motion and neck supple.     Neurological: She is alert.   Skin: Skin is warm and dry.   Psychiatric: She is agitated.         ED Course   Procedures  Labs Reviewed   CBC W/ AUTO DIFFERENTIAL - Abnormal; Notable for the following components:       Result Value    MCH 31.6 (*)     All other components within normal limits   COMPREHENSIVE METABOLIC PANEL   URINALYSIS, REFLEX TO URINE CULTURE    Narrative:     Specimen Source->Urine          Imaging Results          CT Head Without Contrast (Final result)  Result time 05/09/22 18:45:45    Final result by Paco Barbour IV, MD (05/09/22 18:45:45)                 Impression:      No acute intracranial process is convincingly noted.    Extensive involutional changes suggestive microvascular ischemic change similar since the prior      Electronically signed by: Paco Barbour MD  Date:    05/09/2022  Time:    18:45             Narrative:    EXAMINATION:  CT  HEAD WITHOUT CONTRAST    CLINICAL HISTORY:  Mental status change, unknown cause;    TECHNIQUE:  Low dose axial images were obtained through the head.  Coronal and sagittal reformations were also performed. Contrast was not administered.    COMPARISON:  02/21/2022    FINDINGS:  The ventricles and sulci appear prominent suggestive involutional changes but similar since the prior.  No obvious mass lesions are noted.  A hypodensity extends to the left occipital lobe similar since the prior suggestive of infarction and gliosis.  Volume loss is noted.  No edema is noted to suggest acuity.  No abnormal density is noted to suggest acute blood products.Extensive white matter hypodensity is noted bilaterally periventricular suggestive of microvascular ischemic change.  A hypodensity is noted along the posterior limb of the internal capsule or lentiform nuclei on the right suggestive microvascular ischemic change similar since the prior.                                 Medications   ALPRAZolam tablet 0.25 mg (0.25 mg Oral Given 5/9/22 2110)   QUEtiapine tablet 50 mg (50 mg Oral Given 5/9/22 2122)     Medical Decision Making:   Clinical Tests:   Lab Tests: Reviewed  Radiological Study: Reviewed             ED Course as of 05/10/22 0116   Mon May 09, 2022   2051 CT Head Without Contrast [EF]   2051 BP(!): 190/92 [EF]   2051 Temp: 97.8 °F (36.6 °C) [EF]   2051 Temp src: Oral [EF]   2051 Pulse: 93 [EF]   2051 Resp: 18 [EF]   2051 SpO2: 97 % [EF]   2239 Okay to discharge home for psych resume previous medications at previous dosages [EF]      ED Course User Index  [EF] Carl Costello MD             Clinical Impression:   Final diagnoses:  [R41.82] Change in mental status          ED Disposition Condition    Discharge Stable        ED Prescriptions     None        Follow-up Information     Follow up With Specialties Details Why Contact Info    [facility] Emergency Medicine  As needed, If symptoms worsen [address]  [facility] [id number] [phone]          89-year-old with dementia presents to the emergency room with agitation worsening today at home.  Family reports that her Seroquel was recently discontinued secondary to starting some oral COVID medications.  She was consulted to psychiatry in the emergency room who recommends restarting her Seroquel.  She was given her nighttime dose.  Family feels comfortable taking her home.  I agree with this plan.  All family members are in agreement with this plan.     Carl Costello MD  05/10/22 0117

## 2022-05-10 NOTE — PROGRESS NOTES
[facility] NOTE    Patient Name: Glenda Gimenez  YOB: 1927    PRESENTING HISTORY   Chief Complaint:   Chief Complaint   Patient presents with    Hospital Follow Up        History of Present Illness:  Ms. Glenda Gimenez is a 89 y.o. female here for f/u    Agitated, not redirectable. Brought to ED for this.   Medical workup unrevealing.   Seen by psychiatry in ED. Recommended resuming previous medications which had been held due to paxlovid.     Given both seroquel and bzd last night. She has been very somnolent throughout the day today.     She had not been complaining about anything.                                                 Review of Systems   All other systems reviewed and are negative.        PAST HISTORY:     Past Medical History:   Diagnosis Date    Anticoagulant long-term use     Arthritis     Dislocation of right shoulder joint     Hypertension     Hypothyroidism     Shoulder disorder     right    Thyroid disease        Past Surgical History:   Procedure Laterality Date    HYSTERECTOMY      PARTIAL HYSTERECTOMY      ARTUR, ovaries in place, uterine prolapse       Family History   Problem Relation Age of Onset    Breast cancer Mother     Cancer Brother         lung cancer    Cancer Maternal Aunt         unknown cancer    Cancer Maternal Uncle         pancreatic cancer    Heart disease Neg Hx        Social History     Socioeconomic History    Marital status:    Tobacco Use    Smoking status: Never Smoker    Smokeless tobacco: Never Used   Substance and Sexual Activity    Alcohol use: Yes     Alcohol/week: 2.0 standard drinks     Types: 2 Glasses of wine per week     Comment: 2 small glasses of red wine with dinner    Drug use: No    Sexual activity: Not Currently     Social Determinants of Health     Financial Resource Strain: Low Risk     Difficulty of Paying Living Expenses: Not hard at all   Food Insecurity: No Food Insecurity     Worried About Running Out of Food in the Last Year: Never true    Ran Out of Food in the Last Year: Never true   Transportation Needs: No Transportation Needs    Lack of Transportation (Medical): No    Lack of Transportation (Non-Medical): No   Physical Activity: Unknown    Days of Exercise per Week: 0 days   Stress: Stress Concern Present    Feeling of Stress : Very much   Social Connections: Unknown    Frequency of Communication with Friends and Family: Twice a week    Frequency of Social Gatherings with Friends and Family: More than three times a week    Active Member of Clubs or Organizations: No    Attends Club or Organization Meetings: Never    Marital Status:    Housing Stability: Unknown    Unable to Pay for Housing in the Last Year: No    Unstable Housing in the Last Year: No       MEDICATIONS & ALLERGIES:     Current Outpatient Medications on File Prior to Visit   Medication Sig    acetaminophen (TYLENOL) 325 MG tablet Take 325 mg by mouth every 6 (six) hours as needed for Pain.    aspirin (ECOTRIN) 81 MG EC tablet Take 81 mg by mouth once daily.    carvedilol (COREG) 3.125 MG tablet Take 1.5625 mg by mouth 2 (two) times daily.     co-enzyme Q-10 30 mg capsule Take 30 mg by mouth 3 (three) times daily.    famotidine (PEPCID) 20 MG tablet Take 1 tablet (20 mg total) by mouth 2 (two) times daily.    furosemide (LASIX) 20 MG tablet     ibandronate (BONIVA) 150 mg tablet Take 1 tablet (150 mg total) by mouth every 30 days.    isosorbide mononitrate (IMDUR) 30 MG 24 hr tablet Take 1 tablet (30 mg total) by mouth once daily.    krill oil 500 mg Cap Take by mouth.    Lactobac no.41/Bifidobact no.7 (PROBIOTIC-10 ORAL) 1 capsule DAILY (route: oral)    levothyroxine (SYNTHROID) 88 MCG tablet Take 1 tablet (88 mcg total) by mouth once daily.    olmesartan (BENICAR) 5 MG Tab Take 1 tablet (5 mg total) by mouth once daily.    ondansetron (ZOFRAN-ODT) 4 MG TbDL Take 1 tablet (4 mg total)  "by mouth every 8 (eight) hours as needed (nausea).    peg 400-propylene glycol, PF, (SYSTANE HYDRATION PF) 0.4-0.3 % Drop 1 drops DAILY (route: ophthalmic (eye))    pulse oximeter (PULSE OXIMETER) device by Apply Externally route 2 (two) times a day. Use twice daily at 8 AM and 3 PM and record the value in MyChart as directed.    pulse oximeter (PULSE OXIMETER) device by Apply Externally route 2 (two) times a day. Use twice daily at 8 AM and 3 PM and record the value in MyChart as directed.    sertraline (ZOLOFT) 25 MG tablet Take 1 tablet (25 mg total) by mouth once daily.    sodium chloride 1 gram tablet Take 1 g by mouth 3 (three) times daily. 2 tabs TID    vit A/vit C/vit E/zinc/copper (ICAPS AREDS ORAL) 1 capsule 2 TIMES DAILY (route: oral)    vitamin D (VITAMIN D3) 1000 units Tab Take 1,000 Units by mouth once daily. Patient is taking 2000 units per day     No current facility-administered medications on file prior to visit.       Review of patient's allergies indicates:   Allergen Reactions    Amlodipine Swelling     Other reaction(s): Angioedema    Atenolol      Other reaction(s): itch    Betamethasone sodium phosphate      Other reaction(s): Flushing (skin)    Cortisone      Other reaction(s): blurry vision  Other reaction(s): Rash    Lisinopril      Other reaction(s): COUGH    Naproxen      Other reaction(s): body shut down    Prednisone     Triamterene-hydrochlorothiazid      Other reaction(s): itch       OBJECTIVE:   Vital Signs:  Vitals:    05/10/22 1059   BP: 110/60   Pulse: 86   Resp: 18   SpO2: 95%   Weight: 69.6 kg (153 lb 7 oz)   Height: 5' 2" (1.575 m)       No results found for this or any previous visit (from the past 24 hour(s)).      Physical Exam  Vitals and nursing note reviewed.   Constitutional:       Comments: Comes to clinic in wheelchair   Cardiovascular:      Rate and Rhythm: Normal rate and regular rhythm.   Pulmonary:      Effort: Pulmonary effort is normal.      " Breath sounds: Normal breath sounds.   Neurological:      Comments: Somnolent but arousable.          ASSESSMENT & PLAN:     Vascular dementia with behavior disturbance  -     QUEtiapine (SEROQUEL) 50 MG tablet; Take 1 tablet (50 mg total) by mouth every evening.  Dispense: 30 tablet; Refill: 11    Anxiety  -     busPIRone (BUSPAR) 10 MG tablet; Take 1 tablet (10 mg total) by mouth 3 (three) times daily.  Dispense: 90 tablet; Refill: 1      Continuing current medicines.   [facility] seroquel.       William Olivia MD   Internal Medicine    This note was created using Dragon voice recognition software that occasionally misinterprets phrases or words.

## 2022-05-10 NOTE — ED NOTES
"Pt presents with extreme aggravation, anxious and being combative with her family. Pt talks about some sort of procedure for her son then starts to say its was done and then startes talking about the same arm bone procedure on herself. Pt talks in circles, without making sense to her family. She is aware who the members are however she is calling her son and grand son "Assholes" And that they are all grabbing her and keeping her from going. She is not able to be redirected calmly without her yelling about her family keeping her from leaving orabout wanting to be let go. Family is at BS They report that pt has been fine up until this evening.   "

## 2022-05-10 NOTE — PATIENT INSTRUCTIONS
Seroquel nightly. Can take 1 additional tablet if needed.       Carlos Doshi,     If you are due for any health screening(s) below please notify me so we can arrange them to be ordered and scheduled to maintain your health. Most healthy patients complete it. Don't lose out on improving your health.     Health Maintenance   Topic Date Due    Lipid Panel  02/26/2026    TETANUS VACCINE  11/07/2027

## 2022-05-11 ENCOUNTER — NURSE TRIAGE (OUTPATIENT)
Dept: ADMINISTRATIVE | Facility: CLINIC | Age: 87
End: 2022-05-11
Payer: MEDICARE

## 2022-05-11 ENCOUNTER — PATIENT MESSAGE (OUTPATIENT)
Dept: ADMINISTRATIVE | Facility: OTHER | Age: 87
End: 2022-05-11
Payer: MEDICARE

## 2022-05-11 ENCOUNTER — PATIENT MESSAGE (OUTPATIENT)
Dept: ADMINISTRATIVE | Facility: CLINIC | Age: 87
End: 2022-05-11
Payer: MEDICARE

## 2022-05-11 NOTE — TELEPHONE ENCOUNTER
Pt escalated for no response - unable to make phone contact with pt but did speak with EC, Shola Gimenez, who hung up quickly after nurse identified self.   LM on pt cell to call OOC for any worsening of symptoms.    Reason for Disposition   Message left on unidentified voice mail.  Phone number verified.    Protocols used: NO CONTACT OR DUPLICATE CONTACT CALL-A-AH

## 2022-05-11 NOTE — TELEPHONE ENCOUNTER
Pt escalated due to no response. Pt called with no contact made. VM left, and [employer] message sent    Reason for Disposition   Message left on unidentified voice mail. Phone number verified.    Protocols used: NO CONTACT OR DUPLICATE CONTACT CALL-A-OH

## 2022-05-12 ENCOUNTER — PATIENT MESSAGE (OUTPATIENT)
Dept: ADMINISTRATIVE | Facility: OTHER | Age: 87
End: 2022-05-12
Payer: MEDICARE

## 2022-05-12 ENCOUNTER — NURSE TRIAGE (OUTPATIENT)
Dept: ADMINISTRATIVE | Facility: CLINIC | Age: 87
End: 2022-05-12
Payer: MEDICARE

## 2022-05-12 ENCOUNTER — PATIENT MESSAGE (OUTPATIENT)
Dept: ADMINISTRATIVE | Facility: CLINIC | Age: 87
End: 2022-05-12
Payer: MEDICARE

## 2022-05-12 NOTE — TELEPHONE ENCOUNTER
Pt called for no response and left VM and told to call OOC if any problems Also sent my chart message to call if any problems  Reason for Disposition   Message left on unidentified voice mail. Phone number verified.    Protocols used: NO CONTACT OR DUPLICATE CONTACT CALL-A-OH

## 2022-05-13 ENCOUNTER — PATIENT MESSAGE (OUTPATIENT)
Dept: ADMINISTRATIVE | Facility: OTHER | Age: 87
End: 2022-05-13
Payer: MEDICARE

## 2022-05-13 ENCOUNTER — NURSE TRIAGE (OUTPATIENT)
Dept: ADMINISTRATIVE | Facility: CLINIC | Age: 87
End: 2022-05-13
Payer: MEDICARE

## 2022-05-13 ENCOUNTER — PATIENT MESSAGE (OUTPATIENT)
Dept: ADMINISTRATIVE | Facility: CLINIC | Age: 87
End: 2022-05-13
Payer: MEDICARE

## 2022-05-13 NOTE — TELEPHONE ENCOUNTER
Pt escalated for no response, NT unable to make phone contact. As per program protocol all contact numbers called once and my chart message sent.    NT did speak with EC, Shola Gimenez, who reports no worsening of symptoms and unsure why VS did not go through as he sent them to sister for entry.      Noted late entry of VS no second escalation triggered.    Reason for Disposition   Message left with person in household    Protocols used: NO CONTACT OR DUPLICATE CONTACT CALL-A-OH

## 2022-05-13 NOTE — TELEPHONE ENCOUNTER
Pt escalated for no response, NT unable to make phone contact. As per program protocol all contact numbers called once and my chart message sent.    Reason for Disposition   Message left on unidentified voice mail. Phone number verified.    Protocols used: NO CONTACT OR DUPLICATE CONTACT CALL-A-OH

## 2022-05-14 ENCOUNTER — NURSE TRIAGE (OUTPATIENT)
Dept: ADMINISTRATIVE | Facility: CLINIC | Age: 87
End: 2022-05-14
Payer: MEDICARE

## 2022-05-14 ENCOUNTER — PATIENT MESSAGE (OUTPATIENT)
Dept: ADMINISTRATIVE | Facility: CLINIC | Age: 87
End: 2022-05-14
Payer: MEDICARE

## 2022-05-14 ENCOUNTER — PATIENT MESSAGE (OUTPATIENT)
Dept: ADMINISTRATIVE | Facility: OTHER | Age: 87
End: 2022-05-14
Payer: MEDICARE

## 2022-05-14 NOTE — TELEPHONE ENCOUNTER
Pt escalated for no response, NT unable to make phone contact. As per program protocol all contact numbers called once and my chart message sent.

## 2022-05-15 ENCOUNTER — NURSE TRIAGE (OUTPATIENT)
Dept: ADMINISTRATIVE | Facility: CLINIC | Age: 87
End: 2022-05-15
Payer: MEDICARE

## 2022-05-15 ENCOUNTER — PATIENT MESSAGE (OUTPATIENT)
Dept: ADMINISTRATIVE | Facility: CLINIC | Age: 87
End: 2022-05-15
Payer: MEDICARE

## 2022-05-15 ENCOUNTER — PATIENT MESSAGE (OUTPATIENT)
Dept: ADMINISTRATIVE | Facility: OTHER | Age: 87
End: 2022-05-15
Payer: MEDICARE

## 2022-05-15 NOTE — TELEPHONE ENCOUNTER
Pt called for surveillance no response escalation. No answer. VM left.     EC called No answer. No VM, unable to leave message.   Second EC called. No answer. VM left.     No contact.    Reason for Disposition   Message left on unidentified voice mail.  Phone number verified.    Additional Information   Negative: Caller has already spoken with the PCP and has no further questions.   Negative: Caller has already spoken with another triager and has no further questions.   Negative: Caller has already spoken with another triager or PCP AND has further questions AND triager able to answer questions.    Protocols used: NO CONTACT OR DUPLICATE CONTACT CALL-A-

## 2022-05-16 ENCOUNTER — PATIENT MESSAGE (OUTPATIENT)
Dept: ADMINISTRATIVE | Facility: OTHER | Age: 87
End: 2022-05-16
Payer: MEDICARE

## 2022-05-16 ENCOUNTER — NURSE TRIAGE (OUTPATIENT)
Dept: ADMINISTRATIVE | Facility: CLINIC | Age: 87
End: 2022-05-16
Payer: MEDICARE

## 2022-05-16 ENCOUNTER — PATIENT MESSAGE (OUTPATIENT)
Dept: ADMINISTRATIVE | Facility: CLINIC | Age: 87
End: 2022-05-16
Payer: MEDICARE

## 2022-05-16 NOTE — TELEPHONE ENCOUNTER
Pt escalated for no response, NT unable to make phone contact. As per program protocol all contact numbers called once and my chart message sent.    NT did speak with EC, Shola Gimenez, who states I'm just walking in the house now and hung up the phone.  OOC number previously given.    Reason for Disposition   Message left with person in household    Protocols used: NO CONTACT OR DUPLICATE CONTACT CALL-A-OH

## 2022-05-17 ENCOUNTER — DOCUMENT SCAN (OUTPATIENT)
Dept: HOME HEALTH SERVICES | Facility: HOSPITAL | Age: 87
End: 2022-05-17
Payer: MEDICARE

## 2022-05-21 PROBLEM — R41.0 CONFUSION: Status: RESOLVED | Noted: 2021-10-22 | Resolved: 2022-05-21

## 2022-05-21 PROBLEM — F03.918 DEMENTIA WITH BEHAVIORAL DISTURBANCE: Status: ACTIVE | Noted: 2022-05-21

## 2022-05-22 NOTE — ASSESSMENT & PLAN NOTE
Chronic and followed by Endocrinology.   Needs up to date lab.  Ordered for HH to draw on next visit.

## 2022-05-22 NOTE — ASSESSMENT & PLAN NOTE
Chronic and worsening over last several months.  Gets agitated, some sundowning and not sleeping well lately.  No longer able to be left alone.  Begin seroquel, continue buspar.  Continue to monitor.

## 2022-05-22 NOTE — ASSESSMENT & PLAN NOTE
Full code. ACP documents left on previous visit.  Daughter reminded to obtain already completed forms for submission

## 2022-05-23 ENCOUNTER — CARE AT HOME (OUTPATIENT)
Dept: HOME HEALTH SERVICES | Facility: CLINIC | Age: 87
End: 2022-05-23
Payer: MEDICARE

## 2022-05-23 DIAGNOSIS — F01.518 VASCULAR DEMENTIA WITH BEHAVIOR DISTURBANCE: ICD-10-CM

## 2022-05-23 DIAGNOSIS — Z51.5 PALLIATIVE CARE ENCOUNTER: ICD-10-CM

## 2022-05-23 DIAGNOSIS — R54 ADVANCED AGE: ICD-10-CM

## 2022-05-23 DIAGNOSIS — F41.9 ANXIETY: Primary | ICD-10-CM

## 2022-05-23 DIAGNOSIS — Z86.16 HISTORY OF COVID-19: ICD-10-CM

## 2022-05-23 PROCEDURE — 99350 PR HOME VISIT,ESTAB PATIENT,LEVEL IV: ICD-10-PCS | Mod: S$GLB,,, | Performed by: NURSE PRACTITIONER

## 2022-05-23 PROCEDURE — 99350 HOME/RES VST EST HIGH MDM 60: CPT | Mod: S$GLB,,, | Performed by: NURSE PRACTITIONER

## 2022-05-24 VITALS
OXYGEN SATURATION: 94 % | BODY MASS INDEX: 28.52 KG/M2 | RESPIRATION RATE: 16 BRPM | SYSTOLIC BLOOD PRESSURE: 138 MMHG | HEIGHT: 62 IN | WEIGHT: 155 LBS | DIASTOLIC BLOOD PRESSURE: 68 MMHG | TEMPERATURE: 98 F | HEART RATE: 64 BPM

## 2022-05-24 DIAGNOSIS — F41.9 ANXIETY: ICD-10-CM

## 2022-05-24 PROBLEM — M85.80 OSTEOPENIA WITH HIGH RISK OF FRACTURE: Status: RESOLVED | Noted: 2018-01-22 | Resolved: 2022-05-24

## 2022-05-24 PROBLEM — Z86.16 HISTORY OF COVID-19: Status: ACTIVE | Noted: 2022-05-24

## 2022-05-24 PROBLEM — Z87.440 HISTORY OF UTI: Status: RESOLVED | Noted: 2020-10-11 | Resolved: 2022-05-24

## 2022-05-24 PROBLEM — R42 DIZZINESS: Status: RESOLVED | Noted: 2020-10-11 | Resolved: 2022-05-24

## 2022-05-24 PROBLEM — F01.518 VASCULAR DEMENTIA WITH BEHAVIOR DISTURBANCE: Status: ACTIVE | Noted: 2022-05-21

## 2022-05-24 PROBLEM — Z13.31 DEPRESSION SCREEN: Status: RESOLVED | Noted: 2021-06-09 | Resolved: 2022-05-24

## 2022-05-24 PROBLEM — Z60.4 SOCIAL ISOLATION: Status: RESOLVED | Noted: 2020-11-22 | Resolved: 2022-05-24

## 2022-05-24 PROBLEM — E66.9 OBESITY (BMI 30-39.9): Status: RESOLVED | Noted: 2018-01-22 | Resolved: 2022-05-24

## 2022-05-24 PROBLEM — R63.4 WEIGHT LOSS, UNINTENTIONAL: Status: RESOLVED | Noted: 2021-09-26 | Resolved: 2022-05-24

## 2022-05-24 PROBLEM — R41.3 MEMORY LOSS: Status: RESOLVED | Noted: 2021-12-12 | Resolved: 2022-05-24

## 2022-05-24 PROBLEM — R63.0 DECREASED APPETITE: Status: RESOLVED | Noted: 2021-06-27 | Resolved: 2022-05-24

## 2022-05-24 PROBLEM — R29.818 DIFFICULTY BALANCING: Status: RESOLVED | Noted: 2020-09-06 | Resolved: 2022-05-24

## 2022-05-24 PROBLEM — R32 URINARY INCONTINENCE: Status: RESOLVED | Noted: 2021-02-28 | Resolved: 2022-05-24

## 2022-05-24 RX ORDER — BUSPIRONE HYDROCHLORIDE 10 MG/1
10 TABLET ORAL 3 TIMES DAILY
Qty: 90 TABLET | Refills: 2 | Status: SHIPPED | OUTPATIENT
Start: 2022-05-24 | End: 2022-09-01 | Stop reason: SDUPTHER
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
## 2022-05-24 NOTE — ASSESSMENT & PLAN NOTE
Chronic with recent ER visit due to severe agitation.  Improved today due to resumption of seroquel.  No longer able to be left alone.  Continue seroquel and buspar.  Continue to monitor.

## 2022-05-24 NOTE — PATIENT INSTRUCTIONS
Patient Instructions:  - [facility] Nurse Practitioner to schedule home follow-up visit with patient as needed.  - Continue all medications, treatments and therapies as ordered.   - Follow all instructions, recommendations as discussed.  - Maintain Safety Precautions at all times.  - Attend all medical appointments as scheduled.  - For worsening symptoms: call Primary Care Physician or Nurse Practitioner.  - For emergencies, call 911 or immediately report to the nearest emergency room.  - Limit Risks of environmental exposure to coronavirus/COVID-19 as discussed including: social distancing, hand hygiene, and limiting departures from the home for necessities only.

## 2022-05-26 ENCOUNTER — TELEPHONE (OUTPATIENT)
Dept: FAMILY MEDICINE | Facility: CLINIC | Age: 87
End: 2022-05-26
Payer: MEDICARE

## 2022-05-26 NOTE — TELEPHONE ENCOUNTER
----- Message from Sol Nance sent at 5/26/2022 10:41 AM CDT -----  .Type:  Patient Call Back    Who Called: PT DAUGHTER SHEILA       Does the patient know what this is regarding?: SHE NEEDS A LETTER FROM THE OFFICE ABOUT PT DEMENTIA DIAGNOSIS    Would the patient rather a call back YES     Best Call Back Number:[phone]    Additional Information: Thank You

## 2022-05-26 NOTE — TELEPHONE ENCOUNTER
Patient's daughter (Ankita) states she has initiated an  interdiction process with an  who is requesting to know if Dr. Olivia will be willing to sign an affidavit regarding patient's dementia and ability to take care of herself. Please advise. Thank you.

## 2022-05-26 NOTE — TELEPHONE ENCOUNTER
Yes, but depending on what it says I may have to do some testing with her for documentation purposes. May require OV if that is the case.     Message text          Call placed to patient's daughter (Ankita) for notification. Verbalized understanding. Mrs. Luciano states she will have her  draw up the affidavit, and she will submit documents for review/scheduled appointment if Dr. Olivia deem appropriate at that time. Will send follow up message to Dr. Olivia for notification.

## 2022-05-30 PROBLEM — R11.2 NAUSEA AND VOMITING: Status: ACTIVE | Noted: 2022-05-30

## 2022-05-30 PROBLEM — J02.9 SORE THROAT: Status: ACTIVE | Noted: 2022-05-30

## 2022-05-30 PROBLEM — R50.9 FEVER: Status: ACTIVE | Noted: 2022-05-30

## 2022-05-30 NOTE — PROGRESS NOTES
"[facility]  Medical Home Visit    Visit Date: 5/2/2022  Encounter Provider: Lesley Tamez NP  PCP:  William Olivia MD    Subjective:      Patient ID: Glenda Gimenez is a 89 y.o. female.    Consult Requested By:  Dr. Carroll  Reason for Consult:  Medical home visit    Glenda is being seen at home due to physical debility that presents a taxing effort to leave the home, to mitigate high risk of hospital readmission and/or due to the limited availability of reliable or safe options for transportation to the point of access to the provider. Prior to treatment on this visit the chart was reviewed and patient verbal consent was obtained.      Chief Complaint: Vomiting, sore throat sudden onset    Glenda is a 89 year old female with a PMHX of hypothyroidism, hypertension, primary hyperparathyroidism, COPD, chronic diastolic CHF, obesity and sepsis pneumonia in 2020.     Glenda is being seen today for son's report of patient being in [facility] today with daughter in law Mary and patient vomited "with no warning". Reports had been feeling fine up until this morning at [facility]. Has vomited once more since that time. Now reports a sore throat. Temp 100.0 on exam today. Patient appears weak and ill. She is in no distress however. Memory is impaired, can't recall short term memories. Reports feeling better right now, no nausea. No diarrhea reported. Discussed viral illness likely but should get tested for COVID. Son, Shola Gimenez, present today,  Verbalized understanding if patient vomits again or symptoms worsen she needs to be seen in the ER.     Denies chest pain, shortness of breath, diarrhea. Reports taking all medications as prescribed. Risks of environmental exposure to coronavirus discussed including: social distancing, hand hygiene, and limiting departures from the home for necessities only. Reports understanding and willingness to comply.              Review of Systems "   Constitutional: Positive for fatigue. Weight loss and appetite stable.    HENT: Positive sore throat.   Eyes: Vision loss related to macular degeneration.  Respiratory: Denies cough, shortness of breath. Negative for chest tightness and wheezing.   Cardiovascular: Negative.    Gastrointestinal: Positive for nausea and vomiting today.   Endocrine: Negative.    Genitourinary: Urinary incontinence.   Musculoskeletal: Positive for hand arthritis-opening jars and bottles is getting more difficult.   Skin: Negative.    Allergic/Immunologic: Negative.    Neurological: Positive for weakness. Negative for tremors and syncope. Positive balance difficulties and dizziness.  Hematological: Bruises/bleeds easily.   Psychiatric/Behavioral: Positive for sleep issues. Negative for depression. Anxiety improved. Forgetfulness,confusion with some sundowning behaviors.         Assessments:  · Environmental: living with son and daughter-in-law in a single story raised home that is clean, adequate lighting and temperature, no foul odors. Goes to daughter's home in [place] intermittently  · Functional Status: independent mostly, meals provided so she doesn't have to cook, needs reminding to bathe, dress  · Safety: fall risk  · Nutritional: adequate access to food  · Home Health/DME/Supplies: [facility]. DME: rollator         History:  Past Medical History:   Diagnosis Date    Anticoagulant long-term use     Arthritis     Dislocation of right shoulder joint     Hypertension     Hypothyroidism     Shoulder disorder     right    Thyroid disease      Family History   Problem Relation Age of Onset    Breast cancer Mother     Cancer Brother         lung cancer    Cancer Maternal Aunt         unknown cancer    Cancer Maternal Uncle         pancreatic cancer    Heart disease Neg Hx      Past Surgical History:   Procedure Laterality Date    HYSTERECTOMY      PARTIAL HYSTERECTOMY      ARTUR, ovaries in place, uterine prolapse      Review of patient's allergies indicates:   Allergen Reactions    Amlodipine Swelling     Other reaction(s): Angioedema    Atenolol      Other reaction(s): itch    Betamethasone sodium phosphate      Other reaction(s): Flushing (skin)    Cortisone      Other reaction(s): blurry vision  Other reaction(s): Rash    Lisinopril      Other reaction(s): COUGH    Naproxen      Other reaction(s): body shut down    Prednisone     Triamterene-hydrochlorothiazid      Other reaction(s): itch       Medications:    Current Outpatient Medications:     acetaminophen (TYLENOL) 325 MG tablet, Take 325 mg by mouth every 6 (six) hours as needed for Pain., Disp: , Rfl:     aspirin (ECOTRIN) 81 MG EC tablet, Take 81 mg by mouth once daily., Disp: , Rfl:     carvedilol (COREG) 3.125 MG tablet, Take 1.5625 mg by mouth 2 (two) times daily. , Disp: , Rfl: 1    co-enzyme Q-10 30 mg capsule, Take 30 mg by mouth 3 (three) times daily., Disp: , Rfl:     famotidine (PEPCID) 20 MG tablet, Take 1 tablet (20 mg total) by mouth 2 (two) times daily., Disp: 180 tablet, Rfl: 3    furosemide (LASIX) 20 MG tablet, , Disp: , Rfl:     ibandronate (BONIVA) 150 mg tablet, Take 1 tablet (150 mg total) by mouth every 30 days., Disp: 12 tablet, Rfl: 3    isosorbide mononitrate (IMDUR) 30 MG 24 hr tablet, Take 1 tablet (30 mg total) by mouth once daily., Disp: 90 tablet, Rfl: 3    krill oil 500 mg Cap, Take by mouth., Disp: , Rfl:     Lactobac no.41/Bifidobact no.7 (PROBIOTIC-10 ORAL), 1 capsule DAILY (route: oral), Disp: , Rfl:     levothyroxine (SYNTHROID) 88 MCG tablet, Take 1 tablet (88 mcg total) by mouth once daily., Disp: 90 tablet, Rfl: 3    olmesartan (BENICAR) 5 MG Tab, Take 1 tablet (5 mg total) by mouth once daily., Disp: 90 tablet, Rfl: 3    peg 400-propylene glycol, PF, (SYSTANE HYDRATION PF) 0.4-0.3 % Drop, 1 drops DAILY (route: ophthalmic (eye)), Disp: , Rfl:     sertraline (ZOLOFT) 25 MG tablet, Take 1 tablet (25 mg total)  by mouth once daily., Disp: 90 tablet, Rfl: 3    sodium chloride 1 gram tablet, Take 1 g by mouth 3 (three) times daily. 2 tabs TID, Disp: , Rfl:     vit A/vit C/vit E/zinc/copper (ICAPS AREDS ORAL), 1 capsule 2 TIMES DAILY (route: oral), Disp: , Rfl:     vitamin D (VITAMIN D3) 1000 units Tab, Take 1,000 Units by mouth once daily. Patient is taking 2000 units per day, Disp: , Rfl:     busPIRone (BUSPAR) 10 MG tablet, Take 1 tablet (10 mg total) by mouth 3 (three) times daily., Disp: 90 tablet, Rfl: 2    ondansetron (ZOFRAN-ODT) 4 MG TbDL, Take 1 tablet (4 mg total) by mouth every 8 (eight) hours as needed (nausea)., Disp: 21 tablet, Rfl: 0    pulse oximeter (PULSE OXIMETER) device, by Apply Externally route 2 (two) times a day. Use twice daily at 8 AM and 3 PM and record the value in MyChart as directed., Disp: 1 each, Rfl: 0    pulse oximeter (PULSE OXIMETER) device, by Apply Externally route 2 (two) times a day. Use twice daily at 8 AM and 3 PM and record the value in MyChart as directed., Disp: 1 each, Rfl: 0    QUEtiapine (SEROQUEL) 50 MG tablet, Take 1 tablet (50 mg total) by mouth every evening., Disp: 30 tablet, Rfl: 11        Objective:     Physical Exam:  Vitals:    05/02/22 1300   BP: 130/70   Pulse: 88   Resp: 16   Temp: 100 °F (37.8 °C)   TempSrc: Temporal   SpO2: 95%   PainSc: 0-No pain     There is no height or weight on file to calculate BMI.    Physical Exam   Constitutional: She appears well-developed and well-nourished. Appears ill today.  HENT:   Head: Normocephalic and atraumatic.   Right Ear: External ear normal.   Left Ear: External ear normal.   Nose: Nose normal.   Mouth/Throat: Oropharynx is with mild erythema, no exudate.   Eyes: Pupils are equal, round, and reactive to light. Conjunctivae and EOM are normal.   Neck: Normal range of motion. Neck supple.   Cardiovascular: Normal rate, regular rhythm, normal heart sounds and intact distal pulses.   Pulmonary/Chest: Effort normal.  No stridor. No respiratory distress. She has no wheezes. She has no rales. She exhibits no tenderness.    Abdominal: Soft. Bowel sounds are normal.   Musculoskeletal: Normal range of motion. She exhibits no tenderness or deformity. Left ankle with 1+ non-pitting edema-pt reports chronic. Wearing TLSO brace.  Neurological: She is alert and oriented to person only. Forgetful. Positive balance difficulties.  Skin: Skin is warm and dry. Capillary refill takes 2 to 3 seconds.   Psychiatric: She has a normal mood and affect. Her behavior is anxious. Some short term memory deficits noted. Repeats self.       Labs:  CBC:   WBC   Date Value Ref Range Status   05/09/2022 6.95 3.90 - 12.70 K/uL Final       Hgb   Date Value Ref Range Status   07/20/2013 12.5 12.0 - 16.0 g/dl Final     Hemoglobin   Date Value Ref Range Status   05/09/2022 13.2 12.0 - 16.0 g/dL Final         Hematocrit   Date Value Ref Range Status   05/09/2022 39.1 37.0 - 48.5 % Final       MCV   Date Value Ref Range Status   05/09/2022 94 82 - 98 fL Final         Platelets   Date Value Ref Range Status   05/09/2022 277 150 - 450 K/uL Final       LFT:   Lab Results   Component Value Date    AST 35 05/09/2022    GGT 73 (H) 08/28/2020    ALKPHOS 77 05/09/2022    BILITOT 0.4 05/09/2022       Albumin:   Albumin   Date Value Ref Range Status   05/09/2022 3.6 3.5 - 5.2 g/dL Final     Protein:   Total Protein   Date Value Ref Range Status   05/09/2022 8.3 6.0 - 8.4 g/dL Final       Radiology:  I have reviewed all pertinent imaging results/findings within the past 24 hours.      Assessment:     1. Nausea and vomiting, intractability of vomiting not specified, unspecified vomiting type    2. Sore throat    3. Fever, unspecified fever cause        Plan:   Glenda was seen today for nausea and sore throat.    Diagnoses and all orders for this visit:    Nausea and vomiting, intractability of vomiting not specified, unspecified vomiting type  -     ondansetron (ZOFRAN-ODT)  4 MG TbDL; Take 1 tablet (4 mg total) by mouth every 8 (eight) hours as needed (nausea).    Sore throat  -     Discontinue: amoxicillin-clavulanate 500-125mg (AUGMENTIN) 500-125 mg Tab; Take 1 tablet (500 mg total) by mouth 2 (two) times daily. for 7 days    Fever, unspecified fever cause  -     Discontinue: amoxicillin-clavulanate 500-125mg (AUGMENTIN) 500-125 mg Tab; Take 1 tablet (500 mg total) by mouth 2 (two) times daily. for 7 days      Problem List Items Addressed This Visit        ENT    Pharyngitis     Sudden onset with low grade fever.  Consider COVID testing.  Report to ER is symptoms worsen.              GI    Non-intractable vomiting with nausea - Primary     Sudden onset with low grade fever.  Consider COVID testing.  Report to ER is symptoms worsen.           Relevant Medications    ondansetron (ZOFRAN-ODT) 4 MG TbDL       Other    Fever          Were controlled substances prescribed?  No      Follow Up Appointments:   Future Appointments   Date Time Provider Department Center   6/6/2022  8:00 AM GRANT Alvarez [facility] NEURO [place]   6/9/2022 10:30 AM [facility] XR2 [facility] XRAY [facility]   6/9/2022 11:00 AM Raymundo Shea DO 2C NEUROSU [facility]   6/25/2022  8:00 AM Damir Fraire MD [facility] ENDOCRN [place]   8/3/2022  3:20 PM William Olivia MD [facility] FAM MED [place]       Signature:  Lesley Tamez NP

## 2022-06-03 ENCOUNTER — TELEPHONE (OUTPATIENT)
Dept: NEUROLOGY | Facility: CLINIC | Age: 87
End: 2022-06-03
Payer: MEDICARE

## 2022-06-03 NOTE — TELEPHONE ENCOUNTER
Spoke with patient's son confirmed appointment/Location. Patient's son v/u and voiced that he will be attending the appointment

## 2022-06-06 ENCOUNTER — OFFICE VISIT (OUTPATIENT)
Dept: NEUROLOGY | Facility: CLINIC | Age: 87
End: 2022-06-06
Payer: MEDICARE

## 2022-06-06 VITALS
BODY MASS INDEX: 29.25 KG/M2 | HEART RATE: 76 BPM | DIASTOLIC BLOOD PRESSURE: 64 MMHG | HEIGHT: 62 IN | SYSTOLIC BLOOD PRESSURE: 133 MMHG | WEIGHT: 158.94 LBS

## 2022-06-06 DIAGNOSIS — Z86.73 HISTORY OF STROKE: ICD-10-CM

## 2022-06-06 DIAGNOSIS — F01.518 VASCULAR DEMENTIA WITH BEHAVIOR DISTURBANCE: Primary | ICD-10-CM

## 2022-06-06 PROCEDURE — 1157F PR ADVANCE CARE PLAN OR EQUIV PRESENT IN MEDICAL RECORD: ICD-10-PCS | Mod: CPTII,S$GLB,, | Performed by: NURSE PRACTITIONER

## 2022-06-06 PROCEDURE — 1101F PT FALLS ASSESS-DOCD LE1/YR: CPT | Mod: CPTII,S$GLB,, | Performed by: NURSE PRACTITIONER

## 2022-06-06 PROCEDURE — 99483 PR ASSMT/CARE PLANNING, PT W/COGN IMPAIRMENT: ICD-10-PCS | Mod: S$GLB,,, | Performed by: NURSE PRACTITIONER

## 2022-06-06 PROCEDURE — 1157F ADVNC CARE PLAN IN RCRD: CPT | Mod: CPTII,S$GLB,, | Performed by: NURSE PRACTITIONER

## 2022-06-06 PROCEDURE — 3288F PR FALLS RISK ASSESSMENT DOCUMENTED: ICD-10-PCS | Mod: CPTII,S$GLB,, | Performed by: NURSE PRACTITIONER

## 2022-06-06 PROCEDURE — 3288F FALL RISK ASSESSMENT DOCD: CPT | Mod: CPTII,S$GLB,, | Performed by: NURSE PRACTITIONER

## 2022-06-06 PROCEDURE — 99999 PR PBB SHADOW E&M-EST. PATIENT-LVL IV: CPT | Mod: PBBFAC,,, | Performed by: NURSE PRACTITIONER

## 2022-06-06 PROCEDURE — 99999 PR PBB SHADOW E&M-EST. PATIENT-LVL IV: ICD-10-PCS | Mod: PBBFAC,,, | Performed by: NURSE PRACTITIONER

## 2022-06-06 PROCEDURE — 1159F PR MEDICATION LIST DOCUMENTED IN MEDICAL RECORD: ICD-10-PCS | Mod: CPTII,S$GLB,, | Performed by: NURSE PRACTITIONER

## 2022-06-06 PROCEDURE — 99499 NO LOS: ICD-10-PCS | Mod: S$GLB,,, | Performed by: NURSE PRACTITIONER

## 2022-06-06 PROCEDURE — 1126F AMNT PAIN NOTED NONE PRSNT: CPT | Mod: CPTII,S$GLB,, | Performed by: NURSE PRACTITIONER

## 2022-06-06 PROCEDURE — 1126F PR PAIN SEVERITY QUANTIFIED, NO PAIN PRESENT: ICD-10-PCS | Mod: CPTII,S$GLB,, | Performed by: NURSE PRACTITIONER

## 2022-06-06 PROCEDURE — 1159F MED LIST DOCD IN RCRD: CPT | Mod: CPTII,S$GLB,, | Performed by: NURSE PRACTITIONER

## 2022-06-06 PROCEDURE — 99483 ASSMT & CARE PLN PT COG IMP: CPT | Mod: S$GLB,,, | Performed by: NURSE PRACTITIONER

## 2022-06-06 PROCEDURE — 1101F PR PT FALLS ASSESS DOC 0-1 FALLS W/OUT INJ PAST YR: ICD-10-PCS | Mod: CPTII,S$GLB,, | Performed by: NURSE PRACTITIONER

## 2022-06-06 PROCEDURE — 1160F RVW MEDS BY RX/DR IN RCRD: CPT | Mod: CPTII,S$GLB,, | Performed by: NURSE PRACTITIONER

## 2022-06-06 PROCEDURE — 1160F PR REVIEW ALL MEDS BY PRESCRIBER/CLIN PHARMACIST DOCUMENTED: ICD-10-PCS | Mod: CPTII,S$GLB,, | Performed by: NURSE PRACTITIONER

## 2022-06-06 PROCEDURE — 99499 UNLISTED E&M SERVICE: CPT | Mod: S$GLB,,, | Performed by: NURSE PRACTITIONER

## 2022-06-06 NOTE — PROGRESS NOTES
Caregiver name: Db  Relationship to the patient: Son   Does the patient have a living will? yes  Does the patient have a designated healthcare POA? yes  Does the patient have a designated general POA? yes    Have educational materials/resources been provided? yes      Activities of Daily Living    Bathing: Independent  Dressing: Independent  Grooming: Independent  Mouth Care: Independent  Toileting: Independent  Transferring Bed/Chair: Independent  Walking: Independent  Climbing: Independent  Eating: Independent      Instrumental Activities of Daily Living    Shopping: Independent  Cooking: Dependent  Managing Medications: Dependent  Using the phone and looking up numbers: Independent  Doing Housework: Independent  Doing Laundry: Independent  Driving or using public transportation: Cannot Do  Managing finances: Independent    Functional Assessment Staging  1   No difficulty, either subjectively or objectively.         Depression Patient Health Questionnaire 6/6/2022 7/21/2021 6/9/2021 5/11/2021 8/6/2020   Over the last two weeks how often have you been bothered by little interest or pleasure in doing things 1 1 2 0 1   Over the last two weeks how often have you been bothered by feeling down, depressed or hopeless 0 2 3 0 1   PHQ-2 Total Score 1 3 5 0 2   Over the last two weeks how often have you been bothered by trouble falling or staying asleep, or sleeping too much - 0 2 0 0   Over the last two weeks how often have you been bothered by feeling tired or having little energy - 2 1 0 0   Over the last two weeks how often have you been bothered by a poor appetite or overeating - 1 1 0 0   Over the last two weeks how often have you been bothered by feeling bad about yourself - or that you are a failure or have let yourself or your family down - 0 0 0 0   Over the last two weeks how often have you been bothered by trouble concentrating on things, such as reading the newspaper or watching television - 0 1 0 0    Over the last two weeks how often have you been bothered by moving or speaking so slowly that other people could have noticed. Or the opposite - being so fidgety or restless that you have been moving around a lot more than usual. - 0 0 0 0   Over the last two weeks how often have you been bothered by thoughts that you would be better off dead, or of hurting yourself - 0 0 0 0   If you checked off any problems, how difficult have these problems made it for you to do your work, take care of things at home or get along with other people? - Somewhat difficult Somewhat difficult - Not difficult at all   Total Score - 6 10 0 2   Interpretation - Mild Moderate Minimal or None -

## 2022-06-06 NOTE — ASSESSMENT & PLAN NOTE
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

## 2022-06-06 NOTE — PROGRESS NOTES
NEUROLOGY  Outpatient Follow Up    [facility]  [address] [facility], [place] [address]  [phone] (office) / [phone] (fax)    Patient Name:  Glenda Gimenez  :  1927  MR #:  [id number]  Acct #:  [id number]    Date of Neurology Visit: 2022  Name of Provider: BALA Ibarra    Other Physicians:  William Olivia MD (Primary Care Physician); No ref. provider found (Referring)      Chief Complaint: Memory Loss      History of Present Illness (HPI):  Glenda Gimenez is a right handed  89 y.o. female with a h/o hypothyroidism, hypertension, primary hyperparathyroidism, COPD, chronic diastolic CHF, obesity and sepsis pneumonia in   Patient is here today for memory loss. She is accompanied by her daughter and son in law who help supply some information. Daughter states patient does live alone and typically does well. She had some periods of confusion in September and she was reported to have hyponatremia. She was started on sodium pill as per endocrine and her sodium levels have normalized. Daughter states since then she has never gotten back to baseline. Her acute confusion has resolved but patient suffers short term memory decline. Dr. Carroll suggested that she follow up with neurology as patient was noted to have a previous stroke on her CT scan from . She also has had a series of UTIs.      Patient's highest level of education completed was highschool. 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 The onset of memory issues seemed to be more noticeable in 2020. At that time she suffered a fall at home and ended up in the hospital. Family suspected she had COVID at that time as she suffered loss of taste and smell but she wasn't officially diagnosed. She struggles more with short term memory. Family denies personality or behavorial changes. Patient states she feels sad at times knowing she cannot do the things she use to. She is  currently on Zoloft for her mood. Family reports that her mood is mildly improved. She previously was having panic attacks and now on Buspar. She denies hallucinations. She states she sleeps well once she falls asleep. She does nap often during the day and this may cause her to have difficulty sleeping during the day. Family states she struggles more so now with executive function whereas before she was more organized. She is no longer managing her finances. Her daughter has been managing this since 2020 but in the past 6 months patient has relinquished all duties. Daughter is managing her medications and family does help with overseeing duties. There are no issues with hygiene and she is able to perdrom ADLS without assistance. She is also able to prepare her own food. She does often repeat herself and struggles mildy with comprehending. She denies urinary incontinence. Her last fall was about 1 month when she fell out of her bed. She is no longer driving as of 1 month ago. There were no issues with driving but does have macular degeneration and it was suggested she stop driving by 2 of her physicians.   She stays home a lot of the day and watches TV but active around her house.           Interval Hx 6/6/2022:   Patient is here today for memory follow up. She is accompanied by her son who helps supply information. Son states she had a fall in February and is now living with her him in his home. She did sustain hairline fractures to her lower back. Overall her son believes her memory is stable. There are no personality or behavorial changes. She recently was diagnosed with COVID about 6 weeks ago. She was started on Seroquel by her PCP in April for increased anxiety which son states has helped. She denies depression and hallucinations. She reports good sleep. She denies daytime sleeping. Son is managing her finances and her medications. She is able to take them on her own. She denies issues with hygiene and able to  perform ADLs without assistance. She does repeat often and ask questions. She denies urinary incontinence. The last fall was in February. She is not driving. Her daughter in law has a beauty shop that she likes to visit with people and people watch. She also does puzzles with her daughter in law.           Past Medical, Surgical, Family & Social History:   Reviewed and updated.    Home Medications:     Current Outpatient Medications:     acetaminophen (TYLENOL) 325 MG tablet, Take 325 mg by mouth every 6 (six) hours as needed for Pain., Disp: , Rfl:     aspirin (ECOTRIN) 81 MG EC tablet, Take 81 mg by mouth once daily., Disp: , Rfl:     busPIRone (BUSPAR) 10 MG tablet, Take 1 tablet (10 mg total) by mouth 3 (three) times daily., Disp: 90 tablet, Rfl: 2    famotidine (PEPCID) 20 MG tablet, Take 1 tablet (20 mg total) by mouth 2 (two) times daily., Disp: 180 tablet, Rfl: 3    furosemide (LASIX) 20 MG tablet, , Disp: , Rfl:     isosorbide mononitrate (IMDUR) 30 MG 24 hr tablet, Take 1 tablet (30 mg total) by mouth once daily., Disp: 90 tablet, Rfl: 3    krill oil 500 mg Cap, Take by mouth., Disp: , Rfl:     levothyroxine (SYNTHROID) 88 MCG tablet, Take 1 tablet (88 mcg total) by mouth once daily., Disp: 90 tablet, Rfl: 3    olmesartan (BENICAR) 5 MG Tab, Take 1 tablet (5 mg total) by mouth once daily., Disp: 90 tablet, Rfl: 3    peg 400-propylene glycol, PF, (SYSTANE HYDRATION PF) 0.4-0.3 % Drop, 1 drops DAILY (route: ophthalmic (eye)), Disp: , Rfl:     pulse oximeter (PULSE OXIMETER) device, by Apply Externally route 2 (two) times a day. Use twice daily at 8 AM and 3 PM and record the value in MyChart as directed., Disp: 1 each, Rfl: 0    pulse oximeter (PULSE OXIMETER) device, by Apply Externally route 2 (two) times a day. Use twice daily at 8 AM and 3 PM and record the value in MyChart as directed., Disp: 1 each, Rfl: 0    QUEtiapine (SEROQUEL) 50 MG tablet, Take 1 tablet (50 mg total) by mouth every  "evening., Disp: 30 tablet, Rfl: 11    sertraline (ZOLOFT) 25 MG tablet, Take 1 tablet (25 mg total) by mouth once daily., Disp: 90 tablet, Rfl: 3    sodium chloride 1 gram tablet, Take 1 g by mouth 3 (three) times daily. 2 tabs TID, Disp: , Rfl:     vit A/vit C/vit E/zinc/copper (ICAPS AREDS ORAL), 1 capsule 2 TIMES DAILY (route: oral), Disp: , Rfl:     vitamin D (VITAMIN D3) 1000 units Tab, Take 1,000 Units by mouth once daily. Patient is taking 2000 units per day, Disp: , Rfl:     carvedilol (COREG) 3.125 MG tablet, Take 1.5625 mg by mouth 2 (two) times daily. , Disp: , Rfl: 1    co-enzyme Q-10 30 mg capsule, Take 30 mg by mouth 3 (three) times daily., Disp: , Rfl:     ibandronate (BONIVA) 150 mg tablet, Take 1 tablet (150 mg total) by mouth every 30 days., Disp: 12 tablet, Rfl: 3    Lactobac no.41/Bifidobact no.7 (PROBIOTIC-10 ORAL), 1 capsule DAILY (route: oral), Disp: , Rfl:     ondansetron (ZOFRAN-ODT) 4 MG TbDL, Take 1 tablet (4 mg total) by mouth every 8 (eight) hours as needed (nausea)., Disp: 21 tablet, Rfl: 0    Physical Examination:  LMP  (LMP Unknown)   MMSE 6/6/2022   What is the (year), (season), (date), (day), (month)? 4   Where are we (state), (country), (town or city), (hospital), (floor)? 5   Name 3 common objects (eg. "apple", "table", "mike"). Take 1 second to say each. Then ask the patient to repeat all 3. Give 1 point for each correct answer. Then repeat them until he/she learns all 3. Count trials and record. 3   Serial 7's backwards. Stop after 5 answers. (100,93,86,79,72) or alternatively  spell "WORLD" backwards. (D..L..R..O..W). The score is the number of letters in correct order. 3   Ask for the 3 common objects named earlier in the exam. Give 1 point for each correct answer. 1   Name a "pencil" and "watch." 2   Repeat the following: "No ifs, ands, or buts." 1   Follow a 3-stage command: "Take a paper in your right hand, fold it in half, & put it on the floor." 3   Read and " obey the following: (see paper exam) 1   Write a sentence. 1   Copy the following design: (see paper exam) 0   Total MMSE Score 24   Some recent data might be hidden       GENERAL:  General appearance: Well, non-toxic appearing.  No apparent distress.  Neck: supple.  .     MENTAL STATUS:  Alertness, attention span & concentration: normal.  Language: normal.  Orientation to self, place & time:  normal.  Memory, recent & remote: decreased  Fund of knowledge: normal.  MMSE: 24/30  26/30 (12/21)       SPEECH:  Clear and fluent.  Follows complex commands.     CRANIAL NERVES:  Cranial Nerves II-XII were examined.  II - Visual fields: normal.  III, IV, VI: PERRL, EOMI, No ptosis, No nystagmus.  V - Facial sensation: normal.  VII - Face symmetry & mobility: symmetric   VIII - Hearing: normal.  IX, X - Palate: not assessed d/t COVID precautions  XI - Shoulder shrug: normal.  XII - Tongue protrusion: not assessed d/t COVID precautions     GROSS MOTOR:  Gait & station: cautious, good step height, marginal arm swing  Tone: normal.  Abnormal movements: none.  Finger-nose: normal.  Rapid alternating movements: normal.  Pronator drift: normal        MUSCLE STRENGTH:      Fascics Atrophy RIGHT      LEFT Atrophy Fascics       5 Biceps 5           5 Triceps 5           5 Forearm.Pr. 5                           4+ Iliopsoas flex    4+           5 Hip Abduct 5           5 Hip Adduct 5           4+ Quads 4+           5 Hams 5           5 Dorsiflex 5           5 Plantar Flex 5          REFLEXES:     RIGHT Reflex    LEFT   2+ Biceps 2+   2+ Brachiorad. 2+           1+ Patellar 1+      SENSORY:  Light touch: Normal throughout.                    Diagnostic Data Reviewed:   Component      Latest Ref Rng & Units 5/9/2022 4/7/2022   WBC      3.90 - 12.70 K/uL 6.95    RBC      4.00 - 5.40 M/uL 4.18    Hemoglobin      12.0 - 16.0 g/dL 13.2    Hematocrit      37.0 - 48.5 % 39.1    MCV      82 - 98 fL 94    MCH      27.0 - 31.0 pg 31.6 (H)     MCHC      32.0 - 36.0 g/dL 33.8    RDW      11.5 - 14.5 % 13.2    Platelets      150 - 450 K/uL 277    MPV      9.2 - 12.9 fL 10.6    Immature Granulocytes      0.0 - 0.5 % 0.3    Gran # (ANC)      1.8 - 7.7 K/uL 3.4    Immature Grans (Abs)      0.00 - 0.04 K/uL 0.02    Lymph #      1.0 - 4.8 K/uL 2.6    Mono #      0.3 - 1.0 K/uL 0.7    Eos #      0.0 - 0.5 K/uL 0.2    Baso #      0.00 - 0.20 K/uL 0.05    nRBC      0 /100 WBC 0    Gran %      38.0 - 73.0 % 48.7    Lymph %      18.0 - 48.0 % 37.3    Mono %      4.0 - 15.0 % 9.5    Eosinophil %      0.0 - 8.0 % 3.5    Basophil %      0.0 - 1.9 % 0.7    Differential Method       Automated    Sodium      136 - 145 mmol/L 141    Potassium      3.5 - 5.1 mmol/L 4.0    Chloride      95 - 110 mmol/L 104    CO2      23 - 29 mmol/L 26    Glucose      70 - 110 mg/dL 87    BUN      10 - 30 mg/dL 14    Creatinine      0.5 - 1.4 mg/dL 0.8    Calcium      8.7 - 10.5 mg/dL 10.5    PROTEIN TOTAL      6.0 - 8.4 g/dL 8.3    Albumin      3.5 - 5.2 g/dL 3.6    BILIRUBIN TOTAL      0.1 - 1.0 mg/dL 0.4    Alkaline Phosphatase      55 - 135 U/L 77    AST      10 - 40 U/L 35    ALT      10 - 44 U/L 29    Anion Gap      8 - 16 mmol/L 11    eGFR if African American      >60 mL/min/1.73 m:2 >60    eGFR if non African American      >60 mL/min/1.73 m:2 >60    Vit D, 25-Hydroxy      30 - 96 ng/mL  56   TSH      0.400 - 4.000 uIU/mL  0.795   Vitamin B-12      210 - 950 pg/mL  474       CT head 2017:  There is mild to moderate dilatation of ventricles, sulci, fissures. There is  density in white matter consistent with microvascular ischemic changes, and there are old basal ganglia lacunar infarcts.  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  There is no acute intracranial hemorrhage. There is no intracranial mass effect. There is no acute major vascular territory infarct.  There is large right frontal scalp hematoma.  The calvarium appears intact, visualized  paranasal sinuses clear and mastoids pneumatized  IMPRESSION:         Large frontal scalp hematoma.  No acute intracranial findings.  Involutional changes and findings consistent with microvascular ischemic change, old basal ganglia lacunar infarcts, encephalomalacia and old infarct left occipital       CTH 5/9/2022:  FINDINGS:  The ventricles and sulci appear prominent suggestive involutional changes but similar since the prior.  No obvious mass lesions are noted.  A hypodensity extends to the left occipital lobe similar since the prior suggestive of infarction and gliosis.  Volume loss is noted.  No edema is noted to suggest acuity.  No abnormal density is noted to suggest acute blood products.Extensive white matter hypodensity is noted bilaterally periventricular suggestive of microvascular ischemic change.  A hypodensity is noted along the posterior limb of the internal capsule or lentiform nuclei on the right suggestive microvascular ischemic change similar since the prior.     Impression:  No acute intracranial process is convincingly noted.  Extensive involutional changes suggestive microvascular ischemic change similar since the prior                   Assessment and Plan:    Problem List Items Addressed This Visit        Neuro    History of stroke    Current Assessment & Plan     As noted on CTH   -  old basal ganglia lacunar infarcts, encephalomalacia and old infarct left occipital     Continue ASA for stroke prevention  Control stroke risk factors    - BP management as per PCP   - A1c at goal   - no recent lipid panel    Stroke education provided           Vascular dementia with behavior disturbance - Primary    Current Assessment & Plan     Ongoing reports of short term memory loss and repetition as per family report with onset ~ March 2020. Son reports overall stablity with patients memory. There are no reports of behavorial changes, hallucinations, active depression, disturbed sleep patterns, or  urinary icnont.   Pt initiated on Seroquel in April by PCP for increased anxiety. This has offered aid as per son report.   MMSE today 24/30; last 26/30; overall stable   - suspect mild neurocognitive disorder   - vascular etiology  Recent CTH reports Extensive involutional changes and microvascular changes.     Discussed role vs expectation of cognitive enhancing drugs at length. Consider initiation of Aricept. Family would like to to discuss and review first.   Safety concerns addressed. Agree with no driving  Discussed ways to promote brain stimulation.   Pt is no longer driving.                                       Important to note, also  has a past medical history of Anticoagulant long-term use, Arthritis, Dislocation of right shoulder joint, Hypertension, Hypothyroidism, Shoulder disorder, and Thyroid disease.          The patient will return to clinic in 6 months.    All questions were answered and patient is comfortable with the plan.         Thank you very much for the opportunity to assist in this patient's care.    If you have any questions or concerns, please do not hesitate to contact me at any time.      Sincerely,     BALA Ibarra  [facility] [place]

## 2022-06-06 NOTE — ASSESSMENT & PLAN NOTE
Ongoing reports of short term memory loss and repetition as per family report with onset ~ March 2020. Son reports overall stablity with patients memory. There are no reports of behavorial changes, hallucinations, active depression, disturbed sleep patterns, or urinary icnont.   Pt initiated on Seroquel in April by PCP for increased anxiety. This has offered aid as per son report.   MMSE today 24/30; last 26/30; overall stable   - suspect mild neurocognitive disorder   - vascular etiology  Recent CTH reports Extensive involutional changes and microvascular changes.     Discussed role vs expectation of cognitive enhancing drugs at length. Consider initiation of Aricept. Family would like to to discuss and review first.   Safety concerns addressed. Agree with no driving  Discussed ways to promote brain stimulation.   Pt is no longer driving.

## 2022-06-06 NOTE — PATIENT INSTRUCTIONS
To prevent further memory loss, some of the best preventative measures are following a healthy diet, getting regular exercise, and ensuring good sleep habits.  Approximately 30 to 45 minutes of brisk physical activity (brisk walking, swimming, stationary bicycle, etc.) 5 days a week has been shown to improve function in vascular dementias, and can lower the risk of stroke and slow progression of memory loss.        To prevent further memory loss, some of the best preventative measures are following a healthy diet, getting regular exercise, and ensuring good sleep habits.  Approximately 30 to 45 minutes of brisk physical activity (brisk walking, swimming, stationary bicycle, etc.) 5 days a week has been shown to improve function in vascular dementias, and can lower the risk of stroke and slow progression of memory loss.    A Mediterranean style diet, or the DASH diet with lots of fresh fruits and vegetables, whole grains, more fish and chicken, less red meat, less dairy, less processed foods is also beneficial. For more information see:     https://www.rush.[facility]/news/diet-may-help-prevent-alzheimers     Minimize or eliminate the use of alcohol, and discuss any prescription medications you might be taking with your doctor to avoid medications which can cause sedation or worsen cognitive function.    Establish a regular, consistent sleep pattern and practice good sleep hygiene.  Avoid screen time (computer, TV, smartphones or tablets) or heavy meals for at least an hour before bedtime, and avoid caffeine or stimulants after 2 PM. Exercise earlier in the day or mornings and keep your sleeping environment comfortable.     Socializing with friends and family and staying both mentally and physically active is also very important. Continue with hobbies or activities that are engaging and practice activities that are mentally stimulating (word puzzles, Sudoku, etc) and stay active in the community.    Some supplements which  may be of potential benefit include:  - tumeric (curcumin) supplement  - omega-3 fatty acids with sufficient amounts of EPA and DHA, 1000 to 2000 mg a day   - Vitamin D3 supplement 2000 units (IU) daily   - Green tea and green tea extracts may also help (caffeine free)   - supplements containing Phosphatidylserine (PS) and phosphatidylcholine (PC)     There may be some benefit to dietary supplements, however there is no definitive evidence to support the prevention of cognitive impairment or dementia.     Please look into the following websites, to help you find resources including day programs, caregivers and caregiver support, legal questions, support groups, etc.    [facility] on [facility] ([facility])  [facility] - [address]  [facility] - [address]    Group meetings facilitated by David Clarke MA, MARIAN [phone]    ADDRESS  Mailing Address:  [address] [place] [address] Address:  [address] [place] [address]   Web Address:  [email or web address]     Services offered at this location:  Chore, Congregate Meals, Home Delivered Meals, Homemaker, Information and Assistance, Legal, Material Aid, Medical Alert, Medication Management, NFCSP Information and Assistance, NFCSP In-Home Respite, NFCSP Material Aid, NGCSP Personal Care , NFCSP Public Education, NFCSP Sitter Service, NFCSP Support Groups, Nutrition Counseling, Nutrition Education, Outreach, Recreation, Transportation, Utility Assistance, Wellness, Area Agency on Aging, "[employer] on Aging      Website for the Alzheimers Association:  http://www.alz.org/   Excellent resources for finding community resources  Action plan navigator and online tools  Call [phone] for 24/7 helpline    [facility] Office of Aging and Adult Services:  Http://www.ldh.[place].gov/index.cfm/subhome/12/n/7    Peace With Dementia: http://carepartnermentoring.com/    Website for [facility] Agency on Ageing:  http://goea.[place].[facility]/index.cfm?md=jed&tmp=category&catID=[email or web address]&nid=24&ssid=0&startIndex=1       PATIENT/FAMILY RESOURCES:  1. Alzheimer's Association       http://www.alz.org  2. Alzheimer's Foundation of [place]     http://www.alzfdn.org  3. The Alzheimers Disease Education and Referral Center  https://www.zeinab.nih.gov/alzheimers  4. Lewy Body Dementia Association      http://www.lbda.org  5. National [place] of Mental Health     http://www.nimh.nih.gov  6. National [place] on Mental Illness     http://www.[place].org  7. Mental Health [place]       http://www.mentalhealthamerica.net  8. Mental Health.gov        http://www.mentalhealth.gov  9. National [place] for Behavioral Health     http://www.thenationalcouncil.org  10. Substance Abuse and Mental Health Services Administration  http://www.samhsa.gov    11. Licensed local counselors, social workers, psychiatrists, psychologists - one starting point is the Psychology Today website, therapist finder

## 2022-06-09 ENCOUNTER — HOSPITAL ENCOUNTER (OUTPATIENT)
Dept: RADIOLOGY | Facility: HOSPITAL | Age: 87
Discharge: HOME | End: 2022-06-09
Attending: NEUROLOGICAL SURGERY
Payer: MEDICARE

## 2022-06-09 ENCOUNTER — OFFICE VISIT (OUTPATIENT)
Dept: NEUROSURGERY | Facility: CLINIC | Age: 87
End: 2022-06-09
Payer: MEDICARE

## 2022-06-09 VITALS — HEART RATE: 79 BPM | SYSTOLIC BLOOD PRESSURE: 121 MMHG | DIASTOLIC BLOOD PRESSURE: 73 MMHG

## 2022-06-09 DIAGNOSIS — S32.000D COMPRESSION FRACTURE OF LUMBAR VERTEBRA WITH ROUTINE HEALING, UNSPECIFIED LUMBAR VERTEBRAL LEVEL, SUBSEQUENT ENCOUNTER: Primary | ICD-10-CM

## 2022-06-09 DIAGNOSIS — S32.020A COMPRESSION FRACTURE OF L2 VERTEBRA, INITIAL ENCOUNTER: ICD-10-CM

## 2022-06-09 PROCEDURE — 72100 X-RAY EXAM L-S SPINE 2/3 VWS: CPT | Mod: 26,,, | Performed by: RADIOLOGY

## 2022-06-09 PROCEDURE — 1125F AMNT PAIN NOTED PAIN PRSNT: CPT | Mod: CPTII,S$GLB,, | Performed by: NEUROLOGICAL SURGERY

## 2022-06-09 PROCEDURE — 1101F PT FALLS ASSESS-DOCD LE1/YR: CPT | Mod: CPTII,S$GLB,, | Performed by: NEUROLOGICAL SURGERY

## 2022-06-09 PROCEDURE — 1157F ADVNC CARE PLAN IN RCRD: CPT | Mod: CPTII,S$GLB,, | Performed by: NEUROLOGICAL SURGERY

## 2022-06-09 PROCEDURE — 3288F FALL RISK ASSESSMENT DOCD: CPT | Mod: CPTII,S$GLB,, | Performed by: NEUROLOGICAL SURGERY

## 2022-06-09 PROCEDURE — 99213 OFFICE O/P EST LOW 20 MIN: CPT | Mod: S$GLB,,, | Performed by: NEUROLOGICAL SURGERY

## 2022-06-09 PROCEDURE — 1159F PR MEDICATION LIST DOCUMENTED IN MEDICAL RECORD: ICD-10-PCS | Mod: CPTII,S$GLB,, | Performed by: NEUROLOGICAL SURGERY

## 2022-06-09 PROCEDURE — 1159F MED LIST DOCD IN RCRD: CPT | Mod: CPTII,S$GLB,, | Performed by: NEUROLOGICAL SURGERY

## 2022-06-09 PROCEDURE — 72100 XR LUMBAR SPINE AP AND LATERAL: ICD-10-PCS | Mod: 26,,, | Performed by: RADIOLOGY

## 2022-06-09 PROCEDURE — 1157F PR ADVANCE CARE PLAN OR EQUIV PRESENT IN MEDICAL RECORD: ICD-10-PCS | Mod: CPTII,S$GLB,, | Performed by: NEUROLOGICAL SURGERY

## 2022-06-09 PROCEDURE — 1125F PR PAIN SEVERITY QUANTIFIED, PAIN PRESENT: ICD-10-PCS | Mod: CPTII,S$GLB,, | Performed by: NEUROLOGICAL SURGERY

## 2022-06-09 PROCEDURE — 72100 X-RAY EXAM L-S SPINE 2/3 VWS: CPT | Mod: TC,FY

## 2022-06-09 PROCEDURE — 99213 PR OFFICE/OUTPT VISIT, EST, LEVL III, 20-29 MIN: ICD-10-PCS | Mod: S$GLB,,, | Performed by: NEUROLOGICAL SURGERY

## 2022-06-09 PROCEDURE — 1101F PR PT FALLS ASSESS DOC 0-1 FALLS W/OUT INJ PAST YR: ICD-10-PCS | Mod: CPTII,S$GLB,, | Performed by: NEUROLOGICAL SURGERY

## 2022-06-09 PROCEDURE — 3288F PR FALLS RISK ASSESSMENT DOCUMENTED: ICD-10-PCS | Mod: CPTII,S$GLB,, | Performed by: NEUROLOGICAL SURGERY

## 2022-06-09 NOTE — PROGRESS NOTES
The patient regarding L2 and L4 osteoporotic compression fractures.  She reports that her back pain has completely resolved.  She denies radicular pain, weakness or numbness.  She discontinued wearing the TLSO brace.  She is now living with family.  No recent falls.  She is taking vitamin-D and calcium supplementation.    Repeat x-rays obtained today reviewed and are stable relative to most recent x-ray imaging.  See report for details.    Exam:    Awake, alert, oriented to person place and time.  Pleasant.  No distress.  Cranial nerves 2-12 grossly intact.    Strength is graded 5/5 in all muscle groups.    Sensation is grossly intact throughout.    Gait and stance stable with rolling walker  Lumbar spine nontender to palpation or percussion    Analysis:  There is no role for further neurosurgical intervention.  We will be happy to re-evaluate her as needed

## 2022-06-15 ENCOUNTER — PATIENT MESSAGE (OUTPATIENT)
Dept: NEUROLOGY | Facility: CLINIC | Age: 87
End: 2022-06-15
Payer: MEDICARE

## 2022-06-15 DIAGNOSIS — F01.518 VASCULAR DEMENTIA WITH BEHAVIOR DISTURBANCE: Primary | ICD-10-CM

## 2022-06-16 RX ORDER — DONEPEZIL HYDROCHLORIDE 5 MG/1
TABLET, FILM COATED ORAL
Qty: 30 TABLET | Refills: 11 | Status: SHIPPED | OUTPATIENT
Start: 2022-06-16 | End: 2023-07-03

## 2022-06-24 ENCOUNTER — DOCUMENT SCAN (OUTPATIENT)
Dept: HOME HEALTH SERVICES | Facility: HOSPITAL | Age: 87
End: 2022-06-24
Payer: MEDICARE

## 2022-06-25 ENCOUNTER — OFFICE VISIT (OUTPATIENT)
Dept: ENDOCRINOLOGY | Facility: CLINIC | Age: 87
End: 2022-06-25
Payer: MEDICARE

## 2022-06-25 DIAGNOSIS — K21.9 GASTROESOPHAGEAL REFLUX DISEASE WITHOUT ESOPHAGITIS: ICD-10-CM

## 2022-06-25 DIAGNOSIS — I10 ESSENTIAL HYPERTENSION: ICD-10-CM

## 2022-06-25 DIAGNOSIS — R73.09 DYSGLYCEMIA: ICD-10-CM

## 2022-06-25 DIAGNOSIS — F41.9 ANXIETY: ICD-10-CM

## 2022-06-25 DIAGNOSIS — F33.1 MAJOR DEPRESSIVE DISORDER, RECURRENT EPISODE, MODERATE: ICD-10-CM

## 2022-06-25 DIAGNOSIS — R54 ADVANCED AGE: ICD-10-CM

## 2022-06-25 DIAGNOSIS — E55.9 VITAMIN D DEFICIENCY DISEASE: ICD-10-CM

## 2022-06-25 DIAGNOSIS — M85.80 OSTEOPENIA WITH HIGH RISK OF FRACTURE: ICD-10-CM

## 2022-06-25 DIAGNOSIS — E04.9 GOITER: ICD-10-CM

## 2022-06-25 DIAGNOSIS — Z86.73 HISTORY OF STROKE: ICD-10-CM

## 2022-06-25 DIAGNOSIS — E04.1 NODULAR THYROID DISEASE: ICD-10-CM

## 2022-06-25 DIAGNOSIS — I50.32 CHRONIC DIASTOLIC CHF (CONGESTIVE HEART FAILURE): ICD-10-CM

## 2022-06-25 DIAGNOSIS — I25.118 CORONARY ARTERY DISEASE OF NATIVE ARTERY OF NATIVE HEART WITH STABLE ANGINA PECTORIS: ICD-10-CM

## 2022-06-25 DIAGNOSIS — F01.518 VASCULAR DEMENTIA WITH BEHAVIOR DISTURBANCE: ICD-10-CM

## 2022-06-25 DIAGNOSIS — E21.0 PRIMARY HYPERPARATHYROIDISM: Primary | ICD-10-CM

## 2022-06-25 DIAGNOSIS — E66.9 OBESITY (BMI 30-39.9): ICD-10-CM

## 2022-06-25 DIAGNOSIS — E03.9 ACQUIRED HYPOTHYROIDISM: ICD-10-CM

## 2022-06-25 DIAGNOSIS — E88.810 DYSMETABOLIC SYNDROME: ICD-10-CM

## 2022-06-25 DIAGNOSIS — E83.52 HYPERCALCEMIA: ICD-10-CM

## 2022-06-25 PROCEDURE — 1159F MED LIST DOCD IN RCRD: CPT | Mod: CPTII,95,, | Performed by: INTERNAL MEDICINE

## 2022-06-25 PROCEDURE — 1157F ADVNC CARE PLAN IN RCRD: CPT | Mod: CPTII,95,, | Performed by: INTERNAL MEDICINE

## 2022-06-25 PROCEDURE — 1160F RVW MEDS BY RX/DR IN RCRD: CPT | Mod: CPTII,95,, | Performed by: INTERNAL MEDICINE

## 2022-06-25 PROCEDURE — 1160F PR REVIEW ALL MEDS BY PRESCRIBER/CLIN PHARMACIST DOCUMENTED: ICD-10-PCS | Mod: CPTII,95,, | Performed by: INTERNAL MEDICINE

## 2022-06-25 PROCEDURE — 1159F PR MEDICATION LIST DOCUMENTED IN MEDICAL RECORD: ICD-10-PCS | Mod: CPTII,95,, | Performed by: INTERNAL MEDICINE

## 2022-06-25 PROCEDURE — 99214 PR OFFICE/OUTPT VISIT, EST, LEVL IV, 30-39 MIN: ICD-10-PCS | Mod: 95,,, | Performed by: INTERNAL MEDICINE

## 2022-06-25 PROCEDURE — 99214 OFFICE O/P EST MOD 30 MIN: CPT | Mod: 95,,, | Performed by: INTERNAL MEDICINE

## 2022-06-25 PROCEDURE — 1157F PR ADVANCE CARE PLAN OR EQUIV PRESENT IN MEDICAL RECORD: ICD-10-PCS | Mod: CPTII,95,, | Performed by: INTERNAL MEDICINE

## 2022-06-25 RX ORDER — IBANDRONATE SODIUM 150 MG/1
150 TABLET, FILM COATED ORAL
Qty: 3 TABLET | Refills: 3 | Status: SHIPPED | OUTPATIENT
Start: 2022-06-25 | End: 2023-03-28

## 2022-06-25 NOTE — PROGRESS NOTES
Subjective:      Patient ID: Glenda Gimenez is a 89 y.o. female.    Chief Complaint:      Hypothyroidism     Patient is a 89 yr old postmenopausal lady with hypothyroidism on thyroid hormone repletion as well as presumed primary hyperparathyroidism and chronic hyponatremia and is seen in [facility] today via video televisit. As a result the examination aspects of the visit are limited.    History of Present Illness    The patient, Ms Gimenez is a 89 yr old postmenopausal  lady with hyperparathyroidism and hypercalcemia, hypothyrodism and chronic hyponatremia seen in [facility] today.   This is video televisit.  The patient location is: home  The chief complaint leading to consultation is: hypothyroidism, hyperparathyroidism and chronic hyponatremia    Visit type:Synchronous audio and video televisit    Face to Face time with patient: ~30  minutes of total time spent on the encounter, which includes face to face time and non-face to face time preparing to see the patient (eg, review of tests), Obtaining and/or reviewing separately obtained history, Documenting clinical information in the electronic or other health record, Independently interpreting results (not separately reported) and communicating results to the patient/family/caregiver, or Care coordination (not separately reported).         Each patient to whom he or she provides medical services by telemedicine is:  (1) informed of the relationship between the physician and patient and the respective role of any other health care provider with respect to management of the patient; and (2) notified that he or she may decline to receive medical services by telemedicine and may withdraw from such care at any time.    Notes:   Patient has however been seen by Dr Perez for these same problems in the past and had been undergoing clinical surviellance only. She in addition has hypothyroidism on thyroid hormone replacement; 88mcg QD, essential hypertension, thyroid nodular  disease, hypovitaminosis D, Obesity and CKD stage 3.  Patient has had a prior kidney stone in the past ~ 5 yrs ago and this was spontaneously passed.  She denies any significant dyspepsia and has no local neck symptoms.  She does have long standing lumbago with recent right sided sciatica and small joint arthralgias.  Patients most recent thyroid USS was from 09/18 and showed stable nodular disease with no evidence of interval nodular growth. Her next thyroid sonogram would be for ~ 09/20 since she has had over 5 years of serial neck sonography that has shown stability of the noted thyroid nodules.     Her DEXA from 12/15 showed progressive osteopenia with a high frax score based on which she was commenced on fosamax. Her most recent  ffup DEXA from 06/21 showed high risk osteopenia for which she is monthly Boniva. Her next ffup DEXA should be for ~ 06/23.     Patients last fall was in November 2017 but no associated fracture. Patient has not had any episodes of passing kidney stones and has no hematuria.  Patient hasnt had any had any falls since her last visit.       Patient was admitted from march 2020 on account of pneumonia.      She indicates that her ambulation is more limited. She indicates that this is mainly due to myalgias in both limbs.    Patient has not had any recent falls.  She has had no head injury. She has no had any seizures.   It appears she had run out of 1EQ some time ago and so has missed it for a few months.    Review of Systems   Constitutional: Positive for appetite change. Negative for diaphoresis and fatigue. Chills: recent anorexia.   HENT: Negative for facial swelling, trouble swallowing and voice change.    Eyes: Negative for visual disturbance.   Respiratory: Negative for cough and shortness of breath.    Cardiovascular: Negative for chest pain and palpitations.   Gastrointestinal: Negative for constipation, nausea and vomiting.   Endocrine: Negative for polyuria.   Genitourinary:  Negative for flank pain and hematuria.   Musculoskeletal: Positive for arthralgias (Mild chronic lower back pain and knee arthralgias.), back pain (chronic) and gait problem (Unsteady gaite with frequent falls recently.). Negative for myalgias.   Skin: Negative for color change, pallor and rash.   Neurological: Positive for numbness (radicular pain down the left thigh from lower back). Negative for dizziness and headaches.   Hematological: Does not bruise/bleed easily.   Psychiatric/Behavioral: Positive for confusion. The patient is not nervous/anxious.        Objective: LMP  (LMP Unknown) Nil; video televisit.       Physical Exam    Lab Review:      Latest Reference Range & Units 05/02/22 17:35 05/09/22 15:20 05/09/22 18:22 05/09/22 18:45   WBC 3.90 - 12.70 K/uL 6.52  6.95    RBC 4.00 - 5.40 M/uL 4.05  4.18    Hemoglobin 12.0 - 16.0 g/dL 12.6  13.2    Hematocrit 37.0 - 48.5 % 37.5  39.1    MCV 82 - 98 fL 93  94    MCH 27.0 - 31.0 pg 31.1 (H)  31.6 (H)    MCHC 32.0 - 36.0 g/dL 33.6  33.8    RDW 11.5 - 14.5 % 13.2  13.2    Platelets 150 - 450 K/uL 242  277    MPV 9.2 - 12.9 fL 10.5  10.6    Gran % 38.0 - 73.0 % 72.7  48.7    Lymph % 18.0 - 48.0 % 15.2 (L)  37.3    Mono % 4.0 - 15.0 % 10.7  9.5    Eosinophil % 0.0 - 8.0 % 0.2  3.5    Basophil % 0.0 - 1.9 % 0.9  0.7    Immature Granulocytes 0.0 - 0.5 % 0.3  0.3    Gran # (ANC) 1.8 - 7.7 K/uL 4.7  3.4    Lymph # 1.0 - 4.8 K/uL 1.0  2.6    Mono # 0.3 - 1.0 K/uL 0.7  0.7    Eos # 0.0 - 0.5 K/uL 0.0  0.2    Baso # 0.00 - 0.20 K/uL 0.06  0.05    Immature Grans (Abs) 0.00 - 0.04 K/uL 0.02  0.02    nRBC 0 /100 WBC 0  0    Differential Method  Automated  Automated    Sodium 136 - 145 mmol/L 136  141    Potassium 3.5 - 5.1 mmol/L 3.9  4.0    Chloride 95 - 110 mmol/L 101  104    CO2 23 - 29 mmol/L 24  26    Anion Gap 8 - 16 mmol/L 11  11    BUN 10 - 30 mg/dL 14  14    Creatinine 0.5 - 1.4 mg/dL 0.8  0.8    eGFR if non African American >60 mL/min/1.73 m^2 >60  >60    eGFR if  African American >60 mL/min/1.73 m^2 >60  >60    Glucose 70 - 110 mg/dL 115 (H)  87    Calcium 8.7 - 10.5 mg/dL 10.2  10.5    Alkaline Phosphatase 55 - 135 U/L 82  77    PROTEIN TOTAL 6.0 - 8.4 g/dL 8.1  8.3    Albumin 3.5 - 5.2 g/dL 3.6  3.6    BILIRUBIN TOTAL 0.1 - 1.0 mg/dL 0.6  0.4    AST 10 - 40 U/L 33  35    ALT 10 - 44 U/L 24  29    Lipase 4 - 60 U/L 4      Specimen UA   Urine, Clean Catch  Urine, Clean Catch   Color, UA Yellow, Straw, Kary   Yellow  Yellow   Appearance, UA Clear   Clear  Clear   Specific Gravity, UA 1.005 - 1.030   1.010  1.020   pH, UA 5.0 - 8.0   7.0  8.0   Protein, UA Negative   Negative  Negative   Glucose, UA Negative   Negative  Negative   Ketones, UA Negative   Negative  Negative   Occult Blood UA Negative   Negative  Negative   NITRITE UA Negative   Negative  Negative   UROBILINOGEN UA <2.0 EU/dL  Negative  Negative   Bilirubin (UA) Negative   Negative  Negative   Leukocytes, UA Negative   Negative  Negative   (H): Data is abnormally high  (L): Data is abnormally low      Assessment:     1. Primary hyperparathyroidism  Chromogranin A    Magnesium    Phosphorus    Calcium, Ionized    Calcium    Calcitriol    PTH, Intact   2. Nodular thyroid disease  Thyroglobulin    Chromogranin A   3. Acquired hypothyroidism  T4, Free    T3    Thyroglobulin    TSH    Lipid Panel   4. Goiter     5. Dysmetabolic syndrome  Uric Acid   6. Obesity (BMI 30-39.9)     7. Vitamin D deficiency disease     8. Gastroesophageal reflux disease without esophagitis     9. Osteopenia with high risk of fracture  Calcium, Ionized    Calcium   10. Advanced age     11. Hypercalcemia     12. Essential hypertension  Lipid Panel   13. Coronary artery disease of native artery of native heart with stable angina pectoris  Lipid Panel   14. Chronic diastolic CHF (congestive heart failure)     15. Major depressive disorder, recurrent episode, moderate     16. Anxiety     17. History of stroke     18. Vascular dementia with  behavior disturbance     19. Dysglycemia  Hemoglobin A1C        Regarding hyperparathyroidism; Mild primary hyperparathyroidism; to continue serial conservative watchful survielance. To obtain basic monitoring labs in this regard as detailed above.  Regarding hypovitaminosis D; Will recheck 25 OH vitamin d today and in the interim continue OTC vitamin supplementation as before.  Regarding hypothyroidism; appears clinically euthyroid; will recheck TFTS today and to continue levothyroxine 88mcg QD.  Regarding thyroid nodular disease; to obtain ffup thyroid USs ~ 06/23.  Regarding hypertension; presently well controlled. To continue present antihypertensive regimen and to continue serial tracking of BP trends. Encouraged patient to continuing ensuring copious free water intake; ~ 70 fl oz daily.  Regarding high risk osteopenia; reiterated fall risk reduction strategies including obtaining a life alert alarm.  To continue  Boniva, 150mg monthly along with PO vitamin D supplemantation. To obtain ffup DEXA ~ 06/23. She should continue the Boniva for next 5 yrs till ~ 02/26 before commencing biphosphonate holiday.  Regarding essential hypertension; BP fairly well controlled. To continue antihypertensives as before.  Regarding dysmetabolic syndrome; to obtain screening HBA1c and will continue to track serial trends  Regarding Grade 2 obesity; weight essentially stable. No active interventions regarding weight loss at this time but to continue prior efforts at portion size control to enable weight maintenance as before.   Regarding sciatica; encouraged to continue back strengthening exercises which she has been given by PT group and to also try and sleep in her bed rather than couch as she presently does.  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 To commence salt tablet supplements as prescribed in addition to increased  dietary salt intake and limitation of free water intake to ~ 50 fl oz daily. To also ensure monthly serum sodium checks for the next ~ 6months.  Regarding recent anorexia with involuntary weight loss; to commence daily MVI. It is expected that this should improve as patients serum sodium improves back towards normal.    Plan:     ffup in 6mths

## 2022-06-29 ENCOUNTER — DOCUMENT SCAN (OUTPATIENT)
Dept: HOME HEALTH SERVICES | Facility: HOSPITAL | Age: 87
End: 2022-06-29
Payer: MEDICARE

## 2022-07-05 ENCOUNTER — LAB VISIT (OUTPATIENT)
Dept: LAB | Facility: HOSPITAL | Age: 87
End: 2022-07-05
Attending: INTERNAL MEDICINE
Payer: MEDICARE

## 2022-07-05 DIAGNOSIS — E21.0 PRIMARY HYPERPARATHYROIDISM: ICD-10-CM

## 2022-07-05 DIAGNOSIS — M85.80 OSTEOPENIA WITH HIGH RISK OF FRACTURE: ICD-10-CM

## 2022-07-05 DIAGNOSIS — I25.118 CORONARY ARTERY DISEASE OF NATIVE ARTERY OF NATIVE HEART WITH STABLE ANGINA PECTORIS: ICD-10-CM

## 2022-07-05 DIAGNOSIS — E03.9 ACQUIRED HYPOTHYROIDISM: ICD-10-CM

## 2022-07-05 DIAGNOSIS — E88.810 DYSMETABOLIC SYNDROME: ICD-10-CM

## 2022-07-05 DIAGNOSIS — R73.09 DYSGLYCEMIA: ICD-10-CM

## 2022-07-05 DIAGNOSIS — I10 ESSENTIAL HYPERTENSION: ICD-10-CM

## 2022-07-05 DIAGNOSIS — E04.1 NODULAR THYROID DISEASE: ICD-10-CM

## 2022-07-05 LAB
CA-I BLDV-SCNC: 1.43 MMOL/L (ref 1.06–1.42)
CALCIUM SERPL-MCNC: 10.6 MG/DL (ref 8.7–10.5)
CHOLEST SERPL-MCNC: 185 MG/DL (ref 120–199)
CHOLEST/HDLC SERPL: 3.9 {RATIO} (ref 2–5)
ESTIMATED AVG GLUCOSE: 97 MG/DL (ref 68–131)
HBA1C MFR BLD: 5 % (ref 4–5.6)
HDLC SERPL-MCNC: 48 MG/DL (ref 40–75)
HDLC SERPL: 25.9 % (ref 20–50)
LDLC SERPL CALC-MCNC: 108.4 MG/DL (ref 63–159)
MAGNESIUM SERPL-MCNC: 1.8 MG/DL (ref 1.6–2.6)
NONHDLC SERPL-MCNC: 137 MG/DL
PHOSPHATE SERPL-MCNC: 2.8 MG/DL (ref 2.7–4.5)
PTH-INTACT SERPL-MCNC: 105 PG/ML (ref 9–77)
T3 SERPL-MCNC: 70 NG/DL (ref 60–180)
T4 FREE SERPL-MCNC: 1.04 NG/DL (ref 0.71–1.51)
TRIGL SERPL-MCNC: 143 MG/DL (ref 30–150)
TSH SERPL DL<=0.005 MIU/L-ACNC: 3.42 UIU/ML (ref 0.4–4)
URATE SERPL-MCNC: 5.4 MG/DL (ref 2.4–5.7)

## 2022-07-05 PROCEDURE — 84439 ASSAY OF FREE THYROXINE: CPT | Performed by: INTERNAL MEDICINE

## 2022-07-05 PROCEDURE — 84100 ASSAY OF PHOSPHORUS: CPT | Performed by: INTERNAL MEDICINE

## 2022-07-05 PROCEDURE — 86800 THYROGLOBULIN ANTIBODY: CPT | Performed by: INTERNAL MEDICINE

## 2022-07-05 PROCEDURE — 84480 ASSAY TRIIODOTHYRONINE (T3): CPT | Performed by: INTERNAL MEDICINE

## 2022-07-05 PROCEDURE — 84550 ASSAY OF BLOOD/URIC ACID: CPT | Performed by: INTERNAL MEDICINE

## 2022-07-05 PROCEDURE — 82330 ASSAY OF CALCIUM: CPT | Performed by: INTERNAL MEDICINE

## 2022-07-05 PROCEDURE — 82652 VIT D 1 25-DIHYDROXY: CPT | Performed by: INTERNAL MEDICINE

## 2022-07-05 PROCEDURE — 86316 IMMUNOASSAY TUMOR OTHER: CPT | Performed by: INTERNAL MEDICINE

## 2022-07-05 PROCEDURE — 84443 ASSAY THYROID STIM HORMONE: CPT | Performed by: INTERNAL MEDICINE

## 2022-07-05 PROCEDURE — 83735 ASSAY OF MAGNESIUM: CPT | Performed by: INTERNAL MEDICINE

## 2022-07-05 PROCEDURE — 36415 COLL VENOUS BLD VENIPUNCTURE: CPT | Mod: PO | Performed by: INTERNAL MEDICINE

## 2022-07-05 PROCEDURE — 80061 LIPID PANEL: CPT | Performed by: INTERNAL MEDICINE

## 2022-07-05 PROCEDURE — 83036 HEMOGLOBIN GLYCOSYLATED A1C: CPT | Performed by: INTERNAL MEDICINE

## 2022-07-05 PROCEDURE — 83970 ASSAY OF PARATHORMONE: CPT | Performed by: INTERNAL MEDICINE

## 2022-07-05 PROCEDURE — 82310 ASSAY OF CALCIUM: CPT | Performed by: INTERNAL MEDICINE

## 2022-07-07 LAB
CGA SERPL-MCNC: 82 NG/ML
THRYOGLOBULIN INTERPRETATION: ABNORMAL
THYROGLOB AB SERPL-ACNC: 14 IU/ML
THYROGLOB SERPL-MCNC: 0.6 NG/ML

## 2022-07-08 LAB — 1,25(OH)2D3 SERPL-MCNC: 77 PG/ML (ref 20–79)

## 2022-07-28 ENCOUNTER — CARE AT HOME (OUTPATIENT)
Dept: HOME HEALTH SERVICES | Facility: CLINIC | Age: 87
End: 2022-07-28
Payer: MEDICARE

## 2022-07-28 DIAGNOSIS — I10 ESSENTIAL HYPERTENSION: ICD-10-CM

## 2022-07-28 DIAGNOSIS — Z51.5 PALLIATIVE CARE ENCOUNTER: ICD-10-CM

## 2022-07-28 DIAGNOSIS — F01.518 VASCULAR DEMENTIA WITH BEHAVIOR DISTURBANCE: Primary | ICD-10-CM

## 2022-07-28 DIAGNOSIS — E87.1 CHRONIC HYPONATREMIA: ICD-10-CM

## 2022-07-28 DIAGNOSIS — I25.118 CORONARY ARTERY DISEASE OF NATIVE ARTERY OF NATIVE HEART WITH STABLE ANGINA PECTORIS: ICD-10-CM

## 2022-07-28 DIAGNOSIS — F41.9 ANXIETY: ICD-10-CM

## 2022-07-28 DIAGNOSIS — R54 ADVANCED AGE: ICD-10-CM

## 2022-07-28 PROCEDURE — 99350 PR HOME VISIT,ESTAB PATIENT,LEVEL IV: ICD-10-PCS | Mod: S$GLB,,, | Performed by: NURSE PRACTITIONER

## 2022-07-28 PROCEDURE — 99350 HOME/RES VST EST HIGH MDM 60: CPT | Mod: S$GLB,,, | Performed by: NURSE PRACTITIONER

## 2022-08-01 VITALS
RESPIRATION RATE: 16 BRPM | DIASTOLIC BLOOD PRESSURE: 68 MMHG | OXYGEN SATURATION: 97 % | HEIGHT: 62 IN | WEIGHT: 157 LBS | BODY MASS INDEX: 28.89 KG/M2 | HEART RATE: 75 BPM | SYSTOLIC BLOOD PRESSURE: 130 MMHG | TEMPERATURE: 98 F

## 2022-08-02 NOTE — PROGRESS NOTES
"[facility]  Palliative Care Home Visit    Visit Date: 7/28/2022  Encounter Provider: Lesley Tamez NP  PCP:  William Olivia MD    Subjective:      Patient ID: Glenda Gimenez is a 89 y.o. female.    Consult Requested By:  Dr. Carroll  Reason for Consult:  Palliative Care    Glenda is being seen at home due to physical debility that presents a taxing effort to leave the home, to mitigate high risk of hospital readmission and/or due to the limited availability of reliable or safe options for transportation to the point of access to the provider. Prior to treatment on this visit the chart was reviewed and patient verbal consent was obtained.      Chief Complaint: Follow-up, dementia    Glenda is a 89 year old female with a PMHX of hypothyroidism, hypertension, primary hyperparathyroidism, COPD, chronic diastolic CHF, obesity and sepsis pneumonia in 2020.     Glenda is being seen today for a Palliative Care follow up visit. She is found again at her son, Shoal Gimenez's home today. Patient lives with son and daughter in law primarily now, unable to live alone due to dementia. There is some family strife with patient's son and daughter (Ankita). It is my understanding that Ankita is not allowed to see patient any longer after being her primary caregiver for several years while patient was estranged from son for numerous years.     Upon arrival today, patient is found sitting on her rollator. She is alert and oriented x 3 but forgetful. She is calm and cooperative and self reports she is "doing ok". Denies any pain or issues today. Shola reports she is doing well for the most part. Memory is stable on aricept. No wandering behaviors or agitation. Anxiety has been minimal lately. She is mostly homebound but will go for a ride in the car very rarely to "get out of the house". Appetite is reported to be "decent". Patient is assisted to scale today and weight is stable at 157 lbs.    No recent falls. Uses " rollator for ambulatory support. Does have some balance issues and dizziness at times but this is chronic. Requires some help with bathing.     Son is asking about a program that can pay him and his wife as caregivers for patient. Patient has [employer] benefits through  spouse and son encouraged to follow up.    VSS. Denies fever, chest pain, nausea, vomiting, diarrhea. Reports taking all medications as prescribed. No other needs identified at this time. Risks of environmental exposure to coronavirus discussed including: social distancing, hand hygiene, and limiting departures from the home for necessities only. Reports understanding and willingness to comply.           Goals of Care:  Glenda would like to be a Full code and have comfort care. At this time, family prefers for patient to stay with son Shola and daughter in law Mary in [place].     Review of Systems   Constitutional: Positive for fatigue. Weight loss and appetite stable. Negative for fever.   HENT: Negative.    Eyes: Vision loss related to macular degeneration.  Respiratory: Denies cough, shortness of breath. Negative for chest tightness and wheezing.   Cardiovascular: Negative.    Gastrointestinal: Positive for heartburn at times.  Endocrine: Negative.    Genitourinary: Urinary incontinence at times.   Musculoskeletal: Positive for hand arthritis.  Skin: Negative.    Allergic/Immunologic: Negative.    Neurological: Negative for tremors and syncope. Positive balance difficulties and dizziness.  Hematological: Bruises/bleeds easily.   Psychiatric/Behavioral: Negative. Negative for depression. Anxiety improved. Forgetfulness,confusion with some sundowning behaviors. Agitation at times.         Assessments:  · Environmental: staying with son in single story raised home that is clean, adequate lighting and temperature, no foul odors  · Functional Status: requires some help with bathing at times.  · Safety:  dementia, advanced age, fall  "risk  · Nutritional: adequate access to food, reports appetite "decent"  · Home Health/DME/Supplies: No Home Health. DME: rollator         History:  Past Medical History:   Diagnosis Date    Anticoagulant long-term use     Arthritis     Dislocation of right shoulder joint     Hypertension     Hypothyroidism     Shoulder disorder     right    Thyroid disease      Family History   Problem Relation Age of Onset    Breast cancer Mother     Cancer Brother         lung cancer    Cancer Maternal Aunt         unknown cancer    Cancer Maternal Uncle         pancreatic cancer    Heart disease Neg Hx      Past Surgical History:   Procedure Laterality Date    HYSTERECTOMY      PARTIAL HYSTERECTOMY      ARTUR, ovaries in place, uterine prolapse     Review of patient's allergies indicates:   Allergen Reactions    Amlodipine Swelling     Other reaction(s): Angioedema    Atenolol      Other reaction(s): itch    Betamethasone sodium phosphate      Other reaction(s): Flushing (skin)    Cortisone      Other reaction(s): blurry vision  Other reaction(s): Rash    Lisinopril      Other reaction(s): COUGH    Naproxen      Other reaction(s): body shut down    Prednisone     Triamterene-hydrochlorothiazid      Other reaction(s): itch       Medications:    Current Outpatient Medications:     acetaminophen (TYLENOL) 325 MG tablet, Take 325 mg by mouth every 6 (six) hours as needed for Pain., Disp: , Rfl:     aspirin (ECOTRIN) 81 MG EC tablet, Take 81 mg by mouth once daily., Disp: , Rfl:     busPIRone (BUSPAR) 10 MG tablet, Take 1 tablet (10 mg total) by mouth 3 (three) times daily., Disp: 90 tablet, Rfl: 2    donepeziL (ARICEPT) 5 MG tablet, Take 1/2 tablet (2.5 mg) nightly x 4 weeks then increase to 1 tablet (5 mg) nightly thereafter., Disp: 30 tablet, Rfl: 11    furosemide (LASIX) 20 MG tablet, , Disp: , Rfl:     ibandronate (BONIVA) 150 mg tablet, Take 1 tablet (150 mg total) by mouth every 30 days., Disp: 3 " "tablet, Rfl: 3    isosorbide mononitrate (IMDUR) 30 MG 24 hr tablet, Take 1 tablet (30 mg total) by mouth once daily., Disp: 90 tablet, Rfl: 3    krill oil 500 mg Cap, Take by mouth., Disp: , Rfl:     levothyroxine (SYNTHROID) 88 MCG tablet, Take 1 tablet (88 mcg total) by mouth once daily., Disp: 90 tablet, Rfl: 3    olmesartan (BENICAR) 5 MG Tab, Take 1 tablet (5 mg total) by mouth once daily., Disp: 90 tablet, Rfl: 3    peg 400-propylene glycol, PF, (SYSTANE HYDRATION PF) 0.4-0.3 % Drop, 1 drops DAILY (route: ophthalmic (eye)), Disp: , Rfl:     QUEtiapine (SEROQUEL) 50 MG tablet, Take 1 tablet (50 mg total) by mouth every evening., Disp: 30 tablet, Rfl: 11    sertraline (ZOLOFT) 25 MG tablet, Take 1 tablet (25 mg total) by mouth once daily., Disp: 90 tablet, Rfl: 3    sodium chloride 1 gram tablet, Take 1 g by mouth 3 (three) times daily. 2 tabs TID, Disp: , Rfl:     vit A/vit C/vit E/zinc/copper (ICAPS AREDS ORAL), 1 capsule 2 TIMES DAILY (route: oral), Disp: , Rfl:     vitamin D (VITAMIN D3) 1000 units Tab, Take 1,000 Units by mouth once daily. Patient is taking 2000 units per day, Disp: , Rfl:     famotidine (PEPCID) 20 MG tablet, Take 1 tablet (20 mg total) by mouth 2 (two) times daily., Disp: 180 tablet, Rfl: 3    pulse oximeter (PULSE OXIMETER) device, by Apply Externally route 2 (two) times a day. Use twice daily at 8 AM and 3 PM and record the value in MyChart as directed., Disp: 1 each, Rfl: 0    pulse oximeter (PULSE OXIMETER) device, by Apply Externally route 2 (two) times a day. Use twice daily at 8 AM and 3 PM and record the value in MyChart as directed., Disp: 1 each, Rfl: 0    24h Oral Morphine Equivalents (OME):  N/A    Objective:     Physical Exam:  Vitals:    07/28/22 1230   BP: 130/68   Pulse: 75   Resp: 16   Temp: 98.3 °F (36.8 °C)   TempSrc: Temporal   SpO2: 97%   Weight: 71.2 kg (157 lb)   Height: 5' 2" (1.575 m)   PainSc: 0-No pain     Body mass index is 28.72 " kg/m².      Physical Exam   Constitutional: She is oriented to person, place. She appears well-developed and well-nourished.   HENT:   Head: Normocephalic and atraumatic.   Right Ear: External ear normal.   Left Ear: External ear normal.   Nose: Nose normal.   Mouth/Throat: Oropharynx is clear and moist.   Eyes: Pupils are equal, round, and reactive to light. Conjunctivae and EOM are normal.   Neck: Normal range of motion. Neck supple.   Cardiovascular: Normal rate, regular rhythm, normal heart sounds and intact distal pulses.   Pulmonary/Chest: Effort normal. No stridor. No respiratory distress. She has no wheezes. She has no rales. She exhibits no tenderness.    Abdominal: Soft. Bowel sounds are normal.   Musculoskeletal: Normal range of motion. She exhibits no tenderness or deformity. Left ankle with 1+ non-pitting edema-pt reports chronic.   Neurological: She is alert and oriented to person, place. Forgetful, Repeats self.    Skin: Skin is warm and dry. Capillary refill takes 2 to 3 seconds.   Psychiatric: She has a normal mood and affect. Her behavior is anxious at times.        Symptom Assessment (ESAS 0-10 scale)      ESAS 0 1 2 3 4 5 6 7 8 9 10   Pain x                       Dyspnea x                       Anxiety    x                     Nausea x                       Depression   x                       Anorexia  x                       Fatigue    x                     Insomnia  x                       Restlessness  x                       Agitation x                             Constipation    no  Bowel Management Plan (BMP): no  Diarrhea        no  Comments: reports BM once a day     Performance Status: PPS Score 50  Karnofsky Score:  50  EGOC:  2     Advance Care Planning   Ethical / Legal: Advance Care Planning                                                          Surrogate decision maker:  Name Db Gimenez,          Relationship:  Son                                                                                                      Code Status: DNR                                                                  [place]:  none                                                                 Other advance directive:  HPOA, living will on file in [facility]                                                               Labs:  CBC:   WBC   Date Value Ref Range Status   05/09/2022 6.95 3.90 - 12.70 K/uL Final       Hgb   Date Value Ref Range Status   07/20/2013 12.5 12.0 - 16.0 g/dl Final     Hemoglobin   Date Value Ref Range Status   05/09/2022 13.2 12.0 - 16.0 g/dL Final         Hematocrit   Date Value Ref Range Status   05/09/2022 39.1 37.0 - 48.5 % Final       MCV   Date Value Ref Range Status   05/09/2022 94 82 - 98 fL Final         Platelets   Date Value Ref Range Status   05/09/2022 277 150 - 450 K/uL Final       LFT:   Lab Results   Component Value Date    AST 35 05/09/2022    GGT 73 (H) 08/28/2020    ALKPHOS 77 05/09/2022    BILITOT 0.4 05/09/2022       Albumin:   Albumin   Date Value Ref Range Status   05/09/2022 3.6 3.5 - 5.2 g/dL Final     Protein:   Total Protein   Date Value Ref Range Status   05/09/2022 8.3 6.0 - 8.4 g/dL Final       Radiology:  I have reviewed all pertinent imaging results/findings within the past 24 hours.      Assessment:     1. Vascular dementia with behavior disturbance    2. Anxiety    3. Essential hypertension    4. Chronic hyponatremia    5. Palliative care encounter    6. Advanced age    7. Coronary artery disease of native artery of native heart with stable angina pectoris        Plan:   Glenda was seen today for follow-up and dementia.    Diagnoses and all orders for this visit:    Vascular dementia with behavior disturbance    Anxiety    Essential hypertension    Chronic hyponatremia    Palliative care encounter    Advanced age    Coronary artery disease of native artery of native heart with stable angina pectoris      Problem List Items Addressed This Visit        Neuro     Vascular dementia with behavior disturbance - Primary     Chronic and currently stable.  Continue aricept and seroquel.  Follow up with Neurology.               Psychiatric    Anxiety     Chronic, stable and improved.  Continue buspar.                Cardiac/Vascular    Essential hypertension     Chronic and stable.  Continue to monitor.            Coronary artery disease of native artery of native heart with stable angina pectoris     Chronic and stable.  Denies chest pain.  Followed by PCP.   Continue Imdur.              Renal/    Chronic hyponatremia     Chronic and stable.  Continue salt tabs.  Followed by Endocrinology.              Palliative Care    Palliative care encounter     DNR.  HPOA now Db Gimenez (Jerry).              Other    Advanced age     Frail elderly with dementia.                 Were controlled substances prescribed?  No    > 50% of 60 min visit spent in chart review, face to face discussion of goals of care,  symptom assessment, coordination of care and emotional support.    Follow Up Appointments:   Future Appointments   Date Time Provider Department Center   8/3/2022  3:20 PM MD BHARGAVI Jones [facility] [place]   1/10/2023  2:00 PM MD BHARGAVI Ayala [facility] [place]       Signature:  Lesley Tamez NP

## 2022-08-03 ENCOUNTER — OFFICE VISIT (OUTPATIENT)
Dept: FAMILY MEDICINE | Facility: CLINIC | Age: 87
End: 2022-08-03
Payer: MEDICARE

## 2022-08-03 VITALS
HEIGHT: 62 IN | RESPIRATION RATE: 18 BRPM | SYSTOLIC BLOOD PRESSURE: 116 MMHG | OXYGEN SATURATION: 95 % | HEART RATE: 75 BPM | BODY MASS INDEX: 28.97 KG/M2 | WEIGHT: 157.44 LBS | DIASTOLIC BLOOD PRESSURE: 68 MMHG

## 2022-08-03 DIAGNOSIS — E03.9 ACQUIRED HYPOTHYROIDISM: ICD-10-CM

## 2022-08-03 DIAGNOSIS — Z00.00 ROUTINE GENERAL MEDICAL EXAMINATION AT A HEALTH CARE FACILITY: ICD-10-CM

## 2022-08-03 DIAGNOSIS — R07.9 CHEST PAIN, UNSPECIFIED TYPE: ICD-10-CM

## 2022-08-03 DIAGNOSIS — Z23 NEED FOR PNEUMOCOCCAL VACCINATION: Primary | ICD-10-CM

## 2022-08-03 DIAGNOSIS — B35.1 ONYCHOMYCOSIS: ICD-10-CM

## 2022-08-03 DIAGNOSIS — R27.0 ATAXIA: ICD-10-CM

## 2022-08-03 DIAGNOSIS — I10 ESSENTIAL HYPERTENSION: ICD-10-CM

## 2022-08-03 PROCEDURE — 93010 ELECTROCARDIOGRAM REPORT: CPT | Mod: S$GLB,,, | Performed by: INTERNAL MEDICINE

## 2022-08-03 PROCEDURE — 1126F PR PAIN SEVERITY QUANTIFIED, NO PAIN PRESENT: ICD-10-PCS | Mod: CPTII,S$GLB,, | Performed by: STUDENT IN AN ORGANIZED HEALTH CARE EDUCATION/TRAINING PROGRAM

## 2022-08-03 PROCEDURE — 1101F PR PT FALLS ASSESS DOC 0-1 FALLS W/OUT INJ PAST YR: ICD-10-PCS | Mod: CPTII,S$GLB,, | Performed by: STUDENT IN AN ORGANIZED HEALTH CARE EDUCATION/TRAINING PROGRAM

## 2022-08-03 PROCEDURE — 99999 PR PBB SHADOW E&M-EST. PATIENT-LVL V: ICD-10-PCS | Mod: PBBFAC,,, | Performed by: STUDENT IN AN ORGANIZED HEALTH CARE EDUCATION/TRAINING PROGRAM

## 2022-08-03 PROCEDURE — 1159F MED LIST DOCD IN RCRD: CPT | Mod: CPTII,S$GLB,, | Performed by: STUDENT IN AN ORGANIZED HEALTH CARE EDUCATION/TRAINING PROGRAM

## 2022-08-03 PROCEDURE — G0009 ADMIN PNEUMOCOCCAL VACCINE: HCPCS | Mod: S$GLB,,, | Performed by: STUDENT IN AN ORGANIZED HEALTH CARE EDUCATION/TRAINING PROGRAM

## 2022-08-03 PROCEDURE — 1160F RVW MEDS BY RX/DR IN RCRD: CPT | Mod: CPTII,S$GLB,, | Performed by: STUDENT IN AN ORGANIZED HEALTH CARE EDUCATION/TRAINING PROGRAM

## 2022-08-03 PROCEDURE — 1159F PR MEDICATION LIST DOCUMENTED IN MEDICAL RECORD: ICD-10-PCS | Mod: CPTII,S$GLB,, | Performed by: STUDENT IN AN ORGANIZED HEALTH CARE EDUCATION/TRAINING PROGRAM

## 2022-08-03 PROCEDURE — 1101F PT FALLS ASSESS-DOCD LE1/YR: CPT | Mod: CPTII,S$GLB,, | Performed by: STUDENT IN AN ORGANIZED HEALTH CARE EDUCATION/TRAINING PROGRAM

## 2022-08-03 PROCEDURE — G0009 PNEUMOCOCCAL CONJUGATE VACCINE 20-VALENT: ICD-10-PCS | Mod: S$GLB,,, | Performed by: STUDENT IN AN ORGANIZED HEALTH CARE EDUCATION/TRAINING PROGRAM

## 2022-08-03 PROCEDURE — 90677 PNEUMOCOCCAL CONJUGATE VACCINE 20-VALENT: ICD-10-PCS | Mod: S$GLB,,, | Performed by: STUDENT IN AN ORGANIZED HEALTH CARE EDUCATION/TRAINING PROGRAM

## 2022-08-03 PROCEDURE — 1157F ADVNC CARE PLAN IN RCRD: CPT | Mod: CPTII,S$GLB,, | Performed by: STUDENT IN AN ORGANIZED HEALTH CARE EDUCATION/TRAINING PROGRAM

## 2022-08-03 PROCEDURE — 1126F AMNT PAIN NOTED NONE PRSNT: CPT | Mod: CPTII,S$GLB,, | Performed by: STUDENT IN AN ORGANIZED HEALTH CARE EDUCATION/TRAINING PROGRAM

## 2022-08-03 PROCEDURE — 99999 PR PBB SHADOW E&M-EST. PATIENT-LVL V: CPT | Mod: PBBFAC,,, | Performed by: STUDENT IN AN ORGANIZED HEALTH CARE EDUCATION/TRAINING PROGRAM

## 2022-08-03 PROCEDURE — 93005 EKG 12-LEAD: ICD-10-PCS | Mod: S$GLB,,, | Performed by: STUDENT IN AN ORGANIZED HEALTH CARE EDUCATION/TRAINING PROGRAM

## 2022-08-03 PROCEDURE — 93005 ELECTROCARDIOGRAM TRACING: CPT | Mod: S$GLB,,, | Performed by: STUDENT IN AN ORGANIZED HEALTH CARE EDUCATION/TRAINING PROGRAM

## 2022-08-03 PROCEDURE — 1160F PR REVIEW ALL MEDS BY PRESCRIBER/CLIN PHARMACIST DOCUMENTED: ICD-10-PCS | Mod: CPTII,S$GLB,, | Performed by: STUDENT IN AN ORGANIZED HEALTH CARE EDUCATION/TRAINING PROGRAM

## 2022-08-03 PROCEDURE — 3288F PR FALLS RISK ASSESSMENT DOCUMENTED: ICD-10-PCS | Mod: CPTII,S$GLB,, | Performed by: STUDENT IN AN ORGANIZED HEALTH CARE EDUCATION/TRAINING PROGRAM

## 2022-08-03 PROCEDURE — 99214 OFFICE O/P EST MOD 30 MIN: CPT | Mod: 25,S$GLB,, | Performed by: STUDENT IN AN ORGANIZED HEALTH CARE EDUCATION/TRAINING PROGRAM

## 2022-08-03 PROCEDURE — 90677 PCV20 VACCINE IM: CPT | Mod: S$GLB,,, | Performed by: STUDENT IN AN ORGANIZED HEALTH CARE EDUCATION/TRAINING PROGRAM

## 2022-08-03 PROCEDURE — 93010 EKG 12-LEAD: ICD-10-PCS | Mod: S$GLB,,, | Performed by: INTERNAL MEDICINE

## 2022-08-03 PROCEDURE — 3288F FALL RISK ASSESSMENT DOCD: CPT | Mod: CPTII,S$GLB,, | Performed by: STUDENT IN AN ORGANIZED HEALTH CARE EDUCATION/TRAINING PROGRAM

## 2022-08-03 PROCEDURE — 1157F PR ADVANCE CARE PLAN OR EQUIV PRESENT IN MEDICAL RECORD: ICD-10-PCS | Mod: CPTII,S$GLB,, | Performed by: STUDENT IN AN ORGANIZED HEALTH CARE EDUCATION/TRAINING PROGRAM

## 2022-08-03 PROCEDURE — 99214 PR OFFICE/OUTPT VISIT, EST, LEVL IV, 30-39 MIN: ICD-10-PCS | Mod: 25,S$GLB,, | Performed by: STUDENT IN AN ORGANIZED HEALTH CARE EDUCATION/TRAINING PROGRAM

## 2022-08-03 RX ORDER — CICLOPIROX 7.7 MG/G
GEL TOPICAL 2 TIMES DAILY
Qty: 100 G | Refills: 2 | Status: SHIPPED | OUTPATIENT
Start: 2022-08-03 | End: 2023-02-13

## 2022-08-03 RX ORDER — PANTOPRAZOLE SODIUM 40 MG/1
40 TABLET, DELAYED RELEASE ORAL DAILY
Qty: 30 TABLET | Refills: 0 | Status: SHIPPED | OUTPATIENT
Start: 2022-08-03 | End: 2022-09-08

## 2022-08-03 NOTE — TELEPHONE ENCOUNTER
No new care gaps identified.  [facility] Embedded Care Gaps. Reference number: [id number]. 8/03/2022   3:58:35 PM CDT

## 2022-08-03 NOTE — PROGRESS NOTES
"[facility] MEDICINE CLINIC NOTE    Patient Name: Glenda Gimenez  YOB: 1927    PRESENTING HISTORY   Chief Complaint:   Chief Complaint   Patient presents with    Follow-up        History of Present Illness:  Ms. Glenda Gimenez is a 89 y.o. female     Here for scheduled f/u.     Doing well overall.     1.  Burning pain in chest. When "excited."  No pain after eating.   Never gets with exertion.   No fever.   No cough or dyspnea.     2. Mobility and balance are slowly declining. Plans to enroll in senior exercise program, but asks about PT reconditioning program first which I think is reasonable.                                                Review of Systems   All other systems reviewed and are negative.        PAST HISTORY:     Past Medical History:   Diagnosis Date    Anticoagulant long-term use     Arthritis     Dislocation of right shoulder joint     Hypertension     Hypothyroidism     Shoulder disorder     right    Thyroid disease        Past Surgical History:   Procedure Laterality Date    HYSTERECTOMY      PARTIAL HYSTERECTOMY      ARTUR, ovaries in place, uterine prolapse       Family History   Problem Relation Age of Onset    Breast cancer Mother     Cancer Brother         lung cancer    Cancer Maternal Aunt         unknown cancer    Cancer Maternal Uncle         pancreatic cancer    Heart disease Neg Hx        Social History     Socioeconomic History    Marital status:    Tobacco Use    Smoking status: Never Smoker    Smokeless tobacco: Never Used   Substance and Sexual Activity    Alcohol use: Yes     Alcohol/week: 2.0 standard drinks     Types: 2 Glasses of wine per week     Comment: 2 small glasses of red wine with dinner    Drug use: No    Sexual activity: Not Currently     Social Determinants of Health     Financial Resource Strain: Low Risk     Difficulty of Paying Living Expenses: Not hard at all   Food Insecurity: No Food Insecurity    Worried " About Running Out of Food in the Last Year: Never true    Ran Out of Food in the Last Year: Never true   Transportation Needs: No Transportation Needs    Lack of Transportation (Medical): No    Lack of Transportation (Non-Medical): No   Physical Activity: Unknown    Days of Exercise per Week: 0 days   Stress: Stress Concern Present    Feeling of Stress : Very much   Social Connections: Unknown    Frequency of Communication with Friends and Family: Twice a week    Frequency of Social Gatherings with Friends and Family: More than three times a week    Active Member of Clubs or Organizations: No    Attends Club or Organization Meetings: Never    Marital Status:    Housing Stability: Unknown    Unable to Pay for Housing in the Last Year: No    Unstable Housing in the Last Year: No       MEDICATIONS & ALLERGIES:     Current Outpatient Medications on File Prior to Visit   Medication Sig    acetaminophen (TYLENOL) 325 MG tablet Take 325 mg by mouth every 6 (six) hours as needed for Pain.    aspirin (ECOTRIN) 81 MG EC tablet Take 81 mg by mouth once daily.    busPIRone (BUSPAR) 10 MG tablet Take 1 tablet (10 mg total) by mouth 3 (three) times daily.    donepeziL (ARICEPT) 5 MG tablet Take 1/2 tablet (2.5 mg) nightly x 4 weeks then increase to 1 tablet (5 mg) nightly thereafter.    furosemide (LASIX) 20 MG tablet     ibandronate (BONIVA) 150 mg tablet Take 1 tablet (150 mg total) by mouth every 30 days.    isosorbide mononitrate (IMDUR) 30 MG 24 hr tablet Take 1 tablet (30 mg total) by mouth once daily.    krill oil 500 mg Cap Take by mouth.    levothyroxine (SYNTHROID) 88 MCG tablet Take 1 tablet (88 mcg total) by mouth once daily.    olmesartan (BENICAR) 5 MG Tab Take 1 tablet (5 mg total) by mouth once daily.    peg 400-propylene glycol, PF, (SYSTANE HYDRATION PF) 0.4-0.3 % Drop 1 drops DAILY (route: ophthalmic (eye))    pulse oximeter (PULSE OXIMETER) device by Apply Externally route 2  "(two) times a day. Use twice daily at 8 AM and 3 PM and record the value in MyChart as directed.    pulse oximeter (PULSE OXIMETER) device by Apply Externally route 2 (two) times a day. Use twice daily at 8 AM and 3 PM and record the value in MyChart as directed.    QUEtiapine (SEROQUEL) 50 MG tablet Take 1 tablet (50 mg total) by mouth every evening.    sertraline (ZOLOFT) 25 MG tablet Take 1 tablet (25 mg total) by mouth once daily.    sodium chloride 1 gram tablet Take 1 g by mouth 3 (three) times daily. 2 tabs TID    vit A/vit C/vit E/zinc/copper (ICAPS AREDS ORAL) 1 capsule 2 TIMES DAILY (route: oral)    vitamin D (VITAMIN D3) 1000 units Tab Take 1,000 Units by mouth once daily. Patient is taking 2000 units per day    famotidine (PEPCID) 20 MG tablet Take 1 tablet (20 mg total) by mouth 2 (two) times daily.     No current facility-administered medications on file prior to visit.       Review of patient's allergies indicates:   Allergen Reactions    Amlodipine Swelling     Other reaction(s): Angioedema    Atenolol      Other reaction(s): itch    Betamethasone sodium phosphate      Other reaction(s): Flushing (skin)    Cortisone      Other reaction(s): blurry vision  Other reaction(s): Rash    Lisinopril      Other reaction(s): COUGH    Naproxen      Other reaction(s): body shut down    Prednisone     Triamterene-hydrochlorothiazid      Other reaction(s): itch       OBJECTIVE:   Vital Signs:  Vitals:    08/03/22 1521   BP: 116/68   Pulse: 75   Resp: 18   SpO2: 95%   Weight: 71.4 kg (157 lb 6.5 oz)   Height: 5' 2" (1.575 m)       No results found for this or any previous visit (from the past 24 hour(s)).      Physical Exam  Vitals and nursing note reviewed.   Constitutional:       Appearance: She is not diaphoretic.      Comments: Uses walker.    HENT:      Head: Normocephalic and atraumatic.      Right Ear: External ear normal.      Left Ear: External ear normal.   Eyes:      General: No scleral " icterus.     Conjunctiva/sclera: Conjunctivae normal.      Pupils: Pupils are equal, round, and reactive to light.   Neck:      Thyroid: No thyromegaly.   Cardiovascular:      Rate and Rhythm: Normal rate and regular rhythm.      Heart sounds: Normal heart sounds. No murmur heard.  Pulmonary:      Effort: Pulmonary effort is normal. No respiratory distress.      Breath sounds: Normal breath sounds. No wheezing or rales.   Musculoskeletal:         General: No tenderness or deformity. Normal range of motion.      Cervical back: Normal range of motion and neck supple.      Comments: Strength in 4+/5 throughout   Lymphadenopathy:      Cervical: No cervical adenopathy.   Skin:     General: Skin is warm and dry.      Findings: No erythema or rash.   Neurological:      General: No focal deficit present.      Mental Status: She is alert and oriented to person, place, and time.      Gait: Gait is intact.      Comments: Normal Rhomberg. No pronator drift. Unsteady gait.    Psychiatric:         Mood and Affect: Mood and affect normal.         Cognition and Memory: Memory normal.         Judgment: Judgment normal.         ASSESSMENT & PLAN:     Need for pneumococcal vaccination  -     (In Office Administered) Pneumococcal Conjugate Vaccine (20 Valent) (IM)    Chest pain, unspecified type  -     IN OFFICE EKG 12-LEAD (to Muse)  -     Echo; Future  -     pantoprazole (PROTONIX) 40 MG tablet; Take 1 tablet (40 mg total) by mouth once daily. One month then stop  Dispense: 30 tablet; Refill: 0    Onychomycosis  -     ciclopirox 0.77 % Gel; Apply topically 2 (two) times a day.  Dispense: 100 g; Refill: 2    Ataxia  -     Ambulatory referral/consult to Physical/Occupational Therapy; Future; Expected date: 08/10/2022    Routine general medical examination at a health care facility    Essential hypertension  Continue current medicines.      Acquired hypothyroidism  Continue current medicines.        William Olivia MD   Internal  Medicine    This note was created using Dragon voice recognition software that occasionally misinterprets phrases or words.

## 2022-08-04 RX ORDER — PANTOPRAZOLE SODIUM 40 MG/1
TABLET, DELAYED RELEASE ORAL
Qty: 90 TABLET | OUTPATIENT
Start: 2022-08-04

## 2022-08-04 NOTE — TELEPHONE ENCOUNTER
Refill Routing Note   Medication(s) are not appropriate for processing by [facility] for the following reason(s):      - Pt requesting 90 day supply    [facility] action(s):  Defer          Medication reconciliation completed: No     Appointments  past 12m or future 3m with PCP    Date Provider   Last Visit   8/3/2022 William Olivia MD   Next Visit   Visit date not found William Olivia MD   ED visits in past 90 days: 1        Note composed:11:29 AM 08/04/2022

## 2022-08-11 ENCOUNTER — HOSPITAL ENCOUNTER (OUTPATIENT)
Facility: HOSPITAL | Age: 87
Discharge: HOME HEALTH | End: 2022-08-12
Attending: EMERGENCY MEDICINE | Admitting: INTERNAL MEDICINE
Payer: MEDICARE

## 2022-08-11 DIAGNOSIS — R07.9 CHEST PAIN: ICD-10-CM

## 2022-08-11 DIAGNOSIS — R07.9 CHEST PAIN, UNSPECIFIED TYPE: Primary | ICD-10-CM

## 2022-08-11 LAB
ALBUMIN SERPL BCP-MCNC: 3.4 G/DL (ref 3.5–5.2)
ALP SERPL-CCNC: 66 U/L (ref 55–135)
ALT SERPL W/O P-5'-P-CCNC: 18 U/L (ref 10–44)
ANION GAP SERPL CALC-SCNC: 11 MMOL/L (ref 8–16)
AST SERPL-CCNC: 30 U/L (ref 10–40)
BASOPHILS # BLD AUTO: 0.08 K/UL (ref 0–0.2)
BASOPHILS NFR BLD: 1.3 % (ref 0–1.9)
BILIRUB SERPL-MCNC: 0.4 MG/DL (ref 0.1–1)
BNP SERPL-MCNC: 143 PG/ML (ref 0–99)
BUN SERPL-MCNC: 18 MG/DL (ref 10–30)
CALCIUM SERPL-MCNC: 10.3 MG/DL (ref 8.7–10.5)
CHLORIDE SERPL-SCNC: 106 MMOL/L (ref 95–110)
CO2 SERPL-SCNC: 23 MMOL/L (ref 23–29)
CREAT SERPL-MCNC: 0.8 MG/DL (ref 0.5–1.4)
DIFFERENTIAL METHOD: ABNORMAL
EOSINOPHIL # BLD AUTO: 0.3 K/UL (ref 0–0.5)
EOSINOPHIL NFR BLD: 4.3 % (ref 0–8)
ERYTHROCYTE [DISTWIDTH] IN BLOOD BY AUTOMATED COUNT: 13 % (ref 11.5–14.5)
EST. GFR  (NO RACE VARIABLE): >60 ML/MIN/1.73 M^2
GLUCOSE SERPL-MCNC: 108 MG/DL (ref 70–110)
HCT VFR BLD AUTO: 36.6 % (ref 37–48.5)
HGB BLD-MCNC: 11.9 G/DL (ref 12–16)
IMM GRANULOCYTES # BLD AUTO: 0.01 K/UL (ref 0–0.04)
IMM GRANULOCYTES NFR BLD AUTO: 0.2 % (ref 0–0.5)
LYMPHOCYTES # BLD AUTO: 2.3 K/UL (ref 1–4.8)
LYMPHOCYTES NFR BLD: 36.4 % (ref 18–48)
MCH RBC QN AUTO: 30.9 PG (ref 27–31)
MCHC RBC AUTO-ENTMCNC: 32.5 G/DL (ref 32–36)
MCV RBC AUTO: 95 FL (ref 82–98)
MONOCYTES # BLD AUTO: 0.6 K/UL (ref 0.3–1)
MONOCYTES NFR BLD: 10.1 % (ref 4–15)
NEUTROPHILS # BLD AUTO: 3 K/UL (ref 1.8–7.7)
NEUTROPHILS NFR BLD: 47.7 % (ref 38–73)
NRBC BLD-RTO: 0 /100 WBC
PLATELET # BLD AUTO: 239 K/UL (ref 150–450)
PMV BLD AUTO: 10.6 FL (ref 9.2–12.9)
POTASSIUM SERPL-SCNC: 4.6 MMOL/L (ref 3.5–5.1)
PROT SERPL-MCNC: 7.8 G/DL (ref 6–8.4)
RBC # BLD AUTO: 3.85 M/UL (ref 4–5.4)
SARS-COV-2 RDRP RESP QL NAA+PROBE: NEGATIVE
SODIUM SERPL-SCNC: 140 MMOL/L (ref 136–145)
TROPONIN I SERPL DL<=0.01 NG/ML-MCNC: 0.06 NG/ML (ref 0–0.03)
TROPONIN I SERPL DL<=0.01 NG/ML-MCNC: 0.07 NG/ML (ref 0–0.03)
TSH SERPL DL<=0.005 MIU/L-ACNC: 2.7 UIU/ML (ref 0.4–4)
WBC # BLD AUTO: 6.23 K/UL (ref 3.9–12.7)

## 2022-08-11 PROCEDURE — 99285 EMERGENCY DEPT VISIT HI MDM: CPT | Mod: 25,CS

## 2022-08-11 PROCEDURE — 94761 N-INVAS EAR/PLS OXIMETRY MLT: CPT

## 2022-08-11 PROCEDURE — 84443 ASSAY THYROID STIM HORMONE: CPT | Performed by: NURSE PRACTITIONER

## 2022-08-11 PROCEDURE — 85025 COMPLETE CBC W/AUTO DIFF WBC: CPT | Performed by: EMERGENCY MEDICINE

## 2022-08-11 PROCEDURE — G0378 HOSPITAL OBSERVATION PER HR: HCPCS

## 2022-08-11 PROCEDURE — 99900035 HC TECH TIME PER 15 MIN (STAT)

## 2022-08-11 PROCEDURE — 83880 ASSAY OF NATRIURETIC PEPTIDE: CPT | Performed by: EMERGENCY MEDICINE

## 2022-08-11 PROCEDURE — 80053 COMPREHEN METABOLIC PANEL: CPT | Performed by: EMERGENCY MEDICINE

## 2022-08-11 PROCEDURE — 84484 ASSAY OF TROPONIN QUANT: CPT | Performed by: EMERGENCY MEDICINE

## 2022-08-11 PROCEDURE — 36000 PLACE NEEDLE IN VEIN: CPT

## 2022-08-11 PROCEDURE — U0002 COVID-19 LAB TEST NON-CDC: HCPCS | Performed by: NURSE PRACTITIONER

## 2022-08-11 PROCEDURE — 36415 COLL VENOUS BLD VENIPUNCTURE: CPT | Performed by: NURSE PRACTITIONER

## 2022-08-11 PROCEDURE — 63600175 PHARM REV CODE 636 W HCPCS: Performed by: NURSE PRACTITIONER

## 2022-08-11 PROCEDURE — 25000003 PHARM REV CODE 250: Performed by: NURSE PRACTITIONER

## 2022-08-11 PROCEDURE — 93005 ELECTROCARDIOGRAM TRACING: CPT

## 2022-08-11 PROCEDURE — 96372 THER/PROPH/DIAG INJ SC/IM: CPT | Performed by: NURSE PRACTITIONER

## 2022-08-11 PROCEDURE — 84484 ASSAY OF TROPONIN QUANT: CPT | Mod: 91 | Performed by: NURSE PRACTITIONER

## 2022-08-11 PROCEDURE — 93010 ELECTROCARDIOGRAM REPORT: CPT | Mod: ,,, | Performed by: INTERNAL MEDICINE

## 2022-08-11 PROCEDURE — 93010 EKG 12-LEAD: ICD-10-PCS | Mod: ,,, | Performed by: INTERNAL MEDICINE

## 2022-08-11 PROCEDURE — 36415 COLL VENOUS BLD VENIPUNCTURE: CPT | Performed by: EMERGENCY MEDICINE

## 2022-08-11 RX ORDER — ISOSORBIDE MONONITRATE 30 MG/1
30 TABLET, EXTENDED RELEASE ORAL DAILY
Status: DISCONTINUED | OUTPATIENT
Start: 2022-08-12 | End: 2022-08-12 | Stop reason: HOSPADM

## 2022-08-11 RX ORDER — IPRATROPIUM BROMIDE AND ALBUTEROL SULFATE 2.5; .5 MG/3ML; MG/3ML
3 SOLUTION RESPIRATORY (INHALATION) EVERY 4 HOURS PRN
Status: DISCONTINUED | OUTPATIENT
Start: 2022-08-11 | End: 2022-08-12 | Stop reason: HOSPADM

## 2022-08-11 RX ORDER — SODIUM CHLORIDE 0.9 % (FLUSH) 0.9 %
10 SYRINGE (ML) INJECTION EVERY 12 HOURS PRN
Status: DISCONTINUED | OUTPATIENT
Start: 2022-08-11 | End: 2022-08-12 | Stop reason: HOSPADM

## 2022-08-11 RX ORDER — SODIUM,POTASSIUM PHOSPHATES 280-250MG
2 POWDER IN PACKET (EA) ORAL
Status: DISCONTINUED | OUTPATIENT
Start: 2022-08-11 | End: 2022-08-12 | Stop reason: HOSPADM

## 2022-08-11 RX ORDER — SIMETHICONE 80 MG
1 TABLET,CHEWABLE ORAL 4 TIMES DAILY PRN
Status: DISCONTINUED | OUTPATIENT
Start: 2022-08-11 | End: 2022-08-12 | Stop reason: HOSPADM

## 2022-08-11 RX ORDER — MAG HYDROX/ALUMINUM HYD/SIMETH 200-200-20
30 SUSPENSION, ORAL (FINAL DOSE FORM) ORAL 4 TIMES DAILY PRN
Status: DISCONTINUED | OUTPATIENT
Start: 2022-08-11 | End: 2022-08-12 | Stop reason: HOSPADM

## 2022-08-11 RX ORDER — QUETIAPINE FUMARATE 25 MG/1
50 TABLET, FILM COATED ORAL NIGHTLY
Status: DISCONTINUED | OUTPATIENT
Start: 2022-08-11 | End: 2022-08-12 | Stop reason: HOSPADM

## 2022-08-11 RX ORDER — CHOLECALCIFEROL (VITAMIN D3) 25 MCG
1000 TABLET ORAL DAILY
Status: DISCONTINUED | OUTPATIENT
Start: 2022-08-12 | End: 2022-08-12 | Stop reason: HOSPADM

## 2022-08-11 RX ORDER — IBUPROFEN 200 MG
24 TABLET ORAL
Status: DISCONTINUED | OUTPATIENT
Start: 2022-08-11 | End: 2022-08-12 | Stop reason: HOSPADM

## 2022-08-11 RX ORDER — NALOXONE HCL 0.4 MG/ML
0.02 VIAL (ML) INJECTION
Status: DISCONTINUED | OUTPATIENT
Start: 2022-08-11 | End: 2022-08-12 | Stop reason: HOSPADM

## 2022-08-11 RX ORDER — BUSPIRONE HYDROCHLORIDE 10 MG/1
10 TABLET ORAL 3 TIMES DAILY
Status: DISCONTINUED | OUTPATIENT
Start: 2022-08-11 | End: 2022-08-12 | Stop reason: HOSPADM

## 2022-08-11 RX ORDER — FUROSEMIDE 20 MG/1
20 TABLET ORAL DAILY
Status: DISCONTINUED | OUTPATIENT
Start: 2022-08-12 | End: 2022-08-12 | Stop reason: HOSPADM

## 2022-08-11 RX ORDER — ACETAMINOPHEN 325 MG/1
650 TABLET ORAL EVERY 8 HOURS PRN
Status: DISCONTINUED | OUTPATIENT
Start: 2022-08-11 | End: 2022-08-12 | Stop reason: HOSPADM

## 2022-08-11 RX ORDER — ONDANSETRON 2 MG/ML
4 INJECTION INTRAMUSCULAR; INTRAVENOUS EVERY 8 HOURS PRN
Status: DISCONTINUED | OUTPATIENT
Start: 2022-08-11 | End: 2022-08-12 | Stop reason: HOSPADM

## 2022-08-11 RX ORDER — TALC
6 POWDER (GRAM) TOPICAL NIGHTLY PRN
Status: DISCONTINUED | OUTPATIENT
Start: 2022-08-11 | End: 2022-08-12 | Stop reason: HOSPADM

## 2022-08-11 RX ORDER — AMOXICILLIN 250 MG
1 CAPSULE ORAL 2 TIMES DAILY
Status: DISCONTINUED | OUTPATIENT
Start: 2022-08-11 | End: 2022-08-12 | Stop reason: HOSPADM

## 2022-08-11 RX ORDER — INSULIN ASPART 100 [IU]/ML
1-10 INJECTION, SOLUTION INTRAVENOUS; SUBCUTANEOUS
Status: DISCONTINUED | OUTPATIENT
Start: 2022-08-11 | End: 2022-08-12 | Stop reason: HOSPADM

## 2022-08-11 RX ORDER — LANOLIN ALCOHOL/MO/W.PET/CERES
800 CREAM (GRAM) TOPICAL
Status: DISCONTINUED | OUTPATIENT
Start: 2022-08-11 | End: 2022-08-12 | Stop reason: HOSPADM

## 2022-08-11 RX ORDER — GLUCAGON 1 MG
1 KIT INJECTION
Status: DISCONTINUED | OUTPATIENT
Start: 2022-08-11 | End: 2022-08-12 | Stop reason: HOSPADM

## 2022-08-11 RX ORDER — LEVOTHYROXINE SODIUM 88 UG/1
88 TABLET ORAL DAILY
Status: DISCONTINUED | OUTPATIENT
Start: 2022-08-12 | End: 2022-08-12 | Stop reason: HOSPADM

## 2022-08-11 RX ORDER — SERTRALINE HYDROCHLORIDE 25 MG/1
25 TABLET, FILM COATED ORAL DAILY
Status: DISCONTINUED | OUTPATIENT
Start: 2022-08-12 | End: 2022-08-12 | Stop reason: HOSPADM

## 2022-08-11 RX ORDER — IBUPROFEN 200 MG
16 TABLET ORAL
Status: DISCONTINUED | OUTPATIENT
Start: 2022-08-11 | End: 2022-08-12 | Stop reason: HOSPADM

## 2022-08-11 RX ORDER — DONEPEZIL HYDROCHLORIDE 5 MG/1
5 TABLET, FILM COATED ORAL NIGHTLY
Status: DISCONTINUED | OUTPATIENT
Start: 2022-08-11 | End: 2022-08-12 | Stop reason: HOSPADM

## 2022-08-11 RX ORDER — ENOXAPARIN SODIUM 100 MG/ML
40 INJECTION SUBCUTANEOUS EVERY 24 HOURS
Status: DISCONTINUED | OUTPATIENT
Start: 2022-08-11 | End: 2022-08-12 | Stop reason: HOSPADM

## 2022-08-11 RX ORDER — ASPIRIN 81 MG/1
81 TABLET ORAL DAILY
Status: DISCONTINUED | OUTPATIENT
Start: 2022-08-12 | End: 2022-08-12 | Stop reason: HOSPADM

## 2022-08-11 RX ORDER — FAMOTIDINE 20 MG/1
20 TABLET, FILM COATED ORAL DAILY
Status: DISCONTINUED | OUTPATIENT
Start: 2022-08-12 | End: 2022-08-12 | Stop reason: HOSPADM

## 2022-08-11 RX ADMIN — BUSPIRONE HYDROCHLORIDE 10 MG: 10 TABLET ORAL at 08:08

## 2022-08-11 RX ADMIN — DONEPEZIL HYDROCHLORIDE 5 MG: 5 TABLET, FILM COATED ORAL at 08:08

## 2022-08-11 RX ADMIN — DOCUSATE SODIUM AND SENNOSIDES 1 TABLET: 8.6; 5 TABLET, FILM COATED ORAL at 08:08

## 2022-08-11 RX ADMIN — QUETIAPINE FUMARATE 50 MG: 25 TABLET ORAL at 08:08

## 2022-08-11 RX ADMIN — ENOXAPARIN SODIUM 40 MG: 100 INJECTION SUBCUTANEOUS at 06:08

## 2022-08-11 RX ADMIN — Medication 6 MG: at 08:08

## 2022-08-11 NOTE — ASSESSMENT & PLAN NOTE
Chronic, controlled.  Latest blood pressure and vitals reviewed-   Temp:  [97.7 °F (36.5 °C)]   Pulse:  [78]   Resp:  [20]   BP: (136)/(65)   SpO2:  [97 %] .   Home meds for hypertension were reviewed and noted below.   Hypertension Medications             furosemide (LASIX) 20 MG tablet     isosorbide mononitrate (IMDUR) 30 MG 24 hr tablet Take 1 tablet (30 mg total) by mouth once daily.    olmesartan (BENICAR) 5 MG Tab Take 1 tablet (5 mg total) by mouth once daily.          While in the hospital, will manage blood pressure as follows; Continue home antihypertensive regimen    Will utilize p.r.n. blood pressure medication only if patient's blood pressure greater than  180/110 and she develops symptoms such as worsening chest pain or shortness of breath.

## 2022-08-11 NOTE — HPI
"Glenda Gimenez is a 89 y/o F with a past medical history significant for HTN, HLD, and hypothyroidism who presented to the ED with c/o chest pain and shortness of breath which began this morning.  She is accompanied by her son.  Pt states that she does not remember having chest pain and that she is here for a "check-up."  Son states that pt was c/o chest pain and SOB this morning; pt states now she is chest pain free.  Denies recent fevers, chills, abdominal pain, dysuria, N/V or any other symptoms.  ED workup is significant for mildly elevated troponin of 0.064 with no acute EKG changes.  She is placed in observation under the service of hospital medicine for continued monitoring and treatment.  "

## 2022-08-11 NOTE — ASSESSMENT & PLAN NOTE
Patient has chronic hypothyroidism. TFTs reviewed-   Lab Results   Component Value Date    TSH 3.421 07/05/2022   . Will continue chronic levothyroxine and adjust for and clinical changes.

## 2022-08-11 NOTE — ED NOTES
Pt to ED with c/o waking up with burning in her chest. Pt went to see an cardiologist and she is schedule to have a ECHO. Son states he noticed her color has changed yesterday and today looking pale. Pt/son states she has had SOB and CP. Denies dizziness or syncope. Pt in NAD. Will continue to monitor.

## 2022-08-11 NOTE — ASSESSMENT & PLAN NOTE
EKG prn Chest Pain  Troponin x 3  CBC, CMP, Mg, Phos daily  Fasting Lipid panel in am  TSH level  CXR  JOYCE score-3  Consult Cardiology - follow recommendations  NM stress test in am  Supplemental O2 via nasal cannula to keep O2 Sats >92%  Beta Blockade if needed to reduce myocardial oxygen demand  Nitroglycerin prn  ASA

## 2022-08-11 NOTE — Clinical Note
Diagnosis: Chest pain, unspecified type [[id number]]   Future Attending Provider: BON BRANTLEY [[id number]]   Admitting Provider:: BON BRANTLEY [[id number]]

## 2022-08-11 NOTE — SUBJECTIVE & OBJECTIVE
Past Medical History:   Diagnosis Date    Anticoagulant long-term use     Arthritis     Dislocation of right shoulder joint     Hypertension     Hypothyroidism     Shoulder disorder     right    Thyroid disease        Past Surgical History:   Procedure Laterality Date    HYSTERECTOMY      PARTIAL HYSTERECTOMY      ARTUR, ovaries in place, uterine prolapse       Review of patient's allergies indicates:   Allergen Reactions    Amlodipine Swelling     Other reaction(s): Angioedema    Atenolol      Other reaction(s): itch    Betamethasone sodium phosphate      Other reaction(s): Flushing (skin)    Cortisone      Other reaction(s): blurry vision  Other reaction(s): Rash    Lisinopril      Other reaction(s): COUGH    Naproxen      Other reaction(s): body shut down    Prednisone     Triamterene-hydrochlorothiazid      Other reaction(s): itch       No current facility-administered medications on file prior to encounter.     Current Outpatient Medications on File Prior to Encounter   Medication Sig    acetaminophen (TYLENOL) 325 MG tablet Take 325 mg by mouth every 6 (six) hours as needed for Pain.    aspirin (ECOTRIN) 81 MG EC tablet Take 81 mg by mouth once daily.    busPIRone (BUSPAR) 10 MG tablet Take 1 tablet (10 mg total) by mouth 3 (three) times daily.    ciclopirox 0.77 % Gel Apply topically 2 (two) times a day.    donepeziL (ARICEPT) 5 MG tablet Take 1/2 tablet (2.5 mg) nightly x 4 weeks then increase to 1 tablet (5 mg) nightly thereafter.    famotidine (PEPCID) 20 MG tablet Take 1 tablet (20 mg total) by mouth 2 (two) times daily.    furosemide (LASIX) 20 MG tablet     ibandronate (BONIVA) 150 mg tablet Take 1 tablet (150 mg total) by mouth every 30 days.    isosorbide mononitrate (IMDUR) 30 MG 24 hr tablet Take 1 tablet (30 mg total) by mouth once daily.    krill oil 500 mg Cap Take by mouth.    levothyroxine (SYNTHROID) 88 MCG tablet Take 1 tablet (88 mcg total) by mouth once daily.    olmesartan (BENICAR) 5 MG Tab  Take 1 tablet (5 mg total) by mouth once daily.    pantoprazole (PROTONIX) 40 MG tablet Take 1 tablet (40 mg total) by mouth once daily. One month then stop    peg 400-propylene glycol, PF, (SYSTANE HYDRATION PF) 0.4-0.3 % Drop 1 drops DAILY (route: ophthalmic (eye))    pulse oximeter (PULSE OXIMETER) device by Apply Externally route 2 (two) times a day. Use twice daily at 8 AM and 3 PM and record the value in MyChart as directed.    pulse oximeter (PULSE OXIMETER) device by Apply Externally route 2 (two) times a day. Use twice daily at 8 AM and 3 PM and record the value in MyChart as directed.    QUEtiapine (SEROQUEL) 50 MG tablet Take 1 tablet (50 mg total) by mouth every evening.    sertraline (ZOLOFT) 25 MG tablet Take 1 tablet (25 mg total) by mouth once daily.    sodium chloride 1 gram tablet Take 1 g by mouth 3 (three) times daily. 2 tabs TID    vit A/vit C/vit E/zinc/copper (ICAPS AREDS ORAL) 1 capsule 2 TIMES DAILY (route: oral)    vitamin D (VITAMIN D3) 1000 units Tab Take 1,000 Units by mouth once daily. Patient is taking 2000 units per day     Family History       Problem Relation (Age of Onset)    Breast cancer Mother    Cancer Brother, Maternal Aunt, Maternal Uncle          Tobacco Use    Smoking status: Never Smoker    Smokeless tobacco: Never Used   Substance and Sexual Activity    Alcohol use: Yes     Alcohol/week: 2.0 standard drinks     Types: 2 Glasses of wine per week     Comment: 2 small glasses of red wine with dinner    Drug use: No    Sexual activity: Not Currently     Review of Systems   Constitutional:  Negative for chills and fever.   HENT:  Negative for congestion and rhinorrhea.    Respiratory:  Positive for shortness of breath. Negative for cough.    Cardiovascular:  Positive for chest pain. Negative for palpitations.   Gastrointestinal:  Negative for abdominal pain, diarrhea, nausea and vomiting.   Genitourinary:  Negative for frequency and urgency.   Musculoskeletal:  Negative for  back pain and neck pain.   Skin:  Negative for pallor and rash.   Neurological:  Negative for weakness.   Psychiatric/Behavioral:  Negative for agitation and confusion.    All other systems reviewed and are negative.  Objective:     Vital Signs (Most Recent):  Temp: 97.7 °F (36.5 °C) (08/11/22 1036)  Pulse: 78 (08/11/22 1036)  Resp: 20 (08/11/22 1036)  BP: 136/65 (08/11/22 1036)  SpO2: 97 % (08/11/22 1036) Vital Signs (24h Range):  Temp:  [97.7 °F (36.5 °C)] 97.7 °F (36.5 °C)  Pulse:  [78] 78  Resp:  [20] 20  SpO2:  [97 %] 97 %  BP: (136)/(65) 136/65     Weight: 71.2 kg (157 lb)  Body mass index is 28.72 kg/m².    Physical Exam  Constitutional:       General: She is not in acute distress.     Appearance: Normal appearance. She is well-developed. She is not ill-appearing.   HENT:      Head: Normocephalic and atraumatic.   Eyes:      Conjunctiva/sclera: Conjunctivae normal.      Pupils: Pupils are equal, round, and reactive to light.   Cardiovascular:      Rate and Rhythm: Normal rate and regular rhythm.      Heart sounds: Normal heart sounds.   Pulmonary:      Effort: Pulmonary effort is normal. No respiratory distress.      Breath sounds: Normal breath sounds.   Abdominal:      General: Bowel sounds are normal. There is no distension.      Palpations: Abdomen is soft.      Tenderness: There is no abdominal tenderness.   Musculoskeletal:         General: Normal range of motion.      Cervical back: Normal range of motion and neck supple.   Skin:     General: Skin is warm and dry.   Neurological:      General: No focal deficit present.      Mental Status: She is alert and oriented to person, place, and time.      Comments: Mild confusion   Psychiatric:         Mood and Affect: Mood normal.         Behavior: Behavior normal.         Thought Content: Thought content normal.         Judgment: Judgment normal.         CRANIAL NERVES     CN III, IV, VI   Pupils are equal, round, and reactive to light.     Significant Labs:  All pertinent labs within the past 24 hours have been reviewed.  CBC:   Recent Labs   Lab 08/11/22  1133   WBC 6.23   HGB 11.9*   HCT 36.6*        CMP:   Recent Labs   Lab 08/11/22  1133      K 4.6      CO2 23      BUN 18   CREATININE 0.8   CALCIUM 10.3   PROT 7.8   ALBUMIN 3.4*   BILITOT 0.4   ALKPHOS 66   AST 30   ALT 18   ANIONGAP 11       Significant Imaging: I have reviewed all pertinent imaging results/findings within the past 24 hours.

## 2022-08-11 NOTE — ED PROVIDER NOTES
Encounter Date: 8/11/2022    SCRIBE #1 NOTE: Ena RAY, emery scribing for, and in the presence of, Bernard Vann MD.       History     Chief Complaint   Patient presents with    Shortness of Breath     Intermittent x several weeks; denies CP      Time seen by provider: 11:02 AM on 08/11/2022    Glenda Gimenez is a 89 y.o. female who presents to the ED with an onset of CP and SOB. Sx have presented intermittently the last couple of weeks. Current episode of Sx began when patient awoke 2.5 hrs ago (8:30am). The patient denies N/V, cough, congestion, fever, blood in urine, blood in stool, abdominal pain, or any other symptoms at this time. She followed up with PCP, Dr. Olivia, last week regarding Sx and was scheduled for echocardiogram next week. PMHx of HTN. No pertinent PSHx.    The history is provided by the patient and a relative.     Review of patient's allergies indicates:   Allergen Reactions    Amlodipine Swelling     Other reaction(s): Angioedema    Atenolol      Other reaction(s): itch    Betamethasone sodium phosphate      Other reaction(s): Flushing (skin)    Cortisone      Other reaction(s): blurry vision  Other reaction(s): Rash    Lisinopril      Other reaction(s): COUGH    Naproxen      Other reaction(s): body shut down    Prednisone     Triamterene-hydrochlorothiazid      Other reaction(s): itch     Past Medical History:   Diagnosis Date    Anticoagulant long-term use     Arthritis     Dislocation of right shoulder joint     Hypertension     Hypothyroidism     Shoulder disorder     right    Thyroid disease      Past Surgical History:   Procedure Laterality Date    HYSTERECTOMY      PARTIAL HYSTERECTOMY      ARTUR, ovaries in place, uterine prolapse     Family History   Problem Relation Age of Onset    Breast cancer Mother     Cancer Brother         lung cancer    Cancer Maternal Aunt         unknown cancer    Cancer Maternal Uncle         pancreatic cancer    Heart  disease Neg Hx      Social History     Tobacco Use    Smoking status: Never Smoker    Smokeless tobacco: Never Used   Substance Use Topics    Alcohol use: Yes     Alcohol/week: 2.0 standard drinks     Types: 2 Glasses of wine per week     Comment: 2 small glasses of red wine with dinner    Drug use: No     Review of Systems   Constitutional: Negative for fever.   HENT: Negative for congestion and sore throat.    Respiratory: Positive for shortness of breath. Negative for cough.    Cardiovascular: Positive for chest pain.   Gastrointestinal: Negative for abdominal pain, blood in stool, nausea and vomiting.   Genitourinary: Negative for dysuria and hematuria.   Musculoskeletal: Negative for back pain.   Skin: Negative for rash.   Neurological: Negative for weakness.   Hematological: Does not bruise/bleed easily.       Physical Exam     Initial Vitals [08/11/22 1036]   BP Pulse Resp Temp SpO2   136/65 78 20 97.7 °F (36.5 °C) 97 %      MAP       --         Physical Exam    Nursing note and vitals reviewed.  Constitutional: She appears well-developed and well-nourished. She is not diaphoretic. No distress.   HENT:   Head: Normocephalic and atraumatic.   Eyes: EOM are normal. Pupils are equal, round, and reactive to light.   Neck: Neck supple.   Normal range of motion.  Cardiovascular: Normal rate, regular rhythm, normal heart sounds and intact distal pulses. Exam reveals no gallop and no friction rub.    No murmur heard.  Pulmonary/Chest: Breath sounds normal. No respiratory distress. She has no wheezes. She has no rhonchi. She has no rales.   Abdominal: Abdomen is soft. Bowel sounds are normal. There is no abdominal tenderness. There is no rebound and no guarding.   Musculoskeletal:         General: Normal range of motion.      Cervical back: Normal range of motion and neck supple.     Neurological: She is alert and oriented to person, place, and time.   Skin: Skin is warm and dry.   Psychiatric: She has a normal  mood and affect. Her behavior is normal. Judgment and thought content normal.         ED Course   Procedures  Labs Reviewed   CBC W/ AUTO DIFFERENTIAL - Abnormal; Notable for the following components:       Result Value    RBC 3.85 (*)     Hemoglobin 11.9 (*)     Hematocrit 36.6 (*)     All other components within normal limits    Narrative:     Collection has been rescheduled by SLO at 08/11/2022 11:26 Reason:   Patient unavailable she went xray    COMPREHENSIVE METABOLIC PANEL - Abnormal; Notable for the following components:    Albumin 3.4 (*)     All other components within normal limits    Narrative:     Collection has been rescheduled by SLO at 08/11/2022 11:26 Reason:   Patient unavailable she went xray    TROPONIN I - Abnormal; Notable for the following components:    Troponin I 0.064 (*)     All other components within normal limits    Narrative:     Collection has been rescheduled by SLO at 08/11/2022 11:26 Reason:   Patient unavailable she went xray    B-TYPE NATRIURETIC PEPTIDE - Abnormal; Notable for the following components:     (*)     All other components within normal limits    Narrative:     Collection has been rescheduled by SLO at 08/11/2022 11:26 Reason:   Patient unavailable she went xray    SARS-COV-2 RNA AMPLIFICATION, QUAL   TROPONIN I     EKG Readings: (Independently Interpreted)   Sinus rhythm at ventricular rate of 69 bpm. Normal axis. T wave flattening in Leads III, AVL, V4, and V5. No STEMI.       Imaging Results          X-Ray Chest PA And Lateral (Final result)  Result time 08/11/22 11:32:15    Final result by Zaina Díaz MD (08/11/22 11:32:15)                 Impression:      There is no evidence acute pulmonary consolidation.      Electronically signed by: Zaina Díaz MD  Date:    08/11/2022  Time:    11:32             Narrative:    EXAMINATION:  XR CHEST PA AND LATERAL    CLINICAL HISTORY:  Chest Pain;    TECHNIQUE:  PA and lateral views of the chest were  performed.    COMPARISON:  06/11/2021    FINDINGS:  There is patchy opacification of the pulmonary parenchyma similar to prior exam prob dominantly at the periphery thought to represent fibrotic changes.  There is no evidence acute pulmonary consolidation.                                 Medications - No data to display  Medical Decision Making:   History:   Old Medical Records: I decided to obtain old medical records.  Initial Assessment:   89-year-old female presented with chest pain.  Differential Diagnosis:   Initial differential diagnosis included but not limited to acute MI, unstable angina, and reflux disease.  Clinical Tests:   Lab Tests: Ordered and Reviewed  Radiological Study: Ordered and Reviewed  Medical Tests: Ordered and Reviewed  ED Management:  The patient was emergently evaluated in the emergency department, her evaluation was significant for an elderly female with a benign exam.  The patient's EKG showed no acute abnormalities per my independent interpretation.  The patient's chest x-ray showed no acute abnormalities per Radiology.  The patient's labs were significant for a mildly elevated troponin.  Patient is chest pain-free year in the emergency department.  I will admit the patient to the hospitalist service for further care and workup.  The case was discussed with the APC on call for the hospitalist service.  She has accepted the patient for admission.          Scribe Attestation:   Scribe #1: I performed the above scribed service and the documentation accurately describes the services I performed. I attest to the accuracy of the note.              I, Dr. Bernard Vann, personally performed the services described in this documentation. All medical record entries made by the scribe were at my direction and in my presence.  I have reviewed the chart and agree that the record reflects my personal performance and is accurate and complete. Bernard Vann MD.  4:13 PM 08/11/2022      Clinical  Impression:   Final diagnoses:  [R07.9] Chest pain  [R07.9] Chest pain, unspecified type (Primary)          ED Disposition Condition    Observation               Bernard Vann MD  08/11/22 8916

## 2022-08-11 NOTE — NURSING TRANSFER
Nursing Transfer Note      8/11/2022     Reason patient is being transferred: Observation    Transfer to  203  From ED    Transfer via WC    Transfer with Daughter in Law    Transported by Barron Pittman RN    Medicines sent: none    Any special needs or follow-up needed: Tele    Chart send with patient: via EPIC    Notified: oncoming RN    Patient reassessed at: upon arrival    Upon arrival to floor: oriented to room

## 2022-08-11 NOTE — H&P
"[facility]  [facility] Medicine  History & Physical    Patient Name: Glenda Gimenez  MRN: [id number]  Patient Class: OP- Observation  Admission Date: 8/11/2022  Attending Physician: Neda Garcia MD   Primary Care Provider: William Olivia MD         Patient information was obtained from patient, relative(s) and ER records.     Subjective:     Principal Problem:Chest pain    Chief Complaint:   Chief Complaint   Patient presents with    Shortness of Breath     Intermittent x several weeks; denies CP         HPI: Glenda Gimenez is a 89 y/o F with a past medical history significant for HTN, HLD, and hypothyroidism who presented to the ED with c/o chest pain and shortness of breath which began this morning.  She is accompanied by her son.  Pt states that she does not remember having chest pain and that she is here for a "check-up."  Son states that pt was c/o chest pain and SOB this morning; pt states now she is chest pain free.  Denies recent fevers, chills, abdominal pain, dysuria, N/V or any other symptoms.  ED workup is significant for mildly elevated troponin of 0.064 with no acute EKG changes.  She is placed in observation under the service of hospital medicine for continued monitoring and treatment.      Past Medical History:   Diagnosis Date    Anticoagulant long-term use     Arthritis     Dislocation of right shoulder joint     Hypertension     Hypothyroidism     Shoulder disorder     right    Thyroid disease        Past Surgical History:   Procedure Laterality Date    HYSTERECTOMY      PARTIAL HYSTERECTOMY      ARTUR, ovaries in place, uterine prolapse       Review of patient's allergies indicates:   Allergen Reactions    Amlodipine Swelling     Other reaction(s): Angioedema    Atenolol      Other reaction(s): itch    Betamethasone sodium phosphate      Other reaction(s): Flushing (skin)    Cortisone      Other reaction(s): blurry vision  Other reaction(s): Rash    " Lisinopril      Other reaction(s): COUGH    Naproxen      Other reaction(s): body shut down    Prednisone     Triamterene-hydrochlorothiazid      Other reaction(s): itch       No current facility-administered medications on file prior to encounter.     Current Outpatient Medications on File Prior to Encounter   Medication Sig    acetaminophen (TYLENOL) 325 MG tablet Take 325 mg by mouth every 6 (six) hours as needed for Pain.    aspirin (ECOTRIN) 81 MG EC tablet Take 81 mg by mouth once daily.    busPIRone (BUSPAR) 10 MG tablet Take 1 tablet (10 mg total) by mouth 3 (three) times daily.    ciclopirox 0.77 % Gel Apply topically 2 (two) times a day.    donepeziL (ARICEPT) 5 MG tablet Take 1/2 tablet (2.5 mg) nightly x 4 weeks then increase to 1 tablet (5 mg) nightly thereafter.    famotidine (PEPCID) 20 MG tablet Take 1 tablet (20 mg total) by mouth 2 (two) times daily.    furosemide (LASIX) 20 MG tablet     ibandronate (BONIVA) 150 mg tablet Take 1 tablet (150 mg total) by mouth every 30 days.    isosorbide mononitrate (IMDUR) 30 MG 24 hr tablet Take 1 tablet (30 mg total) by mouth once daily.    krill oil 500 mg Cap Take by mouth.    levothyroxine (SYNTHROID) 88 MCG tablet Take 1 tablet (88 mcg total) by mouth once daily.    olmesartan (BENICAR) 5 MG Tab Take 1 tablet (5 mg total) by mouth once daily.    pantoprazole (PROTONIX) 40 MG tablet Take 1 tablet (40 mg total) by mouth once daily. One month then stop    peg 400-propylene glycol, PF, (SYSTANE HYDRATION PF) 0.4-0.3 % Drop 1 drops DAILY (route: ophthalmic (eye))    pulse oximeter (PULSE OXIMETER) device by Apply Externally route 2 (two) times a day. Use twice daily at 8 AM and 3 PM and record the value in MyChart as directed.    pulse oximeter (PULSE OXIMETER) device by Apply Externally route 2 (two) times a day. Use twice daily at 8 AM and 3 PM and record the value in MyChart as directed.    QUEtiapine (SEROQUEL) 50 MG tablet Take 1 tablet  (50 mg total) by mouth every evening.    sertraline (ZOLOFT) 25 MG tablet Take 1 tablet (25 mg total) by mouth once daily.    sodium chloride 1 gram tablet Take 1 g by mouth 3 (three) times daily. 2 tabs TID    vit A/vit C/vit E/zinc/copper (ICAPS AREDS ORAL) 1 capsule 2 TIMES DAILY (route: oral)    vitamin D (VITAMIN D3) 1000 units Tab Take 1,000 Units by mouth once daily. Patient is taking 2000 units per day     Family History       Problem Relation (Age of Onset)    Breast cancer Mother    Cancer Brother, Maternal Aunt, Maternal Uncle          Tobacco Use    Smoking status: Never Smoker    Smokeless tobacco: Never Used   Substance and Sexual Activity    Alcohol use: Yes     Alcohol/week: 2.0 standard drinks     Types: 2 Glasses of wine per week     Comment: 2 small glasses of red wine with dinner    Drug use: No    Sexual activity: Not Currently     Review of Systems   Constitutional:  Negative for chills and fever.   HENT:  Negative for congestion and rhinorrhea.    Respiratory:  Positive for shortness of breath. Negative for cough.    Cardiovascular:  Positive for chest pain. Negative for palpitations.   Gastrointestinal:  Negative for abdominal pain, diarrhea, nausea and vomiting.   Genitourinary:  Negative for frequency and urgency.   Musculoskeletal:  Negative for back pain and neck pain.   Skin:  Negative for pallor and rash.   Neurological:  Negative for weakness.   Psychiatric/Behavioral:  Negative for agitation and confusion.    All other systems reviewed and are negative.  Objective:     Vital Signs (Most Recent):  Temp: 97.7 °F (36.5 °C) (08/11/22 1036)  Pulse: 78 (08/11/22 1036)  Resp: 20 (08/11/22 1036)  BP: 136/65 (08/11/22 1036)  SpO2: 97 % (08/11/22 1036) Vital Signs (24h Range):  Temp:  [97.7 °F (36.5 °C)] 97.7 °F (36.5 °C)  Pulse:  [78] 78  Resp:  [20] 20  SpO2:  [97 %] 97 %  BP: (136)/(65) 136/65     Weight: 71.2 kg (157 lb)  Body mass index is 28.72 kg/m².    Physical  Exam  Constitutional:       General: She is not in acute distress.     Appearance: Normal appearance. She is well-developed. She is not ill-appearing.   HENT:      Head: Normocephalic and atraumatic.   Eyes:      Conjunctiva/sclera: Conjunctivae normal.      Pupils: Pupils are equal, round, and reactive to light.   Cardiovascular:      Rate and Rhythm: Normal rate and regular rhythm.      Heart sounds: Normal heart sounds.   Pulmonary:      Effort: Pulmonary effort is normal. No respiratory distress.      Breath sounds: Normal breath sounds.   Abdominal:      General: Bowel sounds are normal. There is no distension.      Palpations: Abdomen is soft.      Tenderness: There is no abdominal tenderness.   Musculoskeletal:         General: Normal range of motion.      Cervical back: Normal range of motion and neck supple.   Skin:     General: Skin is warm and dry.   Neurological:      General: No focal deficit present.      Mental Status: She is alert and oriented to person, place, and time.      Comments: Mild confusion   Psychiatric:         Mood and Affect: Mood normal.         Behavior: Behavior normal.         Thought Content: Thought content normal.         Judgment: Judgment normal.         CRANIAL NERVES     CN III, IV, VI   Pupils are equal, round, and reactive to light.     Significant Labs: All pertinent labs within the past 24 hours have been reviewed.  CBC:   Recent Labs   Lab 08/11/22  1133   WBC 6.23   HGB 11.9*   HCT 36.6*        CMP:   Recent Labs   Lab 08/11/22  1133      K 4.6      CO2 23      BUN 18   CREATININE 0.8   CALCIUM 10.3   PROT 7.8   ALBUMIN 3.4*   BILITOT 0.4   ALKPHOS 66   AST 30   ALT 18   ANIONGAP 11       Significant Imaging: I have reviewed all pertinent imaging results/findings within the past 24 hours.    Assessment/Plan:     * Chest pain  EKG prn Chest Pain  Troponin x 3  CBC, CMP, Mg, Phos daily  Fasting Lipid panel in am  TSH level  CXR  JOYCE  score-3  Consult Cardiology - follow recommendations  NM stress test in am  Supplemental O2 via nasal cannula to keep O2 Sats >92%  Beta Blockade if needed to reduce myocardial oxygen demand  Nitroglycerin prn  ASA      Salt-losing nephritis or syndrome  Continue salt tabs      Gastroesophageal reflux disease without esophagitis  pepcid bid        COPD (chronic obstructive pulmonary disease)  Patient's COPD is controlled currently.  Patient is currently off COPD Pathway. Continue scheduled inhalers Supplemental oxygen as needed and monitor respiratory status closely.         Essential hypertension  Chronic, controlled.  Latest blood pressure and vitals reviewed-   Temp:  [97.7 °F (36.5 °C)]   Pulse:  [78]   Resp:  [20]   BP: (136)/(65)   SpO2:  [97 %] .   Home meds for hypertension were reviewed and noted below.   Hypertension Medications             furosemide (LASIX) 20 MG tablet     isosorbide mononitrate (IMDUR) 30 MG 24 hr tablet Take 1 tablet (30 mg total) by mouth once daily.    olmesartan (BENICAR) 5 MG Tab Take 1 tablet (5 mg total) by mouth once daily.          While in the hospital, will manage blood pressure as follows; Continue home antihypertensive regimen    Will utilize p.r.n. blood pressure medication only if patient's blood pressure greater than  180/110 and she develops symptoms such as worsening chest pain or shortness of breath.        Stage 3 chronic kidney disease, unspecified whether stage 3a or 3b CKD  stable      Hypothyroid  Patient has chronic hypothyroidism. TFTs reviewed-   Lab Results   Component Value Date    TSH 3.421 07/05/2022   . Will continue chronic levothyroxine and adjust for and clinical changes.          VTE Risk Mitigation (From admission, onward)    Lovenox/SCDs             GRANT Kam  Department of Hospital Medicine   [facility] - Emergency Dept

## 2022-08-12 VITALS
WEIGHT: 157 LBS | RESPIRATION RATE: 15 BRPM | HEART RATE: 72 BPM | DIASTOLIC BLOOD PRESSURE: 60 MMHG | TEMPERATURE: 97 F | BODY MASS INDEX: 28.89 KG/M2 | SYSTOLIC BLOOD PRESSURE: 126 MMHG | HEIGHT: 62 IN | OXYGEN SATURATION: 94 %

## 2022-08-12 LAB
ALBUMIN SERPL BCP-MCNC: 3.2 G/DL (ref 3.5–5.2)
ALP SERPL-CCNC: 67 U/L (ref 55–135)
ALT SERPL W/O P-5'-P-CCNC: 18 U/L (ref 10–44)
ANION GAP SERPL CALC-SCNC: 11 MMOL/L (ref 8–16)
AST SERPL-CCNC: 24 U/L (ref 10–40)
BASOPHILS # BLD AUTO: 0.08 K/UL (ref 0–0.2)
BASOPHILS NFR BLD: 1.3 % (ref 0–1.9)
BILIRUB SERPL-MCNC: 0.5 MG/DL (ref 0.1–1)
BUN SERPL-MCNC: 19 MG/DL (ref 10–30)
CALCIUM SERPL-MCNC: 10.3 MG/DL (ref 8.7–10.5)
CHLORIDE SERPL-SCNC: 107 MMOL/L (ref 95–110)
CO2 SERPL-SCNC: 22 MMOL/L (ref 23–29)
CREAT SERPL-MCNC: 0.7 MG/DL (ref 0.5–1.4)
DIFFERENTIAL METHOD: ABNORMAL
EOSINOPHIL # BLD AUTO: 0.3 K/UL (ref 0–0.5)
EOSINOPHIL NFR BLD: 4.8 % (ref 0–8)
ERYTHROCYTE [DISTWIDTH] IN BLOOD BY AUTOMATED COUNT: 12.9 % (ref 11.5–14.5)
EST. GFR  (NO RACE VARIABLE): >60 ML/MIN/1.73 M^2
GLUCOSE SERPL-MCNC: 97 MG/DL (ref 70–110)
HCT VFR BLD AUTO: 33.1 % (ref 37–48.5)
HGB BLD-MCNC: 11.2 G/DL (ref 12–16)
IMM GRANULOCYTES # BLD AUTO: 0.01 K/UL (ref 0–0.04)
IMM GRANULOCYTES NFR BLD AUTO: 0.2 % (ref 0–0.5)
LYMPHOCYTES # BLD AUTO: 2.5 K/UL (ref 1–4.8)
LYMPHOCYTES NFR BLD: 41.5 % (ref 18–48)
MAGNESIUM SERPL-MCNC: 1.7 MG/DL (ref 1.6–2.6)
MCH RBC QN AUTO: 31.7 PG (ref 27–31)
MCHC RBC AUTO-ENTMCNC: 33.8 G/DL (ref 32–36)
MCV RBC AUTO: 94 FL (ref 82–98)
MONOCYTES # BLD AUTO: 0.6 K/UL (ref 0.3–1)
MONOCYTES NFR BLD: 9.8 % (ref 4–15)
NEUTROPHILS # BLD AUTO: 2.5 K/UL (ref 1.8–7.7)
NEUTROPHILS NFR BLD: 42.4 % (ref 38–73)
NRBC BLD-RTO: 0 /100 WBC
PLATELET # BLD AUTO: 237 K/UL (ref 150–450)
PMV BLD AUTO: 10.9 FL (ref 9.2–12.9)
POTASSIUM SERPL-SCNC: 3.9 MMOL/L (ref 3.5–5.1)
PROT SERPL-MCNC: 6.8 G/DL (ref 6–8.4)
RBC # BLD AUTO: 3.53 M/UL (ref 4–5.4)
SODIUM SERPL-SCNC: 140 MMOL/L (ref 136–145)
TROPONIN I SERPL DL<=0.01 NG/ML-MCNC: 0.09 NG/ML (ref 0–0.03)
WBC # BLD AUTO: 6 K/UL (ref 3.9–12.7)

## 2022-08-12 PROCEDURE — 25000003 PHARM REV CODE 250: Performed by: NURSE PRACTITIONER

## 2022-08-12 PROCEDURE — 83735 ASSAY OF MAGNESIUM: CPT | Performed by: NURSE PRACTITIONER

## 2022-08-12 PROCEDURE — G0378 HOSPITAL OBSERVATION PER HR: HCPCS

## 2022-08-12 PROCEDURE — 99900035 HC TECH TIME PER 15 MIN (STAT)

## 2022-08-12 PROCEDURE — 85025 COMPLETE CBC W/AUTO DIFF WBC: CPT | Performed by: NURSE PRACTITIONER

## 2022-08-12 PROCEDURE — 99220 PR INITIAL OBSERVATION CARE,LEVL III: CPT | Mod: ,,, | Performed by: INTERNAL MEDICINE

## 2022-08-12 PROCEDURE — 80053 COMPREHEN METABOLIC PANEL: CPT | Performed by: NURSE PRACTITIONER

## 2022-08-12 PROCEDURE — 99220 PR INITIAL OBSERVATION CARE,LEVL III: ICD-10-PCS | Mod: ,,, | Performed by: INTERNAL MEDICINE

## 2022-08-12 PROCEDURE — 94761 N-INVAS EAR/PLS OXIMETRY MLT: CPT

## 2022-08-12 PROCEDURE — 36415 COLL VENOUS BLD VENIPUNCTURE: CPT | Performed by: NURSE PRACTITIONER

## 2022-08-12 RX ADMIN — FUROSEMIDE 20 MG: 20 TABLET ORAL at 11:08

## 2022-08-12 RX ADMIN — BUSPIRONE HYDROCHLORIDE 10 MG: 10 TABLET ORAL at 11:08

## 2022-08-12 RX ADMIN — LEVOTHYROXINE SODIUM 88 MCG: 0.09 TABLET ORAL at 11:08

## 2022-08-12 RX ADMIN — SERTRALINE HYDROCHLORIDE 25 MG: 25 TABLET ORAL at 11:08

## 2022-08-12 RX ADMIN — Medication 1000 UNITS: at 11:08

## 2022-08-12 RX ADMIN — ASPIRIN 81 MG: 81 TABLET, COATED ORAL at 11:08

## 2022-08-12 RX ADMIN — FAMOTIDINE 20 MG: 20 TABLET ORAL at 11:08

## 2022-08-12 RX ADMIN — LOSARTAN POTASSIUM 12.5 MG: 25 TABLET, FILM COATED ORAL at 11:08

## 2022-08-12 RX ADMIN — ISOSORBIDE MONONITRATE 30 MG: 30 TABLET, EXTENDED RELEASE ORAL at 11:08

## 2022-08-12 RX ADMIN — DOCUSATE SODIUM AND SENNOSIDES 1 TABLET: 8.6; 5 TABLET, FILM COATED ORAL at 11:08

## 2022-08-12 NOTE — PLAN OF CARE
[facility]  Initial Discharge Assessment       Primary Care Provider: William Olivia MD    Admission Diagnosis: Chest pain [R07.9]  Chest pain, unspecified type [R07.9]    Admission Date: 8/11/2022  Expected Discharge Date: 8/12/2022    Discharge Barriers Identified: None    Payor: [employer] MANAGED MEDICARE / Plan: [employer] MEDICARE HMO / Product Type: Capitation /     Extended Emergency Contact Information  Primary Emergency Contact: Shola Gimenez  Home Phone: [phone]  Mobile Phone: [phone]  Relation: Son  Preferred language: English   needed? No    Discharge Plan A: Home Health  Discharge Plan B: Home with family      [id number] - [place] - [address] AT [facility] [address]  [address] [address]  Phone: [phone] Fax: [phone]    SW met with patient and patient's son Shola Gimenez at bedside to complete discharge planning assessment.  Patient alert and oriented xs 4.  Patient verified all demographic information on facesheet is correct.  Patient verified PCP is Dr. Olivia.  Patient verified primary health insurance is [employer].  Patient with home health and listed DME. Patient in agreement with resuming home health with [facility].  Patient with [facility] NP at home and OP CM Programs.  Patient with POA (son) and Living Will.  Patient not on dialysis or medication coumadin.  Patient with no 30 day admission.  Patient with no financial issues at this time.  Patient family will provide transportation upon discharge from facility.  Patient independent with ADLs, live with adult son, son drives.      Initial Assessment (most recent)     Adult Discharge Assessment - 08/12/22 1115        Discharge Assessment    Assessment Type Discharge Planning Assessment     Confirmed/corrected address, phone number and insurance Yes     Confirmed Demographics Correct on Facesheet     Source of Information  patient;family     Does patient/caregiver understand observation status Yes     Communicated BOGDAN with patient/caregiver Yes     Lives With child(kiarra), adult     Facility Arrived From: home     Do you expect to return to your current living situation? Yes     Do you have help at home or someone to help you manage your care at home? Yes     Who are your caregiver(s) and their phone number(s)? family     Prior to hospitilization cognitive status: Alert/Oriented     Current cognitive status: Alert/Oriented     Walking or Climbing Stairs Difficulty ambulation difficulty, requires equipment;stair climbing difficulty, requires equipment     Dressing/Bathing Difficulty none     Equipment Currently Used at Home rollator     Readmission within 30 days? No     Patient currently being followed by outpatient case management? Yes     If yes, name of outpatient case management following: [facility] outpatient case management     Do you currently have service(s) that help you manage your care at home? Yes     Name and Contact number of agency [facility] home health     Is the pt/caregiver preference to resume services with current agency Yes     Do you take prescription medications? Yes     Do you have prescription coverage? Yes     Do you have any problems affording any of your prescribed medications? No     Is the patient taking medications as prescribed? yes     Who is going to help you get home at discharge? son     How do you get to doctors appointments? family or friend will provide     Are you on dialysis? No     Do you take coumadin? No     Discharge Plan A Home Health     Discharge Plan B Home with family     DME Needed Upon Discharge  none     Discharge Plan discussed with: Patient;Adult children     Discharge Barriers Identified None

## 2022-08-12 NOTE — PLAN OF CARE
Patient was shane to ambulate in hallway with no exertional chest pain or SOB. Reviewed discharge instructions with patient. Patient verbalizes understanding. Patient w/c to car with family at side.

## 2022-08-12 NOTE — PLAN OF CARE
08/11/22 2018   Patient Assessment/Suction   Level of Consciousness (AVPU) alert   Respiratory Effort Normal;Unlabored   Expansion/Accessory Muscles/Retractions no use of accessory muscles;no retractions   Rhythm/Pattern, Respiratory depth regular;pattern regular;unlabored   Cough Frequency no cough   PRE-TX-O2   O2 Device (Oxygen Therapy) room air   SpO2 98 %   Pulse Oximetry Type Intermittent   $ Pulse Oximetry - Multiple Charge Pulse Oximetry - Multiple   Pulse 77   Resp 18

## 2022-08-12 NOTE — CONSULTS
"[facility]  Department of Cardiology  Consult Note      PATIENT NAME: Glenda Gimenez    MRN: [id number]  TODAY'S DATE: 08/12/2022  ADMIT DATE: 8/11/2022                          CONSULT REQUESTED BY: Neda Garcia MD    SUBJECTIVE     PRINCIPAL PROBLEM: Chest pain  Glenda Gimenez is a 89 y/o F with a past medical history significant for HTN, HLD, and hypothyroidism who presented to the ED with c/o chest pain and shortness of breath which began this morning.  She is accompanied by her son.  Pt states that she does not remember having chest pain and that she is here for a "check-up."  Son states that pt was c/o chest pain and SOB this morning; pt states now she is chest pain free.  Denies recent fevers, chills, abdominal pain, dysuria, N/V or any other symptoms.  ED workup is significant for mildly elevated troponin of 0.064 with no acute EKG changes.  She is placed in observation under the service of hospital medicine for continued monitoring and treatment.       REASON FOR CONSULT:  Chest pain      HPI:  Patient is a 89-year-old lady with known history of hypertension has not had any significant past cardiac history.  She was followed up in 2013 by Dr. Bautista for hypertension.  Patient was brought in by her son with complaints of having chest pain and shortness of breath which began in the morning.  Patient states that she does not remember having chest pain and that she was here for a checkup.  At the present time patient denies any chest pain or tightness or heaviness her breathing is stable denies any shortness of breath or difficulty in breathing denies any dizziness or lightheadedness or loss of consciousness.    In the ER troponin was 0.06 and her EKG has no acute ST T-wave changes and no significant change from the EKG done in February 2022.        Review of patient's allergies indicates:   Allergen Reactions    Amlodipine Swelling     Other reaction(s): Angioedema    Atenolol      " Other reaction(s): itch    Betamethasone sodium phosphate      Other reaction(s): Flushing (skin)    Cortisone      Other reaction(s): blurry vision  Other reaction(s): Rash    Lisinopril      Other reaction(s): COUGH    Naproxen      Other reaction(s): body shut down    Prednisone     Triamterene-hydrochlorothiazid      Other reaction(s): itch       Past Medical History:   Diagnosis Date    Anticoagulant long-term use     Arthritis     Dislocation of right shoulder joint     Hypertension     Hypothyroidism     Shoulder disorder     right    Thyroid disease      Past Surgical History:   Procedure Laterality Date    HYSTERECTOMY      PARTIAL HYSTERECTOMY      ARTUR, ovaries in place, uterine prolapse     Social History     Tobacco Use    Smoking status: Never Smoker    Smokeless tobacco: Never Used   Substance Use Topics    Alcohol use: Yes     Alcohol/week: 2.0 standard drinks     Types: 2 Glasses of wine per week     Comment: 2 small glasses of red wine with dinner    Drug use: No        REVIEW OF SYSTEMS  CONSTITUTIONAL: Negative for chills, fatigue and fever.   EYES: No double vision, No blurred vision  NEURO: No headaches, No dizziness  RESPIRATORY: Negative for cough, shortness of breath and wheezing.    CARDIOVASCULAR: Negative for chest pain. Negative for palpitations and leg swelling.   GI: Negative for abdominal pain, No melena, diarrhea, nausea and vomiting.   : Negative for dysuria and frequency, Negative for hematuria  SKIN: Negative for bruising, Negative for edema or discoloration noted.   ENDOCRINE: Negative for polyphagia, Negative for heat intolerance, Negative for cold intolerance  PSYCHIATRIC: Negative for depression, Negative for anxiety, Negative for memory loss  MUSCULOSKELETAL: Negative for neck pain, Negative for muscle weakness, Negative for back pain     OBJECTIVE     VITAL SIGNS (Most Recent)  Temp: 96.6 °F (35.9 °C) (08/12/22 0727)  Pulse: 72 (08/12/22 0839)  Resp: 15  (08/12/22 0839)  BP: 127/61 (08/12/22 0727)  SpO2: (!) 94 % (08/12/22 0839)    VENTILATION STATUS  Resp: 15 (08/12/22 0839)  SpO2: (!) 94 % (08/12/22 0839)  Oxygen Concentration (%):  [21] 21    I & O (Last 24H):No intake or output data in the 24 hours ending 08/12/22 0853    WEIGHTS  Wt Readings from Last 1 Encounters:   08/11/22 1845 71.2 kg (157 lb)   08/11/22 1036 71.2 kg (157 lb)       PHYSICAL EXAM  GENERAL: well built, well nourished, well-developed in no apparent distress alert and oriented.   HEENT: Normocephalic. Pupils normal and conjunctivae normal.  Mucous membranes normal, no cyanosis or icterus, trachea central,no pallor or icterus is noted..   NECK: No JVD. No bruit..   THYROID: Thyroid not enlarged. No nodules present..   CARDIAC: Regular rate and rhythm. S1 is normal.S2 audible soft systolic murmur audible.    CHEST ANATOMY: normal.   LUNGS: Clear to auscultation. No wheezing or rhonchi..   ABDOMEN: Soft    URINARY: No izaguirre catheter   EXTREMITIES: No cyanosis, clubbing or edema noted at this time., no calf tenderness bilaterally.   PERIPHERAL VASCULAR SYSTEM: Good palpable distal pulses.   CENTRAL NERVOUS SYSTEM: No focal motor or sensory deficits noted.   SKIN: Skin without lesions, moist, well perfused.   MUSCLE STRENGTH & TONE: No noteable weakness, atrophy or abnormal movement.     HOME MEDICATIONS:  No current facility-administered medications on file prior to encounter.     Current Outpatient Medications on File Prior to Encounter   Medication Sig Dispense Refill    acetaminophen (TYLENOL) 325 MG tablet Take 325 mg by mouth every 6 (six) hours as needed for Pain.      aspirin (ECOTRIN) 81 MG EC tablet Take 81 mg by mouth once daily.      busPIRone (BUSPAR) 10 MG tablet Take 1 tablet (10 mg total) by mouth 3 (three) times daily. 90 tablet 2    ciclopirox 0.77 % Gel Apply topically 2 (two) times a day. 100 g 2    donepeziL (ARICEPT) 5 MG tablet Take 1/2 tablet (2.5 mg) nightly x 4 weeks  then increase to 1 tablet (5 mg) nightly thereafter. 30 tablet 11    famotidine (PEPCID) 20 MG tablet Take 1 tablet (20 mg total) by mouth 2 (two) times daily. 180 tablet 3    furosemide (LASIX) 20 MG tablet       ibandronate (BONIVA) 150 mg tablet Take 1 tablet (150 mg total) by mouth every 30 days. 3 tablet 3    isosorbide mononitrate (IMDUR) 30 MG 24 hr tablet Take 1 tablet (30 mg total) by mouth once daily. 90 tablet 3    krill oil 500 mg Cap Take by mouth.      levothyroxine (SYNTHROID) 88 MCG tablet Take 1 tablet (88 mcg total) by mouth once daily. 90 tablet 3    olmesartan (BENICAR) 5 MG Tab Take 1 tablet (5 mg total) by mouth once daily. 90 tablet 3    pantoprazole (PROTONIX) 40 MG tablet Take 1 tablet (40 mg total) by mouth once daily. One month then stop 30 tablet 0    peg 400-propylene glycol, PF, (SYSTANE HYDRATION PF) 0.4-0.3 % Drop 1 drops DAILY (route: ophthalmic (eye))      pulse oximeter (PULSE OXIMETER) device by Apply Externally route 2 (two) times a day. Use twice daily at 8 AM and 3 PM and record the value in MyChart as directed. 1 each 0    pulse oximeter (PULSE OXIMETER) device by Apply Externally route 2 (two) times a day. Use twice daily at 8 AM and 3 PM and record the value in MyChart as directed. 1 each 0    QUEtiapine (SEROQUEL) 50 MG tablet Take 1 tablet (50 mg total) by mouth every evening. 30 tablet 11    sertraline (ZOLOFT) 25 MG tablet Take 1 tablet (25 mg total) by mouth once daily. 90 tablet 3    sodium chloride 1 gram tablet Take 1 g by mouth 3 (three) times daily. 2 tabs TID      vit A/vit C/vit E/zinc/copper (ICAPS AREDS ORAL) 1 capsule 2 TIMES DAILY (route: oral)      vitamin D (VITAMIN D3) 1000 units Tab Take 1,000 Units by mouth once daily. Patient is taking 2000 units per day         SCHEDULED MEDS:   aspirin  81 mg Oral Daily    busPIRone  10 mg Oral TID    donepeziL  5 mg Oral QHS    enoxaparin  40 mg Subcutaneous Daily    famotidine  20 mg Oral Daily     furosemide  20 mg Oral Daily    isosorbide mononitrate  30 mg Oral Daily    levothyroxine  88 mcg Oral Daily    losartan  12.5 mg Oral Daily    QUEtiapine  50 mg Oral QHS    senna-docusate 8.6-50 mg  1 tablet Oral BID    sertraline  25 mg Oral Daily    vitamin D  1,000 Units Oral Daily       CONTINUOUS INFUSIONS:    PRN MEDS:acetaminophen, albuterol-ipratropium, aluminum-magnesium hydroxide-simethicone, dextrose 50%, dextrose 50%, glucagon (human recombinant), glucose, glucose, insulin aspart U-100, magnesium oxide, magnesium oxide, melatonin, naloxone, ondansetron, potassium bicarbonate, potassium bicarbonate, potassium bicarbonate, potassium, sodium phosphates, potassium, sodium phosphates, potassium, sodium phosphates, simethicone, sodium chloride 0.9%    LABS AND DIAGNOSTICS     CBC LAST 3 DAYS  Recent Labs   Lab 08/11/22  1133 08/12/22  0309   WBC 6.23 6.00   RBC 3.85* 3.53*   HGB 11.9* 11.2*   HCT 36.6* 33.1*   MCV 95 94   MCH 30.9 31.7*   MCHC 32.5 33.8   RDW 13.0 12.9    237   MPV 10.6 10.9   GRAN 47.7  3.0 42.4  2.5   LYMPH 36.4  2.3 41.5  2.5   MONO 10.1  0.6 9.8  0.6   BASO 0.08 0.08   NRBC 0 0       COAGULATION LAST 3 DAYS  No results for input(s): LABPT, INR, APTT in the last 168 hours.    CHEMISTRY LAST 3 DAYS  Recent Labs   Lab 08/11/22  1133 08/12/22  0309    140   K 4.6 3.9    107   CO2 23 22*   ANIONGAP 11 11   BUN 18 19   CREATININE 0.8 0.7    97   CALCIUM 10.3 10.3   MG  --  1.7   ALBUMIN 3.4* 3.2*   PROT 7.8 6.8   ALKPHOS 66 67   ALT 18 18   AST 30 24   BILITOT 0.4 0.5       CARDIAC PROFILE LAST 3 DAYS  Recent Labs   Lab 08/11/22  1133 08/11/22  1709 08/11/22  2309   *  --   --    TROPONINI 0.064* 0.070* 0.086*       ENDOCRINE LAST 3 DAYS  Recent Labs   Lab 08/11/22  1709   TSH 2.697       LAST ARTERIAL BLOOD GAS  ABG  No results for input(s): PH, PO2, PCO2, HCO3, BE in the last 168 hours.    LAST 7 DAYS MICROBIOLOGY   Microbiology Results (last  7 days)     ** No results found for the last 168 hours. **          MOST RECENT IMAGING  X-Ray Chest PA And Lateral  Narrative: EXAMINATION:  XR CHEST PA AND LATERAL    CLINICAL HISTORY:  Chest Pain;    TECHNIQUE:  PA and lateral views of the chest were performed.    COMPARISON:  06/11/2021    FINDINGS:  There is patchy opacification of the pulmonary parenchyma similar to prior exam prob dominantly at the periphery thought to represent fibrotic changes.  There is no evidence acute pulmonary consolidation.  Impression: There is no evidence acute pulmonary consolidation.    Electronically signed by: Zaina Díaz MD  Date:    08/11/2022  Time:    11:32      ECHOCARDIOGRAM RESULTS (last 5)  No results found for this or any previous visit.      CURRENT/PREVIOUS VISIT EKG  Results for orders placed or performed in visit on 08/03/22   IN OFFICE EKG 12-LEAD (to [place])    Collection Time: 08/03/22  3:51 PM    Narrative    Test Reason : R07.9,    Vent. Rate : 073 BPM     Atrial Rate : 073 BPM     P-R Int : 206 ms          QRS Dur : 078 ms      QT Int : 410 ms       P-R-T Axes : 030 010 068 degrees     QTc Int : 451 ms    Normal sinus rhythm  Nonspecific ST and T wave abnormality  Abnormal ECG  When compared with ECG of 09-MAY-2022 18:29,  No significant change was found  Confirmed by Bogdan Martin MD ([id number]) on 8/4/2022 8:37:52 PM    Referred By: William Olivia           Confirmed By:Bogdan Martin MD           ASSESSMENT/PLAN:     Active Hospital Problems    Diagnosis    *Chest pain    Salt-losing nephritis or syndrome    Gastroesophageal reflux disease without esophagitis    COPD (chronic obstructive pulmonary disease)    Essential hypertension    Stage 3 chronic kidney disease, unspecified whether stage 3a or 3b CKD    Hypothyroid       ASSESSMENT & PLAN:   1. Chest pain  2. Essential hypertension   3. Advanced age  4. Stage 3 chronic kidney disease   5. Hypothyroidism         RECOMMENDATIONS:  1. Trend the troponins  2. 2D  echocardiogram for LV function ejection fraction and wall motion abnormality   3. Patient is advanced in age at 89 years.  Would consider conservative medical management as opposed to aggressive invasive and interventional workup.  4. Isosorbide mononitrate 30 mg p.o. b.i.d.  5. Consider Plavix 75 mg p.o. q.day, she is at risk for bleeding.  6. Given the advanced age she likely has multivessel coronary artery disease and not a reasonable candidate for coronary bypass grafting due to advanced age.  7. Consideration for invasive workup if the family wishes.  8. Further recommendations to follow thank you for the consultation.          Mina Benitez MD  [facility]  Department of Cardiology  Date of Service: 08/12/2022  8:53 AM

## 2022-08-12 NOTE — PLAN OF CARE
Patient cleared for discharge from case management standpoint.    SW notified [facility] via careport of patient discharge today to resume home health.         08/12/22 1319   Final Note   Assessment Type Final Discharge Note   Anticipated Discharge Disposition Home-Health   Hospital Resources/Appts/Education Provided Post-Acute resouces added to AVS;Provided education on problems/symptoms using teachback;Appointments scheduled and added to AVS;Provided patient/caregiver with written discharge plan information

## 2022-08-12 NOTE — CARE UPDATE
08/12/22 0839   Patient Assessment/Suction   Level of Consciousness (AVPU) alert   Respiratory Effort Normal   Expansion/Accessory Muscles/Retractions expansion symmetric   All Lung Fields Breath Sounds Anterior:   LLL Breath Sounds diminished   RLL Breath Sounds diminished   Rhythm/Pattern, Respiratory pattern regular   Cough Frequency no cough   PRE-TX-O2   O2 Device (Oxygen Therapy) room air   Oxygen Concentration (%) 21   SpO2 (!) 94 %   Pulse Oximetry Type Intermittent   $ Pulse Oximetry - Multiple Charge Pulse Oximetry - Multiple   Pulse 72   Resp 15   Positioning HOB elevated 45 degrees   Aerosol Therapy   $ Aerosol Therapy Charges PRN treatment not required

## 2022-08-13 LAB
AORTIC ROOT ANNULUS: 2.97 CM
AORTIC VALVE CUSP SEPERATION: 1.99 CM
AV INDEX (PROSTH): 0.76
AV MEAN GRADIENT: 8 MMHG
AV PEAK GRADIENT: 13 MMHG
AV VALVE AREA: 2.68 CM2
AV VELOCITY RATIO: 0.61
BSA FOR ECHO PROCEDURE: 1.76 M2
CV ECHO LV RWT: 0.44 CM
DOP CALC AO PEAK VEL: 1.81 M/S
DOP CALC AO VTI: 36.98 CM
DOP CALC LVOT AREA: 3.5 CM2
DOP CALC LVOT DIAMETER: 2.12 CM
DOP CALC LVOT PEAK VEL: 1.1 M/S
DOP CALC LVOT STROKE VOLUME: 99.07 CM3
DOP CALC MV VTI: 24.49 CM
DOP CALCLVOT PEAK VEL VTI: 28.08 CM
E WAVE DECELERATION TIME: 236.18 MSEC
E/A RATIO: 0.69
E/E' RATIO: 11.08 M/S
ECHO LV POSTERIOR WALL: 0.96 CM (ref 0.6–1.1)
EJECTION FRACTION: 50 %
FRACTIONAL SHORTENING: 22 % (ref 28–44)
INTERVENTRICULAR SEPTUM: 1.04 CM (ref 0.6–1.1)
IVRT: 71.36 MSEC
LA MAJOR: 5.22 CM
LA WIDTH: 3.48 CM
LEFT ATRIUM SIZE: 3.77 CM
LEFT ATRIUM VOLUME INDEX MOD: 24.7 ML/M2
LEFT ATRIUM VOLUME MOD: 42.5 CM3
LEFT INTERNAL DIMENSION IN SYSTOLE: 3.4 CM (ref 2.1–4)
LEFT VENTRICLE DIASTOLIC VOLUME INDEX: 50.23 ML/M2
LEFT VENTRICLE DIASTOLIC VOLUME: 86.4 ML
LEFT VENTRICLE MASS INDEX: 85 G/M2
LEFT VENTRICLE SYSTOLIC VOLUME INDEX: 27.6 ML/M2
LEFT VENTRICLE SYSTOLIC VOLUME: 47.39 ML
LEFT VENTRICULAR INTERNAL DIMENSION IN DIASTOLE: 4.37 CM (ref 3.5–6)
LEFT VENTRICULAR MASS: 146.22 G
LV LATERAL E/E' RATIO: 9 M/S
LV SEPTAL E/E' RATIO: 14.4 M/S
MV A" WAVE DURATION": 20.17 MSEC
MV MEAN GRADIENT: 1 MMHG
MV PEAK A VEL: 1.04 M/S
MV PEAK E VEL: 0.72 M/S
MV PEAK GRADIENT: 6 MMHG
MV STENOSIS PRESSURE HALF TIME: 68.49 MS
MV VALVE AREA BY CONTINUITY EQUATION: 4.05 CM2
MV VALVE AREA P 1/2 METHOD: 3.21 CM2
PISA MRMAX VEL: 0.04 M/S
PISA TR MAX VEL: 2.5 M/S
PULM VEIN S/D RATIO: 1.26
PV PEAK D VEL: 0.34 M/S
PV PEAK S VEL: 0.43 M/S
PV PEAK VELOCITY: 0.79 CM/S
RA MAJOR: 4.45 CM
RA PRESSURE: 3 MMHG
RA WIDTH: 3.4 CM
RIGHT VENTRICULAR END-DIASTOLIC DIMENSION: 2.93 CM
RV TISSUE DOPPLER FREE WALL SYSTOLIC VELOCITY 1 (APICAL 4 CHAMBER VIEW): 9.78 CM/S
TDI LATERAL: 0.08 M/S
TDI SEPTAL: 0.05 M/S
TDI: 0.07 M/S
TR MAX PG: 25 MMHG
TRICUSPID ANNULAR PLANE SYSTOLIC EXCURSION: 2.14 CM
TV REST PULMONARY ARTERY PRESSURE: 28 MMHG

## 2022-08-15 ENCOUNTER — OFFICE VISIT (OUTPATIENT)
Dept: CARDIOLOGY | Facility: CLINIC | Age: 87
End: 2022-08-15
Payer: MEDICARE

## 2022-08-15 ENCOUNTER — TELEPHONE (OUTPATIENT)
Dept: MEDSURG UNIT | Facility: HOSPITAL | Age: 87
End: 2022-08-15
Payer: MEDICARE

## 2022-08-15 VITALS
HEART RATE: 76 BPM | OXYGEN SATURATION: 98 % | SYSTOLIC BLOOD PRESSURE: 116 MMHG | DIASTOLIC BLOOD PRESSURE: 66 MMHG | WEIGHT: 155 LBS | BODY MASS INDEX: 28.35 KG/M2

## 2022-08-15 DIAGNOSIS — I25.118 CORONARY ARTERY DISEASE OF NATIVE ARTERY OF NATIVE HEART WITH STABLE ANGINA PECTORIS: ICD-10-CM

## 2022-08-15 DIAGNOSIS — E55.9 VITAMIN D DEFICIENCY DISEASE: ICD-10-CM

## 2022-08-15 DIAGNOSIS — J43.8 OTHER EMPHYSEMA: ICD-10-CM

## 2022-08-15 DIAGNOSIS — R07.89 BURNING CHEST PAIN: Primary | ICD-10-CM

## 2022-08-15 DIAGNOSIS — E03.9 HYPOTHYROIDISM, UNSPECIFIED TYPE: ICD-10-CM

## 2022-08-15 DIAGNOSIS — R54 ADVANCED AGE: ICD-10-CM

## 2022-08-15 PROCEDURE — 1126F PR PAIN SEVERITY QUANTIFIED, NO PAIN PRESENT: ICD-10-PCS | Mod: CPTII,S$GLB,, | Performed by: GENERAL PRACTICE

## 2022-08-15 PROCEDURE — 1160F PR REVIEW ALL MEDS BY PRESCRIBER/CLIN PHARMACIST DOCUMENTED: ICD-10-PCS | Mod: CPTII,S$GLB,, | Performed by: GENERAL PRACTICE

## 2022-08-15 PROCEDURE — 1101F PT FALLS ASSESS-DOCD LE1/YR: CPT | Mod: CPTII,S$GLB,, | Performed by: GENERAL PRACTICE

## 2022-08-15 PROCEDURE — 1159F PR MEDICATION LIST DOCUMENTED IN MEDICAL RECORD: ICD-10-PCS | Mod: CPTII,S$GLB,, | Performed by: GENERAL PRACTICE

## 2022-08-15 PROCEDURE — 3288F FALL RISK ASSESSMENT DOCD: CPT | Mod: CPTII,S$GLB,, | Performed by: GENERAL PRACTICE

## 2022-08-15 PROCEDURE — 99214 OFFICE O/P EST MOD 30 MIN: CPT | Mod: S$GLB,,, | Performed by: GENERAL PRACTICE

## 2022-08-15 PROCEDURE — 1157F PR ADVANCE CARE PLAN OR EQUIV PRESENT IN MEDICAL RECORD: ICD-10-PCS | Mod: CPTII,S$GLB,, | Performed by: GENERAL PRACTICE

## 2022-08-15 PROCEDURE — 1101F PR PT FALLS ASSESS DOC 0-1 FALLS W/OUT INJ PAST YR: ICD-10-PCS | Mod: CPTII,S$GLB,, | Performed by: GENERAL PRACTICE

## 2022-08-15 PROCEDURE — 1126F AMNT PAIN NOTED NONE PRSNT: CPT | Mod: CPTII,S$GLB,, | Performed by: GENERAL PRACTICE

## 2022-08-15 PROCEDURE — 3288F PR FALLS RISK ASSESSMENT DOCUMENTED: ICD-10-PCS | Mod: CPTII,S$GLB,, | Performed by: GENERAL PRACTICE

## 2022-08-15 PROCEDURE — 1159F MED LIST DOCD IN RCRD: CPT | Mod: CPTII,S$GLB,, | Performed by: GENERAL PRACTICE

## 2022-08-15 PROCEDURE — 99214 PR OFFICE/OUTPT VISIT, EST, LEVL IV, 30-39 MIN: ICD-10-PCS | Mod: S$GLB,,, | Performed by: GENERAL PRACTICE

## 2022-08-15 PROCEDURE — 1157F ADVNC CARE PLAN IN RCRD: CPT | Mod: CPTII,S$GLB,, | Performed by: GENERAL PRACTICE

## 2022-08-15 PROCEDURE — 1160F RVW MEDS BY RX/DR IN RCRD: CPT | Mod: CPTII,S$GLB,, | Performed by: GENERAL PRACTICE

## 2022-08-15 RX ORDER — NITROGLYCERIN 0.4 MG/1
0.4 TABLET SUBLINGUAL EVERY 5 MIN PRN
Qty: 25 TABLET | Refills: 3 | Status: SHIPPED | OUTPATIENT
Start: 2022-08-15

## 2022-08-15 NOTE — PROGRESS NOTES
11/29/18 1300   Group 1   Start Time 1230   Stop Time 1330   Length (min) 60 Min   Group Type Inpatient   Group Name educational   Focus of Group medication   Number in attendance 12   Attendance Present   Participation Active   Patient Response Attentive;Interested in topic   Group Notes Pt completed tasks and was engaged and cooperative.      "Subjective:    Patient ID:  Glenda Gimenez is a 89 y.o. female who presents for evaluation of   Chief Complaint   Patient presents with    Hospital Follow Up   REFP MD [place]    HPI    Glenda Gimenez is a 89 y/o F with a past medical history significant for HTN, HLD, and hypothyroidism who presented to the ED with c/o chest pain and shortness of breath which began this morning.  She is accompanied by her son.  Pt states that she does not remember having chest pain and that she is here for a "check-up."  Son states that pt was c/o chest pain and SOB this morning; pt states now she is chest pain free.  Denies recent fevers, chills, abdominal pain, dysuria, N/V or any other symptoms.  ED workup is significant for mildly elevated troponin of 0.064 with no acute EKG changes.  She is placed in observation under the service of hospital medicine for continued monitoring and treatment.        * No surgery found *       Hospital Course:   Glenda Gimenez was monitored closely during her hospitalization.  She was admitted for further workup of chest pain.  Cardiology was consulted and conservative treatment was advised due to the patient's advanced age, and the patient agreed.  Her troponins were trended and remained negative.  An echocardiogram was performed with EF 50% noted, normal LV diastolic function.  She remained free from chest pain throughout her hospital stay.  She was cleared for discharge from the standpoint of cardiology.  Return precautions were discussed at length.  Home health and appropriate follow up was arranged.  She was discharged home in good condition.         After Visit Summary printed on 12/16/2013        Progress Notes  Abdias Bautista MD (Physician)   Cardiology  Subjective:    Patient ID: Glenda Gimenez is a 86 y.o. female who presents for follow up   Recently diagnosed with diverticulitis and just finished taking antibiotics. HAS gained 10 pounds in the past 6 months.  Her 2D " "echo shows normal LV function and diastolic dysfunction   SHe has history of HTN since 2007. SHe is mildly active due to hip arthritis. Denies angina, dyspnea, palpitations, PND, orthopnea, claudication, TIA's          ROSANNE Olivia MD  Burning pain in chest. When "excited."  No pain after eating.   Never gets with exertion.   No fever.   No cough or dyspnea.      2. Mobility and balance are slowly declining. Plans to enroll in senior exercise program, but asks about PT reconditioning program first which I think is reasonable.       8/15/22     Trend the troponins  2. 2D echocardiogram for LV function ejection fraction and wall motion abnormality   3. Patient is advanced in age at 89 years.  Would consider conservative medical management as opposed to aggressive invasive and interventional workup.  4. Isosorbide mononitrate 30 mg p.o. b.i.d.  5. Consider Plavix 75 mg p.o. q.day, she is at risk for bleeding.  6. Given the advanced age she likely has multivessel coronary artery disease and not a reasonable candidate for coronary bypass grafting due to advanced age.  7. Consideration for invasive workup if the family wishes.  8. Further recommendations to follow thank you for the consultation.              Review of patient's allergies indicates:   Allergen Reactions    Amlodipine Swelling     Other reaction(s): Angioedema    Atenolol      Other reaction(s): itch    Betamethasone sodium phosphate      Other reaction(s): Flushing (skin)    Cortisone      Other reaction(s): blurry vision  Other reaction(s): Rash    Lisinopril      Other reaction(s): COUGH    Naproxen      Other reaction(s): body shut down    Prednisone     Triamterene-hydrochlorothiazid      Other reaction(s): itch       Past Medical History:   Diagnosis Date    Anticoagulant long-term use     Arthritis     Dislocation of right shoulder joint     Hypertension     Hypothyroidism     Shoulder disorder     right    Thyroid disease      Past " Surgical History:   Procedure Laterality Date    HYSTERECTOMY      PARTIAL HYSTERECTOMY      ARTUR, ovaries in place, uterine prolapse     Social History     Tobacco Use    Smoking status: Never Smoker    Smokeless tobacco: Never Used   Substance Use Topics    Alcohol use: Yes     Alcohol/week: 2.0 standard drinks     Types: 2 Glasses of wine per week     Comment: 2 small glasses of red wine with dinner    Drug use: No     Family History   Problem Relation Age of Onset    Breast cancer Mother     Cancer Brother         lung cancer    Cancer Maternal Aunt         unknown cancer    Cancer Maternal Uncle         pancreatic cancer    Heart disease Neg Hx         Review of Systems:   Constitution: Negative for diaphoresis and fever.   HEENT: Negative for nosebleeds.    Cardiovascular: Negative for chest pain       No dyspnea on exertion       No leg swelling        No palpitations  Respiratory: Negative for shortness of breath and wheezing.    Hematologic/Lymphatic: Negative for bleeding problem. Does not bruise/bleed easily.   Skin: Negative for color change and rash.   Musculoskeletal: Negative for falls and myalgias.   Gastrointestinal: Negative for hematemesis and hematochezia.   Genitourinary: Negative for hematuria.   Neurological: Negative for dizziness and light-headedness.   Psychiatric/Behavioral: Negative for altered mental status and memory loss.          Objective:        Vitals:    08/15/22 0934   BP: 116/66   Pulse: 76       Lab Results   Component Value Date    WBC 6.00 08/12/2022    HGB 11.2 (L) 08/12/2022    HCT 33.1 (L) 08/12/2022     08/12/2022    CHOL 185 07/05/2022    TRIG 143 07/05/2022    HDL 48 07/05/2022    ALT 18 08/12/2022    AST 24 08/12/2022     08/12/2022    K 3.9 08/12/2022     08/12/2022    CREATININE 0.7 08/12/2022    BUN 19 08/12/2022    CO2 22 (L) 08/12/2022    TSH 2.697 08/11/2022    INR 1.0 08/28/2020    HGBA1C 5.0 07/05/2022        ECHOCARDIOGRAM  RESULTS  Results for orders placed during the hospital encounter of 08/11/22    Echo    Interpretation Summary  · The left ventricle is normal in size with mild concentric hypertrophy and low normal systolic function.  · There are segmental left ventricular wall motion abnormalities.  · Mild left atrial enlargement.  · Mild tricuspid regurgitation.  · Mild mitral regurgitation.  · Normal central venous pressure (3 mmHg).  · The estimated PA systolic pressure is 28 mmHg.  · The estimated ejection fraction is 50%.  · Normal left ventricular diastolic function.  · Normal right ventricular size with normal right ventricular systolic function.        CURRENT/PREVIOUS VISIT EKG  Results for orders placed or performed during the hospital encounter of 08/11/22   EKG 12-lead    Collection Time: 08/11/22 11:50 AM    Narrative    Test Reason : R07.9,    Vent. Rate : 069 BPM     Atrial Rate : 069 BPM     P-R Int : 298 ms          QRS Dur : 088 ms      QT Int : 392 ms       P-R-T Axes : 065 006 073 degrees     QTc Int : 420 ms    Sinus rhythm with 1st degree A-V block  Nonspecific ST and T wave abnormality  Abnormal ECG  When compared with ECG of 03-AUG-2022 15:51,  MD interval has increased    Referred By: [email or web address]   SELF           Confirmed By:      No valid procedures specified.   No results found for this or any previous visit.      Physical Exam:  CONSTITUTIONAL: No fever, no chills  HEENT: Normocephalic, atraumatic,pupils reactive to light                 NECK:  No JVD no carotid bruit  CVS: S1S2+, RRR, no murmurs,   LUNGS: Clear  ABDOMEN: Soft, NT, BS+  EXTREMITIES: No cyanosis, edema  : No izaguirre catheter  NEURO: AAO X 3  PSY: Normal affect      Medication List with Changes/Refills   Current Medications    ACETAMINOPHEN (TYLENOL) 325 MG TABLET    Take 325 mg by mouth every 6 (six) hours as needed for Pain.    ASPIRIN (ECOTRIN) 81 MG EC TABLET    Take 81 mg by mouth once daily.    BUSPIRONE (BUSPAR) 10 MG TABLET    Take  1 tablet (10 mg total) by mouth 3 (three) times daily.    CICLOPIROX 0.77 % GEL    Apply topically 2 (two) times a day.    DONEPEZIL (ARICEPT) 5 MG TABLET    Take 1/2 tablet (2.5 mg) nightly x 4 weeks then increase to 1 tablet (5 mg) nightly thereafter.    FAMOTIDINE (PEPCID) 20 MG TABLET    Take 1 tablet (20 mg total) by mouth 2 (two) times daily.    FUROSEMIDE (LASIX) 20 MG TABLET        IBANDRONATE (BONIVA) 150 MG TABLET    Take 1 tablet (150 mg total) by mouth every 30 days.    ISOSORBIDE MONONITRATE (IMDUR) 30 MG 24 HR TABLET    Take 1 tablet (30 mg total) by mouth once daily.    KRILL  MG CAP    Take by mouth.    LEVOTHYROXINE (SYNTHROID) 88 MCG TABLET    Take 1 tablet (88 mcg total) by mouth once daily.    OLMESARTAN (BENICAR) 5 MG TAB    Take 1 tablet (5 mg total) by mouth once daily.    PANTOPRAZOLE (PROTONIX) 40 MG TABLET    Take 1 tablet (40 mg total) by mouth once daily. One month then stop    -PROPYLENE GLYCOL, PF, (SYSTANE HYDRATION PF) 0.4-0.3 % DROP    1 drops DAILY (route: ophthalmic (eye))    PULSE OXIMETER (PULSE OXIMETER) DEVICE    by Apply Externally route 2 (two) times a day. Use twice daily at 8 AM and 3 PM and record the value in MyChart as directed.    PULSE OXIMETER (PULSE OXIMETER) DEVICE    by Apply Externally route 2 (two) times a day. Use twice daily at 8 AM and 3 PM and record the value in MyChart as directed.    QUETIAPINE (SEROQUEL) 50 MG TABLET    Take 1 tablet (50 mg total) by mouth every evening.    SERTRALINE (ZOLOFT) 25 MG TABLET    Take 1 tablet (25 mg total) by mouth once daily.    SODIUM CHLORIDE 1 GRAM TABLET    Take 1 g by mouth 3 (three) times daily. 2 tabs TID    VIT A/VIT C/VIT E/ZINC/COPPER (ICAPS AREDS ORAL)    1 capsule 2 TIMES DAILY (route: oral)    VITAMIN D (VITAMIN D3) 1000 UNITS TAB    Take 1,000 Units by mouth once daily. Patient is taking 2000 units per day             Assessment:       1. Burning chest pain    2. Vitamin D deficiency disease     3. Coronary artery disease of native artery of native heart with stable angina pectoris    4. Hypothyroidism, unspecified type    5. Advanced age    6. Other emphysema         Plan:     Problem List Items Addressed This Visit        Pulmonary    COPD (chronic obstructive pulmonary disease)       Cardiac/Vascular    Coronary artery disease of native artery of native heart with stable angina pectoris       Endocrine    Vitamin D deficiency disease    Hypothyroid       Other    Advanced age      Other Visit Diagnoses     Burning chest pain    -  Primary          He has had no more burning since placed on tonics..  She does get out of breath short distance walking..  She is taking Imdur 30 mg once a day.  She is not taking Pepcid.    He has orders for physical therapy and wants to make sure her heart is okay to start the therapy.  Agree with have physical therapy.  Prescribe p.r.n. nitroglycerin for chest burning.  If she has further symptoms or exertional symptoms come back we can adjust her medications are perform additional testing is needed.    Okay to proceed with physical therapy.      Follow up in about 3 months (around 11/15/2022).       The patients questions were answered, they verbalized understanding, and agreed with the treatment plan.     ISIDRO JENSEN MD  [facility]

## 2022-08-15 NOTE — HOSPITAL COURSE
Glenda Gimenez was monitored closely during her hospitalization.  She was admitted for further workup of chest pain.  Cardiology was consulted and conservative treatment was advised due to the patient's advanced age, and the patient agreed.  Her troponins were trended and remained negative.  An echocardiogram was performed with EF 50% noted, normal LV diastolic function.  She remained free from chest pain throughout her hospital stay.  She was cleared for discharge from the standpoint of cardiology.  Return precautions were discussed at length.  Home health and appropriate follow up was arranged.  She was discharged home in good condition.

## 2022-08-15 NOTE — DISCHARGE SUMMARY
"[facility]  Discharge Summary      Patient Name: Glenda Gimenez  MRN: [id number]  Patient Class: OP- Observation  Admission Date: 8/11/2022  Hospital Length of Stay: 0 days  Discharge Date and Time: 8/12/2022  2:33 PM  Attending Physician: No att. providers found   Discharging Provider: GRANT Kam  Primary Care Provider: William Olivia MD      HPI:   Glenda Gimenez is a 89 y/o F with a past medical history significant for HTN, HLD, and hypothyroidism who presented to the ED with c/o chest pain and shortness of breath which began this morning.  She is accompanied by her son.  Pt states that she does not remember having chest pain and that she is here for a "check-up."  Son states that pt was c/o chest pain and SOB this morning; pt states now she is chest pain free.  Denies recent fevers, chills, abdominal pain, dysuria, N/V or any other symptoms.  ED workup is significant for mildly elevated troponin of 0.064 with no acute EKG changes.  She is placed in observation under the service of hospital medicine for continued monitoring and treatment.      * No surgery found *      Hospital Course:   Glenda Gimenez was monitored closely during her hospitalization.  She was admitted for further workup of chest pain.  Cardiology was consulted and conservative treatment was advised due to the patient's advanced age, and the patient agreed.  Her troponins were trended and remained negative.  An echocardiogram was performed with EF 50% noted, normal LV diastolic function.  She remained free from chest pain throughout her hospital stay.  She was cleared for discharge from the standpoint of cardiology.  Return precautions were discussed at length.  Home health and appropriate follow up was arranged.  She was discharged home in good condition.       Goals of Care Treatment Preferences:  Code Status: Full Code    Living Will: Yes              Consults:     No new " Assessment & Plan notes have been filed under this hospital service since the last note was generated.  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 Go in 1 week(s).    Specialty: Family Medicine  Why: with/ NP Francoericagautam Wood Aug 17, 2022 at 10:40am  Contact information:  [address] [facility] [address]  [phone]             [facility] Follow up.    Specialties: Home Health Services, Home Therapy Services, Home Living Aide Services  Why: Home Health  Contact information:  [address], [facility]  [facility] [address]  [phone]           Eugenio Arciniega MD. Go to.    Specialties: Cardiovascular Disease, Cardiology  Why: Aug 15, 2022 at 9:20am  Contact information:  [address]  [address]  [facility] [address]  [phone]                       Patient Instructions:      Ambulatory referral/consult to Home Health   Standing Status: Future   Referral Priority: Routine Referral Type: Home Health   Referral Reason: Specialty Services Required   Requested Specialty: Home Health Services   Number of Visits Requested: 1     Diet Cardiac     Notify your health care provider if you experience any of the following:  temperature >100.4     Notify your health care provider if you experience any of the following:  persistent nausea  and vomiting or diarrhea     Notify your health care provider if you experience any of the following:  severe uncontrolled pain     Notify your health care provider if you experience any of the following:  difficulty breathing or increased cough     Notify your health care provider if you experience any of the following:  severe persistent headache     Notify your health care provider if you experience any of the following:  persistent dizziness, light-headedness, or visual disturbances     Notify your health care provider if you experience any of the following:  increased confusion or weakness     Activity as tolerated       Significant Diagnostic Studies: Labs: All labs within the past 24 hours have been reviewed    Pending Diagnostic Studies:     None         Medications:  Reconciled Home Medications:      Medication List      CONTINUE taking these medications    acetaminophen 325 MG tablet  Commonly known as: TYLENOL  Take 325 mg by mouth every 6 (six) hours as needed for Pain.     aspirin 81 MG EC tablet  Commonly known as: ECOTRIN  Take 81 mg by mouth once daily.     busPIRone 10 MG tablet  Commonly known as: BUSPAR  Take 1 tablet (10 mg total) by mouth 3 (three) times daily.     ciclopirox 0.77 % Gel  Apply topically 2 (two) times a day.     donepeziL 5 MG tablet  Commonly known as: ARICEPT  Take 1/2 tablet (2.5 mg) nightly x 4 weeks then increase to 1 tablet (5 mg) nightly thereafter.     famotidine 20 MG tablet  Commonly known as: PEPCID  Take 1 tablet (20 mg total) by mouth 2 (two) times daily.     furosemide 20 MG tablet  Commonly known as: LASIX     ibandronate 150 mg tablet  Commonly known as: BONIVA  Take 1 tablet (150 mg total) by mouth every 30 days.     ICAPS AREDS ORAL  1 capsule 2 TIMES DAILY (route: oral)     isosorbide mononitrate 30 MG 24 hr tablet  Commonly known as: IMDUR  Take 1 tablet (30 mg total) by mouth once daily.     krill oil 500 mg Cap  Take by mouth.     levothyroxine 88 MCG  tablet  Commonly known as: SYNTHROID  Take 1 tablet (88 mcg total) by mouth once daily.     olmesartan 5 MG Tab  Commonly known as: BENICAR  Take 1 tablet (5 mg total) by mouth once daily.     pantoprazole 40 MG tablet  Commonly known as: PROTONIX  Take 1 tablet (40 mg total) by mouth once daily. One month then stop     * pulse oximeter device  Commonly known as: pulse oximeter  by Apply Externally route 2 (two) times a day. Use twice daily at 8 AM and 3 PM and record the value in MyChart as directed.     * pulse oximeter device  Commonly known as: pulse oximeter  by Apply Externally route 2 (two) times a day. Use twice daily at 8 AM and 3 PM and record the value in MyChart as directed.     QUEtiapine 50 MG tablet  Commonly known as: SEROQUEL  Take 1 tablet (50 mg total) by mouth every evening.     sertraline 25 MG tablet  Commonly known as: ZOLOFT  Take 1 tablet (25 mg total) by mouth once daily.     sodium chloride 1 gram tablet  Take 1 g by mouth 3 (three) times daily. 2 tabs TID     SYSTANE HYDRATION PF 0.4-0.3 % Drop  Generic drug: peg 400-propylene glycol (PF)  1 drops DAILY (route: ophthalmic (eye))     vitamin D 1000 units Tab  Commonly known as: VITAMIN D3  Take 1,000 Units by mouth once daily. Patient is taking 2000 units per day         * This list has 2 medication(s) that are the same as other medications prescribed for you. Read the directions carefully, and ask your doctor or other care provider to review them with you.                Indwelling Lines/Drains at time of discharge:   Lines/Drains/Airways     None                 Time spent on the discharge of patient: 35 minutes         GRANT Kam  Department of Hospital Medicine  [facility]

## 2022-08-16 PROCEDURE — G0180 PR HOME HEALTH MD CERTIFICATION: ICD-10-PCS | Mod: ,,, | Performed by: STUDENT IN AN ORGANIZED HEALTH CARE EDUCATION/TRAINING PROGRAM

## 2022-08-16 PROCEDURE — G0180 MD CERTIFICATION HHA PATIENT: HCPCS | Mod: ,,, | Performed by: STUDENT IN AN ORGANIZED HEALTH CARE EDUCATION/TRAINING PROGRAM

## 2022-08-29 ENCOUNTER — EXTERNAL HOME HEALTH (OUTPATIENT)
Dept: HOME HEALTH SERVICES | Facility: HOSPITAL | Age: 87
End: 2022-08-29
Payer: MEDICARE

## 2022-09-01 DIAGNOSIS — F41.9 ANXIETY: ICD-10-CM

## 2022-09-01 RX ORDER — BUSPIRONE HYDROCHLORIDE 10 MG/1
10 TABLET ORAL 3 TIMES DAILY
Qty: 90 TABLET | Refills: 2 | Status: SHIPPED | OUTPATIENT
Start: 2022-09-01 | End: 2022-11-29

## 2022-09-08 ENCOUNTER — CARE AT HOME (OUTPATIENT)
Dept: HOME HEALTH SERVICES | Facility: CLINIC | Age: 87
End: 2022-09-08
Payer: MEDICARE

## 2022-09-08 VITALS
RESPIRATION RATE: 16 BRPM | TEMPERATURE: 98 F | OXYGEN SATURATION: 97 % | HEART RATE: 73 BPM | WEIGHT: 158 LBS | SYSTOLIC BLOOD PRESSURE: 116 MMHG | BODY MASS INDEX: 29.08 KG/M2 | DIASTOLIC BLOOD PRESSURE: 70 MMHG | HEIGHT: 62 IN

## 2022-09-08 DIAGNOSIS — K21.9 GASTROESOPHAGEAL REFLUX DISEASE WITHOUT ESOPHAGITIS: Primary | ICD-10-CM

## 2022-09-08 DIAGNOSIS — Z51.5 PALLIATIVE CARE ENCOUNTER: ICD-10-CM

## 2022-09-08 DIAGNOSIS — R54 ADVANCED AGE: ICD-10-CM

## 2022-09-08 DIAGNOSIS — I25.118 CORONARY ARTERY DISEASE OF NATIVE ARTERY OF NATIVE HEART WITH STABLE ANGINA PECTORIS: ICD-10-CM

## 2022-09-08 DIAGNOSIS — I10 ESSENTIAL HYPERTENSION: ICD-10-CM

## 2022-09-08 DIAGNOSIS — F01.518 VASCULAR DEMENTIA WITH BEHAVIOR DISTURBANCE: ICD-10-CM

## 2022-09-08 PROCEDURE — 99350 PR HOME VISIT,ESTAB PATIENT,LEVEL IV: ICD-10-PCS | Mod: S$GLB,,, | Performed by: NURSE PRACTITIONER

## 2022-09-08 PROCEDURE — 99350 HOME/RES VST EST HIGH MDM 60: CPT | Mod: S$GLB,,, | Performed by: NURSE PRACTITIONER

## 2022-09-08 RX ORDER — FAMOTIDINE 20 MG/1
20 TABLET, FILM COATED ORAL 2 TIMES DAILY
Qty: 60 TABLET | Refills: 11 | Status: SHIPPED | OUTPATIENT
Start: 2022-09-08 | End: 2023-09-08

## 2022-09-08 NOTE — PROGRESS NOTES
"[facility]  Palliative Care Home Visit    Visit Date: 9/8/2022  Encounter Provider: Lesley Tamez NP  PCP:  William Olivia MD    Subjective:      Patient ID: Glenda Gimenez is a 89 y.o. female.    Consult Requested By:  Dr. Carroll  Reason for Consult:  Palliative Care    Glenda is being seen at home due to physical debility that presents a taxing effort to leave the home, to mitigate high risk of hospital readmission and/or due to the limited availability of reliable or safe options for transportation to the point of access to the provider. Prior to treatment on this visit the chart was reviewed and patient verbal consent was obtained.      Chief Complaint: Follow-up, dementia    Glenda is a 89 year old female with a PMHX of hypothyroidism, hypertension, primary hyperparathyroidism, COPD, chronic diastolic CHF, obesity and sepsis pneumonia in 2020.     Glenda is being seen today for a Palliative Care follow up visit. She is found again at her son, Shola Gimenez's home today. Patient lives with son Shola (whom she was estranged from for >20 years up until recently) and daughter in law, Mary, primarily now, unable to live alone due to dementia. Patient is alienated from long time caregiver, daughter Ankita.     Upon arrival today, patient is found sitting on her rollator. She is alert and oriented x 3 but forgetful. She is calm and cooperative and self reports she is "doing pretty good". Denies any pain or issues today. Mary reports she is doing well for the most part and has had no memory decline recently. Also reports anxiety is stable. No behavioral outbursts.       Appetite is reported to be "good". Requesting refill on pepcid for GERD symtpoms. Patient is assisted to scale today and      weight is stable at 158 lbs, which is stable.       Patient had a recent hospitalization for chest burning but is currently stable. Has followed up with Cardiology as well.     No recent falls. Uses " "rollator for ambulatory support. Does have some balance issues and dizziness at times but this is chronic. Requires some help with bathing.         VSS. Denies fever, chest pain, nausea, vomiting, diarrhea. Reports taking all medications as prescribed. No other needs identified at this time. Risks of environmental exposure to coronavirus discussed including: social distancing, hand hygiene, and limiting departures from the home for necessities only. Reports understanding and willingness to comply.           Goals of Care:  Glenda would like to be a Full code and have comfort care. At this time, family prefers for patient to stay with son Shola and daughter in law Mary in [place].     Review of Systems   Constitutional: Positive for fatigue. Weight loss and appetite stable. Negative for fever.   HENT: Negative.    Eyes: Vision loss related to macular degeneration.  Respiratory: Denies cough, shortness of breath. Negative for chest tightness and wheezing.   Cardiovascular: Negative.    Gastrointestinal: Positive for heartburn at times.  Endocrine: Negative.    Genitourinary: Urinary incontinence at times.   Musculoskeletal: Positive for hand arthritis.  Skin: Negative.    Allergic/Immunologic: Negative.    Neurological: Negative for tremors and syncope. Positive balance difficulties and dizziness.  Hematological: Bruises/bleeds easily.   Psychiatric/Behavioral: Negative. Negative for depression. Anxiety improved. Forgetfulness,confusion with some sundowning behaviors. Agitation at times.         Assessments:  Environmental: staying with son in single story raised home that is clean, adequate lighting and temperature, no foul odors  Functional Status: requires some help with bathing at times.  Safety:  dementia, advanced age, fall risk  Nutritional: adequate access to food, reports appetite "decent"  Home Health/DME/Supplies: No Home Health. DME: rollator         History:  Past Medical History:   Diagnosis Date    " Anticoagulant long-term use     Arthritis     Dislocation of right shoulder joint     Hypertension     Hypothyroidism     Shoulder disorder     right    Thyroid disease      Family History   Problem Relation Age of Onset    Breast cancer Mother     Cancer Brother         lung cancer    Cancer Maternal Aunt         unknown cancer    Cancer Maternal Uncle         pancreatic cancer    Heart disease Neg Hx      Past Surgical History:   Procedure Laterality Date    HYSTERECTOMY      PARTIAL HYSTERECTOMY      ARTUR, ovaries in place, uterine prolapse     Review of patient's allergies indicates:   Allergen Reactions    Amlodipine Swelling     Other reaction(s): Angioedema    Atenolol      Other reaction(s): itch    Betamethasone sodium phosphate      Other reaction(s): Flushing (skin)    Cortisone      Other reaction(s): blurry vision  Other reaction(s): Rash    Lisinopril      Other reaction(s): COUGH    Naproxen      Other reaction(s): body shut down    Prednisone     Triamterene-hydrochlorothiazid      Other reaction(s): itch       Medications:    Current Outpatient Medications:     acetaminophen (TYLENOL) 325 MG tablet, Take 325 mg by mouth every 6 (six) hours as needed for Pain., Disp: , Rfl:     aspirin (ECOTRIN) 81 MG EC tablet, Take 81 mg by mouth once daily., Disp: , Rfl:     busPIRone (BUSPAR) 10 MG tablet, Take 1 tablet (10 mg total) by mouth 3 (three) times daily., Disp: 90 tablet, Rfl: 2    donepeziL (ARICEPT) 5 MG tablet, Take 1/2 tablet (2.5 mg) nightly x 4 weeks then increase to 1 tablet (5 mg) nightly thereafter., Disp: 30 tablet, Rfl: 11    furosemide (LASIX) 20 MG tablet, , Disp: , Rfl:     ibandronate (BONIVA) 150 mg tablet, Take 1 tablet (150 mg total) by mouth every 30 days., Disp: 3 tablet, Rfl: 3    isosorbide mononitrate (IMDUR) 30 MG 24 hr tablet, TAKE 1 TABLET(30 MG) BY MOUTH EVERY DAY, Disp: 90 tablet, Rfl: 3    krill oil 500 mg Cap, Take by mouth., Disp: , Rfl:     nitroGLYCERIN (NITROSTAT) 0.4  "MG SL tablet, Place 1 tablet (0.4 mg total) under the tongue every 5 (five) minutes as needed for Chest pain., Disp: 25 tablet, Rfl: 3    olmesartan (BENICAR) 5 MG Tab, Take 1 tablet (5 mg total) by mouth once daily., Disp: 90 tablet, Rfl: 3    peg 400-propylene glycol, PF, (SYSTANE HYDRATION PF) 0.4-0.3 % Drop, 1 drops DAILY (route: ophthalmic (eye)), Disp: , Rfl:     QUEtiapine (SEROQUEL) 50 MG tablet, Take 1 tablet (50 mg total) by mouth every evening., Disp: 30 tablet, Rfl: 11    sertraline (ZOLOFT) 25 MG tablet, Take 1 tablet (25 mg total) by mouth once daily., Disp: 90 tablet, Rfl: 3    sodium chloride 1 gram tablet, Take 1 g by mouth 3 (three) times daily. 2 tabs TID, Disp: , Rfl:     vit A/vit C/vit E/zinc/copper (ICAPS AREDS ORAL), 1 capsule 2 TIMES DAILY (route: oral), Disp: , Rfl:     vitamin D (VITAMIN D3) 1000 units Tab, Take 1,000 Units by mouth once daily. Patient is taking 2000 units per day, Disp: , Rfl:     ciclopirox 0.77 % Gel, Apply topically 2 (two) times a day. (Patient not taking: Reported on 9/8/2022), Disp: 100 g, Rfl: 2    famotidine (PEPCID) 20 MG tablet, Take 1 tablet (20 mg total) by mouth 2 (two) times daily., Disp: 60 tablet, Rfl: 11    levothyroxine (SYNTHROID) 88 MCG tablet, TAKE 1 TABLET(88 MCG) BY MOUTH EVERY DAY, Disp: 90 tablet, Rfl: 3    pulse oximeter (PULSE OXIMETER) device, by Apply Externally route 2 (two) times a day. Use twice daily at 8 AM and 3 PM and record the value in MyChart as directed., Disp: 1 each, Rfl: 0    pulse oximeter (PULSE OXIMETER) device, by Apply Externally route 2 (two) times a day. Use twice daily at 8 AM and 3 PM and record the value in MyChart as directed., Disp: 1 each, Rfl: 0    24h Oral Morphine Equivalents (OME):  N/A    Objective:     Physical Exam:  Vitals:    09/08/22 1140   BP: 116/70   Pulse: 73   Resp: 16   Temp: 98.2 °F (36.8 °C)   TempSrc: Temporal   SpO2: 97%   Weight: 71.7 kg (158 lb)   Height: 5' 2" (1.575 m)   PainSc: 0-No pain "     Body mass index is 28.9 kg/m².      Physical Exam   Constitutional: She is oriented to person, place. She appears well-developed and well-nourished.   HENT:   Head: Normocephalic and atraumatic.   Right Ear: External ear normal.   Left Ear: External ear normal.   Nose: Nose normal.   Mouth/Throat: Oropharynx is clear and moist.   Eyes: Pupils are equal, round, and reactive to light. Conjunctivae and EOM are normal.   Neck: Normal range of motion. Neck supple.   Cardiovascular: Normal rate, regular rhythm, normal heart sounds and intact distal pulses.   Pulmonary/Chest: Effort normal. No stridor. No respiratory distress. She has no wheezes. She has no rales. She exhibits no tenderness.    Abdominal: Soft. Bowel sounds are normal.   Musculoskeletal: Normal range of motion. She exhibits no tenderness or deformity. Left ankle with 1+ non-pitting edema-pt reports chronic.   Neurological: She is alert and oriented to person, place. Forgetful, Repeats self.    Skin: Skin is warm and dry. Capillary refill takes 2 to 3 seconds.   Psychiatric: She has a normal mood and affect. Her behavior is anxious at times.        Symptom Assessment (ESAS 0-10 scale)      ESAS 0 1 2 3 4 5 6 7 8 9 10   Pain x                       Dyspnea x                       Anxiety  x                       Nausea x                       Depression   x                       Anorexia  x                       Fatigue    x                     Insomnia  x                       Restlessness  x                       Agitation x                             Constipation    no  Bowel Management Plan (BMP): no  Diarrhea        no  Comments: reports BM once a day     Performance Status: PPS Score 50  Karnofsky Score:  50  EGOC:  2     Advance Care Planning   Ethical / Legal: Advance Care Planning                                                          Surrogate decision maker:  Name Db Gimenez,          Relationship:  Son                                                                                                      Code Status: DNR                                                                  [place]:  none                                                                 Other advance directive:  HPOA, living will on file in [facility]                                                               Labs:  CBC:   WBC   Date Value Ref Range Status   08/12/2022 6.00 3.90 - 12.70 K/uL Final         Hgb   Date Value Ref Range Status   07/20/2013 12.5 12.0 - 16.0 g/dl Final     Hemoglobin   Date Value Ref Range Status   08/12/2022 11.2 (L) 12.0 - 16.0 g/dL Final         Hematocrit   Date Value Ref Range Status   08/12/2022 33.1 (L) 37.0 - 48.5 % Final         MCV   Date Value Ref Range Status   08/12/2022 94 82 - 98 fL Final         Platelets   Date Value Ref Range Status   08/12/2022 237 150 - 450 K/uL Final       LFT:   Lab Results   Component Value Date    AST 24 08/12/2022    GGT 73 (H) 08/28/2020    ALKPHOS 67 08/12/2022    BILITOT 0.5 08/12/2022       Albumin:   Albumin   Date Value Ref Range Status   08/12/2022 3.2 (L) 3.5 - 5.2 g/dL Final     Protein:   Total Protein   Date Value Ref Range Status   08/12/2022 6.8 6.0 - 8.4 g/dL Final       Radiology:  I have reviewed all pertinent imaging results/findings within the past 24 hours.      Assessment:     1. Gastroesophageal reflux disease without esophagitis    2. Vascular dementia with behavior disturbance    3. Essential hypertension    4. Coronary artery disease of native artery of native heart with stable angina pectoris    5. Palliative care encounter    6. Advanced age        Plan:   Glenda was seen today for follow-up.    Diagnoses and all orders for this visit:    Gastroesophageal reflux disease without esophagitis  -     famotidine (PEPCID) 20 MG tablet; Take 1 tablet (20 mg total) by mouth 2 (two) times daily.    Vascular dementia with behavior disturbance    Essential hypertension    Coronary artery disease  "of native artery of native heart with stable angina pectoris    Palliative care encounter    Advanced age    Problem List Items Addressed This Visit          Neuro    Vascular dementia with behavior disturbance     Chronic and currently stable with no decline.  Continue aricept and seroquel.  Followed by Neurology.             Cardiac/Vascular    Essential hypertension     Chronic and stable.  Continue to monitor.   Continue current medication regimen.         Coronary artery disease of native artery of native heart with stable angina pectoris     Chronic and stable despite recent hospitalization for chest "burning".  Denies chest pain.  Followed by Cardiology.    Continue Imdur.            GI    Gastroesophageal reflux disease without esophagitis - Primary     Chronic and stable.  Controlled on Pepcid.  Discussed food triggers and avoidance.         Relevant Medications    famotidine (PEPCID) 20 MG tablet       Palliative Care    Palliative care encounter     DNR.  HPOA now Db Gimenez (Jerry).            Other    Advanced age     Frail elderly with dementia.            Were controlled substances prescribed?  No    > 50% of 60 min visit spent in chart review, face to face discussion of goals of care,  symptom assessment, coordination of care and emotional support.    Follow Up Appointments:   Future Appointments   Date Time Provider Department Center   1/10/2023  2:00 PM Damir Fraire MD [facility] [place]   2/13/2023  1:40 PM William Olivia MD [facility] [place]       Signature:  Lesley Tamez NP  "

## 2022-09-15 NOTE — ASSESSMENT & PLAN NOTE
Chronic and currently stable with no decline.  Continue aricept and seroquel.  Followed by Neurology.

## 2022-09-15 NOTE — ASSESSMENT & PLAN NOTE
"Chronic and stable despite recent hospitalization for chest "burning".  Denies chest pain.  Followed by Cardiology.    Continue Imdur.  "

## 2022-10-07 DIAGNOSIS — I10 HYPERTENSION, UNSPECIFIED TYPE: ICD-10-CM

## 2022-10-07 RX ORDER — OLMESARTAN MEDOXOMIL 5 MG/1
TABLET ORAL
Qty: 90 TABLET | Refills: 3 | Status: SHIPPED | OUTPATIENT
Start: 2022-10-07 | End: 2024-01-09 | Stop reason: SDUPTHER

## 2022-11-03 ENCOUNTER — CARE AT HOME (OUTPATIENT)
Dept: HOME HEALTH SERVICES | Facility: CLINIC | Age: 87
End: 2022-11-03
Payer: MEDICARE

## 2022-11-03 VITALS
DIASTOLIC BLOOD PRESSURE: 72 MMHG | OXYGEN SATURATION: 98 % | TEMPERATURE: 98 F | RESPIRATION RATE: 16 BRPM | SYSTOLIC BLOOD PRESSURE: 130 MMHG | HEART RATE: 78 BPM | WEIGHT: 156 LBS | BODY MASS INDEX: 28.71 KG/M2 | HEIGHT: 62 IN

## 2022-11-03 DIAGNOSIS — F41.9 ANXIETY: ICD-10-CM

## 2022-11-03 DIAGNOSIS — I10 ESSENTIAL HYPERTENSION: ICD-10-CM

## 2022-11-03 DIAGNOSIS — R54 ADVANCED AGE: ICD-10-CM

## 2022-11-03 DIAGNOSIS — E87.1 CHRONIC HYPONATREMIA: ICD-10-CM

## 2022-11-03 DIAGNOSIS — Z51.5 PALLIATIVE CARE ENCOUNTER: ICD-10-CM

## 2022-11-03 DIAGNOSIS — F01.518 VASCULAR DEMENTIA WITH BEHAVIOR DISTURBANCE: Primary | ICD-10-CM

## 2022-11-03 PROCEDURE — 99349 HOME/RES VST EST MOD MDM 40: CPT | Mod: S$GLB,,, | Performed by: NURSE PRACTITIONER

## 2022-11-03 PROCEDURE — 99349 PR HOME VISIT,ESTAB PATIENT,LEVEL III: ICD-10-PCS | Mod: S$GLB,,, | Performed by: NURSE PRACTITIONER

## 2022-11-03 NOTE — PROGRESS NOTES
"[facility]  Palliative Care Home Visit    Visit Date: 11/3/2022  Encounter Provider: Lesley Tamez NP  PCP:  William Olivia MD    Subjective:      Patient ID: Glenda Gimenez is a 89 y.o. female.    Consult Requested By:  Dr. Carroll  Reason for Consult:  Palliative Care    Glenda is being seen at home due to physical debility that presents a taxing effort to leave the home, to mitigate high risk of hospital readmission and/or due to the limited availability of reliable or safe options for transportation to the point of access to the provider. Prior to treatment on this visit the chart was reviewed and patient verbal consent was obtained.      Chief Complaint: Follow-up, dementia    Glenda is a 89 year old female with a PMHX of hypothyroidism, hypertension, primary hyperparathyroidism, COPD, chronic diastolic CHF, obesity and sepsis pneumonia in 2020.     Glenda is being seen today for a Palliative Care follow up visit. She is found again at her son, Shola Gimenez's home today. Patient lives with son Shola (whom she was estranged from for >20 years up until recently) and daughter in law, Mary, primarily now, unable to live alone due to dementia. Patient is alienated from long time caregiver, daughter Ankita.     Upon arrival today, patient is found sitting on the sofa. AAO x 3 in no distress. She is pleasant and calm. Cooperates with exam. Son reports she has been doing well, repeats herself more in the afternoons and evenings. Glenda has not had any wandering issues up until a few days ago son found her across the street checking the mail. He reports "she snuck out of the house with us home". Patient verbalizes that she knows she shouldn't do that and will not again. Shola urges he will keep a closer eye on her.    Mary reports she and patient went to a family wedding in [place] recently and this "threw her off of her routine". She reports patient seemed more confused but was calm and " "cooperative. Also reports anxiety is stable. No behavioral outbursts.    Patient is followed by Endocrinology for chronic hyponatremia. Still taking salt tabs daily. Asymptomatic.                   No recent falls. Uses rollator for ambulatory support. Does        have some balance issues and dizziness at times but this is chronic. Requires some help with bathing.       VSS. Denies fever, chest pain, nausea, vomiting, diarrhea. Reports taking all medications as prescribed. No other needs identified at this time.      Goals of Care:  Glenda would like to be a Full code and have comfort care. At this time, family prefers for patient to stay with son Shola and daughter in law Mary in [place].     Review of Systems   Constitutional: Positive for fatigue. Weight loss and appetite stable. Negative for fever.   HENT: Negative.    Eyes: Vision loss related to macular degeneration.  Respiratory: Denies cough, shortness of breath. Negative for chest tightness and wheezing.   Cardiovascular: Negative.    Gastrointestinal: Positive for heartburn at times.  Endocrine: Negative.    Genitourinary: Urinary incontinence at times.   Musculoskeletal: Positive for hand arthritis.  Skin: Negative.    Allergic/Immunologic: Negative.    Neurological: Negative for tremors and syncope. Positive balance difficulties and dizziness.  Hematological: Bruises/bleeds easily.   Psychiatric/Behavioral: Negative. Negative for depression. Anxiety improved. Forgetfulness,confusion with some sundowning behaviors. Recent wandering behavior.         Assessments:  Environmental: staying with son in single story home that is clean, adequate lighting and temperature, no foul odors  Functional Status: requires some help with bathing at times.  Safety:  dementia, advanced age, fall risk  Nutritional: adequate access to food, reports appetite "decent"  Home Health/DME/Supplies: No Home Health. DME: rollator         History:  Past Medical History: "   Diagnosis Date    Anticoagulant long-term use     Arthritis     Dislocation of right shoulder joint     Hypertension     Hypothyroidism     Shoulder disorder     right    Thyroid disease      Family History   Problem Relation Age of Onset    Breast cancer Mother     Cancer Brother         lung cancer    Cancer Maternal Aunt         unknown cancer    Cancer Maternal Uncle         pancreatic cancer    Heart disease Neg Hx      Past Surgical History:   Procedure Laterality Date    HYSTERECTOMY      PARTIAL HYSTERECTOMY      ARTUR, ovaries in place, uterine prolapse     Review of patient's allergies indicates:   Allergen Reactions    Amlodipine Swelling     Other reaction(s): Angioedema    Atenolol      Other reaction(s): itch    Betamethasone sodium phosphate      Other reaction(s): Flushing (skin)    Cortisone      Other reaction(s): blurry vision  Other reaction(s): Rash    Lisinopril      Other reaction(s): COUGH    Naproxen      Other reaction(s): body shut down    Prednisone     Triamterene-hydrochlorothiazid      Other reaction(s): itch       Medications:    Current Outpatient Medications:     acetaminophen (TYLENOL) 325 MG tablet, Take 325 mg by mouth every 6 (six) hours as needed for Pain., Disp: , Rfl:     aspirin (ECOTRIN) 81 MG EC tablet, Take 81 mg by mouth once daily., Disp: , Rfl:     busPIRone (BUSPAR) 10 MG tablet, Take 1 tablet (10 mg total) by mouth 3 (three) times daily., Disp: 90 tablet, Rfl: 2    donepeziL (ARICEPT) 5 MG tablet, Take 1/2 tablet (2.5 mg) nightly x 4 weeks then increase to 1 tablet (5 mg) nightly thereafter., Disp: 30 tablet, Rfl: 11    famotidine (PEPCID) 20 MG tablet, Take 1 tablet (20 mg total) by mouth 2 (two) times daily., Disp: 60 tablet, Rfl: 11    furosemide (LASIX) 20 MG tablet, TAKE 1 TABLET(20 MG) BY MOUTH EVERY DAY, Disp: 90 tablet, Rfl: 1    ibandronate (BONIVA) 150 mg tablet, Take 1 tablet (150 mg total) by mouth every 30 days., Disp: 3 tablet, Rfl: 3    isosorbide  mononitrate (IMDUR) 30 MG 24 hr tablet, TAKE 1 TABLET(30 MG) BY MOUTH EVERY DAY, Disp: 90 tablet, Rfl: 3    krill oil 500 mg Cap, Take by mouth., Disp: , Rfl:     levothyroxine (SYNTHROID) 88 MCG tablet, TAKE 1 TABLET(88 MCG) BY MOUTH EVERY DAY, Disp: 90 tablet, Rfl: 3    olmesartan (BENICAR) 5 MG Tab, TAKE 1 TABLET(5 MG) BY MOUTH EVERY DAY, Disp: 90 tablet, Rfl: 3    peg 400-propylene glycol, PF, (SYSTANE HYDRATION PF) 0.4-0.3 % Drop, 1 drops DAILY (route: ophthalmic (eye)), Disp: , Rfl:     pulse oximeter (PULSE OXIMETER) device, by Apply Externally route 2 (two) times a day. Use twice daily at 8 AM and 3 PM and record the value in MyChart as directed., Disp: 1 each, Rfl: 0    QUEtiapine (SEROQUEL) 50 MG tablet, Take 1 tablet (50 mg total) by mouth every evening., Disp: 30 tablet, Rfl: 11    sertraline (ZOLOFT) 25 MG tablet, Take 1 tablet (25 mg total) by mouth once daily., Disp: 90 tablet, Rfl: 3    sodium chloride 1 gram tablet, Take 1 g by mouth 3 (three) times daily. 2 tabs TID, Disp: , Rfl:     vit A/vit C/vit E/zinc/copper (ICAPS AREDS ORAL), 1 capsule 2 TIMES DAILY (route: oral), Disp: , Rfl:     vitamin D (VITAMIN D3) 1000 units Tab, Take 1,000 Units by mouth once daily. Patient is taking 2000 units per day, Disp: , Rfl:     ciclopirox 0.77 % Gel, Apply topically 2 (two) times a day. (Patient not taking: Reported on 9/8/2022), Disp: 100 g, Rfl: 2    nitroGLYCERIN (NITROSTAT) 0.4 MG SL tablet, Place 1 tablet (0.4 mg total) under the tongue every 5 (five) minutes as needed for Chest pain. (Patient not taking: Reported on 11/3/2022), Disp: 25 tablet, Rfl: 3    pulse oximeter (PULSE OXIMETER) device, by Apply Externally route 2 (two) times a day. Use twice daily at 8 AM and 3 PM and record the value in MyChart as directed. (Patient not taking: Reported on 11/3/2022), Disp: 1 each, Rfl: 0    24h Oral Morphine Equivalents (OME):  N/A    Objective:     Physical Exam:  Constitutional: She is oriented to person,  place. She appears well-developed and well-nourished.   HENT:   Head: Normocephalic and atraumatic.   Right Ear: External ear normal.   Left Ear: External ear normal.   Nose: Nose normal.   Mouth/Throat: Oropharynx is clear and moist.   Eyes: Pupils are equal, round, and reactive to light. Conjunctivae and EOM are normal.   Neck: Normal range of motion. Neck supple.   Cardiovascular: Normal rate, regular rhythm, normal heart sounds and intact distal pulses.   Pulmonary/Chest: Effort normal. No stridor. No respiratory distress. She has no wheezes. She has no rales. She exhibits no tenderness.    Abdominal: Soft. Bowel sounds are normal.   Musculoskeletal: Normal range of motion. She exhibits no tenderness or deformity. Left ankle with 1+ non-pitting edema-pt reports chronic.   Neurological: She is alert and oriented to person, place. Forgetful, Repeats self.    Skin: Skin is warm and dry. Capillary refill takes 2 to 3 seconds.   Psychiatric: She has a normal mood and affect. Her behavior is anxious at times.        Symptom Assessment (ESAS 0-10 scale)  Pain:  0  Dyspnea:  0  Anxiety:  1  Nausea:  0  Depression:  0  Anorexia:  0  Fatigue:  3  Insomnia:  0  Restlessness:  0  Agitation:  0          Constipation    no  Bowel Management Plan (BMP): no  Diarrhea        no  Comments: reports BM once a day     Performance Status: PPS Score 50  FAST SCORE 6E  Karnofsky Score:  50  EGOC:  2     Advance Care Planning   Ethical / Legal: Advance Care Planning   Surrogate decision maker:  Name Db Gimenez,  Relationship:Son                                                                                                     Code Status: DNR  [place]POST:  none Other advance directive:  HPOA, living will on file [facility]                                                                   Labs:  Vitals:    11/03/22 1220   BP: 130/72   Pulse: 78   Resp: 16   Temp: 98.3 °F (36.8 °C)   TempSrc: Temporal   SpO2: 98%   Weight: 70.8 kg (156 lb)  "  Height: 5' 2" (1.575 m)   PainSc: 0-No pain     Body mass index is 28.53 kg/m².    CBC:   WBC   Date Value Ref Range Status   08/12/2022 6.00 3.90 - 12.70 K/uL Final         Hgb   Date Value Ref Range Status   07/20/2013 12.5 12.0 - 16.0 g/dl Final     Hemoglobin   Date Value Ref Range Status   08/12/2022 11.2 (L) 12.0 - 16.0 g/dL Final         Hematocrit   Date Value Ref Range Status   08/12/2022 33.1 (L) 37.0 - 48.5 % Final         MCV   Date Value Ref Range Status   08/12/2022 94 82 - 98 fL Final         Platelets   Date Value Ref Range Status   08/12/2022 237 150 - 450 K/uL Final       LFT:   Lab Results   Component Value Date    AST 24 08/12/2022    GGT 73 (H) 08/28/2020    ALKPHOS 67 08/12/2022    BILITOT 0.5 08/12/2022       Albumin:   Albumin   Date Value Ref Range Status   08/12/2022 3.2 (L) 3.5 - 5.2 g/dL Final     Protein:   Total Protein   Date Value Ref Range Status   08/12/2022 6.8 6.0 - 8.4 g/dL Final       Radiology:  I have reviewed all pertinent imaging results/findings within the past 24 hours.      Assessment:     1. Vascular dementia with behavior disturbance    2. Anxiety    3. Essential hypertension    4. Chronic hyponatremia    5. Palliative care encounter    6. Advanced age          Plan:   Glenda was seen today for follow-up and dementia.    Diagnoses and all orders for this visit:    Vascular dementia with behavior disturbance    Anxiety    Essential hypertension    Chronic hyponatremia    Palliative care encounter    Advanced age      Problem List Items Addressed This Visit          Neuro    Vascular dementia with behavior disturbance - Primary     Chronic and currently stable with no decline.  Behavior stable except for recent single wandering episode.  Continue aricept and seroquel.  Followed by Neurology.             Psychiatric    Anxiety     Chronic and stable.  Continue buspar.               Cardiac/Vascular    Essential hypertension     Chronic and stable.  Continue to monitor. "   Continue current medication regimen.            Renal/    Chronic hyponatremia     Chronic and stable.  Continue salt tabs.  Followed by Endocrinology.            Palliative Care    Palliative care encounter     DNR.  MANISH Ace (Shola) Ammy-son.            Other    Advanced age     Frail elderly with dementia.              Were controlled substances prescribed?  No    > 50% of 40 min visit spent in chart review, face to face discussion of goals of care,  symptom assessment, coordination of care and emotional support.    Follow Up Appointments:   Future Appointments   Date Time Provider Department Center   1/10/2023  2:00 PM Damir Fraire MD [facility] [place]   2/13/2023  1:40 PM William Olivia MD [facility] [place]       Signature:  Lesley Tamez NP

## 2022-11-10 ENCOUNTER — PES CALL (OUTPATIENT)
Dept: ADMINISTRATIVE | Facility: CLINIC | Age: 87
End: 2022-11-10
Payer: MEDICARE

## 2022-11-13 NOTE — ASSESSMENT & PLAN NOTE
Chronic and currently stable with no decline.  Behavior stable except for recent single wandering episode.  Continue aricept and seroquel.  Followed by Neurology.

## 2022-11-17 NOTE — PROGRESS NOTES
"[facility]  Palliative Care Home Visit    Visit Date: 12/18/2020  Encounter Provider: Lesley Tamez NP  PCP:  Crystal Hahn MD (Inactive)    Subjective:      Patient ID: Glenda Gimenez is a 89 y.o. female.    Consult Requested By:  Dr. Yao  Reason for Consult:  Palliative Care    Glenda is being seen at home due to physical debility that presents a taxing effort to leave the home, to mitigate high risk of hospital readmission and/or due to the limited availability of reliable or safe options for transportation to the point of access to the provider. Prior to treatment on this visit the chart was reviewed and patient consent was obtained.       Chief Complaint: Follow-up    Glenda is a 89 year old female with a PMHX of hypothyroidism, hypertension, primary hyperparathyroidism, COPD, chronic diastolic CHF, obesity and recent sepsis pneumonia.     Glenda is being seen today for a Palliative Care follow up visit. She is found today doing very well today and is AAO x4. She lives alone but reports her daughter visits often. She is somewhat anxious today because she received a letter from [employer] insurance [employer] stating that Dr. Yao, her PCP, is retiring 12/31/2020 and she must pick a new PCP. [facility] listed a suggestion PCP for her but she is unsure what she wants to do. Encouraged patient to discuss with her daughter and try and decide from there which PCP she would like to pick. Additionally, patient was flustered with the automated phone prompts with [employer] as she was trying to send for a refill on her Levothyroxine and Lasix. I did assist patient with refills on automated system today.      Glenda reports she is doing well except she is so tired of being "isolated" from others because of COVID-19. She wishes she could visit family once a week like she did previously but is very cautious and stays home. She denies depression but reports some sadness related to not being able to visit " "with loved ones.      Glenda reports some mild shortness of breath with walking occasionally. She reports a COPD diagnosis but uses no oxygen or nebulizer treatments.     She reports that her GERD symptoms are much better since being on Famotidine instead of Omeprazole. Food triggers and avoidance discussed today.      Balance difficulty and some dizziness continue to be an issue for her over the last few months. No falls have occurred and patient is doing exercises that PT has taught her in the past for this. She reports that when she stays home all day and doesn't go anywhere or even step outside for a few minutes she notices her balance gets "off". She recognizes that being sedentary makes her dizziness worse. Physical Therapy services were offered again today to help her work on balance issues but she declines currently as she will continue to do some of the exercises they have recommended previously. Fall precautions and safety discussed today.      VSS. Denies fever, chest pain, nausea, vomiting, diarrhea. Denies any acute issues, concerns or complaints to address on today's visit. Reports taking all medications as prescribed. No other needs identified at this time. Risks of environmental exposure to coronavirus discussed including: social distancing, hand hygiene, and limiting departures from the home for necessities only. Reports understanding and willingness to comply.           Goals of Care:  Glenda would like to be a Full code and have comfort care. She would like to continue living at home independently for as long as possible. She states that she has a living will on file at [facility] as well as [facility] placed 20 years ago. She states that her daughter Ankita Gimenez is her HPOA. Patient is unable to find copies of documents. Has new documents from previous visit but has not reviewed or completed yet.      Review of Systems   Constitutional: Negative for weight " "loss. Negative for fever.   HENT: Negative.    Eyes: Negative.    Respiratory: Denies cough. Positive for shortness of breath (mild and occasional with exertion). Negative for chest tightness and wheezing.    Cardiovascular: Negative.    Gastrointestinal: Positive for heartburn.  Endocrine: Negative.    Genitourinary: Negative.    Musculoskeletal: Positive for hand arthritis-opening jars and bottles is getting more difficult.   Skin: Negative.    Allergic/Immunologic: Negative.    Neurological: Positive for weakness (mild). Negative for tremors and syncope. Positive balance difficulties and dizziness.  Hematological: Bruises/bleeds easily.   Psychiatric/Behavioral: Negative.  Positive sleep issues.        Assessments:  · Environmental: lives alone in a single story raised home that is clean, adequate lighting and temperature, no foul odors  · Functional Status: independent  · Safety:  lives alone, advanced age, fall risk  · Nutritional: adequate access to food, reports appetite "good"  · Home Health/DME/Supplies: No Home Health. DME: rollator        Symptom Assessment (ESAS 0-10 scale)      ESAS 0 1 2 3 4 5 6 7 8 9 10   Pain x                       Dyspnea x                       Anxiety x                       Nausea x                       Depression  x                       Anorexia x                       Fatigue          x               Insomnia    x                     Restlessness  x                       Agitation x                             Constipation    no  Bowel Management Plan (BMP): no  Diarrhea        no  Comments: reports BM once a day     Performance Status: PPS Score 60  Karnofsky Score:  60  EGOC:  2     History:  Past Medical History:   Diagnosis Date    Anticoagulant long-term use     Arthritis     Dislocation of right shoulder joint     Hypertension     Hypothyroidism     Shoulder disorder     right    Thyroid disease      Family History   Problem Relation Age of Onset    Breast " cancer Mother     Cancer Brother         lung cancer    Cancer Maternal Aunt         unknown cancer    Cancer Maternal Uncle         pancreatic cancer    Heart disease Neg Hx      Past Surgical History:   Procedure Laterality Date    HYSTERECTOMY      PARTIAL HYSTERECTOMY      ARTUR, ovaries in place, uterine prolapse     Review of patient's allergies indicates:   Allergen Reactions    Atenolol      Other reaction(s): itch    Betamethasone sodium phosphate      Other reaction(s): Flushing (skin)    Cortisone      Other reaction(s): blurry vision  Other reaction(s): Rash    Lisinopril      Other reaction(s): COUGH    Naproxen      Other reaction(s): body shut down    Triamterene-hydrochlorothiazid      Other reaction(s): itch       Medications:    Current Outpatient Medications:     acetaminophen (TYLENOL) 325 MG tablet, Take 325 mg by mouth every 6 (six) hours as needed for Pain., Disp: , Rfl:     alendronate (FOSAMAX) 70 MG tablet, Take 1 tablet (70 mg total) by mouth every 7 days., Disp: 12 tablet, Rfl: 3    aspirin (ECOTRIN) 81 MG EC tablet, Take 81 mg by mouth once daily., Disp: , Rfl:     carvedilol (COREG) 3.125 MG tablet, Take 1.5625 mg by mouth 2 (two) times daily. , Disp: , Rfl: 1    co-enzyme Q-10 30 mg capsule, Take 30 mg by mouth 3 (three) times daily., Disp: , Rfl:     famotidine (PEPCID) 20 MG tablet, Take 1 tablet (20 mg total) by mouth 2 (two) times daily., Disp: 60 tablet, Rfl: 11    furosemide (LASIX) 20 MG tablet, Take 1 tablet (20 mg total) by mouth once daily., Disp: 90 tablet, Rfl: 3    isosorbide mononitrate (IMDUR) 30 MG 24 hr tablet, Take 1 tablet (30 mg total) by mouth once daily., Disp: 90 tablet, Rfl: 3    krill oil 500 mg Cap, Take by mouth., Disp: , Rfl:     levothyroxine (SYNTHROID) 88 MCG tablet, TAKE 1 TABLET BY MOUTH EVERY DAY, Disp: 90 tablet, Rfl: 3    olmesartan (BENICAR) 5 MG Tab, Take 1 tablet (5 mg total) by mouth once daily., Disp: 90 tablet, Rfl: 3     potassium chloride SA (K-DUR,KLOR-CON) 20 MEQ tablet, TAKE 1 TABLET(20 MEQ) BY MOUTH TWICE DAILY (Patient taking differently: 20 mEq once. TAKE 1 TABLET(20 MEQ) BY MOUTH ONCE  DAILY), Disp: 180 tablet, Rfl: 3    traZODone (DESYREL) 50 MG tablet, Take 1 tablet (50 mg total) by mouth every evening., Disp: 30 tablet, Rfl: 0    vitamin D (VITAMIN D3) 1000 units Tab, Take 1,000 Units by mouth once daily. Patient is taking 2000 units per day, Disp: , Rfl:     albuterol-ipratropium (DUO-NEB) 2.5 mg-0.5 mg/3 mL nebulizer solution, Take 3 mLs by nebulization every 4 (four) hours as needed for Wheezing or Shortness of Breath. Rescue (Patient not taking: Reported on 10/8/2020), Disp: 120 Box, Rfl: 11    ciprofloxacin HCl (CIPRO) 500 MG tablet, Take 1 tablet (500 mg total) by mouth every 12 (twelve) hours. (Patient not taking: Reported on 10/8/2020), Disp: 14 tablet, Rfl: 0    24h Oral Morphine Equivalents (OME):  N/A    Objective:     Physical Exam:  Vitals:    12/18/20 0845   BP: (!) 142/80   Pulse: 82   Resp: 16   Temp: 98.6 °F (37 °C)   TempSrc: Temporal   SpO2: 98%   Weight: 77.1 kg (170 lb)   PainSc: 0-No pain     Body mass index is 29.18 kg/m².     Physical Exam   Constitutional: She is oriented to person, place, and time. She appears well-developed and well-nourished.   HENT:   Head: Normocephalic and atraumatic.   Right Ear: External ear normal.   Left Ear: External ear normal.   Nose: Nose normal.   Mouth/Throat: Oropharynx is clear and moist. Some missing front teeth from a fall in Nov 2019  Eyes: Pupils are equal, round, and reactive to light. Conjunctivae and EOM are normal.   Neck: Normal range of motion. Neck supple.   Cardiovascular: Normal rate, regular rhythm, normal heart sounds and intact distal pulses.   Pulmonary/Chest: Effort normal. No stridor. No respiratory distress. She has no wheezes. She has no rales. She exhibits no tenderness.  No longer using oxygen or nebulizer.  Abdominal: Soft. Bowel  sounds are normal.   Musculoskeletal: Normal range of motion. She exhibits no tenderness or deformity. Left ankle with 1+ non-pitting edema-pt reports chronic  Neurological: She is alert and oriented to person, place, and time. Positive balance difficulties.  Skin: Skin is warm and dry. Capillary refill takes 2 to 3 seconds.   Psychiatric: She has a normal mood and affect. Her behavior is normal. Judgment and thought content normal.       Advance Care Planning   Ethical / Legal: Advance Care Planning   · Surrogate decision maker:  Name Ankita Gimenez, Relationship: daughter  · Code Status: Reports full code today  · [place]:  none  Other advance directive:  HPOA, living will on file at hospital placed 20 years ago-patient states she does not have a copy, new forms left with patient to review with daughter, Capacity to make medical decisions:  intact, Conflict none         Labs:  CBC:   WBC   Date Value Ref Range Status   03/06/2020 9.32 3.90 - 12.70 K/uL Final       Hgb   Date Value Ref Range Status   07/20/2013 12.5 12.0 - 16.0 g/dl Final     Hemoglobin   Date Value Ref Range Status   03/06/2020 11.7 (L) 12.0 - 16.0 g/dL Final       Hematocrit   Date Value Ref Range Status   03/06/2020 36.8 (L) 37.0 - 48.5 % Final       MCV   Date Value Ref Range Status   03/06/2020 100 (H) 82 - 98 fL Final       Platelets   Date Value Ref Range Status   03/06/2020 376 (H) 150 - 350 K/uL Final       LFT:   Lab Results   Component Value Date    AST 29 08/28/2020    GGT 73 (H) 08/28/2020    ALKPHOS 97 08/28/2020    BILITOT 0.7 08/28/2020       Albumin:   Albumin   Date Value Ref Range Status   08/28/2020 3.9 3.5 - 5.2 g/dL Final     Protein:   Total Protein   Date Value Ref Range Status   08/28/2020 8.1 6.0 - 8.4 g/dL Final       Radiology:  I have reviewed all pertinent imaging results/findings within the past 24 hours.      Assessment:     1. Difficulty balancing    2. Social isolation    3. Chronic obstructive pulmonary disease,  unspecified COPD type    4. Hypertension, unspecified type    5. Gastroesophageal reflux disease without esophagitis    6. Palliative care encounter    7. Dizziness        Plan:   Glenda was seen today for follow-up.    Diagnoses and all orders for this visit:    Difficulty balancing    Social isolation    Chronic obstructive pulmonary disease, unspecified COPD type    Hypertension, unspecified type    Gastroesophageal reflux disease without esophagitis    Palliative care encounter    Dizziness      Problem List Items Addressed This Visit        Neuro    Difficulty balancing - Primary     Chronic. Refuses PT.  Fall precautions and safety advised.             Psychiatric    Social isolation     Chronic and related to COVID-19 pandemic.  Denies depression.  Encouraged support from family.            Pulmonary    Chronic obstructive pulmonary disease     Chronic and stable on no meds.  Continue to monitor.            Cardiac/Vascular    Hypertension     BP borderline high today.  Continue medication.  Follow low salt diet.  Continue to monitor.            GI    Gastroesophageal reflux disease     Chronic.  Controlled on medication.  Discussed food triggers and avoidance.            Other    Palliative care encounter     Full code. ACP documents left on previous visit, patient has not been able to sit with daughter to fill out forms.          Dizziness     Chronic.   Refuses PT.  Fall precautions and safety advised.                  Were controlled substances prescribed?  No    > 50% of 60 min visit spent in chart review, face to face discussion of goals of care,  symptom assessment, coordination of care and emotional support. Topics discussed today: dizziness, hypertension, GERD, fall precautions, social isolation.    Follow Up Appointments:   Future Appointments   Date Time Provider Department Center   2/26/2021  3:00 PM Damir Fraire MD [facility]   Verbal consent for visit obtained from patient  today.    Signature:  Lesley Tamez NP     Attestation:   Screening criteria to assess the level of the patient's risk for infection with COVID-19 as recommended by the CDC at the time of the above documented home visit concluded appropriateness to proceed. Universal precautions were maintained at all times, including provider wearing a mask and gloves during entire visit and use of 60% alcohol gel hand  immediately prior to entry and upon departure of patient's home as well as cleaning of equipment used in home visit with antibacterial/germicidal disposable wipes.       Sotyktu Pregnancy And Lactation Text: There is insufficient data to evaluate whether or not Sotyktu is safe to use during pregnancy.   It is not known if Sotyktu passes into breast milk and whether or not it is safe to use when breastfeeding.

## 2023-01-10 ENCOUNTER — DOCUMENTATION ONLY (OUTPATIENT)
Dept: ENDOCRINOLOGY | Facility: CLINIC | Age: 88
End: 2023-01-10
Payer: MEDICARE

## 2023-01-10 PROBLEM — R94.6 THYROID FUNCTION TEST ABNORMAL: Status: ACTIVE | Noted: 2023-01-10

## 2023-01-10 PROBLEM — E06.9 THYROIDITIS: Status: ACTIVE | Noted: 2023-01-10

## 2023-01-10 NOTE — PROGRESS NOTES
Subjective:      Patient ID: Glenda Gimenez is a 89 y.o. female.    Chief Complaint:      Hypothyroidism     Patient is a 89 yr old postmenopausal lady with hypothyroidism on thyroid hormone repletion as well as presumed primary hyperparathyroidism and chronic hyponatremia and is seen in [facility] today via video televisit. As a result the examination aspects of the visit are limited.    History of Present Illness    The patient, Ms Gimenez is a 89 yr old postmenopausal  lady with hyperparathyroidism and hypercalcemia, hypothyrodism and chronic hyponatremia seen in [facility] today.   This is video televisit.  The patient location is: home  The chief complaint leading to consultation is: hypothyroidism, hyperparathyroidism and chronic hyponatremia    Visit type:Synchronous audio and video televisit    Face to Face time with patient: ?? minutes of total time spent on the encounter, which includes face to face time and non-face to face time preparing to see the patient (eg, review of tests), Obtaining and/or reviewing separately obtained history, Documenting clinical information in the electronic or other health record, Independently interpreting results (not separately reported) and communicating results to the patient/family/caregiver, or Care coordination (not separately reported).         Each patient to whom he or she provides medical services by telemedicine is:  (1) informed of the relationship between the physician and patient and the respective role of any other health care provider with respect to management of the patient; and (2) notified that he or she may decline to receive medical services by telemedicine and may withdraw from such care at any time.    Notes:   Patient has however been seen by Dr Perez for these same problems in the past and had been undergoing clinical surviellance only. She in addition has hypothyroidism on thyroid hormone replacement; 88mcg QD, essential hypertension, thyroid nodular  disease, hypovitaminosis D, Obesity and CKD stage 3.  Patient has had a prior kidney stone in the past ~ 5 yrs ago and this was spontaneously passed.  She denies any significant dyspepsia and has no local neck symptoms.  She does have long standing lumbago with recent right sided sciatica and small joint arthralgias.  Patients most recent thyroid USS was from 09/18 and showed stable nodular disease with no evidence of interval nodular growth. Her next thyroid sonogram would be for ~ 09/20 since she has had over 5 years of serial neck sonography that has shown stability of the noted thyroid nodules.     Her DEXA from 12/15 showed progressive osteopenia with a high frax score based on which she was commenced on fosamax. Her most recent  ffup DEXA from 06/21 showed high risk osteopenia for which she is monthly Boniva. Her next ffup DEXA should be for ~ 06/23.     Patients last fall was in November 2017 but no associated fracture. Patient has not had any episodes of passing kidney stones and has no hematuria.  Patient hasnt had any had any falls since her last visit.       Patient was admitted from march 2020 on account of pneumonia.      She indicates that her ambulation is more limited. She indicates that this is mainly due to myalgias in both limbs.    Patient has not had any recent falls.  She has had no head injury. She has no had any seizures.   It appears she had run out of [employer] some time ago and so has missed it for a few months.    Review of Systems   Constitutional:  Positive for appetite change. Negative for diaphoresis and fatigue. Chills: recent anorexia.  HENT:  Negative for facial swelling, trouble swallowing and voice change.    Eyes:  Negative for visual disturbance.   Respiratory:  Negative for cough and shortness of breath.    Cardiovascular:  Negative for chest pain and palpitations.   Gastrointestinal:  Negative for constipation, nausea and vomiting.   Endocrine: Negative for polyuria.    Genitourinary:  Negative for flank pain and hematuria.   Musculoskeletal:  Positive for arthralgias (Mild chronic lower back pain and knee arthralgias.), back pain (chronic) and gait problem (Unsteady gaite with frequent falls recently.). Negative for myalgias.   Skin:  Negative for color change, pallor and rash.   Neurological:  Positive for numbness (radicular pain down the left thigh from lower back). Negative for dizziness and headaches.   Hematological:  Does not bruise/bleed easily.   Psychiatric/Behavioral:  Positive for confusion. The patient is not nervous/anxious.      Objective: LMP  (LMP Unknown) Nil; video televisit.       Physical Exam    Lab Review:        Latest Reference Range & Units 05/09/22 18:22 05/09/22 18:45 07/05/22 11:03 08/11/22 11:33 08/11/22 15:09 08/11/22 17:09 08/11/22 23:09 08/12/22 03:09   WBC 3.90 - 12.70 K/uL 6.95   6.23    6.00   RBC 4.00 - 5.40 M/uL 4.18   3.85 (L)    3.53 (L)   Hemoglobin 12.0 - 16.0 g/dL 13.2   11.9 (L)    11.2 (L)   Hematocrit 37.0 - 48.5 % 39.1   36.6 (L)    33.1 (L)   MCV 82 - 98 fL 94   95    94   MCH 27.0 - 31.0 pg 31.6 (H)   30.9    31.7 (H)   MCHC 32.0 - 36.0 g/dL 33.8   32.5    33.8   RDW 11.5 - 14.5 % 13.2   13.0    12.9   Platelets 150 - 450 K/uL 277   239    237   MPV 9.2 - 12.9 fL 10.6   10.6    10.9   Gran % 38.0 - 73.0 % 48.7   47.7    42.4   Lymph % 18.0 - 48.0 % 37.3   36.4    41.5   Mono % 4.0 - 15.0 % 9.5   10.1    9.8   Eosinophil % 0.0 - 8.0 % 3.5   4.3    4.8   Basophil % 0.0 - 1.9 % 0.7   1.3    1.3   Immature Granulocytes 0.0 - 0.5 % 0.3   0.2    0.2   Gran # (ANC) 1.8 - 7.7 K/uL 3.4   3.0    2.5   Lymph # 1.0 - 4.8 K/uL 2.6   2.3    2.5   Mono # 0.3 - 1.0 K/uL 0.7   0.6    0.6   Eos # 0.0 - 0.5 K/uL 0.2   0.3    0.3   Baso # 0.00 - 0.20 K/uL 0.05   0.08    0.08   Immature Grans (Abs) 0.00 - 0.04 K/uL 0.02   0.01    0.01   nRBC 0 /100 WBC 0   0    0   Differential Method  Automated   Automated    Automated   Sodium 136 - 145 mmol/L 141    140    140   Potassium 3.5 - 5.1 mmol/L 4.0   4.6    3.9   Chloride 95 - 110 mmol/L 104   106    107   CO2 23 - 29 mmol/L 26   23    22 (L)   Anion Gap 8 - 16 mmol/L 11   11    11   BUN 10 - 30 mg/dL 14   18    19   Creatinine 0.5 - 1.4 mg/dL 0.8   0.8    0.7   eGFR >60 mL/min/1.73 m^2    >60    >60   eGFR if non African American >60 mL/min/1.73 m^2 >60          eGFR if African American >60 mL/min/1.73 m^2 >60          Glucose 70 - 110 mg/dL 87   108    97   Calcium 8.7 - 10.5 mg/dL 10.5  10.6 (H) 10.3    10.3   Ionized Calcium 1.06 - 1.42 mmol/L   1.43 (H)        Phosphorus 2.7 - 4.5 mg/dL   2.8        Magnesium 1.6 - 2.6 mg/dL   1.8     1.7   Alkaline Phosphatase 55 - 135 U/L 77   66    67   PROTEIN TOTAL 6.0 - 8.4 g/dL 8.3   7.8    6.8   Albumin 3.5 - 5.2 g/dL 3.6   3.4 (L)    3.2 (L)   Uric Acid 2.4 - 5.7 mg/dL   5.4        BILIRUBIN TOTAL 0.1 - 1.0 mg/dL 0.4   0.4    0.5   AST 10 - 40 U/L 35   30    24   ALT 10 - 44 U/L 29   18    18   Cholesterol 120 - 199 mg/dL   185        HDL 40 - 75 mg/dL   48        HDL/Cholesterol Ratio 20.0 - 50.0 %   25.9        LDL Cholesterol External 63.0 - 159.0 mg/dL   108.4        Non-HDL Cholesterol mg/dL   137        Total Cholesterol/HDL Ratio 2.0 - 5.0    3.9        Triglycerides 30 - 150 mg/dL   143        BNP 0 - 99 pg/mL    143 (H)       Troponin I 0.000 - 0.026 ng/mL    0.064 (H)  0.070 (H) 0.086 (H)    Vit D, 1,25-Dihydroxy 20 - 79 pg/mL   77        Hemoglobin A1C External 4.0 - 5.6 %   5.0        Estimated Avg Glucose 68 - 131 mg/dL   97        TSH 0.400 - 4.000 uIU/mL   3.421   2.697     T3, Total 60 - 180 ng/dL   70        Free T4 0.71 - 1.51 ng/dL   1.04        Thyroglobulin Interpretation    SEE BELOW        Thyroglobulin Antibody Screen <1.8 IU/mL   14 (H)        Thyroglobulin, Tumor Marker ng/mL   0.6 (H)        PTH 9.0 - 77.0 pg/mL   105.0 (H)        Chromogranin A <93 ng/mL   82        SARS-CoV-2 RNA, Amplification, Qual Negative      Negative      Specimen UA    Urine, Clean Catch         Color, UA Yellow, Straw, Kary   Yellow         Appearance, UA Clear   Clear         Specific Gravity, UA 1.005 - 1.030   1.020         pH, UA 5.0 - 8.0   8.0         Protein, UA Negative   Negative         Glucose, UA Negative   Negative         Ketones, UA Negative   Negative         Occult Blood UA Negative   Negative         NITRITE UA Negative   Negative         UROBILINOGEN UA <2.0 EU/dL  Negative         Bilirubin (UA) Negative   Negative         Leukocytes, UA Negative   Negative         (L): Data is abnormally low  (H): Data is abnormally high    Assessment:     1. Nodular thyroid disease        2. Chronic hyponatremia        3. Hypercalcemia        4. Postmenopausal        5. Salt-losing nephritis or syndrome        6. Stage 3 chronic kidney disease, unspecified whether stage 3a or 3b CKD        7. Dysmetabolic syndrome        8. Primary hyperparathyroidism        9. Obesity (BMI 30-39.9)        10. Vitamin D deficiency disease        11. Gastroesophageal reflux disease without esophagitis        12. Fatigue, unspecified type        13. Coronary artery disease of native artery of native heart with stable angina pectoris        14. Chronic diastolic CHF (congestive heart failure)        15. Essential hypertension        16. History of stroke        17. Vascular dementia with behavior disturbance               Regarding hyperparathyroidism; Mild primary hyperparathyroidism; to continue serial conservative watchful survielance. To obtain basic monitoring labs in this regard as detailed above.  Regarding hypovitaminosis D; Will recheck 25 OH vitamin d today and in the interim continue OTC vitamin supplementation as before.  Regarding hypothyroidism; appears clinically euthyroid; will recheck TFTS today and to continue levothyroxine 88mcg QD.  Regarding thyroid nodular disease; to obtain ffup thyroid USs ~ 06/23.  Regarding hypertension; presently well controlled. To continue present  antihypertensive regimen and to continue serial tracking of BP trends. Encouraged patient to continuing ensuring copious free water intake; ~ 70 fl oz daily.  Regarding high risk osteopenia; reiterated fall risk reduction strategies including obtaining a life alert alarm.  To continue  Boniva, 150mg monthly along with PO vitamin D supplemantation. To obtain ffup DEXA ~ 06/23. She should continue the Boniva for next 5 yrs till ~ 02/26 before commencing biphosphonate holiday.  Regarding essential hypertension; BP fairly well controlled. To continue antihypertensives as before.  Regarding dysmetabolic syndrome; to obtain screening HBA1c and will continue to track serial trends  Regarding Grade 2 obesity; weight essentially stable. No active interventions regarding weight loss at this time but to continue prior efforts at portion size control to enable weight maintenance as before.   Regarding sciatica; encouraged to continue back strengthening exercises which she has been given by PT group and to also try and sleep in her bed rather than couch as she presently does.  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 To commence salt tablet supplements as prescribed in addition to increased dietary salt intake and limitation of free water intake to ~ 50 fl oz daily. To also ensure monthly serum sodium checks for the next ~ 6months.  Regarding recent anorexia with involuntary weight loss; to commence daily MVI. It is expected that this should improve as patients serum sodium improves back towards normal.    Plan:     The patient did not arrive in time for her scheduled appt.  She will need to have this rescheduled.

## 2023-01-12 ENCOUNTER — PATIENT MESSAGE (OUTPATIENT)
Dept: ADMINISTRATIVE | Facility: CLINIC | Age: 88
End: 2023-01-12
Payer: MEDICARE

## 2023-01-19 ENCOUNTER — TELEPHONE (OUTPATIENT)
Dept: ADMINISTRATIVE | Facility: CLINIC | Age: 88
End: 2023-01-19
Payer: MEDICARE

## 2023-01-19 NOTE — TELEPHONE ENCOUNTER
Attempt was made to contact patient regard [facility] visits.  Unable to reach patient via phone call.  Left voicemail with contact information to return phone call.

## 2023-02-07 DIAGNOSIS — Z00.00 ENCOUNTER FOR MEDICARE ANNUAL WELLNESS EXAM: ICD-10-CM

## 2023-02-09 DIAGNOSIS — Z00.00 ENCOUNTER FOR MEDICARE ANNUAL WELLNESS EXAM: ICD-10-CM

## 2023-02-13 ENCOUNTER — LAB VISIT (OUTPATIENT)
Dept: LAB | Facility: HOSPITAL | Age: 88
End: 2023-02-13
Attending: STUDENT IN AN ORGANIZED HEALTH CARE EDUCATION/TRAINING PROGRAM
Payer: MEDICARE

## 2023-02-13 ENCOUNTER — OFFICE VISIT (OUTPATIENT)
Dept: FAMILY MEDICINE | Facility: CLINIC | Age: 88
End: 2023-02-13
Payer: MEDICARE

## 2023-02-13 VITALS
BODY MASS INDEX: 31.32 KG/M2 | HEART RATE: 73 BPM | OXYGEN SATURATION: 95 % | SYSTOLIC BLOOD PRESSURE: 110 MMHG | HEIGHT: 62 IN | RESPIRATION RATE: 18 BRPM | DIASTOLIC BLOOD PRESSURE: 64 MMHG | WEIGHT: 170.19 LBS

## 2023-02-13 DIAGNOSIS — D64.9 ANEMIA, UNSPECIFIED TYPE: Primary | ICD-10-CM

## 2023-02-13 DIAGNOSIS — E03.9 HYPOTHYROIDISM, UNSPECIFIED TYPE: ICD-10-CM

## 2023-02-13 DIAGNOSIS — F01.518 VASCULAR DEMENTIA WITH BEHAVIOR DISTURBANCE: ICD-10-CM

## 2023-02-13 DIAGNOSIS — F33.1 MAJOR DEPRESSIVE DISORDER, RECURRENT EPISODE, MODERATE: ICD-10-CM

## 2023-02-13 DIAGNOSIS — D64.9 ANEMIA, UNSPECIFIED TYPE: ICD-10-CM

## 2023-02-13 PROBLEM — E44.1 MILD PROTEIN-CALORIE MALNUTRITION: Status: RESOLVED | Noted: 2021-01-19 | Resolved: 2023-02-13

## 2023-02-13 PROBLEM — J44.9 COPD (CHRONIC OBSTRUCTIVE PULMONARY DISEASE): Status: RESOLVED | Noted: 2020-04-10 | Resolved: 2023-02-13

## 2023-02-13 PROBLEM — I25.118 CORONARY ARTERY DISEASE OF NATIVE ARTERY OF NATIVE HEART WITH STABLE ANGINA PECTORIS: Status: RESOLVED | Noted: 2021-05-11 | Resolved: 2023-02-13

## 2023-02-13 PROBLEM — I50.32 CHRONIC DIASTOLIC CHF (CONGESTIVE HEART FAILURE): Status: RESOLVED | Noted: 2020-03-06 | Resolved: 2023-02-13

## 2023-02-13 PROBLEM — I77.89 OTHER SPECIFIED DISORDERS OF ARTERIES AND ARTERIOLES: Status: RESOLVED | Noted: 2022-04-07 | Resolved: 2023-02-13

## 2023-02-13 LAB
ALBUMIN SERPL BCP-MCNC: 3.7 G/DL (ref 3.5–5.2)
ALP SERPL-CCNC: 61 U/L (ref 55–135)
ALT SERPL W/O P-5'-P-CCNC: 13 U/L (ref 10–44)
ANION GAP SERPL CALC-SCNC: 11 MMOL/L (ref 8–16)
AST SERPL-CCNC: 24 U/L (ref 10–40)
BASOPHILS # BLD AUTO: 0.09 K/UL (ref 0–0.2)
BASOPHILS NFR BLD: 1.3 % (ref 0–1.9)
BILIRUB SERPL-MCNC: 0.3 MG/DL (ref 0.1–1)
BUN SERPL-MCNC: 26 MG/DL (ref 10–30)
CALCIUM SERPL-MCNC: 10.7 MG/DL (ref 8.7–10.5)
CHLORIDE SERPL-SCNC: 102 MMOL/L (ref 95–110)
CO2 SERPL-SCNC: 26 MMOL/L (ref 23–29)
CREAT SERPL-MCNC: 1 MG/DL (ref 0.5–1.4)
DIFFERENTIAL METHOD: ABNORMAL
EOSINOPHIL # BLD AUTO: 0.3 K/UL (ref 0–0.5)
EOSINOPHIL NFR BLD: 4 % (ref 0–8)
ERYTHROCYTE [DISTWIDTH] IN BLOOD BY AUTOMATED COUNT: 12.9 % (ref 11.5–14.5)
EST. GFR  (NO RACE VARIABLE): 51.9 ML/MIN/1.73 M^2
GLUCOSE SERPL-MCNC: 73 MG/DL (ref 70–110)
HCT VFR BLD AUTO: 39 % (ref 37–48.5)
HGB BLD-MCNC: 12.4 G/DL (ref 12–16)
IMM GRANULOCYTES # BLD AUTO: 0.02 K/UL (ref 0–0.04)
IMM GRANULOCYTES NFR BLD AUTO: 0.3 % (ref 0–0.5)
LYMPHOCYTES # BLD AUTO: 2.6 K/UL (ref 1–4.8)
LYMPHOCYTES NFR BLD: 38.6 % (ref 18–48)
MCH RBC QN AUTO: 30.7 PG (ref 27–31)
MCHC RBC AUTO-ENTMCNC: 31.8 G/DL (ref 32–36)
MCV RBC AUTO: 97 FL (ref 82–98)
MONOCYTES # BLD AUTO: 0.7 K/UL (ref 0.3–1)
MONOCYTES NFR BLD: 10.2 % (ref 4–15)
NEUTROPHILS # BLD AUTO: 3.1 K/UL (ref 1.8–7.7)
NEUTROPHILS NFR BLD: 45.6 % (ref 38–73)
NRBC BLD-RTO: 0 /100 WBC
PLATELET # BLD AUTO: 278 K/UL (ref 150–450)
PMV BLD AUTO: 11 FL (ref 9.2–12.9)
POTASSIUM SERPL-SCNC: 4.6 MMOL/L (ref 3.5–5.1)
PROT SERPL-MCNC: 7.8 G/DL (ref 6–8.4)
RBC # BLD AUTO: 4.04 M/UL (ref 4–5.4)
SODIUM SERPL-SCNC: 139 MMOL/L (ref 136–145)
WBC # BLD AUTO: 6.77 K/UL (ref 3.9–12.7)

## 2023-02-13 PROCEDURE — 1160F PR REVIEW ALL MEDS BY PRESCRIBER/CLIN PHARMACIST DOCUMENTED: ICD-10-PCS | Mod: HCNC,CPTII,S$GLB, | Performed by: STUDENT IN AN ORGANIZED HEALTH CARE EDUCATION/TRAINING PROGRAM

## 2023-02-13 PROCEDURE — 99214 PR OFFICE/OUTPT VISIT, EST, LEVL IV, 30-39 MIN: ICD-10-PCS | Mod: HCNC,S$GLB,, | Performed by: STUDENT IN AN ORGANIZED HEALTH CARE EDUCATION/TRAINING PROGRAM

## 2023-02-13 PROCEDURE — 1101F PT FALLS ASSESS-DOCD LE1/YR: CPT | Mod: HCNC,CPTII,S$GLB, | Performed by: STUDENT IN AN ORGANIZED HEALTH CARE EDUCATION/TRAINING PROGRAM

## 2023-02-13 PROCEDURE — 1101F PR PT FALLS ASSESS DOC 0-1 FALLS W/OUT INJ PAST YR: ICD-10-PCS | Mod: HCNC,CPTII,S$GLB, | Performed by: STUDENT IN AN ORGANIZED HEALTH CARE EDUCATION/TRAINING PROGRAM

## 2023-02-13 PROCEDURE — 3288F FALL RISK ASSESSMENT DOCD: CPT | Mod: HCNC,CPTII,S$GLB, | Performed by: STUDENT IN AN ORGANIZED HEALTH CARE EDUCATION/TRAINING PROGRAM

## 2023-02-13 PROCEDURE — 80053 COMPREHEN METABOLIC PANEL: CPT | Mod: HCNC | Performed by: STUDENT IN AN ORGANIZED HEALTH CARE EDUCATION/TRAINING PROGRAM

## 2023-02-13 PROCEDURE — 99999 PR PBB SHADOW E&M-EST. PATIENT-LVL V: ICD-10-PCS | Mod: PBBFAC,HCNC,, | Performed by: STUDENT IN AN ORGANIZED HEALTH CARE EDUCATION/TRAINING PROGRAM

## 2023-02-13 PROCEDURE — 1159F PR MEDICATION LIST DOCUMENTED IN MEDICAL RECORD: ICD-10-PCS | Mod: HCNC,CPTII,S$GLB, | Performed by: STUDENT IN AN ORGANIZED HEALTH CARE EDUCATION/TRAINING PROGRAM

## 2023-02-13 PROCEDURE — 3288F PR FALLS RISK ASSESSMENT DOCUMENTED: ICD-10-PCS | Mod: HCNC,CPTII,S$GLB, | Performed by: STUDENT IN AN ORGANIZED HEALTH CARE EDUCATION/TRAINING PROGRAM

## 2023-02-13 PROCEDURE — 1157F ADVNC CARE PLAN IN RCRD: CPT | Mod: HCNC,CPTII,S$GLB, | Performed by: STUDENT IN AN ORGANIZED HEALTH CARE EDUCATION/TRAINING PROGRAM

## 2023-02-13 PROCEDURE — 1160F RVW MEDS BY RX/DR IN RCRD: CPT | Mod: HCNC,CPTII,S$GLB, | Performed by: STUDENT IN AN ORGANIZED HEALTH CARE EDUCATION/TRAINING PROGRAM

## 2023-02-13 PROCEDURE — 1159F MED LIST DOCD IN RCRD: CPT | Mod: HCNC,CPTII,S$GLB, | Performed by: STUDENT IN AN ORGANIZED HEALTH CARE EDUCATION/TRAINING PROGRAM

## 2023-02-13 PROCEDURE — 1126F PR PAIN SEVERITY QUANTIFIED, NO PAIN PRESENT: ICD-10-PCS | Mod: HCNC,CPTII,S$GLB, | Performed by: STUDENT IN AN ORGANIZED HEALTH CARE EDUCATION/TRAINING PROGRAM

## 2023-02-13 PROCEDURE — 1157F PR ADVANCE CARE PLAN OR EQUIV PRESENT IN MEDICAL RECORD: ICD-10-PCS | Mod: HCNC,CPTII,S$GLB, | Performed by: STUDENT IN AN ORGANIZED HEALTH CARE EDUCATION/TRAINING PROGRAM

## 2023-02-13 PROCEDURE — 99999 PR PBB SHADOW E&M-EST. PATIENT-LVL V: CPT | Mod: PBBFAC,HCNC,, | Performed by: STUDENT IN AN ORGANIZED HEALTH CARE EDUCATION/TRAINING PROGRAM

## 2023-02-13 PROCEDURE — 85025 COMPLETE CBC W/AUTO DIFF WBC: CPT | Mod: HCNC | Performed by: STUDENT IN AN ORGANIZED HEALTH CARE EDUCATION/TRAINING PROGRAM

## 2023-02-13 PROCEDURE — 1126F AMNT PAIN NOTED NONE PRSNT: CPT | Mod: HCNC,CPTII,S$GLB, | Performed by: STUDENT IN AN ORGANIZED HEALTH CARE EDUCATION/TRAINING PROGRAM

## 2023-02-13 PROCEDURE — 36415 COLL VENOUS BLD VENIPUNCTURE: CPT | Mod: HCNC,PO | Performed by: STUDENT IN AN ORGANIZED HEALTH CARE EDUCATION/TRAINING PROGRAM

## 2023-02-13 PROCEDURE — 99214 OFFICE O/P EST MOD 30 MIN: CPT | Mod: HCNC,S$GLB,, | Performed by: STUDENT IN AN ORGANIZED HEALTH CARE EDUCATION/TRAINING PROGRAM

## 2023-02-13 NOTE — PROGRESS NOTES
"[facility] CLINIC NOTE    Patient Name: Glenda Gimenez  YOB: 1927    PRESENTING HISTORY     History of Present Illness:  Ms. Glenda Gimenez is a 89 y.o. female here for scheduled f/u.     She is feeling well and is without complaint. Unfortunately still having issues with balance.   Tried vestibular PT with modest benefit.     Hypothyroidism- on replacement.     Osteopenia- on bisphos.     Dementia comorbid with anxiety / depression.   Doing well. On SSRI, buspar, seroquel as well as aricept.     ROS      OBJECTIVE:   Vital Signs:  Vitals:    02/13/23 1339   BP: 110/64   Pulse: 73   Resp: 18   SpO2: 95%   Height: 5' 2" (1.575 m)          Physical Exam: Normal, no change.     Physical Exam    ASSESSMENT & PLAN:     Anemia, unspecified type  -     CBC Auto Differential; Future; Expected date: 02/13/2023  -     Comprehensive Metabolic Panel; Future; Expected date: 02/13/2023  Incidentally noted, recheck.     Major depressive disorder, recurrent episode, moderate  Continue current medications    Vascular dementia with behavior disturbance  Continue current medications    Hypothyroidism, unspecified type  Continue current medications             William Olivia MD   Internal Medicine            "

## 2023-03-13 DIAGNOSIS — F41.9 ANXIETY: ICD-10-CM

## 2023-03-13 RX ORDER — BUSPIRONE HYDROCHLORIDE 10 MG/1
10 TABLET ORAL 3 TIMES DAILY
Qty: 270 TABLET | Refills: 4 | Status: SHIPPED | OUTPATIENT
Start: 2023-03-13

## 2023-03-16 ENCOUNTER — PES CALL (OUTPATIENT)
Dept: ADMINISTRATIVE | Facility: CLINIC | Age: 88
End: 2023-03-16
Payer: MEDICARE

## 2023-05-04 ENCOUNTER — OFFICE VISIT (OUTPATIENT)
Dept: HOME HEALTH SERVICES | Facility: CLINIC | Age: 88
End: 2023-05-04
Payer: MEDICARE

## 2023-05-04 VITALS
SYSTOLIC BLOOD PRESSURE: 112 MMHG | BODY MASS INDEX: 31.09 KG/M2 | OXYGEN SATURATION: 96 % | WEIGHT: 170 LBS | HEART RATE: 71 BPM | DIASTOLIC BLOOD PRESSURE: 61 MMHG

## 2023-05-04 DIAGNOSIS — I70.0 ATHEROSCLEROSIS OF AORTA: ICD-10-CM

## 2023-05-04 DIAGNOSIS — F33.1 MAJOR DEPRESSIVE DISORDER, RECURRENT EPISODE, MODERATE: ICD-10-CM

## 2023-05-04 DIAGNOSIS — E03.9 HYPOTHYROIDISM, UNSPECIFIED TYPE: ICD-10-CM

## 2023-05-04 DIAGNOSIS — Z99.89 DEPENDENCE ON OTHER ENABLING MACHINES AND DEVICES: ICD-10-CM

## 2023-05-04 DIAGNOSIS — M85.80 OSTEOPENIA, UNSPECIFIED LOCATION: ICD-10-CM

## 2023-05-04 DIAGNOSIS — F41.9 ANXIETY: ICD-10-CM

## 2023-05-04 DIAGNOSIS — I10 ESSENTIAL HYPERTENSION: ICD-10-CM

## 2023-05-04 DIAGNOSIS — I25.118 CORONARY ARTERY DISEASE OF NATIVE ARTERY OF NATIVE HEART WITH STABLE ANGINA PECTORIS: ICD-10-CM

## 2023-05-04 DIAGNOSIS — E21.0 PRIMARY HYPERPARATHYROIDISM: ICD-10-CM

## 2023-05-04 DIAGNOSIS — K21.9 GASTROESOPHAGEAL REFLUX DISEASE WITHOUT ESOPHAGITIS: ICD-10-CM

## 2023-05-04 DIAGNOSIS — I50.9 CONGESTIVE HEART FAILURE, UNSPECIFIED HF CHRONICITY, UNSPECIFIED HEART FAILURE TYPE: ICD-10-CM

## 2023-05-04 DIAGNOSIS — H35.3211 EXUDATIVE AGE-RELATED MACULAR DEGENERATION OF RIGHT EYE WITH ACTIVE CHOROIDAL NEOVASCULARIZATION: ICD-10-CM

## 2023-05-04 DIAGNOSIS — Z00.00 ENCOUNTER FOR MEDICARE ANNUAL WELLNESS EXAM: Primary | ICD-10-CM

## 2023-05-04 DIAGNOSIS — Z00.00 ENCOUNTER FOR PREVENTIVE HEALTH EXAMINATION: ICD-10-CM

## 2023-05-04 DIAGNOSIS — F01.518 VASCULAR DEMENTIA WITH BEHAVIOR DISTURBANCE: ICD-10-CM

## 2023-05-04 PROCEDURE — 1157F ADVNC CARE PLAN IN RCRD: CPT | Mod: CPTII,S$GLB,, | Performed by: NURSE PRACTITIONER

## 2023-05-04 PROCEDURE — 1160F RVW MEDS BY RX/DR IN RCRD: CPT | Mod: CPTII,S$GLB,, | Performed by: NURSE PRACTITIONER

## 2023-05-04 PROCEDURE — 1159F PR MEDICATION LIST DOCUMENTED IN MEDICAL RECORD: ICD-10-PCS | Mod: CPTII,S$GLB,, | Performed by: NURSE PRACTITIONER

## 2023-05-04 PROCEDURE — 3288F FALL RISK ASSESSMENT DOCD: CPT | Mod: CPTII,S$GLB,, | Performed by: NURSE PRACTITIONER

## 2023-05-04 PROCEDURE — 3288F PR FALLS RISK ASSESSMENT DOCUMENTED: ICD-10-PCS | Mod: CPTII,S$GLB,, | Performed by: NURSE PRACTITIONER

## 2023-05-04 PROCEDURE — 1101F PT FALLS ASSESS-DOCD LE1/YR: CPT | Mod: CPTII,S$GLB,, | Performed by: NURSE PRACTITIONER

## 2023-05-04 PROCEDURE — G0439 PPPS, SUBSEQ VISIT: HCPCS | Mod: S$GLB,,, | Performed by: NURSE PRACTITIONER

## 2023-05-04 PROCEDURE — 1126F PR PAIN SEVERITY QUANTIFIED, NO PAIN PRESENT: ICD-10-PCS | Mod: CPTII,S$GLB,, | Performed by: NURSE PRACTITIONER

## 2023-05-04 PROCEDURE — 1160F PR REVIEW ALL MEDS BY PRESCRIBER/CLIN PHARMACIST DOCUMENTED: ICD-10-PCS | Mod: CPTII,S$GLB,, | Performed by: NURSE PRACTITIONER

## 2023-05-04 PROCEDURE — 1157F PR ADVANCE CARE PLAN OR EQUIV PRESENT IN MEDICAL RECORD: ICD-10-PCS | Mod: CPTII,S$GLB,, | Performed by: NURSE PRACTITIONER

## 2023-05-04 PROCEDURE — 1101F PR PT FALLS ASSESS DOC 0-1 FALLS W/OUT INJ PAST YR: ICD-10-PCS | Mod: CPTII,S$GLB,, | Performed by: NURSE PRACTITIONER

## 2023-05-04 PROCEDURE — 1170F FXNL STATUS ASSESSED: CPT | Mod: CPTII,S$GLB,, | Performed by: NURSE PRACTITIONER

## 2023-05-04 PROCEDURE — G0439 PR MEDICARE ANNUAL WELLNESS SUBSEQUENT VISIT: ICD-10-PCS | Mod: S$GLB,,, | Performed by: NURSE PRACTITIONER

## 2023-05-04 PROCEDURE — 1159F MED LIST DOCD IN RCRD: CPT | Mod: CPTII,S$GLB,, | Performed by: NURSE PRACTITIONER

## 2023-05-04 PROCEDURE — 1126F AMNT PAIN NOTED NONE PRSNT: CPT | Mod: CPTII,S$GLB,, | Performed by: NURSE PRACTITIONER

## 2023-05-04 PROCEDURE — 1170F PR FUNCTIONAL STATUS ASSESSED: ICD-10-PCS | Mod: CPTII,S$GLB,, | Performed by: NURSE PRACTITIONER

## 2023-05-04 NOTE — PROGRESS NOTES
Glenda Gimenez presented for a  Medicare AWV and comprehensive Health Risk Assessment today. The following components were reviewed and updated:    Medical history  Family History  Social history  Allergies and Current Medications  Health Risk Assessment  Health Maintenance  Care Team         ** See Completed Assessments for Annual Wellness Visit within the encounter summary.**         The following assessments were completed:  Living Situation  CAGE  Depression Screening  Timed Get Up and Go  Whisper Test  Cognitive Function Screening - not performed  Nutrition Screening  ADL Screening  PAQ Screening    Review for Opioid Screening: Patient does not have rx for Opioids.  Review for Substance Use Disorders: Patient does not use substance.      Vitals:    05/04/23 1037   BP: 112/61   Pulse: 71   SpO2: 96%   Weight: 77.1 kg (170 lb)     Body mass index is 31.09 kg/m².  Physical Exam  Vitals reviewed.   HENT:      Head: Normocephalic.   Eyes:      Pupils: Pupils are equal, round, and reactive to light.   Cardiovascular:      Rate and Rhythm: Normal rate and regular rhythm.      Heart sounds: Normal heart sounds.   Pulmonary:      Effort: Pulmonary effort is normal.      Breath sounds: Normal breath sounds.   Abdominal:      General: Bowel sounds are normal.      Palpations: Abdomen is soft.   Musculoskeletal:         General: Normal range of motion.      Cervical back: Normal range of motion.      Right lower leg: No edema.      Left lower leg: No edema.   Skin:     General: Skin is warm and dry.   Neurological:      Mental Status: She is alert and oriented to person, place, and time.      Motor: Weakness present.      Gait: Gait abnormal (walker).   Psychiatric:         Behavior: Behavior normal.         Cognition and Memory: Memory is impaired.             Diagnoses and health risks identified today and associated recommendations/orders:    1. Encounter for Medicare annual wellness exam  - Above assessments  completed. Preventive measures and health maintenance reviewed with patient.  - Ambulatory Referral/Consult to Enhanced Annual Wellness Visit (eAWV)    2. Encounter for preventive health examination  -son interested in resuming [facility] care at home when new provider starts in June, will place referral.    3. Vascular dementia with behavior disturbance  Stable, followed by PCP  -on donepezil and quetiapine    4. Exudative age-related macular degeneration of right eye with active choroidal neovascularization  Stable, followed by Ophthalmology  -areds    5. Major depressive disorder, recurrent episode, moderate  Stable, followed by PCP  -on sertraline    6. Coronary artery disease of native artery of native heart with stable angina pectoris  Stable, followed by Cardiology  -on krill oil, isosorbide, olmesartan, asa, nitro if needed    7. Atherosclerosis of aorta  -noted on imaging  -on asa, krill oil, olmesartan, isosorbide    8. Osteopenia, unspecified location  Stable, followed by Endocrinology  -on ibandronate and D3    9. Primary hyperparathyroidism  Stable, followed by Endocrinology    10. Congestive heart failure, unspecified HF chronicity, unspecified heart failure type  Stable, followed by PCP  -on furosemide and K+    11. Anxiety  Stable, followed by PCP  -on buspirone    12. Essential hypertension  Stable, followed by PCP  -on olmesartan    13. Hypothyroidism, unspecified type  Stable, followed by Endocrinology  -on levothyroxine    14. Gastroesophageal reflux disease without esophagitis  Stable, followed by PCP  -on PPI and famotidine    15. Dependence on other enabling machines and devices  -uses walker, no falls reported, discussed safety and fall precautions      Provided Mercedes with a 5-10 year written screening schedule and personal prevention plan. Recommendations were developed using the USPSTF age appropriate recommendations. Education, counseling, and referrals were provided as needed. After Visit  Summary printed and given to patient which includes a list of additional screenings\tests needed.    Follow up in about 1 year (around 5/4/2024) for your next annual wellness visit.    Tereza Kaur NP      I offered to discuss advanced care planning, including how to pick a person who would make decisions for you if you were unable to make them for yourself, called a health care power of , and what kind of decisions you might make such as use of life sustaining treatments such as ventilators and tube feeding when faced with a life limiting illness recorded on a living will that they will need to know. (How you want to be cared for as you near the end of your natural life)     X  Patient has advanced directives on file, which we reviewed, and they do not wish to make changes.

## 2023-05-04 NOTE — PATIENT INSTRUCTIONS
Counseling and Referral of Other Preventative  (Italic type indicates deductible and co-insurance are waived)    Patient Name: Glenda Gimenez  Today's Date: 5/4/2023    Health Maintenance       Date Due Completion Date    Shingles Vaccine (1 of 2) Never done ---    COVID-19 Vaccine (3 - Booster for Pfizer series) 04/14/2021 2/17/2021    Lipid Panel 07/05/2027 7/5/2022    TETANUS VACCINE 11/07/2027 11/7/2017        No orders of the defined types were placed in this encounter.    The following information is provided to all patients.  This information is to help you find resources for any of the problems found today that may be affecting your health:                Living healthy guide: www.[facility].[place].gov      Understanding Diabetes: www.diabetes.org      Eating healthy: www.cdc.gov/healthyweight      [facility] home safety checklist: www.cdc.gov/steadi/patient.html      Agency on Aging: www.goea.[place].[facility]      Alcoholics anonymous (AA): www.aa.org      Physical Activity: www.zeinab.nih.gov/rv9rekq      Tobacco use: www.quitwithusla.org

## 2023-05-06 DIAGNOSIS — I25.118 CORONARY ARTERY DISEASE OF NATIVE ARTERY OF NATIVE HEART WITH STABLE ANGINA PECTORIS: ICD-10-CM

## 2023-05-09 RX ORDER — ISOSORBIDE MONONITRATE 30 MG/1
TABLET, EXTENDED RELEASE ORAL
Qty: 90 TABLET | Refills: 3 | Status: SHIPPED | OUTPATIENT
Start: 2023-05-09 | End: 2023-06-12 | Stop reason: DRUGHIGH

## 2023-05-12 ENCOUNTER — PATIENT MESSAGE (OUTPATIENT)
Dept: FAMILY MEDICINE | Facility: CLINIC | Age: 88
End: 2023-05-12
Payer: MEDICARE

## 2023-05-24 NOTE — TELEPHONE ENCOUNTER
No care due was identified.  [facility] Embedded Care Due Messages. Reference number: [id number].   5/24/2023 10:26:42 AM CDT

## 2023-05-24 NOTE — TELEPHONE ENCOUNTER
Refill Routing Note   Medication(s) are not appropriate for processing by [facility] for the following reason(s):      No active prescription written by PCP    [facility] action(s):  Defer None identified            Appointments  past 12m or future 3m with PCP    Date Provider   Last Visit   2/13/2023 William Olivia MD   Next Visit   8/15/2023 William Olivia MD   ED visits in past 90 days: 0        Note composed:2:45 PM 05/24/2023

## 2023-05-26 RX ORDER — FUROSEMIDE 20 MG/1
20 TABLET ORAL DAILY
Qty: 90 TABLET | Refills: 1 | Status: SHIPPED | OUTPATIENT
Start: 2023-05-26 | End: 2023-12-12 | Stop reason: SDUPTHER

## 2023-05-26 NOTE — TELEPHONE ENCOUNTER
No care due was identified.  [facility] Embedded Care Due Messages. Reference number: [id number].   5/26/2023 11:20:09 AM CDT

## 2023-05-29 RX ORDER — FUROSEMIDE 20 MG/1
TABLET ORAL
Qty: 90 TABLET | Refills: 1 | OUTPATIENT
Start: 2023-05-29

## 2023-06-08 ENCOUNTER — TELEPHONE (OUTPATIENT)
Dept: HOME HEALTH SERVICES | Facility: CLINIC | Age: 88
End: 2023-06-08
Payer: MEDICARE

## 2023-06-08 DIAGNOSIS — R54 ADVANCED AGE: Primary | ICD-10-CM

## 2023-06-08 NOTE — TELEPHONE ENCOUNTER
Order placed for [facility].  Requested by son.  Patient was previously seen by Lesley Tamez.

## 2023-06-12 ENCOUNTER — TELEPHONE (OUTPATIENT)
Dept: FAMILY MEDICINE | Facility: CLINIC | Age: 88
End: 2023-06-12
Payer: MEDICARE

## 2023-06-12 ENCOUNTER — OFFICE VISIT (OUTPATIENT)
Dept: FAMILY MEDICINE | Facility: CLINIC | Age: 88
End: 2023-06-12
Payer: MEDICARE

## 2023-06-12 ENCOUNTER — LAB VISIT (OUTPATIENT)
Dept: LAB | Facility: HOSPITAL | Age: 88
End: 2023-06-12
Attending: STUDENT IN AN ORGANIZED HEALTH CARE EDUCATION/TRAINING PROGRAM
Payer: MEDICARE

## 2023-06-12 ENCOUNTER — TELEPHONE (OUTPATIENT)
Dept: FAMILY MEDICINE | Facility: CLINIC | Age: 88
End: 2023-06-12

## 2023-06-12 VITALS
HEIGHT: 62 IN | WEIGHT: 178.13 LBS | RESPIRATION RATE: 18 BRPM | DIASTOLIC BLOOD PRESSURE: 58 MMHG | SYSTOLIC BLOOD PRESSURE: 110 MMHG | OXYGEN SATURATION: 95 % | HEART RATE: 81 BPM | BODY MASS INDEX: 32.78 KG/M2

## 2023-06-12 DIAGNOSIS — R42 DIZZINESS: ICD-10-CM

## 2023-06-12 DIAGNOSIS — R93.89 ABNORMAL FINDINGS ON DIAGNOSTIC IMAGING OF OTHER SPECIFIED BODY STRUCTURES: ICD-10-CM

## 2023-06-12 DIAGNOSIS — J31.0 CHRONIC RHINITIS: ICD-10-CM

## 2023-06-12 DIAGNOSIS — R42 DIZZINESS: Primary | ICD-10-CM

## 2023-06-12 LAB
ALBUMIN SERPL BCP-MCNC: 3.7 G/DL (ref 3.5–5.2)
ALP SERPL-CCNC: 60 U/L (ref 55–135)
ALT SERPL W/O P-5'-P-CCNC: 13 U/L (ref 10–44)
ANION GAP SERPL CALC-SCNC: 11 MMOL/L (ref 8–16)
AST SERPL-CCNC: 24 U/L (ref 10–40)
BASOPHILS # BLD AUTO: 0.08 K/UL (ref 0–0.2)
BASOPHILS NFR BLD: 1.2 % (ref 0–1.9)
BILIRUB SERPL-MCNC: 0.3 MG/DL (ref 0.1–1)
BUN SERPL-MCNC: 24 MG/DL (ref 10–30)
CALCIUM SERPL-MCNC: 10.3 MG/DL (ref 8.7–10.5)
CHLORIDE SERPL-SCNC: 102 MMOL/L (ref 95–110)
CO2 SERPL-SCNC: 28 MMOL/L (ref 23–29)
CREAT SERPL-MCNC: 0.9 MG/DL (ref 0.5–1.4)
DIFFERENTIAL METHOD: ABNORMAL
EOSINOPHIL # BLD AUTO: 0.3 K/UL (ref 0–0.5)
EOSINOPHIL NFR BLD: 3.8 % (ref 0–8)
ERYTHROCYTE [DISTWIDTH] IN BLOOD BY AUTOMATED COUNT: 13.2 % (ref 11.5–14.5)
EST. GFR  (NO RACE VARIABLE): 58.5 ML/MIN/1.73 M^2
GLUCOSE SERPL-MCNC: 81 MG/DL (ref 70–110)
HCT VFR BLD AUTO: 39.3 % (ref 37–48.5)
HGB BLD-MCNC: 12.8 G/DL (ref 12–16)
IMM GRANULOCYTES # BLD AUTO: 0.01 K/UL (ref 0–0.04)
IMM GRANULOCYTES NFR BLD AUTO: 0.2 % (ref 0–0.5)
LYMPHOCYTES # BLD AUTO: 2.6 K/UL (ref 1–4.8)
LYMPHOCYTES NFR BLD: 39.7 % (ref 18–48)
MCH RBC QN AUTO: 31.6 PG (ref 27–31)
MCHC RBC AUTO-ENTMCNC: 32.6 G/DL (ref 32–36)
MCV RBC AUTO: 97 FL (ref 82–98)
MONOCYTES # BLD AUTO: 0.6 K/UL (ref 0.3–1)
MONOCYTES NFR BLD: 9.4 % (ref 4–15)
NEUTROPHILS # BLD AUTO: 3 K/UL (ref 1.8–7.7)
NEUTROPHILS NFR BLD: 45.7 % (ref 38–73)
NRBC BLD-RTO: 0 /100 WBC
PLATELET # BLD AUTO: 281 K/UL (ref 150–450)
PMV BLD AUTO: 10.7 FL (ref 9.2–12.9)
POTASSIUM SERPL-SCNC: 4.5 MMOL/L (ref 3.5–5.1)
PROT SERPL-MCNC: 7.6 G/DL (ref 6–8.4)
RBC # BLD AUTO: 4.05 M/UL (ref 4–5.4)
SODIUM SERPL-SCNC: 141 MMOL/L (ref 136–145)
T4 FREE SERPL-MCNC: 0.9 NG/DL (ref 0.71–1.51)
TSH SERPL DL<=0.005 MIU/L-ACNC: 6.16 UIU/ML (ref 0.4–4)
WBC # BLD AUTO: 6.58 K/UL (ref 3.9–12.7)

## 2023-06-12 PROCEDURE — 1157F ADVNC CARE PLAN IN RCRD: CPT | Mod: CPTII,S$GLB,, | Performed by: STUDENT IN AN ORGANIZED HEALTH CARE EDUCATION/TRAINING PROGRAM

## 2023-06-12 PROCEDURE — 1159F PR MEDICATION LIST DOCUMENTED IN MEDICAL RECORD: ICD-10-PCS | Mod: CPTII,S$GLB,, | Performed by: STUDENT IN AN ORGANIZED HEALTH CARE EDUCATION/TRAINING PROGRAM

## 2023-06-12 PROCEDURE — 80053 COMPREHEN METABOLIC PANEL: CPT | Performed by: STUDENT IN AN ORGANIZED HEALTH CARE EDUCATION/TRAINING PROGRAM

## 2023-06-12 PROCEDURE — 85025 COMPLETE CBC W/AUTO DIFF WBC: CPT | Performed by: STUDENT IN AN ORGANIZED HEALTH CARE EDUCATION/TRAINING PROGRAM

## 2023-06-12 PROCEDURE — 1157F PR ADVANCE CARE PLAN OR EQUIV PRESENT IN MEDICAL RECORD: ICD-10-PCS | Mod: CPTII,S$GLB,, | Performed by: STUDENT IN AN ORGANIZED HEALTH CARE EDUCATION/TRAINING PROGRAM

## 2023-06-12 PROCEDURE — 93005 EKG 12-LEAD: ICD-10-PCS | Mod: S$GLB,,, | Performed by: STUDENT IN AN ORGANIZED HEALTH CARE EDUCATION/TRAINING PROGRAM

## 2023-06-12 PROCEDURE — 1159F MED LIST DOCD IN RCRD: CPT | Mod: CPTII,S$GLB,, | Performed by: STUDENT IN AN ORGANIZED HEALTH CARE EDUCATION/TRAINING PROGRAM

## 2023-06-12 PROCEDURE — 93010 EKG 12-LEAD: ICD-10-PCS | Mod: S$GLB,,, | Performed by: INTERNAL MEDICINE

## 2023-06-12 PROCEDURE — 1101F PR PT FALLS ASSESS DOC 0-1 FALLS W/OUT INJ PAST YR: ICD-10-PCS | Mod: CPTII,S$GLB,, | Performed by: STUDENT IN AN ORGANIZED HEALTH CARE EDUCATION/TRAINING PROGRAM

## 2023-06-12 PROCEDURE — 36415 COLL VENOUS BLD VENIPUNCTURE: CPT | Mod: PO | Performed by: STUDENT IN AN ORGANIZED HEALTH CARE EDUCATION/TRAINING PROGRAM

## 2023-06-12 PROCEDURE — 1160F RVW MEDS BY RX/DR IN RCRD: CPT | Mod: CPTII,S$GLB,, | Performed by: STUDENT IN AN ORGANIZED HEALTH CARE EDUCATION/TRAINING PROGRAM

## 2023-06-12 PROCEDURE — 1126F AMNT PAIN NOTED NONE PRSNT: CPT | Mod: CPTII,S$GLB,, | Performed by: STUDENT IN AN ORGANIZED HEALTH CARE EDUCATION/TRAINING PROGRAM

## 2023-06-12 PROCEDURE — 84443 ASSAY THYROID STIM HORMONE: CPT | Performed by: STUDENT IN AN ORGANIZED HEALTH CARE EDUCATION/TRAINING PROGRAM

## 2023-06-12 PROCEDURE — 84439 ASSAY OF FREE THYROXINE: CPT | Performed by: STUDENT IN AN ORGANIZED HEALTH CARE EDUCATION/TRAINING PROGRAM

## 2023-06-12 PROCEDURE — 1101F PT FALLS ASSESS-DOCD LE1/YR: CPT | Mod: CPTII,S$GLB,, | Performed by: STUDENT IN AN ORGANIZED HEALTH CARE EDUCATION/TRAINING PROGRAM

## 2023-06-12 PROCEDURE — 3288F PR FALLS RISK ASSESSMENT DOCUMENTED: ICD-10-PCS | Mod: CPTII,S$GLB,, | Performed by: STUDENT IN AN ORGANIZED HEALTH CARE EDUCATION/TRAINING PROGRAM

## 2023-06-12 PROCEDURE — 99214 OFFICE O/P EST MOD 30 MIN: CPT | Mod: S$GLB,,, | Performed by: STUDENT IN AN ORGANIZED HEALTH CARE EDUCATION/TRAINING PROGRAM

## 2023-06-12 PROCEDURE — 93010 ELECTROCARDIOGRAM REPORT: CPT | Mod: S$GLB,,, | Performed by: INTERNAL MEDICINE

## 2023-06-12 PROCEDURE — 93005 ELECTROCARDIOGRAM TRACING: CPT | Mod: S$GLB,,, | Performed by: STUDENT IN AN ORGANIZED HEALTH CARE EDUCATION/TRAINING PROGRAM

## 2023-06-12 PROCEDURE — 1160F PR REVIEW ALL MEDS BY PRESCRIBER/CLIN PHARMACIST DOCUMENTED: ICD-10-PCS | Mod: CPTII,S$GLB,, | Performed by: STUDENT IN AN ORGANIZED HEALTH CARE EDUCATION/TRAINING PROGRAM

## 2023-06-12 PROCEDURE — 3288F FALL RISK ASSESSMENT DOCD: CPT | Mod: CPTII,S$GLB,, | Performed by: STUDENT IN AN ORGANIZED HEALTH CARE EDUCATION/TRAINING PROGRAM

## 2023-06-12 PROCEDURE — 99999 PR PBB SHADOW E&M-EST. PATIENT-LVL V: ICD-10-PCS | Mod: PBBFAC,,, | Performed by: STUDENT IN AN ORGANIZED HEALTH CARE EDUCATION/TRAINING PROGRAM

## 2023-06-12 PROCEDURE — 99999 PR PBB SHADOW E&M-EST. PATIENT-LVL V: CPT | Mod: PBBFAC,,, | Performed by: STUDENT IN AN ORGANIZED HEALTH CARE EDUCATION/TRAINING PROGRAM

## 2023-06-12 PROCEDURE — 99214 PR OFFICE/OUTPT VISIT, EST, LEVL IV, 30-39 MIN: ICD-10-PCS | Mod: S$GLB,,, | Performed by: STUDENT IN AN ORGANIZED HEALTH CARE EDUCATION/TRAINING PROGRAM

## 2023-06-12 PROCEDURE — 1126F PR PAIN SEVERITY QUANTIFIED, NO PAIN PRESENT: ICD-10-PCS | Mod: CPTII,S$GLB,, | Performed by: STUDENT IN AN ORGANIZED HEALTH CARE EDUCATION/TRAINING PROGRAM

## 2023-06-12 RX ORDER — FLUTICASONE PROPIONATE 50 MCG
1 SPRAY, SUSPENSION (ML) NASAL
Qty: 16 G | Refills: 0 | Status: SHIPPED | OUTPATIENT
Start: 2023-06-12

## 2023-06-12 NOTE — PROGRESS NOTES
"[facility] CLINIC NOTE    Patient Name: Glenda Gimenez  YOB: 1927    PRESENTING HISTORY     History of Present Illness:  Ms. Glenda Gimenez is a 89 y.o. female here with dizziness.     On/off symptoms.   At least 1 month.   Sometimes happens with standing, sometimes sitting.   Never happens in bed.   Shakes head and symptoms resolve.     Feels a whirling sensation. Describes from sitting to standing.   No nausea associated. Lasts a few seconds.       ROS      OBJECTIVE:   Vital Signs:  Vitals:    06/12/23 1436   BP: (!) 110/58   Pulse: 81   Resp: 18   SpO2: 95%   Weight: 80.8 kg (178 lb 2.1 oz)   Height: 5' 2" (1.575 m)         Physical Exam  Vitals and nursing note reviewed.   Constitutional:       Appearance: She is not diaphoretic.   HENT:      Head: Normocephalic and atraumatic.      Right Ear: Tympanic membrane and external ear normal.      Left Ear: Tympanic membrane and external ear normal.   Eyes:      General: No scleral icterus.     Conjunctiva/sclera: Conjunctivae normal.      Pupils: Pupils are equal, round, and reactive to light.   Neck:      Thyroid: No thyromegaly.      Vascular: No carotid bruit.   Cardiovascular:      Rate and Rhythm: Normal rate and regular rhythm.      Heart sounds: Normal heart sounds. No murmur heard.  Pulmonary:      Effort: Pulmonary effort is normal. No respiratory distress.      Breath sounds: Normal breath sounds. No wheezing or rales.   Musculoskeletal:         General: No tenderness or deformity. Normal range of motion.      Cervical back: Normal range of motion and neck supple.      Right lower leg: No edema.      Left lower leg: No edema.   Lymphadenopathy:      Cervical: No cervical adenopathy.   Skin:     General: Skin is warm and dry.      Findings: No erythema or rash.   Neurological:      Mental Status: She is alert and oriented to person, place, and time.      Gait: Gait is intact.   Psychiatric:         Mood and Affect: Mood " and affect normal.         Cognition and Memory: Memory normal.         Judgment: Judgment normal.       ASSESSMENT & PLAN:     Dizziness  -     Comprehensive Metabolic Panel; Future; Expected date: 06/12/2023  -     CBC Auto Differential; Future; Expected date: 06/12/2023  -     TSH; Future; Expected date: 06/12/2023  -     IN OFFICE EKG 12-LEAD (to Muse)    Chronic rhinitis  -     fluticasone propionate (FLONASE) 50 mcg/actuation nasal spray; 1 spray (50 mcg total) by Each Nostril route twice a week.  Dispense: 16 g; Refill: 0    Abnormal findings on diagnostic imaging of other specified body structures  -     TSH; Future; Expected date: 06/12/2023             William Olivia MD   Internal Medicine

## 2023-06-12 NOTE — TELEPHONE ENCOUNTER
Phone call placed yo pts son to book 1 month f/u per   Dr. Olivia, he states that he will have  to call back as his wife is going out of town. I advised him to be sure and call back as Dr. Olivia is stopping a medication and would like a follow up after.

## 2023-06-12 NOTE — TELEPHONE ENCOUNTER
Pat Thomas Staff    ----- Message from Pat Levine sent at 6/12/2023  4:47 PM CDT -----  Contact: pt  Pt daughter in law is calling she has questions   Please give her a call back [phone]

## 2023-06-12 NOTE — PATIENT INSTRUCTIONS
Hold the isosorbide for one month and see how she feels.   Call me for any systolic blood pressure >150 or >90 on the bottom.               Carlos Doshi,     If you are due for any health screening(s) below please notify me so we can arrange them to be ordered and scheduled to maintain your health. Most healthy patients complete it. Don't lose out on improving your health.     All of your core healthy metrics are met.

## 2023-06-12 NOTE — TELEPHONE ENCOUNTER
Return call placed to number in attached message. No answer received. Left message including date and time of call, requesting return call to office.

## 2023-06-12 NOTE — TELEPHONE ENCOUNTER
I spoke with pts son via phone, he states that his mother has been complaining of dizziness. He states that she is on a bunch of meds and wondering if that could be the issue. Will see Dr. Olivia today for 2:45. No further questions.     ----- Message from Pat Levine sent at 6/12/2023  9:23 AM CDT -----  Contact: pt  Pt son is calling wanting his mother to be seen asap  Please give son a call back [phone]

## 2023-06-13 ENCOUNTER — PES CALL (OUTPATIENT)
Dept: ADMINISTRATIVE | Facility: CLINIC | Age: 88
End: 2023-06-13
Payer: MEDICARE

## 2023-06-14 DIAGNOSIS — E03.9 ACQUIRED HYPOTHYROIDISM: ICD-10-CM

## 2023-06-14 RX ORDER — LEVOTHYROXINE SODIUM 88 UG/1
TABLET ORAL
Qty: 90 TABLET | Refills: 3 | Status: SHIPPED | OUTPATIENT
Start: 2023-06-14 | End: 2023-09-26 | Stop reason: SDUPTHER

## 2023-06-30 ENCOUNTER — HOSPITAL ENCOUNTER (EMERGENCY)
Facility: HOSPITAL | Age: 88
Discharge: HOME | End: 2023-06-30
Attending: EMERGENCY MEDICINE
Payer: MEDICARE

## 2023-06-30 VITALS
OXYGEN SATURATION: 97 % | BODY MASS INDEX: 28.75 KG/M2 | SYSTOLIC BLOOD PRESSURE: 149 MMHG | TEMPERATURE: 97 F | RESPIRATION RATE: 18 BRPM | HEIGHT: 66 IN | DIASTOLIC BLOOD PRESSURE: 71 MMHG | HEART RATE: 76 BPM
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
## 2023-06-30 DIAGNOSIS — S00.03XA CONTUSION OF SCALP, INITIAL ENCOUNTER: Primary | ICD-10-CM

## 2023-06-30 PROCEDURE — 99284 EMERGENCY DEPT VISIT MOD MDM: CPT | Mod: 25

## 2023-06-30 NOTE — ED PROVIDER NOTES
Encounter Date: 6/30/2023    SCRIBE #1 NOTE: I, Fátima Yao, am scribing for, and in the presence of,  Leon Calderon MD.     History     Chief Complaint   Patient presents with    Fall     Lost balance and fell striking head on tile floor. Denies LOC     Time seen by provider: 2:21 PM on 06/30/2023    Glenda Gimenez is a 89 y.o. female with a PMHx of HTN, anticoagulant long-term use, and arthritis who presents to the ED for evaluation s/p a slip and fall that occurred earlier today.  Patient reports attempting to get into a stylist's chair when she misplaced her foot, causing the chair to spin and the patient to fall.  She states hitting the back of her head and R elbow during the fall, resulting in a wound to the elbow region.  The patient denies visual disturbances, SOB, abdominal pain, N/V, hip pain, elbow pain, gait abnormalities, back pain, LOC, numbness, tingling, or any other symptoms at this time.  She has no pertinent PSHx and notes taking a daily aspirin.  Patient has no immediate social determinants of health, as she does not live alone, but with her son.      The history is provided by the patient and the spouse.   Review of patient's allergies indicates:   Allergen Reactions    Amlodipine Swelling     Other reaction(s): Angioedema    Atenolol      Other reaction(s): itch    Betamethasone sodium phosphate      Other reaction(s): Flushing (skin)    Cortisone      Other reaction(s): blurry vision  Other reaction(s): Rash    Lisinopril      Other reaction(s): COUGH    Naproxen      Other reaction(s): body shut down    Prednisone     Triamterene-hydrochlorothiazid      Other reaction(s): itch     Past Medical History:   Diagnosis Date    Anticoagulant long-term use     Arthritis     Dislocation of right shoulder joint     Hypertension     Hypothyroidism     Shoulder disorder     right    Thyroid disease      Past Surgical History:   Procedure Laterality Date    HYSTERECTOMY      PARTIAL HYSTERECTOMY       ARTUR, ovaries in place, uterine prolapse     Family History   Problem Relation Age of Onset    Breast cancer Mother     Cancer Brother         lung cancer    Cancer Maternal Aunt         unknown cancer    Cancer Maternal Uncle         pancreatic cancer    Heart disease Neg Hx      Social History     Tobacco Use    Smoking status: Never    Smokeless tobacco: Never   Substance Use Topics    Alcohol use: Yes     Alcohol/week: 2.0 standard drinks     Types: 2 Glasses of wine per week     Comment: 2 small glasses of red wine with dinner    Drug use: No     Review of Systems   Constitutional:  Negative for fever.   HENT:  Negative for sore throat.         Positive for a head injury.   Eyes:  Negative for visual disturbance.   Respiratory:  Negative for shortness of breath.    Cardiovascular:  Negative for chest pain.   Gastrointestinal:  Negative for abdominal pain, nausea and vomiting.   Genitourinary:  Negative for dysuria.   Musculoskeletal:  Negative for arthralgias, back pain and gait problem.   Skin:  Positive for wound. Negative for rash.   Neurological:  Negative for syncope, weakness and numbness.        Negative for paresthesias.    Hematological:  Bruises/bleeds easily.     Physical Exam     Initial Vitals [06/30/23 1317]   BP Pulse Resp Temp SpO2   (!) 150/69 84 18 97.4 °F (36.3 °C) 97 %      MAP       --         Physical Exam    Nursing note and vitals reviewed.  Constitutional: She appears well-developed.  Non-toxic appearance.   HENT:   Head: Normocephalic and atraumatic.   2 cm hematoma over the dome of the scalp.     Eyes: EOM are normal. Pupils are equal, round, and reactive to light.   Neck: Neck supple.   Cardiovascular:  Normal rate, regular rhythm, normal heart sounds and intact distal pulses.     Exam reveals no gallop and no friction rub.       No murmur heard.  Pulmonary/Chest: No respiratory distress. She has no decreased breath sounds. She has wheezes (end inspiratory). She has no rhonchi.  She has no rales.   Abdominal: Abdomen is soft. Bowel sounds are normal. She exhibits no distension. There is no abdominal tenderness.   Musculoskeletal:         General: Normal range of motion.      Cervical back: Neck supple. No bony tenderness.      Thoracic back: No bony tenderness.      Lumbar back: No bony tenderness.      Comments: Full ROM of the BUE's and BLE's.  No bony TTP noted on exam.  Bandage noted to R elbow.       Neurological: She is alert and oriented to person, place, and time. She has normal strength. No cranial nerve deficit or sensory deficit. Gait normal. GCS eye subscore is 4. GCS verbal subscore is 5. GCS motor subscore is 6.   Skin: Skin is warm and dry.   Psychiatric: She has a normal mood and affect.       ED Course   Procedures  Labs Reviewed - No data to display       Imaging Results              CT Head Without Contrast (Final result)  Result time 06/30/23 15:24:12      Final result by Juancarlos Garrett MD (06/30/23 15:24:12)                   Impression:      1. No acute intracranial CT findings or adverse changes from 05/09/2022.      Electronically signed by: Juancarlos Garrett  Date:    06/30/2023  Time:    15:24               Narrative:    EXAMINATION:  CT HEAD WITHOUT CONTRAST    CLINICAL HISTORY:  Head trauma, minor (Age >= 65y);    TECHNIQUE:  Low dose axial CT images obtained throughout the head without intravenous contrast. Sagittal and coronal reconstructions were performed.    COMPARISON:  Head CT 05/09/2022.    FINDINGS:  Brain: No acute intracranial hemorrhage, midline shift or mass effect, or space-occupying extra-axial fluid collections.  Chronic ischemic changes again identified commensurate with the patient's age including confluent low density throughout the deep periventricular white matter.  Additional small left occipital cortical/subcortical infarct with encephalomalacia/gliosis and chronic lacunar type infarct within the right basal ganglia.  No CT evidence of a new major  vascular territory infarct.    Ventricles: The ventricles, sulci, and cisterns are within normal limits.    Skull: The osseous structures are unremarkable in appearance.    Extracranial soft tissues: Limited imaging is within normal limits.    Other: Trace fluid within the right mastoid tip.  Left mastoid air cells are clear.  No significant findings involving the paranasal sinuses which are partially imaged as well as the partially imaged orbits.                                       Medications - No data to display  Medical Decision Making:   History:   Old Medical Records: I decided to obtain old medical records.  Clinical Tests:   Radiological Study: Ordered and Reviewed        Scribe Attestation:   Scribe #1: I performed the above scribed service and the documentation accurately describes the services I performed. I attest to the accuracy of the note.      ED Course as of 06/30/23 1531   Fri Jun 30, 2023   1529 CT head shows no signs of intracranial bleeding or skull fracture.  She has full range of motion of the neck.  She has a small contusion to right elbow.  I do not appreciate any bony deformity to the elbow.  I think she is stable for discharge with return precautions.  Vital signs are stable.  No signs of any lower back injury or other extremity injury from a fall.  I believe this was mechanical fall given that she was about to sit down and a rotating chair and slipped backward onto her buttock [JS]      ED Course User Index  [JS] Leon Calderon MD               I, Dr. Leon Calderon personally performed the services described in this documentation. All medical record entries made by the scribe were at my direction and in my presence.  I have reviewed the chart and agree that the record reflects my personal performance and is accurate and complete. Leon Calderon MD.  3:31 PM 06/30/2023    DISCLAIMER: This note was prepared with Dragon NaturallySpeaking voice recognition transcription software.  Garbled syntax, mangled pronouns, and other bizarre constructions may be attributed to that software system     Clinical Impression:   Final diagnoses:  [S00.03XA] Contusion of scalp, initial encounter (Primary)  [W19.XXXA] Fall, initial encounter        ED Disposition Condition    Discharge Stable          ED Prescriptions    None       Follow-up Information       Follow up With Specialties Details Why Contact Info    William Olivia MD Family Medicine  As needed [address] [facility] [address]  [phone]      [facility] Emergency Dept Emergency Medicine  If symptoms worsen, headache, altered mental status, vomiting, confusion [address] [place] [id number] [phone]             Leon Calderon MD  06/30/23 6077

## 2023-07-03 DIAGNOSIS — F01.518 VASCULAR DEMENTIA WITH BEHAVIOR DISTURBANCE: ICD-10-CM

## 2023-07-03 RX ORDER — DONEPEZIL HYDROCHLORIDE 5 MG/1
TABLET, FILM COATED ORAL
Qty: 30 TABLET | Refills: 0 | Status: SHIPPED | OUTPATIENT
Start: 2023-07-03 | End: 2023-08-07

## 2023-07-05 RX ORDER — DONEPEZIL HYDROCHLORIDE 5 MG/1
TABLET, FILM COATED ORAL
Qty: 60 TABLET | OUTPATIENT
Start: 2023-07-05

## 2023-07-06 ENCOUNTER — PATIENT MESSAGE (OUTPATIENT)
Dept: FAMILY MEDICINE | Facility: CLINIC | Age: 88
End: 2023-07-06
Payer: MEDICARE

## 2023-07-11 ENCOUNTER — TELEPHONE (OUTPATIENT)
Dept: PSYCHIATRY | Facility: CLINIC | Age: 88
End: 2023-07-11
Payer: MEDICARE

## 2023-07-11 ENCOUNTER — OFFICE VISIT (OUTPATIENT)
Dept: FAMILY MEDICINE | Facility: CLINIC | Age: 88
End: 2023-07-11
Payer: MEDICARE

## 2023-07-11 VITALS
RESPIRATION RATE: 18 BRPM | BODY MASS INDEX: 28.49 KG/M2 | HEIGHT: 66 IN | OXYGEN SATURATION: 95 % | SYSTOLIC BLOOD PRESSURE: 126 MMHG | WEIGHT: 177.25 LBS | DIASTOLIC BLOOD PRESSURE: 60 MMHG | HEART RATE: 69 BPM

## 2023-07-11 DIAGNOSIS — F33.1 MODERATE EPISODE OF RECURRENT MAJOR DEPRESSIVE DISORDER: ICD-10-CM

## 2023-07-11 PROCEDURE — 99999 PR PBB SHADOW E&M-EST. PATIENT-LVL V: ICD-10-PCS | Mod: PBBFAC,,, | Performed by: STUDENT IN AN ORGANIZED HEALTH CARE EDUCATION/TRAINING PROGRAM

## 2023-07-11 PROCEDURE — 3288F FALL RISK ASSESSMENT DOCD: CPT | Mod: CPTII,S$GLB,, | Performed by: STUDENT IN AN ORGANIZED HEALTH CARE EDUCATION/TRAINING PROGRAM

## 2023-07-11 PROCEDURE — 96127 BRIEF EMOTIONAL/BEHAV ASSMT: CPT | Mod: S$GLB,,, | Performed by: STUDENT IN AN ORGANIZED HEALTH CARE EDUCATION/TRAINING PROGRAM

## 2023-07-11 PROCEDURE — 1160F PR REVIEW ALL MEDS BY PRESCRIBER/CLIN PHARMACIST DOCUMENTED: ICD-10-PCS | Mod: CPTII,S$GLB,, | Performed by: STUDENT IN AN ORGANIZED HEALTH CARE EDUCATION/TRAINING PROGRAM

## 2023-07-11 PROCEDURE — 1159F MED LIST DOCD IN RCRD: CPT | Mod: CPTII,S$GLB,, | Performed by: STUDENT IN AN ORGANIZED HEALTH CARE EDUCATION/TRAINING PROGRAM

## 2023-07-11 PROCEDURE — 96127 PR BRIEF EMOTIONAL/BEHAV ASSMT: ICD-10-PCS | Mod: S$GLB,,, | Performed by: STUDENT IN AN ORGANIZED HEALTH CARE EDUCATION/TRAINING PROGRAM

## 2023-07-11 PROCEDURE — 1157F ADVNC CARE PLAN IN RCRD: CPT | Mod: CPTII,S$GLB,, | Performed by: STUDENT IN AN ORGANIZED HEALTH CARE EDUCATION/TRAINING PROGRAM

## 2023-07-11 PROCEDURE — 1126F PR PAIN SEVERITY QUANTIFIED, NO PAIN PRESENT: ICD-10-PCS | Mod: CPTII,S$GLB,, | Performed by: STUDENT IN AN ORGANIZED HEALTH CARE EDUCATION/TRAINING PROGRAM

## 2023-07-11 PROCEDURE — 1101F PT FALLS ASSESS-DOCD LE1/YR: CPT | Mod: CPTII,S$GLB,, | Performed by: STUDENT IN AN ORGANIZED HEALTH CARE EDUCATION/TRAINING PROGRAM

## 2023-07-11 PROCEDURE — 1126F AMNT PAIN NOTED NONE PRSNT: CPT | Mod: CPTII,S$GLB,, | Performed by: STUDENT IN AN ORGANIZED HEALTH CARE EDUCATION/TRAINING PROGRAM

## 2023-07-11 PROCEDURE — 1157F PR ADVANCE CARE PLAN OR EQUIV PRESENT IN MEDICAL RECORD: ICD-10-PCS | Mod: CPTII,S$GLB,, | Performed by: STUDENT IN AN ORGANIZED HEALTH CARE EDUCATION/TRAINING PROGRAM

## 2023-07-11 PROCEDURE — 1159F PR MEDICATION LIST DOCUMENTED IN MEDICAL RECORD: ICD-10-PCS | Mod: CPTII,S$GLB,, | Performed by: STUDENT IN AN ORGANIZED HEALTH CARE EDUCATION/TRAINING PROGRAM

## 2023-07-11 PROCEDURE — 99999 PR PBB SHADOW E&M-EST. PATIENT-LVL V: CPT | Mod: PBBFAC,,, | Performed by: STUDENT IN AN ORGANIZED HEALTH CARE EDUCATION/TRAINING PROGRAM

## 2023-07-11 PROCEDURE — 99214 OFFICE O/P EST MOD 30 MIN: CPT | Mod: S$GLB,,, | Performed by: STUDENT IN AN ORGANIZED HEALTH CARE EDUCATION/TRAINING PROGRAM

## 2023-07-11 PROCEDURE — 3288F PR FALLS RISK ASSESSMENT DOCUMENTED: ICD-10-PCS | Mod: CPTII,S$GLB,, | Performed by: STUDENT IN AN ORGANIZED HEALTH CARE EDUCATION/TRAINING PROGRAM

## 2023-07-11 PROCEDURE — 1160F RVW MEDS BY RX/DR IN RCRD: CPT | Mod: CPTII,S$GLB,, | Performed by: STUDENT IN AN ORGANIZED HEALTH CARE EDUCATION/TRAINING PROGRAM

## 2023-07-11 PROCEDURE — 99214 PR OFFICE/OUTPT VISIT, EST, LEVL IV, 30-39 MIN: ICD-10-PCS | Mod: S$GLB,,, | Performed by: STUDENT IN AN ORGANIZED HEALTH CARE EDUCATION/TRAINING PROGRAM

## 2023-07-11 PROCEDURE — 1101F PR PT FALLS ASSESS DOC 0-1 FALLS W/OUT INJ PAST YR: ICD-10-PCS | Mod: CPTII,S$GLB,, | Performed by: STUDENT IN AN ORGANIZED HEALTH CARE EDUCATION/TRAINING PROGRAM

## 2023-07-11 RX ORDER — SERTRALINE HYDROCHLORIDE 50 MG/1
50 TABLET, FILM COATED ORAL DAILY
Qty: 90 TABLET | Refills: 4 | Status: SHIPPED | OUTPATIENT
Start: 2023-07-11

## 2023-07-11 NOTE — PROGRESS NOTES
"[facility] CLINIC NOTE    Patient Name: Glenda Gimenez  YOB: 1927    PRESENTING HISTORY     History of Present Illness:  Ms. Glenda Gimenez is a 89 y.o. female here with anxiety and depression.     Dizziness, whirling is better off isosorbide  She had a fall since last OV with me, related to swiveling of chair. No syncope, dizziness etc as precipitant.     Feels Apathetic- why am I here.   Feels like going to jump out of skin.   Can't do much, boredom. Worsens mood.     Phq9-15 points.   No active or passive SI. No AVH.     Already on buspar 10mg TID, sertraline 25mg QD, aricept 5mg, Seroquel 50mg QHS.   Sleeping well.   No appetite, but eating.       ROS      OBJECTIVE:   Vital Signs:  Vitals:    07/11/23 1137   BP: 126/60   Pulse: 69   Resp: 18   SpO2: 95%   Weight: 80.4 kg (177 lb 4 oz)   Height: 5' 6" (1.676 m)          Physical Exam: Normal, no change.     Physical Exam    ASSESSMENT & PLAN:     Moderate episode of recurrent major depressive disorder  -     sertraline (ZOLOFT) 50 MG tablet; Take 1 tablet (50 mg total) by mouth once daily.  Dispense: 90 tablet; Refill: 4  -     Ambulatory referral/consult to Psychiatry; Future; Expected date: 07/18/2023    I would favor uptitration of SSRI prior to upping antipsychotic   She is already on a relatively complex regimen, I would like psychiatry opinion on management.         William Olivia MD   Internal Medicine            "

## 2023-07-11 NOTE — TELEPHONE ENCOUNTER
Pt's son Shola called requesting to schedule an appointment. Advised him that I see referral was placed today. Pt will be added to the wait list and we will call to schedule appt once we reach her name. Advised him it may be several months for the initial appointment. He verbalized understanding.

## 2023-07-11 NOTE — PATIENT INSTRUCTIONS
Make sure to isolate the levothyroxine. Take with no food or other medicines for 1 hour.           Carlos Doshi,     If you are due for any health screening(s) below please notify me so we can arrange them to be ordered and scheduled to maintain your health. Most healthy patients complete it. Don't lose out on improving your health.     All of your core healthy metrics are met.

## 2023-07-17 ENCOUNTER — TELEPHONE (OUTPATIENT)
Dept: FAMILY MEDICINE | Facility: CLINIC | Age: 88
End: 2023-07-17
Payer: MEDICARE

## 2023-07-17 ENCOUNTER — E-CONSULT (OUTPATIENT)
Dept: PSYCHIATRY | Facility: CLINIC | Age: 88
End: 2023-07-17
Payer: MEDICARE

## 2023-07-17 DIAGNOSIS — F33.1 MODERATE EPISODE OF RECURRENT MAJOR DEPRESSIVE DISORDER: Primary | ICD-10-CM

## 2023-07-17 DIAGNOSIS — F33.1 MAJOR DEPRESSIVE DISORDER, RECURRENT EPISODE, MODERATE: ICD-10-CM

## 2023-07-17 DIAGNOSIS — F41.9 ANXIETY: Primary | ICD-10-CM

## 2023-07-17 PROCEDURE — 99447 PR INTERPROF, PHONE/INTERNET/EHR, CONSULT, 11-20 MINS: ICD-10-PCS | Mod: S$GLB,,, | Performed by: STUDENT IN AN ORGANIZED HEALTH CARE EDUCATION/TRAINING PROGRAM

## 2023-07-17 PROCEDURE — 99447 NTRPROF PH1/NTRNET/EHR 11-20: CPT | Mod: S$GLB,,, | Performed by: STUDENT IN AN ORGANIZED HEALTH CARE EDUCATION/TRAINING PROGRAM

## 2023-07-17 NOTE — CONSULTS
"[facility] - Psychiatry  Response for E-Consult     Patient Name: Glenda Gimenez  MRN: [id number]  Primary Care Provider: William Olivia MD   Requesting Provider: William Olivia MD  E-Consult to Adult Psychiatry  Consult performed by: Kaden Frances, DO  Consult ordered by: William Olivia MD  Reason for consult: Would uptitration of SSRI be appropriate next step in management?  Assessment/Recommendations: Yes, it would be an appropriate next step. See note.      Question:   Question Answer   Verbal consent has been obtained from patient, and patient is aware of applicable cost? Pending specialist evaluation, I will follow up with the patient. Yes   What is your clinical question? Would uptitration of SSRI be appropriate next step in management?   Total time spent preparing for the referral and/or communicating with the consulting physician: 16 minutes or more   89 F on on buspar 10mg TID, sertraline 25mg QD, aricept 5mg, Seroquel 50mg QHS. Issues with anxiety and depression.     Would uptitration of SSRI be appropriate next step? I will f/u at 4 week intervals.     Thanks for the help.     Recommendation:     ZOLOFT is a good option for anxiety and depression. The recommended dose range for this patient is 50-200mg/day, with a slow titration. Start with 50mg daily and increase to 75mg or 100mg after one month. Adjust the dose as needed and monitor for tolerability and response.    For elderly patients, it's advisable to use a validated instrument like the geriatric depression scale (GDS) or the PHQ9 to measure progress. These questionnaires are available on Epic ("Questionnaire Assignment").     Since the patient is elderly and takes multiple medications, it's important to watch for hyponatremia and SIADH. Consider doing a BMP one week after each dose change. Also, be aware that diarrhea or loose stools are common side effects of ZOLOFT. Monitor for this, as the patient's age and medications increase the " risk of dehydration and electrolyte imbalances. Noted that sodium is wnl on 12JUN2023.    Rationale:     ZOLOFT is a preferable option for the elderly compared to other SSRIs because it carries a lower risk of drug-drug interactions. While the recommended therapeutic dose for most antidepressants remains the same for the elderly and younger adults, lower doses may be better tolerated by elderly patients due to factors such as slower absorption, variations in body composition, extended time to achieve therapeutic levels, and prolonged clearance and excretion.    Contingency:     Consider referral to geriatric psychiatry for worsening symptoms. Taper ZOLOFT gradually to prevent discontinuation symptoms such as dizziness, nausea, stomach cramps, sweating, tingling, dysesthesias, etc. Reduce the dose by 50% for 5 days, then another 50% for 5 days. Finally, either reduce the dose once more or discontinue completely.    Total time of Consultation: 20 minute    I did not speak to the requesting provider verbally about this.     *This eConsult is based on the clinical data available to me and is furnished without benefit of a physical examination. The eConsult will need to be interpreted in light of any clinical issues or changes in patient status not available to me at the time of filing this eConsults. Significant changes in patient condition or level of acuity should result in immediate formal consultation and reevaluation. Please alert me if you have further questions.    Thank you for this eConsult referral.     Kaden Frances, DO  [facility] - Psychiatry

## 2023-07-17 NOTE — TELEPHONE ENCOUNTER
I spoke with pts son via phone. Will bring pt in on 07/19/2023.     ----- Message from William Olivia MD sent at 7/17/2023  1:45 PM CDT -----  BMP this week

## 2023-07-19 ENCOUNTER — LAB VISIT (OUTPATIENT)
Dept: LAB | Facility: HOSPITAL | Age: 88
End: 2023-07-19
Attending: STUDENT IN AN ORGANIZED HEALTH CARE EDUCATION/TRAINING PROGRAM
Payer: MEDICARE

## 2023-07-19 DIAGNOSIS — F33.1 MODERATE EPISODE OF RECURRENT MAJOR DEPRESSIVE DISORDER: ICD-10-CM

## 2023-07-19 LAB
ANION GAP SERPL CALC-SCNC: 10 MMOL/L (ref 8–16)
BUN SERPL-MCNC: 26 MG/DL (ref 10–30)
CALCIUM SERPL-MCNC: 10.4 MG/DL (ref 8.7–10.5)
CHLORIDE SERPL-SCNC: 100 MMOL/L (ref 95–110)
CO2 SERPL-SCNC: 28 MMOL/L (ref 23–29)
CREAT SERPL-MCNC: 0.9 MG/DL (ref 0.5–1.4)
EST. GFR  (NO RACE VARIABLE): 58.5 ML/MIN/1.73 M^2
GLUCOSE SERPL-MCNC: 108 MG/DL (ref 70–110)
POTASSIUM SERPL-SCNC: 4.2 MMOL/L (ref 3.5–5.1)
SODIUM SERPL-SCNC: 138 MMOL/L (ref 136–145)

## 2023-07-19 PROCEDURE — 80048 BASIC METABOLIC PNL TOTAL CA: CPT | Mod: HCNC | Performed by: STUDENT IN AN ORGANIZED HEALTH CARE EDUCATION/TRAINING PROGRAM

## 2023-07-19 PROCEDURE — 36415 COLL VENOUS BLD VENIPUNCTURE: CPT | Mod: HCNC,PO | Performed by: STUDENT IN AN ORGANIZED HEALTH CARE EDUCATION/TRAINING PROGRAM

## 2023-08-05 DIAGNOSIS — F01.518 VASCULAR DEMENTIA WITH BEHAVIOR DISTURBANCE: ICD-10-CM

## 2023-08-07 RX ORDER — DONEPEZIL HYDROCHLORIDE 5 MG/1
TABLET, FILM COATED ORAL
Qty: 30 TABLET | Refills: 0 | Status: SHIPPED | OUTPATIENT
Start: 2023-08-07 | End: 2023-08-30

## 2023-08-07 NOTE — TELEPHONE ENCOUNTER
Spoke with patient's son attempted to schedule an appointment. Patient's son declined scheduling an appointment at the moment.

## 2023-08-08 ENCOUNTER — CARE AT HOME (OUTPATIENT)
Dept: HOME HEALTH SERVICES | Facility: CLINIC | Age: 88
End: 2023-08-08
Payer: MEDICARE

## 2023-08-08 DIAGNOSIS — R54 ADVANCED AGE: ICD-10-CM

## 2023-08-08 DIAGNOSIS — F01.518 VASCULAR DEMENTIA WITH BEHAVIOR DISTURBANCE: ICD-10-CM

## 2023-08-08 DIAGNOSIS — F33.1 MAJOR DEPRESSIVE DISORDER, RECURRENT EPISODE, MODERATE: Primary | ICD-10-CM

## 2023-08-08 DIAGNOSIS — H35.3211 EXUDATIVE AGE-RELATED MACULAR DEGENERATION OF RIGHT EYE WITH ACTIVE CHOROIDAL NEOVASCULARIZATION: ICD-10-CM

## 2023-08-08 PROCEDURE — 99350 HOME/RES VST EST HIGH MDM 60: CPT | Mod: S$GLB,,,

## 2023-08-08 PROCEDURE — 99350 PR HOME VISIT,ESTAB PATIENT,LEVEL IV: ICD-10-PCS | Mod: S$GLB,,,

## 2023-08-09 VITALS
OXYGEN SATURATION: 96 % | RESPIRATION RATE: 16 BRPM | DIASTOLIC BLOOD PRESSURE: 60 MMHG | HEART RATE: 76 BPM | SYSTOLIC BLOOD PRESSURE: 140 MMHG

## 2023-08-09 NOTE — ASSESSMENT & PLAN NOTE
-Stable, AOx3 with episodes of confusion.   -Lives with son and daughter in law who help with her daily care.   -continue to monitor.

## 2023-08-09 NOTE — ASSESSMENT & PLAN NOTE
-Zoloft recently increased, doing better  -On wait list for psychiatry appointment, will cancel if success continues

## 2023-08-09 NOTE — PROGRESS NOTES
"[facility]  Palliative Care Home Visit    Visit Date: 8/9/2023  Encounter Provider: Nila BURNS-C  PCP:  William Olivia MD    Subjective:      Patient ID: Glenda Gimenez is a 89 y.o. female.    Consult Requested By:  Tereza Kaur  Reason for Consult:  Palliative Care Home Visit    Glenda is being seen at home due to due to physical debility that presents a taxing effort to leave the home, to mitigate high risk of hospital readmission and/or due to the limited availability of reliable or safe options for transportation to the point of access to the provider. Prior to treatment on this visit the chart was reviewed and patient verbal consent was obtained. .      Chief Complaint: Follow-up    Greeted at the door by eris Jolley. Visit conducted in patient's dining room. Eris Jolley active participant in visit today.    Glenda is a 89 year old female with a PMHX of hypothyroidism, hypertension, primary hyperparathyroidism, COPD, chronic diastolic CHF, obesity and sepsis pneumonia in 2020. Glenda is being seen today for a Palliative Care follow up visit. Patient lives with son Shola and daughter in law, Mary. Unable to live alone due to dementia.      Upon arrival today, patient is found sitting at dining room table, AAO x 3 in no distress. She is pleasant and calm. Son reports he has noticed an increase in mood since Dr. Olivia recently increased her Zoloft to 50mg. Patient agrees, states "its just hard not be able to do what you want to do." States is on waiting list to see phychiatry, but was told it could be about 6 months. States will cancel psychiatry visit if she continues to do well on increased zoloft.     Reports fall about 1 month ago when she was attempting to sit down in a swivel chair at hair salon. Unremarkable ED visit.      VSS. Denies fever, chest pain, nausea, vomiting, diarrhea. Reports taking all medications as prescribed. No other needs identified at this time.      Goals of " Care:  Glenda would like to be a Full code and have comfort care. At this time, family prefers for patient to stay with son Shola and daughter in law Mary in [place].      Review of Systems   Constitutional:  Negative for activity change, appetite change and fever.   Respiratory:  Negative for cough, chest tightness, shortness of breath and wheezing.    Cardiovascular:  Negative for chest pain, palpitations and leg swelling.   Gastrointestinal:  Negative for abdominal pain, constipation and diarrhea.   Genitourinary:  Negative for difficulty urinating and hematuria.   Musculoskeletal:  Positive for gait problem. Negative for joint swelling.   Skin:  Negative for color change and wound.   Neurological:  Positive for weakness. Negative for tremors, seizures and numbness.   Psychiatric/Behavioral:  Positive for confusion and dysphoric mood.          Assessments:  Environmental: staying with son and daughter in law in single story home   Functional Status: Modified independent- ambulatory with rollator. requires some help from daughter in law with bathing at times.  Safety:  dementia, advanced age, fall risk  Nutritional: adequate access to food, reports good appetite   Home Health/DME/Supplies: No Home Health. DME: rollator, wheelchair in home if needed    History:  Past Medical History:   Diagnosis Date    Anticoagulant long-term use     Arthritis     Dislocation of right shoulder joint     Hypertension     Hypothyroidism     Shoulder disorder     right    Thyroid disease      Family History   Problem Relation Age of Onset    Breast cancer Mother     Cancer Brother         lung cancer    Cancer Maternal Aunt         unknown cancer    Cancer Maternal Uncle         pancreatic cancer    Heart disease Neg Hx      Past Surgical History:   Procedure Laterality Date    HYSTERECTOMY      PARTIAL HYSTERECTOMY      ARTUR, ovaries in place, uterine prolapse     Review of patient's allergies indicates:   Allergen Reactions     Amlodipine Swelling     Other reaction(s): Angioedema    Atenolol      Other reaction(s): itch    Betamethasone sodium phosphate      Other reaction(s): Flushing (skin)    Cortisone      Other reaction(s): blurry vision  Other reaction(s): Rash    Lisinopril      Other reaction(s): COUGH    Naproxen      Other reaction(s): body shut down    Prednisone     Triamterene-hydrochlorothiazid      Other reaction(s): itch       Medications:    Current Outpatient Medications:     acetaminophen (TYLENOL) 325 MG tablet, Take 325 mg by mouth every 6 (six) hours as needed for Pain., Disp: , Rfl:     aspirin (ECOTRIN) 81 MG EC tablet, Take 81 mg by mouth once daily., Disp: , Rfl:     busPIRone (BUSPAR) 10 MG tablet, Take 1 tablet (10 mg total) by mouth 3 (three) times daily., Disp: 270 tablet, Rfl: 4    donepeziL (ARICEPT) 5 MG tablet, Take 1 tablet every evening, Disp: 30 tablet, Rfl: 0    famotidine (PEPCID) 20 MG tablet, Take 1 tablet (20 mg total) by mouth 2 (two) times daily., Disp: 60 tablet, Rfl: 11    fluticasone propionate (FLONASE) 50 mcg/actuation nasal spray, 1 spray (50 mcg total) by Each Nostril route twice a week., Disp: 16 g, Rfl: 0    furosemide (LASIX) 20 MG tablet, Take 1 tablet (20 mg total) by mouth once daily., Disp: 90 tablet, Rfl: 1    ibandronate (BONIVA) 150 mg tablet, TAKE 1 TABLET(150 MG) BY MOUTH EVERY 30 DAYS, Disp: 3 tablet, Rfl: 3    krill oil 500 mg Cap, Take by mouth., Disp: , Rfl:     levothyroxine (SYNTHROID) 88 MCG tablet, TAKE 1 TABLET(88 MCG) BY MOUTH EVERY DAY, Disp: 90 tablet, Rfl: 3    nitroGLYCERIN (NITROSTAT) 0.4 MG SL tablet, Place 1 tablet (0.4 mg total) under the tongue every 5 (five) minutes as needed for Chest pain., Disp: 25 tablet, Rfl: 3    olmesartan (BENICAR) 5 MG Tab, TAKE 1 TABLET(5 MG) BY MOUTH EVERY DAY, Disp: 90 tablet, Rfl: 3    peg 400-propylene glycol, PF, (SYSTANE HYDRATION PF) 0.4-0.3 % Drop, 1 drops DAILY (route: ophthalmic (eye)), Disp: , Rfl:     potassium  chloride SA (K-DUR,KLOR-CON) 20 MEQ tablet, TAKE 1 TABLET(20 MEQ) BY MOUTH EVERY DAY FOR 1 DOSE, Disp: 90 tablet, Rfl: 3    QUEtiapine (SEROQUEL) 50 MG tablet, TAKE 1 TABLET(50 MG) BY MOUTH EVERY EVENING, Disp: 30 tablet, Rfl: 11    sertraline (ZOLOFT) 50 MG tablet, Take 1 tablet (50 mg total) by mouth once daily., Disp: 90 tablet, Rfl: 4    sodium chloride 1 gram tablet, Take 1 g by mouth 3 (three) times daily. 2 tabs TID, Disp: , Rfl:     vit A/vit C/vit E/zinc/copper (ICAPS AREDS ORAL), 1 capsule 2 TIMES DAILY (route: oral), Disp: , Rfl:     vitamin D (VITAMIN D3) 1000 units Tab, Take 1,000 Units by mouth once daily. Patient is taking 2000 units per day, Disp: , Rfl:         Objective:     Physical Exam:  Vitals:    08/09/23 1452   BP: (!) 140/60   Pulse: 76   Resp: 16   SpO2: 96%   PainSc: 0-No pain     There is no height or weight on file to calculate BMI.    Physical Exam  Constitutional:       Appearance: Normal appearance.   HENT:      Head: Normocephalic and atraumatic.   Cardiovascular:      Rate and Rhythm: Normal rate and regular rhythm.      Pulses: Normal pulses.      Heart sounds: Normal heart sounds.   Pulmonary:      Effort: Pulmonary effort is normal.      Breath sounds: Normal breath sounds.   Abdominal:      General: Bowel sounds are normal.      Palpations: Abdomen is soft.   Musculoskeletal:      Cervical back: Normal range of motion and neck supple.      Comments: Ambulatory with rollator   Skin:     General: Skin is warm and dry.   Neurological:      General: No focal deficit present.      Mental Status: She is alert and oriented to person, place, and time.      Comments: Confused at times   Psychiatric:         Mood and Affect: Mood normal.         Behavior: Behavior normal.         Review of Symptoms      Symptom Assessment (ESAS 0-10 Scale)  Pain:  0  Dyspnea:  0  Anxiety:  2  Nausea:  0  Depression:  2  Anorexia:  0  Fatigue:  0  Insomnia:  0  Restlessness:  0  Agitation:  0     CAM /  Delirium:  Negative  Constipation:  Negative  Diarrhea:  Negative    Constipation:  No constipation    Pain Assessment:  OME in 24 hours:  0  Location(s):      Living Arrangements:  Lives with family    Psychosocial/Cultural:   See Palliative Psychosocial Note: No  **Primary  to Follow**  Palliative Care  Consult: No        Advance Care Planning   Advance Directives:   Living Will: Yes        Copy on chart: Yes    Medical Power of : Yes    Goals of Care: The patient endorses that what is most important right now is to focus on spending time at home, avoiding the hospital, remaining as independent as possible, symptom/pain control, and comfort and QOL     Accordingly, we have decided that the best plan to meet the patient's goals includes continuing with treatment         Labs:  CBC:   WBC   Date Value Ref Range Status   06/12/2023 6.58 3.90 - 12.70 K/uL Final       Hgb   Date Value Ref Range Status   07/20/2013 12.5 12.0 - 16.0 g/dl Final     Hemoglobin   Date Value Ref Range Status   06/12/2023 12.8 12.0 - 16.0 g/dL Final       Hematocrit   Date Value Ref Range Status   06/12/2023 39.3 37.0 - 48.5 % Final       MCV   Date Value Ref Range Status   06/12/2023 97 82 - 98 fL Final       Platelets   Date Value Ref Range Status   06/12/2023 281 150 - 450 K/uL Final       LFT:   Lab Results   Component Value Date    AST 24 06/12/2023    GGT 73 (H) 08/28/2020    ALKPHOS 60 06/12/2023    BILITOT 0.3 06/12/2023       Albumin:   Albumin   Date Value Ref Range Status   06/12/2023 3.7 3.5 - 5.2 g/dL Final     Protein:   Total Protein   Date Value Ref Range Status   06/12/2023 7.6 6.0 - 8.4 g/dL Final           Assessment:     Problem List Items Addressed This Visit          Neuro    Vascular dementia with behavior disturbance    Current Assessment & Plan     -Stable, AOx3 with episodes of confusion.   -Lives with son and daughter in law who help with her daily care.   -continue to monitor.              Psychiatric    Major depressive disorder, recurrent episode, moderate - Primary    Current Assessment & Plan     -Zoloft recently increased, doing better  -On wait list for psychiatry appointment, will cancel if success continues            Ophtho    Macular degeneration of right eye    Current Assessment & Plan     -Saw optometry today, now in bilateral eyes  -Injections given in optometry clinic today.             Other    Advanced age    Current Assessment & Plan     Follow up visit in 3 months.              Plan:   Glenda was seen today for follow-up.    Diagnoses and all orders for this visit:    Major depressive disorder, recurrent episode, moderate    Advanced age  -     Ambulatory referral/consult to [facility]    Vascular dementia with behavior disturbance    Exudative age-related macular degeneration of right eye with active choroidal neovascularization        Were controlled substances prescribed?  No    > 50% of 60 min visit spent in chart review, face to face discussion of goals of care,  symptom assessment, coordination of care and emotional support.    Follow Up Appointments:   Future Appointments   Date Time Provider Department Center   8/15/2023  2:00 PM William Olivia MD [facility] MED [place]       Signature:

## 2023-08-15 ENCOUNTER — OFFICE VISIT (OUTPATIENT)
Dept: FAMILY MEDICINE | Facility: CLINIC | Age: 88
End: 2023-08-15
Payer: MEDICARE

## 2023-08-15 ENCOUNTER — LAB VISIT (OUTPATIENT)
Dept: LAB | Facility: HOSPITAL | Age: 88
End: 2023-08-15
Attending: STUDENT IN AN ORGANIZED HEALTH CARE EDUCATION/TRAINING PROGRAM
Payer: MEDICARE

## 2023-08-15 VITALS
OXYGEN SATURATION: 95 % | SYSTOLIC BLOOD PRESSURE: 126 MMHG | BODY MASS INDEX: 28.84 KG/M2 | HEIGHT: 66 IN | HEART RATE: 78 BPM | RESPIRATION RATE: 18 BRPM | WEIGHT: 179.44 LBS | DIASTOLIC BLOOD PRESSURE: 60 MMHG

## 2023-08-15 DIAGNOSIS — F33.1 MODERATE EPISODE OF RECURRENT MAJOR DEPRESSIVE DISORDER: ICD-10-CM

## 2023-08-15 DIAGNOSIS — E03.9 HYPOTHYROIDISM, UNSPECIFIED TYPE: ICD-10-CM

## 2023-08-15 DIAGNOSIS — I10 ESSENTIAL HYPERTENSION: ICD-10-CM

## 2023-08-15 DIAGNOSIS — E03.9 HYPOTHYROIDISM, UNSPECIFIED TYPE: Primary | ICD-10-CM

## 2023-08-15 PROCEDURE — 1126F AMNT PAIN NOTED NONE PRSNT: CPT | Mod: HCNC,CPTII,S$GLB, | Performed by: STUDENT IN AN ORGANIZED HEALTH CARE EDUCATION/TRAINING PROGRAM

## 2023-08-15 PROCEDURE — 1160F PR REVIEW ALL MEDS BY PRESCRIBER/CLIN PHARMACIST DOCUMENTED: ICD-10-PCS | Mod: HCNC,CPTII,S$GLB, | Performed by: STUDENT IN AN ORGANIZED HEALTH CARE EDUCATION/TRAINING PROGRAM

## 2023-08-15 PROCEDURE — 84443 ASSAY THYROID STIM HORMONE: CPT | Mod: HCNC | Performed by: STUDENT IN AN ORGANIZED HEALTH CARE EDUCATION/TRAINING PROGRAM

## 2023-08-15 PROCEDURE — 1159F PR MEDICATION LIST DOCUMENTED IN MEDICAL RECORD: ICD-10-PCS | Mod: HCNC,CPTII,S$GLB, | Performed by: STUDENT IN AN ORGANIZED HEALTH CARE EDUCATION/TRAINING PROGRAM

## 2023-08-15 PROCEDURE — 1157F ADVNC CARE PLAN IN RCRD: CPT | Mod: HCNC,CPTII,S$GLB, | Performed by: STUDENT IN AN ORGANIZED HEALTH CARE EDUCATION/TRAINING PROGRAM

## 2023-08-15 PROCEDURE — 36415 COLL VENOUS BLD VENIPUNCTURE: CPT | Mod: HCNC,PO | Performed by: STUDENT IN AN ORGANIZED HEALTH CARE EDUCATION/TRAINING PROGRAM

## 2023-08-15 PROCEDURE — 1159F MED LIST DOCD IN RCRD: CPT | Mod: HCNC,CPTII,S$GLB, | Performed by: STUDENT IN AN ORGANIZED HEALTH CARE EDUCATION/TRAINING PROGRAM

## 2023-08-15 PROCEDURE — 1160F RVW MEDS BY RX/DR IN RCRD: CPT | Mod: HCNC,CPTII,S$GLB, | Performed by: STUDENT IN AN ORGANIZED HEALTH CARE EDUCATION/TRAINING PROGRAM

## 2023-08-15 PROCEDURE — 99999 PR PBB SHADOW E&M-EST. PATIENT-LVL V: CPT | Mod: PBBFAC,HCNC,, | Performed by: STUDENT IN AN ORGANIZED HEALTH CARE EDUCATION/TRAINING PROGRAM

## 2023-08-15 PROCEDURE — 1101F PT FALLS ASSESS-DOCD LE1/YR: CPT | Mod: HCNC,CPTII,S$GLB, | Performed by: STUDENT IN AN ORGANIZED HEALTH CARE EDUCATION/TRAINING PROGRAM

## 2023-08-15 PROCEDURE — 3288F FALL RISK ASSESSMENT DOCD: CPT | Mod: HCNC,CPTII,S$GLB, | Performed by: STUDENT IN AN ORGANIZED HEALTH CARE EDUCATION/TRAINING PROGRAM

## 2023-08-15 PROCEDURE — 1126F PR PAIN SEVERITY QUANTIFIED, NO PAIN PRESENT: ICD-10-PCS | Mod: HCNC,CPTII,S$GLB, | Performed by: STUDENT IN AN ORGANIZED HEALTH CARE EDUCATION/TRAINING PROGRAM

## 2023-08-15 PROCEDURE — 1157F PR ADVANCE CARE PLAN OR EQUIV PRESENT IN MEDICAL RECORD: ICD-10-PCS | Mod: HCNC,CPTII,S$GLB, | Performed by: STUDENT IN AN ORGANIZED HEALTH CARE EDUCATION/TRAINING PROGRAM

## 2023-08-15 PROCEDURE — 99214 PR OFFICE/OUTPT VISIT, EST, LEVL IV, 30-39 MIN: ICD-10-PCS | Mod: HCNC,S$GLB,, | Performed by: STUDENT IN AN ORGANIZED HEALTH CARE EDUCATION/TRAINING PROGRAM

## 2023-08-15 PROCEDURE — 99999 PR PBB SHADOW E&M-EST. PATIENT-LVL V: ICD-10-PCS | Mod: PBBFAC,HCNC,, | Performed by: STUDENT IN AN ORGANIZED HEALTH CARE EDUCATION/TRAINING PROGRAM

## 2023-08-15 PROCEDURE — 1101F PR PT FALLS ASSESS DOC 0-1 FALLS W/OUT INJ PAST YR: ICD-10-PCS | Mod: HCNC,CPTII,S$GLB, | Performed by: STUDENT IN AN ORGANIZED HEALTH CARE EDUCATION/TRAINING PROGRAM

## 2023-08-15 PROCEDURE — 99214 OFFICE O/P EST MOD 30 MIN: CPT | Mod: HCNC,S$GLB,, | Performed by: STUDENT IN AN ORGANIZED HEALTH CARE EDUCATION/TRAINING PROGRAM

## 2023-08-15 PROCEDURE — 3288F PR FALLS RISK ASSESSMENT DOCUMENTED: ICD-10-PCS | Mod: HCNC,CPTII,S$GLB, | Performed by: STUDENT IN AN ORGANIZED HEALTH CARE EDUCATION/TRAINING PROGRAM

## 2023-08-15 NOTE — PROGRESS NOTES
"[facility] MEDICINE CLINIC NOTE    Patient Name: Glenda Gimenez  YOB: 1927    PRESENTING HISTORY     History of Present Illness:  Ms. Glenda Gimenez is a 89 y.o. female here for f/u.     Feeling better. No more feeling like she is going to jump out of her skin.     PHQ9- 8 points (from 15)    Minimal actvity- watches game show [employer].     Has some loose stools, more frequent since upping zoloft. Not watery.     Feels anxious, like what are we doing here. She mentions this several times during OV.     ROS      OBJECTIVE:   Vital Signs:  Vitals:    08/15/23 1354   BP: 126/60   Pulse: 78   Resp: 18   SpO2: 95%   Weight: 81.4 kg (179 lb 7.3 oz)   Height: 5' 6" (1.676 m)          Physical Exam: Normal, no change.     Physical Exam    ASSESSMENT & PLAN:     Hypothyroidism, unspecified type  -     TSH; Future; Expected date: 08/15/2023  Recheck control, now taking replacement in isolation without other meds    Moderate episode of recurrent major depressive disorder  Doing better, leave meds same for 3 months and reassess.     Essential hypertension  Controlled           William Olivia MD   Internal Medicine            "

## 2023-08-16 LAB — TSH SERPL DL<=0.005 MIU/L-ACNC: 2.92 UIU/ML (ref 0.4–4)

## 2023-08-30 DIAGNOSIS — F01.518 VASCULAR DEMENTIA WITH BEHAVIOR DISTURBANCE: ICD-10-CM

## 2023-08-30 RX ORDER — DONEPEZIL HYDROCHLORIDE 5 MG/1
TABLET, FILM COATED ORAL
Qty: 30 TABLET | Refills: 0 | Status: SHIPPED | OUTPATIENT
Start: 2023-08-30 | End: 2023-09-01 | Stop reason: SDUPTHER

## 2023-08-31 NOTE — TELEPHONE ENCOUNTER
Spoke with patient's son. Patient's son states that he will get their Nurse practitioner to refill and hung up.

## 2023-09-01 DIAGNOSIS — F01.518 VASCULAR DEMENTIA WITH BEHAVIOR DISTURBANCE: ICD-10-CM

## 2023-09-05 ENCOUNTER — PATIENT MESSAGE (OUTPATIENT)
Dept: HOME HEALTH SERVICES | Facility: CLINIC | Age: 88
End: 2023-09-05
Payer: MEDICARE

## 2023-09-06 RX ORDER — DONEPEZIL HYDROCHLORIDE 5 MG/1
TABLET, FILM COATED ORAL
Qty: 30 TABLET | Refills: 0 | Status: SHIPPED | OUTPATIENT
Start: 2023-09-06 | End: 2023-09-11 | Stop reason: SDUPTHER

## 2023-09-11 ENCOUNTER — CARE AT HOME (OUTPATIENT)
Dept: HOME HEALTH SERVICES | Facility: CLINIC | Age: 88
End: 2023-09-11
Payer: MEDICARE

## 2023-09-11 VITALS
DIASTOLIC BLOOD PRESSURE: 58 MMHG | TEMPERATURE: 98 F | SYSTOLIC BLOOD PRESSURE: 110 MMHG | RESPIRATION RATE: 16 BRPM | OXYGEN SATURATION: 95 % | HEART RATE: 82 BPM

## 2023-09-11 DIAGNOSIS — E03.9 HYPOTHYROIDISM, UNSPECIFIED TYPE: ICD-10-CM

## 2023-09-11 DIAGNOSIS — I10 ESSENTIAL HYPERTENSION: ICD-10-CM

## 2023-09-11 DIAGNOSIS — F01.518 VASCULAR DEMENTIA WITH BEHAVIOR DISTURBANCE: Primary | ICD-10-CM

## 2023-09-11 DIAGNOSIS — F33.1 MAJOR DEPRESSIVE DISORDER, RECURRENT EPISODE, MODERATE: ICD-10-CM

## 2023-09-11 PROCEDURE — 99349 HOME/RES VST EST MOD MDM 40: CPT | Mod: S$GLB,,,

## 2023-09-11 PROCEDURE — 99349 PR HOME VISIT,ESTAB PATIENT,LEVEL III: ICD-10-PCS | Mod: S$GLB,,,

## 2023-09-11 RX ORDER — DONEPEZIL HYDROCHLORIDE 5 MG/1
TABLET, FILM COATED ORAL
Qty: 90 TABLET | Refills: 3 | Status: SHIPPED | OUTPATIENT
Start: 2023-09-11

## 2023-09-11 NOTE — PROGRESS NOTES
[facility]  Medical Home Visit    Encounter Provider: Nila ESTRADAC  PCP:  William Olivia MD    Subjective:      Patient ID: Glenda Gimenez is a 89 y.o. female.    Consult Requested By:  No ref. provider found  Reason for Consult:  Follow Up Medical Visit - [facility]    Glenda is being seen at home due to due to physical debility that presents a taxing effort to leave the home, to mitigate high risk of hospital readmission and/or due to the limited availability of reliable or safe options for transportation to the point of access to the provider. Prior to treatment on this visit the chart was reviewed and patient verbal consent was obtained.     Chief Complaint: Follow-up    Greeted at the door by daughter in law, Mary.  Visit conducted in living room.     Ms. Gimenez is a 89 y.o. female who has a past medical history of Anticoagulant long-term use, Arthritis, Dislocation of right shoulder joint, Hypertension, Hypothyroidism, Shoulder disorder, and Thyroid disease.  She is oriented to person place and situation, she and Mary are the primary source of information for this visit.    Ms. Doshi reports she is doing well and has no acute complaints today. Denies pain. She reports sleeping well- with no difficulty with sleep onset or maintenance. Appetite is fair- 2 small meals per day. States she just does not really feel hungry anymore. No acute problems with elimination, denies constipation, diarrhea, dysuria.     Mary confirms that she is still doing better after zoloft increase about 2 months ago. She is looking for hobbies for Ms. Doshi to do during the day with her hands such as crafts and is also considering an adult day care for activities and thinks Ms. Doshi would enjoy it.     VSS. All hospital discharge orders reviewed and being followed, all medications reconciled and reviewed, patient verbalized understanding. No other needs identified at this time.       PCP  is William Olivia MD. Plan to follow up q 4-6 weeks and/or as needed to decrease risk and/or frequency of hospitalizations. [facility] contact information available in home.          Review of Systems   Constitutional:  Negative for activity change, appetite change and fever.   Respiratory:  Negative for cough, chest tightness, shortness of breath and wheezing.    Cardiovascular:  Negative for chest pain, palpitations and leg swelling.   Gastrointestinal:  Negative for abdominal pain, constipation and diarrhea.   Genitourinary:  Negative for difficulty urinating and hematuria.   Musculoskeletal:  Positive for gait problem. Negative for joint swelling.   Skin:  Negative for color change and wound.   Neurological:  Positive for weakness. Negative for tremors, seizures and numbness.   Psychiatric/Behavioral:  Negative for confusion.        Assessments:  Environmental: staying with son and daughter in law in single story home   Functional Status: Modified independent- ambulatory with rollator. requires some help from daughter in law with bathing at times.  Safety:  dementia, advanced age, fall risk  Nutritional: adequate access to food, reports good appetite   Home Health/DME/Supplies: No Home Health. DME: rollator, wheelchair in home if needed    Objective:   Physical Exam  Constitutional:       Appearance: Normal appearance.   HENT:      Head: Normocephalic and atraumatic.   Cardiovascular:      Rate and Rhythm: Normal rate and regular rhythm.      Pulses: Normal pulses.      Heart sounds: Normal heart sounds.   Pulmonary:      Effort: Pulmonary effort is normal.      Breath sounds: Normal breath sounds.   Abdominal:      General: Bowel sounds are normal.      Palpations: Abdomen is soft.   Musculoskeletal:      Cervical back: Normal range of motion and neck supple.      Comments: Ambulatory with rollator   Skin:     General: Skin is warm and dry.   Neurological:      General: No focal deficit present.       Mental Status: She is alert and oriented to person, place, and time.      Comments: Confused at times   Psychiatric:         Mood and Affect: Mood normal.         Behavior: Behavior normal.         Vitals:    09/11/23 1100   BP: (!) 110/58   Pulse: 82   Resp: 16   Temp: 98 °F (36.7 °C)   SpO2: 95%     There is no height or weight on file to calculate BMI.    Assessment:     1. Vascular dementia with behavior disturbance    2. Essential hypertension    3. Hypothyroidism, unspecified type    4. Major depressive disorder, recurrent episode, moderate         Plan:     Advance Care Planning   Advance Directives:   Living Will: Yes        Copy on chart: Yes    Medical Power of : Yes    Goals of Care: The patient endorses that what is most important right now is to focus on spending time at home, avoiding the hospital, remaining as independent as possible, symptom/pain control, and comfort and QOL     Accordingly, we have decided that the best plan to meet the patient's goals includes continuing with treatment           1. Vascular dementia with behavior disturbance  -     donepeziL (ARICEPT) 5 MG tablet; TAKE 1 TABLET BY MOUTH EVERY EVENING  Dispense: 90 tablet; Refill: 3    2. Essential hypertension  Assessment & Plan:  -Chronic; stable  -Continue olemesartan 5mg tab        3. Hypothyroidism, unspecified type  Assessment & Plan:  -Chronic; stable  -Continue home medication regimen.    Lab Results   Component Value Date    TSH 2.917 08/15/2023           4. Major depressive disorder, recurrent episode, moderate  Assessment & Plan:  -doing well on increased zoloft dose.  -continue current medication regimen.            Were controlled substances prescribed?  No    TIME: Total face-to-face time was 40 minutes, >50% of this was spent on counseling and coordination of care. The following issues were discussed: primary and secondary diagnoses, co-morbidities, prescribed medications, treatment modalities, importance of  compliance with medical advice and directives for follow-up care.    Follow Up Appointments:   Future Appointments   Date Time Provider Department Center   10/24/2023  8:00 AM Nila Kelley NP [address]   11/16/2023  1:00 PM William Olivia MD [facility] MED [place]       Signature:

## 2023-09-11 NOTE — ASSESSMENT & PLAN NOTE
-Chronic; stable  -Continue home medication regimen.    Lab Results   Component Value Date    TSH 2.917 08/15/2023       
-Chronic; stable  -Continue olemesartan 5mg tab    
-doing well on increased zoloft dose.  -continue current medication regimen.   
vomiting for 2 weeks LMP 10/3/2017

## 2023-09-24 ENCOUNTER — PATIENT MESSAGE (OUTPATIENT)
Dept: HOME HEALTH SERVICES | Facility: CLINIC | Age: 88
End: 2023-09-24
Payer: MEDICARE

## 2023-09-26 ENCOUNTER — CARE AT HOME (OUTPATIENT)
Dept: HOME HEALTH SERVICES | Facility: CLINIC | Age: 88
End: 2023-09-26
Payer: MEDICARE

## 2023-09-26 VITALS
DIASTOLIC BLOOD PRESSURE: 62 MMHG | OXYGEN SATURATION: 94 % | RESPIRATION RATE: 16 BRPM | SYSTOLIC BLOOD PRESSURE: 112 MMHG | HEART RATE: 71 BPM | TEMPERATURE: 98 F

## 2023-09-26 DIAGNOSIS — Z74.09 OTHER REDUCED MOBILITY: ICD-10-CM

## 2023-09-26 DIAGNOSIS — J01.90 ACUTE NON-RECURRENT SINUSITIS, UNSPECIFIED LOCATION: Primary | ICD-10-CM

## 2023-09-26 DIAGNOSIS — E03.9 ACQUIRED HYPOTHYROIDISM: ICD-10-CM

## 2023-09-26 DIAGNOSIS — N18.31 CHRONIC KIDNEY DISEASE, STAGE 3A: ICD-10-CM

## 2023-09-26 PROBLEM — J02.9 PHARYNGITIS: Status: RESOLVED | Noted: 2022-05-30 | Resolved: 2023-09-26

## 2023-09-26 PROCEDURE — 99499 RISK ADDL DX/OHS AUDIT: ICD-10-PCS | Mod: S$GLB,,,

## 2023-09-26 PROCEDURE — 99349 PR HOME VISIT,ESTAB PATIENT,LEVEL III: ICD-10-PCS | Mod: S$GLB,,,

## 2023-09-26 PROCEDURE — 99349 HOME/RES VST EST MOD MDM 40: CPT | Mod: S$GLB,,,

## 2023-09-26 PROCEDURE — 99499 UNLISTED E&M SERVICE: CPT | Mod: S$GLB,,,

## 2023-09-26 RX ORDER — LEVOTHYROXINE SODIUM 88 UG/1
88 TABLET ORAL DAILY
Qty: 90 TABLET | Refills: 3 | Status: SHIPPED | OUTPATIENT
Start: 2023-09-26

## 2023-09-26 RX ORDER — DOXYCYCLINE 100 MG/1
100 CAPSULE ORAL EVERY 12 HOURS
Qty: 14 CAPSULE | Refills: 0 | Status: SHIPPED | OUTPATIENT
Start: 2023-09-26 | End: 2023-10-03

## 2023-09-26 NOTE — PROGRESS NOTES
[facility]  Medical Chronic Care Home Visit    Encounter Provider: IGNACIO YANEZ,   PCP: William Olivia MD  Consult Requested By: No ref. provider found    HISTORY OF PRESENT ILLNESS      Patient ID: Glenda Gimenez is a 89 y.o. female is being seen in the home due to physical debility that presents a taxing effort to leave the home, to mitigate high risk of hospital readmission and/or due to the limited availability of reliable or safe options for transportation to the point of access to the provider. Prior to treatment on this visit the chart was reviewed and patient verbal consent was obtained.    Chronic medical conditions synopsis:    1)  Ms. Gimenez is a 89 y.o. female who has a past medical history of vascular dementia, depression, Anticoagulant long-term use, Arthritis, Dislocation of right shoulder joint, Hypertension, Hypothyroidism, Shoulder disorder, and Thyroid disease.      2) She is being seen today for worsening wet cough x 2 weeks with decreasing appetite. Reports chronic rhinorrhea but may be worse than baseline. She is afebrile. Lung with clear breath sounds throughout.    DECISION MAKING TODAY       Assessment & Plan:  1. Acute non-recurrent sinusitis, unspecified location  -Dicussed possibility of sinusitis and post nasal drip causing a decrease in appetite.   -Appears well, afebrile with clear breath sounds, pneumonia unlikely.  -Pt reports allergy to pcn. Allergy is not on list, added today with unknown reaction.   -     doxycycline (VIBRAMYCIN) 100 MG Cap; Take 1 capsule (100 mg total) by mouth every 12 (twelve) hours. for 7 days  Dispense: 14 capsule; Refill: 0    2. Acquired hypothyroidism  Lab Results   Component Value Date    TSH 2.917 08/15/2023     -     levothyroxine (SYNTHROID) 88 MCG tablet; Take 1 tablet (88 mcg total) by mouth once daily.  Dispense: 90 tablet; Refill: 3    3. Chronic kidney disease, stage 3a  Assessment & Plan:  -Stable  -Continue to monitor  for progression    Lab Results   Component Value Date    BUN 26 07/19/2023     Lab Results   Component Value Date    CREATININE 0.9 07/19/2023     .      4. Other reduced mobility      -Continue care at home visits.      ENVIRONMENT OF CARE      Family and/or Caregiver present at visit?  Yes  Name of Caregiver: Shola  History provided by: patient and caregiver    Advance Care Planning   Advanced Care Planning Status:  Patient has had an ACP conversation  Living Will: Yes  Power of : Yes  [place]: No    Does Caregiver have HCPoA: Yes  Changes today: n/a       Impression upon entering the home:  Physical Dwelling: single family home   Appearance of home environment: cleaniness: clean and home structure: sound structure  Functional Status: moderate assistance  Mobility: ambulatory with device  Nutritional access: adequate intake and access  Home Health: No, and does not need it at this time   DME/Supplies: rolling walker, cane, and shower chair       Does patient have an ostomy (ileostomy, colostomy, suprapubic catheter, nephrostomy tube, tracheostomy, PEG tube, pleurex catheter, cholecystostomy, etc)? No  Were BPAs reviewed? Yes    Social History     Socioeconomic History    Marital status:     Number of children: 2   Tobacco Use    Smoking status: Never    Smokeless tobacco: Never   Substance and Sexual Activity    Alcohol use: Yes     Alcohol/week: 2.0 standard drinks of alcohol     Types: 2 Glasses of wine per week     Comment: 2 small glasses of red wine with dinner    Drug use: No    Sexual activity: Not Currently     Social Determinants of Health     Financial Resource Strain: Unknown (7/10/2023)    Overall Financial Resource Strain (CARDIA)     Difficulty of Paying Living Expenses: Patient refused   Food Insecurity: Unknown (7/10/2023)    Hunger Vital Sign     Worried About Running Out of Food in the Last Year: Patient refused     Ran Out of Food in the Last Year: Patient refused   Transportation  Needs: No Transportation Needs (7/10/2023)    PRAPARE - Transportation     Lack of Transportation (Medical): No     Lack of Transportation (Non-Medical): No   Physical Activity: Inactive (7/10/2023)    Exercise Vital Sign     Days of Exercise per Week: 0 days     Minutes of Exercise per Session: 0 min   Stress: Stress Concern Present (7/10/2023)    Citizen of [place] of Occupational Health - Occupational Stress Questionnaire     Feeling of Stress : Rather much   Social Connections: Unknown (7/10/2023)    Social Connection and Isolation Panel [NHANES]     Frequency of Communication with Friends and Family: Patient refused     Frequency of Social Gatherings with Friends and Family: Three times a week     Attends Catholic Services: Never     Active Member of Clubs or Organizations: Patient refused     Attends Club or Organization Meetings: Patient refused     Marital Status:    Recent Concern: Social Connections - Socially Isolated (5/4/2023)    Social Connection and Isolation Panel [NHANES]     Frequency of Communication with Friends and Family: More than three times a week     Frequency of Social Gatherings with Friends and Family: More than three times a week     Attends Catholic Services: Never     Active Member of Clubs or Organizations: No     Attends Club or Organization Meetings: Never     Marital Status:    Housing Stability: Unknown (7/10/2023)    Housing Stability Vital Sign     Unable to Pay for Housing in the Last Year: Patient refused     Unstable Housing in the Last Year: No         OBJECTIVE:     Vital Signs:  There were no vitals filed for this visit.    Review of Systems   Constitutional:  Negative for activity change, appetite change and fever.   HENT:  Positive for rhinorrhea.    Respiratory:  Positive for cough. Negative for chest tightness, shortness of breath and wheezing.    Cardiovascular:  Negative for chest pain, palpitations and leg swelling.   Gastrointestinal:  Negative for  abdominal pain, constipation and diarrhea.   Genitourinary:  Negative for difficulty urinating and hematuria.   Musculoskeletal:  Positive for gait problem. Negative for joint swelling.   Skin:  Negative for color change and wound.   Neurological:  Positive for weakness. Negative for tremors, seizures and numbness.   Psychiatric/Behavioral:  Negative for confusion.        Physical Exam:  Physical Exam  Constitutional:       Appearance: Normal appearance.   HENT:      Head: Normocephalic and atraumatic.      Nose: Rhinorrhea present.   Cardiovascular:      Rate and Rhythm: Normal rate and regular rhythm.      Pulses: Normal pulses.      Heart sounds: Normal heart sounds.   Pulmonary:      Effort: Pulmonary effort is normal.      Breath sounds: Normal breath sounds.   Abdominal:      General: Bowel sounds are normal.      Palpations: Abdomen is soft.   Musculoskeletal:         General: Normal range of motion.      Cervical back: Normal range of motion and neck supple.   Skin:     General: Skin is warm and dry.   Neurological:      General: No focal deficit present.      Mental Status: She is alert and oriented to person, place, and time. Mental status is at baseline.   Psychiatric:         Mood and Affect: Mood normal.         Behavior: Behavior normal.         INSTRUCTIONS FOR PATIENT:     Scheduled Follow-up, Appts Reviewed with Modifications if Needed: Yes  Future Appointments   Date Time Provider Department Center   11/16/2023  1:00 PM William Olivia MD [facility] MED [place]         Current Outpatient Medications:     acetaminophen (TYLENOL) 325 MG tablet, Take 325 mg by mouth every 6 (six) hours as needed for Pain., Disp: , Rfl:     aspirin (ECOTRIN) 81 MG EC tablet, Take 81 mg by mouth once daily., Disp: , Rfl:     busPIRone (BUSPAR) 10 MG tablet, Take 1 tablet (10 mg total) by mouth 3 (three) times daily., Disp: 270 tablet, Rfl: 4    donepeziL (ARICEPT) 5 MG tablet, TAKE 1 TABLET BY MOUTH EVERY EVENING, Disp:  90 tablet, Rfl: 3    doxycycline (VIBRAMYCIN) 100 MG Cap, Take 1 capsule (100 mg total) by mouth every 12 (twelve) hours. for 7 days, Disp: 14 capsule, Rfl: 0    famotidine (PEPCID) 20 MG tablet, Take 1 tablet (20 mg total) by mouth 2 (two) times daily., Disp: 60 tablet, Rfl: 11    fluticasone propionate (FLONASE) 50 mcg/actuation nasal spray, 1 spray (50 mcg total) by Each Nostril route twice a week., Disp: 16 g, Rfl: 0    furosemide (LASIX) 20 MG tablet, Take 1 tablet (20 mg total) by mouth once daily., Disp: 90 tablet, Rfl: 1    ibandronate (BONIVA) 150 mg tablet, TAKE 1 TABLET(150 MG) BY MOUTH EVERY 30 DAYS, Disp: 3 tablet, Rfl: 3    krill oil 500 mg Cap, Take by mouth., Disp: , Rfl:     levothyroxine (SYNTHROID) 88 MCG tablet, Take 1 tablet (88 mcg total) by mouth once daily., Disp: 90 tablet, Rfl: 3    nitroGLYCERIN (NITROSTAT) 0.4 MG SL tablet, Place 1 tablet (0.4 mg total) under the tongue every 5 (five) minutes as needed for Chest pain., Disp: 25 tablet, Rfl: 3    olmesartan (BENICAR) 5 MG Tab, TAKE 1 TABLET(5 MG) BY MOUTH EVERY DAY, Disp: 90 tablet, Rfl: 3    peg 400-propylene glycol, PF, (SYSTANE HYDRATION PF) 0.4-0.3 % Drop, 1 drops DAILY (route: ophthalmic (eye)), Disp: , Rfl:     potassium chloride SA (K-DUR,KLOR-CON) 20 MEQ tablet, TAKE 1 TABLET(20 MEQ) BY MOUTH EVERY DAY FOR 1 DOSE, Disp: 90 tablet, Rfl: 3    QUEtiapine (SEROQUEL) 50 MG tablet, TAKE 1 TABLET(50 MG) BY MOUTH EVERY EVENING, Disp: 30 tablet, Rfl: 11    sertraline (ZOLOFT) 50 MG tablet, Take 1 tablet (50 mg total) by mouth once daily., Disp: 90 tablet, Rfl: 4    vit A/vit C/vit E/zinc/copper (ICAPS AREDS ORAL), 1 capsule 2 TIMES DAILY (route: oral), Disp: , Rfl:     vitamin D (VITAMIN D3) 1000 units Tab, Take 1,000 Units by mouth once daily. Patient is taking 2000 units per day, Disp: , Rfl:     Medication Reconciliation:  Were medications changed during this appointment? yes  Were medications in the home? Yes  Is the patient taking the  medications as directed? Yes  Does the patient understand the medications and changes? Yes  Does updated med list accurately reflects meds patient is currently taking? Yes    Signature:      CHISHOLM NP      Time: Total face-to-face time was 40 minutes, >50% of this was spent on counseling and coordination of care. The following issues were discussed: primary and secondary diagnoses, co-morbidities, prescribed medications, treatment modalities, importance of compliance with medical advice and directives for follow-up care.

## 2023-09-26 NOTE — ASSESSMENT & PLAN NOTE
-Stable  -Continue to monitor for progression    Lab Results   Component Value Date    BUN 26 07/19/2023     Lab Results   Component Value Date    CREATININE 0.9 07/19/2023     .

## 2023-11-03 ENCOUNTER — TELEPHONE (OUTPATIENT)
Dept: FAMILY MEDICINE | Facility: CLINIC | Age: 88
End: 2023-11-03
Payer: MEDICARE

## 2023-11-03 NOTE — TELEPHONE ENCOUNTER
Spoke with pts son via phone and rescheduled Mrs. Zamarripa appt to another day. No further questions.    ----- Message from Joie Cortes sent at 11/3/2023 11:51 AM CDT -----  Type:  Reschedule Appointment Request    Caller is requesting to reschedule appointment.      Name of Caller:  Pt's son Db    When is the first available appointment?  Has appt 11/16 at 1 pm    Symptoms:  3 month f/u    Would the patient rather a call back or a response via [facility]?  Call back    Best Call Back Number:  [phone]    Additional Information:  Db has an appt at the same time as pt and needs to see if he can get her moved to same day morning or if they need to change day and time.  Please call back to advise. Thanks!

## 2023-11-04 ENCOUNTER — PATIENT MESSAGE (OUTPATIENT)
Dept: FAMILY MEDICINE | Facility: CLINIC | Age: 88
End: 2023-11-04
Payer: MEDICARE

## 2023-11-05 RX ORDER — ISOSORBIDE MONONITRATE 30 MG/1
30 TABLET, EXTENDED RELEASE ORAL
COMMUNITY
Start: 2023-08-31

## 2023-11-05 NOTE — TELEPHONE ENCOUNTER
No care due was identified.  [facility] Embedded Care Due Messages. Reference number: [id number].   11/05/2023 3:33:20 PM CST

## 2023-11-06 DIAGNOSIS — K21.9 GASTROESOPHAGEAL REFLUX DISEASE WITHOUT ESOPHAGITIS: Primary | ICD-10-CM

## 2023-11-06 RX ORDER — FAMOTIDINE 20 MG/1
20 TABLET, FILM COATED ORAL 2 TIMES DAILY
Qty: 180 TABLET | Refills: 3 | Status: SHIPPED | OUTPATIENT
Start: 2023-11-06 | End: 2024-11-05

## 2023-11-06 RX ORDER — FAMOTIDINE 20 MG/1
20 TABLET, FILM COATED ORAL 2 TIMES DAILY
Qty: 60 TABLET | Refills: 11 | Status: SHIPPED | OUTPATIENT
Start: 2023-11-06 | End: 2023-11-06 | Stop reason: SDUPTHER

## 2023-11-06 NOTE — PROGRESS NOTES
Son Shola called, needs refill on famotidine. Also would like flu vaccine for Ms. Gimenez.  Refills sent, home visit scheduled.

## 2023-11-07 ENCOUNTER — CARE AT HOME (OUTPATIENT)
Dept: HOME HEALTH SERVICES | Facility: CLINIC | Age: 88
End: 2023-11-07
Payer: MEDICARE

## 2023-11-07 VITALS
RESPIRATION RATE: 18 BRPM | DIASTOLIC BLOOD PRESSURE: 64 MMHG | SYSTOLIC BLOOD PRESSURE: 120 MMHG | OXYGEN SATURATION: 97 % | HEART RATE: 69 BPM | TEMPERATURE: 98 F

## 2023-11-07 DIAGNOSIS — Z23 FLU VACCINE NEED: ICD-10-CM

## 2023-11-07 DIAGNOSIS — Z74.09 OTHER REDUCED MOBILITY: ICD-10-CM

## 2023-11-07 DIAGNOSIS — E03.9 HYPOTHYROIDISM, UNSPECIFIED TYPE: ICD-10-CM

## 2023-11-07 DIAGNOSIS — K21.9 GASTROESOPHAGEAL REFLUX DISEASE WITHOUT ESOPHAGITIS: Primary | ICD-10-CM

## 2023-11-07 DIAGNOSIS — F33.1 MAJOR DEPRESSIVE DISORDER, RECURRENT EPISODE, MODERATE: ICD-10-CM

## 2023-11-07 PROCEDURE — 1160F PR REVIEW ALL MEDS BY PRESCRIBER/CLIN PHARMACIST DOCUMENTED: ICD-10-PCS | Mod: CPTII,S$GLB,,

## 2023-11-07 PROCEDURE — G0008 ADMIN INFLUENZA VIRUS VAC: HCPCS | Mod: S$GLB,,,

## 2023-11-07 PROCEDURE — 99349 PR HOME VISIT,ESTAB PATIENT,LEVEL III: ICD-10-PCS | Mod: S$GLB,,,

## 2023-11-07 PROCEDURE — 1159F PR MEDICATION LIST DOCUMENTED IN MEDICAL RECORD: ICD-10-PCS | Mod: CPTII,S$GLB,,

## 2023-11-07 PROCEDURE — 1160F RVW MEDS BY RX/DR IN RCRD: CPT | Mod: CPTII,S$GLB,,

## 2023-11-07 PROCEDURE — 1157F ADVNC CARE PLAN IN RCRD: CPT | Mod: CPTII,S$GLB,,

## 2023-11-07 PROCEDURE — G0008 PR ADMIN INFLUENZA VIRUS VAC: ICD-10-PCS | Mod: S$GLB,,,

## 2023-11-07 PROCEDURE — 1157F PR ADVANCE CARE PLAN OR EQUIV PRESENT IN MEDICAL RECORD: ICD-10-PCS | Mod: CPTII,S$GLB,,

## 2023-11-07 PROCEDURE — 90694 VACC AIIV4 NO PRSRV 0.5ML IM: CPT | Mod: S$GLB,,,

## 2023-11-07 PROCEDURE — 90694 FLU VACCINE - QUADRIVALENT - ADJUVANTED: ICD-10-PCS | Mod: S$GLB,,,

## 2023-11-07 PROCEDURE — 1159F MED LIST DOCD IN RCRD: CPT | Mod: CPTII,S$GLB,,

## 2023-11-07 PROCEDURE — 99349 HOME/RES VST EST MOD MDM 40: CPT | Mod: S$GLB,,,

## 2023-11-07 NOTE — PROGRESS NOTES
[facility]  Medical Chronic Care Home Visit    Encounter Provider: IGNACIO YANEZ,   PCP: William Olivia MD  Consult Requested By: No ref. provider found    HISTORY OF PRESENT ILLNESS      Patient ID: Glenda Gimenez is a 89 y.o. female is being seen in the home due to physical debility that presents a taxing effort to leave the home, to mitigate high risk of hospital readmission and/or due to the limited availability of reliable or safe options for transportation to the point of access to the provider. Prior to treatment on this visit the chart was reviewed and patient verbal consent was obtained.      Chronic medical conditions synopsis:    Ms. Gimenez is a 89 y.o. female who has a past medical history significant for vascular dementia, depression, Anticoagulant long-term use, Arthritis, Dislocation of right shoulder joint, Hypertension, Hypothyroidism, Shoulder disorder, and Thyroid disease.      Reason for consult and visit   Routine follow up and flu vaccination.      Recent developments  Ms. Gimenez is in her usual state of health. No new complaints. Denies pain today. Sleeping well. Denies problems with elimination. Reports decreased appetite but still gets good intake- 3 small meals per day.     Son and patient deny recent acute illness and/or fever in past 72 hours.        DECISION MAKING TODAY       Assessment & Plan:  1. Gastroesophageal reflux disease without esophagitis  Assessment & Plan:  -Chronic and stable  -Continue famotidine  -Refills for 1 year sent to pharmacy.      2. Hypothyroidism, unspecified type  Assessment & Plan:  -Chronic and stable   -Continue levothyroxine      3. Major depressive disorder, recurrent episode, moderate  Assessment & Plan:  -Stable  -Doing well on current dose of zoloft.   -Continue to monitor      4. Flu vaccine need  -Administered in home  -Monitored for 15 minutes post injection  -No complications    -     Influenze Virus Vaccine, QUAD (CCIIV4),  Antibotic Free, 0.5 ml (18691)    5. Other reduced mobility     -Follow up in 3 months, sooner if needed   -Son has [facility] contact information  -     Influenze Virus Vaccine, QUAD (CCIIV4), Antibotic Free, 0.5 ml (06319)    Other orders  -     Influenza - Quadrivalent (Adjuvanted)           ENVIRONMENT OF CARE      Family and/or Caregiver present at visit?  Yes  Name of Caregiver: francisco Jolley  History provided by: patient and caregiver    Advance Care Planning   Advanced Care Planning Status:  Patient has had an ACP conversation  Living Will: Yes  Power of : Yes  [place]: No    Does Caregiver have HCPoA: Yes  Changes today: n/a       Impression upon entering the home:  Physical Dwelling: single family home   Appearance of home environment: cleaniness: clean and home structure: sound structure  Functional Status: moderate assistance  Mobility: ambulatory with device  Nutritional access: adequate intake and access  Home Health: No, and does not need it at this time   DME/Supplies: rolling walker, cane, and shower chair       Disease/illness education:  aging, GERD, reduced mobility, flu vaccine  Establishment or re-establishment of referral orders for community resources: No other necessary community resources.   Discussion with other health care providers: No discussion with other health care providers necessary.   Does patient have a PCP at [facility]? Yes   Repatriation plan with PCP? [facility] reason: mobility   Does patient have an ostomy (ileostomy, colostomy, suprapubic catheter, nephrostomy tube, tracheostomy, PEG tube, pleurex catheter, cholecystostomy, etc)? No  Were BPAs reviewed? Yes    Social History     Socioeconomic History    Marital status:     Number of children: 2   Tobacco Use    Smoking status: Never    Smokeless tobacco: Never   Substance and Sexual Activity    Alcohol use: Yes     Alcohol/week: 2.0 standard drinks of alcohol     Types: 2 Glasses of wine per week     Comment: 2  small glasses of red wine with dinner    Drug use: No    Sexual activity: Not Currently     Social Determinants of Health     Financial Resource Strain: Unknown (7/10/2023)    Overall Financial Resource Strain (CARDIA)     Difficulty of Paying Living Expenses: Patient refused   Food Insecurity: Unknown (7/10/2023)    Hunger Vital Sign     Worried About Running Out of Food in the Last Year: Patient refused     Ran Out of Food in the Last Year: Patient refused   Transportation Needs: No Transportation Needs (7/10/2023)    PRAPARE - Transportation     Lack of Transportation (Medical): No     Lack of Transportation (Non-Medical): No   Physical Activity: Inactive (7/10/2023)    Exercise Vital Sign     Days of Exercise per Week: 0 days     Minutes of Exercise per Session: 0 min   Stress: Stress Concern Present (7/10/2023)    [place] of Occupational Health - Occupational Stress Questionnaire     Feeling of Stress : Rather much   Social Connections: Unknown (7/10/2023)    Social Connection and Isolation Panel [NHANES]     Frequency of Communication with Friends and Family: Patient refused     Frequency of Social Gatherings with Friends and Family: Three times a week     Attends Evangelical Services: Never     Active Member of Clubs or Organizations: Patient refused     Attends Club or Organization Meetings: Patient refused     Marital Status:    Recent Concern: Social Connections - Socially Isolated (5/4/2023)    Social Connection and Isolation Panel [NHANES]     Frequency of Communication with Friends and Family: More than three times a week     Frequency of Social Gatherings with Friends and Family: More than three times a week     Attends Evangelical Services: Never     Active Member of Clubs or Organizations: No     Attends Club or Organization Meetings: Never     Marital Status:    Housing Stability: Unknown (7/10/2023)    Housing Stability Vital Sign     Unable to Pay for Housing in the Last Year:  Patient refused     Unstable Housing in the Last Year: No         OBJECTIVE:     Vital Signs:  There were no vitals filed for this visit.    Review of Systems   Constitutional:  Negative for activity change, appetite change and fever.   Respiratory:  Negative for cough, chest tightness, shortness of breath and wheezing.    Cardiovascular:  Negative for chest pain, palpitations and leg swelling.   Gastrointestinal:  Negative for abdominal pain, constipation and diarrhea.   Genitourinary:  Negative for difficulty urinating and hematuria.   Musculoskeletal:  Positive for gait problem. Negative for joint swelling.   Skin:  Negative for color change and wound.   Neurological:  Positive for weakness. Negative for tremors, seizures and numbness.   Psychiatric/Behavioral:  Negative for confusion.        Physical Exam:  Physical Exam  Constitutional:       Appearance: Normal appearance.   HENT:      Head: Normocephalic and atraumatic.   Cardiovascular:      Rate and Rhythm: Normal rate and regular rhythm.      Pulses: Normal pulses.      Heart sounds: Normal heart sounds.   Pulmonary:      Effort: Pulmonary effort is normal.      Breath sounds: Normal breath sounds.   Abdominal:      General: Bowel sounds are normal.      Palpations: Abdomen is soft.   Musculoskeletal:      Cervical back: Normal range of motion and neck supple.      Comments: Ambulatory with rollator   Skin:     General: Skin is warm and dry.   Neurological:      General: No focal deficit present.      Mental Status: She is alert and oriented to person, place, and time.      Comments: Confused at times   Psychiatric:         Mood and Affect: Mood normal.         Behavior: Behavior normal.         INSTRUCTIONS FOR PATIENT:     Scheduled Follow-up, Appts Reviewed with Modifications if Needed: Yes  Future Appointments   Date Time Provider Department Center   11/24/2023 11:30 AM Toño Esteves PA-C [facility] MED [place]   2/7/2024  8:00 AM Nila Kelley  NP [facility] [place]         Current Outpatient Medications:     acetaminophen (TYLENOL) 325 MG tablet, Take 325 mg by mouth every 6 (six) hours as needed for Pain., Disp: , Rfl:     aspirin (ECOTRIN) 81 MG EC tablet, Take 81 mg by mouth once daily., Disp: , Rfl:     busPIRone (BUSPAR) 10 MG tablet, Take 1 tablet (10 mg total) by mouth 3 (three) times daily., Disp: 270 tablet, Rfl: 4    donepeziL (ARICEPT) 5 MG tablet, TAKE 1 TABLET BY MOUTH EVERY EVENING, Disp: 90 tablet, Rfl: 3    famotidine (PEPCID) 20 MG tablet, Take 1 tablet (20 mg total) by mouth 2 (two) times daily., Disp: 180 tablet, Rfl: 3    fluticasone propionate (FLONASE) 50 mcg/actuation nasal spray, 1 spray (50 mcg total) by Each Nostril route twice a week., Disp: 16 g, Rfl: 0    furosemide (LASIX) 20 MG tablet, Take 1 tablet (20 mg total) by mouth once daily., Disp: 90 tablet, Rfl: 1    ibandronate (BONIVA) 150 mg tablet, TAKE 1 TABLET(150 MG) BY MOUTH EVERY 30 DAYS, Disp: 3 tablet, Rfl: 3    isosorbide mononitrate (IMDUR) 30 MG 24 hr tablet, Take 30 mg by mouth., Disp: , Rfl:     krill oil 500 mg Cap, Take by mouth., Disp: , Rfl:     levothyroxine (SYNTHROID) 88 MCG tablet, Take 1 tablet (88 mcg total) by mouth once daily., Disp: 90 tablet, Rfl: 3    nitroGLYCERIN (NITROSTAT) 0.4 MG SL tablet, Place 1 tablet (0.4 mg total) under the tongue every 5 (five) minutes as needed for Chest pain., Disp: 25 tablet, Rfl: 3    olmesartan (BENICAR) 5 MG Tab, TAKE 1 TABLET(5 MG) BY MOUTH EVERY DAY, Disp: 90 tablet, Rfl: 3    peg 400-propylene glycol, PF, (SYSTANE HYDRATION PF) 0.4-0.3 % Drop, 1 drops DAILY (route: ophthalmic (eye)), Disp: , Rfl:     potassium chloride SA (K-DUR,KLOR-CON) 20 MEQ tablet, TAKE 1 TABLET(20 MEQ) BY MOUTH EVERY DAY FOR 1 DOSE, Disp: 90 tablet, Rfl: 3    QUEtiapine (SEROQUEL) 50 MG tablet, TAKE 1 TABLET(50 MG) BY MOUTH EVERY EVENING, Disp: 30 tablet, Rfl: 11    sertraline (ZOLOFT) 50 MG tablet, Take 1 tablet (50 mg total) by mouth once  daily., Disp: 90 tablet, Rfl: 4    vit A/vit C/vit E/zinc/copper (ICAPS AREDS ORAL), 1 capsule 2 TIMES DAILY (route: oral), Disp: , Rfl:     vitamin D (VITAMIN D3) 1000 units Tab, Take 1,000 Units by mouth once daily. Patient is taking 2000 units per day, Disp: , Rfl:     Medication Reconciliation:  Were medications changed during this appointment? No  Were medications in the home? Yes  Is the patient taking the medications as directed? Yes  Does the patient/caregiver understand the medications and changes? Yes  Does updated med list accurately reflects meds patient is currently taking? Yes    Signature:      IGNACIO YANEZ,, NP      Time: Total face-to-face time was 40 minutes, >50% of this was spent on counseling and coordination of care. The following issues were discussed: primary and secondary diagnoses, co-morbidities, prescribed medications, treatment modalities, importance of compliance with medical advice and directives for follow-up care.

## 2023-11-24 ENCOUNTER — OFFICE VISIT (OUTPATIENT)
Dept: FAMILY MEDICINE | Facility: CLINIC | Age: 88
End: 2023-11-24
Payer: MEDICARE

## 2023-11-24 VITALS
DIASTOLIC BLOOD PRESSURE: 52 MMHG | HEART RATE: 69 BPM | BODY MASS INDEX: 28.23 KG/M2 | SYSTOLIC BLOOD PRESSURE: 110 MMHG | OXYGEN SATURATION: 94 % | HEIGHT: 66 IN | RESPIRATION RATE: 16 BRPM | WEIGHT: 175.69 LBS | TEMPERATURE: 98 F

## 2023-11-24 DIAGNOSIS — D64.9 ANEMIA, UNSPECIFIED TYPE: ICD-10-CM

## 2023-11-24 DIAGNOSIS — I10 ESSENTIAL HYPERTENSION: ICD-10-CM

## 2023-11-24 DIAGNOSIS — E55.9 VITAMIN D DEFICIENCY: ICD-10-CM

## 2023-11-24 DIAGNOSIS — R79.89 HIGH SERUM PARATHYROID HORMONE (PTH): ICD-10-CM

## 2023-11-24 DIAGNOSIS — F33.1 MODERATE EPISODE OF RECURRENT MAJOR DEPRESSIVE DISORDER: ICD-10-CM

## 2023-11-24 DIAGNOSIS — E03.9 HYPOTHYROIDISM, UNSPECIFIED TYPE: Primary | ICD-10-CM

## 2023-11-24 PROCEDURE — 99999 PR PBB SHADOW E&M-EST. PATIENT-LVL V: CPT | Mod: PBBFAC,HCNC,,

## 2023-11-24 PROCEDURE — 3288F FALL RISK ASSESSMENT DOCD: CPT | Mod: HCNC,CPTII,S$GLB,

## 2023-11-24 PROCEDURE — 1101F PR PT FALLS ASSESS DOC 0-1 FALLS W/OUT INJ PAST YR: ICD-10-PCS | Mod: HCNC,CPTII,S$GLB,

## 2023-11-24 PROCEDURE — 1159F PR MEDICATION LIST DOCUMENTED IN MEDICAL RECORD: ICD-10-PCS | Mod: HCNC,CPTII,S$GLB,

## 2023-11-24 PROCEDURE — 99214 OFFICE O/P EST MOD 30 MIN: CPT | Mod: HCNC,S$GLB,,

## 2023-11-24 PROCEDURE — 1101F PT FALLS ASSESS-DOCD LE1/YR: CPT | Mod: HCNC,CPTII,S$GLB,

## 2023-11-24 PROCEDURE — 99214 PR OFFICE/OUTPT VISIT, EST, LEVL IV, 30-39 MIN: ICD-10-PCS | Mod: HCNC,S$GLB,,

## 2023-11-24 PROCEDURE — 1157F ADVNC CARE PLAN IN RCRD: CPT | Mod: HCNC,CPTII,S$GLB,

## 2023-11-24 PROCEDURE — 1160F RVW MEDS BY RX/DR IN RCRD: CPT | Mod: HCNC,CPTII,S$GLB,

## 2023-11-24 PROCEDURE — 1157F PR ADVANCE CARE PLAN OR EQUIV PRESENT IN MEDICAL RECORD: ICD-10-PCS | Mod: HCNC,CPTII,S$GLB,

## 2023-11-24 PROCEDURE — 1126F AMNT PAIN NOTED NONE PRSNT: CPT | Mod: HCNC,CPTII,S$GLB,

## 2023-11-24 PROCEDURE — 1159F MED LIST DOCD IN RCRD: CPT | Mod: HCNC,CPTII,S$GLB,

## 2023-11-24 PROCEDURE — 3288F PR FALLS RISK ASSESSMENT DOCUMENTED: ICD-10-PCS | Mod: HCNC,CPTII,S$GLB,

## 2023-11-24 PROCEDURE — 99999 PR PBB SHADOW E&M-EST. PATIENT-LVL V: ICD-10-PCS | Mod: PBBFAC,HCNC,,

## 2023-11-24 PROCEDURE — 1126F PR PAIN SEVERITY QUANTIFIED, NO PAIN PRESENT: ICD-10-PCS | Mod: HCNC,CPTII,S$GLB,

## 2023-11-24 PROCEDURE — 1160F PR REVIEW ALL MEDS BY PRESCRIBER/CLIN PHARMACIST DOCUMENTED: ICD-10-PCS | Mod: HCNC,CPTII,S$GLB,

## 2023-11-24 NOTE — PROGRESS NOTES
"Subjective:       Patient ID: Glenda Gimenez is a 89 y.o. female.    Chief Complaint: Follow-up (3 month f/u)    Routine follow up. No acute health complaints at this time.    Mood. Doing well on current therapy. Does not feel like she is jumping out of her skin or anxious.     Thyroid. Better since adjusting how she takes meds.     HTN. Stable. Continue meds. Denies symptoms of hypotension.         Past Medical History:   Diagnosis Date    Anticoagulant long-term use     Arthritis     Dislocation of right shoulder joint     Hypertension     Hypothyroidism     Shoulder disorder     right    Thyroid disease        Review of patient's allergies indicates:   Allergen Reactions    Amlodipine Swelling     Other reaction(s): Angioedema    Atenolol      Other reaction(s): itch    Betamethasone sodium phosphate      Other reaction(s): Flushing (skin)    Cortisone      Other reaction(s): blurry vision  Other reaction(s): Rash    Lisinopril      Other reaction(s): COUGH    Naproxen      Other reaction(s): body shut down    Pcn [penicillins] Other (See Comments)     Unknown reaction "I was just told a long time ago I was allergic to penicillin."    Prednisone     Triamterene-hydrochlorothiazid      Other reaction(s): itch         Current Outpatient Medications:     acetaminophen (TYLENOL) 325 MG tablet, Take 325 mg by mouth every 6 (six) hours as needed for Pain., Disp: , Rfl:     aspirin (ECOTRIN) 81 MG EC tablet, Take 81 mg by mouth once daily., Disp: , Rfl:     busPIRone (BUSPAR) 10 MG tablet, Take 1 tablet (10 mg total) by mouth 3 (three) times daily., Disp: 270 tablet, Rfl: 4    donepeziL (ARICEPT) 5 MG tablet, TAKE 1 TABLET BY MOUTH EVERY EVENING, Disp: 90 tablet, Rfl: 3    famotidine (PEPCID) 20 MG tablet, Take 1 tablet (20 mg total) by mouth 2 (two) times daily., Disp: 180 tablet, Rfl: 3    fluticasone propionate (FLONASE) 50 mcg/actuation nasal spray, 1 spray (50 mcg total) by Each Nostril route twice a week., " Disp: 16 g, Rfl: 0    furosemide (LASIX) 20 MG tablet, Take 1 tablet (20 mg total) by mouth once daily., Disp: 90 tablet, Rfl: 1    ibandronate (BONIVA) 150 mg tablet, TAKE 1 TABLET(150 MG) BY MOUTH EVERY 30 DAYS, Disp: 3 tablet, Rfl: 3    isosorbide mononitrate (IMDUR) 30 MG 24 hr tablet, Take 30 mg by mouth., Disp: , Rfl:     krill oil 500 mg Cap, Take by mouth., Disp: , Rfl:     levothyroxine (SYNTHROID) 88 MCG tablet, Take 1 tablet (88 mcg total) by mouth once daily., Disp: 90 tablet, Rfl: 3    nitroGLYCERIN (NITROSTAT) 0.4 MG SL tablet, Place 1 tablet (0.4 mg total) under the tongue every 5 (five) minutes as needed for Chest pain., Disp: 25 tablet, Rfl: 3    olmesartan (BENICAR) 5 MG Tab, TAKE 1 TABLET(5 MG) BY MOUTH EVERY DAY, Disp: 90 tablet, Rfl: 3    peg 400-propylene glycol, PF, (SYSTANE HYDRATION PF) 0.4-0.3 % Drop, 1 drops DAILY (route: ophthalmic (eye)), Disp: , Rfl:     potassium chloride SA (K-DUR,KLOR-CON) 20 MEQ tablet, TAKE 1 TABLET(20 MEQ) BY MOUTH EVERY DAY FOR 1 DOSE, Disp: 90 tablet, Rfl: 3    QUEtiapine (SEROQUEL) 50 MG tablet, TAKE 1 TABLET(50 MG) BY MOUTH EVERY EVENING, Disp: 30 tablet, Rfl: 11    sertraline (ZOLOFT) 50 MG tablet, Take 1 tablet (50 mg total) by mouth once daily., Disp: 90 tablet, Rfl: 4    vit A/vit C/vit E/zinc/copper (ICAPS AREDS ORAL), 1 capsule 2 TIMES DAILY (route: oral), Disp: , Rfl:     vitamin D (VITAMIN D3) 1000 units Tab, Take 1,000 Units by mouth once daily. Patient is taking 2000 units per day, Disp: , Rfl:     Review of Systems   Constitutional:  Negative for activity change, appetite change, chills, diaphoresis, fatigue, fever and unexpected weight change.   HENT:  Negative for congestion, ear pain, postnasal drip, rhinorrhea, sinus pressure, sneezing, sore throat and trouble swallowing.    Eyes:  Negative for pain, itching and visual disturbance.   Respiratory:  Negative for cough, chest tightness, shortness of breath and wheezing.    Cardiovascular:  Negative  "for chest pain, palpitations and leg swelling.   Gastrointestinal:  Negative for abdominal distention, abdominal pain, blood in stool, constipation, diarrhea, nausea and vomiting.   Endocrine: Negative for cold intolerance and heat intolerance.   Genitourinary:  Negative for difficulty urinating, dysuria, frequency, hematuria and urgency.   Musculoskeletal:  Negative for arthralgias, back pain, myalgias and neck pain.   Skin:  Negative for color change, pallor, rash and wound.   Neurological:  Negative for dizziness, syncope, weakness, numbness and headaches.   Hematological:  Negative for adenopathy.   Psychiatric/Behavioral:  Negative for behavioral problems. The patient is not nervous/anxious.        Objective:      BP (!) 110/52 (BP Location: Right arm, Patient Position: Sitting, BP Method: Large (Manual))   Pulse 69   Temp 97.6 °F (36.4 °C) (Oral)   Resp 16   Ht 5' 6" (1.676 m)   Wt 79.7 kg (175 lb 11.3 oz)   LMP  (LMP Unknown)   SpO2 (!) 94%   BMI 28.36 kg/m²   Physical Exam  Vitals reviewed.   Constitutional:       General: She is not in acute distress.     Appearance: Normal appearance. She is normal weight. She is not ill-appearing, toxic-appearing or diaphoretic.   HENT:      Head: Normocephalic.      Right Ear: External ear normal.      Left Ear: External ear normal.      Nose: Nose normal. No congestion or rhinorrhea.      Mouth/Throat:      Mouth: Mucous membranes are moist.      Pharynx: Oropharynx is clear.   Eyes:      General: No scleral icterus.        Right eye: No discharge.         Left eye: No discharge.      Extraocular Movements: Extraocular movements intact.      Conjunctiva/sclera: Conjunctivae normal.   Cardiovascular:      Rate and Rhythm: Normal rate and regular rhythm.      Pulses: Normal pulses.      Heart sounds: Normal heart sounds. No murmur heard.     No friction rub. No gallop.   Pulmonary:      Effort: Pulmonary effort is normal. No respiratory distress.      Breath " sounds: Normal breath sounds. No wheezing, rhonchi or rales.   Chest:      Chest wall: No tenderness.   Musculoskeletal:         General: No swelling, tenderness or deformity. Normal range of motion.      Cervical back: Normal range of motion.      Right lower leg: No edema.      Left lower leg: No edema.   Skin:     General: Skin is warm and dry.      Capillary Refill: Capillary refill takes less than 2 seconds.      Coloration: Skin is not jaundiced.      Findings: No bruising, erythema, lesion or rash.   Neurological:      Mental Status: She is alert and oriented to person, place, and time.      Gait: Gait abnormal.   Psychiatric:         Mood and Affect: Mood normal.         Behavior: Behavior normal.         Thought Content: Thought content normal.         Judgment: Judgment normal.         Assessment:       1. Hypothyroidism, unspecified type    2. Moderate episode of recurrent major depressive disorder    3. Essential hypertension    4. Anemia, unspecified type    5. High serum parathyroid hormone (PTH)    6. Vitamin D deficiency        Plan:       Hypothyroidism, unspecified type  -     TSH; Future; Expected date: 11/24/2023        -    Stable. Continue meds. Will continue to monitor.     Moderate episode of recurrent major depressive disorder        -    Stable. Continue meds. Will continue to monitor.     Essential hypertension  -     BASIC METABOLIC PANEL; Future; Expected date: 11/24/2023        -    Stable. Continue meds. Will continue to monitor.     Anemia, unspecified type  -     CBC W/ AUTO DIFFERENTIAL; Future; Expected date: 11/24/2023    High serum parathyroid hormone (PTH)  -     PTH, intact; Future; Expected date: 11/24/2023  -     Calcitriol; Future; Expected date: 11/24/2023    Vitamin D deficiency  -     Calcitriol; Future; Expected date: 11/24/2023                 Toño Esteves PA-C  Family Medicine Physician Assistant       Future Appointments       Date Provider Specialty Appt Notes     2/7/2024 Nila Kelley NP [facility] 3mo F/U (HOME VISIT)  -[place]  -[id number] [id number]    5/20/2024  Lab labs    5/30/2024 William Olivia MD Family Medicine 6 month f/u               I spent a total of 20 minutes on the day of the visit.This includes face to face time and non-face to face time preparing to see the patient (eg, review of tests), obtaining and/or reviewing separately obtained history, documenting clinical information in the electronic or other health record, independently interpreting results and communicating results to the patient/family/caregiver, or care coordinator.      We have addressed [4] Moderate: 1 or more chronic illnesses with exacerbation, progression, or side effects of treatment / 2 or more stable chronic illnesses / 1 undiagnosed new problem with uncertain prognosis / 1 acute illness with systemic symptoms / 1 acute complicated injury  The complexity of the data reviewed and analyzed for this visit was [3] Limited (Reviewed prior external note, ordered unique testing or reviewed the results of each unique test)   The risk of complications and/or morbidity or mortality are [4] Moderate risk (I.e. prescription drug management / decision regarding minor surgery with identified pt or procedure risk factors / decision regarding elective major surgery without identified pt or procedure risk factors / diagnosis or treatment significantly limited by social determinants of health)   The level of Medical Decision Making for this visit is [4] Moderate

## 2023-11-24 NOTE — PATIENT INSTRUCTIONS
Carlos Doshi,     If you are due for any health screening(s) below please notify me so we can arrange them to be ordered and scheduled to maintain your health. Most healthy patients complete it. Don't lose out on improving your health.     All of your core healthy metrics are met.

## 2023-12-12 RX ORDER — FUROSEMIDE 20 MG/1
20 TABLET ORAL DAILY
Qty: 90 TABLET | Refills: 2 | Status: SHIPPED | OUTPATIENT
Start: 2023-12-12

## 2023-12-12 NOTE — TELEPHONE ENCOUNTER
Refill Routing Note   Medication(s) are not appropriate for processing by [facility] for the following reason(s):        Drug-disease interaction: furosemide and Chronic hyponatremia; Hyponatremia     [facility] action(s):  Defer      Medication Therapy Plan:       Pharmacist review requested: Yes     Appointments  past 12m or future 3m with PCP    Date Provider   Last Visit   8/15/2023 William Olivia MD   Next Visit   5/30/2024 William Olivia MD   ED visits in past 90 days: 0        Note composed:2:24 PM 12/12/2023

## 2023-12-12 NOTE — TELEPHONE ENCOUNTER
Refill Decision Note   Glenda Gimenez  is requesting a refill authorization.  Brief Assessment and Rationale for Refill:  Approve     Medication Therapy Plan:         Pharmacist review requested: Yes   Extended chart review required: Yes   Comments:     Note composed:4:22 PM 12/12/2023

## 2023-12-12 NOTE — TELEPHONE ENCOUNTER
No care due was identified.  [facility] Embedded Care Due Messages. Reference number: [id number].   12/12/2023 10:10:32 AM CST

## 2024-01-09 DIAGNOSIS — I10 HYPERTENSION, UNSPECIFIED TYPE: ICD-10-CM

## 2024-01-09 DIAGNOSIS — E87.6 HYPOKALEMIA: ICD-10-CM

## 2024-01-09 RX ORDER — OLMESARTAN MEDOXOMIL 5 MG/1
5 TABLET ORAL DAILY
Qty: 90 TABLET | Refills: 3 | Status: SHIPPED | OUTPATIENT
Start: 2024-01-09

## 2024-01-09 RX ORDER — POTASSIUM CHLORIDE 20 MEQ/1
TABLET, EXTENDED RELEASE ORAL
Qty: 90 TABLET | Refills: 3 | Status: SHIPPED | OUTPATIENT
Start: 2024-01-09

## 2024-02-09 ENCOUNTER — HOSPITAL ENCOUNTER (EMERGENCY)
Facility: HOSPITAL | Age: 89
Discharge: HOME | End: 2024-02-09
Attending: EMERGENCY MEDICINE
Payer: MEDICARE

## 2024-02-09 VITALS
OXYGEN SATURATION: 95 % | DIASTOLIC BLOOD PRESSURE: 63 MMHG | BODY MASS INDEX: 29.16 KG/M2 | SYSTOLIC BLOOD PRESSURE: 137 MMHG | TEMPERATURE: 98 F | RESPIRATION RATE: 19 BRPM | HEIGHT: 65 IN | HEART RATE: 81 BPM | WEIGHT: 175 LBS

## 2024-02-09 DIAGNOSIS — U07.1 COVID-19 VIRUS INFECTION: Primary | ICD-10-CM

## 2024-02-09 DIAGNOSIS — R05.9 COUGH: ICD-10-CM

## 2024-02-09 DIAGNOSIS — R11.10 VOMITING: ICD-10-CM

## 2024-02-09 LAB
ALBUMIN SERPL BCP-MCNC: 4.1 G/DL (ref 3.5–5.2)
ALP SERPL-CCNC: 81 U/L (ref 55–135)
ALT SERPL W/O P-5'-P-CCNC: 10 U/L (ref 10–44)
ANION GAP SERPL CALC-SCNC: 9 MMOL/L (ref 8–16)
AST SERPL-CCNC: 19 U/L (ref 10–40)
BACTERIA #/AREA URNS HPF: NEGATIVE /HPF
BASOPHILS # BLD AUTO: 0.08 K/UL (ref 0–0.2)
BASOPHILS NFR BLD: 0.9 % (ref 0–1.9)
BILIRUB SERPL-MCNC: 0.7 MG/DL (ref 0.1–1)
BILIRUB UR QL STRIP: NEGATIVE
BNP SERPL-MCNC: 381 PG/ML (ref 0–99)
BUN SERPL-MCNC: 14 MG/DL (ref 10–30)
CALCIUM SERPL-MCNC: 10 MG/DL (ref 8.7–10.5)
CHLORIDE SERPL-SCNC: 103 MMOL/L (ref 95–110)
CLARITY UR: CLEAR
CO2 SERPL-SCNC: 25 MMOL/L (ref 23–29)
COLOR UR: YELLOW
CREAT SERPL-MCNC: 0.8 MG/DL (ref 0.5–1.4)
DIFFERENTIAL METHOD BLD: ABNORMAL
EOSINOPHIL # BLD AUTO: 0 K/UL (ref 0–0.5)
EOSINOPHIL NFR BLD: 0.3 % (ref 0–8)
ERYTHROCYTE [DISTWIDTH] IN BLOOD BY AUTOMATED COUNT: 13.3 % (ref 11.5–14.5)
EST. GFR  (NO RACE VARIABLE): >60 ML/MIN/1.73 M^2
GLUCOSE SERPL-MCNC: 141 MG/DL (ref 70–110)
GLUCOSE UR QL STRIP: NEGATIVE
HCT VFR BLD AUTO: 39.8 % (ref 37–48.5)
HGB BLD-MCNC: 12.9 G/DL (ref 12–16)
HGB UR QL STRIP: NEGATIVE
HYALINE CASTS #/AREA URNS LPF: 2 /LPF
IMM GRANULOCYTES # BLD AUTO: 0.03 K/UL (ref 0–0.04)
IMM GRANULOCYTES NFR BLD AUTO: 0.3 % (ref 0–0.5)
KETONES UR QL STRIP: NEGATIVE
LEUKOCYTE ESTERASE UR QL STRIP: ABNORMAL
LIPASE SERPL-CCNC: 13 U/L (ref 4–60)
LYMPHOCYTES # BLD AUTO: 0.8 K/UL (ref 1–4.8)
LYMPHOCYTES NFR BLD: 9.4 % (ref 18–48)
MAGNESIUM SERPL-MCNC: 1.8 MG/DL (ref 1.6–2.6)
MCH RBC QN AUTO: 30.2 PG (ref 27–31)
MCHC RBC AUTO-ENTMCNC: 32.4 G/DL (ref 32–36)
MCV RBC AUTO: 93 FL (ref 82–98)
MICROSCOPIC COMMENT: ABNORMAL
MONOCYTES # BLD AUTO: 0.6 K/UL (ref 0.3–1)
MONOCYTES NFR BLD: 6.5 % (ref 4–15)
NEUTROPHILS # BLD AUTO: 7.2 K/UL (ref 1.8–7.7)
NEUTROPHILS NFR BLD: 82.6 % (ref 38–73)
NITRITE UR QL STRIP: NEGATIVE
NRBC BLD-RTO: 0 /100 WBC
PH UR STRIP: 6 [PH] (ref 5–8)
PLATELET # BLD AUTO: 278 K/UL (ref 150–450)
PMV BLD AUTO: 10 FL (ref 9.2–12.9)
POTASSIUM SERPL-SCNC: 4.3 MMOL/L (ref 3.5–5.1)
PROT SERPL-MCNC: 7.9 G/DL (ref 6–8.4)
PROT UR QL STRIP: NEGATIVE
RBC # BLD AUTO: 4.27 M/UL (ref 4–5.4)
RBC #/AREA URNS HPF: 2 /HPF (ref 0–4)
SODIUM SERPL-SCNC: 137 MMOL/L (ref 136–145)
SP GR UR STRIP: 1.01 (ref 1–1.03)
SQUAMOUS #/AREA URNS HPF: 1 /HPF
TROPONIN I SERPL HS-MCNC: 13.4 PG/ML (ref 0–14.9)
URN SPEC COLLECT METH UR: ABNORMAL
UROBILINOGEN UR STRIP-ACNC: NEGATIVE EU/DL
WBC # BLD AUTO: 8.73 K/UL (ref 3.9–12.7)
WBC #/AREA URNS HPF: 3 /HPF (ref 0–5)

## 2024-02-09 PROCEDURE — 80053 COMPREHEN METABOLIC PANEL: CPT | Performed by: EMERGENCY MEDICINE

## 2024-02-09 PROCEDURE — 84484 ASSAY OF TROPONIN QUANT: CPT | Performed by: EMERGENCY MEDICINE

## 2024-02-09 PROCEDURE — 96374 THER/PROPH/DIAG INJ IV PUSH: CPT

## 2024-02-09 PROCEDURE — 85025 COMPLETE CBC W/AUTO DIFF WBC: CPT | Performed by: EMERGENCY MEDICINE

## 2024-02-09 PROCEDURE — 93010 ELECTROCARDIOGRAM REPORT: CPT | Mod: ,,, | Performed by: GENERAL PRACTICE

## 2024-02-09 PROCEDURE — 83690 ASSAY OF LIPASE: CPT | Performed by: EMERGENCY MEDICINE

## 2024-02-09 PROCEDURE — 63600175 PHARM REV CODE 636 W HCPCS: Performed by: EMERGENCY MEDICINE

## 2024-02-09 PROCEDURE — 81001 URINALYSIS AUTO W/SCOPE: CPT | Performed by: EMERGENCY MEDICINE

## 2024-02-09 PROCEDURE — 93005 ELECTROCARDIOGRAM TRACING: CPT | Performed by: GENERAL PRACTICE

## 2024-02-09 PROCEDURE — 99285 EMERGENCY DEPT VISIT HI MDM: CPT | Mod: 25

## 2024-02-09 PROCEDURE — 83735 ASSAY OF MAGNESIUM: CPT | Performed by: EMERGENCY MEDICINE

## 2024-02-09 PROCEDURE — 83880 ASSAY OF NATRIURETIC PEPTIDE: CPT | Performed by: EMERGENCY MEDICINE

## 2024-02-09 RX ORDER — ONDANSETRON 4 MG/1
4 TABLET, FILM COATED ORAL EVERY 8 HOURS PRN
Qty: 15 TABLET | Refills: 0 | Status: SHIPPED | OUTPATIENT
Start: 2024-02-09

## 2024-02-09 RX ORDER — NIRMATRELVIR AND RITONAVIR 300-100 MG
KIT ORAL
Qty: 30 TABLET | Refills: 0 | Status: SHIPPED | OUTPATIENT
Start: 2024-02-09 | End: 2024-05-17 | Stop reason: DRUGHIGH

## 2024-02-09 RX ORDER — ONDANSETRON HYDROCHLORIDE 2 MG/ML
4 INJECTION, SOLUTION INTRAVENOUS
Status: COMPLETED | OUTPATIENT
Start: 2024-02-09 | End: 2024-02-09

## 2024-02-09 RX ADMIN — ONDANSETRON 4 MG: 2 INJECTION INTRAMUSCULAR; INTRAVENOUS at 08:02

## 2024-02-10 ENCOUNTER — NURSE TRIAGE (OUTPATIENT)
Dept: ADMINISTRATIVE | Facility: CLINIC | Age: 89
End: 2024-02-10
Payer: MEDICARE

## 2024-02-10 NOTE — DISCHARGE INSTRUCTIONS
RETURN TO EMERGENCY DEPARTMENT WITHOUT FAIL, IF YOUR SYMPTOMS WORSEN, IF YOU GET NEW OR DIFFERENT SYMPTOMS, IF YOU ARE UNABLE TO FOLLOW UP AS DIRECTED, OR IF YOU HAVE ANY CONCERNS OR WORRIES.    Take medication as directed.  Follow-up with your primary care provider.  Return if symptoms change or worsen.

## 2024-02-10 NOTE — ED PROVIDER NOTES
"Encounter Date: 2/9/2024       History     Chief Complaint   Patient presents with    Nausea     Tested positive for covid today, son tested positive a few days ago. Unable to keep anything down, n/v     89-year-old female with past medical history of hypertension, dislocation or right shoulder, hypothyroidism, presents emergency department with COVID positive test and vomiting.  It is reported that the patient has had some nausea and vomiting since testing positive for COVID.  Patient denies any diarrhea she denies any associated abdominal pain.  She does state she is intermittently short of breath at times.  She denies any chest pain.  She does have a cough.  Occasionally productive.  No urinary symptoms.  No reported fever chills.      Review of patient's allergies indicates:   Allergen Reactions    Amlodipine Swelling     Other reaction(s): Angioedema    Atenolol      Other reaction(s): itch    Betamethasone sodium phosphate      Other reaction(s): Flushing (skin)    Cortisone      Other reaction(s): blurry vision  Other reaction(s): Rash    Lisinopril      Other reaction(s): COUGH    Naproxen      Other reaction(s): body shut down    Pcn [penicillins] Other (See Comments)     Unknown reaction "I was just told a long time ago I was allergic to penicillin."    Prednisone     Triamterene-hydrochlorothiazid      Other reaction(s): itch     Past Medical History:   Diagnosis Date    Anticoagulant long-term use     Arthritis     Dislocation of right shoulder joint     Hypertension     Hypothyroidism     Shoulder disorder     right    Thyroid disease      Past Surgical History:   Procedure Laterality Date    HYSTERECTOMY      PARTIAL HYSTERECTOMY      ARTUR, ovaries in place, uterine prolapse     Family History   Problem Relation Age of Onset    Breast cancer Mother     Cancer Brother         lung cancer    Cancer Maternal Aunt         unknown cancer    Cancer Maternal Uncle         pancreatic cancer    Heart disease Neg " Hx      Social History     Tobacco Use    Smoking status: Never    Smokeless tobacco: Never   Substance Use Topics    Alcohol use: Yes     Alcohol/week: 2.0 standard drinks of alcohol     Types: 2 Glasses of wine per week     Comment: 2 small glasses of red wine with dinner    Drug use: No     Review of Systems   Constitutional:  Positive for fatigue. Negative for fever.   HENT:  Negative for sore throat.    Respiratory:  Positive for cough and shortness of breath.    Cardiovascular:  Negative for chest pain.   Gastrointestinal:  Positive for nausea and vomiting.   Genitourinary:  Negative for dysuria.   Musculoskeletal:  Negative for back pain.   Skin:  Negative for rash.   Neurological:  Negative for weakness and headaches.   Hematological:  Does not bruise/bleed easily.   All other systems reviewed and are negative.      Physical Exam     Initial Vitals [02/09/24 1806]   BP Pulse Resp Temp SpO2   (!) 153/69 84 19 98.2 °F (36.8 °C) (!) 94 %      MAP       --         Physical Exam    Nursing note and vitals reviewed.  Constitutional: She appears well-developed and well-nourished. No distress.   HENT:   Head: Normocephalic and atraumatic.   Mouth/Throat: No oropharyngeal exudate.   Eyes: Conjunctivae and EOM are normal. Pupils are equal, round, and reactive to light.   Neck: Neck supple. No tracheal deviation present.   Cardiovascular:  Normal rate, regular rhythm, normal heart sounds and intact distal pulses.           No murmur heard.  Pulmonary/Chest: Breath sounds normal. No stridor. No respiratory distress. She has no wheezes. She has no rhonchi. She has no rales.   Abdominal: Abdomen is soft. She exhibits no distension. There is no abdominal tenderness. There is no rebound and no guarding.   Musculoskeletal:         General: No tenderness or edema. Normal range of motion.      Cervical back: Neck supple.     Neurological: She is alert and oriented to person, place, and time. She has normal strength. No cranial  nerve deficit or sensory deficit. GCS score is 15. GCS eye subscore is 4. GCS verbal subscore is 5. GCS motor subscore is 6.   Skin: Skin is warm and dry. Capillary refill takes less than 2 seconds. No rash noted. No erythema. No pallor.   Psychiatric: She has a normal mood and affect. Thought content normal.         ED Course   Procedures  Labs Reviewed   CBC W/ AUTO DIFFERENTIAL - Abnormal; Notable for the following components:       Result Value    Lymph # 0.8 (*)     Gran % 82.6 (*)     Lymph % 9.4 (*)     All other components within normal limits   COMPREHENSIVE METABOLIC PANEL - Abnormal; Notable for the following components:    Glucose 141 (*)     All other components within normal limits   B-TYPE NATRIURETIC PEPTIDE - Abnormal; Notable for the following components:     (*)     All other components within normal limits   URINALYSIS, REFLEX TO URINE CULTURE - Abnormal; Notable for the following components:    Leukocytes, UA 2+ (*)     All other components within normal limits    Narrative:     Specimen Source->Urine   URINALYSIS MICROSCOPIC - Abnormal; Notable for the following components:    Hyaline Casts, UA 2 (*)     All other components within normal limits    Narrative:     Specimen Source->Urine   MAGNESIUM   TROPONIN I HIGH SENSITIVITY   LIPASE          Results for orders placed or performed during the hospital encounter of 02/09/24   CBC auto differential   Result Value Ref Range    WBC 8.73 3.90 - 12.70 K/uL    RBC 4.27 4.00 - 5.40 M/uL    Hemoglobin 12.9 12.0 - 16.0 g/dL    Hematocrit 39.8 37.0 - 48.5 %    MCV 93 82 - 98 fL    MCH 30.2 27.0 - 31.0 pg    MCHC 32.4 32.0 - 36.0 g/dL    RDW 13.3 11.5 - 14.5 %    Platelets 278 150 - 450 K/uL    MPV 10.0 9.2 - 12.9 fL    Immature Granulocytes 0.3 0.0 - 0.5 %    Gran # (ANC) 7.2 1.8 - 7.7 K/uL    Immature Grans (Abs) 0.03 0.00 - 0.04 K/uL    Lymph # 0.8 (L) 1.0 - 4.8 K/uL    Mono # 0.6 0.3 - 1.0 K/uL    Eos # 0.0 0.0 - 0.5 K/uL    Baso # 0.08 0.00 -  0.20 K/uL    nRBC 0 0 /100 WBC    Gran % 82.6 (H) 38.0 - 73.0 %    Lymph % 9.4 (L) 18.0 - 48.0 %    Mono % 6.5 4.0 - 15.0 %    Eosinophil % 0.3 0.0 - 8.0 %    Basophil % 0.9 0.0 - 1.9 %    Differential Method Automated    Comprehensive metabolic panel   Result Value Ref Range    Sodium 137 136 - 145 mmol/L    Potassium 4.3 3.5 - 5.1 mmol/L    Chloride 103 95 - 110 mmol/L    CO2 25 23 - 29 mmol/L    Glucose 141 (H) 70 - 110 mg/dL    BUN 14 10 - 30 mg/dL    Creatinine 0.8 0.5 - 1.4 mg/dL    Calcium 10.0 8.7 - 10.5 mg/dL    Total Protein 7.9 6.0 - 8.4 g/dL    Albumin 4.1 3.5 - 5.2 g/dL    Total Bilirubin 0.7 0.1 - 1.0 mg/dL    Alkaline Phosphatase 81 55 - 135 U/L    AST 19 10 - 40 U/L    ALT 10 10 - 44 U/L    eGFR >60.0 >60 mL/min/1.73 m^2    Anion Gap 9 8 - 16 mmol/L   Brain natriuretic peptide   Result Value Ref Range     (H) 0 - 99 pg/mL   Magnesium   Result Value Ref Range    Magnesium 1.8 1.6 - 2.6 mg/dL   Troponin I High Sensitivity   Result Value Ref Range    Troponin I High Sensitivity 13.4 0.0 - 14.9 pg/mL   Lipase   Result Value Ref Range    Lipase 13 4 - 60 U/L   Urinalysis, Reflex to Urine Culture Urine, Clean Catch    Specimen: Urine   Result Value Ref Range    Specimen UA Urine, Clean Catch     Color, UA Yellow Yellow, Straw, Kary    Appearance, UA Clear Clear    pH, UA 6.0 5.0 - 8.0    Specific Gravity, UA 1.010 1.005 - 1.030    Protein, UA Negative Negative    Glucose, UA Negative Negative    Ketones, UA Negative Negative    Bilirubin (UA) Negative Negative    Occult Blood UA Negative Negative    Nitrite, UA Negative Negative    Urobilinogen, UA Negative Negative EU/dL    Leukocytes, UA 2+ (A) Negative   Urinalysis Microscopic   Result Value Ref Range    RBC, UA 2 0 - 4 /hpf    WBC, UA 3 0 - 5 /hpf    Bacteria Negative None-Occ /hpf    Squam Epithel, UA 1 /hpf    Hyaline Casts, UA 2 (A) 0-1/lpf /lpf    Microscopic Comment SEE COMMENT        Imaging Results              X-Ray Chest 1 View  (Preliminary result)  Result time 02/09/24 20:09:40      Wet Read by Shaun Monzon DO (02/09/24 20:09:40, [facility] - Emergency Dept, Emergency Medicine)    Rotated film, no obvious acute abnormality.                                     Medications   ondansetron injection 4 mg (4 mg Intravenous Given 2/9/24 2021)     Medical Decision Making  Differential includes but not limited to COVID, dehydration, vomiting, diarrhea, electrolyte abnormality, urinary tract infection, ACS, less suspicious for any abdominal surgical emergency such as mesenteric ischemia, appendicitis, obstruction.    Emergent evaluation 89-year-old female presents emergency department with mentioned complaints of vomiting and COVID concerns.  Patient is very well-appearing and says that she is ready to go home when I initially evaluated her.  She was diagnosed with COVID and her family's concern as she has had 2 episodes of vomiting today.  Patient denies any abdominal pain.  Patient has a soft nontender abdomen.  Labs emergency department or unremarkable.  Patient is feeling better after Zofran and tolerating p.o. fluids.  No further vomiting.  Patient ambulated in the ER with a normal pulse ox.  She felt good.  She was not short of breath with ambulation.  She will be prescribed Paxlovid.  She knows she needs to stop several of her medications on Paxlovid.  She will be discharged home to follow up with primary care provider.  She will return if symptoms change or worsen.    A dictation software program was used for this note.  Please expect some simple typographical  errors in this note.     I had a detailed discussion with the patient and/or guardian regarding: The historical points, exam findings, and diagnostic results supporting the discharge diagnosis, lab results, pertinent radiology results, and the need for outpatient follow-up, for definitive care with a family practitioner and to return to the emergency department if  symptoms worsen or persist or if there are any questions or concerns that arise at home. All questions have been answered in detail. Strict return to Emergency Department precautions have been provided      Amount and/or Complexity of Data Reviewed  External Data Reviewed: labs and notes.  Labs: ordered. Decision-making details documented in ED Course.  Radiology: ordered and independent interpretation performed.  ECG/medicine tests: ordered and independent interpretation performed. Decision-making details documented in ED Course.    Risk  Prescription drug management.               ED Course as of 02/09/24 2345 Fri Feb 09, 2024 2115 EKG 6:58 p.m. normal sinus rhythm rate of 83.  Nonspecific T-wave changes.  First-degree AV block.  No ST elevation or depression.  No STEMI.  EKG interpreted independently by me.   [JR]   2115 Patient ambulated in the hallway and says she did not feel short of breath.  She did not desat.  She is feeling better. [JR]      ED Course User Index  [JR] Shaun Monzon DO                           Clinical Impression:  Final diagnoses:  [R11.10] Vomiting  [R05.9] Cough  [U07.1] COVID-19 virus infection (Primary)          ED Disposition Condition    Discharge Stable          ED Prescriptions       Medication Sig Dispense Start Date End Date Auth. Provider    ondansetron (ZOFRAN) 4 MG tablet Take 1 tablet (4 mg total) by mouth every 8 (eight) hours as needed. 15 tablet 2/9/2024 -- Shaun Monzon DO    nirmatrelvir-ritonavir (PAXLOVID) 300 mg (150 mg x 2)-100 mg copackaged tablets (EUA) Take 3 tablets by mouth 2 (two) times daily. Each dose contains 2 nirmatrelvir (pink tablets) and 1 ritonavir (white tablet). Take all 3 tablets together 30 tablet 2/9/2024 -- Shaun Monzon DO          Follow-up Information       Follow up With Specialties Details Why Contact Info Additional Information    William Olivia MD Family Medicine In 3 days  [address] [facility] [address] [phone]        [facility] - Emergency Dept Emergency Medicine  If symptoms worsen [address]  [facility] [address]  [phone] 1st floor             Shaun Monzon DO  02/09/24 9103

## 2024-02-12 ENCOUNTER — PATIENT MESSAGE (OUTPATIENT)
Dept: FAMILY MEDICINE | Facility: CLINIC | Age: 89
End: 2024-02-12
Payer: MEDICARE

## 2024-02-15 LAB
OHS QRS DURATION: 84 MS
OHS QTC CALCULATION: 462 MS

## 2024-02-20 ENCOUNTER — CARE AT HOME (OUTPATIENT)
Dept: HOME HEALTH SERVICES | Facility: CLINIC | Age: 89
End: 2024-02-20
Payer: MEDICARE

## 2024-02-20 VITALS
TEMPERATURE: 99 F | WEIGHT: 178 LBS | HEART RATE: 74 BPM | RESPIRATION RATE: 16 BRPM | BODY MASS INDEX: 29.62 KG/M2 | SYSTOLIC BLOOD PRESSURE: 120 MMHG | OXYGEN SATURATION: 98 % | DIASTOLIC BLOOD PRESSURE: 68 MMHG

## 2024-02-20 DIAGNOSIS — F01.518 VASCULAR DEMENTIA WITH BEHAVIOR DISTURBANCE: ICD-10-CM

## 2024-02-20 DIAGNOSIS — H35.3231 EXUDATIVE AGE-RELATED MACULAR DEGENERATION, BILATERAL, WITH ACTIVE CHOROIDAL NEOVASCULARIZATION: ICD-10-CM

## 2024-02-20 DIAGNOSIS — I25.118 CORONARY ARTERY DISEASE OF NATIVE ARTERY OF NATIVE HEART WITH STABLE ANGINA PECTORIS: ICD-10-CM

## 2024-02-20 DIAGNOSIS — I50.9 CONGESTIVE HEART FAILURE, UNSPECIFIED HF CHRONICITY, UNSPECIFIED HEART FAILURE TYPE: Primary | ICD-10-CM

## 2024-02-20 DIAGNOSIS — Z86.16 HISTORY OF COVID-19: ICD-10-CM

## 2024-02-20 DIAGNOSIS — I70.0 ATHEROSCLEROSIS OF AORTA: ICD-10-CM

## 2024-02-20 PROCEDURE — 1101F PT FALLS ASSESS-DOCD LE1/YR: CPT | Mod: CPTII,S$GLB,, | Performed by: NURSE PRACTITIONER

## 2024-02-20 PROCEDURE — 1126F AMNT PAIN NOTED NONE PRSNT: CPT | Mod: CPTII,S$GLB,, | Performed by: NURSE PRACTITIONER

## 2024-02-20 PROCEDURE — 1157F ADVNC CARE PLAN IN RCRD: CPT | Mod: CPTII,S$GLB,, | Performed by: NURSE PRACTITIONER

## 2024-02-20 PROCEDURE — 3288F FALL RISK ASSESSMENT DOCD: CPT | Mod: CPTII,S$GLB,, | Performed by: NURSE PRACTITIONER

## 2024-02-20 PROCEDURE — 99349 HOME/RES VST EST MOD MDM 40: CPT | Mod: S$GLB,,, | Performed by: NURSE PRACTITIONER

## 2024-02-20 PROCEDURE — 1159F MED LIST DOCD IN RCRD: CPT | Mod: CPTII,S$GLB,, | Performed by: NURSE PRACTITIONER

## 2024-02-20 PROCEDURE — 1160F RVW MEDS BY RX/DR IN RCRD: CPT | Mod: CPTII,S$GLB,, | Performed by: NURSE PRACTITIONER

## 2024-02-20 NOTE — PROGRESS NOTES
"[facility]  Medical Chronic Care Home Visit    Encounter Provider: Lesley Tamez   PCP: William Olivia MD  Consult Requested By: No ref. provider found    HISTORY OF PRESENT ILLNESS      Patient ID: Glenda Gimenez is a 89 y.o. female is being seen in the home due to physical debility that presents a taxing effort to leave the home, to mitigate high risk of hospital readmission and/or due to the limited availability of reliable or safe options for transportation to the point of access to the provider. Prior to treatment on this visit the chart was reviewed and patient verbal consent was obtained.    Chronic medical conditions synopsis:  Mercedes is a 89 year old female with a PMHX of hypothyroidism, hypertension, primary hyperparathyroidism, COPD, chronic diastolic CHF, obesity, dementia and sepsis pneumonia in 2020.      Greeted by patient's son, Shola and daughter in law Hernandez for visit today. Patient is found sitting on sofa, alert and oriented to person and place with forgetfulness. She is not anxious and is cooperative with exam today. Shola reports he and patient tested positive for COVID recently. He brought patient to ER 2/9/24 because she had a cough and was fearful she had pneumonia. She did not have pneumonia. Paxlovid was prescribed but patient did not take it because "she reacted strangely to that when she had COVID before". Her "wet" cough from that time has now changed to a mild, dry, intermittent cough that is improving significantly each day they report. Patient has no complaints today. Eating well. No N/V/D.     Patient ambulates around the home using her rollator. No reports of wandering or agitation reported. Dementia symptoms are controlled with sertraline, buspar, aricept and seroquel.    VSS. No distress observed.      PCP is William Olivia MD. Plan to follow up q 4-6 weeks and/or as needed to decrease risk and/or frequency of hospitalizations. [facility] " "contact information available in home.         DECISION MAKING TODAY       Assessment & Plan:  1. Congestive heart failure, unspecified HF chronicity, unspecified heart failure type  Assessment & Plan:  Chronic, stable.  Follow low sodium diet.  Monitor.      2. Exudative age-related macular degeneration, bilateral, with active choroidal neovascularization  Assessment & Plan:  Chronic.  Followed by Ophthalmology.      3. Vascular dementia with behavior disturbance  Assessment & Plan:  Stable.  Behavior stable on seroquel, buspar, aricept, sertraline.   Monitor.      4. Coronary artery disease of native artery of native heart with stable angina pectoris  Assessment & Plan:  Stable on Imdur.  Denies any chest pain recently or currently.        5. Atherosclerosis of aorta  Overview:  HCC noted on imaging      6. History of COVID-19  Assessment & Plan:  24 COVID +, respiratory symptoms.  Did not take prescribed Paxlovid "reacted to it in the past".  Stable now.  Mild lingering dry cough, wants no meds for it.              ENVIRONMENT OF CARE      Family and/or Caregiver present at visit?  Yes  Name of Caregiver: son Shola and daughter in law Mary  History provided by: caregiver    4Ms for Medical Decision-Making in Older Adults    Last Completed EAWV: 2023    MOBILITY:  Get Up and Go:      2023    10:41 AM   Get Up and Go   Trial 1 10 seconds     Activities of Daily Livin/4/2023    10:47 AM   Activities of Daily Living   Ambulation Assistance Required   Ambulation Assistance Walker (wheeled)   Dressing Independent   Transfers Independent   Toileting Continent of bladder;Continent of bowel   Feeding Independent   Cleaning home/Chores Assistance Required   Telephone use Assistance Required   Shopping Assistance Required   Paying bills Assistance Required   Taking meds Assistance Required     Whisper Test:       No data to display              Disability Status:      2023    10:49 AM   Disability " Status   Are you deaf or do you have serious difficulty hearing? N   Are you blind or do you have serious difficulty seeing, even when wearing glasses? N   Because of a physical, mental, or emotional condition, do you have serious difficulty concentrating, remembering, or making decisions? Y   Do you have serious difficulty walking or climbing stairs? Y   Do you have difficulty dressing or bathing? N   Because of a physical, mental, or emotional condition, do you have difficulty doing errands alone such as visiting a doctor's office or shopping? Y     Nutrition Screenin/4/2023    10:41 AM   Nutrition Screening   Has food intake declined over the past three months due to loss of appetite, digestive problems, chewing or swallowing difficulties? No decrease in food intake   Involuntary weight loss during the last 3 months? No weight loss   Mobility? Goes out   Has the patient suffered psychological stress or acute disease in the past three months? No   Body Mass Index (BMI)?  BMI 23 or greater    Screening Score: 0-7 Malnourished, 8-11 At Risk, 12-14 Normal    MENTATION:   Depression Patient Health Questionnaire:      2023    10:39 AM   Depression Patient Health Questionnaire   Over the last two weeks how often have you been bothered by little interest or pleasure in doing things Not at all   Over the last two weeks how often have you been bothered by feeling down, depressed or hopeless Not at all   PHQ-2 Total Score 0     Has Dementia Dx: Yes    Cognitive Function Screening:       No data to display              Cognitive Function Screening Total - Less than 4 = Abnormal,  Greater than or equal to 4 = Normal    MEDICATIONS:  High Risk Medications:  Total Active Medications: 2  QUEtiapine - 50 MG  sertraline - 50 MG    WHAT MATTERS MOST:  Advance Care Planning   ACP Status:   Patient has had an ACP conversation  Living Will: Yes  Power of : Yes  [place]: No    What is most important right now is to  focus on spending time at homeavoiding the hospitalremaining as independent as possiblesymptom/pain controlcomfort and QOL     Accordingly, we have decided that the best plan to meet the patient's goals includes continuing with treatment      What matters most to patient today is: patient unable to state what matters most today           Is patient hospice appropriate: No  (If needed, use PPS <30 or FAST score >7)  Was referral to hospice placed: No    Impression upon entering the home:  Physical Dwelling: single family home   Appearance of home environment: cleaniness: clean, walking pathways: clear, lighting: adequate, and home structure: sound structure  Functional Status: minimal assistance  Mobility: ambulatory with device  Nutritional access: adequate intake and access  Home Health: No, and does not need it at this time   DME/Supplies: rolling walker     Diagnostic tests reviewed/disposition: No diagnosic tests pending after this hospitalization.  Disease/illness education:  dementia, fall precautions/safety, post COVID  Establishment or re-establishment of referral orders for community resources: No other necessary community resources.   Discussion with other health care providers: No discussion with other health care providers necessary.   Does patient have a PCP at [facility]? Yes   Repatriation plan with PCP? follow-up with PCP within 90d   Does patient have an ostomy (ileostomy, colostomy, suprapubic catheter, nephrostomy tube, tracheostomy, PEG tube, pleurex catheter, cholecystostomy, etc)? No  Were BPAs reviewed? Yes    Social History     Socioeconomic History    Marital status:     Number of children: 2   Tobacco Use    Smoking status: Never    Smokeless tobacco: Never   Substance and Sexual Activity    Alcohol use: Yes     Alcohol/week: 2.0 standard drinks of alcohol     Types: 2 Glasses of wine per week     Comment: 2 small glasses of red wine with dinner    Drug use: No    Sexual activity: Not  Currently     Social Determinants of Health     Financial Resource Strain: Patient Declined (7/10/2023)    Overall Financial Resource Strain (CARDIA)     Difficulty of Paying Living Expenses: Patient declined   Food Insecurity: Patient Declined (7/10/2023)    Hunger Vital Sign     Worried About Running Out of Food in the Last Year: Patient declined     Ran Out of Food in the Last Year: Patient declined   Transportation Needs: No Transportation Needs (7/10/2023)    PRAPARE - Transportation     Lack of Transportation (Medical): No     Lack of Transportation (Non-Medical): No   Physical Activity: Inactive (7/10/2023)    Exercise Vital Sign     Days of Exercise per Week: 0 days     Minutes of Exercise per Session: 0 min   Stress: Stress Concern Present (7/10/2023)    [employer] [place] [employer] - Occupational Stress Questionnaire     Feeling of Stress : Rather much   Social Connections: Unknown (7/10/2023)    Social Connection and Isolation Panel [NHANES]     Frequency of Communication with Friends and Family: Patient declined     Frequency of Social Gatherings with Friends and Family: Three times a week     Attends Faith Services: Never     Active Member of Clubs or Organizations: Patient declined     Attends Club or Organization Meetings: Patient declined     Marital Status:    Recent Concern: Social Connections - Socially Isolated (5/4/2023)    Social Connection and Isolation Panel [NHANES]     Frequency of Communication with Friends and Family: More than three times a week     Frequency of Social Gatherings with Friends and Family: More than three times a week     Attends Faith Services: Never     Active Member of Clubs or Organizations: No     Attends Club or Organization Meetings: Never     Marital Status:    Housing Stability: Unknown (7/10/2023)    Housing Stability Vital Sign     Unable to Pay for Housing in the Last Year: Patient refused     Unstable Housing in the Last Year:  No         OBJECTIVE:     Vital Signs:  Vitals:    02/20/24 1100   BP: 120/68   Pulse: 74   Resp: 16   Temp: 98.6 °F (37 °C)       Review of Systems   Constitutional:  Negative for activity change, appetite change and fever.   Respiratory:  Negative for chest tightness, shortness of breath and wheezing. Positive for intermittent dry cough over last 2 weeks.    Cardiovascular:  Negative for chest pain, palpitations and leg swelling.   Gastrointestinal:  Negative for abdominal pain, constipation and diarrhea.   Genitourinary:  Negative for difficulty urinating and hematuria.   Musculoskeletal:  Positive for gait problem. Negative for joint swelling.   Skin:  Negative for color change and wound.   Neurological:  Positive for weakness. Negative for tremors, seizures and numbness.   Psychiatric/Behavioral:  Negative for confusion.      Physical Exam:  Physical Exam  Constitutional:       Appearance: Normal appearance.   HENT:      Head: Normocephalic and atraumatic.   Cardiovascular:      Rate and Rhythm: Normal rate and regular rhythm.      Pulses: Normal pulses.      Heart sounds: Normal heart sounds.   Pulmonary:      Effort: Pulmonary effort is normal.      Breath sounds: Normal breath sounds.   Occasional dry cough noted.  Abdominal:      General: Bowel sounds are normal.      Palpations: Abdomen is soft.   Musculoskeletal:      Cervical back: Normal range of motion and neck supple.      Comments: Ambulatory with rollator   Skin:     General: Skin is warm and dry.   Neurological:      General: No focal deficit present.      Mental Status: She is alert and oriented to person, place, and time.      Comments: Confused at times   Psychiatric:         Mood and Affect: Mood normal.         Behavior: Behavior normal.     INSTRUCTIONS FOR PATIENT:     Scheduled Follow-up, Appts Reviewed with Modifications if Needed: Yes  Future Appointments   Date Time Provider Department Center   5/20/2024  9:30 AM ERIC OROZCO  [place]   5/30/2024 10:30 AM William Olivia MD [facility] [place]         Current Outpatient Medications:     acetaminophen (TYLENOL) 325 MG tablet, Take 325 mg by mouth every 6 (six) hours as needed for Pain., Disp: , Rfl:     aspirin (ECOTRIN) 81 MG EC tablet, Take 81 mg by mouth once daily., Disp: , Rfl:     busPIRone (BUSPAR) 10 MG tablet, Take 1 tablet (10 mg total) by mouth 3 (three) times daily., Disp: 270 tablet, Rfl: 4    donepeziL (ARICEPT) 5 MG tablet, TAKE 1 TABLET BY MOUTH EVERY EVENING, Disp: 90 tablet, Rfl: 3    famotidine (PEPCID) 20 MG tablet, Take 1 tablet (20 mg total) by mouth 2 (two) times daily., Disp: 180 tablet, Rfl: 3    fluticasone propionate (FLONASE) 50 mcg/actuation nasal spray, 1 spray (50 mcg total) by Each Nostril route twice a week., Disp: 16 g, Rfl: 0    furosemide (LASIX) 20 MG tablet, Take 1 tablet (20 mg total) by mouth once daily., Disp: 90 tablet, Rfl: 2    isosorbide mononitrate (IMDUR) 30 MG 24 hr tablet, Take 30 mg by mouth., Disp: , Rfl:     krill oil 500 mg Cap, Take by mouth., Disp: , Rfl:     levothyroxine (SYNTHROID) 88 MCG tablet, Take 1 tablet (88 mcg total) by mouth once daily., Disp: 90 tablet, Rfl: 3    olmesartan (BENICAR) 5 MG Tab, Take 1 tablet (5 mg total) by mouth once daily., Disp: 90 tablet, Rfl: 3    ondansetron (ZOFRAN) 4 MG tablet, Take 1 tablet (4 mg total) by mouth every 8 (eight) hours as needed., Disp: 15 tablet, Rfl: 0    peg 400-propylene glycol, PF, (SYSTANE HYDRATION PF) 0.4-0.3 % Drop, 1 drops DAILY (route: ophthalmic (eye)), Disp: , Rfl:     potassium chloride SA (K-DUR,KLOR-CON) 20 MEQ tablet, TAKE 1 TABLET(20 MEQ) BY MOUTH EVERY DAY, Disp: 90 tablet, Rfl: 3    QUEtiapine (SEROQUEL) 50 MG tablet, TAKE 1 TABLET(50 MG) BY MOUTH EVERY EVENING, Disp: 30 tablet, Rfl: 11    sertraline (ZOLOFT) 50 MG tablet, Take 1 tablet (50 mg total) by mouth once daily., Disp: 90 tablet, Rfl: 4    vit A/vit C/vit E/zinc/copper (ICAPS AREDS ORAL), 1 capsule 2  TIMES DAILY (route: oral), Disp: , Rfl:     vitamin D (VITAMIN D3) 1000 units Tab, Take 1,000 Units by mouth once daily. Patient is taking 2000 units per day, Disp: , Rfl:     ibandronate (BONIVA) 150 mg tablet, TAKE 1 TABLET(150 MG) BY MOUTH EVERY 30 DAYS (Patient not taking: Reported on 2/20/2024), Disp: 3 tablet, Rfl: 3    nirmatrelvir-ritonavir (PAXLOVID) 300 mg (150 mg x 2)-100 mg copackaged tablets (EUA), Take 3 tablets by mouth 2 (two) times daily. Each dose contains 2 nirmatrelvir (pink tablets) and 1 ritonavir (white tablet). Take all 3 tablets together (Patient not taking: Reported on 2/20/2024), Disp: 30 tablet, Rfl: 0    nitroGLYCERIN (NITROSTAT) 0.4 MG SL tablet, Place 1 tablet (0.4 mg total) under the tongue every 5 (five) minutes as needed for Chest pain. (Patient not taking: Reported on 2/20/2024), Disp: 25 tablet, Rfl: 3    Medication Reconciliation:  Were medications changed during this appointment? No  Were medications in the home? Yes  Is the patient taking the medications as directed? Yes  Does the patient/caregiver understand the medications and changes? Yes  Does updated med list accurately reflects meds patient is currently taking? Yes    Signature: Lesley Tamez NP

## 2024-02-21 NOTE — ASSESSMENT & PLAN NOTE
"2/9/24 COVID +, respiratory symptoms.  Did not take prescribed Paxlovid "reacted to it in the past".  Stable now.  Mild lingering dry cough, wants no meds for it.   "

## 2024-04-18 ENCOUNTER — TELEPHONE (OUTPATIENT)
Dept: PSYCHIATRY | Facility: CLINIC | Age: 89
End: 2024-04-18
Payer: MEDICARE

## 2024-04-18 NOTE — TELEPHONE ENCOUNTER
Spoke to patient's son regarding referral on the wait list. Offered to schedule a new patient appointment for eval and med management. Son states he does not know if his mom wants to schedule. He will discuss it with her and call back.

## 2024-04-23 ENCOUNTER — CARE AT HOME (OUTPATIENT)
Dept: HOME HEALTH SERVICES | Facility: CLINIC | Age: 89
End: 2024-04-23
Payer: MEDICARE

## 2024-04-23 DIAGNOSIS — R54 ADVANCED AGE: ICD-10-CM

## 2024-04-23 DIAGNOSIS — I10 ESSENTIAL HYPERTENSION: ICD-10-CM

## 2024-04-23 DIAGNOSIS — F01.518 VASCULAR DEMENTIA WITH BEHAVIOR DISTURBANCE: Primary | ICD-10-CM

## 2024-04-23 PROCEDURE — 1157F ADVNC CARE PLAN IN RCRD: CPT | Mod: CPTII,S$GLB,, | Performed by: NURSE PRACTITIONER

## 2024-04-23 PROCEDURE — 1159F MED LIST DOCD IN RCRD: CPT | Mod: CPTII,S$GLB,, | Performed by: NURSE PRACTITIONER

## 2024-04-23 PROCEDURE — 99349 HOME/RES VST EST MOD MDM 40: CPT | Mod: S$GLB,,, | Performed by: NURSE PRACTITIONER

## 2024-04-23 PROCEDURE — 3288F FALL RISK ASSESSMENT DOCD: CPT | Mod: CPTII,S$GLB,, | Performed by: NURSE PRACTITIONER

## 2024-04-23 PROCEDURE — 1101F PT FALLS ASSESS-DOCD LE1/YR: CPT | Mod: CPTII,S$GLB,, | Performed by: NURSE PRACTITIONER

## 2024-04-23 PROCEDURE — 1126F AMNT PAIN NOTED NONE PRSNT: CPT | Mod: CPTII,S$GLB,, | Performed by: NURSE PRACTITIONER

## 2024-04-23 PROCEDURE — 1160F RVW MEDS BY RX/DR IN RCRD: CPT | Mod: CPTII,S$GLB,, | Performed by: NURSE PRACTITIONER

## 2024-04-24 VITALS
OXYGEN SATURATION: 99 % | HEART RATE: 70 BPM | DIASTOLIC BLOOD PRESSURE: 68 MMHG | RESPIRATION RATE: 16 BRPM | TEMPERATURE: 99 F | SYSTOLIC BLOOD PRESSURE: 128 MMHG

## 2024-04-24 NOTE — ASSESSMENT & PLAN NOTE
Chronic, stable.  Still functioning with some independence.  Behavior stable on seroquel, buspar, aricept, sertraline.   Continue current regimen.  Monitor.

## 2024-04-24 NOTE — PATIENT INSTRUCTIONS
Instructions:  - Continue all medications, treatments and therapies as ordered.  - Follow all instructions, recommendations as discussed.  - Maintain Safety Precautions at all times.  - Attend all medical appointments as scheduled.  - For worsening symptoms: call Primary Care Physician or Nurse Practitioner.  - For emergencies, call 911 or immediately report to the nearest emergency room.

## 2024-05-16 DIAGNOSIS — F01.518 VASCULAR DEMENTIA WITH BEHAVIOR DISTURBANCE: ICD-10-CM

## 2024-05-16 NOTE — TELEPHONE ENCOUNTER
No care due was identified.  [facility] Embedded Care Due Messages. Reference number: [id number].   5/16/2024 12:17:38 AM CDT

## 2024-05-17 RX ORDER — QUETIAPINE FUMARATE 50 MG/1
50 TABLET, FILM COATED ORAL NIGHTLY
Qty: 90 TABLET | Refills: 1 | Status: SHIPPED | OUTPATIENT
Start: 2024-05-17

## 2024-05-20 ENCOUNTER — LAB VISIT (OUTPATIENT)
Dept: LAB | Facility: HOSPITAL | Age: 89
End: 2024-05-20
Payer: MEDICARE

## 2024-05-20 DIAGNOSIS — R79.89 HIGH SERUM PARATHYROID HORMONE (PTH): ICD-10-CM

## 2024-05-20 DIAGNOSIS — E03.9 HYPOTHYROIDISM, UNSPECIFIED TYPE: ICD-10-CM

## 2024-05-20 DIAGNOSIS — D64.9 ANEMIA, UNSPECIFIED TYPE: ICD-10-CM

## 2024-05-20 DIAGNOSIS — E55.9 VITAMIN D DEFICIENCY: ICD-10-CM

## 2024-05-20 DIAGNOSIS — I10 ESSENTIAL HYPERTENSION: ICD-10-CM

## 2024-05-20 LAB
ANION GAP SERPL CALC-SCNC: 6 MMOL/L (ref 8–16)
BASOPHILS # BLD AUTO: 0.07 K/UL (ref 0–0.2)
BASOPHILS NFR BLD: 1.1 % (ref 0–1.9)
BUN SERPL-MCNC: 28 MG/DL (ref 10–30)
CALCIUM SERPL-MCNC: 10 MG/DL (ref 8.7–10.5)
CHLORIDE SERPL-SCNC: 104 MMOL/L (ref 95–110)
CO2 SERPL-SCNC: 26 MMOL/L (ref 23–29)
CREAT SERPL-MCNC: 1 MG/DL (ref 0.5–1.4)
DIFFERENTIAL METHOD BLD: ABNORMAL
EOSINOPHIL # BLD AUTO: 0.4 K/UL (ref 0–0.5)
EOSINOPHIL NFR BLD: 6.7 % (ref 0–8)
ERYTHROCYTE [DISTWIDTH] IN BLOOD BY AUTOMATED COUNT: 14.2 % (ref 11.5–14.5)
EST. GFR  (NO RACE VARIABLE): 51.2 ML/MIN/1.73 M^2
GLUCOSE SERPL-MCNC: 93 MG/DL (ref 70–110)
HCT VFR BLD AUTO: 35.4 % (ref 37–48.5)
HGB BLD-MCNC: 11.5 G/DL (ref 12–16)
IMM GRANULOCYTES # BLD AUTO: 0.01 K/UL (ref 0–0.04)
IMM GRANULOCYTES NFR BLD AUTO: 0.2 % (ref 0–0.5)
LYMPHOCYTES # BLD AUTO: 2.8 K/UL (ref 1–4.8)
LYMPHOCYTES NFR BLD: 44.5 % (ref 18–48)
MCH RBC QN AUTO: 30.8 PG (ref 27–31)
MCHC RBC AUTO-ENTMCNC: 32.5 G/DL (ref 32–36)
MCV RBC AUTO: 95 FL (ref 82–98)
MONOCYTES # BLD AUTO: 0.6 K/UL (ref 0.3–1)
MONOCYTES NFR BLD: 9.4 % (ref 4–15)
NEUTROPHILS # BLD AUTO: 2.4 K/UL (ref 1.8–7.7)
NEUTROPHILS NFR BLD: 38.1 % (ref 38–73)
NRBC BLD-RTO: 0 /100 WBC
PLATELET # BLD AUTO: 256 K/UL (ref 150–450)
PMV BLD AUTO: 10.9 FL (ref 9.2–12.9)
POTASSIUM SERPL-SCNC: 4.1 MMOL/L (ref 3.5–5.1)
PTH-INTACT SERPL-MCNC: 187.5 PG/ML (ref 9–77)
RBC # BLD AUTO: 3.73 M/UL (ref 4–5.4)
SODIUM SERPL-SCNC: 136 MMOL/L (ref 136–145)
TSH SERPL DL<=0.005 MIU/L-ACNC: 3.64 UIU/ML (ref 0.4–4)
WBC # BLD AUTO: 6.27 K/UL (ref 3.9–12.7)

## 2024-05-20 PROCEDURE — 85025 COMPLETE CBC W/AUTO DIFF WBC: CPT | Mod: HCNC

## 2024-05-20 PROCEDURE — 80048 BASIC METABOLIC PNL TOTAL CA: CPT | Mod: HCNC

## 2024-05-20 PROCEDURE — 83970 ASSAY OF PARATHORMONE: CPT | Mod: HCNC

## 2024-05-20 PROCEDURE — 82652 VIT D 1 25-DIHYDROXY: CPT | Mod: HCNC

## 2024-05-20 PROCEDURE — 84443 ASSAY THYROID STIM HORMONE: CPT | Mod: HCNC

## 2024-05-24 LAB — 1,25(OH)2D3 SERPL-MCNC: 40 PG/ML (ref 20–79)

## 2024-05-30 ENCOUNTER — OFFICE VISIT (OUTPATIENT)
Dept: FAMILY MEDICINE | Facility: CLINIC | Age: 89
End: 2024-05-30
Payer: MEDICARE

## 2024-05-30 VITALS
DIASTOLIC BLOOD PRESSURE: 58 MMHG | OXYGEN SATURATION: 95 % | HEIGHT: 65 IN | HEART RATE: 75 BPM | BODY MASS INDEX: 29.53 KG/M2 | WEIGHT: 177.25 LBS | RESPIRATION RATE: 18 BRPM | SYSTOLIC BLOOD PRESSURE: 102 MMHG

## 2024-05-30 DIAGNOSIS — I10 ESSENTIAL HYPERTENSION: Primary | ICD-10-CM

## 2024-05-30 DIAGNOSIS — E03.9 ACQUIRED HYPOTHYROIDISM: ICD-10-CM

## 2024-05-30 DIAGNOSIS — F33.42 RECURRENT MAJOR DEPRESSIVE DISORDER, IN FULL REMISSION: ICD-10-CM

## 2024-05-30 DIAGNOSIS — E21.0 PRIMARY HYPERPARATHYROIDISM: ICD-10-CM

## 2024-05-30 PROCEDURE — 1159F MED LIST DOCD IN RCRD: CPT | Mod: HCNC,CPTII,S$GLB, | Performed by: STUDENT IN AN ORGANIZED HEALTH CARE EDUCATION/TRAINING PROGRAM

## 2024-05-30 PROCEDURE — G2211 COMPLEX E/M VISIT ADD ON: HCPCS | Mod: HCNC,S$GLB,, | Performed by: STUDENT IN AN ORGANIZED HEALTH CARE EDUCATION/TRAINING PROGRAM

## 2024-05-30 PROCEDURE — 3288F FALL RISK ASSESSMENT DOCD: CPT | Mod: HCNC,CPTII,S$GLB, | Performed by: STUDENT IN AN ORGANIZED HEALTH CARE EDUCATION/TRAINING PROGRAM

## 2024-05-30 PROCEDURE — 99214 OFFICE O/P EST MOD 30 MIN: CPT | Mod: HCNC,S$GLB,, | Performed by: STUDENT IN AN ORGANIZED HEALTH CARE EDUCATION/TRAINING PROGRAM

## 2024-05-30 PROCEDURE — 1126F AMNT PAIN NOTED NONE PRSNT: CPT | Mod: HCNC,CPTII,S$GLB, | Performed by: STUDENT IN AN ORGANIZED HEALTH CARE EDUCATION/TRAINING PROGRAM

## 2024-05-30 PROCEDURE — 1101F PT FALLS ASSESS-DOCD LE1/YR: CPT | Mod: HCNC,CPTII,S$GLB, | Performed by: STUDENT IN AN ORGANIZED HEALTH CARE EDUCATION/TRAINING PROGRAM

## 2024-05-30 PROCEDURE — 99999 PR PBB SHADOW E&M-EST. PATIENT-LVL V: CPT | Mod: PBBFAC,HCNC,, | Performed by: STUDENT IN AN ORGANIZED HEALTH CARE EDUCATION/TRAINING PROGRAM

## 2024-05-30 PROCEDURE — 1157F ADVNC CARE PLAN IN RCRD: CPT | Mod: HCNC,CPTII,S$GLB, | Performed by: STUDENT IN AN ORGANIZED HEALTH CARE EDUCATION/TRAINING PROGRAM

## 2024-05-30 PROCEDURE — 1160F RVW MEDS BY RX/DR IN RCRD: CPT | Mod: HCNC,CPTII,S$GLB, | Performed by: STUDENT IN AN ORGANIZED HEALTH CARE EDUCATION/TRAINING PROGRAM

## 2024-05-30 NOTE — PROGRESS NOTES
"[facility] MEDICINE CLINIC NOTE    Patient Name: Glenda Gimenez  YOB: 1927    PRESENTING HISTORY     History of Present Illness:  Ms. Glenda Gimenez is a 89 y.o. female here for routine f/u.     She is doing very well and has no complaints.     Since dropping isosorbide dizziness is resolved. No recurrence.     Hypothyroidism with TSH at target.     She has been on bisphos since at least 2015. Last DEXA with osteopenia. Will give her drug holiday starting now.     Hyperparathyroidism in setting of osteoporosis- in light of her clinical status I am going to recommend no intervention.     He depression/apathy is good/resolved. She is sleeping well.     ROS      OBJECTIVE:   Vital Signs:  Vitals:    05/30/24 1030   BP: (!) 102/58   Pulse: 75   Resp: 18   SpO2: 95%   Weight: 80.4 kg (177 lb 4 oz)   Height: 5' 5" (1.651 m)          Physical Exam: Normal, no change.     Physical Exam    ASSESSMENT & PLAN:     Essential hypertension  -     BASIC METABOLIC PANEL; Future; Expected date: 05/30/2024  -     CBC W/ AUTO DIFFERENTIAL; Future; Expected date: 05/30/2024  Stable, continue current medications    Acquired hypothyroidism  -     BASIC METABOLIC PANEL; Future; Expected date: 05/30/2024  -     CBC W/ AUTO DIFFERENTIAL; Future; Expected date: 05/30/2024  Stable, continue current medications    Recurrent major depressive disorder, in full remission  Stable, continue current medications    Primary hyperparathyroidism  See discussion above.        This is the best she has looked to me in a while.   Will drop bisphos and leave all other meds alone. F/u in 6 months.       William Olivia  Internal Medicine    Visit complexity inherent to evaluation and management associated with medical care services that serve as the continuing focal point for all needed health care services and/or with medical care services that are part of ongoing care related to a patient's single, serious condition or a complex " condition provided today

## 2024-06-05 RX ORDER — ISOSORBIDE MONONITRATE 30 MG/1
30 TABLET, EXTENDED RELEASE ORAL
Qty: 90 TABLET | Refills: 2 | OUTPATIENT
Start: 2024-06-05

## 2024-06-05 NOTE — TELEPHONE ENCOUNTER
Refill Routing Note   Medication(s) are not appropriate for processing by [facility] for the following reason(s):        No active prescription written by provider    [facility] action(s):  Defer        Medication Therapy Plan: Historical Medication, medication not active.      Appointments  past 12m or future 3m with PCP    Date Provider   Last Visit   5/30/2024 William Olivia MD   Next Visit   Visit date not found William Olivia MD   ED visits in past 90 days: 0        Note composed:11:45 AM 06/05/2024

## 2024-06-05 NOTE — TELEPHONE ENCOUNTER
No care due was identified.  [facility] Embedded Care Due Messages. Reference number: [id number].   6/05/2024 9:37:11 AM CDT

## 2024-06-19 NOTE — TELEPHONE ENCOUNTER
Refill Routing Note   Medication(s) are not appropriate for processing by [facility] for the following reason(s):        No active prescription written by provider    [facility] action(s):  Defer               Appointments  past 12m or future 3m with PCP    Date Provider   Last Visit   5/30/2024 William Olivia MD   Next Visit   12/3/2024 William Olivia MD   ED visits in past 90 days: 0        Note composed:4:04 PM 06/19/2024

## 2024-06-19 NOTE — TELEPHONE ENCOUNTER
No care due was identified.  [facility] Embedded Care Due Messages. Reference number: [id number].   6/19/2024 1:29:04 PM CDT

## 2024-06-20 ENCOUNTER — PATIENT MESSAGE (OUTPATIENT)
Dept: FAMILY MEDICINE | Facility: CLINIC | Age: 89
End: 2024-06-20
Payer: MEDICARE

## 2024-06-20 NOTE — ASSESSMENT & PLAN NOTE
Assessment & Plan     Mass of left parotid gland    -Recommendation for biopsy to rule out malignancy    Mass of left side of neck    - CT Soft Tissue Neck w Contrast shows a left parotid mass per radiology report.  Recommendation is patient to get a biopsy. Called patient and reviewed results with patient and discussed recommendations for biopsy.  Called patient's primary care provider, and left a message with the triage nurse.   -Forwarded the results and notes to primary care provider     Addendum  Colleague of Dr. Demetrius Elizalde returned my call and he did acknowledge making sure that the patient will get biopsy done.      Results for orders placed or performed in visit on 06/20/24   CT Soft Tissue Neck w Contrast     Status: Abnormal   Result Value Ref Range    Radiologist flags Left parotid mass (Urgent)     Narrative    CT SOFT TISSUE NECK WITH CONTRAST 6/20/2024 2:11 PM     HISTORY: Nontender mass on the left side of neck and jaw. Mass of left  side of neck.      COMPARISON: No prior similar studies.     TECHNIQUE: Following intravenous administration of nonionic iodinated  contrast medium, thin section axial helical CT images were obtained  from the skull base down to the level of the aortic arch. Coronal and  sagittal reformations were created, reviewed and archived. Dose  reduction techniques were used.    CONTRAST: 90 mL Isovue-370    FINDINGS: There is a 2.7 x 2.2 x 2.8 cm muscle isodense mass in the  superficial left parotid gland. Multiple enlarged left-sided cervical  lymph nodes in level II-IV, largest measuring 3.4 x 3.0 cm in the  level IIa. This lymph node does not have fat plane between the  overlying SCM muscle.    No focal oral cavity, nasopharyngeal, oropharyngeal, hypopharyngeal,  or glottic mucosal space abnormality. Normal tongue base. Salivary  glands are within normal limits.    No suspicious thyroid lesions.    Patent cervical vasculature; no high grade arterial stenosis.    No suspicious  Stable on sertraline.  Denies SI/HI or plans.   Continue to monitor.   osseus lesion. No high grade spinal canal stenosis.    Clear paranasal sinuses and mastoid air cells. No periapical dental  lucency. The imaged skull base, intracranial and orbital structures  are within normal limits.    No suspicious finding in the visualized superior mediastinum/thorax.  Clear lung apices.    Sternotomy wires.      Impression    IMPRESSION:   1. 2.7 x 2.2 x 2.8 cm muscle isodense mass in the superficial left  parotid gland. This may represent a primary salivary gland tumor or  enlarged intraparotid lymph node. Recommend biopsy.  2. Multiple enlarged left-sided cervical lymph nodes in level II-IV,  largest measuring 3.4 x 3.0 cm in the level IIa. The largest lymph  node does not have fat plane between the overlying SCM muscle,  suspicious for invasion. These can be metastatic if the left parotid  mass is a primary malignant salivary gland tumor. Other less likely  differential include lymphoproliferative disease. Recommend biopsy.    Multiple attempts were made to contact the ordering provider without  success.    [Access Center: Left parotid mass]    This report will be copied to the [facility] to ensure a  provider acknowledges the finding. Access Center is available Monday  through Friday 8am-3:30 pm.        ROSINA GROSSMAN MD         SYSTEM ID:  [id number]       Patient Instructions   Follow up with PCP for further instructions    Return for Follow up, PCP.    At the end of the encounter, I discussed results, diagnosis, medications. Discussed red flags for immediate return to clinic/ER, as well as indications for follow up if no improvement. Patient understood and agreed to plan. Patient was stable for discharge.    Max Naylor is a 82 year old male who presents to clinic today for the following health issues:  Chief Complaint   Patient presents with    Mass     Lump on side of right face/ neck - started out small yesterday- increased in size this morning   No pain- slight  discomfort   Warm to touch- no discharge     HPI  Patient reports lump or mass on the left side of the jaw and neck.  He noticed this yesterday.  He reports the mass has been increasing in size.  The mass is nontender.  He denies fever, chills, cold symptoms, cough, ear pain or sore throat.      Review of Systems   HENT:  Positive for facial swelling.        Problem List:  2024-05: Type 2 diabetes mellitus with stage 3b chronic kidney   disease, with long-term current use of insulin (H)  2024-05: Stage 3b chronic kidney disease (H)  2023-07: Acute midline low back pain without sciatica  2023-04: Steatosis of liver  2022-06: Gastroparesis  2022-04: Other cirrhosis of liver (H)  2021-12: Dyspnea  2019-03: Hepatotoxicity  2018-05: Atrial fibrillation, unspecified type (H)  2018-04: Shortness of breath  2018-04: Elevated troponin  2018-04: ACS (acute coronary syndrome) (H)  2017-02: Type 2 diabetes mellitus without complication, with long-  term current use of insulin (H)  2016-08: Hypothyroidism, unspecified type  2016-04: Gastroesophageal reflux disease without esophagitis  2016-04: Atrial flutter (H)  2016-04: Chest pain  2014-06: ACS (acute coronary syndrome) (H)  2014-03: Anemia  2012-12: BRANDON (obstructive sleep apnea)  2012-04: Pulmonary hypertension (H)  2012-04: Health Care Home  2011-09: Vitamin D deficiency  2011-08: Advanced directives, counseling/discussion  2011-05: Hyperlipidemia LDL goal <70  2009-12: Testosterone deficiency  2008-12: Anemia  2008-01: Allergic rhinitis  2003-01: Essential hypertension, benign  Coronary atherosclerosis  Impotence of organic origin  Osteopenia      Past Medical History:   Diagnosis Date    Adhesive capsulitis of shoulder     Anemia 03/10/2014    Anemia, unspecified     Antiplatelet or antithrombotic long-term use     Arrhythmia     Atrial fib/flutter    CAD (coronary artery disease)     Gastroparesis     delayed emptying study May 2022    History of cardioversion 04/24/2018     a single synchronized shock of 120 joules restored normal sinus rhythm    History of cardioversion 2019    single shock , 200 joules successul in restoring NSR    Hyperlipidemia LDL goal <70 2011    Hypertension     Hypothyroidism     Impotence of organic origin     Laceration of finger 2010     left thumb s/p sutures    Numbness and tingling     diabetic neuropathy bilateral feet    BRANDON (obstructive sleep apnea)     intolerant of CPAP, uses it occasionally.  Using mandibular device occasionally    Osteopenia     Pulmonary hypertension (H) 2012    Sensorineural hearing loss, unspecified     Stented coronary artery 1997    CABG     Testosterone deficiency     Type 2 diabetes mellitus without complication  (goal A1C<8) 10/24/2015    Typical atrial flutter (H) 2016    Unspecified hypothyroidism     Vitamin D deficiency 2011       Social History     Tobacco Use    Smoking status: Former     Current packs/day: 0.00     Average packs/day: 1 pack/day for 6.5 years (6.5 ttl pk-yrs)     Types: Cigarettes, Cigars, Pipe     Start date:      Quit date: 1964     Years since quittin.0    Smokeless tobacco: Never   Substance Use Topics    Alcohol use: Not Currently     Comment: couple drinks a year           Objective    BP (!) 150/67 (BP Location: Right arm, Patient Position: Sitting, Cuff Size: Adult Regular)   Pulse 63   Temp 98.2  F (36.8  C) (Oral)   Resp 18   SpO2 96%   Physical Exam  Constitutional:       Appearance: Normal appearance.   HENT:      Head: Normocephalic. Mass present.        Comments: Non-tender mass on the left jaw and neck     Right Ear: Tympanic membrane normal.      Left Ear: Tympanic membrane normal.      Mouth/Throat:      Mouth: Mucous membranes are moist.      Pharynx: Oropharynx is clear. Uvula midline. No posterior oropharyngeal erythema.   Cardiovascular:      Rate and Rhythm: Normal rate and regular rhythm.   Pulmonary:      Effort: Pulmonary  effort is normal.      Breath sounds: Normal breath sounds.   Lymphadenopathy:      Cervical: Cervical adenopathy present.      Left cervical: Superficial cervical adenopathy (anterior) present.   Skin:     General: Skin is warm and dry.      Findings: No rash.   Neurological:      Mental Status: He is alert.   Psychiatric:         Mood and Affect: Mood normal.         Behavior: Behavior normal.              Norah Gomez PA-C

## 2024-06-21 RX ORDER — ISOSORBIDE MONONITRATE 30 MG/1
30 TABLET, EXTENDED RELEASE ORAL
Qty: 90 TABLET | Refills: 3 | Status: SHIPPED | OUTPATIENT
Start: 2024-06-21

## 2024-07-06 NOTE — TELEPHONE ENCOUNTER
No care due was identified.  Health [facility] Embedded Care Due Messages. Reference number: [id number].   7/06/2024 5:45:44 PM CDT

## 2024-07-08 RX ORDER — FUROSEMIDE 20 MG/1
20 TABLET ORAL
Qty: 90 TABLET | Refills: 3 | Status: SHIPPED | OUTPATIENT
Start: 2024-07-08

## 2024-07-08 NOTE — TELEPHONE ENCOUNTER
Refill Decision Note   Glenda Gimenez  is requesting a refill authorization.  Brief Assessment and Rationale for Refill:  Approve     Medication Therapy Plan:         Comments:     Note composed:2:23 AM 07/08/2024

## 2024-07-09 DIAGNOSIS — F41.9 ANXIETY: ICD-10-CM

## 2024-07-09 DIAGNOSIS — I10 HYPERTENSION, UNSPECIFIED TYPE: ICD-10-CM

## 2024-07-09 NOTE — TELEPHONE ENCOUNTER
No care due was identified.  Health [facility] Embedded Care Due Messages. Reference number: [id number].   7/09/2024 6:34:51 PM CDT

## 2024-07-10 RX ORDER — BUSPIRONE HYDROCHLORIDE 10 MG/1
10 TABLET ORAL 3 TIMES DAILY
Qty: 270 TABLET | Refills: 4 | Status: SHIPPED | OUTPATIENT
Start: 2024-07-10

## 2024-07-10 RX ORDER — OLMESARTAN MEDOXOMIL 5 MG/1
5 TABLET ORAL DAILY
Qty: 90 TABLET | Refills: 3 | Status: SHIPPED | OUTPATIENT
Start: 2024-07-10

## 2024-07-18 ENCOUNTER — PATIENT MESSAGE (OUTPATIENT)
Dept: FAMILY MEDICINE | Facility: CLINIC | Age: 89
End: 2024-07-18
Payer: MEDICARE

## 2024-07-18 NOTE — TELEPHONE ENCOUNTER
Contacted pt son via phone. Sts pt is short of breath, but oxygen is normal. Some nausea, no vomiting. Little to no appetite. Offered same day appointment with SUZANNE Noble. Son declined this and sts he will monitor her at home and contact us if symptoms worsen.

## 2024-07-21 ENCOUNTER — PATIENT MESSAGE (OUTPATIENT)
Dept: FAMILY MEDICINE | Facility: CLINIC | Age: 89
End: 2024-07-21
Payer: MEDICARE

## 2024-07-23 ENCOUNTER — HOSPITAL ENCOUNTER (OUTPATIENT)
Dept: RADIOLOGY | Facility: HOSPITAL | Age: 89
Discharge: HOME | End: 2024-07-23
Attending: STUDENT IN AN ORGANIZED HEALTH CARE EDUCATION/TRAINING PROGRAM
Payer: MEDICARE

## 2024-07-23 ENCOUNTER — ANESTHESIA EVENT (OUTPATIENT)
Dept: SURGERY | Facility: HOSPITAL | Age: 89
End: 2024-07-23
Payer: MEDICARE

## 2024-07-23 ENCOUNTER — HOSPITAL ENCOUNTER (INPATIENT)
Facility: HOSPITAL | Age: 89
LOS: 9 days | Discharge: NURSING, REHAB OR LONG-TERM CARE | DRG: 329 | End: 2024-08-01
Attending: EMERGENCY MEDICINE | Admitting: INTERNAL MEDICINE
Payer: MEDICARE

## 2024-07-23 ENCOUNTER — ANESTHESIA (OUTPATIENT)
Dept: SURGERY | Facility: HOSPITAL | Age: 89
End: 2024-07-23
Payer: MEDICARE

## 2024-07-23 ENCOUNTER — TELEPHONE (OUTPATIENT)
Dept: FAMILY MEDICINE | Facility: CLINIC | Age: 89
End: 2024-07-23

## 2024-07-23 ENCOUNTER — OFFICE VISIT (OUTPATIENT)
Dept: FAMILY MEDICINE | Facility: CLINIC | Age: 89
End: 2024-07-23
Payer: MEDICARE

## 2024-07-23 VITALS
SYSTOLIC BLOOD PRESSURE: 100 MMHG | DIASTOLIC BLOOD PRESSURE: 52 MMHG | HEIGHT: 65 IN | BODY MASS INDEX: 29.65 KG/M2 | OXYGEN SATURATION: 94 % | HEART RATE: 89 BPM | RESPIRATION RATE: 18 BRPM | WEIGHT: 177.94 LBS

## 2024-07-23 DIAGNOSIS — K57.92 DIVERTICULITIS: ICD-10-CM

## 2024-07-23 DIAGNOSIS — I48.91 ATRIAL FIBRILLATION, UNSPECIFIED TYPE: ICD-10-CM

## 2024-07-23 DIAGNOSIS — R63.0 DECREASED APPETITE: ICD-10-CM

## 2024-07-23 DIAGNOSIS — K56.609 SMALL BOWEL OBSTRUCTION: ICD-10-CM

## 2024-07-23 DIAGNOSIS — R63.0 DECREASED APPETITE: Primary | ICD-10-CM

## 2024-07-23 DIAGNOSIS — K63.89 PNEUMATOSIS INTESTINALIS: ICD-10-CM

## 2024-07-23 DIAGNOSIS — K56.2 CECAL VOLVULUS: Primary | ICD-10-CM

## 2024-07-23 DIAGNOSIS — R06.00 DYSPNEA: ICD-10-CM

## 2024-07-23 DIAGNOSIS — R07.9 CHEST PAIN: ICD-10-CM

## 2024-07-23 DIAGNOSIS — Z01.818 PREOP EXAMINATION: ICD-10-CM

## 2024-07-23 DIAGNOSIS — Z01.818 PRE-OP EXAM: ICD-10-CM

## 2024-07-23 PROBLEM — K55.9 ISCHEMIA, BOWEL: Status: ACTIVE | Noted: 2024-07-23

## 2024-07-23 LAB
ALBUMIN SERPL BCP-MCNC: 4.1 G/DL (ref 3.5–5.2)
ALP SERPL-CCNC: 79 U/L (ref 55–135)
ALT SERPL W/O P-5'-P-CCNC: 35 U/L (ref 10–44)
ANION GAP SERPL CALC-SCNC: 11 MMOL/L (ref 8–16)
AST SERPL-CCNC: 44 U/L (ref 10–40)
BACTERIA #/AREA URNS HPF: ABNORMAL /HPF
BASOPHILS # BLD AUTO: 0.07 K/UL (ref 0–0.2)
BASOPHILS NFR BLD: 0.8 % (ref 0–1.9)
BILIRUB SERPL-MCNC: 0.8 MG/DL (ref 0.1–1)
BILIRUB UR QL STRIP: NEGATIVE
BUN SERPL-MCNC: 40 MG/DL (ref 10–30)
CALCIUM SERPL-MCNC: 10.4 MG/DL (ref 8.7–10.5)
CHLORIDE SERPL-SCNC: 102 MMOL/L (ref 95–110)
CLARITY UR: ABNORMAL
CO2 SERPL-SCNC: 26 MMOL/L (ref 23–29)
COLOR UR: YELLOW
CREAT SERPL-MCNC: 1.4 MG/DL (ref 0.5–1.4)
DIFFERENTIAL METHOD BLD: ABNORMAL
EOSINOPHIL # BLD AUTO: 0.1 K/UL (ref 0–0.5)
EOSINOPHIL NFR BLD: 0.8 % (ref 0–8)
ERYTHROCYTE [DISTWIDTH] IN BLOOD BY AUTOMATED COUNT: 13.2 % (ref 11.5–14.5)
EST. GFR  (NO RACE VARIABLE): 34.2 ML/MIN/1.73 M^2
GLUCOSE SERPL-MCNC: 157 MG/DL (ref 70–110)
GLUCOSE UR QL STRIP: NEGATIVE
HCT VFR BLD AUTO: 37.7 % (ref 37–48.5)
HGB BLD-MCNC: 12.3 G/DL (ref 12–16)
HGB UR QL STRIP: ABNORMAL
HYALINE CASTS #/AREA URNS LPF: 5 /LPF
IMM GRANULOCYTES # BLD AUTO: 0.03 K/UL (ref 0–0.04)
IMM GRANULOCYTES NFR BLD AUTO: 0.3 % (ref 0–0.5)
KETONES UR QL STRIP: NEGATIVE
LDH SERPL L TO P-CCNC: 1.72 MMOL/L (ref 0.5–2.2)
LEUKOCYTE ESTERASE UR QL STRIP: ABNORMAL
LIPASE SERPL-CCNC: 7 U/L (ref 4–60)
LYMPHOCYTES # BLD AUTO: 1.2 K/UL (ref 1–4.8)
LYMPHOCYTES NFR BLD: 13.1 % (ref 18–48)
MCH RBC QN AUTO: 31 PG (ref 27–31)
MCHC RBC AUTO-ENTMCNC: 32.6 G/DL (ref 32–36)
MCV RBC AUTO: 95 FL (ref 82–98)
MICROSCOPIC COMMENT: ABNORMAL
MONOCYTES # BLD AUTO: 0.8 K/UL (ref 0.3–1)
MONOCYTES NFR BLD: 8.5 % (ref 4–15)
NEUTROPHILS # BLD AUTO: 7.1 K/UL (ref 1.8–7.7)
NEUTROPHILS NFR BLD: 76.5 % (ref 38–73)
NITRITE UR QL STRIP: NEGATIVE
NON-SQ EPI CELLS #/AREA URNS HPF: 2 /HPF
NRBC BLD-RTO: 0 /100 WBC
PH UR STRIP: 6 [PH] (ref 5–8)
PLATELET # BLD AUTO: 293 K/UL (ref 150–450)
PMV BLD AUTO: 10.7 FL (ref 9.2–12.9)
POTASSIUM SERPL-SCNC: 3.7 MMOL/L (ref 3.5–5.1)
PROT SERPL-MCNC: 7.8 G/DL (ref 6–8.4)
PROT UR QL STRIP: ABNORMAL
RBC # BLD AUTO: 3.97 M/UL (ref 4–5.4)
RBC #/AREA URNS HPF: 63 /HPF (ref 0–4)
SAMPLE: NORMAL
SODIUM SERPL-SCNC: 139 MMOL/L (ref 136–145)
SP GR UR STRIP: 1.02 (ref 1–1.03)
SQUAMOUS #/AREA URNS HPF: 21 /HPF
URN SPEC COLLECT METH UR: ABNORMAL
UROBILINOGEN UR STRIP-ACNC: ABNORMAL EU/DL
WBC # BLD AUTO: 9.31 K/UL (ref 3.9–12.7)
WBC #/AREA URNS HPF: >100 /HPF (ref 0–5)
WBC CLUMPS URNS QL MICRO: ABNORMAL

## 2024-07-23 PROCEDURE — 20000000 HC ICU ROOM

## 2024-07-23 PROCEDURE — 83690 ASSAY OF LIPASE: CPT | Performed by: NURSE PRACTITIONER

## 2024-07-23 PROCEDURE — 87086 URINE CULTURE/COLONY COUNT: CPT | Performed by: NURSE PRACTITIONER

## 2024-07-23 PROCEDURE — 87186 SC STD MICRODIL/AGAR DIL: CPT | Performed by: NURSE PRACTITIONER

## 2024-07-23 PROCEDURE — 63600175 PHARM REV CODE 636 W HCPCS: Mod: JZ,JG | Performed by: NURSE PRACTITIONER

## 2024-07-23 PROCEDURE — 1126F AMNT PAIN NOTED NONE PRSNT: CPT | Mod: HCNC,CPTII,S$GLB, | Performed by: STUDENT IN AN ORGANIZED HEALTH CARE EDUCATION/TRAINING PROGRAM

## 2024-07-23 PROCEDURE — 27202107 HC XP QUATRO SENSOR: Performed by: ANESTHESIOLOGY

## 2024-07-23 PROCEDURE — 99900035 HC TECH TIME PER 15 MIN (STAT)

## 2024-07-23 PROCEDURE — 99214 OFFICE O/P EST MOD 30 MIN: CPT | Mod: HCNC,S$GLB,, | Performed by: STUDENT IN AN ORGANIZED HEALTH CARE EDUCATION/TRAINING PROGRAM

## 2024-07-23 PROCEDURE — 63600175 PHARM REV CODE 636 W HCPCS: Performed by: STUDENT IN AN ORGANIZED HEALTH CARE EDUCATION/TRAINING PROGRAM

## 2024-07-23 PROCEDURE — G2211 COMPLEX E/M VISIT ADD ON: HCPCS | Mod: HCNC,S$GLB,, | Performed by: STUDENT IN AN ORGANIZED HEALTH CARE EDUCATION/TRAINING PROGRAM

## 2024-07-23 PROCEDURE — 99223 1ST HOSP IP/OBS HIGH 75: CPT | Mod: 57,,, | Performed by: STUDENT IN AN ORGANIZED HEALTH CARE EDUCATION/TRAINING PROGRAM

## 2024-07-23 PROCEDURE — 94761 N-INVAS EAR/PLS OXIMETRY MLT: CPT

## 2024-07-23 PROCEDURE — 36000708 HC OR TIME LEV III 1ST 15 MIN: Performed by: STUDENT IN AN ORGANIZED HEALTH CARE EDUCATION/TRAINING PROGRAM

## 2024-07-23 PROCEDURE — 51701 INSERT BLADDER CATHETER: CPT

## 2024-07-23 PROCEDURE — 81001 URINALYSIS AUTO W/SCOPE: CPT | Performed by: NURSE PRACTITIONER

## 2024-07-23 PROCEDURE — 1159F MED LIST DOCD IN RCRD: CPT | Mod: HCNC,CPTII,S$GLB, | Performed by: STUDENT IN AN ORGANIZED HEALTH CARE EDUCATION/TRAINING PROGRAM

## 2024-07-23 PROCEDURE — 25000003 PHARM REV CODE 250: Performed by: NURSE ANESTHETIST, CERTIFIED REGISTERED

## 2024-07-23 PROCEDURE — 3288F FALL RISK ASSESSMENT DOCD: CPT | Mod: HCNC,CPTII,S$GLB, | Performed by: STUDENT IN AN ORGANIZED HEALTH CARE EDUCATION/TRAINING PROGRAM

## 2024-07-23 PROCEDURE — 27201107 HC STYLET, STANDARD: Performed by: ANESTHESIOLOGY

## 2024-07-23 PROCEDURE — 63600175 PHARM REV CODE 636 W HCPCS: Performed by: NURSE ANESTHETIST, CERTIFIED REGISTERED

## 2024-07-23 PROCEDURE — 27000221 HC OXYGEN, UP TO 24 HOURS

## 2024-07-23 PROCEDURE — 25500020 PHARM REV CODE 255: Performed by: NURSE PRACTITIONER

## 2024-07-23 PROCEDURE — 63600175 PHARM REV CODE 636 W HCPCS: Performed by: INTERNAL MEDICINE

## 2024-07-23 PROCEDURE — 99285 EMERGENCY DEPT VISIT HI MDM: CPT | Mod: 25

## 2024-07-23 PROCEDURE — 74019 RADEX ABDOMEN 2 VIEWS: CPT | Mod: TC

## 2024-07-23 PROCEDURE — 25000003 PHARM REV CODE 250: Performed by: INTERNAL MEDICINE

## 2024-07-23 PROCEDURE — 1101F PT FALLS ASSESS-DOCD LE1/YR: CPT | Mod: HCNC,CPTII,S$GLB, | Performed by: STUDENT IN AN ORGANIZED HEALTH CARE EDUCATION/TRAINING PROGRAM

## 2024-07-23 PROCEDURE — 96375 TX/PRO/DX INJ NEW DRUG ADDON: CPT

## 2024-07-23 PROCEDURE — 99999 PR PBB SHADOW E&M-EST. PATIENT-LVL V: CPT | Mod: PBBFAC,HCNC,, | Performed by: STUDENT IN AN ORGANIZED HEALTH CARE EDUCATION/TRAINING PROGRAM

## 2024-07-23 PROCEDURE — 36000709 HC OR TIME LEV III EA ADD 15 MIN: Performed by: STUDENT IN AN ORGANIZED HEALTH CARE EDUCATION/TRAINING PROGRAM

## 2024-07-23 PROCEDURE — 27000673 HC TUBING BLOOD Y: Performed by: ANESTHESIOLOGY

## 2024-07-23 PROCEDURE — 0DTF0ZZ RESECTION OF RIGHT LARGE INTESTINE, OPEN APPROACH: ICD-10-PCS | Performed by: STUDENT IN AN ORGANIZED HEALTH CARE EDUCATION/TRAINING PROGRAM

## 2024-07-23 PROCEDURE — 93005 ELECTROCARDIOGRAM TRACING: CPT | Performed by: GENERAL PRACTICE

## 2024-07-23 PROCEDURE — 1157F ADVNC CARE PLAN IN RCRD: CPT | Mod: HCNC,CPTII,S$GLB, | Performed by: STUDENT IN AN ORGANIZED HEALTH CARE EDUCATION/TRAINING PROGRAM

## 2024-07-23 PROCEDURE — 88309 TISSUE EXAM BY PATHOLOGIST: CPT | Mod: TC | Performed by: PATHOLOGY

## 2024-07-23 PROCEDURE — 96361 HYDRATE IV INFUSION ADD-ON: CPT

## 2024-07-23 PROCEDURE — 27201423 OPTIME MED/SURG SUP & DEVICES STERILE SUPPLY: Performed by: STUDENT IN AN ORGANIZED HEALTH CARE EDUCATION/TRAINING PROGRAM

## 2024-07-23 PROCEDURE — 44160 REMOVAL OF COLON: CPT | Mod: ,,, | Performed by: STUDENT IN AN ORGANIZED HEALTH CARE EDUCATION/TRAINING PROGRAM

## 2024-07-23 PROCEDURE — 74019 RADEX ABDOMEN 2 VIEWS: CPT | Mod: 26,,, | Performed by: RADIOLOGY

## 2024-07-23 PROCEDURE — 88307 TISSUE EXAM BY PATHOLOGIST: CPT | Mod: TC | Performed by: PATHOLOGY

## 2024-07-23 PROCEDURE — 96367 TX/PROPH/DG ADDL SEQ IV INF: CPT

## 2024-07-23 PROCEDURE — 96365 THER/PROPH/DIAG IV INF INIT: CPT

## 2024-07-23 PROCEDURE — 99406 BEHAV CHNG SMOKING 3-10 MIN: CPT

## 2024-07-23 PROCEDURE — 37000008 HC ANESTHESIA 1ST 15 MINUTES: Performed by: STUDENT IN AN ORGANIZED HEALTH CARE EDUCATION/TRAINING PROGRAM

## 2024-07-23 PROCEDURE — 85025 COMPLETE CBC W/AUTO DIFF WBC: CPT | Performed by: NURSE PRACTITIONER

## 2024-07-23 PROCEDURE — 94799 UNLISTED PULMONARY SVC/PX: CPT

## 2024-07-23 PROCEDURE — 80053 COMPREHEN METABOLIC PANEL: CPT | Performed by: NURSE PRACTITIONER

## 2024-07-23 PROCEDURE — 37000009 HC ANESTHESIA EA ADD 15 MINS: Performed by: STUDENT IN AN ORGANIZED HEALTH CARE EDUCATION/TRAINING PROGRAM

## 2024-07-23 PROCEDURE — 25000003 PHARM REV CODE 250: Performed by: NURSE PRACTITIONER

## 2024-07-23 PROCEDURE — 93010 ELECTROCARDIOGRAM REPORT: CPT | Mod: ,,, | Performed by: GENERAL PRACTICE

## 2024-07-23 RX ORDER — SODIUM,POTASSIUM PHOSPHATES 280-250MG
2 POWDER IN PACKET (EA) ORAL
Status: DISCONTINUED | OUTPATIENT
Start: 2024-07-23 | End: 2024-08-01 | Stop reason: HOSPADM

## 2024-07-23 RX ORDER — LANOLIN ALCOHOL/MO/W.PET/CERES
800 CREAM (GRAM) TOPICAL
Status: DISCONTINUED | OUTPATIENT
Start: 2024-07-23 | End: 2024-08-01 | Stop reason: HOSPADM

## 2024-07-23 RX ORDER — ONDANSETRON HYDROCHLORIDE 2 MG/ML
4 INJECTION, SOLUTION INTRAVENOUS
Status: COMPLETED | OUTPATIENT
Start: 2024-07-23 | End: 2024-07-23

## 2024-07-23 RX ORDER — NALOXONE HCL 0.4 MG/ML
0.02 VIAL (ML) INJECTION
Status: DISCONTINUED | OUTPATIENT
Start: 2024-07-23 | End: 2024-08-01 | Stop reason: HOSPADM

## 2024-07-23 RX ORDER — FAMOTIDINE 10 MG/ML
INJECTION INTRAVENOUS
Status: DISCONTINUED | OUTPATIENT
Start: 2024-07-23 | End: 2024-07-23

## 2024-07-23 RX ORDER — ACETAMINOPHEN 325 MG/1
650 TABLET ORAL EVERY 4 HOURS PRN
Status: DISCONTINUED | OUTPATIENT
Start: 2024-07-23 | End: 2024-08-01 | Stop reason: HOSPADM

## 2024-07-23 RX ORDER — ROCURONIUM BROMIDE 10 MG/ML
INJECTION, SOLUTION INTRAVENOUS
Status: DISCONTINUED | OUTPATIENT
Start: 2024-07-23 | End: 2024-07-23

## 2024-07-23 RX ORDER — LEVOFLOXACIN 5 MG/ML
500 INJECTION, SOLUTION INTRAVENOUS
Status: COMPLETED | OUTPATIENT
Start: 2024-07-23 | End: 2024-07-23

## 2024-07-23 RX ORDER — ALUMINUM HYDROXIDE, MAGNESIUM HYDROXIDE, AND SIMETHICONE 1200; 120; 1200 MG/30ML; MG/30ML; MG/30ML
30 SUSPENSION ORAL 4 TIMES DAILY PRN
Status: DISCONTINUED | OUTPATIENT
Start: 2024-07-23 | End: 2024-08-01 | Stop reason: HOSPADM

## 2024-07-23 RX ORDER — PROPOFOL 10 MG/ML
VIAL (ML) INTRAVENOUS
Status: DISCONTINUED | OUTPATIENT
Start: 2024-07-23 | End: 2024-07-23

## 2024-07-23 RX ORDER — LIDOCAINE HYDROCHLORIDE 20 MG/ML
INJECTION, SOLUTION EPIDURAL; INFILTRATION; INTRACAUDAL; PERINEURAL
Status: DISCONTINUED | OUTPATIENT
Start: 2024-07-23 | End: 2024-07-23

## 2024-07-23 RX ORDER — PANTOPRAZOLE SODIUM 40 MG/10ML
40 INJECTION, POWDER, LYOPHILIZED, FOR SOLUTION INTRAVENOUS DAILY
Status: DISCONTINUED | OUTPATIENT
Start: 2024-07-24 | End: 2024-08-01 | Stop reason: HOSPADM

## 2024-07-23 RX ORDER — METRONIDAZOLE 500 MG/100ML
500 INJECTION, SOLUTION INTRAVENOUS
Status: COMPLETED | OUTPATIENT
Start: 2024-07-23 | End: 2024-07-23

## 2024-07-23 RX ORDER — SODIUM CHLORIDE 9 MG/ML
INJECTION, SOLUTION INTRAVENOUS CONTINUOUS
Status: DISCONTINUED | OUTPATIENT
Start: 2024-07-23 | End: 2024-07-28

## 2024-07-23 RX ORDER — ONDANSETRON HYDROCHLORIDE 2 MG/ML
INJECTION, SOLUTION INTRAVENOUS
Status: DISCONTINUED | OUTPATIENT
Start: 2024-07-23 | End: 2024-07-23

## 2024-07-23 RX ORDER — HYDROCODONE BITARTRATE AND ACETAMINOPHEN 5; 325 MG/1; MG/1
1 TABLET ORAL EVERY 6 HOURS PRN
Status: DISCONTINUED | OUTPATIENT
Start: 2024-07-23 | End: 2024-08-01 | Stop reason: HOSPADM

## 2024-07-23 RX ORDER — ACETAMINOPHEN 10 MG/ML
INJECTION, SOLUTION INTRAVENOUS
Status: DISCONTINUED | OUTPATIENT
Start: 2024-07-23 | End: 2024-07-23

## 2024-07-23 RX ORDER — LEVOFLOXACIN 5 MG/ML
250 INJECTION, SOLUTION INTRAVENOUS
Status: DISCONTINUED | OUTPATIENT
Start: 2024-07-24 | End: 2024-07-27

## 2024-07-23 RX ORDER — ACETAMINOPHEN 325 MG/1
650 TABLET ORAL EVERY 8 HOURS PRN
Status: DISCONTINUED | OUTPATIENT
Start: 2024-07-23 | End: 2024-07-24

## 2024-07-23 RX ORDER — SUCCINYLCHOLINE CHLORIDE 20 MG/ML
INJECTION INTRAMUSCULAR; INTRAVENOUS
Status: DISCONTINUED | OUTPATIENT
Start: 2024-07-23 | End: 2024-07-23

## 2024-07-23 RX ORDER — CIPROFLOXACIN 2 MG/ML
400 INJECTION, SOLUTION INTRAVENOUS
Status: COMPLETED | OUTPATIENT
Start: 2024-07-23 | End: 2024-07-23

## 2024-07-23 RX ORDER — METRONIDAZOLE 500 MG/100ML
500 INJECTION, SOLUTION INTRAVENOUS
Status: DISCONTINUED | OUTPATIENT
Start: 2024-07-24 | End: 2024-07-28

## 2024-07-23 RX ORDER — HYDROMORPHONE HYDROCHLORIDE 1 MG/ML
0.5 INJECTION, SOLUTION INTRAMUSCULAR; INTRAVENOUS; SUBCUTANEOUS EVERY 6 HOURS PRN
Status: DISCONTINUED | OUTPATIENT
Start: 2024-07-23 | End: 2024-08-01 | Stop reason: HOSPADM

## 2024-07-23 RX ORDER — FENTANYL CITRATE 50 UG/ML
INJECTION, SOLUTION INTRAMUSCULAR; INTRAVENOUS
Status: DISCONTINUED | OUTPATIENT
Start: 2024-07-23 | End: 2024-07-23

## 2024-07-23 RX ORDER — ONDANSETRON HYDROCHLORIDE 2 MG/ML
4 INJECTION, SOLUTION INTRAVENOUS EVERY 6 HOURS PRN
Status: DISCONTINUED | OUTPATIENT
Start: 2024-07-23 | End: 2024-08-01 | Stop reason: HOSPADM

## 2024-07-23 RX ORDER — TALC
6 POWDER (GRAM) TOPICAL NIGHTLY PRN
Status: DISCONTINUED | OUTPATIENT
Start: 2024-07-23 | End: 2024-08-01 | Stop reason: HOSPADM

## 2024-07-23 RX ADMIN — LEVOFLOXACIN 500 MG: 500 INJECTION, SOLUTION INTRAVENOUS at 09:07

## 2024-07-23 RX ADMIN — Medication 120 MG: at 08:07

## 2024-07-23 RX ADMIN — FENTANYL CITRATE 25 MCG: 50 INJECTION INTRAMUSCULAR; INTRAVENOUS at 09:07

## 2024-07-23 RX ADMIN — SODIUM CHLORIDE: 9 INJECTION, SOLUTION INTRAVENOUS at 11:07

## 2024-07-23 RX ADMIN — PROPOFOL 70 MG: 10 INJECTION, EMULSION INTRAVENOUS at 08:07

## 2024-07-23 RX ADMIN — ROCURONIUM BROMIDE 20 MG: 10 INJECTION, SOLUTION INTRAVENOUS at 08:07

## 2024-07-23 RX ADMIN — METRONIDAZOLE 500 MG: 5 INJECTION, SOLUTION INTRAVENOUS at 05:07

## 2024-07-23 RX ADMIN — FENTANYL CITRATE 25 MCG: 50 INJECTION INTRAMUSCULAR; INTRAVENOUS at 08:07

## 2024-07-23 RX ADMIN — CIPROFLOXACIN 400 MG: 2 INJECTION, SOLUTION INTRAVENOUS at 04:07

## 2024-07-23 RX ADMIN — SUGAMMADEX 200 MG: 100 INJECTION, SOLUTION INTRAVENOUS at 09:07

## 2024-07-23 RX ADMIN — ROCURONIUM BROMIDE 5 MG: 10 INJECTION, SOLUTION INTRAVENOUS at 08:07

## 2024-07-23 RX ADMIN — LIDOCAINE HYDROCHLORIDE 50 MG: 20 INJECTION, SOLUTION INTRAVENOUS at 08:07

## 2024-07-23 RX ADMIN — SODIUM CHLORIDE 1000 ML: 9 INJECTION, SOLUTION INTRAVENOUS at 02:07

## 2024-07-23 RX ADMIN — ACETAMINOPHEN 1000 MG: 10 INJECTION, SOLUTION INTRAVENOUS at 08:07

## 2024-07-23 RX ADMIN — ONDANSETRON 4 MG: 2 INJECTION INTRAMUSCULAR; INTRAVENOUS at 04:07

## 2024-07-23 RX ADMIN — FAMOTIDINE 20 MG: 10 INJECTION, SOLUTION INTRAVENOUS at 08:07

## 2024-07-23 RX ADMIN — SODIUM CHLORIDE, SODIUM LACTATE, POTASSIUM CHLORIDE, AND CALCIUM CHLORIDE: .6; .31; .03; .02 INJECTION, SOLUTION INTRAVENOUS at 08:07

## 2024-07-23 RX ADMIN — IOHEXOL 100 ML: 350 INJECTION, SOLUTION INTRAVENOUS at 04:07

## 2024-07-23 RX ADMIN — ROCURONIUM BROMIDE 25 MG: 10 INJECTION, SOLUTION INTRAVENOUS at 08:07

## 2024-07-23 RX ADMIN — ONDANSETRON 4 MG: 2 INJECTION INTRAMUSCULAR; INTRAVENOUS at 08:07

## 2024-07-23 NOTE — ASSESSMENT & PLAN NOTE
Chronic, controlled. Latest blood pressure and vitals reviewed-     Temp:  [98 °F (36.7 °C)]   Pulse:  [71-89]   Resp:  [18-20]   BP: (100-152)/(50-72)   SpO2:  [94 %-96 %] .   Home meds for hypertension were reviewed and noted below.   Hypertension Medications               furosemide (LASIX) 20 MG tablet TAKE 1 TABLET BY MOUTH EVERY DAY    isosorbide mononitrate (IMDUR) 30 MG 24 hr tablet TAKE 1 TABLET BY MOUTH EVERY DAY    nitroGLYCERIN (NITROSTAT) 0.4 MG SL tablet Place 1 tablet (0.4 mg total) under the tongue every 5 (five) minutes as needed for Chest pain.    olmesartan (BENICAR) 5 MG Tab Take 1 tablet (5 mg total) by mouth once daily.            While in the hospital, will manage blood pressure as follows; Adjust home antihypertensive regimen as follows- iv hydralazine PRN basis    Will utilize p.r.n. blood pressure medication only if patient's blood pressure greater than 140/90 and she develops symptoms such as worsening chest pain or shortness of breath.

## 2024-07-23 NOTE — SUBJECTIVE & OBJECTIVE
"Past Medical History:   Diagnosis Date    Anticoagulant long-term use     Arthritis     Dislocation of right shoulder joint     Hypertension     Hypothyroidism     Shoulder disorder     right    Thyroid disease        Past Surgical History:   Procedure Laterality Date    HYSTERECTOMY      PARTIAL HYSTERECTOMY      ARTUR, ovaries in place, uterine prolapse       Review of patient's allergies indicates:   Allergen Reactions    Amlodipine Swelling     Other reaction(s): Angioedema    Atenolol      Other reaction(s): itch    Betamethasone sodium phosphate      Other reaction(s): Flushing (skin)    Cortisone      Other reaction(s): blurry vision  Other reaction(s): Rash    Lisinopril      Other reaction(s): COUGH    Naproxen      Other reaction(s): body shut down    Paxlovid [nirmatrelvir-ritonavir] Other (See Comments)     "Reacted strangely to it in the past" behavior changes described    Pcn [penicillins] Other (See Comments)     Unknown reaction "I was just told a long time ago I was allergic to penicillin."    Prednisone     Triamterene-hydrochlorothiazid      Other reaction(s): itch       No current facility-administered medications on file prior to encounter.     Current Outpatient Medications on File Prior to Encounter   Medication Sig    aspirin (ECOTRIN) 81 MG EC tablet Take 81 mg by mouth once daily.    busPIRone (BUSPAR) 10 MG tablet Take 1 tablet (10 mg total) by mouth 3 (three) times daily.    donepeziL (ARICEPT) 5 MG tablet TAKE 1 TABLET BY MOUTH EVERY EVENING (Patient taking differently: Take 5 mg by mouth every evening. TAKE 1 TABLET BY MOUTH EVERY EVENING)    famotidine (PEPCID) 20 MG tablet Take 1 tablet (20 mg total) by mouth 2 (two) times daily.    fluticasone propionate (FLONASE) 50 mcg/actuation nasal spray 1 spray (50 mcg total) by Each Nostril route twice a week.    furosemide (LASIX) 20 MG tablet TAKE 1 TABLET BY MOUTH EVERY DAY (Patient taking differently: Take 20 mg by mouth once daily.)    " isosorbide mononitrate (IMDUR) 30 MG 24 hr tablet TAKE 1 TABLET BY MOUTH EVERY DAY (Patient taking differently: Take 30 mg by mouth once daily.)    krill oil 500 mg Cap Take 1 capsule by mouth once daily.    levothyroxine (SYNTHROID) 88 MCG tablet Take 1 tablet (88 mcg total) by mouth once daily.    nitroGLYCERIN (NITROSTAT) 0.4 MG SL tablet Place 1 tablet (0.4 mg total) under the tongue every 5 (five) minutes as needed for Chest pain.    olmesartan (BENICAR) 5 MG Tab Take 1 tablet (5 mg total) by mouth once daily.    ondansetron (ZOFRAN) 4 MG tablet Take 1 tablet (4 mg total) by mouth every 8 (eight) hours as needed. (Patient taking differently: Take 4 mg by mouth every 8 (eight) hours as needed for Nausea.)    peg 400-propylene glycol, PF, (SYSTANE HYDRATION PF) 0.4-0.3 % Drop Place 1 drop into both eyes once daily.    potassium chloride SA (K-DUR,KLOR-CON) 20 MEQ tablet TAKE 1 TABLET(20 MEQ) BY MOUTH EVERY DAY (Patient taking differently: Take 20 mEq by mouth. TAKE 1 TABLET(20 MEQ) BY MOUTH EVERY DAY)    QUEtiapine (SEROQUEL) 50 MG tablet TAKE 1 TABLET BY MOUTH EVERY EVENING    sertraline (ZOLOFT) 50 MG tablet Take 1 tablet (50 mg total) by mouth once daily.    vit A/vit C/vit E/zinc/copper (ICAPS AREDS ORAL) Take 1 capsule by mouth 2 (two) times a day.    vitamin D (VITAMIN D3) 1000 units Tab Take 1,000 Units by mouth once daily. Patient is taking 2000 units per day    [DISCONTINUED] acetaminophen (TYLENOL) 325 MG tablet Take 325 mg by mouth every 6 (six) hours as needed for Pain.     Family History       Problem Relation (Age of Onset)    Breast cancer Mother    Cancer Brother, Maternal Aunt, Maternal Uncle          Tobacco Use    Smoking status: Never    Smokeless tobacco: Never   Substance and Sexual Activity    Alcohol use: Yes     Alcohol/week: 2.0 standard drinks of alcohol     Types: 2 Glasses of wine per week     Comment: 2 small glasses of red wine with dinner    Drug use: No    Sexual activity: Not  Currently     Review of Systems   Unable to perform ROS: Dementia     Objective:     Vital Signs (Most Recent):  Temp: 98 °F (36.7 °C) (07/23/24 1400)  Pulse: 79 (07/23/24 1730)  Resp: 19 (07/23/24 1700)  BP: (!) 147/70 (07/23/24 1730)  SpO2: (!) 94 % (07/23/24 1700) Vital Signs (24h Range):  Temp:  [98 °F (36.7 °C)] 98 °F (36.7 °C)  Pulse:  [71-89] 79  Resp:  [18-20] 19  SpO2:  [94 %-96 %] 94 %  BP: (100-152)/(50-72) 147/70     Weight: 80.3 kg (177 lb)  Body mass index is 29.45 kg/m².     Physical Exam  Vitals and nursing note reviewed.   Constitutional:       General: She is not in acute distress.  HENT:      Head: Atraumatic.      Right Ear: External ear normal.      Left Ear: External ear normal.      Nose: Nose normal.      Mouth/Throat:      Mouth: Mucous membranes are dry.   Cardiovascular:      Rate and Rhythm: Normal rate.   Pulmonary:      Effort: Pulmonary effort is normal.   Abdominal:      General: There is distension.      Palpations: Abdomen is soft.   Musculoskeletal:         General: Normal range of motion.      Cervical back: Normal range of motion.   Skin:     General: Skin is warm.   Neurological:      Mental Status: She is alert. Mental status is at baseline.                Significant Labs: All pertinent labs within the past 24 hours have been reviewed.  CBC:   Recent Labs   Lab 07/23/24  1151 07/23/24  1423   WBC 8.99 9.31   HGB 12.5 12.3   HCT 38.5 37.7    293     CMP:   Recent Labs   Lab 07/23/24  1151 07/23/24  1423    139   K 3.7 3.7    102   CO2 23 26   * 157*   BUN 37* 40*   CREATININE 1.4 1.4   CALCIUM 10.6* 10.4   PROT 7.9 7.8   ALBUMIN 3.6 4.1   BILITOT 0.8 0.8   ALKPHOS 86 79   AST 48* 44*   ALT 38 35   ANIONGAP 13 11       Significant Imaging: I have reviewed all pertinent imaging results/findings within the past 24 hours.

## 2024-07-23 NOTE — HPI
89 year old pt getting admitted with cecal volvulus/SBO/bowel ischemia  Pt has been suffering from abdominal pain for past 1 week  Also she lost her appetite   Finally family took the pt to PCP and pt had KUB which were concerning  She was referred to ER and had CT scan done  Surgeon was called and pt will have emergent OR visist soon  History mainly from family because pt has dementia

## 2024-07-23 NOTE — ED PROVIDER NOTES
"Source of History:  Patient, son, chart    Chief complaint:  Abdominal Pain (Seen today at doctor's office with xray that showed sbo. 1 week ongoing. )      HPI:  Glenda Gimenez is a 89 y.o. female with medical history of dementia, arthritis, hypertension, thyroid disease, anxiety, GERD, CHF presenting with generalized abdominal pain, decreased appetite and abnormal x-ray.  Patient was seen by PCP today for her decreased appetite.  PCP ordered outpatient x-ray and findings were consistent with a small-bowel obstruction.  Patient is sent to the ED for evaluation.    This is the extent to the patients complaints today here in the emergency department.    ROS: As per HPI and below:  Constitutional:  Positive for decreased appetite.  No fever.  No chills.  Eyes: No visual changes.  ENT: No sore throat. No ear pain    Cardiovascular: No chest pain.  Respiratory: No shortness of breath.  GI:  Positive for abdominal pain.  Positive for constipation.  Genitourinary: No abnormal urination.  Neurologic: No headache. No focal weakness.  No numbness.  MSK: no back pain.  Integument: No rashes or lesions.  Hematologic: No easy bruising.  Endocrine: No excessive thirst or urination.    Review of patient's allergies indicates:   Allergen Reactions    Amlodipine Swelling     Other reaction(s): Angioedema    Atenolol      Other reaction(s): itch    Betamethasone sodium phosphate      Other reaction(s): Flushing (skin)    Cortisone      Other reaction(s): blurry vision  Other reaction(s): Rash    Lisinopril      Other reaction(s): COUGH    Naproxen      Other reaction(s): body shut down    Paxlovid [nirmatrelvir-ritonavir] Other (See Comments)     "Reacted strangely to it in the past" behavior changes described    Pcn [penicillins] Other (See Comments)     Unknown reaction "I was just told a long time ago I was allergic to penicillin."    Prednisone     Triamterene-hydrochlorothiazid      Other reaction(s): itch       PMH:  As " per HPI and below:  Past Medical History:   Diagnosis Date    Anticoagulant long-term use     Arthritis     Dislocation of right shoulder joint     Hypertension     Hypothyroidism     Shoulder disorder     right    Thyroid disease      Past Surgical History:   Procedure Laterality Date    HYSTERECTOMY      PARTIAL HYSTERECTOMY      ARTUR, ovaries in place, uterine prolapse       Social History     Tobacco Use    Smoking status: Never    Smokeless tobacco: Never   Substance Use Topics    Alcohol use: Yes     Alcohol/week: 2.0 standard drinks of alcohol     Types: 2 Glasses of wine per week     Comment: 2 small glasses of red wine with dinner    Drug use: No       Physical Exam:    /63   Pulse 80   Temp 98 °F (36.7 °C)   Resp 19   Wt 80.3 kg (177 lb)   LMP  (LMP Unknown)   SpO2 (!) 94%   BMI 29.45 kg/m²   Nursing note and vital signs reviewed.  Constitutional: No acute distress.  Nontoxic  Eyes: No conjunctival injection.  Extraocular muscles are intact.  ENT:  Mucous membranes pink but dry.  Oropharynx clear.  Normal phonation.  Cardiovascular: Regular rate and rhythm.  No murmurs. No gallops. No rubs  Respiratory: Clear to auscultation bilaterally.  Good air movement.  No wheezes.  No rhonchi. No rales. No accessory muscle use.  Abdomen:  Soft.  Nontender.  No rebound or guarding.  Bowel sounds hypoactive.  Non peritoneal.  Musculoskeletal: Good range of motion all joints.  No deformities.  Neck supple.  No meningismus.  Skin: No rashes seen.  Poor skin turgor.  No abrasions.  No ecchymoses.  Neuro: alert and oriented x3,  no focal neurological deficits.  Psych: Appropriate, conversant    MDM    Emergent evaluation of a 89 yo female presenting for decreased appetite, abdominal pain and constipation.  Patient was seen by PCP today due to decreased appetite.  Patient had an x-ray completed which showed concerned for bowel obstruction.  Patient was sent to the ED for abnormal imaging.  Son says patient has  not been eating much last few days but has been drinking and he has been giving her ensure drinks.  On exam pt is A&Ox3. VSS. Nonfebrile and nontoxic appearing.  Mucous membranes pink but dry. Breath sounds clear bilaterally.  Abdomen soft and nontender. No rebound or guarding appreciated on exam.   BS normoactive.  Pt speaking in full sentences.  Steady gait appreciated. Cap refill < 3 seconds.      History Acquisition   Additional history was acquired from other historians.  Family, chart    The patient's list of active medical problems, social history, medications, and allergies as documented per RN staff has been reviewed.     Differential Diagnoses   Based on available information and the initial assessment, the working differential diagnoses considered during this evaluation include but are not limited to constipation, fecal impaction, bowel obstruction, ileus, others.    I will get labs, imaging, hydrate and reassess.  I discussed this case with my supervising physician.      LABS     Labs Reviewed   CBC W/ AUTO DIFFERENTIAL - Abnormal       Result Value    WBC 9.31      RBC 3.97 (*)     Hemoglobin 12.3      Hematocrit 37.7      MCV 95      MCH 31.0      MCHC 32.6      RDW 13.2      Platelets 293      MPV 10.7      Immature Granulocytes 0.3      Gran # (ANC) 7.1      Immature Grans (Abs) 0.03      Lymph # 1.2      Mono # 0.8      Eos # 0.1      Baso # 0.07      nRBC 0      Gran % 76.5 (*)     Lymph % 13.1 (*)     Mono % 8.5      Eosinophil % 0.8      Basophil % 0.8      Differential Method Automated     COMPREHENSIVE METABOLIC PANEL - Abnormal    Sodium 139      Potassium 3.7      Chloride 102      CO2 26      Glucose 157 (*)     BUN 40 (*)     Creatinine 1.4      Calcium 10.4      Total Protein 7.8      Albumin 4.1      Total Bilirubin 0.8      Alkaline Phosphatase 79      AST 44 (*)     ALT 35      eGFR 34.2 (*)     Anion Gap 11     URINALYSIS, REFLEX TO URINE CULTURE - Abnormal    Specimen UA Urine, Clean  Catch      Color, UA Yellow      Appearance, UA Hazy (*)     pH, UA 6.0      Specific Gravity, UA 1.025      Protein, UA 1+ (*)     Glucose, UA Negative      Ketones, UA Negative      Bilirubin (UA) Negative      Occult Blood UA 1+ (*)     Nitrite, UA Negative      Urobilinogen, UA 2.0-3.0 (*)     Leukocytes, UA 3+ (*)     Narrative:     Specimen Source->Urine   URINALYSIS MICROSCOPIC - Abnormal    RBC, UA 63 (*)     WBC, UA >100 (*)     WBC Clumps, UA Rare      Bacteria Rare      Squam Epithel, UA 21      Non-Squam Epith 2 (*)     Hyaline Casts, UA 5 (*)     Microscopic Comment SEE COMMENT      Narrative:     Specimen Source->Urine   CULTURE, URINE   LIPASE    Lipase 7     ISTAT LACTATE    POC Lactate 1.72      Sample VENOUS     POCT LACTATE         Imaging     Imaging Results              X-Ray Chest AP Portable (In process)                       CT Abdomen Pelvis With IV Contrast NO Oral Contrast (Final result)  Result time 07/23/24 16:18:11      Final result by Gardenia Hurtado MD (07/23/24 16:18:11)                   Impression:      Findings compatible with cecal volvulus with moderate distension of the cecum measuring 11.4 cm and pneumatosis which may represent bowel ischemia.    There are mildly dilated loops of small bowel compatible with partial small bowel obstruction    Fluid distension of the remainder of the colon secondary to long segment of sigmoid wall thickening and diverticula suggestive of chronic diverticulitis.  A colonic stricture cannot be excluded.    Minimal ascites    Vascular calcification of the aorta and coronary arteries    Prior hysterectomy    Chronic compression fractures of the lumbar spine    These findings were called to the ordering physician    This report was flagged in Epic as abnormal.      Electronically signed by: Gardenia Hurtado  Date:    07/23/2024  Time:    16:18               Narrative:      CMS MANDATED QUALITY DATA - CT RADIATION - 436    All CT scans at this  facility utilize dose modulation, iterative reconstruction, and/or weight based dosing when appropriate to reduce radiation dose to as low as reasonably achievable.    CLINICAL HISTORY:  ([id number] y/o  (5/19/1927) F    Bowel obstruction suspected;    TECHNIQUE:  (CJ#[id number], exam time 7/23/2024 16:05)    CT ABDOMEN PELVIS WITH IV CONTRAST [id number]    Axial CT images of the abdomen and pelvis were obtained from the dome of the diaphragm to the proximal thighs.    100cc omnipague 350 IV contrast was given    COMPARISON:  KUB at 11:48    FINDINGS:  Lower Thorax:    Lung bases are clear.  The heart is mildly enlarged.  There is coronary artery calcification.    CT Abdomen:    Bowel: There is moderate distension of the cecum which is within the upper mid abdomen suggestive of cecal volvulus.  The maximum diameter is 11.4 cm.  There is pneumatosis.    There is mild fluid distension of the remainder of the colon to level of the sigmoid colon.  There is a long segment of mild wall thickening of the sigmoid colon with scattered diverticula which may represent chronic diverticulitis.  There is no abscess or perforation.    There are mildly dilated loops of small bowel throughout the abdomen    The stomach is compressed due to the dilated cecum.    There is no free air.  There is minimal ascites around the liver.    The liver, spleen, and pancreas demonstrate normal enhancement.  There is no biliary duct dilatation.  Gallbladder is normal.    Adrenal glands are normal    Kidneys enhance symmetrically without hydronephrosis.  There is a 5 mm nonobstructing right renal stone.    There is diffuse vascular calcification of the aorta and its branches.  The aorta is normal in caliber.    There is no mesenteric or retroperitoneal adenopathy.    The abdominal wall is unremarkable.    Osseous structures: Degenerative changes of the lumbar spine with chronic moderate compression of L2 and mild compression of L4.  There is no acute  osseous abnormality.  Degenerative changes of the hips    CT pelvis: The bladder is normal.  The uterus is absent.  There is no pelvic adenopathy.                                      A radiology report was available for my review at the time of the encounter.    EKG   EKG Readings: (Independently Interpreted)   Initial Reading: No STEMI. Previous EKG Date: 6/12/23. Rhythm: Normal Sinus Rhythm. Heart Rate: 80. Conduction: 1st Degree AV Block.   My independent interpretation    No STEMI  Sinus rhythm with first-degree AV block  Rate of 80         Additional Consideration   All available testing was considered during the course of this workup.  Comorbidities taken into consideration during the patient's evaluation and treatment include weight, age.    Social determinants of health were taken into consideration during development of our treatment plan.    Medications   metronidazole IVPB 500 mg (500 mg Intravenous New Bag 7/23/24 1723)   sodium chloride 0.9% bolus 1,000 mL 1,000 mL (0 mLs Intravenous Stopped 7/23/24 1528)   ciprofloxacin (CIPRO)400mg/200ml D5W IVPB 400 mg (0 mg Intravenous Stopped 7/23/24 1716)   iohexoL (OMNIPAQUE 350) injection 100 mL (100 mLs Intravenous Given 7/23/24 1604)   ondansetron injection 4 mg (4 mg Intravenous Given 7/23/24 1633)      ED Course as of 07/23/24 1729   Tue Jul 23, 2024   1530 CBC unremarkable.  No leukocytosis noted.  H&H stable at 12.3 and 37.7.  CMP shows a BUN of 40 and creatinine of 1.4.  This appears near patient's baseline.  UA shows 3+ leukocytes with negative nitrites.  Microscopic UA shows greater than 100 WBCs.  Will treat with ciprofloxacin after reviewing patient's previous urine cultures and allergies. [RZ]   1620 CT independently reviewed by myself and Radiology.  CT concerning for cecal volvulus with moderate distension of the cecum measuring 11.4 cm and pneumatosis which may represent bowel ischemia. Mildly dilated loops of small bowel compatible with partial  small bowel obstruction. Fluid distension of the remainder of the colon secondary to long segment of sigmoid wall thickening and diverticula suggestive of chronic diverticulitis.    Will discuss this case with General surgery.  Awaiting recommendations. [RZ]   9482 Critical Care:   Critical Care Times:   Direct Patient Care (initial evaluation, reassessments, and time considering the case)................................................................10 minutes.   Additional History from reviewing old medical records or taking additional history from the family, EMS, PCP, etc......................10 minutes.   Ordering, Reviewing, and Interpreting Diagnostic Studies.............................................................................................................10 minutes.   Documentation................................................................................................................................................................................10 minutes.   ==============================================================  · Total Critical Care Time - exclusive of procedural time: 40 minutes.  ==============================================================  Critical care was necessary to treat or prevent imminent or life-threatening deterioration of the following conditions:  Sepsis, ischemic bowel, cecal volvulus.   Critical care was time spent personally by me on the following activities: obtaining history from patient or relative, examination of patient, review of x-rays / CT sent with the patient, ordering lab, x-rays, and/or EKG, development of treatment plan with patient or relative, ordering and performing treatments and interventions, interpretation of cardiac measurements and re-evaluation of patient's conition.   Critical Care Condition: potentially life-threatening  [RZ]   1706 Dr. Drummond at bedside to discuss options with family.  Family is agreeable to surgery.  Chest x-ray and  EKG orders placed for preop exam.  Will discussed case with hospital medicine for admission after surgery.  Awaiting callback. [RZ]      ED Course User Index  [RZ] Isamar Turner NP            Attending Attestation:     Physician Attestation Statement for NP/PA:   I personally made/approved the management plan and take responsibility for the patient management.    Other NP/PA Attestation Additions:    History of Present Illness: 89-year-old female presents with abdominal pain.    Medical Decision Making: Initial differential diagnosis included but not limited to bowel obstruction, colitis, and enteritis.  The patient's labs were significant for a normal white blood cell count, normal lactic acid level, and infection in her urine.  The patient's CT scan does show a cecal volvulus with an area of pneumatosis which may be bowel ischemia per Radiology.  The patient was evaluated by General surgery here in the emergency department.  She will go to the operating room with general surgery and be admitted to Hospital Medicine.  I am in agreement with the nurse practitioner's assessment, treatment, and plan of care.             CLINICAL IMPRESSION  1. Cecal volvulus    2. Pneumatosis intestinalis    3. Small bowel obstruction    4. Diverticulitis    5. Preop examination         ED Disposition Condition    Admit Stable          This note was created using dictation software.  This program may occasionally mistype words and phrases.         Isamar Turner NP  07/23/24 1716       Bernard Vann MD  07/23/24 [id number]

## 2024-07-23 NOTE — ASSESSMENT & PLAN NOTE
In the background of Volvulus  After Surgery pt may need NG tube  Watch out for postoperative ileus/Obstruction

## 2024-07-23 NOTE — H&P
"  Formerly [facility] - Emergency Dept  Hospital Medicine  History & Physical    Patient Name: Glenda Gimenez  MRN: [id number]  Patient Class: IP- Inpatient  Admission Date: 7/23/2024  Attending Physician: Perico Calero MD   Primary Care Provider: William Olivia MD         Patient information was obtained from relative(s), past medical records, ER records, and ER MD .     Subjective:     Principal Problem:Cecal volvulus    Chief Complaint:   Chief Complaint   Patient presents with    Abdominal Pain     Seen today at doctor's office with xray that showed sbo. 1 week ongoing.         HPI: 89 year old pt getting admitted with cecal volvulus/SBO/bowel ischemia  Pt has been suffering from abdominal pain for past 1 week  Also she lost her appetite   Finally family took the pt to PCP and pt had KUB which were concerning  She was referred to ER and had CT scan done  Surgeon was called and pt will have emergent OR visist soon  History mainly from family because pt has dementia    Past Medical History:   Diagnosis Date    Anticoagulant long-term use     Arthritis     Dislocation of right shoulder joint     Hypertension     Hypothyroidism     Shoulder disorder     right    Thyroid disease        Past Surgical History:   Procedure Laterality Date    HYSTERECTOMY      PARTIAL HYSTERECTOMY      ARTUR, ovaries in place, uterine prolapse       Review of patient's allergies indicates:   Allergen Reactions    Amlodipine Swelling     Other reaction(s): Angioedema    Atenolol      Other reaction(s): itch    Betamethasone sodium phosphate      Other reaction(s): Flushing (skin)    Cortisone      Other reaction(s): blurry vision  Other reaction(s): Rash    Lisinopril      Other reaction(s): COUGH    Naproxen      Other reaction(s): body shut down    Paxlovid [nirmatrelvir-ritonavir] Other (See Comments)     "Reacted strangely to it in the past" behavior changes described    Pcn [penicillins] Other (See Comments)     Unknown " "reaction "I was just told a long time ago I was allergic to penicillin."    Prednisone     Triamterene-hydrochlorothiazid      Other reaction(s): itch       No current facility-administered medications on file prior to encounter.     Current Outpatient Medications on File Prior to Encounter   Medication Sig    aspirin (ECOTRIN) 81 MG EC tablet Take 81 mg by mouth once daily.    busPIRone (BUSPAR) 10 MG tablet Take 1 tablet (10 mg total) by mouth 3 (three) times daily.    donepeziL (ARICEPT) 5 MG tablet TAKE 1 TABLET BY MOUTH EVERY EVENING (Patient taking differently: Take 5 mg by mouth every evening. TAKE 1 TABLET BY MOUTH EVERY EVENING)    famotidine (PEPCID) 20 MG tablet Take 1 tablet (20 mg total) by mouth 2 (two) times daily.    fluticasone propionate (FLONASE) 50 mcg/actuation nasal spray 1 spray (50 mcg total) by Each Nostril route twice a week.    furosemide (LASIX) 20 MG tablet TAKE 1 TABLET BY MOUTH EVERY DAY (Patient taking differently: Take 20 mg by mouth once daily.)    isosorbide mononitrate (IMDUR) 30 MG 24 hr tablet TAKE 1 TABLET BY MOUTH EVERY DAY (Patient taking differently: Take 30 mg by mouth once daily.)    krill oil 500 mg Cap Take 1 capsule by mouth once daily.    levothyroxine (SYNTHROID) 88 MCG tablet Take 1 tablet (88 mcg total) by mouth once daily.    nitroGLYCERIN (NITROSTAT) 0.4 MG SL tablet Place 1 tablet (0.4 mg total) under the tongue every 5 (five) minutes as needed for Chest pain.    olmesartan (BENICAR) 5 MG Tab Take 1 tablet (5 mg total) by mouth once daily.    ondansetron (ZOFRAN) 4 MG tablet Take 1 tablet (4 mg total) by mouth every 8 (eight) hours as needed. (Patient taking differently: Take 4 mg by mouth every 8 (eight) hours as needed for Nausea.)    peg 400-propylene glycol, PF, (SYSTANE HYDRATION PF) 0.4-0.3 % Drop Place 1 drop into both eyes once daily.    potassium chloride SA (K-DUR,KLOR-CON) 20 MEQ tablet TAKE 1 TABLET(20 MEQ) BY MOUTH EVERY DAY (Patient taking " differently: Take 20 mEq by mouth. TAKE 1 TABLET(20 MEQ) BY MOUTH EVERY DAY)    QUEtiapine (SEROQUEL) 50 MG tablet TAKE 1 TABLET BY MOUTH EVERY EVENING    sertraline (ZOLOFT) 50 MG tablet Take 1 tablet (50 mg total) by mouth once daily.    vit A/vit C/vit E/zinc/copper (ICAPS AREDS ORAL) Take 1 capsule by mouth 2 (two) times a day.    vitamin D (VITAMIN D3) 1000 units Tab Take 1,000 Units by mouth once daily. Patient is taking 2000 units per day    [DISCONTINUED] acetaminophen (TYLENOL) 325 MG tablet Take 325 mg by mouth every 6 (six) hours as needed for Pain.     Family History       Problem Relation (Age of Onset)    Breast cancer Mother    Cancer Brother, Maternal Aunt, Maternal Uncle          Tobacco Use    Smoking status: Never    Smokeless tobacco: Never   Substance and Sexual Activity    Alcohol use: Yes     Alcohol/week: 2.0 standard drinks of alcohol     Types: 2 Glasses of wine per week     Comment: 2 small glasses of red wine with dinner    Drug use: No    Sexual activity: Not Currently     Review of Systems   Unable to perform ROS: Dementia     Objective:     Vital Signs (Most Recent):  Temp: 98 °F (36.7 °C) (07/23/24 1400)  Pulse: 79 (07/23/24 1730)  Resp: 19 (07/23/24 1700)  BP: (!) 147/70 (07/23/24 1730)  SpO2: (!) 94 % (07/23/24 1700) Vital Signs (24h Range):  Temp:  [98 °F (36.7 °C)] 98 °F (36.7 °C)  Pulse:  [71-89] 79  Resp:  [18-20] 19  SpO2:  [94 %-96 %] 94 %  BP: (100-152)/(50-72) 147/70     Weight: 80.3 kg (177 lb)  Body mass index is 29.45 kg/m².     Physical Exam  Vitals and nursing note reviewed.   Constitutional:       General: She is not in acute distress.  HENT:      Head: Atraumatic.      Right Ear: External ear normal.      Left Ear: External ear normal.      Nose: Nose normal.      Mouth/Throat:      Mouth: Mucous membranes are dry.   Cardiovascular:      Rate and Rhythm: Normal rate.   Pulmonary:      Effort: Pulmonary effort is normal.   Abdominal:      General: There is distension.       Palpations: Abdomen is soft.   Musculoskeletal:         General: Normal range of motion.      Cervical back: Normal range of motion.   Skin:     General: Skin is warm.   Neurological:      Mental Status: She is alert. Mental status is at baseline.                Significant Labs: All pertinent labs within the past 24 hours have been reviewed.  CBC:   Recent Labs   Lab 07/23/24  1151 07/23/24  1423   WBC 8.99 9.31   HGB 12.5 12.3   HCT 38.5 37.7    293     CMP:   Recent Labs   Lab 07/23/24  1151 07/23/24  1423    139   K 3.7 3.7    102   CO2 23 26   * 157*   BUN 37* 40*   CREATININE 1.4 1.4   CALCIUM 10.6* 10.4   PROT 7.9 7.8   ALBUMIN 3.6 4.1   BILITOT 0.8 0.8   ALKPHOS 86 79   AST 48* 44*   ALT 38 35   ANIONGAP 13 11       Significant Imaging: I have reviewed all pertinent imaging results/findings within the past 24 hours.    Assessment/Plan:     * Cecal volvulus  OR visit soon  Iv fluids  Pain management  Iv abx  Overall prognosis is very poor       Small bowel obstruction  In the background of Volvulus  After Surgery pt may need NG tube  Watch out for postoperative ileus/Obstruction       Ischemia, bowel  As above       Congestive heart failure, unspecified HF chronicity, unspecified heart failure type  Type unknown  On Lasix at home    Coronary artery disease of native artery of native heart with stable angina pectoris  Aware     Advanced age  Aware       Essential hypertension  Chronic, controlled. Latest blood pressure and vitals reviewed-     Temp:  [98 °F (36.7 °C)]   Pulse:  [71-89]   Resp:  [18-20]   BP: (100-152)/(50-72)   SpO2:  [94 %-96 %] .   Home meds for hypertension were reviewed and noted below.   Hypertension Medications               furosemide (LASIX) 20 MG tablet TAKE 1 TABLET BY MOUTH EVERY DAY    isosorbide mononitrate (IMDUR) 30 MG 24 hr tablet TAKE 1 TABLET BY MOUTH EVERY DAY    nitroGLYCERIN (NITROSTAT) 0.4 MG SL tablet Place 1 tablet (0.4 mg total) under the  tongue every 5 (five) minutes as needed for Chest pain.    olmesartan (BENICAR) 5 MG Tab Take 1 tablet (5 mg total) by mouth once daily.            While in the hospital, will manage blood pressure as follows; Adjust home antihypertensive regimen as follows- iv hydralazine PRN basis    Will utilize p.r.n. blood pressure medication only if patient's blood pressure greater than 140/90 and she develops symptoms such as worsening chest pain or shortness of breath.      VTE Risk Mitigation (From admission, onward)           Ordered     IP VTE HIGH RISK PATIENT  Once         07/23/24 1733     Place sequential compression device  Until discontinued         07/23/24 1733                               Renal Dosing of Medication    An order has been entered by a pharmacist to adjust the dose and/or frequency of the medication listed below based on the patient's renal function.    Objective:  89 y.o., female, Actual Body Weight = 80.3 kg (177 lb)    Current Regimen:  Levofloxacin 500mg IV q24h    Assessment:  Estimated Creatinine Clearance: 24 mL/min (based on SCr of 1.4 mg/dL).  Renal function is poor.    Plan:  Orders have been entered to adjust the dose and/or frequency based on the patient's weight and renal function:  Levofloxacin 500mg x1 then 250 mg IV every 24 hours.    Thank you for allowing us to participate in this patient's care.     Anthony Quezada 7/23/2024 5:40 PM  Department of Pharmacy  Ext [phone]      Perico Calero MD  Department of Hospital Medicine  [facility] - Emergency Dept

## 2024-07-23 NOTE — CONSULTS
[facility] - Emergency Dept  General Surgery  Consult Note    Inpatient consult to General Surgery  Consult performed by: Bahman Drummond MD  Consult ordered by: Isamar Turenr NP        Subjective:     Chief Complaint/Reason for Admission:  Cecal volvulus    History of Present Illness:  This is a 89-year-old female with dementia who presented with her son after she had diminished appetite, abdominal distention, and abdominal pain.  She had a KUB which was concerning for bowel obstruction and sent to the ER for CT imaging.  CT imaging was concerning for cecal volvulus with pneumatosis.    No current facility-administered medications on file prior to encounter.     Current Outpatient Medications on File Prior to Encounter   Medication Sig    aspirin (ECOTRIN) 81 MG EC tablet Take 81 mg by mouth once daily.    busPIRone (BUSPAR) 10 MG tablet Take 1 tablet (10 mg total) by mouth 3 (three) times daily.    donepeziL (ARICEPT) 5 MG tablet TAKE 1 TABLET BY MOUTH EVERY EVENING (Patient taking differently: Take 5 mg by mouth every evening. TAKE 1 TABLET BY MOUTH EVERY EVENING)    famotidine (PEPCID) 20 MG tablet Take 1 tablet (20 mg total) by mouth 2 (two) times daily.    fluticasone propionate (FLONASE) 50 mcg/actuation nasal spray 1 spray (50 mcg total) by Each Nostril route twice a week.    furosemide (LASIX) 20 MG tablet TAKE 1 TABLET BY MOUTH EVERY DAY (Patient taking differently: Take 20 mg by mouth once daily.)    isosorbide mononitrate (IMDUR) 30 MG 24 hr tablet TAKE 1 TABLET BY MOUTH EVERY DAY (Patient taking differently: Take 30 mg by mouth once daily.)    krill oil 500 mg Cap Take 1 capsule by mouth once daily.    levothyroxine (SYNTHROID) 88 MCG tablet Take 1 tablet (88 mcg total) by mouth once daily.    nitroGLYCERIN (NITROSTAT) 0.4 MG SL tablet Place 1 tablet (0.4 mg total) under the tongue every 5 (five) minutes as needed for Chest pain.    olmesartan (BENICAR) 5 MG Tab Take 1 tablet (5 mg  "total) by mouth once daily.    ondansetron (ZOFRAN) 4 MG tablet Take 1 tablet (4 mg total) by mouth every 8 (eight) hours as needed. (Patient taking differently: Take 4 mg by mouth every 8 (eight) hours as needed for Nausea.)    peg 400-propylene glycol, PF, (SYSTANE HYDRATION PF) 0.4-0.3 % Drop Place 1 drop into both eyes once daily.    potassium chloride SA (K-DUR,KLOR-CON) 20 MEQ tablet TAKE 1 TABLET(20 MEQ) BY MOUTH EVERY DAY (Patient taking differently: Take 20 mEq by mouth. TAKE 1 TABLET(20 MEQ) BY MOUTH EVERY DAY)    QUEtiapine (SEROQUEL) 50 MG tablet TAKE 1 TABLET BY MOUTH EVERY EVENING    sertraline (ZOLOFT) 50 MG tablet Take 1 tablet (50 mg total) by mouth once daily.    vit A/vit C/vit E/zinc/copper (ICAPS AREDS ORAL) Take 1 capsule by mouth 2 (two) times a day.    vitamin D (VITAMIN D3) 1000 units Tab Take 1,000 Units by mouth once daily. Patient is taking 2000 units per day    [DISCONTINUED] acetaminophen (TYLENOL) 325 MG tablet Take 325 mg by mouth every 6 (six) hours as needed for Pain.       Review of patient's allergies indicates:   Allergen Reactions    Amlodipine Swelling     Other reaction(s): Angioedema    Atenolol      Other reaction(s): itch    Betamethasone sodium phosphate      Other reaction(s): Flushing (skin)    Cortisone      Other reaction(s): blurry vision  Other reaction(s): Rash    Lisinopril      Other reaction(s): COUGH    Naproxen      Other reaction(s): body shut down    Paxlovid [nirmatrelvir-ritonavir] Other (See Comments)     "Reacted strangely to it in the past" behavior changes described    Pcn [penicillins] Other (See Comments)     Unknown reaction "I was just told a long time ago I was allergic to penicillin."    Prednisone     Triamterene-hydrochlorothiazid      Other reaction(s): itch       Past Medical History:   Diagnosis Date    Anticoagulant long-term use     Arthritis     Dislocation of right shoulder joint     Hypertension     Hypothyroidism     Shoulder disorder  "    right    Thyroid disease      Past Surgical History:   Procedure Laterality Date    HYSTERECTOMY      PARTIAL HYSTERECTOMY      ARTUR, ovaries in place, uterine prolapse     Family History       Problem Relation (Age of Onset)    Breast cancer Mother    Cancer Brother, Maternal Aunt, Maternal Uncle          Tobacco Use    Smoking status: Never    Smokeless tobacco: Never   Substance and Sexual Activity    Alcohol use: Yes     Alcohol/week: 2.0 standard drinks of alcohol     Types: 2 Glasses of wine per week     Comment: 2 small glasses of red wine with dinner    Drug use: No    Sexual activity: Not Currently     Review of Systems   Constitutional:  Negative for fever.   HENT: Negative.     Eyes: Negative.    Respiratory: Negative.     Cardiovascular: Negative.    Gastrointestinal:  Positive for abdominal distention and abdominal pain.   Endocrine: Negative.    Genitourinary: Negative.    Musculoskeletal: Negative.    Skin: Negative.    Allergic/Immunologic: Negative.    Neurological: Negative.    Hematological: Negative.    Psychiatric/Behavioral: Negative.       Objective:     Vital Signs (Most Recent):  Temp: 98 °F (36.7 °C) (07/23/24 1400)  Pulse: 80 (07/23/24 1700)  Resp: 19 (07/23/24 1700)  BP: 139/63 (07/23/24 1700)  SpO2: (!) 94 % (07/23/24 1700) Vital Signs (24h Range):  Temp:  [98 °F (36.7 °C)] 98 °F (36.7 °C)  Pulse:  [71-89] 80  Resp:  [18-20] 19  SpO2:  [94 %-96 %] 94 %  BP: (100-152)/(50-72) 139/63     Weight: 80.3 kg (177 lb)  Body mass index is 29.45 kg/m².      Intake/Output Summary (Last 24 hours) at 7/23/2024 1721  Last data filed at 7/23/2024 1528  Gross per 24 hour   Intake 1000 ml   Output --   Net 1000 ml       Physical Exam  Constitutional:       General: She is not in acute distress.     Appearance: Normal appearance. She is not ill-appearing, toxic-appearing or diaphoretic.   HENT:      Head: Normocephalic.      Nose: Nose normal.   Eyes:      Conjunctiva/sclera: Conjunctivae normal.  "  Cardiovascular:      Rate and Rhythm: Normal rate and regular rhythm.   Pulmonary:      Effort: Pulmonary effort is normal.   Abdominal:      General: There is distension.      Tenderness: There is abdominal tenderness.   Musculoskeletal:         General: Normal range of motion.      Cervical back: Normal range of motion.   Skin:     General: Skin is warm.   Neurological:      General: No focal deficit present.      Mental Status: She is alert.   Psychiatric:         Mood and Affect: Mood normal.         Significant Labs:  CBC:   Recent Labs   Lab 07/23/24  1423   WBC 9.31   RBC 3.97*   HGB 12.3   HCT 37.7      MCV 95   MCH 31.0   MCHC 32.6     CMP:   Recent Labs   Lab 07/23/24  1423   *   CALCIUM 10.4   ALBUMIN 4.1   PROT 7.8      K 3.7   CO2 26      BUN 40*   CREATININE 1.4   ALKPHOS 79   ALT 35   AST 44*   BILITOT 0.8     Coagulation: No results for input(s): "PT", "INR", "APTT" in the last 48 hours.  Lactic Acid: No results for input(s): "LACTATE" in the last 48 hours.    Significant Diagnostics:  CT scan was reviewed.  The cecum is dilated greater than 11 cm concerning for cecal volvulus with likely pneumatosis.    Assessment/Plan:     89-year-old female with likely cecal volvulus.    Risks versus benefits of exploration with right colectomy possible ileostomy were discussed.  Patient's family wishes to proceed with operative intervention.  She is at higher risk for operative complications given age and frailty.    IV fluids  IV antibiotics  NPO  To surgery for exploration    Thank you for your consult. I will follow-up with patient. Please contact us if you have any additional questions.    Bahman Drummond MD  General Surgery  [facility] - Emergency Dept  "

## 2024-07-23 NOTE — ANESTHESIA PREPROCEDURE EVALUATION
07/23/2024  Glenda Gimenez is a 89 y.o., female.           Pre-op Assessment    I have reviewed the Patient Summary Reports.     I have reviewed the Nursing Notes. I have reviewed the NPO Status.   I have reviewed the Medications.     Review of Systems  Anesthesia Hx:  No problems with previous Anesthesia             Denies Family Hx of Anesthesia complications.    Denies Personal Hx of Anesthesia complications.                    Social:  Alcohol Use, Non-Smoker       Hematology/Oncology:  Hematology Normal   Oncology Normal                Hematology Comments: ASA Therapy                     EENT/Dental:  EENT/Dental Normal  Macular degeneration.  Missing multiple teeth.          Cardiovascular:     Hypertension, poorly controlled   CAD       CHF (per chart but patient and her son deny)       ECG has been reviewed. History of  stable angina pectoris per chart but patient her son deny.  Son reports that patient is ambulatory and active.                         Pulmonary:  Pulmonary Normal                       Renal/:  Chronic Renal Disease   Salt-losing nephritis or syndrome  Chronic hyponatremia             Hepatic/GI:     GERD   SBO   Cecal volvulus          Musculoskeletal:  Arthritis   History of Dislocation of right shoulder joint   Joint Disease:  Arthritis, Osteoarthritis          Neurological:   CVA (Per chart but patient and her son deny)        Vascular dementia with behavior disturbance Neuro Symptoms of weakness    Osteoarthritis    Dementia                         Endocrine:   Hypothyroidism    Thyroid Disease, Goiter, Multinodular Goiter             Psych:  Psychiatric History anxiety depression Sleep Disorder and Insomnia.       Sleep Disorder and Insomnia.        Physical Exam  General: Well nourished, Cooperative, Alert and Oriented    Airway:  Mallampati: II   Mouth Opening:  Normal  TM Distance: > 6 cm  Tongue: Normal  Neck ROM: Extension Decreased    Dental:  Intact    Chest/Lungs:  Clear to auscultation, Normal Respiratory Rate    Heart:  Rate: Normal  Rhythm: Regular Rhythm  Sounds: Normal  Murmur: Systolic;        Anesthesia Plan  Type of Anesthesia, risks & benefits discussed:    Anesthesia Type: Gen ETT  Intra-op Monitoring Plan: Standard ASA Monitors and Central Line  Post Op Pain Control Plan: multimodal analgesia and IV/PO Opioids PRN  Induction:  IV and rapid sequence  Airway Plan: Video, Post-Induction  Informed Consent: Informed consent signed with the Patient representative and all parties understand the risks and agree with anesthesia plan.  All questions answered.   ASA Score: 4 Emergent  Anesthesia Plan Notes: Fluid Warmer   No Versed  Minimal Fentanyl   Benadryl 6.25mg iv, Decadron 4mg, iv Zofran 4mg iv, Pepcid 20mg iv   Ofirmev 1000 mg iv  Sugammadex        Ready For Surgery From Anesthesia Perspective.     .

## 2024-07-23 NOTE — PROGRESS NOTES
"[facility] MEDICINE CLINIC NOTE    Patient Name: Glenda Gimenez  YOB: 1927    PRESENTING HISTORY     History of Present Illness:  Ms. Glenda Gimenez is a 89 y.o. female here with decreased appetite.     History largely provided by family due to dementia.     About a week of decreased appeitte  Some abdominal pain (she denies but doesn't remember).   No fever.   No vomiting.   No diarrhea.   No constipation. Unsure exactly when last BM was but has been going regularly.     Prior appendectomy.   Gallbladder in place.     ROS      OBJECTIVE:   Vital Signs:  Vitals:    07/23/24 1109   BP: (!) 100/52   Pulse: 89   Resp: 18   SpO2: (!) 94%   Weight: 80.7 kg (177 lb 14.6 oz)   Height: 5' 5" (1.651 m)            Physical Exam  Vitals and nursing note reviewed.   Constitutional:       Appearance: She is not diaphoretic.   HENT:      Head: Normocephalic and atraumatic.      Right Ear: External ear normal.      Left Ear: External ear normal.      Nose: Rhinorrhea present.   Eyes:      General: No scleral icterus.     Conjunctiva/sclera: Conjunctivae normal.      Pupils: Pupils are equal, round, and reactive to light.   Neck:      Thyroid: No thyromegaly.   Cardiovascular:      Rate and Rhythm: Normal rate and regular rhythm.      Heart sounds: Normal heart sounds. No murmur heard.  Pulmonary:      Effort: Pulmonary effort is normal. No respiratory distress.      Breath sounds: Normal breath sounds. No wheezing or rales.   Abdominal:      General: There is distension.      Tenderness: There is no abdominal tenderness. There is no guarding or rebound.      Comments: Tinkling bowel sounds   Musculoskeletal:         General: No tenderness or deformity. Normal range of motion.      Cervical back: Normal range of motion and neck supple.      Right lower leg: No edema.      Left lower leg: No edema.   Lymphadenopathy:      Cervical: No cervical adenopathy.   Skin:     General: Skin is warm and dry.      " Findings: No erythema or rash.   Neurological:      Mental Status: She is alert and oriented to person, place, and time.      Gait: Gait is intact.   Psychiatric:         Mood and Affect: Mood and affect normal.         Cognition and Memory: Memory normal.         Judgment: Judgment normal.       ASSESSMENT & PLAN:     Decreased appetite  -     X-Ray Abdomen Flat And Erect; Future; Expected date: 07/23/2024  -     CBC W/ AUTO DIFFERENTIAL; Future; Expected date: 07/23/2024  -     COMPREHENSIVE METABOLIC PANEL; Future; Expected date: 07/23/2024  -     LIPASE; Future; Expected date: 07/23/2024    I am concerned about bowel obstruction based on exam although the history disputes this.   Xray now.   Labs above.     If unrevealing, will get US.     On ARB, diuretic, potassium.   Given current GI status, think this is risky. Will hold all three for now.          William Olivia  Internal Medicine      Visit complexity inherent to evaluation and management associated with medical care services that serve as the continuing focal point for all needed health care services and/or with medical care services that are part of ongoing care related to a patient's single, serious condition or a complex condition provided today

## 2024-07-23 NOTE — TELEPHONE ENCOUNTER
Call routed from call center.  at [facility]. Sts pt son was told pt was a direct admit but she cannot see any admit orders. Per Dr. Olivia pt is not a direct admit and can go through ER as instructed at appointment. Caller relayed message to pt and son who verbalized understanding.

## 2024-07-23 NOTE — PROGRESS NOTES
Renal Dosing of Medication    An order has been entered by a pharmacist to adjust the dose and/or frequency of the medication listed below based on the patient's renal function.    Objective:  89 y.o., female, Actual Body Weight = 80.3 kg (177 lb)    Current Regimen:  Levofloxacin 500mg IV q24h    Assessment:  Estimated Creatinine Clearance: 24 mL/min (based on SCr of 1.4 mg/dL).  Renal function is poor.    Plan:  Orders have been entered to adjust the dose and/or frequency based on the patient's weight and renal function:  Levofloxacin 500mg x1 then 250 mg IV every 24 hours.    Thank you for allowing us to participate in this patient's care.     Anthony Quezada 7/23/2024 5:40 PM  Department of Pharmacy  Ext [phone]

## 2024-07-24 PROBLEM — I50.22 CHRONIC SYSTOLIC (CONGESTIVE) HEART FAILURE: Status: ACTIVE | Noted: 2024-02-20

## 2024-07-24 LAB
ALBUMIN SERPL BCP-MCNC: 3.2 G/DL (ref 3.5–5.2)
ALP SERPL-CCNC: 72 U/L (ref 55–135)
ALT SERPL W/O P-5'-P-CCNC: 35 U/L (ref 10–44)
ANION GAP SERPL CALC-SCNC: 7 MMOL/L (ref 8–16)
AST SERPL-CCNC: 40 U/L (ref 10–40)
BASOPHILS # BLD AUTO: 0.03 K/UL (ref 0–0.2)
BASOPHILS NFR BLD: 0.3 % (ref 0–1.9)
BILIRUB SERPL-MCNC: 1 MG/DL (ref 0.1–1)
BNP SERPL-MCNC: 481 PG/ML (ref 0–99)
BUN SERPL-MCNC: 28 MG/DL (ref 10–30)
CALCIUM SERPL-MCNC: 8.9 MG/DL (ref 8.7–10.5)
CHLORIDE SERPL-SCNC: 107 MMOL/L (ref 95–110)
CO2 SERPL-SCNC: 25 MMOL/L (ref 23–29)
CREAT SERPL-MCNC: 0.8 MG/DL (ref 0.5–1.4)
DIFFERENTIAL METHOD BLD: ABNORMAL
EOSINOPHIL # BLD AUTO: 0 K/UL (ref 0–0.5)
EOSINOPHIL NFR BLD: 0.1 % (ref 0–8)
ERYTHROCYTE [DISTWIDTH] IN BLOOD BY AUTOMATED COUNT: 13.2 % (ref 11.5–14.5)
EST. GFR  (NO RACE VARIABLE): >60 ML/MIN/1.73 M^2
GLUCOSE SERPL-MCNC: 118 MG/DL (ref 70–110)
HCT VFR BLD AUTO: 35.6 % (ref 37–48.5)
HGB BLD-MCNC: 11.7 G/DL (ref 12–16)
IMM GRANULOCYTES # BLD AUTO: 0.01 K/UL (ref 0–0.04)
IMM GRANULOCYTES NFR BLD AUTO: 0.1 % (ref 0–0.5)
LACTATE SERPL-SCNC: 1.1 MMOL/L (ref 0.5–1.9)
LYMPHOCYTES # BLD AUTO: 0.5 K/UL (ref 1–4.8)
LYMPHOCYTES NFR BLD: 4.2 % (ref 18–48)
MAGNESIUM SERPL-MCNC: 1.5 MG/DL (ref 1.6–2.6)
MAGNESIUM SERPL-MCNC: 2.1 MG/DL (ref 1.6–2.6)
MCH RBC QN AUTO: 30.9 PG (ref 27–31)
MCHC RBC AUTO-ENTMCNC: 32.9 G/DL (ref 32–36)
MCV RBC AUTO: 94 FL (ref 82–98)
MONOCYTES # BLD AUTO: 0.5 K/UL (ref 0.3–1)
MONOCYTES NFR BLD: 4.5 % (ref 4–15)
NEUTROPHILS # BLD AUTO: 10.5 K/UL (ref 1.8–7.7)
NEUTROPHILS NFR BLD: 90.8 % (ref 38–73)
NRBC BLD-RTO: 0 /100 WBC
PLATELET # BLD AUTO: 267 K/UL (ref 150–450)
PMV BLD AUTO: 10.5 FL (ref 9.2–12.9)
POTASSIUM SERPL-SCNC: 4.2 MMOL/L (ref 3.5–5.1)
PROT SERPL-MCNC: 6 G/DL (ref 6–8.4)
RBC # BLD AUTO: 3.79 M/UL (ref 4–5.4)
SODIUM SERPL-SCNC: 139 MMOL/L (ref 136–145)
WBC # BLD AUTO: 11.51 K/UL (ref 3.9–12.7)

## 2024-07-24 PROCEDURE — 83605 ASSAY OF LACTIC ACID: CPT | Performed by: INTERNAL MEDICINE

## 2024-07-24 PROCEDURE — 83880 ASSAY OF NATRIURETIC PEPTIDE: CPT | Performed by: INTERNAL MEDICINE

## 2024-07-24 PROCEDURE — 12000002 HC ACUTE/MED SURGE SEMI-PRIVATE ROOM

## 2024-07-24 PROCEDURE — 87040 BLOOD CULTURE FOR BACTERIA: CPT | Performed by: INTERNAL MEDICINE

## 2024-07-24 PROCEDURE — 85025 COMPLETE CBC W/AUTO DIFF WBC: CPT | Performed by: INTERNAL MEDICINE

## 2024-07-24 PROCEDURE — 94761 N-INVAS EAR/PLS OXIMETRY MLT: CPT

## 2024-07-24 PROCEDURE — 63600175 PHARM REV CODE 636 W HCPCS: Mod: JZ,JG | Performed by: INTERNAL MEDICINE

## 2024-07-24 PROCEDURE — 25000003 PHARM REV CODE 250: Performed by: INTERNAL MEDICINE

## 2024-07-24 PROCEDURE — 80053 COMPREHEN METABOLIC PANEL: CPT | Performed by: INTERNAL MEDICINE

## 2024-07-24 PROCEDURE — 83735 ASSAY OF MAGNESIUM: CPT | Mod: 91 | Performed by: HOSPITALIST

## 2024-07-24 PROCEDURE — 36415 COLL VENOUS BLD VENIPUNCTURE: CPT | Performed by: INTERNAL MEDICINE

## 2024-07-24 PROCEDURE — 99900031 HC PATIENT EDUCATION (STAT)

## 2024-07-24 PROCEDURE — 83735 ASSAY OF MAGNESIUM: CPT | Performed by: INTERNAL MEDICINE

## 2024-07-24 RX ORDER — MAGNESIUM SULFATE HEPTAHYDRATE 40 MG/ML
4 INJECTION, SOLUTION INTRAVENOUS
Status: DISCONTINUED | OUTPATIENT
Start: 2024-07-24 | End: 2024-07-31

## 2024-07-24 RX ORDER — MAGNESIUM SULFATE HEPTAHYDRATE 40 MG/ML
2 INJECTION, SOLUTION INTRAVENOUS
Status: DISCONTINUED | OUTPATIENT
Start: 2024-07-24 | End: 2024-07-31

## 2024-07-24 RX ADMIN — METRONIDAZOLE 500 MG: 500 INJECTION, SOLUTION INTRAVENOUS at 01:07

## 2024-07-24 RX ADMIN — HYDROMORPHONE HYDROCHLORIDE 0.5 MG: 0.5 INJECTION, SOLUTION INTRAMUSCULAR; INTRAVENOUS; SUBCUTANEOUS at 07:07

## 2024-07-24 RX ADMIN — LEVOFLOXACIN 250 MG: 5 INJECTION, SOLUTION INTRAVENOUS at 08:07

## 2024-07-24 RX ADMIN — ONDANSETRON 4 MG: 2 INJECTION INTRAMUSCULAR; INTRAVENOUS at 09:07

## 2024-07-24 RX ADMIN — SODIUM CHLORIDE: 9 INJECTION, SOLUTION INTRAVENOUS at 05:07

## 2024-07-24 RX ADMIN — METRONIDAZOLE 500 MG: 500 INJECTION, SOLUTION INTRAVENOUS at 05:07

## 2024-07-24 RX ADMIN — MAGNESIUM SULFATE HEPTAHYDRATE 2 G: 40 INJECTION, SOLUTION INTRAVENOUS at 05:07

## 2024-07-24 RX ADMIN — METRONIDAZOLE 500 MG: 500 INJECTION, SOLUTION INTRAVENOUS at 09:07

## 2024-07-24 RX ADMIN — PANTOPRAZOLE SODIUM 40 MG: 40 INJECTION, POWDER, FOR SOLUTION INTRAVENOUS at 09:07

## 2024-07-24 NOTE — PLAN OF CARE
Pt remains in ICU.  Awaiting bed availably.  Confused at baseline and repetitive questions.  Less anxious when family present.  Cabrera catheter remains until POD 2, at least, d/t difficult placement and marginal urine output, and distended abd per Dr Drummond.  No c/o nausea or pain although difficult to determine d/t pt mental status.  Abd remains distended and tender when palpated.  No to distant bowel sounds.  NGT remains.  LIWS.  Pt denies passing flatus but, again, pt confused at baseline.  Magnesium improved after replacement.

## 2024-07-24 NOTE — ANESTHESIA PROCEDURE NOTES
Central Line    Diagnosis: Volvulus  Patient location during procedure: done in OR  Procedure Urgency: Emergent  Procedure start time: 7/23/2024 8:34 PM  Timeout: 7/23/2024 8:34 PM  Procedure end time: 7/23/2024 8:44 PM      Staffing  Authorizing Provider: Abhay Salgado MD  Performing Provider: Abhay Salgado MD    Staffing  Performed: anesthesiologist   Anesthesiologist: Abhay Salgado MD  Performed by: Abhay Salgado MD  Authorized by: Abhay Salgado MD    Anesthesiologist was present at the time of the procedure.  Preanesthetic Checklist  Completed: patient identified, IV checked, site marked, risks and benefits discussed, surgical consent, monitors and equipment checked, pre-op evaluation, timeout performed and anesthesia consent given  Indication   Indication: vascular access     Anesthesia   general anesthesia    Central Line   Skin Prep: skin prepped with ChloraPrep, skin prep agent completely dried prior to procedure  Sterile Barriers Followed: Yes    All five maximal barriers used- gloves, gown, cap, mask, and large sterile sheet    hand hygiene performed prior to central venous catheter insertion  Location: right internal jugular.   Catheter type: triple lumen  Catheter Size: 7 Fr  Inserted Catheter Length: 13 cm  Ultrasound: vascular probe with ultrasound   Vessel Caliber: large, patent, compressibility normal  Needle advanced into vessel with real time Ultrasound guidance.  Guidewire confirmed in vessel.  sterile gel and probe cover used in ultrasound-guided central venous catheter insertion  Manometry: none  Insertion Attempts: 1   Securement:line sutured, chlorhexidine patch, sterile dressing applied and blood return through all ports    Post-Procedure   X-Ray: no pneumothorax on x-ray, placement verified by x-ray, tip termination and successful placement  Tip termination comments: SVC   Adverse Events:none      Guidewire Guidewire removed intact.   Additional Notes  No indication of  arterial puncture at any time.  All ports aspirated and flushed easily.

## 2024-07-24 NOTE — ASSESSMENT & PLAN NOTE
Blood pressure controlled.  Holding antihypertensives while NPO.  Monitor blood pressure every hour while in ICU.  Use IV vasodilators as needed.

## 2024-07-24 NOTE — NURSING
Nurses Note -- 4 Eyes      7/24/2024   0701 AM      Skin assessed during: Q Shift Change      [] No Altered Skin Integrity Present    []Prevention Measures Documented      [x] Yes- Altered Skin Integrity Present or Discovered   [] LDA Added if Not in Epic (Describe Wound)   [] New Altered Skin Integrity was Present on Admit and Documented in LDA   [] Wound Image Taken    Wound Care Consulted? No    Attending Nurse:  bran Crews RN/Staff Member:  sonja       Excoriation and and incision

## 2024-07-24 NOTE — PROGRESS NOTES
[facility] Medicine  Progress Note    Patient Name: Glenda Gimenez  MRN: [id number]  Patient Class: IP- Inpatient   Admission Date: 7/23/2024  Length of Stay: 1 days  Attending Physician: Josse Wilkerson MD  Primary Care Provider: William Olivia MD        Subjective:     Principal Problem:Cecal volvulus        HPI:  89 year old pt getting admitted with cecal volvulus/SBO/bowel ischemia  Pt has been suffering from abdominal pain for past 1 week  Also she lost her appetite   Finally family took the pt to PCP and pt had KUB which were concerning  She was referred to ER and had CT scan done  Surgeon was called and pt will have emergent OR visist soon  History mainly from family because pt has dementia    Overview/Hospital Course:  No notes on file    Interval History:  The patient underwent laparotomy and resection of the cecum and ascending colon due to volvulus and obstruction.  She is now awake, alert, and states that she feels well.  Still has a nasogastric tube and still is NPO.    Review of Systems   Respiratory:  Negative for shortness of breath.    Cardiovascular:  Negative for chest pain.     Objective:     Vital Signs (Most Recent):  Temp: 97.6 °F (36.4 °C) (07/24/24 1530)  Pulse: 80 (07/24/24 1530)  Resp: (!) 30 (07/24/24 1530)  BP: 136/62 (07/24/24 1530)  SpO2: (!) 92 % (07/24/24 1530) Vital Signs (24h Range):  Temp:  [97.6 °F (36.4 °C)-97.8 °F (36.6 °C)] 97.6 °F (36.4 °C)  Pulse:  [67-88] 80  Resp:  [19-38] 30  SpO2:  [92 %-100 %] 92 %  BP: (113-164)/(56-99) 136/62     Weight: 80.9 kg (178 lb 5.6 oz)  Body mass index is 30.61 kg/m².    Intake/Output Summary (Last 24 hours) at 7/24/2024 1624  Last data filed at 7/24/2024 1425  Gross per 24 hour   Intake 3593.33 ml   Output 1200 ml   Net 2393.33 ml         Physical Exam  Constitutional:       General: She is not in acute distress.     Appearance: She is not ill-appearing.   HENT:      Nose:      Comments: Nasogastric tube  present  Eyes:      General:         Right eye: No discharge.         Left eye: No discharge.   Neck:      Vascular: No JVD.   Cardiovascular:      Rate and Rhythm: Normal rate and regular rhythm.   Pulmonary:      Effort: Pulmonary effort is normal.      Breath sounds: Normal breath sounds.   Abdominal:      General: Abdomen is flat. Bowel sounds are normal. There is no distension.      Palpations: Abdomen is soft.      Tenderness: There is no abdominal tenderness.      Comments: Fresh midline incision.   Musculoskeletal:      Right lower leg: No edema.      Left lower leg: No edema.   Skin:     General: Skin is warm and moist.      Findings: No rash.   Neurological:      Mental Status: She is alert. She is disoriented and confused.   Psychiatric:         Attention and Perception: Attention normal.             Significant Labs: All pertinent labs within the past 24 hours have been reviewed.    Significant Imaging: I have reviewed all pertinent imaging results/findings within the past 24 hours.    Assessment/Plan:      * Cecal volvulus  Lead to small-bowel obstruction.  She went to the OR and had resection of affected bowel.  Continue nasogastric tube.  Defer to general surgeon regarding further management.    Small bowel obstruction  Due to cecal volvulus.  She went to the OR and underwent resection of the cecum and ascending colon.  Anastomosis was created.  Continue nasogastric tube to suction and defer to general surgeon regarding further management.    Ischemia, bowel  Ruled out      Chronic systolic (congestive) heart failure  Echo from two years ago showed preserved EF.  Currently she is euvolemic.  When she is taken off NPO status, I will put her back on her cardiac meds, including oral diuretic and Benicar.    Coronary artery disease of native artery of native heart with stable angina pectoris  Stable.  When able to take oral medications, we will put her back on her usual cardiac meds.    Essential  hypertension  Blood pressure controlled.  Holding antihypertensives while NPO.  Monitor blood pressure every hour while in ICU.  Use IV vasodilators as needed.    Hypothyroid  Stable.  Resume levothyroxine when able to take p.o..        VTE Risk Mitigation (From admission, onward)           Ordered     IP VTE HIGH RISK PATIENT  Once         07/23/24 1733     Place sequential compression device  Until discontinued         07/23/24 1733                    Discharge Planning   BOGDAN:      Code Status: Full Code   Is the patient medically ready for discharge?:     Reason for patient still in hospital (select all that apply): Patient trending condition and Treatment  Discharge Plan A: Home Health              Josse Wilkerson MD  Department of Hospital Medicine   Formerly [facility], [facility]

## 2024-07-24 NOTE — SUBJECTIVE & OBJECTIVE
Interval History:  The patient underwent laparotomy and resection of the cecum and ascending colon due to volvulus and obstruction.  She is now awake, alert, and states that she feels well.  Still has a nasogastric tube and still is NPO.    Review of Systems   Respiratory:  Negative for shortness of breath.    Cardiovascular:  Negative for chest pain.     Objective:     Vital Signs (Most Recent):  Temp: 97.6 °F (36.4 °C) (07/24/24 1530)  Pulse: 80 (07/24/24 1530)  Resp: (!) 30 (07/24/24 1530)  BP: 136/62 (07/24/24 1530)  SpO2: (!) 92 % (07/24/24 1530) Vital Signs (24h Range):  Temp:  [97.6 °F (36.4 °C)-97.8 °F (36.6 °C)] 97.6 °F (36.4 °C)  Pulse:  [67-88] 80  Resp:  [19-38] 30  SpO2:  [92 %-100 %] 92 %  BP: (113-164)/(56-99) 136/62     Weight: 80.9 kg (178 lb 5.6 oz)  Body mass index is 30.61 kg/m².    Intake/Output Summary (Last 24 hours) at 7/24/2024 1624  Last data filed at 7/24/2024 1425  Gross per 24 hour   Intake 3593.33 ml   Output 1200 ml   Net 2393.33 ml         Physical Exam  Constitutional:       General: She is not in acute distress.     Appearance: She is not ill-appearing.   HENT:      Nose:      Comments: Nasogastric tube present  Eyes:      General:         Right eye: No discharge.         Left eye: No discharge.   Neck:      Vascular: No JVD.   Cardiovascular:      Rate and Rhythm: Normal rate and regular rhythm.   Pulmonary:      Effort: Pulmonary effort is normal.      Breath sounds: Normal breath sounds.   Abdominal:      General: Abdomen is flat. Bowel sounds are normal. There is no distension.      Palpations: Abdomen is soft.      Tenderness: There is no abdominal tenderness.      Comments: Fresh midline incision.   Musculoskeletal:      Right lower leg: No edema.      Left lower leg: No edema.   Skin:     General: Skin is warm and moist.      Findings: No rash.   Neurological:      Mental Status: She is alert. She is disoriented and confused.   Psychiatric:         Attention and Perception:  Attention normal.             Significant Labs: All pertinent labs within the past 24 hours have been reviewed.    Significant Imaging: I have reviewed all pertinent imaging results/findings within the past 24 hours.

## 2024-07-24 NOTE — PLAN OF CARE
Problem: Oral Intake Inadequate  Goal: Improved Oral Intake  Outcome: Progressing   Altered GI fxn and NPO s/p exp lap. RD follow for diet advancement as medically feasible.

## 2024-07-24 NOTE — TRANSFER OF CARE
Anesthesia Transfer of Care Note    Patient: Glenda Gimenez    Procedure(s) Performed: Procedure(s) (LRB):  LAPAROTOMY, EXPLORATORY (N/A)  HEMICOLECTOMY, RIGHT (Right)  COLECTOMY, WITH ILEUM TO COLON ANASTOMOSIS    Patient location: ICU    Anesthesia Type: general    Transport from OR: Transported from OR on 6-10 L/min O2 by face mask with adequate spontaneous ventilation    Post pain: adequate analgesia    Post assessment: no apparent anesthetic complications and tolerated procedure well    Post vital signs: stable    Level of consciousness: awake    Nausea/Vomiting: no nausea/vomiting    Complications: none    Transfer of care protocol was followed      Last vitals: Visit Vitals  BP (!) 147/70   Pulse 79   Temp 36.7 °C (98 °F)   Resp 19   Wt 80.3 kg (177 lb)   LMP  (LMP Unknown)   SpO2 (!) 94%   BMI 29.45 kg/m²     
Negative

## 2024-07-24 NOTE — NURSING
Nurses Note -- 4 Eyes      7/23/2024   11:19 PM      Skin assessed during: Admit      [] No Altered Skin Integrity Present    []Prevention Measures Documented      [x] Yes- Altered Skin Integrity Present or Discovered   [x] LDA Added if Not in Epic (Describe Wound)   [x] New Altered Skin Integrity was Present on Admit and Documented in LDA   [x] Wound Image Taken    Wound Care Consulted? Yes    Attending Nurse:  Arabella Crews RN/Staff Member:   Chandrika

## 2024-07-24 NOTE — ASSESSMENT & PLAN NOTE
Lead to small-bowel obstruction.  She went to the OR and had resection of affected bowel.  Continue nasogastric tube.  Defer to general surgeon regarding further management.

## 2024-07-24 NOTE — ASSESSMENT & PLAN NOTE
Echo from two years ago showed preserved EF.  Currently she is euvolemic.  When she is taken off NPO status, I will put her back on her cardiac meds, including oral diuretic and Benicar.

## 2024-07-24 NOTE — ANESTHESIA POSTPROCEDURE EVALUATION
Anesthesia Post Evaluation    Patient: Glenda Gimenez    Procedure(s) Performed: Procedure(s) (LRB):  LAPAROTOMY, EXPLORATORY (N/A)  HEMICOLECTOMY, RIGHT (Right)  COLECTOMY, WITH ILEUM TO COLON ANASTOMOSIS    Final Anesthesia Type: general      Patient location during evaluation: ICU  Patient participation: Yes- Able to Participate  Level of consciousness: awake and alert  Post-procedure vital signs: reviewed and stable  Pain management: adequate  Airway patency: patent    PONV status at discharge: No PONV  Anesthetic complications: no      Cardiovascular status: stable  Respiratory status: unassisted and spontaneous ventilation  Hydration status: euvolemic  Follow-up not needed.              Vitals Value Taken Time   /64 07/23/24 2215   Temp  07/23/24 2226   Pulse 74 07/23/24 2224   Resp 15 07/23/24 2224   SpO2 98 % 07/23/24 2222   Vitals shown include unfiled device data.      No case tracking events are documented in the log.      Pain/Lakeshia Score: No data recorded

## 2024-07-24 NOTE — CONSULTS
"[facility]  Adult Nutrition   Consult Note (Initial Assessment)     SUMMARY     Recommendations  Recommendation/Intervention:   1. Once medically feasible avance to soft blad diet with texture per speech SLP.   2. If unable to advance diet within 48 hrs, consider nutrition support. Clinimix @ 75 ml/hr (76.5 gm protein, 612 kcal)   3. RD following.  Goals: Advance diet and tolerate intake to meet > 50% of estimated needs by RD follow up.  Nutrition Goal Status: progressing towards goal    Nutrition Diagnosis PES Statement: Inadequate oral intake related to altered GI fxn as evidenced by s/p exp lap and continue NPO status at this time.      Dietitian Rounds Brief   RD consult for poor intake. Pt is s/p exp lap. NG to suction. She was admitted with cecal volvulus/SBO/bowel ischemia. Per H& P:  Pt had been suffering from abdominal pain for past 1 week and poor appetite. Pt also  has dementia.     Nutrition Related Social Determinants of Health: SDOH: Unable to assess at this time.   Food Insecurity: No Food Insecurity (7/22/2024)    Hunger Vital Sign     Worried About Running Out of Food in the Last Year: Never true     Ran Out of Food in the Last Year: Never true        Diet order:   Current Diet Order: NPO        Evaluation of Received Nutrient/Fluid Intake  Energy Calories Required: not meeting needs  Protein Required: not meeting needs  Fluid Required: not meeting needs      % Intake of Estimated Energy Needs: 0%  % Meal Intake: NPO      Intake/Output Summary (Last 24 hours) at 7/24/2024 1411  Last data filed at 7/24/2024 1128  Gross per 24 hour   Intake 3873.33 ml   Output 1125 ml   Net 2748.33 ml        Anthropometrics  Temp: 97.8 °F (36.6 °C)  Height Method: Stated  Height: 5' 4" (162.6 cm)  Height (inches): 64 in  Weight Method: Bed Scale  Weight: 80.9 kg (178 lb 5.6 oz)  Weight (lb): 178.35 lb  Ideal Body Weight (IBW), Female: 120 lb  % Ideal Body Weight, Female (lb): 148.63 %  BMI (Calculated): " 30.6  BMI Grade: 30 - 34.9- obesity - grade I       Estimated/Assessed Needs  Weight Used For Calorie Calculations: 80.9 kg (178 lb 5.6 oz)  Energy Calorie Requirements (kcal): 5275-8634 kcal (20-25 kcal/ kg bw)  Energy Need Method: Kcal/kg  Protein Requirements:  gm (1.5-2.0 gm/ kg bw)  Weight Used For Protein Calculations: 55 kg (121 lb 4.1 oz)     Estimated Fluid Requirement Method: RDA Method  RDA Method (mL): 1618       Reason for Assessment  Reason For Assessment: consult  Diagnosis: other (see comments) (cecal volvulus)  Nutrition Discharge Planning: pending  Past Medical History:   Diagnosis Date    Anticoagulant long-term use     Arthritis     Dislocation of right shoulder joint     Hypertension     Hypothyroidism     Shoulder disorder     right    Thyroid disease       Nutrition/Diet History  Spiritual, Cultural Beliefs, Congregational Practices, Values that Affect Care: no  Food Allergies: NKFA  Factors Affecting Nutritional Intake: abdominal pain, NPO    Nutrition Risk Screen  Nutrition Risk Screen: reduced oral intake over the last month     MST Score: 0  Have you recently lost weight without trying?: No  Weight loss score: 0  Have you been eating poorly because of a decreased appetite?: No  Appetite score: 0       Weight History:  Wt Readings from Last 10 Encounters:   07/23/24 80.9 kg (178 lb 5.6 oz)   07/23/24 80.7 kg (177 lb 14.6 oz)   05/30/24 80.4 kg (177 lb 4 oz)   02/20/24 80.7 kg (178 lb)   02/09/24 79.4 kg (175 lb)   11/24/23 79.7 kg (175 lb 11.3 oz)   08/15/23 81.4 kg (179 lb 7.3 oz)   07/11/23 80.4 kg (177 lb 4 oz)   06/12/23 80.8 kg (178 lb 2.1 oz)   05/04/23 77.1 kg (170 lb)        Lab/Procedures/Meds: Pertinent Labs/Meds Reviewed    Medications:Pertinent Medications Reviewed  Scheduled Meds:   lactated ringers  500 mL Intravenous Once    levoFLOXacin  250 mg Intravenous Q24H    metronidazole  500 mg Intravenous Q8H    pantoprazole  40 mg Intravenous Daily     Continuous Infusions:   0.9%  NaCl   Intravenous Continuous 50 mL/hr at 07/24/24 1252 Rate Change at 07/24/24 1252         Labs: Pertinent Labs Reviewed  Clinical Chemistry:  Recent Labs   Lab 07/23/24  1151 07/23/24  1423 07/24/24  0329    139 139   K 3.7 3.7 4.2    102 107   CO2 23 26 25   * 157* 118*   BUN 37* 40* 28   CREATININE 1.4 1.4 0.8   CALCIUM 10.6* 10.4 8.9   PROT 7.9 7.8 6.0   ALBUMIN 3.6 4.1 3.2*   BILITOT 0.8 0.8 1.0   ALKPHOS 86 79 72   AST 48* 44* 40   ALT 38 35 35   ANIONGAP 13 11 7*   MG  --   --  1.5*   LIPASE 10 7  --      CBC:   Recent Labs   Lab 07/24/24  0329   WBC 11.51   RBC 3.79*   HGB 11.7*   HCT 35.6*      MCV 94   MCH 30.9   MCHC 32.9     Cardiac Profile:  Recent Labs   Lab 07/24/24  0329   *         Monitor and Evaluation  Food and Nutrient Intake: energy intake, food and beverage intake  Food and Nutrient Adminstration: diet order  Knowledge/Beliefs/Attitudes: food and nutrition knowledge/skill, beliefs and attitudes  Physical Activity and Function: factors affecting access to physical activity, nutrition-related ADLs and IADLs  Anthropometric Measurements: weight, weight change, body mass index  Biochemical Data, Medical Tests and Procedures: electrolyte and renal panel, lipid profile, gastrointestinal profile, glucose/endocrine profile     Nutrition Risk  Level of Risk/Frequency of Follow-up:  (high 2 xweek)     Nutrition Follow-Up  RD Follow-up?: Yes      Pauly Gutiérrez, JUAN 07/24/2024 2:11 PM

## 2024-07-24 NOTE — CARE UPDATE
07/23/24 4281   Patient Assessment/Suction   Level of Consciousness (AVPU) alert   Respiratory Effort Normal;Unlabored   Expansion/Accessory Muscles/Retractions no use of accessory muscles;no retractions;expansion symmetric   All Lung Fields Breath Sounds diminished   Rhythm/Pattern, Respiratory unlabored;no shortness of breath reported   Cough Frequency no cough   PRE-TX-O2   Device (Oxygen Therapy) nasal cannula   $ Is the patient on Low Flow Oxygen? Yes   Flow (L/min) (Oxygen Therapy) 3   SpO2 97 %   Pulse Oximetry Type Continuous   $ Pulse Oximetry - Multiple Charge Pulse Oximetry - Multiple   Pulse 72   Resp (!) 24

## 2024-07-24 NOTE — ASSESSMENT & PLAN NOTE
Due to cecal volvulus.  She went to the OR and underwent resection of the cecum and ascending colon.  Anastomosis was created.  Continue nasogastric tube to suction and defer to general surgeon regarding further management.

## 2024-07-24 NOTE — PROGRESS NOTES
Patient seen and examined.  Recovering well after surgery yesterday.      Vitals are stable   Afebrile   Abdomen appropriately tender     Labs reviewed, stable     No significant changes from surgical perspective.  Okay to step-down from a surgical perspective.    Recommend leaving Cabrera in today, likely okay to remove tomorrow   Leave NG-tube in place for now  Okay for sparing ice chips for comfort  Await better bowel function

## 2024-07-24 NOTE — OP NOTE
[facility]  Surgery Department  Operative Note    SUMMARY     Date of Procedure: 7/23/2024     Procedure: Procedure(s) (LRB):  LAPAROTOMY, EXPLORATORY (N/A)  HEMICOLECTOMY, RIGHT (Right)  COLECTOMY, WITH ILEUM TO COLON ANASTOMOSIS     Surgeons and Role:     * Bahman Drummond MD - Primary    Assisting Surgeon: None    Pre-Operative Diagnosis: Cecal volvulus [K56.2]    Post-Operative Diagnosis: Post-Op Diagnosis Codes:     * Cecal volvulus [K56.2]    Anesthesia: General    Operative Findings (including complications, if any):  Cecal volvulus with severely dilated cecum.  Right hemicolectomy.  Ileocolic anastomosis    Description of Technical Procedures:  This is a 89-year-old female who presented with a cecal volvulus on CT imaging.  I recommended exploration for which family did consent.  She was taken to the OR, placed supine, general endotracheal anesthesia was induced, the abdomen was prepped and draped in the usual fashion, a time-out was completed.  A generous midline laparotomy was made sharply.  Deep tissues were divided using electrocautery down to and through midline fascia in the abdomen was entered.  Immediately upon entry, there was a large amount of ascites which was evacuated.  There was a very dilated segment of colon which on further inspection was the cecum.  It had volvulized at the mesentery causing an obstruction.  The terminal ileum was divided using a blue staple load.  The mesentery was then divided using a LigaSure device along the right hemicolon.  Given the distention, the right colon had already been medialized.  I was able to transect the mesentery up to the transverse colon.  Right branch off the middle colic vessel was identified on palpation.  A window in the mesentery of the transverse colon at this location was created bluntly in the transverse colon was divided using an additional blue staple load.  The remaining mesentery was divided using a LigaSure and the specimen  was passed off as terminal ileum and right colon.  Small bowel and colon appeared viable and the patient was hemodynamically stable.  I elected to create a stapled anti peristaltic ileocolic anastomosis.  The common entero colotomy was also closed using an additional blue staple load.  The abdomen was inspected for hemostasis which was adequate.  Staple lines were oversewn with silk sutures.  Midline fascia was closed with PDS.  Skin was closed with staples.  Patient tolerated the entire procedure without apparent complication, was extubated in the OR, brought to recovery in stable condition.  All counts were correct.    Significant Surgical Tasks Conducted by the Assistant(s), if Applicable: n/a    Estimated Blood Loss (EBL): min           Implants: * No implants in log *    Specimens:   Specimen (24h ago, onward)       Start     Ordered    07/23/24 2118  Specimen to Pathology - Surgery  Once        Comments: Pre-op Diagnosis: Cecal volvulus [K56.2]Procedure(s):LAPAROTOMY, EXPLORATORY Number of specimens: 1Name of specimens: terminal ileum and right colon     Question:  Release to patient  Answer:  Immediate    07/23/24 2119                            Condition: Good    Disposition:  ICU hemodynamically stable    Attestation: Op Note Attestation: I was physically present and scrubbed for the entire procedure.

## 2024-07-24 NOTE — PLAN OF CARE
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 No  Secondary Emergency Contact: Mary Gimenez  Mobile Phone: [phone]  Relation: Relative  Preferred language: English   needed? No    Discharge Plan A: Home Health  Discharge Plan B: Home with family      [id number] - [place] - [address] AT [facility] [address]  [address]  [place] [address]  Phone: [phone] Fax: [phone]    [id number] - [place] - [address]  [address]  [place] [address]  Phone: [phone] Fax: [phone]    SW met with patient at bedside to complete discharge planning assessment.  Patient alert and oriented xs 4.  Patient verified all demographic information on facesheet is correct.  Patient verified PCP is Dr. Olivia.  Patient verified primary health insurance is [employer].  Patient with NO home health but has listed DME.  Patient with NO POA or Living Will.  Patient not on dialysis or medication coumadin.  Patient with no 30 day admission.  Patient with no financial issues at this time.  Patient family will provide transportation upon discharge from facility.  Patient independent with ADLs, live with adult son, son drives.     Initial Assessment (most recent)       Adult Discharge Assessment - 07/24/24 7171          Discharge Assessment    Assessment Type Discharge Planning  Assessment     Confirmed/corrected address, phone number and insurance Yes     Confirmed Demographics Correct on Facesheet     Source of Information patient     Communicated BOGDAN with patient/caregiver Date not available/Unable to determine     People in Home child(kiarra), adult     Facility Arrived From: home     Do you expect to return to your current living situation? Yes     Do you have help at home or someone to help you manage your care at home? Yes     Who are your caregiver(s) and their phone number(s)? son     Prior to hospitilization cognitive status: Alert/Oriented     Current cognitive status: Alert/Oriented     Walking or Climbing Stairs Difficulty yes     Walking or Climbing Stairs ambulation difficulty, requires equipment;stair climbing difficulty, requires equipment     Dressing/Bathing Difficulty yes     Dressing/Bathing bathing difficulty, requires equipment;dressing difficulty, requires equipment     Equipment Currently Used at Home rollator     Readmission within 30 days? No     Patient currently being followed by outpatient case management? No     Do you currently have service(s) that help you manage your care at home? No     Do you take prescription medications? Yes     Do you have prescription coverage? Yes     Do you have any problems affording any of your prescribed medications? No     Is the patient taking medications as prescribed? yes     Who is going to help you get home at discharge? son     How do you get to doctors appointments? family or friend will provide     Are you on dialysis? No     Do you take coumadin? No     Discharge Plan A Home Health     Discharge Plan B Home with family     DME Needed Upon Discharge  none     Discharge Plan discussed with: Patient     Transition of Care Barriers None

## 2024-07-24 NOTE — PLAN OF CARE
Plan of care reviewed patient including fall prevention plan and pain management plan. Incision is clean dry and intact. Bedside report given to Jolene.  Problem: Infection  Goal: Absence of Infection Signs and Symptoms  Outcome: Progressing     Problem: Adult Inpatient Plan of Care  Goal: Plan of Care Review  Outcome: Progressing  Goal: Patient-Specific Goal (Individualized)  Outcome: Progressing  Goal: Absence of Hospital-Acquired Illness or Injury  Outcome: Progressing  Goal: Optimal Comfort and Wellbeing  Outcome: Progressing  Goal: Readiness for Transition of Care  Outcome: Progressing     Problem: Fall Injury Risk  Goal: Absence of Fall and Fall-Related Injury  Outcome: Progressing     Problem: Wound  Goal: Optimal Coping  Outcome: Progressing  Goal: Optimal Functional Ability  Outcome: Progressing  Goal: Absence of Infection Signs and Symptoms  Outcome: Progressing  Goal: Improved Oral Intake  Outcome: Progressing  Goal: Optimal Pain Control and Function  Outcome: Progressing  Goal: Skin Health and Integrity  Outcome: Progressing  Goal: Optimal Wound Healing  Outcome: Progressing     Problem: Skin Injury Risk Increased  Goal: Skin Health and Integrity  Outcome: Progressing

## 2024-07-24 NOTE — PLAN OF CARE
07/24/24 0801   Patient Assessment/Suction   Level of Consciousness (AVPU) alert   Respiratory Effort Normal;Unlabored   Expansion/Accessory Muscles/Retractions no retractions;no use of accessory muscles   PRE-TX-O2   Device (Oxygen Therapy) room air   SpO2 96 %   Pulse Oximetry Type Continuous   $ Pulse Oximetry - Multiple Charge Pulse Oximetry - Multiple   Pulse 79   Resp (!) 23   Education   $ Education 15 min

## 2024-07-24 NOTE — ANESTHESIA PROCEDURE NOTES
Intubation    Date/Time: 7/23/2024 8:28 PM    Performed by: Blas Storm CRNA  Authorized by: Abhay Salgado MD    Intubation:     Induction:  Rapid sequence induction    Intubated:  Postinduction    Mask Ventilation:  N/a    Attempts:  1    Attempted By:  CRNA    Method of Intubation:  Video laryngoscopy    Blade:  [place] 3    Laryngeal View Grade: Grade I - full view of cords      Difficult Airway Encountered?: No      Complications:  None    Airway Device:  Oral endotracheal tube    Airway Device Size:  7.0    Style/Cuff Inflation:  Cuffed    Inflation Amount (mL):  7    Tube secured:  21    Secured at:  The lips    Placement Verified By:  Capnometry    Complicating Factors:  None    Findings Post-Intubation:  BS equal bilateral and atraumatic/condition of teeth unchanged

## 2024-07-25 PROBLEM — N39.0 UTI (URINARY TRACT INFECTION): Status: ACTIVE | Noted: 2024-07-25

## 2024-07-25 LAB
ALBUMIN SERPL BCP-MCNC: 3 G/DL (ref 3.5–5.2)
ALP SERPL-CCNC: 70 U/L (ref 55–135)
ALT SERPL W/O P-5'-P-CCNC: 25 U/L (ref 10–44)
ANION GAP SERPL CALC-SCNC: 5 MMOL/L (ref 8–16)
AST SERPL-CCNC: 31 U/L (ref 10–40)
BACTERIA UR CULT: ABNORMAL
BASOPHILS # BLD AUTO: 0.04 K/UL (ref 0–0.2)
BASOPHILS NFR BLD: 0.3 % (ref 0–1.9)
BILIRUB SERPL-MCNC: 0.8 MG/DL (ref 0.1–1)
BUN SERPL-MCNC: 21 MG/DL (ref 10–30)
CALCIUM SERPL-MCNC: 9.2 MG/DL (ref 8.7–10.5)
CHLORIDE SERPL-SCNC: 109 MMOL/L (ref 95–110)
CO2 SERPL-SCNC: 27 MMOL/L (ref 23–29)
CREAT SERPL-MCNC: 0.8 MG/DL (ref 0.5–1.4)
DIFFERENTIAL METHOD BLD: ABNORMAL
EOSINOPHIL # BLD AUTO: 0.1 K/UL (ref 0–0.5)
EOSINOPHIL NFR BLD: 0.7 % (ref 0–8)
ERYTHROCYTE [DISTWIDTH] IN BLOOD BY AUTOMATED COUNT: 13.3 % (ref 11.5–14.5)
EST. GFR  (NO RACE VARIABLE): >60 ML/MIN/1.73 M^2
GLUCOSE SERPL-MCNC: 116 MG/DL (ref 70–110)
HCT VFR BLD AUTO: 34.4 % (ref 37–48.5)
HGB BLD-MCNC: 11.1 G/DL (ref 12–16)
IMM GRANULOCYTES # BLD AUTO: 0.09 K/UL (ref 0–0.04)
IMM GRANULOCYTES NFR BLD AUTO: 0.7 % (ref 0–0.5)
LYMPHOCYTES # BLD AUTO: 0.9 K/UL (ref 1–4.8)
LYMPHOCYTES NFR BLD: 7.1 % (ref 18–48)
MAGNESIUM SERPL-MCNC: 2 MG/DL (ref 1.6–2.6)
MCH RBC QN AUTO: 31.1 PG (ref 27–31)
MCHC RBC AUTO-ENTMCNC: 32.3 G/DL (ref 32–36)
MCV RBC AUTO: 96 FL (ref 82–98)
MONOCYTES # BLD AUTO: 1.2 K/UL (ref 0.3–1)
MONOCYTES NFR BLD: 9.4 % (ref 4–15)
NEUTROPHILS # BLD AUTO: 10.7 K/UL (ref 1.8–7.7)
NEUTROPHILS NFR BLD: 81.8 % (ref 38–73)
NRBC BLD-RTO: 0 /100 WBC
PLATELET # BLD AUTO: 269 K/UL (ref 150–450)
PMV BLD AUTO: 10.4 FL (ref 9.2–12.9)
POTASSIUM SERPL-SCNC: 4.7 MMOL/L (ref 3.5–5.1)
PROT SERPL-MCNC: 6 G/DL (ref 6–8.4)
RBC # BLD AUTO: 3.57 M/UL (ref 4–5.4)
SODIUM SERPL-SCNC: 141 MMOL/L (ref 136–145)
WBC # BLD AUTO: 13.07 K/UL (ref 3.9–12.7)

## 2024-07-25 PROCEDURE — 97116 GAIT TRAINING THERAPY: CPT

## 2024-07-25 PROCEDURE — 27000221 HC OXYGEN, UP TO 24 HOURS

## 2024-07-25 PROCEDURE — 25000003 PHARM REV CODE 250: Performed by: HOSPITALIST

## 2024-07-25 PROCEDURE — 12000002 HC ACUTE/MED SURGE SEMI-PRIVATE ROOM

## 2024-07-25 PROCEDURE — 36415 COLL VENOUS BLD VENIPUNCTURE: CPT | Performed by: INTERNAL MEDICINE

## 2024-07-25 PROCEDURE — 94761 N-INVAS EAR/PLS OXIMETRY MLT: CPT

## 2024-07-25 PROCEDURE — 80053 COMPREHEN METABOLIC PANEL: CPT | Performed by: INTERNAL MEDICINE

## 2024-07-25 PROCEDURE — 25000003 PHARM REV CODE 250: Performed by: INTERNAL MEDICINE

## 2024-07-25 PROCEDURE — 83735 ASSAY OF MAGNESIUM: CPT | Performed by: INTERNAL MEDICINE

## 2024-07-25 PROCEDURE — 85025 COMPLETE CBC W/AUTO DIFF WBC: CPT | Performed by: INTERNAL MEDICINE

## 2024-07-25 PROCEDURE — 63600175 PHARM REV CODE 636 W HCPCS: Performed by: INTERNAL MEDICINE

## 2024-07-25 PROCEDURE — 97162 PT EVAL MOD COMPLEX 30 MIN: CPT

## 2024-07-25 RX ORDER — DIAZEPAM 10 MG/2ML
2.5 INJECTION INTRAMUSCULAR ONCE
Status: COMPLETED | OUTPATIENT
Start: 2024-07-25 | End: 2024-07-25

## 2024-07-25 RX ORDER — MUPIROCIN 20 MG/G
OINTMENT TOPICAL 2 TIMES DAILY
Status: DISPENSED | OUTPATIENT
Start: 2024-07-25 | End: 2024-07-30

## 2024-07-25 RX ORDER — LEVOTHYROXINE SODIUM ANHYDROUS 100 UG/5ML
40 INJECTION, POWDER, LYOPHILIZED, FOR SOLUTION INTRAVENOUS DAILY
Status: DISCONTINUED | OUTPATIENT
Start: 2024-07-25 | End: 2024-07-26

## 2024-07-25 RX ORDER — QUETIAPINE FUMARATE 25 MG/1
25 TABLET, FILM COATED ORAL DAILY
Status: DISCONTINUED | OUTPATIENT
Start: 2024-07-25 | End: 2024-08-01 | Stop reason: HOSPADM

## 2024-07-25 RX ORDER — DONEPEZIL HYDROCHLORIDE 5 MG/1
5 TABLET, FILM COATED ORAL NIGHTLY
Status: DISCONTINUED | OUTPATIENT
Start: 2024-07-25 | End: 2024-08-01 | Stop reason: HOSPADM

## 2024-07-25 RX ADMIN — METRONIDAZOLE 500 MG: 500 INJECTION, SOLUTION INTRAVENOUS at 04:07

## 2024-07-25 RX ADMIN — QUETIAPINE FUMARATE 25 MG: 25 TABLET ORAL at 04:07

## 2024-07-25 RX ADMIN — DONEPEZIL HYDROCHLORIDE 5 MG: 5 TABLET, FILM COATED ORAL at 08:07

## 2024-07-25 RX ADMIN — PANTOPRAZOLE SODIUM 40 MG: 40 INJECTION, POWDER, FOR SOLUTION INTRAVENOUS at 09:07

## 2024-07-25 RX ADMIN — LEVOTHYROXINE SODIUM ANHYDROUS 40 MCG: 100 INJECTION, POWDER, LYOPHILIZED, FOR SOLUTION INTRAVENOUS at 04:07

## 2024-07-25 RX ADMIN — MUPIROCIN 1 G: 20 OINTMENT TOPICAL at 09:07

## 2024-07-25 RX ADMIN — LEVOFLOXACIN 250 MG: 5 INJECTION, SOLUTION INTRAVENOUS at 08:07

## 2024-07-25 RX ADMIN — METRONIDAZOLE 500 MG: 500 INJECTION, SOLUTION INTRAVENOUS at 12:07

## 2024-07-25 RX ADMIN — MUPIROCIN 1 G: 20 OINTMENT TOPICAL at 08:07

## 2024-07-25 RX ADMIN — METRONIDAZOLE 500 MG: 500 INJECTION, SOLUTION INTRAVENOUS at 09:07

## 2024-07-25 RX ADMIN — DIAZEPAM 2.5 MG: 5 INJECTION, SOLUTION INTRAMUSCULAR; INTRAVENOUS at 12:07

## 2024-07-25 NOTE — PROGRESS NOTES
Patient seen and examined.  She pulled out her NG tube.  No significant bowel function.  Pain is well controlled.      Vitals are stable   Afebrile   Abdomen is appropriately tender     Labs reviewed    No significant changes.  Okay for small sips and chips  Trend labs.  CRP added.

## 2024-07-25 NOTE — ASSESSMENT & PLAN NOTE
Blood pressure controlled.  Holding antihypertensives while NPO.  Monitor blood pressure  Use IV vasodilators as needed.

## 2024-07-25 NOTE — ASSESSMENT & PLAN NOTE
Lead to small-bowel obstruction.  She went to the OR and had resection of affected bowel.  Continue nasogastric tube.  Defer to general surgeon regarding further management.  Patient accidentally removed the NG tube and wait for the surgeon.  Will not reinsert at this time  Hemoglobin stable white cell slightly elevated  BNP is elevated and can not give much of the fluid

## 2024-07-25 NOTE — PT/OT/SLP EVAL
Physical Therapy Evaluation    Patient Name:  Glenda Gimenez   MRN:  [id number]    Recommendations:     Discharge Recommendations: Low Intensity Therapy (with close supervision for mobility)   Discharge Equipment Recommendations: none   Barriers to discharge:  due to cognitive impairment PT feels pt needs to be at baseline or very close to that prior to discharge home as she will otherwise not ask for assistance if needed.    Assessment:     Glenda Gimenez is a 89 y.o. female admitted with a medical diagnosis of Cecal volvulus.  She presents with the following impairments/functional limitations: weakness, impaired endurance, impaired self care skills, impaired functional mobility, gait instability, impaired balance, impaired cognition, decreased safety awareness, pain, impaired cardiopulmonary response to activity, edema.    Pt found HOB elevated with son present and reluctantly agreeable to working with PT. Pt A & O to self only and has the following co-morbidities: HTN, CAD, HF, SBO, UTI, Dementia.  Pt tolerated session fairly due to dementia and pt's son reported pt has not been on her anxiety medication since admit. Pt required moderate to minimum assistance plus verbal cueing for safe mobilization during session today. Pt would benefit from acute PT during hospitalization to increase strength, endurance and safety with mobility and would benefit from close supervision and Low Intensity Therapy upon discharge home.      Rehab Prognosis: Fair; patient would benefit from acute skilled PT services to address these deficits and reach maximum level of function.    Recent Surgery: Procedure(s) (LRB):  LAPAROTOMY, EXPLORATORY (N/A)  HEMICOLECTOMY, RIGHT (Right)  COLECTOMY, WITH ILEUM TO COLON ANASTOMOSIS 2 Days Post-Op    Plan:     During this hospitalization, patient to be seen daily to address the identified rehab impairments via gait training, therapeutic activities, therapeutic exercises and progress toward  the following goals:    Plan of Care Expires:  08/22/24    Subjective     Chief Complaint: abdominal pain and feeling short of breath  Patient/Family Comments/goals: return to prior level of function, per pt's son  Pain/Comfort:  Pain Rating 1:  (pt unable to rate due to cognitive impairment)  Location 1: abdomen  Pain Addressed 1: Pre-medicate for activity, Reposition, Distraction, Cessation of Activity, Nurse notified  Pain Rating Post-Intervention 1:  (not rated)    Patients cultural, spiritual, Jew conflicts given the current situation:      Living Environment:  Pt lives with her son & CRISTIAN in a H.  Prior to admission, patients level of function was Supervision to MI with mobility using a rollator and minimum assistance with bathing for safety.  Equipment used at home: rollator.  DME owned (not currently used): none.  Upon discharge, patient will have assistance from her son & DIL.    Objective:     Communicated with DEBORAH Mendez prior to session.  Patient found HOB elevated with bed alarm, izaguirre catheter, oxygen, peripheral IV, telemetry  upon PT entry to room.    General Precautions: Standard, fall  Orthopedic Precautions:N/A   Braces: N/A  Respiratory Status: Nasal cannula, flow 2 L/min    Exams:  Cognitive Exam:  Patient is oriented to Person  RLE ROM: WFL  RLE Strength: NT  LLE ROM: WFL  LLE Strength: NT    Functional Mobility:  Bed Mobility:     Scooting: moderate assistance  Supine to Sit: moderate assistance and of 1-2 persons  Sit to Supine: moderate assistance and of 1-2 persons  Transfers:     Sit to Stand:  minimum assistance and of 1-2 persons with rolling walker  Gait: x 24' with rolling walker and minimum assistance for safety. Pt short of breath after gait trial, but PT did not have close access to a pulse ox(donned nc again and will monitor SPO2 during session tomorrow.)       AM-PAC 6 CLICK MOBILITY  Total Score:16       Treatment & Education:  Pt was educated on the following: call  light use, importance of OOB activity and functional mobility to negate the negative effects of prolonged bed rest during this hospitalization, safe transfers/ambulation and discharge planning recommendations/options.      Patient left HOB elevated with all lines intact, call button in reach, and bed alarm on.    GOALS:   Multidisciplinary Problems       Physical Therapy Goals          Problem: Physical Therapy    Goal Priority Disciplines Outcome Goal Variances Interventions   Physical Therapy Goal     PT, PT/OT      Description: Goals to be met by: 24     Patient will increase functional independence with mobility by performin. Supine to sit with Supervision  2. Sit to stand transfer with Supervision  3. Bed to chair transfer with Supervision using Rolling Walker  4. Gait  x 150 feet with Supervision using Rolling Walker.                              History:     Past Medical History:   Diagnosis Date    Anticoagulant long-term use     Arthritis     Dislocation of right shoulder joint     Hypertension     Hypothyroidism     Shoulder disorder     right    Thyroid disease        Past Surgical History:   Procedure Laterality Date    COLECTOMY, WITH ILEUM TO COLON ANASTOMOSIS  2024    Procedure: COLECTOMY, WITH ILEUM TO COLON ANASTOMOSIS;  Surgeon: Bahman Drummond MD;  Location: [facility] OR;  Service: General;;    HYSTERECTOMY      LAPAROTOMY, EXPLORATORY N/A 2024    Procedure: LAPAROTOMY, EXPLORATORY;  Surgeon: Bahman Drummond MD;  Location: [facility];  Service: General;  Laterality: N/A;    PARTIAL HYSTERECTOMY      ARTUR, ovaries in place, uterine prolapse    RIGHT HEMICOLECTOMY Right 2024    Procedure: HEMICOLECTOMY, RIGHT;  Surgeon: Bahman Drummond MD;  Location: [facility];  Service: General;  Laterality: Right;       Time Tracking:     PT Received On: 24  PT Start Time: 1406     PT Stop Time: 1428  PT Total Time (min): 22 min     Billable Minutes: Evaluation 10 and Gait Training  12      07/25/2024

## 2024-07-25 NOTE — ASSESSMENT & PLAN NOTE
Echo from two years ago showed preserved EF.  Currently she is euvolemic.  When she is taken off NPO status, resumed cardiac meds, including oral diuretic and Benicar.

## 2024-07-25 NOTE — CARE UPDATE
07/25/24 0730   Patient Assessment/Suction   Level of Consciousness (AVPU) alert   JIAN Breath Sounds diminished   PRE-TX-O2   Device (Oxygen Therapy) nasal cannula   $ Is the patient on Low Flow Oxygen? Yes   Flow (L/min) (Oxygen Therapy) (S)  2  (placed on room air)   SpO2 95 %   Pulse Oximetry Type Intermittent   $ Pulse Oximetry - Multiple Charge Pulse Oximetry - Multiple   Pulse 87   Resp 18

## 2024-07-25 NOTE — NURSING
Report called to Eleanor on receiving unit. Updated to meds given, assessment ,etc. Time allowed for questions and answers. Family at bedside updated to room number. Possessions collected and patient transported to room #1103 in bed without incident.

## 2024-07-25 NOTE — HOSPITAL COURSE
was admitted for  cecal volvulus/SBO/bowel ischemia.  While here, her labs and vital signs were monitored.  General surgery was consulted and she underwent exploratory lap with right hemicolectomy on 07/23.  She had an NG tube postoperatively, which was removed when she was started on liquid diet.  She did have poor oral intake and remained on IV fluid hydration until this improved.  She then developed acute HF and volume overload requiring discontinuing the IV fluids and being given IV Lasix and initiation of daily oral Lasix.  She went into AFib with RVR.  She had a noted history of atrial fib was no longer on a beta blocker anticoagulation, which was suspected to be due to her age and fragility.  She was given IV digoxin and Cardiology was consulted.  She was then transferred to the telemetry unit, and remained on telemetry monitoring.  She remained in NSR following the dose of IV digoxin.  She required placement of Cabrera catheter due to underlying UTI and urinary retention.  She was treated with IV Cipro and then transitioned to oral Macrobid based on her urine culture, which grew out E coli.  Her diet was advanced to soft bland, which she tolerated.  She continued to have poor oral intake, and was started on a nutritional supplement.  PT/OT were consulted and followed her.  Case management was consulted for discharge planning.  Her Cabrera catheter was discontinued for void trial, and she voided without issues.  Her overall condition has improved and she has been discharged to SNF today.  She will follow up with General surgery, Cardiology, and her PCP.  She will be started on Megace to improve her oral intake.

## 2024-07-25 NOTE — PROGRESS NOTES
[facility]  Progress Note    Patient Name: Glenda Gimenez  MRN: [id number]  Patient Class: IP- Inpatient   Admission Date: 7/23/2024  Length of Stay: 2 days  Attending Physician: Wu Gao MD  Primary Care Provider: William Olivia MD        Subjective:     Principal Problem:Cecal volvulus        HPI:  89 year old pt getting admitted with cecal volvulus/SBO/bowel ischemia  Pt has been suffering from abdominal pain for past 1 week  Also she lost her appetite   Finally family took the pt to PCP and pt had KUB which were concerning  She was referred to ER and had CT scan done  Surgeon was called and pt will have emergent OR visist soon  History mainly from family because pt has dementia    Overview/Hospital Course:  89 year old pt getting admitted with cecal volvulus/SBO/bowel ischemia  Surgeon reviewed and later exploratory laparotomy was done and colectomy was done      Interval History:     Patient was seen and examined, abdomen is soft, incision is clear, patient accidentally removed the NG tube and will not put back at this time      Review of Systems  Objective:     Vital Signs (Most Recent):  Temp: 97.7 °F (36.5 °C) (07/25/24 0732)  Pulse: 93 (07/25/24 0732)  Resp: 18 (07/25/24 0732)  BP: 132/83 (07/25/24 0732)  SpO2: (!) 92 % (07/25/24 0732) Vital Signs (24h Range):  Temp:  [97.6 °F (36.4 °C)-98.8 °F (37.1 °C)] 97.7 °F (36.5 °C)  Pulse:  [76-93] 93  Resp:  [18-43] 18  SpO2:  [87 %-96 %] 92 %  BP: (113-147)/(58-83) 132/83     Weight: 80.9 kg (178 lb 5.6 oz)  Body mass index is 30.61 kg/m².    Intake/Output Summary (Last 24 hours) at 7/25/2024 1017  Last data filed at 7/25/2024 0534  Gross per 24 hour   Intake 1111.67 ml   Output 1205 ml   Net -93.33 ml         Physical Exam  Vitals and nursing note reviewed.   HENT:      Head: Normocephalic and atraumatic.   Eyes:      Conjunctiva/sclera: Conjunctivae normal.   Neck:      Vascular: No JVD.   Cardiovascular:      Heart  "sounds: Normal heart sounds.   Pulmonary:      Effort: Pulmonary effort is normal.      Breath sounds: Normal breath sounds.   Abdominal:      General: Bowel sounds are normal.      Palpations: Abdomen is soft.      Comments: Appropriately tender   Skin:     General: Skin is warm.   Neurological:      Mental Status: She is alert and oriented to person, place, and time.             Significant Labs: All pertinent labs within the past 24 hours have been reviewed.  CMP:   Recent Labs   Lab 07/23/24  1423 07/24/24  0329 07/25/24  0645    139 141   K 3.7 4.2 4.7    107 109   CO2 26 25 27   * 118* 116*   BUN 40* 28 21   CREATININE 1.4 0.8 0.8   CALCIUM 10.4 8.9 9.2   PROT 7.8 6.0 6.0   ALBUMIN 4.1 3.2* 3.0*   BILITOT 0.8 1.0 0.8   ALKPHOS 79 72 70   AST 44* 40 31   ALT 35 35 25   ANIONGAP 11 7* 5*     Cardiac Markers:   Recent Labs   Lab 07/24/24  0329   *     Coagulation: No results for input(s): "PT", "INR", "APTT" in the last 48 hours.    Significant Imaging: I have reviewed all pertinent imaging results/findings within the past 24 hours.    Assessment/Plan:      * Cecal volvulus  Lead to small-bowel obstruction.  She went to the OR and had resection of affected bowel.  Continue nasogastric tube.  Defer to general surgeon regarding further management.  Patient accidentally removed the NG tube and wait for the surgeon.  Will not reinsert at this time  Hemoglobin stable white cell slightly elevated  BNP is elevated and can not give much of the fluid    UTI (urinary tract infection)    Patient is on Levaquin, E coli noted    Small bowel obstruction  Due to cecal volvulus.  She went to the OR and underwent resection of the cecum and ascending colon.  Anastomosis was created.    Follow surgeon    Ischemia, bowel  Ruled out      Chronic systolic (congestive) heart failure  Echo from two years ago showed preserved EF.  Currently she is euvolemic.  When she is taken off NPO status, resumed cardiac " meds, including oral diuretic and Benicar.    Coronary artery disease of native artery of native heart with stable angina pectoris  Stable.  When able to take oral medications, usual cardiac meds.    Essential hypertension  Blood pressure controlled.  Holding antihypertensives while NPO.  Monitor blood pressure  Use IV vasodilators as needed.    Hypothyroid  Stable.  Resume levothyroxine when able to take p.o..        VTE Risk Mitigation (From admission, onward)           Ordered     IP VTE HIGH RISK PATIENT  Once         07/23/24 1733     Place sequential compression device  Until discontinued         07/23/24 1733                    Discharge Planning   BOGDAN: 8/2/2024     Code Status: Full Code   Is the patient medically ready for discharge?:     Reason for patient still in hospital (select all that apply): Treatment  Discharge Plan A: Home Health                  Wu Gao MD  Department of Hospital Medicine   Replaced by [facility]

## 2024-07-25 NOTE — NURSING
Nurses Note -- 4 Eyes      7/24/2024   9:09 PM      Skin assessed during: Transfer      [x] No Altered Skin Integrity Present    [x]Prevention Measures Documented  Midline surgical incision    [] Yes- Altered Skin Integrity Present or Discovered   [] LDA Added if Not in Epic (Describe Wound)   [] New Altered Skin Integrity was Present on Admit and Documented in LDA   [] Wound Image Taken    Wound Care Consulted? No    Attending Nurse:  Kristen   [phone]

## 2024-07-25 NOTE — SUBJECTIVE & OBJECTIVE
Interval History:     Patient was seen and examined, abdomen is soft, incision is clear, patient accidentally removed the NG tube and will not put back at this time      Review of Systems  Objective:     Vital Signs (Most Recent):  Temp: 97.7 °F (36.5 °C) (07/25/24 0732)  Pulse: 93 (07/25/24 0732)  Resp: 18 (07/25/24 0732)  BP: 132/83 (07/25/24 0732)  SpO2: (!) 92 % (07/25/24 0732) Vital Signs (24h Range):  Temp:  [97.6 °F (36.4 °C)-98.8 °F (37.1 °C)] 97.7 °F (36.5 °C)  Pulse:  [76-93] 93  Resp:  [18-43] 18  SpO2:  [87 %-96 %] 92 %  BP: (113-147)/(58-83) 132/83     Weight: 80.9 kg (178 lb 5.6 oz)  Body mass index is 30.61 kg/m².    Intake/Output Summary (Last 24 hours) at 7/25/2024 1017  Last data filed at 7/25/2024 0534  Gross per 24 hour   Intake 1111.67 ml   Output 1205 ml   Net -93.33 ml         Physical Exam  Vitals and nursing note reviewed.   HENT:      Head: Normocephalic and atraumatic.   Eyes:      Conjunctiva/sclera: Conjunctivae normal.   Neck:      Vascular: No JVD.   Cardiovascular:      Heart sounds: Normal heart sounds.   Pulmonary:      Effort: Pulmonary effort is normal.      Breath sounds: Normal breath sounds.   Abdominal:      General: Bowel sounds are normal.      Palpations: Abdomen is soft.      Comments: Appropriately tender   Skin:     General: Skin is warm.   Neurological:      Mental Status: She is alert and oriented to person, place, and time.             Significant Labs: All pertinent labs within the past 24 hours have been reviewed.  CMP:   Recent Labs   Lab 07/23/24  1423 07/24/24  0329 07/25/24  0645    139 141   K 3.7 4.2 4.7    107 109   CO2 26 25 27   * 118* 116*   BUN 40* 28 21   CREATININE 1.4 0.8 0.8   CALCIUM 10.4 8.9 9.2   PROT 7.8 6.0 6.0   ALBUMIN 4.1 3.2* 3.0*   BILITOT 0.8 1.0 0.8   ALKPHOS 79 72 70   AST 44* 40 31   ALT 35 35 25   ANIONGAP 11 7* 5*     Cardiac Markers:   Recent Labs   Lab 07/24/24  0329   *     Coagulation: No results for  "input(s): "PT", "INR", "APTT" in the last 48 hours.    Significant Imaging: I have reviewed all pertinent imaging results/findings within the past 24 hours.  "

## 2024-07-25 NOTE — NURSING
Nurses Note -- 4 Eyes      7/25/2024   5:26 PM      Skin assessed during: Daily Assessment      [x] No Altered Skin Integrity Present    []Prevention Measures Documented  Midline abdominal incision    [] Yes- Altered Skin Integrity Present or Discovered   [] LDA Added if Not in Epic (Describe Wound)   [] New Altered Skin Integrity was Present on Admit and Documented in LDA   [] Wound Image Taken    Wound Care Consulted? No    Attending Nurse:  Vanessa Crews RN/Staff Member:

## 2024-07-26 LAB
ALBUMIN SERPL BCP-MCNC: 2.8 G/DL (ref 3.5–5.2)
ALP SERPL-CCNC: 68 U/L (ref 55–135)
ALT SERPL W/O P-5'-P-CCNC: 19 U/L (ref 10–44)
ANION GAP SERPL CALC-SCNC: 7 MMOL/L (ref 8–16)
AST SERPL-CCNC: 31 U/L (ref 10–40)
BASOPHILS # BLD AUTO: 0.05 K/UL (ref 0–0.2)
BASOPHILS NFR BLD: 0.4 % (ref 0–1.9)
BILIRUB SERPL-MCNC: 0.6 MG/DL (ref 0.1–1)
BUN SERPL-MCNC: 20 MG/DL (ref 10–30)
CALCIUM SERPL-MCNC: 8.8 MG/DL (ref 8.7–10.5)
CHLORIDE SERPL-SCNC: 111 MMOL/L (ref 95–110)
CO2 SERPL-SCNC: 24 MMOL/L (ref 23–29)
CREAT SERPL-MCNC: 0.7 MG/DL (ref 0.5–1.4)
CRP SERPL-MCNC: >16 MG/DL
DIFFERENTIAL METHOD BLD: ABNORMAL
EOSINOPHIL # BLD AUTO: 0.3 K/UL (ref 0–0.5)
EOSINOPHIL NFR BLD: 2.3 % (ref 0–8)
ERYTHROCYTE [DISTWIDTH] IN BLOOD BY AUTOMATED COUNT: 13.2 % (ref 11.5–14.5)
EST. GFR  (NO RACE VARIABLE): >60 ML/MIN/1.73 M^2
GLUCOSE SERPL-MCNC: 91 MG/DL (ref 70–110)
HCT VFR BLD AUTO: 31.3 % (ref 37–48.5)
HGB BLD-MCNC: 10 G/DL (ref 12–16)
IMM GRANULOCYTES # BLD AUTO: 0.09 K/UL (ref 0–0.04)
IMM GRANULOCYTES NFR BLD AUTO: 0.8 % (ref 0–0.5)
LYMPHOCYTES # BLD AUTO: 1.2 K/UL (ref 1–4.8)
LYMPHOCYTES NFR BLD: 10.4 % (ref 18–48)
MAGNESIUM SERPL-MCNC: 1.7 MG/DL (ref 1.6–2.6)
MCH RBC QN AUTO: 30.9 PG (ref 27–31)
MCHC RBC AUTO-ENTMCNC: 31.9 G/DL (ref 32–36)
MCV RBC AUTO: 97 FL (ref 82–98)
MONOCYTES # BLD AUTO: 1.1 K/UL (ref 0.3–1)
MONOCYTES NFR BLD: 8.8 % (ref 4–15)
NEUTROPHILS # BLD AUTO: 9.2 K/UL (ref 1.8–7.7)
NEUTROPHILS NFR BLD: 77.3 % (ref 38–73)
NRBC BLD-RTO: 0 /100 WBC
PLATELET # BLD AUTO: 284 K/UL (ref 150–450)
PMV BLD AUTO: 10.8 FL (ref 9.2–12.9)
POTASSIUM SERPL-SCNC: 3.8 MMOL/L (ref 3.5–5.1)
PROT SERPL-MCNC: 5.6 G/DL (ref 6–8.4)
RBC # BLD AUTO: 3.24 M/UL (ref 4–5.4)
SODIUM SERPL-SCNC: 142 MMOL/L (ref 136–145)
WBC # BLD AUTO: 11.89 K/UL (ref 3.9–12.7)

## 2024-07-26 PROCEDURE — 99900031 HC PATIENT EDUCATION (STAT)

## 2024-07-26 PROCEDURE — 85025 COMPLETE CBC W/AUTO DIFF WBC: CPT | Performed by: INTERNAL MEDICINE

## 2024-07-26 PROCEDURE — 63600175 PHARM REV CODE 636 W HCPCS: Performed by: INTERNAL MEDICINE

## 2024-07-26 PROCEDURE — 80053 COMPREHEN METABOLIC PANEL: CPT | Performed by: INTERNAL MEDICINE

## 2024-07-26 PROCEDURE — 86140 C-REACTIVE PROTEIN: CPT | Performed by: STUDENT IN AN ORGANIZED HEALTH CARE EDUCATION/TRAINING PROGRAM

## 2024-07-26 PROCEDURE — 36415 COLL VENOUS BLD VENIPUNCTURE: CPT | Performed by: INTERNAL MEDICINE

## 2024-07-26 PROCEDURE — 83735 ASSAY OF MAGNESIUM: CPT | Performed by: INTERNAL MEDICINE

## 2024-07-26 PROCEDURE — 97535 SELF CARE MNGMENT TRAINING: CPT

## 2024-07-26 PROCEDURE — 25000003 PHARM REV CODE 250: Performed by: HOSPITALIST

## 2024-07-26 PROCEDURE — 94761 N-INVAS EAR/PLS OXIMETRY MLT: CPT

## 2024-07-26 PROCEDURE — 12000002 HC ACUTE/MED SURGE SEMI-PRIVATE ROOM

## 2024-07-26 PROCEDURE — 25000003 PHARM REV CODE 250: Performed by: INTERNAL MEDICINE

## 2024-07-26 PROCEDURE — 97165 OT EVAL LOW COMPLEX 30 MIN: CPT

## 2024-07-26 PROCEDURE — 27000221 HC OXYGEN, UP TO 24 HOURS

## 2024-07-26 RX ORDER — LEVOTHYROXINE SODIUM 88 UG/1
88 TABLET ORAL
Status: DISCONTINUED | OUTPATIENT
Start: 2024-07-27 | End: 2024-08-01 | Stop reason: HOSPADM

## 2024-07-26 RX ORDER — ISOSORBIDE MONONITRATE 30 MG/1
30 TABLET, EXTENDED RELEASE ORAL DAILY
Status: DISCONTINUED | OUTPATIENT
Start: 2024-07-26 | End: 2024-08-01 | Stop reason: HOSPADM

## 2024-07-26 RX ADMIN — LEVOTHYROXINE SODIUM ANHYDROUS 40 MCG: 100 INJECTION, POWDER, LYOPHILIZED, FOR SOLUTION INTRAVENOUS at 09:07

## 2024-07-26 RX ADMIN — LEVOFLOXACIN 250 MG: 5 INJECTION, SOLUTION INTRAVENOUS at 09:07

## 2024-07-26 RX ADMIN — MUPIROCIN 1 G: 20 OINTMENT TOPICAL at 09:07

## 2024-07-26 RX ADMIN — DONEPEZIL HYDROCHLORIDE 5 MG: 5 TABLET, FILM COATED ORAL at 09:07

## 2024-07-26 RX ADMIN — METRONIDAZOLE 500 MG: 500 INJECTION, SOLUTION INTRAVENOUS at 06:07

## 2024-07-26 RX ADMIN — ISOSORBIDE MONONITRATE 30 MG: 30 TABLET, EXTENDED RELEASE ORAL at 01:07

## 2024-07-26 RX ADMIN — PANTOPRAZOLE SODIUM 40 MG: 40 INJECTION, POWDER, FOR SOLUTION INTRAVENOUS at 09:07

## 2024-07-26 RX ADMIN — METRONIDAZOLE 500 MG: 500 INJECTION, SOLUTION INTRAVENOUS at 09:07

## 2024-07-26 RX ADMIN — QUETIAPINE FUMARATE 25 MG: 25 TABLET ORAL at 09:07

## 2024-07-26 RX ADMIN — ONDANSETRON 4 MG: 2 INJECTION INTRAMUSCULAR; INTRAVENOUS at 02:07

## 2024-07-26 RX ADMIN — LOSARTAN POTASSIUM 12.5 MG: 25 TABLET, FILM COATED ORAL at 01:07

## 2024-07-26 RX ADMIN — SODIUM CHLORIDE 1000 ML: 9 INJECTION, SOLUTION INTRAVENOUS at 11:07

## 2024-07-26 RX ADMIN — METRONIDAZOLE 500 MG: 500 INJECTION, SOLUTION INTRAVENOUS at 12:07

## 2024-07-26 NOTE — PHYSICIAN QUERY
Please specify the diagnosis associated with the clinical findings.     Unspecified Acute Kidney Failure/Injury

## 2024-07-26 NOTE — PLAN OF CARE
Recommendations  1. Once medically feasible avance to soft blad diet with texture per speech SLP.   2. If unable to advance diet within 48 hrs, consider nutrition support.   -Clinimix @ 75 ml/hr (76.5 gm protein, 612 kcal)   3. RD following.  Goals: Advance diet and tolerate intake to meet > 50% of estimated needs by RD follow up.  Nutrition Goal Status: progressing towards goal        Nutrition Diagnosis PES Statement:   Nutrition Problem  Inadequate oral intake     Related to (etiology):   Altered GI     Signs and Symptoms (as evidenced by):   S/p ex lap     Interventions(treatment strategy):  Upgraded to Clear liquids today     Nutrition Diagnosis Status:   Improving

## 2024-07-26 NOTE — PLAN OF CARE
Problem: Infection  Goal: Absence of Infection Signs and Symptoms  7/26/2024 0421 by Mary Abarca RN  Outcome: Progressing  7/26/2024 0345 by Mary Abarca RN  Outcome: Progressing     Problem: Adult Inpatient Plan of Care  Goal: Plan of Care Review  7/26/2024 0421 by Mary Abarca RN  Outcome: Progressing  7/26/2024 0345 by Mary Abarca RN  Outcome: Progressing  Goal: Patient-Specific Goal (Individualized)  7/26/2024 0421 by Mary Abarca RN  Outcome: Progressing  7/26/2024 0345 by Mary Abarca RN  Outcome: Progressing  Goal: Absence of Hospital-Acquired Illness or Injury  7/26/2024 0421 by Mary Abarca RN  Outcome: Progressing  7/26/2024 0345 by Mary Abarca RN  Outcome: Progressing  Goal: Optimal Comfort and Wellbeing  7/26/2024 0421 by Mary Abarca RN  Outcome: Progressing  7/26/2024 0345 by Mary Abarca RN  Outcome: Progressing  Goal: Readiness for Transition of Care  7/26/2024 0421 by Mary Abarca RN  Outcome: Progressing  7/26/2024 0345 by Mary Abarca RN  Outcome: Progressing     Problem: Fall Injury Risk  Goal: Absence of Fall and Fall-Related Injury  7/26/2024 0421 by Mary Abarca RN  Outcome: Progressing  7/26/2024 0345 by Mary Abarca RN  Outcome: Progressing     Problem: Wound  Goal: Optimal Coping  7/26/2024 0421 by Mary Abarca RN  Outcome: Progressing  7/26/2024 0345 by Mary Abarca RN  Outcome: Progressing  Goal: Optimal Functional Ability  7/26/2024 0421 by Mary bAarca RN  Outcome: Progressing  7/26/2024 0345 by Mary Abarca RN  Outcome: Progressing  Goal: Absence of Infection Signs and Symptoms  7/26/2024 0421 by Mary Abarca RN  Outcome: Progressing  7/26/2024 0345 by Mary Abarca RN  Outcome: Progressing  Goal: Improved Oral Intake  7/26/2024 0421 by Mary Abarca, RN  Outcome: Progressing  7/26/2024 0345 by Mary Abarca, RN  Outcome: Progressing  Goal: Optimal Pain Control and  Function  7/26/2024 0421 by Mary Abarca RN  Outcome: Progressing  7/26/2024 0345 by Mary Abarca RN  Outcome: Progressing  Goal: Skin Health and Integrity  7/26/2024 0421 by Mary Abarca RN  Outcome: Progressing  7/26/2024 0345 by Mary Abarca RN  Outcome: Progressing  Goal: Optimal Wound Healing  7/26/2024 0421 by Mary Abarca RN  Outcome: Progressing  7/26/2024 0345 by Mary Abarca RN  Outcome: Progressing     Problem: Skin Injury Risk Increased  Goal: Skin Health and Integrity  7/26/2024 0421 by Mary Abarca RN  Outcome: Progressing  7/26/2024 0345 by Mary Abarca RN  Outcome: Progressing     Problem: Oral Intake Inadequate  Goal: Improved Oral Intake  7/26/2024 0421 by Mary Abarca RN  Outcome: Progressing  7/26/2024 0345 by Mary Abarca RN  Outcome: Progressing

## 2024-07-26 NOTE — PROGRESS NOTES
Patient seen examined.  Resting comfortably.  Sounds like she is having issues with early delirium.      Vitals are stable   Afebrile   Abdomen is appropriately tender     Labs reviewed.  Leukocytosis has normalized   CRP is elevated    89-year-old female status post right hemicolectomy for a cecal volvulus.    Delirium precautions   Patient has not been nauseated.  Will trial clear liquids.  Discuss going very slowly with her son until bowel function returns.  Following

## 2024-07-26 NOTE — PROGRESS NOTES
"[facility]  Adult Nutrition   Progress Note (Follow-Up)    SUMMARY     Recommendations  1. Once medically feasible avance to soft blad diet with texture per speech SLP.   2. If unable to advance diet within 48 hrs, consider nutrition support.   -Clinimix @ 75 ml/hr (76.5 gm protein, 612 kcal)   3. RD following.  Goals: Advance diet and tolerate intake to meet > 50% of estimated needs by RD follow up.  Nutrition Goal Status: progressing towards goal      Nutrition Diagnosis PES Statement:   Nutrition Problem  Inadequate oral intake    Related to (etiology):   Altered GI    Signs and Symptoms (as evidenced by):   S/p ex lap    Interventions(treatment strategy):  Upgraded to Clear liquids today    Nutrition Diagnosis Status:   Improving    Dietitian Rounds Brief  Abdomin appropriately tender. Pt pulled out NG tube. Not replacing at this time. Pt continued on NPO since 7/23/24 allowing time for GI to rest and heal. Diet upgraded today to Clear liquids. RD to follow oral/GI tolerance.      Nutrition Related Social Determinants of Health: SDOH: None Identified      Malnutrition Assessment  Pt at risk 2/2     Diet order:   Current Diet Order: Clear Liquids    Evaluation of Received Nutrient/Fluid Intake  % Intake of Estimated Energy Needs: 0 - 25 %  % Meal Intake: 0 - 25 %      Intake/Output Summary (Last 24 hours) at 7/26/2024 1513  Last data filed at 7/26/2024 1244  Gross per 24 hour   Intake 60 ml   Output 750 ml   Net -690 ml        Anthropometrics  Temp: 97.9 °F (36.6 °C)  Height Method: Stated  Height: 5' 4" (162.6 cm)  Height (inches): 64 in  Weight Method: Bed Scale  Weight: 80.9 kg (178 lb 5.6 oz)  Weight (lb): 178.35 lb  Ideal Body Weight (IBW), Female: 120 lb  % Ideal Body Weight, Female (lb): 148.63 %  BMI (Calculated): 30.6  BMI Grade: 30 - 34.9- obesity - grade I       Estimated/Assessed Needs  Weight Used For Calorie Calculations: 80.9 kg (178 lb 5.6 oz)  Energy Calorie Requirements (kcal): " 8328-6770 kcal (20-25 kcal/ kg bw)  Energy Need Method: Kcal/kg  Protein Requirements:  gm (1.5-2.0 gm/ kg bw)  Weight Used For Protein Calculations: 55 kg (121 lb 4.1 oz)     Estimated Fluid Requirement Method: RDA Method  RDA Method (mL): 1618       Reason for Assessment  Reason For Assessment: consult  Diagnosis: other (see comments) (cecal volvulus)  Nutrition Discharge Planning: pending    Nutrition/Diet History  Spiritual, Cultural Beliefs, Moravian Practices, Values that Affect Care: no  Food Allergies: NKFA  Factors Affecting Nutritional Intake: abdominal pain, NPO    Nutrition Risk Screen  Nutrition Risk Screen: reduced oral intake over the last month     MST Score: 0  Have you recently lost weight without trying?: No  Weight loss score: 0  Have you been eating poorly because of a decreased appetite?: No  Appetite score: 0       Weight History:  Wt Readings from Last 10 Encounters:   07/23/24 80.9 kg (178 lb 5.6 oz)   07/23/24 80.7 kg (177 lb 14.6 oz)   05/30/24 80.4 kg (177 lb 4 oz)   02/20/24 80.7 kg (178 lb)   02/09/24 79.4 kg (175 lb)   11/24/23 79.7 kg (175 lb 11.3 oz)   08/15/23 81.4 kg (179 lb 7.3 oz)   07/11/23 80.4 kg (177 lb 4 oz)   06/12/23 80.8 kg (178 lb 2.1 oz)   05/04/23 77.1 kg (170 lb)        Lab/Procedures/Meds: Pertinent Labs/Meds Reviewed    Medications:Pertinent Medications Reviewed  Scheduled Meds:   donepeziL  5 mg Oral QHS    isosorbide mononitrate  30 mg Oral Daily    lactated ringers  500 mL Intravenous Once    levoFLOXacin  250 mg Intravenous Q24H    [START ON 7/27/2024] levothyroxine  88 mcg Oral Before breakfast    losartan  12.5 mg Oral Daily    metronidazole  500 mg Intravenous Q8H    mupirocin   Nasal BID    pantoprazole  40 mg Intravenous Daily    QUEtiapine  25 mg Oral Daily     Continuous Infusions:   0.9% NaCl   Intravenous Continuous 50 mL/hr at 07/24/24 1252 Rate Change at 07/24/24 1252     PRN Meds:.  Current Facility-Administered Medications:     acetaminophen,  650 mg, Oral, Q4H PRN    aluminum-magnesium hydroxide-simethicone, 30 mL, Oral, QID PRN    HYDROcodone-acetaminophen, 1 tablet, Oral, Q6H PRN    HYDROmorphone, 0.5 mg, Intravenous, Q6H PRN    magnesium oxide, 800 mg, Oral, PRN    magnesium oxide, 800 mg, Oral, PRN    magnesium sulfate IVPB, 2 g, Intravenous, PRN    magnesium sulfate IVPB, 4 g, Intravenous, PRN    melatonin, 6 mg, Oral, Nightly PRN    naloxone, 0.02 mg, Intravenous, PRN    ondansetron, 4 mg, Intravenous, Q6H PRN    potassium bicarbonate, 35 mEq, Oral, PRN    potassium bicarbonate, 50 mEq, Oral, PRN    potassium bicarbonate, 60 mEq, Oral, PRN    potassium, sodium phosphates, 2 packet, Oral, PRN    potassium, sodium phosphates, 2 packet, Oral, PRN    potassium, sodium phosphates, 2 packet, Oral, PRN    Labs: Pertinent Labs Reviewed  Clinical Chemistry:  Recent Labs   Lab 07/23/24  1151 07/23/24  1423 07/24/24  0329 07/24/24  1441 07/25/24  0645 07/26/24  0505    139 139  --  141 142   K 3.7 3.7 4.2  --  4.7 3.8    102 107  --  109 111*   CO2 23 26 25  --  27 24   * 157* 118*  --  116* 91   BUN 37* 40* 28  --  21 20   CREATININE 1.4 1.4 0.8  --  0.8 0.7   CALCIUM 10.6* 10.4 8.9  --  9.2 8.8   PROT 7.9 7.8 6.0  --  6.0 5.6*   ALBUMIN 3.6 4.1 3.2*  --  3.0* 2.8*   BILITOT 0.8 0.8 1.0  --  0.8 0.6   ALKPHOS 86 79 72  --  70 68   AST 48* 44* 40  --  31 31   ALT 38 35 35  --  25 19   ANIONGAP 13 11 7*  --  5* 7*   MG  --   --  1.5*   < > 2.0 1.7   LIPASE 10 7  --   --   --   --     < > = values in this interval not displayed.     CBC:   Recent Labs   Lab 07/26/24  0505   WBC 11.89   RBC 3.24*   HGB 10.0*   HCT 31.3*      MCV 97   MCH 30.9   MCHC 31.9*       Cardiac Profile:  Recent Labs   Lab 07/24/24  0329   *     Inflammatory Labs:  Recent Labs   Lab 07/26/24  0505   CRP >16.00*       Monitor and Evaluation  Food and Nutrient Intake: energy intake, food and beverage intake  Food and Nutrient Adminstration: diet  order  Knowledge/Beliefs/Attitudes: food and nutrition knowledge/skill, beliefs and attitudes  Physical Activity and Function: factors affecting access to physical activity, nutrition-related ADLs and IADLs  Anthropometric Measurements: weight, weight change, body mass index  Biochemical Data, Medical Tests and Procedures: electrolyte and renal panel, lipid profile, gastrointestinal profile, glucose/endocrine profile     Nutrition Risk  Level of Risk/Frequency of Follow-up:  (high 2 xweek)     Nutrition Follow-Up  RD Follow-up?: Yes      Barber Salinas RD 07/26/2024 3:13 PM

## 2024-07-26 NOTE — SUBJECTIVE & OBJECTIVE
Interval History:     Patient seen  With the assistance cleaning  the patient.  Abdominal soft.  Bandage appeared clean    Review of Systems  Objective:     Vital Signs (Most Recent):  Temp: 97.9 °F (36.6 °C) (07/26/24 0705)  Pulse: 99 (07/26/24 0705)  Resp: 18 (07/26/24 0705)  BP: (!) 154/78 (07/26/24 0705)  SpO2: 96 % (07/26/24 0705) Vital Signs (24h Range):  Temp:  [97.9 °F (36.6 °C)-98.5 °F (36.9 °C)] 97.9 °F (36.6 °C)  Pulse:  [] 99  Resp:  [18] 18  SpO2:  [93 %-98 %] 96 %  BP: (115-154)/(69-83) 154/78     Weight: 80.9 kg (178 lb 5.6 oz)  Body mass index is 30.61 kg/m².    Intake/Output Summary (Last 24 hours) at 7/26/2024 1027  Last data filed at 7/26/2024 0859  Gross per 24 hour   Intake 60 ml   Output 750 ml   Net -690 ml         Physical Exam  Vitals and nursing note reviewed.   HENT:      Head: Normocephalic and atraumatic.      Nose: Nose normal.   Eyes:      Conjunctiva/sclera: Conjunctivae normal.   Neck:      Vascular: No JVD.   Cardiovascular:      Heart sounds: Normal heart sounds.   Pulmonary:      Effort: Pulmonary effort is normal.      Breath sounds: Normal breath sounds.   Abdominal:      General: Abdomen is flat.      Palpations: Abdomen is soft.   Musculoskeletal:         General: Normal range of motion.   Skin:     General: Skin is warm.   Neurological:      Mental Status: She is alert and oriented to person, place, and time.   Psychiatric:         Mood and Affect: Mood normal.             Significant Labs: All pertinent labs within the past 24 hours have been reviewed.  CBC:   Recent Labs   Lab 07/25/24  0645 07/26/24  0505   WBC 13.07* 11.89   HGB 11.1* 10.0*   HCT 34.4* 31.3*    284     CMP:   Recent Labs   Lab 07/25/24  0645 07/26/24  0505    142   K 4.7 3.8    111*   CO2 27 24   * 91   BUN 21 20   CREATININE 0.8 0.7   CALCIUM 9.2 8.8   PROT 6.0 5.6*   ALBUMIN 3.0* 2.8*   BILITOT 0.8 0.6   ALKPHOS 70 68   AST 31 31   ALT 25 19   ANIONGAP 5* 7*     Cardiac  "Markers: No results for input(s): "CKMB", "MYOGLOBIN", "BNP", "TROPISTAT" in the last 48 hours.    Significant Imaging: I have reviewed all pertinent imaging results/findings within the past 24 hours.  "

## 2024-07-26 NOTE — ASSESSMENT & PLAN NOTE
Echo from two years ago showed preserved EF.  Currently she is euvolemic.    Resumed cardiac meds including except diuretics. Resume only when fully eating

## 2024-07-26 NOTE — PLAN OF CARE
Problem: Infection  Goal: Absence of Infection Signs and Symptoms  Outcome: Progressing     Problem: Adult Inpatient Plan of Care  Goal: Plan of Care Review  Outcome: Progressing  Goal: Patient-Specific Goal (Individualized)  Outcome: Progressing  Goal: Absence of Hospital-Acquired Illness or Injury  Outcome: Progressing  Goal: Optimal Comfort and Wellbeing  Outcome: Progressing  Goal: Readiness for Transition of Care  Outcome: Progressing     Problem: Fall Injury Risk  Goal: Absence of Fall and Fall-Related Injury  Outcome: Progressing     Problem: Wound  Goal: Optimal Coping  Outcome: Progressing  Goal: Optimal Functional Ability  Outcome: Progressing  Goal: Absence of Infection Signs and Symptoms  Outcome: Progressing  Goal: Improved Oral Intake  Outcome: Progressing  Goal: Optimal Pain Control and Function  Outcome: Progressing  Goal: Skin Health and Integrity  Outcome: Progressing  Goal: Optimal Wound Healing  Outcome: Progressing     Problem: Skin Injury Risk Increased  Goal: Skin Health and Integrity  Outcome: Progressing     Problem: Oral Intake Inadequate  Goal: Improved Oral Intake  Outcome: Progressing

## 2024-07-26 NOTE — PT/OT/SLP PROGRESS
Physical Therapy      Patient Name:  Glenda Gimenez   MRN:  [id number]    Patient not seen today secondary to Unarousable, Other (Comment) (Pt sleeping soundly am & pm & did not arouse during conversation with pt's son. Pt was re-started on her anti-depressant medication. Will attempt again tomorrow.). Will follow-up 7/27/24.

## 2024-07-26 NOTE — NURSING
Nurses Note -- 4 Eyes      7/26/2024   4:25 PM      Skin assessed during: Daily Assessment      [x] No Altered Skin Integrity Present    []Prevention Measures Documented  Midline abdominal incision    [] Yes- Altered Skin Integrity Present or Discovered   [] LDA Added if Not in Epic (Describe Wound)   [] New Altered Skin Integrity was Present on Admit and Documented in LDA   [] Wound Image Taken    Wound Care Consulted? No    Attending Nurse:  Vanessa Crews RN/Staff Member:

## 2024-07-26 NOTE — PT/OT/SLP EVAL
"Occupational Therapy   Evaluation    Name: Glenda Gimenez  MRN: [id number]  Admitting Diagnosis: Cecal volvulus  Recent Surgery: Procedure(s) (LRB):  LAPAROTOMY, EXPLORATORY (N/A)  HEMICOLECTOMY, RIGHT (Right)  COLECTOMY, WITH ILEUM TO COLON ANASTOMOSIS 3 Days Post-Op    Recommendations:     Discharge Recommendations: Low Intensity Therapy (needs caregiver for when her DIL is working)  Discharge Equipment Recommendations:  none  Barriers to discharge:  Decreased caregiver support    Assessment:     Glenda Gimenez is a 89 y.o. female with a medical diagnosis of Cecal volvulus.  She presents with confusion on why she was in the hospital. Pt completed bed mobility with MOD A. She neededMINA to t/f from sit> stand using RW. She was able to perform a BSC transfer from the bed using step transfer with MIN A and verbal cuing. She was unable to don socks with MAX A. When sitting on the side of the bed, she was able to scoot towards the HOB with CGA and verbal cuing. Performance deficits affecting function: weakness, impaired endurance, impaired self care skills, impaired functional mobility, impaired balance, decreased coordination, decreased upper extremity function, decreased lower extremity function, decreased safety awareness.      Rehab Prognosis: Good; patient would benefit from acute skilled OT services to address these deficits and reach maximum level of function.       Plan:     Patient to be seen 5 x/week to address the above listed problems via self-care/home management, therapeutic activities, therapeutic exercises  Plan of Care Expires: 08/26/24  Plan of Care Reviewed with: patient, son    Subjective     Chief Complaint: "My stomach hurts"  Patient/Family Comments/goals: Increase her balance so that she doesn't have a fall at home    Occupational Profile:  Living Environment: Pt lives with her son and DIL in a [facility] with no AKIKO.  Previous level of function: Per son, his wife helps her bath and occasionally " helps her use the bathroom. Pt was able to dress her self while sitting down and eat independently once food is prepared.  Roles and Routines: Mother  Equipment Used at Home: rollator  Assistance upon Discharge: Son and DIL. Son is retired    Pain/Comfort:  Pain Rating 1: 4/10  Location - Side 1: Bilateral  Location 1: abdomen  Pain Addressed 1: Reposition, Nurse notified  Pain Rating Post-Intervention 1: 4/10    Patients cultural, spiritual, Methodist conflicts given the current situation: no    Objective:     Communicated with: Nurse prior to session.  Patient found supine with bed alarm, izaguirre catheter, peripheral IV, oxygen, telemetry upon OT entry to room.    General Precautions: Standard, fall  Orthopedic Precautions: N/A  Braces: N/A  Respiratory Status: Nasal cannula, flow 2 L/min    Occupational Performance:    Bed Mobility:    Patient completed Rolling/Turning to Left with  minimum assistance  Patient completed Rolling/Turning to Right with minimum assistance  Patient completed Scooting/Bridging with minimum assistance  Patient completed Supine to Sit with moderate assistance  Patient completed Sit to Supine with minimum assistance    Functional Mobility/Transfers:  Patient completed Sit <> Stand Transfer with minimum assistance  with  rolling walker   Patient completed Toilet Transfer Step Transfer technique with minimum assistance with  rolling walker    Activities of Daily Living:  Lower Body Dressing: total assistance for donning/doffing socks while seated on the EOB    Cognitive/Visual Perceptual:  Cognitive/Psychosocial Skills:     -       Oriented to: Person and Place   -       Follows Commands/attention:Follows multistep  commands  -       Communication: clear/fluent  -       Memory: Impaired STM  -       Safety awareness/insight to disability: impaired     Physical Exam:  Balance: -       Sitting Balance: G, Standing Balance F+  Upper Extremity Range of Motion:     -       Right Upper Extremity:  WNL  -       Left Upper Extremity: WNL  Upper Extremity Strength:    -       Right Upper Extremity: WNL  -       Left Upper Extremity: WNL   Strength:    -       Right Upper Extremity: WNL  -       Left Upper Extremity: WNL    AMPAC 6 Click ADL:  AMPAC Total Score: 17    Treatment & Education:  Pt was educated on the role of occupational therapy and the importance of completing ADLs and functional mobility.     Patient left supine with all lines intact, call button in reach, and bed alarm on    GOALS:   Goals to be met by: 8/26/24     Patient will increase functional independence with ADLs by performing:    UE Dressing with Modified [place] while seated.  LE Dressing with Minimal Assistance while seated.  Grooming while seated with Set-up Assistance.  Toileting from toilet with Minimal Assistance for hygiene and clothing management.   Toilet transfer to toilet with Supervision using RW.      History:     Past Medical History:   Diagnosis Date    Anticoagulant long-term use     Arthritis     Dislocation of right shoulder joint     Hypertension     Hypothyroidism     Shoulder disorder     right    Thyroid disease          Past Surgical History:   Procedure Laterality Date    COLECTOMY, WITH ILEUM TO COLON ANASTOMOSIS  7/23/2024    Procedure: COLECTOMY, WITH ILEUM TO COLON ANASTOMOSIS;  Surgeon: Bahman Drummond MD;  Location: [facility];  Service: General;;    HYSTERECTOMY      LAPAROTOMY, EXPLORATORY N/A 7/23/2024    Procedure: LAPAROTOMY, EXPLORATORY;  Surgeon: Bahman Drummond MD;  Location: [facility] OR;  Service: General;  Laterality: N/A;    PARTIAL HYSTERECTOMY      ARTUR, ovaries in place, uterine prolapse    RIGHT HEMICOLECTOMY Right 7/23/2024    Procedure: HEMICOLECTOMY, RIGHT;  Surgeon: Bahman Drummond MD;  Location: [facility];  Service: General;  Laterality: Right;       Time Tracking:     OT Date of Treatment: 07/26/24  OT Start Time: 0950  OT Stop Time: 1020  OT Total Time (min): 30 min    Billable  Minutes:Evaluation 7  Self Care/Home Management 23 7/26/2024

## 2024-07-26 NOTE — ASSESSMENT & PLAN NOTE
Due to cecal volvulus.  She went to the OR and underwent resection of the cecum and ascending colon.  Anastomosis was created.    Follow surgeon

## 2024-07-26 NOTE — ASSESSMENT & PLAN NOTE
Lead to small-bowel obstruction.  She went to the OR and had resection of affected bowel.   Off NG tube  Started clear   Hemoglobin stable white cell slightly elevated but now normalized   BNP is elevated and can not give much of the fluid

## 2024-07-26 NOTE — PROGRESS NOTES
[facility]  Progress Note    Patient Name: Glenda Gimenez  MRN: [id number]  Patient Class: IP- Inpatient   Admission Date: 7/23/2024  Length of Stay: 3 days  Attending Physician: Wu Gao MD  Primary Care Provider: William Olivia MD        Subjective:     Principal Problem:Cecal volvulus        HPI:  89 year old pt getting admitted with cecal volvulus/SBO/bowel ischemia  Pt has been suffering from abdominal pain for past 1 week  Also she lost her appetite   Finally family took the pt to PCP and pt had KUB which were concerning  She was referred to ER and had CT scan done  Surgeon was called and pt will have emergent OR visist soon  History mainly from family because pt has dementia    Overview/Hospital Course:  89 year old pt getting admitted with cecal volvulus/SBO/bowel ischemia  Surgeon reviewed and later exploratory laparotomy was done and Right hemicolectomy. Ileocolic anastomosis  was done .  Patient was doing well postop and later surgeon discontinued the NG tube and started some liquid diet.      Interval History:     Patient seen  With the assistance cleaning  the patient.  Abdominal soft.  Bandage appeared clean    Review of Systems  Objective:     Vital Signs (Most Recent):  Temp: 97.9 °F (36.6 °C) (07/26/24 0705)  Pulse: 99 (07/26/24 0705)  Resp: 18 (07/26/24 0705)  BP: (!) 154/78 (07/26/24 0705)  SpO2: 96 % (07/26/24 0705) Vital Signs (24h Range):  Temp:  [97.9 °F (36.6 °C)-98.5 °F (36.9 °C)] 97.9 °F (36.6 °C)  Pulse:  [] 99  Resp:  [18] 18  SpO2:  [93 %-98 %] 96 %  BP: (115-154)/(69-83) 154/78     Weight: 80.9 kg (178 lb 5.6 oz)  Body mass index is 30.61 kg/m².    Intake/Output Summary (Last 24 hours) at 7/26/2024 1027  Last data filed at 7/26/2024 0859  Gross per 24 hour   Intake 60 ml   Output 750 ml   Net -690 ml         Physical Exam  Vitals and nursing note reviewed.   HENT:      Head: Normocephalic and atraumatic.      Nose: Nose normal.   Eyes:  "     Conjunctiva/sclera: Conjunctivae normal.   Neck:      Vascular: No JVD.   Cardiovascular:      Heart sounds: Normal heart sounds.   Pulmonary:      Effort: Pulmonary effort is normal.      Breath sounds: Normal breath sounds.   Abdominal:      General: Abdomen is flat.      Palpations: Abdomen is soft.   Musculoskeletal:         General: Normal range of motion.   Skin:     General: Skin is warm.   Neurological:      Mental Status: She is alert and oriented to person, place, and time.   Psychiatric:         Mood and Affect: Mood normal.             Significant Labs: All pertinent labs within the past 24 hours have been reviewed.  CBC:   Recent Labs   Lab 07/25/24  0645 07/26/24  0505   WBC 13.07* 11.89   HGB 11.1* 10.0*   HCT 34.4* 31.3*    284     CMP:   Recent Labs   Lab 07/25/24  0645 07/26/24  0505    142   K 4.7 3.8    111*   CO2 27 24   * 91   BUN 21 20   CREATININE 0.8 0.7   CALCIUM 9.2 8.8   PROT 6.0 5.6*   ALBUMIN 3.0* 2.8*   BILITOT 0.8 0.6   ALKPHOS 70 68   AST 31 31   ALT 25 19   ANIONGAP 5* 7*     Cardiac Markers: No results for input(s): "CKMB", "MYOGLOBIN", "BNP", "TROPISTAT" in the last 48 hours.    Significant Imaging: I have reviewed all pertinent imaging results/findings within the past 24 hours.    Assessment/Plan:      * Cecal volvulus  Lead to small-bowel obstruction.  She went to the OR and had resection of affected bowel.   Off NG tube  Started clear   Hemoglobin stable white cell slightly elevated but now normalized   BNP is elevated and can not give much of the fluid    UTI (urinary tract infection)    Patient is on Levaquin, E coli noted    Small bowel obstruction  Due to cecal volvulus.  She went to the OR and underwent resection of the cecum and ascending colon.  Anastomosis was created.    Follow surgeon    Ischemia, bowel  Ruled out      Chronic systolic (congestive) heart failure  Echo from two years ago showed preserved EF.  Currently she is euvolemic.  "   Resumed cardiac meds including except diuretics. Resume only when fully eating     Coronary artery disease of native artery of native heart with stable angina pectoris  Stable.  When able to take oral medications, usual cardiac meds.    Essential hypertension  Blood pressure controlled.  Holding antihypertensives while NPO.  Monitor blood pressure  Use IV vasodilators as needed.    Hypothyroid  Stable.  Resumed levothyroxine         VTE Risk Mitigation (From admission, onward)           Ordered     IP VTE HIGH RISK PATIENT  Once         07/23/24 1733     Place sequential compression device  Until discontinued         07/23/24 1733                    Discharge Planning   BOGDAN: 8/2/2024     Code Status: Full Code   Is the patient medically ready for discharge?:     Reason for patient still in hospital (select all that apply): Treatment  Discharge Plan A: Home Health                  Wu Gao MD  Department of Hospital Medicine   [facility]

## 2024-07-27 LAB
ALBUMIN SERPL BCP-MCNC: 2.6 G/DL (ref 3.5–5.2)
ALP SERPL-CCNC: 59 U/L (ref 55–135)
ALT SERPL W/O P-5'-P-CCNC: 14 U/L (ref 10–44)
ANION GAP SERPL CALC-SCNC: 7 MMOL/L (ref 8–16)
AST SERPL-CCNC: 24 U/L (ref 10–40)
BASOPHILS # BLD AUTO: 0.08 K/UL (ref 0–0.2)
BASOPHILS NFR BLD: 0.6 % (ref 0–1.9)
BILIRUB SERPL-MCNC: 0.6 MG/DL (ref 0.1–1)
BILIRUB UR QL STRIP: NEGATIVE
BUN SERPL-MCNC: 18 MG/DL (ref 10–30)
CALCIUM SERPL-MCNC: 8.6 MG/DL (ref 8.7–10.5)
CHLORIDE SERPL-SCNC: 109 MMOL/L (ref 95–110)
CLARITY UR: CLEAR
CO2 SERPL-SCNC: 24 MMOL/L (ref 23–29)
COLOR UR: ABNORMAL
CREAT SERPL-MCNC: 0.7 MG/DL (ref 0.5–1.4)
CRP SERPL-MCNC: 11.9 MG/DL
DIFFERENTIAL METHOD BLD: ABNORMAL
EOSINOPHIL # BLD AUTO: 0.5 K/UL (ref 0–0.5)
EOSINOPHIL NFR BLD: 3.8 % (ref 0–8)
ERYTHROCYTE [DISTWIDTH] IN BLOOD BY AUTOMATED COUNT: 13.3 % (ref 11.5–14.5)
EST. GFR  (NO RACE VARIABLE): >60 ML/MIN/1.73 M^2
GLUCOSE SERPL-MCNC: 75 MG/DL (ref 70–110)
GLUCOSE UR QL STRIP: NEGATIVE
HCT VFR BLD AUTO: 30.9 % (ref 37–48.5)
HGB BLD-MCNC: 9.7 G/DL (ref 12–16)
HGB UR QL STRIP: NEGATIVE
IMM GRANULOCYTES # BLD AUTO: 0.09 K/UL (ref 0–0.04)
IMM GRANULOCYTES NFR BLD AUTO: 0.7 % (ref 0–0.5)
KETONES UR QL STRIP: ABNORMAL
LEUKOCYTE ESTERASE UR QL STRIP: ABNORMAL
LYMPHOCYTES # BLD AUTO: 1.5 K/UL (ref 1–4.8)
LYMPHOCYTES NFR BLD: 12.1 % (ref 18–48)
MAGNESIUM SERPL-MCNC: 1.6 MG/DL (ref 1.6–2.6)
MCH RBC QN AUTO: 30.8 PG (ref 27–31)
MCHC RBC AUTO-ENTMCNC: 31.4 G/DL (ref 32–36)
MCV RBC AUTO: 98 FL (ref 82–98)
MICROSCOPIC COMMENT: NORMAL
MONOCYTES # BLD AUTO: 1 K/UL (ref 0.3–1)
MONOCYTES NFR BLD: 8.3 % (ref 4–15)
NEUTROPHILS # BLD AUTO: 9.2 K/UL (ref 1.8–7.7)
NEUTROPHILS NFR BLD: 74.5 % (ref 38–73)
NITRITE UR QL STRIP: NEGATIVE
NRBC BLD-RTO: 0 /100 WBC
PH UR STRIP: 6 [PH] (ref 5–8)
PLATELET # BLD AUTO: 283 K/UL (ref 150–450)
PMV BLD AUTO: 10.5 FL (ref 9.2–12.9)
POTASSIUM SERPL-SCNC: 3.6 MMOL/L (ref 3.5–5.1)
PROT SERPL-MCNC: 5.5 G/DL (ref 6–8.4)
PROT UR QL STRIP: ABNORMAL
RBC # BLD AUTO: 3.15 M/UL (ref 4–5.4)
RBC #/AREA URNS HPF: 0 /HPF (ref 0–4)
SODIUM SERPL-SCNC: 140 MMOL/L (ref 136–145)
SP GR UR STRIP: 1.02 (ref 1–1.03)
URN SPEC COLLECT METH UR: ABNORMAL
UROBILINOGEN UR STRIP-ACNC: NEGATIVE EU/DL
WBC # BLD AUTO: 12.31 K/UL (ref 3.9–12.7)
WBC #/AREA URNS HPF: 1 /HPF (ref 0–5)

## 2024-07-27 PROCEDURE — 63600175 PHARM REV CODE 636 W HCPCS: Performed by: INTERNAL MEDICINE

## 2024-07-27 PROCEDURE — 63600175 PHARM REV CODE 636 W HCPCS: Performed by: FAMILY MEDICINE

## 2024-07-27 PROCEDURE — 80053 COMPREHEN METABOLIC PANEL: CPT | Performed by: INTERNAL MEDICINE

## 2024-07-27 PROCEDURE — 81001 URINALYSIS AUTO W/SCOPE: CPT | Performed by: FAMILY MEDICINE

## 2024-07-27 PROCEDURE — 51798 US URINE CAPACITY MEASURE: CPT

## 2024-07-27 PROCEDURE — 36415 COLL VENOUS BLD VENIPUNCTURE: CPT | Performed by: INTERNAL MEDICINE

## 2024-07-27 PROCEDURE — 25000003 PHARM REV CODE 250: Performed by: HOSPITALIST

## 2024-07-27 PROCEDURE — 12000002 HC ACUTE/MED SURGE SEMI-PRIVATE ROOM

## 2024-07-27 PROCEDURE — 94761 N-INVAS EAR/PLS OXIMETRY MLT: CPT

## 2024-07-27 PROCEDURE — 83735 ASSAY OF MAGNESIUM: CPT | Performed by: INTERNAL MEDICINE

## 2024-07-27 PROCEDURE — 86140 C-REACTIVE PROTEIN: CPT | Performed by: STUDENT IN AN ORGANIZED HEALTH CARE EDUCATION/TRAINING PROGRAM

## 2024-07-27 PROCEDURE — 85025 COMPLETE CBC W/AUTO DIFF WBC: CPT | Performed by: INTERNAL MEDICINE

## 2024-07-27 PROCEDURE — 25000003 PHARM REV CODE 250: Performed by: FAMILY MEDICINE

## 2024-07-27 PROCEDURE — 25000003 PHARM REV CODE 250: Performed by: INTERNAL MEDICINE

## 2024-07-27 PROCEDURE — 27000221 HC OXYGEN, UP TO 24 HOURS

## 2024-07-27 PROCEDURE — 99900031 HC PATIENT EDUCATION (STAT)

## 2024-07-27 PROCEDURE — 97530 THERAPEUTIC ACTIVITIES: CPT

## 2024-07-27 RX ORDER — LEVOFLOXACIN 5 MG/ML
250 INJECTION, SOLUTION INTRAVENOUS
Status: DISCONTINUED | OUTPATIENT
Start: 2024-07-27 | End: 2024-07-27

## 2024-07-27 RX ORDER — NITROFURANTOIN 25; 75 MG/1; MG/1
100 CAPSULE ORAL EVERY 12 HOURS
Status: COMPLETED | OUTPATIENT
Start: 2024-07-27 | End: 2024-07-27

## 2024-07-27 RX ORDER — ENOXAPARIN SODIUM 100 MG/ML
40 INJECTION SUBCUTANEOUS EVERY 24 HOURS
Status: DISCONTINUED | OUTPATIENT
Start: 2024-07-27 | End: 2024-08-01 | Stop reason: HOSPADM

## 2024-07-27 RX ADMIN — NITROFURANTOIN MONOHYDRATE/MACROCRYSTALS 100 MG: 25; 75 CAPSULE ORAL at 09:07

## 2024-07-27 RX ADMIN — METRONIDAZOLE 500 MG: 500 INJECTION, SOLUTION INTRAVENOUS at 08:07

## 2024-07-27 RX ADMIN — ENOXAPARIN SODIUM 40 MG: 40 INJECTION SUBCUTANEOUS at 04:07

## 2024-07-27 RX ADMIN — DONEPEZIL HYDROCHLORIDE 5 MG: 5 TABLET, FILM COATED ORAL at 09:07

## 2024-07-27 RX ADMIN — LEVOTHYROXINE SODIUM 88 MCG: 0.09 TABLET ORAL at 05:07

## 2024-07-27 RX ADMIN — MUPIROCIN 1 G: 20 OINTMENT TOPICAL at 08:07

## 2024-07-27 RX ADMIN — QUETIAPINE FUMARATE 25 MG: 25 TABLET ORAL at 08:07

## 2024-07-27 RX ADMIN — MUPIROCIN 1 G: 20 OINTMENT TOPICAL at 09:07

## 2024-07-27 RX ADMIN — NITROFURANTOIN MONOHYDRATE/MACROCRYSTALS 100 MG: 25; 75 CAPSULE ORAL at 08:07

## 2024-07-27 RX ADMIN — PANTOPRAZOLE SODIUM 40 MG: 40 INJECTION, POWDER, FOR SOLUTION INTRAVENOUS at 08:07

## 2024-07-27 RX ADMIN — METRONIDAZOLE 500 MG: 500 INJECTION, SOLUTION INTRAVENOUS at 04:07

## 2024-07-27 RX ADMIN — ISOSORBIDE MONONITRATE 30 MG: 30 TABLET, EXTENDED RELEASE ORAL at 08:07

## 2024-07-27 RX ADMIN — METRONIDAZOLE 500 MG: 500 INJECTION, SOLUTION INTRAVENOUS at 12:07

## 2024-07-27 RX ADMIN — LOSARTAN POTASSIUM 12.5 MG: 25 TABLET, FILM COATED ORAL at 08:07

## 2024-07-27 NOTE — PROGRESS NOTES
[facility]  Progress Note    Patient Name: Glenda Gimenez  MRN: [id number]  Patient Class: IP- Inpatient   Admission Date: 7/23/2024  Length of Stay: 4 days  Attending Physician: Erick Collier DO  Primary Care Provider: William Olivia MD        Subjective:     Principal Problem:Cecal volvulus        HPI:  89 year old pt getting admitted with cecal volvulus/SBO/bowel ischemia  Pt has been suffering from abdominal pain for past 1 week  Also she lost her appetite   Finally family took the pt to PCP and pt had KUB which were concerning  She was referred to ER and had CT scan done  Surgeon was called and pt will have emergent OR visist soon  History mainly from family because pt has dementia    Overview/Hospital Course:  89 year old pt getting admitted with cecal volvulus/SBO/bowel ischemia  Surgeon reviewed and later exploratory laparotomy was done and Right hemicolectomy. Ileocolic anastomosis  was done .  Patient was doing well postop and later surgeon discontinued the NG tube and started some liquid diet, but poor po intake. Will attempt to advance and keep gentle fluids until intake improves. UA did not show infection. PVR was 400, straight cath x1 and second PVR was 100. Consider increasing fluids if urine production remains low.      Interval History:     Patient seen  Abdomen soft.  Bandage appeared clean    Review of Systems   Constitutional:  Negative for chills and fever.   Respiratory:  Negative for chest tightness, shortness of breath and wheezing.    Cardiovascular:  Negative for chest pain and palpitations.   Gastrointestinal:  Negative for constipation, diarrhea, nausea and vomiting.   Genitourinary:  Negative for dysuria and hematuria.   Musculoskeletal:  Negative for gait problem.   Neurological:  Negative for dizziness, speech difficulty and numbness.     Objective:     Vital Signs (Most Recent):  Temp: 97.7 °F (36.5 °C) (07/27/24 1125)  Pulse: 86 (07/27/24  "1125)  Resp: 18 (07/27/24 1125)  BP: 131/76 (07/27/24 1125)  SpO2: (!) 94 % (07/27/24 1125) Vital Signs (24h Range):  Temp:  [97.6 °F (36.4 °C)-98.2 °F (36.8 °C)] 97.7 °F (36.5 °C)  Pulse:  [80-90] 86  Resp:  [16-18] 18  SpO2:  [94 %-97 %] 94 %  BP: (111-134)/(57-76) 131/76     Weight: 80.9 kg (178 lb 5.6 oz)  Body mass index is 30.61 kg/m².    Intake/Output Summary (Last 24 hours) at 7/27/2024 1433  Last data filed at 7/27/2024 1320  Gross per 24 hour   Intake 1080 ml   Output --   Net 1080 ml         Physical Exam  Vitals and nursing note reviewed.   HENT:      Head: Normocephalic and atraumatic.      Nose: Nose normal.   Eyes:      Conjunctiva/sclera: Conjunctivae normal.   Neck:      Vascular: No JVD.   Cardiovascular:      Heart sounds: Normal heart sounds.   Pulmonary:      Effort: Pulmonary effort is normal.      Breath sounds: Normal breath sounds.   Abdominal:      General: Abdomen is flat.      Palpations: Abdomen is soft.   Musculoskeletal:         General: Normal range of motion.   Skin:     General: Skin is warm.   Neurological:      Mental Status: She is alert and oriented to person, place, and time.   Psychiatric:         Mood and Affect: Mood normal.             Significant Labs: All pertinent labs within the past 24 hours have been reviewed.  CBC:   Recent Labs   Lab 07/26/24  0505 07/27/24  0515   WBC 11.89 12.31   HGB 10.0* 9.7*   HCT 31.3* 30.9*    283     CMP:   Recent Labs   Lab 07/26/24  0505 07/27/24  0515    140   K 3.8 3.6   * 109   CO2 24 24   GLU 91 75   BUN 20 18   CREATININE 0.7 0.7   CALCIUM 8.8 8.6*   PROT 5.6* 5.5*   ALBUMIN 2.8* 2.6*   BILITOT 0.6 0.6   ALKPHOS 68 59   AST 31 24   ALT 19 14   ANIONGAP 7* 7*     Cardiac Markers: No results for input(s): "CKMB", "MYOGLOBIN", "BNP", "TROPISTAT" in the last 48 hours.    Significant Imaging: I have reviewed all pertinent imaging results/findings within the past 24 hours.    Assessment/Plan:      * Cecal volvulus  Lead " to small-bowel obstruction.  She went to the OR and had resection of affected bowel.   Off NG tube  Started clear   Hemoglobin stable white cell slightly elevated but now normalized   BNP is elevated and can not give much of the fluid    UTI (urinary tract infection)  Transitioned to nitrofurantoin per Cx for 5 day course    Small bowel obstruction  Due to cecal volvulus.  She went to the OR and underwent resection of the cecum and ascending colon.  Anastomosis was created.    Follow surgeon    Ischemia, bowel  Ruled out      Chronic systolic (congestive) heart failure  Echo from two years ago showed preserved EF.  Currently she is euvolemic.    Resumed cardiac meds including except diuretics. Resume only when fully eating     Coronary artery disease of native artery of native heart with stable angina pectoris  Stable.  When able to take oral medications, usual cardiac meds.    Essential hypertension  Blood pressure controlled.  Holding antihypertensives while NPO.  Monitor blood pressure  Use IV vasodilators as needed.    Hypothyroid  Stable.  Resumed levothyroxine         VTE Risk Mitigation (From admission, onward)           Ordered     IP VTE HIGH RISK PATIENT  Once         07/23/24 1733     Place sequential compression device  Until discontinued         07/23/24 1733                    Discharge Planning   BOGDAN: 8/2/2024     Code Status: Full Code   Is the patient medically ready for discharge?:     Reason for patient still in hospital (select all that apply): Treatment, Consult recommendations, PT / OT recommendations, and Pending disposition  Discharge Plan A: Home Health                  Erick Collier DO  Department of Hospital Medicine   [facility]

## 2024-07-27 NOTE — SUBJECTIVE & OBJECTIVE
Interval History:     Patient seen  Abdomen soft.  Bandage appeared clean    Review of Systems   Constitutional:  Negative for chills and fever.   Respiratory:  Negative for chest tightness, shortness of breath and wheezing.    Cardiovascular:  Negative for chest pain and palpitations.   Gastrointestinal:  Negative for constipation, diarrhea, nausea and vomiting.   Genitourinary:  Negative for dysuria and hematuria.   Musculoskeletal:  Negative for gait problem.   Neurological:  Negative for dizziness, speech difficulty and numbness.     Objective:     Vital Signs (Most Recent):  Temp: 97.7 °F (36.5 °C) (07/27/24 1125)  Pulse: 86 (07/27/24 1125)  Resp: 18 (07/27/24 1125)  BP: 131/76 (07/27/24 1125)  SpO2: (!) 94 % (07/27/24 1125) Vital Signs (24h Range):  Temp:  [97.6 °F (36.4 °C)-98.2 °F (36.8 °C)] 97.7 °F (36.5 °C)  Pulse:  [80-90] 86  Resp:  [16-18] 18  SpO2:  [94 %-97 %] 94 %  BP: (111-134)/(57-76) 131/76     Weight: 80.9 kg (178 lb 5.6 oz)  Body mass index is 30.61 kg/m².    Intake/Output Summary (Last 24 hours) at 7/27/2024 1433  Last data filed at 7/27/2024 1320  Gross per 24 hour   Intake 1080 ml   Output --   Net 1080 ml         Physical Exam  Vitals and nursing note reviewed.   HENT:      Head: Normocephalic and atraumatic.      Nose: Nose normal.   Eyes:      Conjunctiva/sclera: Conjunctivae normal.   Neck:      Vascular: No JVD.   Cardiovascular:      Heart sounds: Normal heart sounds.   Pulmonary:      Effort: Pulmonary effort is normal.      Breath sounds: Normal breath sounds.   Abdominal:      General: Abdomen is flat.      Palpations: Abdomen is soft.   Musculoskeletal:         General: Normal range of motion.   Skin:     General: Skin is warm.   Neurological:      Mental Status: She is alert and oriented to person, place, and time.   Psychiatric:         Mood and Affect: Mood normal.             Significant Labs: All pertinent labs within the past 24 hours have been reviewed.  CBC:   Recent Labs  "  Lab 07/26/24  0505 07/27/24  0515   WBC 11.89 12.31   HGB 10.0* 9.7*   HCT 31.3* 30.9*    283     CMP:   Recent Labs   Lab 07/26/24  0505 07/27/24  0515    140   K 3.8 3.6   * 109   CO2 24 24   GLU 91 75   BUN 20 18   CREATININE 0.7 0.7   CALCIUM 8.8 8.6*   PROT 5.6* 5.5*   ALBUMIN 2.8* 2.6*   BILITOT 0.6 0.6   ALKPHOS 68 59   AST 31 24   ALT 19 14   ANIONGAP 7* 7*     Cardiac Markers: No results for input(s): "CKMB", "MYOGLOBIN", "BNP", "TROPISTAT" in the last 48 hours.    Significant Imaging: I have reviewed all pertinent imaging results/findings within the past 24 hours.  "

## 2024-07-27 NOTE — RESPIRATORY THERAPY
07/26/24 2826   Patient Assessment/Suction   Level of Consciousness (AVPU) responds to voice   Respiratory Effort Normal;Unlabored   PRE-TX-O2   Device (Oxygen Therapy) nasal cannula   Flow (L/min) (Oxygen Therapy) 2   SpO2 (!) 94 %   Pulse Oximetry Type Intermittent   $ Pulse Oximetry - Multiple Charge Pulse Oximetry - Multiple   Education   $ Education Oxygen;15 min

## 2024-07-27 NOTE — PT/OT/SLP PROGRESS
Physical Therapy Treatment    Patient Name:  Glenda Gimenez   MRN:  [id number]    Recommendations:     Discharge Recommendations: Low Intensity Therapy  Discharge Equipment Recommendations: none  Barriers to discharge: None    Assessment:     Glenda Gimenez is a 89 y.o. female admitted with a medical diagnosis of Cecal volvulus.  She presents with the following impairments/functional limitations: impaired endurance, weakness, impaired self care skills, impaired functional mobility, gait instability, impaired balance. Patient agreeable to participate in PT treatment. Patient performed bed mobility with Faye but did need increased time and VC. Faye with STS and ambulation of 10' to the door and back in room with RW. Patient returned to supine with ModA. Supine with bed alarm on, all lines in tact, call button in reach, and family present.     Rehab Prognosis: Good; patient would benefit from acute skilled PT services to address these deficits and reach maximum level of function.    Recent Surgery: Procedure(s) (LRB):  LAPAROTOMY, EXPLORATORY (N/A)  HEMICOLECTOMY, RIGHT (Right)  COLECTOMY, WITH ILEUM TO COLON ANASTOMOSIS 4 Days Post-Op    Plan:     During this hospitalization, patient to be seen daily to address the identified rehab impairments via gait training, therapeutic activities, therapeutic exercises and progress toward the following goals:    Plan of Care Expires:  08/25/24    Subjective     Chief Complaint: tired  Patient/Family Comments/goals: get up  Pain/Comfort:         Objective:     Communicated with RN prior to session.  Patient found supine with bed alarm, oxygen, peripheral IV upon PT entry to room.     General Precautions: Standard, fall  Orthopedic Precautions: N/A  Braces: N/A  Respiratory Status: Nasal cannula, flow 2 L/min     Functional Mobility:  Bed Mobility:     Supine to Sit: minimum assistance  Sit to Supine: moderate assistance  Transfers:     Sit to Stand:  minimum assistance with  rolling walker  Gait: 10' in room with RW and Faye      AM-PAC 6 CLICK MOBILITY          Treatment & Education:  Patient was educated on the importance of OOB activity and functional mobility to negate the negative effects of prolonged bed rest during hospitalization, safe transfers and ambulation, and d/c planning.      Patient left supine with all lines intact, bed alarm on, and family present..    GOALS:   Multidisciplinary Problems       Physical Therapy Goals          Problem: Physical Therapy    Goal Priority Disciplines Outcome Goal Variances Interventions   Physical Therapy Goal     PT, PT/OT Progressing     Description: Goals to be met by: 24     Patient will increase functional independence with mobility by performin. Supine to sit with Supervision  2. Sit to stand transfer with Supervision  3. Bed to chair transfer with Supervision using Rolling Walker  4. Gait  x 150 feet with Supervision using Rolling Walker.                              Time Tracking:     PT Received On: 24  PT Start Time: 1048     PT Stop Time: 1104  PT Total Time (min): 16 min     Billable Minutes: Therapeutic Activity 16    Treatment Type: Treatment  PT/PTA: PT     Number of PTA visits since last PT visit: 0     2024

## 2024-07-27 NOTE — CARE UPDATE
07/27/24 0903   Patient Assessment/Suction   Level of Consciousness (AVPU) alert   Respiratory Effort Normal;Unlabored   PRE-TX-O2   Device (Oxygen Therapy) nasal cannula   $ Is the patient on Low Flow Oxygen? Yes   Flow (L/min) (Oxygen Therapy) 2   SpO2 (!) 94 %   Pulse Oximetry Type Intermittent   $ Pulse Oximetry - Multiple Charge Pulse Oximetry - Multiple   Pulse 83   Resp 18

## 2024-07-27 NOTE — PROGRESS NOTES
General Surgery Progress Note    Admit Date: 7/23/2024  S/P: Procedure(s) (LRB):  LAPAROTOMY, EXPLORATORY (N/A)  HEMICOLECTOMY, RIGHT (Right)  COLECTOMY, WITH ILEUM TO COLON ANASTOMOSIS    Post-operative Day: 4 Days Post-Op    Hospital Day: 5    SUBJECTIVE:   Seen with son at bedside.  Patient denies any significant abdominal pain or nausea. Has been tolerating water well but has poor appetite and does not enjoy gel it was or the juices. Had a bowel movement yesterday.  Uncertain if she was passing flatus.    OBJECTIVE:     Vital Signs (Most Recent)  Temp:  [97.6 °F (36.4 °C)-98.2 °F (36.8 °C)] 98 °F (36.7 °C)  Pulse:  [79-90] 83  Resp:  [16-18] 18  SpO2:  [94 %-97 %] 94 %  BP: (111-134)/(57-70) 131/68    I&Os:  I/O last 3 completed shifts:  In: 900 [P.O.:900]  Out: 650 [Urine:650]    Physical Exam:  Gen: NAD, AAOx3  HEENT: Anicteric sclera  Pulm: unlabored, symmetrical   Abd:  Soft, bandage intact without significant soilage, appropriate tenderness    Laboratory:  CBC:   Recent Labs   Lab 07/27/24  0515   WBC 12.31   RBC 3.15*   HGB 9.7*   HCT 30.9*      MCV 98   MCH 30.8   MCHC 31.4*     BMP:   Recent Labs   Lab 07/27/24  0515   GLU 75      K 3.6      CO2 24   BUN 18   CREATININE 0.7   CALCIUM 8.6*     Labs within the past 24 hours have been reviewed.    ASSESSMENT/PLAN:     Patient Active Problem List    Diagnosis Date Noted    UTI (urinary tract infection) 07/25/2024    Cecal volvulus 07/23/2024    Ischemia, bowel 07/23/2024    Small bowel obstruction 07/23/2024    Chronic systolic (congestive) heart failure 02/20/2024    Exudative age-related macular degeneration, bilateral, with active choroidal neovascularization 02/20/2024    Flu vaccine need 11/07/2023    Other reduced mobility 09/26/2023    Thyroid function test abnormal 01/10/2023    Thyroiditis 01/10/2023    Chest pain 08/11/2022    Atherosclerosis of aorta 06/09/2022    Non-intractable vomiting with nausea 05/30/2022    Fever  05/30/2022    Vascular dementia with behavior disturbance 05/21/2022    History of COVID-19 05/02/2022    Closed fracture of second lumbar vertebra with routine healing 03/06/2022    History of stroke 12/20/2021    Hyponatremia 10/22/2021    Salt-losing nephritis or syndrome 09/19/2021    Chronic hyponatremia 09/16/2021    History of recent fall 07/21/2021    Recurrent major depressive disorder, in full remission 06/09/2021    Anxiety 06/09/2021    Other hemorrhoids 05/11/2021    Coronary artery disease of native artery of native heart with stable angina pectoris 05/11/2021    Macular degeneration of right eye 02/28/2021    Osteoarthritis of both hands 08/08/2020    Insomnia 06/23/2020    Gastroesophageal reflux disease without esophagitis 06/23/2020    Palliative care encounter 04/10/2020    Weakness 03/16/2020    Nodular thyroid disease 01/22/2018    Essential hypertension 01/22/2018    Dysmetabolic syndrome 01/22/2018    Primary hyperparathyroidism 09/02/2016    Goiter 12/07/2015    Thyroid disease 12/07/2015    Postmenopausal 12/07/2015    Hyperglycemia 09/14/2015    High serum parathyroid hormone (PTH) 09/14/2015    Thyroid nodule 02/01/2013    Hypothyroid 11/02/2012    Hypercalcemia 07/27/2012    Vitamin D deficiency disease 07/27/2012         89 y.o. female status post right hemicolectomy for volvulus  -appears to be doing well today  -will advance to full liquids so we can add protein shake which the patient was previously enjoyed   -continue low rate IV fluids until p.o. intake does improve  -monitor for continued bowel function   -out of bed to chair and ambulate, PT OT on board

## 2024-07-27 NOTE — NURSING
Straight cath performed per MD order.  300mL of dark, tea-colored urine returned.  UA sample sent to lab.

## 2024-07-27 NOTE — NURSING
Cabrera placed per MD order.  Pt tolerated well.  400mL of dark, tea-colored urine returned.  MD notified.

## 2024-07-27 NOTE — NURSING
Pt had izaguirre removed yesterday.  Pt has not voided since izaguirre removal.  Bladder scan performed, pt has 469mL in bladder, no urge to void.  MD notified.

## 2024-07-28 PROBLEM — I48.91 ATRIAL FIBRILLATION WITH RVR: Status: ACTIVE | Noted: 2024-07-28

## 2024-07-28 LAB
ALBUMIN SERPL BCP-MCNC: 2.9 G/DL (ref 3.5–5.2)
ALLENS TEST: ABNORMAL
ALP SERPL-CCNC: 61 U/L (ref 55–135)
ALT SERPL W/O P-5'-P-CCNC: 12 U/L (ref 10–44)
ANION GAP SERPL CALC-SCNC: 9 MMOL/L (ref 8–16)
AST SERPL-CCNC: 21 U/L (ref 10–40)
BASOPHILS # BLD AUTO: 0.07 K/UL (ref 0–0.2)
BASOPHILS NFR BLD: 0.7 % (ref 0–1.9)
BILIRUB SERPL-MCNC: 0.6 MG/DL (ref 0.1–1)
BNP SERPL-MCNC: 755 PG/ML (ref 0–99)
BUN SERPL-MCNC: 14 MG/DL (ref 10–30)
CALCIUM SERPL-MCNC: 9.1 MG/DL (ref 8.7–10.5)
CHLORIDE SERPL-SCNC: 109 MMOL/L (ref 95–110)
CO2 SERPL-SCNC: 24 MMOL/L (ref 23–29)
CREAT SERPL-MCNC: 0.7 MG/DL (ref 0.5–1.4)
CRP SERPL-MCNC: 11.2 MG/DL
DELSYS: ABNORMAL
DIFFERENTIAL METHOD BLD: ABNORMAL
EOSINOPHIL # BLD AUTO: 0.3 K/UL (ref 0–0.5)
EOSINOPHIL NFR BLD: 2.9 % (ref 0–8)
ERYTHROCYTE [DISTWIDTH] IN BLOOD BY AUTOMATED COUNT: 13.2 % (ref 11.5–14.5)
ERYTHROCYTE [SEDIMENTATION RATE] IN BLOOD BY WESTERGREN METHOD: 4 MM/H
EST. GFR  (NO RACE VARIABLE): >60 ML/MIN/1.73 M^2
GLUCOSE SERPL-MCNC: 113 MG/DL (ref 70–110)
GLUCOSE SERPL-MCNC: 120 MG/DL (ref 70–110)
GLUCOSE SERPL-MCNC: 130 MG/DL (ref 70–110)
HCO3 UR-SCNC: 21.6 MMOL/L (ref 24–28)
HCT VFR BLD AUTO: 35.5 % (ref 37–48.5)
HCT VFR BLD CALC: 37 %PCV (ref 36–54)
HGB BLD-MCNC: 11.5 G/DL (ref 12–16)
IMM GRANULOCYTES # BLD AUTO: 0.09 K/UL (ref 0–0.04)
IMM GRANULOCYTES NFR BLD AUTO: 0.9 % (ref 0–0.5)
LYMPHOCYTES # BLD AUTO: 0.7 K/UL (ref 1–4.8)
LYMPHOCYTES NFR BLD: 7.1 % (ref 18–48)
MAGNESIUM SERPL-MCNC: 1.4 MG/DL (ref 1.6–2.6)
MCH RBC QN AUTO: 31.2 PG (ref 27–31)
MCHC RBC AUTO-ENTMCNC: 32.4 G/DL (ref 32–36)
MCV RBC AUTO: 96 FL (ref 82–98)
MODE: ABNORMAL
MONOCYTES # BLD AUTO: 0.7 K/UL (ref 0.3–1)
MONOCYTES NFR BLD: 6.8 % (ref 4–15)
NEUTROPHILS # BLD AUTO: 8.2 K/UL (ref 1.8–7.7)
NEUTROPHILS NFR BLD: 81.6 % (ref 38–73)
NRBC BLD-RTO: 0 /100 WBC
PCO2 BLDA: 34.1 MMHG (ref 35–45)
PH SMN: 7.41 [PH] (ref 7.35–7.45)
PHOSPHATE SERPL-MCNC: 2.4 MG/DL (ref 2.7–4.5)
PLATELET # BLD AUTO: 320 K/UL (ref 150–450)
PMV BLD AUTO: 10.1 FL (ref 9.2–12.9)
PO2 BLDA: 91 MMHG (ref 80–100)
POC BE: -3 MMOL/L
POC IONIZED CALCIUM: 1.29 MMOL/L (ref 1.06–1.42)
POC SATURATED O2: 97 % (ref 95–100)
POC TCO2: 23 MMOL/L (ref 23–27)
POTASSIUM BLD-SCNC: 3.1 MMOL/L (ref 3.5–5.1)
POTASSIUM SERPL-SCNC: 3.2 MMOL/L (ref 3.5–5.1)
PROT SERPL-MCNC: 6.2 G/DL (ref 6–8.4)
RBC # BLD AUTO: 3.69 M/UL (ref 4–5.4)
SAMPLE: ABNORMAL
SITE: ABNORMAL
SODIUM BLD-SCNC: 141 MMOL/L (ref 136–145)
SODIUM SERPL-SCNC: 142 MMOL/L (ref 136–145)
SP02: 97
TROPONIN I SERPL HS-MCNC: 712.3 PG/ML (ref 0–14.9)
TROPONIN I SERPL HS-MCNC: 857.9 PG/ML (ref 0–14.9)
TROPONIN I SERPL HS-MCNC: 884.9 PG/ML (ref 0–14.9)
TSH SERPL DL<=0.005 MIU/L-ACNC: 1.94 UIU/ML (ref 0.34–5.6)
WBC # BLD AUTO: 10.05 K/UL (ref 3.9–12.7)

## 2024-07-28 PROCEDURE — 84132 ASSAY OF SERUM POTASSIUM: CPT

## 2024-07-28 PROCEDURE — 36415 COLL VENOUS BLD VENIPUNCTURE: CPT | Performed by: INTERNAL MEDICINE

## 2024-07-28 PROCEDURE — 25000003 PHARM REV CODE 250: Performed by: INTERNAL MEDICINE

## 2024-07-28 PROCEDURE — 83735 ASSAY OF MAGNESIUM: CPT | Performed by: INTERNAL MEDICINE

## 2024-07-28 PROCEDURE — 25000003 PHARM REV CODE 250: Performed by: STUDENT IN AN ORGANIZED HEALTH CARE EDUCATION/TRAINING PROGRAM

## 2024-07-28 PROCEDURE — 36415 COLL VENOUS BLD VENIPUNCTURE: CPT

## 2024-07-28 PROCEDURE — 63600175 PHARM REV CODE 636 W HCPCS: Performed by: INTERNAL MEDICINE

## 2024-07-28 PROCEDURE — 82330 ASSAY OF CALCIUM: CPT

## 2024-07-28 PROCEDURE — 80053 COMPREHEN METABOLIC PANEL: CPT | Performed by: INTERNAL MEDICINE

## 2024-07-28 PROCEDURE — 25000242 PHARM REV CODE 250 ALT 637 W/ HCPCS: Performed by: STUDENT IN AN ORGANIZED HEALTH CARE EDUCATION/TRAINING PROGRAM

## 2024-07-28 PROCEDURE — 85014 HEMATOCRIT: CPT

## 2024-07-28 PROCEDURE — 84295 ASSAY OF SERUM SODIUM: CPT

## 2024-07-28 PROCEDURE — 84484 ASSAY OF TROPONIN QUANT: CPT | Mod: 91 | Performed by: FAMILY MEDICINE

## 2024-07-28 PROCEDURE — 93010 ELECTROCARDIOGRAM REPORT: CPT | Mod: ,,, | Performed by: GENERAL PRACTICE

## 2024-07-28 PROCEDURE — 21400001 HC TELEMETRY ROOM

## 2024-07-28 PROCEDURE — 27000221 HC OXYGEN, UP TO 24 HOURS

## 2024-07-28 PROCEDURE — 63600175 PHARM REV CODE 636 W HCPCS: Performed by: STUDENT IN AN ORGANIZED HEALTH CARE EDUCATION/TRAINING PROGRAM

## 2024-07-28 PROCEDURE — 94761 N-INVAS EAR/PLS OXIMETRY MLT: CPT | Mod: XB

## 2024-07-28 PROCEDURE — 99223 1ST HOSP IP/OBS HIGH 75: CPT | Mod: ,,, | Performed by: STUDENT IN AN ORGANIZED HEALTH CARE EDUCATION/TRAINING PROGRAM

## 2024-07-28 PROCEDURE — 85025 COMPLETE CBC W/AUTO DIFF WBC: CPT | Performed by: INTERNAL MEDICINE

## 2024-07-28 PROCEDURE — 83880 ASSAY OF NATRIURETIC PEPTIDE: CPT | Performed by: INTERNAL MEDICINE

## 2024-07-28 PROCEDURE — 86140 C-REACTIVE PROTEIN: CPT | Performed by: STUDENT IN AN ORGANIZED HEALTH CARE EDUCATION/TRAINING PROGRAM

## 2024-07-28 PROCEDURE — 93005 ELECTROCARDIOGRAM TRACING: CPT | Performed by: GENERAL PRACTICE

## 2024-07-28 PROCEDURE — 36415 COLL VENOUS BLD VENIPUNCTURE: CPT | Performed by: FAMILY MEDICINE

## 2024-07-28 PROCEDURE — 82803 BLOOD GASES ANY COMBINATION: CPT

## 2024-07-28 PROCEDURE — 99900031 HC PATIENT EDUCATION (STAT)

## 2024-07-28 PROCEDURE — 36600 WITHDRAWAL OF ARTERIAL BLOOD: CPT

## 2024-07-28 PROCEDURE — 25000003 PHARM REV CODE 250: Performed by: FAMILY MEDICINE

## 2024-07-28 PROCEDURE — 84100 ASSAY OF PHOSPHORUS: CPT | Performed by: INTERNAL MEDICINE

## 2024-07-28 PROCEDURE — 99900035 HC TECH TIME PER 15 MIN (STAT)

## 2024-07-28 PROCEDURE — 25000003 PHARM REV CODE 250: Performed by: HOSPITALIST

## 2024-07-28 PROCEDURE — 84443 ASSAY THYROID STIM HORMONE: CPT

## 2024-07-28 PROCEDURE — 63600175 PHARM REV CODE 636 W HCPCS: Performed by: FAMILY MEDICINE

## 2024-07-28 RX ORDER — DIGOXIN 0.25 MG/ML
500 INJECTION INTRAMUSCULAR; INTRAVENOUS ONCE
Status: COMPLETED | OUTPATIENT
Start: 2024-07-28 | End: 2024-07-28

## 2024-07-28 RX ORDER — FUROSEMIDE 40 MG/1
40 TABLET ORAL DAILY
Status: DISCONTINUED | OUTPATIENT
Start: 2024-07-29 | End: 2024-08-01 | Stop reason: HOSPADM

## 2024-07-28 RX ORDER — DILTIAZEM HYDROCHLORIDE 5 MG/ML
10 INJECTION INTRAVENOUS ONCE
Status: DISCONTINUED | OUTPATIENT
Start: 2024-07-28 | End: 2024-07-28

## 2024-07-28 RX ORDER — DIGOXIN 0.25 MG/ML
500 INJECTION INTRAMUSCULAR; INTRAVENOUS ONCE
Status: DISCONTINUED | OUTPATIENT
Start: 2024-07-28 | End: 2024-07-28

## 2024-07-28 RX ORDER — IPRATROPIUM BROMIDE AND ALBUTEROL SULFATE 2.5; .5 MG/3ML; MG/3ML
3 SOLUTION RESPIRATORY (INHALATION) EVERY 8 HOURS PRN
Status: DISCONTINUED | OUTPATIENT
Start: 2024-07-28 | End: 2024-08-01 | Stop reason: HOSPADM

## 2024-07-28 RX ORDER — FUROSEMIDE 10 MG/ML
40 INJECTION INTRAMUSCULAR; INTRAVENOUS ONCE
Status: COMPLETED | OUTPATIENT
Start: 2024-07-28 | End: 2024-07-28

## 2024-07-28 RX ORDER — METOPROLOL TARTRATE 1 MG/ML
2.5 INJECTION, SOLUTION INTRAVENOUS ONCE
Status: DISCONTINUED | OUTPATIENT
Start: 2024-07-28 | End: 2024-07-28

## 2024-07-28 RX ORDER — MAGNESIUM SULFATE HEPTAHYDRATE 40 MG/ML
2 INJECTION, SOLUTION INTRAVENOUS ONCE
Status: COMPLETED | OUTPATIENT
Start: 2024-07-28 | End: 2024-07-28

## 2024-07-28 RX ORDER — POTASSIUM CHLORIDE 20 MEQ/1
40 TABLET, EXTENDED RELEASE ORAL EVERY 6 HOURS
Status: COMPLETED | OUTPATIENT
Start: 2024-07-28 | End: 2024-07-28

## 2024-07-28 RX ADMIN — HYDROCODONE BITARTRATE AND ACETAMINOPHEN 1 TABLET: 5; 325 TABLET ORAL at 05:07

## 2024-07-28 RX ADMIN — POTASSIUM CHLORIDE 40 MEQ: 1500 TABLET, EXTENDED RELEASE ORAL at 05:07

## 2024-07-28 RX ADMIN — PANTOPRAZOLE SODIUM 40 MG: 40 INJECTION, POWDER, FOR SOLUTION INTRAVENOUS at 09:07

## 2024-07-28 RX ADMIN — LEVOTHYROXINE SODIUM 88 MCG: 0.09 TABLET ORAL at 05:07

## 2024-07-28 RX ADMIN — POTASSIUM BICARBONATE 35 MEQ: 391 TABLET, EFFERVESCENT ORAL at 07:07

## 2024-07-28 RX ADMIN — FUROSEMIDE 40 MG: 10 INJECTION, SOLUTION INTRAMUSCULAR; INTRAVENOUS at 04:07

## 2024-07-28 RX ADMIN — ENOXAPARIN SODIUM 40 MG: 40 INJECTION SUBCUTANEOUS at 05:07

## 2024-07-28 RX ADMIN — MAGNESIUM SULFATE HEPTAHYDRATE 2 G: 40 INJECTION, SOLUTION INTRAVENOUS at 07:07

## 2024-07-28 RX ADMIN — ISOSORBIDE MONONITRATE 30 MG: 30 TABLET, EXTENDED RELEASE ORAL at 10:07

## 2024-07-28 RX ADMIN — IPRATROPIUM BROMIDE AND ALBUTEROL SULFATE 3 ML: 2.5; .5 SOLUTION RESPIRATORY (INHALATION) at 04:07

## 2024-07-28 RX ADMIN — Medication 6 MG: at 09:07

## 2024-07-28 RX ADMIN — LOSARTAN POTASSIUM 12.5 MG: 25 TABLET, FILM COATED ORAL at 10:07

## 2024-07-28 RX ADMIN — POTASSIUM CHLORIDE 40 MEQ: 1500 TABLET, EXTENDED RELEASE ORAL at 11:07

## 2024-07-28 RX ADMIN — METRONIDAZOLE 500 MG: 500 INJECTION, SOLUTION INTRAVENOUS at 01:07

## 2024-07-28 RX ADMIN — QUETIAPINE FUMARATE 25 MG: 25 TABLET ORAL at 10:07

## 2024-07-28 RX ADMIN — DIGOXIN 500 MCG: 0.25 INJECTION INTRAMUSCULAR; INTRAVENOUS at 01:07

## 2024-07-28 RX ADMIN — MUPIROCIN 1 G: 20 OINTMENT TOPICAL at 09:07

## 2024-07-28 RX ADMIN — DONEPEZIL HYDROCHLORIDE 5 MG: 5 TABLET, FILM COATED ORAL at 09:07

## 2024-07-28 NOTE — SIGNIFICANT EVENT
Patient with in afib wRVR -130. Remote hx, not on BB or AC outpatient. EKG confirms, currently being treated for volvulus s/p resection and concern for fluid overload given with elevated BNP. Started on lasix overnight. Asymptomatic but tachypneic with increased O2. Check trops, dilt 10mg push x1 , cardiology consult, and transfer to telemetry. Confirmed full code.

## 2024-07-28 NOTE — ASSESSMENT & PLAN NOTE
Echo from two years ago showed preserved EF.  Does not appear edematous however overnight became tachypneic and CXR showed bilateral interstitial edema.  Fluids were stopped and IV Lasix given once, considering p.o. versus IV for tomorrow.  Lasix was held postop until patient was eating but she had not been eating much.  Update TTE.  BNP elevated

## 2024-07-28 NOTE — PLAN OF CARE
Problem: Fall Injury Risk  Goal: Absence of Fall and Fall-Related Injury  Outcome: Progressing     Problem: Skin Injury Risk Increased  Goal: Skin Health and Integrity  Outcome: Progressing     Problem: Oral Intake Inadequate  Goal: Improved Oral Intake  Outcome: Progressing

## 2024-07-28 NOTE — PROGRESS NOTES
General Surgery Progress Note    Admit Date: 7/23/2024  S/P: Procedure(s) (LRB):  LAPAROTOMY, EXPLORATORY (N/A)  HEMICOLECTOMY, RIGHT (Right)  COLECTOMY, WITH ILEUM TO COLON ANASTOMOSIS    Post-operative Day: 5 Days Post-Op    Hospital Day: 6    SUBJECTIVE:   Had bowel movements and gas with bowel movements. Has poor appetite still.  Developed Afib. Cabrera has had to be replaced    OBJECTIVE:     Vital Signs (Most Recent)  Temp:  [97.8 °F (36.6 °C)-98.5 °F (36.9 °C)] 97.8 °F (36.6 °C)  Pulse:  [] 93  Resp:  [18-34] 18  SpO2:  [94 %-98 %] 94 %  BP: ()/(62-84) 99/62    I&Os:  I/O last 3 completed shifts:  In: 1320 [P.O.:1320]  Out: 300 [Urine:300]    Physical Exam:  Gen: NAD, AAOx3  HEENT: Anicteric sclera  Pulm: unlabored, symmetrical   Abd:  Soft, bandage intact without significant soilage, appropriate tenderness    Laboratory:  CBC:   Recent Labs   Lab 07/28/24  0430 07/28/24  0516   WBC 10.05  --    RBC 3.69*  --    HGB 11.5*  --    HCT 35.5* 37     --    MCV 96  --    MCH 31.2*  --    MCHC 32.4  --      BMP:   Recent Labs   Lab 07/28/24  0430   *      K 3.2*      CO2 24   BUN 14   CREATININE 0.7   CALCIUM 9.1     Labs within the past 24 hours have been reviewed.    ASSESSMENT/PLAN:     Patient Active Problem List    Diagnosis Date Noted    Atrial fibrillation with RVR 07/28/2024    UTI (urinary tract infection) 07/25/2024    Cecal volvulus 07/23/2024    Ischemia, bowel 07/23/2024    Small bowel obstruction 07/23/2024    Chronic systolic (congestive) heart failure 02/20/2024    Exudative age-related macular degeneration, bilateral, with active choroidal neovascularization 02/20/2024    Flu vaccine need 11/07/2023    Other reduced mobility 09/26/2023    Thyroid function test abnormal 01/10/2023    Thyroiditis 01/10/2023    Chest pain 08/11/2022    Atherosclerosis of aorta 06/09/2022    Non-intractable vomiting with nausea 05/30/2022    Fever 05/30/2022    Vascular dementia with  behavior disturbance 05/21/2022    History of COVID-19 05/02/2022    Closed fracture of second lumbar vertebra with routine healing 03/06/2022    History of stroke 12/20/2021    Hyponatremia 10/22/2021    Salt-losing nephritis or syndrome 09/19/2021    Chronic hyponatremia 09/16/2021    History of recent fall 07/21/2021    Recurrent major depressive disorder, in full remission 06/09/2021    Anxiety 06/09/2021    Other hemorrhoids 05/11/2021    Coronary artery disease of native artery of native heart with stable angina pectoris 05/11/2021    Macular degeneration of right eye 02/28/2021    Osteoarthritis of both hands 08/08/2020    Insomnia 06/23/2020    Gastroesophageal reflux disease without esophagitis 06/23/2020    Palliative care encounter 04/10/2020    Weakness 03/16/2020    Nodular thyroid disease 01/22/2018    Essential hypertension 01/22/2018    Dysmetabolic syndrome 01/22/2018    Primary hyperparathyroidism 09/02/2016    Goiter 12/07/2015    Thyroid disease 12/07/2015    Postmenopausal 12/07/2015    Hyperglycemia 09/14/2015    High serum parathyroid hormone (PTH) 09/14/2015    Thyroid nodule 02/01/2013    Hypothyroid 11/02/2012    Hypercalcemia 07/27/2012    Vitamin D deficiency disease 07/27/2012         89 y.o. female status post right hemicolectomy for volvulus  -poor appetite but will advance to regular diet, encourage patient and family to bring food from home if she desires, continue nutritional supplements  -cardiology on board for AFib, echocardiogram ordered going heart rate improves, digoxin given  -from surgical standpoint no immediate surgical intervention is indicated and okay to anticoagulate as needed  -out of bed to chair and ambulate, PT OT on board

## 2024-07-28 NOTE — PLAN OF CARE
I was contacted about patient's increased work of breathing.    On examination:  BP 160s/80s;  to 105; SPO2 97% on 3L NC; RR 28 bpm  Bilateral inspiratory rales on exam  CXR read by me showed bilateral interstitial infiltrate    Assessment:    Pulmonary edema from suspected heart failure exacerbation    Plan:  Discontinue IV fluid  Stat Lasix 40 mg x 1  Check CMP, magnesium, phosphorus, ABG, BNP.    ABG reviewed, okay but BNP was elevated.  Check TTE.  Potassium and magnesium replacement ordered.  Day team to follow.      Gaye Goodson MD  [facility] Medicine.

## 2024-07-28 NOTE — RESPIRATORY THERAPY
07/27/24 2005   Patient Assessment/Suction   Level of Consciousness (AVPU) alert   Respiratory Effort Normal;Unlabored   Expansion/Accessory Muscles/Retractions no use of accessory muscles;no retractions   All Lung Fields Breath Sounds Anterior:;diminished;clear   Rhythm/Pattern, Respiratory unlabored   Cough Frequency no cough   PRE-TX-O2   Device (Oxygen Therapy) nasal cannula   Flow (L/min) (Oxygen Therapy) 2   SpO2 95 %   Pulse Oximetry Type Intermittent   $ Pulse Oximetry - Multiple Charge Pulse Oximetry - Multiple   Pulse 88   Resp 18   Education   $ Education 15 min;Oxygen

## 2024-07-28 NOTE — NURSING
Called by patient to bedside with c/o difficulty breathing. O2 was 88% on 2 L NC. R 32 /780 . checked . Patient increased to 4L via NC and sat increased to 95%. Message Dr. Hamzah Huizar to request orders for chest x-ray and breathing treatment. MD ordered and reported to bedside to examine patient. Per MD 40 mg IV lasix given by RN. ABGs, Echo, Phosphorus and BNP ordered per MD.

## 2024-07-28 NOTE — PROGRESS NOTES
Attempted echo, patient heart rate was in the 130s. Spoke with Dr. Moulton and he said to wait on echo until heart rate is lower. Secure chatted LPN and MD.

## 2024-07-28 NOTE — NURSING
Patient sinus tach with HR from 120-140s. MD informed. Orders for EKG to be completed. Care continued/

## 2024-07-28 NOTE — CARE UPDATE
07/28/24 0806   Patient Assessment/Suction   Level of Consciousness (AVPU) alert   Respiratory Effort Unlabored;Normal   All Lung Fields Breath Sounds Anterior:;clear   Rhythm/Pattern, Respiratory pattern regular   PRE-TX-O2   Device (Oxygen Therapy) nasal cannula   $ Is the patient on Low Flow Oxygen? Yes   Flow (L/min) (Oxygen Therapy) 3  (weaned from 4)   SpO2 95 %   Pulse Oximetry Type Intermittent   $ Pulse Oximetry - Multiple Charge Pulse Oximetry - Multiple   Pulse (!) 118   Resp 20   Aerosol Therapy   $ Aerosol Therapy Charges PRN treatment not required

## 2024-07-28 NOTE — RESPIRATORY THERAPY
07/28/24 0516   Patient Assessment/Suction   Level of Consciousness (AVPU) alert   Respiratory Effort Short of breath   Rhythm/Pattern, Respiratory labored;shortness of breath   PRE-TX-O2   Device (Oxygen Therapy) nasal cannula   Flow (L/min) (Oxygen Therapy) 4   SpO2 98 %   Pulse Oximetry Type Intermittent   $ Pulse Oximetry - Multiple Charge Pulse Oximetry - Multiple   Labs   $ Was an ABG obtained? Arterial Puncture;POCT - Blood gas;POCT - Calcium;POCT - Hematocrit;POCT - PH, Blood;POCT - Potassium;POCT - Sodium   $ Labs Tech Time 15 min     AFTER OBTAINING ABG, MD BRIANA WAS NOTIFIED. THOUGH NO NEW RESPIRATORY ORDERS WERE ORDERED AT THIS TIME, HE ORDERED FOR RN TO GIVE THE PATIENT LASIX AND TO WAIT TO SEE IF PATIENT'S STATUS CHANGES.

## 2024-07-28 NOTE — CONSULTS
"[facility]  Department of Cardiology  Consult Note      PATIENT NAME: Glenda Gimenez  MRN: [id number]  TODAY'S DATE: 07/28/2024  ADMIT DATE: 7/23/2024                          CONSULT REQUESTED BY: Erick Collier DO    SUBJECTIVE     PRINCIPAL PROBLEM: Cecal volvulus      REASON FOR CONSULT:  AFib RVR, remote history, possibly from fluid overload      HPI:    Patient was a 89-year-old female with past medical history hypertension, hypothyroidism, PAF, who was admitted with cecal volvulus/SBO/bowel ischemia s/p exploratory laparotomy with right hemicolectomy for volvulus.  General surgery following.  Today patient had increased work of breathing, requiring increased oxygen.  Elevated BNP, 755.  EKG performed patient found to be in AFib RVR.  Chest x-ray with bilateral interstitial infiltrates, concerning for pulmonary edema.  Cardiology consulted for atrial fibrillation with RVR.  Patient given IV Lasix x1 dose.  Labs this a.m. show hypokalemia, K 3.2 and hypomagnesemia, Mag 1.4.  Troponin also elevated 712.3.  Patient denies any chest pain or shortness of breath during examination.  2-3 L nasal cannula.  No significant peripheral edema.  Abdominal distension noted, nontender to palpation, midline incision with clean dry intact dressing.        Review of patient's allergies indicates:   Allergen Reactions    Amlodipine Swelling     Other reaction(s): Angioedema    Atenolol      Other reaction(s): itch    Betamethasone sodium phosphate      Other reaction(s): Flushing (skin)    Cortisone      Other reaction(s): blurry vision  Other reaction(s): Rash    Lisinopril      Other reaction(s): COUGH    Naproxen      Other reaction(s): body shut down    Paxlovid [nirmatrelvir-ritonavir] Other (See Comments)     "Reacted strangely to it in the past" behavior changes described    Pcn [penicillins] Other (See Comments)     Unknown reaction "I was just told a long time ago I was allergic to penicillin."    " Prednisone     Triamterene-hydrochlorothiazid      Other reaction(s): itch       Past Medical History:   Diagnosis Date    Anticoagulant long-term use     Arthritis     Dislocation of right shoulder joint     Hypertension     Hypothyroidism     Shoulder disorder     right    Thyroid disease      Past Surgical History:   Procedure Laterality Date    COLECTOMY, WITH ILEUM TO COLON ANASTOMOSIS  7/23/2024    Procedure: COLECTOMY, WITH ILEUM TO COLON ANASTOMOSIS;  Surgeon: Bahman Drummond MD;  Location: [facility] [place] OR;  Service: General;;    HYSTERECTOMY      LAPAROTOMY, EXPLORATORY N/A 7/23/2024    Procedure: LAPAROTOMY, EXPLORATORY;  Surgeon: Bahman Drummond MD;  Location: [facility] [place] OR;  Service: General;  Laterality: N/A;    PARTIAL HYSTERECTOMY      ARTUR, ovaries in place, uterine prolapse    RIGHT HEMICOLECTOMY Right 7/23/2024    Procedure: HEMICOLECTOMY, RIGHT;  Surgeon: Bahman Drummond MD;  Location: [facility] [place] OR;  Service: General;  Laterality: Right;     Social History     Tobacco Use    Smoking status: Never    Smokeless tobacco: Never   Substance Use Topics    Alcohol use: Yes     Alcohol/week: 2.0 standard drinks of alcohol     Types: 2 Glasses of wine per week     Comment: 2 small glasses of red wine with dinner    Drug use: No        REVIEW OF SYSTEMS    As mentioned in HPI    OBJECTIVE     VITAL SIGNS (Most Recent)  Temp: 98.1 °F (36.7 °C) (07/28/24 1145)  Pulse: (!) 139 (07/28/24 1145)  Resp: (!) 22 (07/28/24 1145)  BP: 104/68 (07/28/24 1145)  SpO2: 96 % (07/28/24 1145)    VENTILATION STATUS  Resp: (!) 22 (07/28/24 1145)  SpO2: 96 % (07/28/24 1145)           I & O (Last 24H):  Intake/Output Summary (Last 24 hours) at 7/28/2024 1323  Last data filed at 7/28/2024 1226  Gross per 24 hour   Intake 600 ml   Output 1200 ml   Net -600 ml       WEIGHTS  Wt Readings from Last 3 Encounters:   07/23/24 2311 80.9 kg (178 lb 5.6 oz)   07/23/24 1400 80.3 kg (177 lb)   07/23/24 1109 80.7 kg (177 lb 14.6 oz)   05/30/24 1030 80.4 kg  (177 lb 4 oz)       PHYSICAL EXAM    CONSTITUTIONAL: NAD, elderly female mildly short of breath at rest  HEENT: Normocephalic. No pallor  NECK: no JVD  LUNGS:  Tachypneic at rest, fine crackles bilateral lower lobes  HEART:  Irregular rate and rhythm, S1, S2 normal, no murmur   ABDOMEN:  Distended, non-tender, bowel sounds hypoactive  EXTREMITIES: No edema  SKIN: No rash  NEURO: AAO X 3  PSYCH: normal affect      HOME MEDICATIONS:  No current facility-administered medications on file prior to encounter.     Current Outpatient Medications on File Prior to Encounter   Medication Sig Dispense Refill    aspirin (ECOTRIN) 81 MG EC tablet Take 81 mg by mouth once daily.      busPIRone (BUSPAR) 10 MG tablet Take 1 tablet (10 mg total) by mouth 3 (three) times daily. 270 tablet 4    donepeziL (ARICEPT) 5 MG tablet TAKE 1 TABLET BY MOUTH EVERY EVENING (Patient taking differently: Take 5 mg by mouth every evening. TAKE 1 TABLET BY MOUTH EVERY EVENING) 90 tablet 3    famotidine (PEPCID) 20 MG tablet Take 1 tablet (20 mg total) by mouth 2 (two) times daily. 180 tablet 3    fluticasone propionate (FLONASE) 50 mcg/actuation nasal spray 1 spray (50 mcg total) by Each Nostril route twice a week. 16 g 0    furosemide (LASIX) 20 MG tablet TAKE 1 TABLET BY MOUTH EVERY DAY (Patient taking differently: Take 20 mg by mouth once daily.) 90 tablet 3    isosorbide mononitrate (IMDUR) 30 MG 24 hr tablet TAKE 1 TABLET BY MOUTH EVERY DAY (Patient taking differently: Take 30 mg by mouth once daily.) 90 tablet 3    krill oil 500 mg Cap Take 1 capsule by mouth once daily.      levothyroxine (SYNTHROID) 88 MCG tablet Take 1 tablet (88 mcg total) by mouth once daily. 90 tablet 3    nitroGLYCERIN (NITROSTAT) 0.4 MG SL tablet Place 1 tablet (0.4 mg total) under the tongue every 5 (five) minutes as needed for Chest pain. 25 tablet 3    olmesartan (BENICAR) 5 MG Tab Take 1 tablet (5 mg total) by mouth once daily. 90 tablet 3    ondansetron (ZOFRAN) 4  MG tablet Take 1 tablet (4 mg total) by mouth every 8 (eight) hours as needed. (Patient taking differently: Take 4 mg by mouth every 8 (eight) hours as needed for Nausea.) 15 tablet 0    peg 400-propylene glycol, PF, (SYSTANE HYDRATION PF) 0.4-0.3 % Drop Place 1 drop into both eyes once daily.      potassium chloride SA (K-DUR,KLOR-CON) 20 MEQ tablet TAKE 1 TABLET(20 MEQ) BY MOUTH EVERY DAY (Patient taking differently: Take 20 mEq by mouth. TAKE 1 TABLET(20 MEQ) BY MOUTH EVERY DAY) 90 tablet 3    QUEtiapine (SEROQUEL) 50 MG tablet TAKE 1 TABLET BY MOUTH EVERY EVENING 90 tablet 1    sertraline (ZOLOFT) 50 MG tablet Take 1 tablet (50 mg total) by mouth once daily. 90 tablet 4    vit A/vit C/vit E/zinc/copper (ICAPS AREDS ORAL) Take 1 capsule by mouth 2 (two) times a day.      vitamin D (VITAMIN D3) 1000 units Tab Take 1,000 Units by mouth once daily. Patient is taking 2000 units per day         SCHEDULED MEDS:   digoxin  500 mcg Intravenous Once    donepeziL  5 mg Oral QHS    enoxparin  40 mg Subcutaneous Q24H (prophylaxis, 1700)    [START ON 7/29/2024] furosemide  40 mg Oral Daily    isosorbide mononitrate  30 mg Oral Daily    levothyroxine  88 mcg Oral Before breakfast    losartan  12.5 mg Oral Daily    mupirocin   Nasal BID    pantoprazole  40 mg Intravenous Daily    potassium chloride  40 mEq Oral Q6H    QUEtiapine  25 mg Oral Daily       CONTINUOUS INFUSIONS:    PRN MEDS:  Current Facility-Administered Medications:     acetaminophen, 650 mg, Oral, Q4H PRN    albuterol-ipratropium, 3 mL, Nebulization, Q8H PRN    aluminum-magnesium hydroxide-simethicone, 30 mL, Oral, QID PRN    HYDROcodone-acetaminophen, 1 tablet, Oral, Q6H PRN    HYDROmorphone, 0.5 mg, Intravenous, Q6H PRN    magnesium oxide, 800 mg, Oral, PRN    magnesium oxide, 800 mg, Oral, PRN    magnesium sulfate IVPB, 2 g, Intravenous, PRN    magnesium sulfate IVPB, 4 g, Intravenous, PRN    melatonin, 6 mg, Oral, Nightly PRN    naloxone, 0.02 mg,  "Intravenous, PRN    ondansetron, 4 mg, Intravenous, Q6H PRN    potassium bicarbonate, 35 mEq, Oral, PRN    potassium bicarbonate, 50 mEq, Oral, PRN    potassium bicarbonate, 60 mEq, Oral, PRN    potassium, sodium phosphates, 2 packet, Oral, PRN    potassium, sodium phosphates, 2 packet, Oral, PRN    potassium, sodium phosphates, 2 packet, Oral, PRN    LABS AND DIAGNOSTICS     CBC LAST 3 DAYS  Recent Labs   Lab 07/26/24  0505 07/27/24  0515 07/28/24  0430 07/28/24  0516   WBC 11.89 12.31 10.05  --    RBC 3.24* 3.15* 3.69*  --    HGB 10.0* 9.7* 11.5*  --    HCT 31.3* 30.9* 35.5* 37   MCV 97 98 96  --    MCH 30.9 30.8 31.2*  --    MCHC 31.9* 31.4* 32.4  --    RDW 13.2 13.3 13.2  --     283 320  --    MPV 10.8 10.5 10.1  --    GRAN 77.3*  9.2* 74.5*  9.2* 81.6*  8.2*  --    LYMPH 10.4*  1.2 12.1*  1.5 7.1*  0.7*  --    MONO 8.8  1.1* 8.3  1.0 6.8  0.7  --    BASO 0.05 0.08 0.07  --    NRBC 0 0 0  --        COAGULATION LAST 3 DAYS  No results for input(s): "LABPT", "INR", "APTT" in the last 168 hours.    CHEMISTRY LAST 3 DAYS  Recent Labs   Lab 07/26/24  0505 07/27/24  0515 07/28/24  0430 07/28/24  0516    140 142  --    K 3.8 3.6 3.2*  --    * 109 109  --    CO2 24 24 24  --    ANIONGAP 7* 7* 9  --    BUN 20 18 14  --    CREATININE 0.7 0.7 0.7  --    GLU 91 75 120*  --    CALCIUM 8.8 8.6* 9.1  --    PH  --   --   --  7.410   MG 1.7 1.6 1.4*  --    ALBUMIN 2.8* 2.6* 2.9*  --    PROT 5.6* 5.5* 6.2  --    ALKPHOS 68 59 61  --    ALT 19 14 12  --    AST 31 24 21  --    BILITOT 0.6 0.6 0.6  --        CARDIAC PROFILE LAST 3 DAYS  Recent Labs   Lab 07/24/24  0329 07/28/24  0430 07/28/24  1119   * 755*  --    TROPONINIHS  --   --  712.3*       ENDOCRINE LAST 3 DAYS  No results for input(s): "TSH", "PROCAL" in the last 168 hours.    LAST ARTERIAL BLOOD GAS  ABG  Recent Labs   Lab 07/28/24  0516   PH 7.410   PO2 91   PCO2 34.1*   HCO3 21.6*   BE -3*       LAST 7 DAYS MICROBIOLOGY   Microbiology " Results (last 7 days)       Procedure Component Value Units Date/Time    Blood culture [[id number]] Collected: 07/24/24 0005    Order Status: Completed Specimen: Blood from Peripheral, Antecubital, Right Updated: 07/28/24 0232     Blood Culture, Routine No Growth to date      No Growth to date      No Growth to date      No Growth to date      No Growth to date    Urine culture [[id number]]  (Abnormal)  (Susceptibility) Collected: 07/23/24 1438    Order Status: Completed Specimen: Urine Updated: 07/25/24 0712     Urine Culture, Routine ESCHERICHIA COLI  10,000 - 49,999 cfu/ml      Narrative:      Specimen Source->Urine            MOST RECENT IMAGING  X-Ray Chest 1 View  Narrative: EXAMINATION:  XR CHEST 1 VIEW    CLINICAL HISTORY:  Wheezing;    TECHNIQUE:  Single frontal view of the chest was performed.    COMPARISON:  Chest radiograph performed 07/24/2024, 03:20 hours.    FINDINGS:  Monitoring leads overlie the chest.  Previously seen right internal jugular approach central venous catheter and enteric tube appear to been discontinued.    Grossly unchanged cardiomediastinal contours.    Patchy opacities in both lungs, similar to 07/24/2024, 03:20 hours examination.    Small bilateral pleural effusions, grossly unchanged.    No definite pneumothorax.    No acute findings in the visualized abdomen    Osseous and soft tissue structures appear without definite acute change.  Impression: As above.    Electronically signed by: Eugenio Tran  Date:    07/28/2024  Time:    06:48      ECHOCARDIOGRAM RESULTS (last 5)  Results for orders placed during the hospital encounter of 08/11/22    Echo    Interpretation Summary  · The left ventricle is normal in size with mild concentric hypertrophy and low normal systolic function.  · There are segmental left ventricular wall motion abnormalities.  · Mild left atrial enlargement.  · Mild tricuspid regurgitation.  · Mild mitral regurgitation.  · Normal central venous pressure (3  mmHg).  · The estimated PA systolic pressure is 28 mmHg.  · The estimated ejection fraction is 50%.  · Normal left ventricular diastolic function.  · Normal right ventricular size with normal right ventricular systolic function.      CURRENT/PREVIOUS VISIT EKG  Results for orders placed or performed during the hospital encounter of 07/23/24   EKG 12-lead    Collection Time: 07/28/24 12:58 PM   Result Value Ref Range    QRS Duration 72 ms    [facility] QTC Calculation 463 ms    Narrative    Test Reason : R07.9,    Vent. Rate : 138 BPM     Atrial Rate : 000 BPM     P-R Int : 000 ms          QRS Dur : 072 ms      QT Int : 306 ms       P-R-T Axes : 000 -10 059 degrees     QTc Int : 463 ms    Atrial fibrillation with rapid ventricular response with premature  ventricular or aberrantly conducted complexes  Possible Inferior infarct ,age undetermined  Abnormal ECG  When compared with ECG of 28-JUL-2024 10:11,  No significant change was found    Referred By: [email or web address]   SELF           Confirmed By:            ASSESSMENT/PLAN:     Active Hospital Problems    Diagnosis    *Cecal volvulus    UTI (urinary tract infection)    Ischemia, bowel    Small bowel obstruction    Chronic systolic (congestive) heart failure    Coronary artery disease of native artery of native heart with stable angina pectoris    Essential hypertension    Hypothyroid       ASSESSMENT & PLAN:     AFib RVR  Hypokalemia  Hypomagnesemia  Acute on chronic HFpEF  NSTEMI  Cecal volvulus s/p right hemicolectomy  Chronic anemia      RECOMMENDATIONS:    Cecal volvulus s/p right hemicolectomy.  Fell surgery following.  Increased work of breathing and increase oxygen demand.  Chest x-ray concerning for mild pulmonary edema.  IV Lasix ordered.  Patient tachycardic.  EKG confirmed AFib RVR.  BP soft.  Recommend IV digoxin 500 mcg 1 time dose.  Recommend transfer to telemetry floor.  Hypomagnesemia and hypokalemia.  Please replete per protocol.  Elevated troponin HS likely  secondary to demand ischemia from acute CHF and AFib RVR.  Trend to peak.  Conservative management at this time.  We will hold off on heparin drip for now.  Patient denies chest pain.  No acute STT wave changes on EKG.  Obtain echocardiogram when rates are improved.  Recommend systemic anticoagulation if no contraindication from surgery standpoint or otherwise.  Thank you the consultation.  We will continue to follow.      Vera Ferraro NP  Department of Cardiology  Date of Service: 07/28/2024

## 2024-07-28 NOTE — ASSESSMENT & PLAN NOTE
Patient with Paroxysmal (<7 days) atrial fibrillation which is uncontrolled currently with Digoxin. Patient is currently in atrial fibrillation.[id number] Score: 4. HASBLED Score: 2. Anticoagulation indicated. Anticoagulation done with lovenox, cardiology consulted. Recent colectomy so would defer heparin drip to cardiology, continue lovenox for now .

## 2024-07-28 NOTE — PROGRESS NOTES
[facility] Medicine  Progress Note    Patient Name: Glenda Gimenez  MRN: [id number]  Patient Class: IP- Inpatient   Admission Date: 7/23/2024  Length of Stay: 5 days  Attending Physician: Erick Collier DO  Primary Care Provider: William Olivia MD        Subjective:     Principal Problem:Cecal volvulus        HPI:  89 year old pt getting admitted with cecal volvulus/SBO/bowel ischemia  Pt has been suffering from abdominal pain for past 1 week  Also she lost her appetite   Finally family took the pt to PCP and pt had KUB which were concerning  She was referred to ER and had CT scan done  Surgeon was called and pt will have emergent OR visist soon  History mainly from family because pt has dementia    Overview/Hospital Course:  89 year old pt getting admitted with cecal volvulus/SBO/bowel ischemia  Surgeon reviewed and later exploratory laparotomy was done and Right hemicolectomy. Ileocolic anastomosis  was done .  Patient was doing well postop and later surgeon discontinued the NG tube and started some liquid diet, but poor po intake. Will attempt to advance and keep gentle fluids until intake improves. UA did not show infection. PVR was 400, straight cath x1 and second PVR was 100.  As slightly increased fluids due to low urine output but patient became fluid overloaded at night and fluids had to be stopped him was given Lasix.  This a.m. went into AFib with RVR.  Reportedly she does have a history of AFib but is no longer on beta-blocker or anticoagulation possibly due to age and stability.  Attempted to give diltiazem but not able to give on this floor so patient was given digoxin 500 mcg IV, cardiology consulted, transferred to telemetry floor.    Interval History:  Patient seen and examined, no acute complaints.    Review of Systems   Constitutional:  Negative for chills and fever.   Respiratory:  Negative for chest tightness, shortness of breath and wheezing.    Cardiovascular:   Negative for chest pain and palpitations.   Gastrointestinal:  Negative for constipation, diarrhea, nausea and vomiting.   Genitourinary:  Negative for dysuria and hematuria.   Musculoskeletal:  Negative for gait problem.   Neurological:  Negative for dizziness, speech difficulty and numbness.     Objective:     Vital Signs (Most Recent):  Temp: 98.1 °F (36.7 °C) (07/28/24 1145)  Pulse: (!) 132 (07/28/24 1351)  Resp: (!) 21 (07/28/24 1351)  BP: 104/67 (07/28/24 1351)  SpO2: (!) 94 % (07/28/24 1351) Vital Signs (24h Range):  Temp:  [97.8 °F (36.6 °C)-98.5 °F (36.9 °C)] 98.1 °F (36.7 °C)  Pulse:  [] 132  Resp:  [18-34] 21  SpO2:  [94 %-98 %] 94 %  BP: (104-168)/(67-84) 104/67     Weight: 80.9 kg (178 lb 5.6 oz)  Body mass index is 30.61 kg/m².    Intake/Output Summary (Last 24 hours) at 7/28/2024 1420  Last data filed at 7/28/2024 1226  Gross per 24 hour   Intake 600 ml   Output 1200 ml   Net -600 ml         Physical Exam  Vitals and nursing note reviewed.   HENT:      Head: Normocephalic and atraumatic.      Nose: Nose normal.   Eyes:      Conjunctiva/sclera: Conjunctivae normal.   Neck:      Vascular: No JVD.   Cardiovascular:      Rate and Rhythm: Tachycardia present. Rhythm irregular.      Heart sounds: Normal heart sounds.   Pulmonary:      Effort: Pulmonary effort is normal.      Breath sounds: Normal breath sounds.   Abdominal:      General: Abdomen is flat.      Palpations: Abdomen is soft.   Musculoskeletal:         General: Normal range of motion.   Skin:     General: Skin is warm.   Neurological:      Mental Status: She is alert and oriented to person, place, and time.   Psychiatric:         Mood and Affect: Mood normal.             Significant Labs: All pertinent labs within the past 24 hours have been reviewed.  CBC:   Recent Labs   Lab 07/27/24  0515 07/28/24  0430 07/28/24  0516   WBC 12.31 10.05  --    HGB 9.7* 11.5*  --    HCT 30.9* 35.5* 37    320  --      CMP:   Recent Labs   Lab  07/27/24  0515 07/28/24  0430    142   K 3.6 3.2*    109   CO2 24 24   GLU 75 120*   BUN 18 14   CREATININE 0.7 0.7   CALCIUM 8.6* 9.1   PROT 5.5* 6.2   ALBUMIN 2.6* 2.9*   BILITOT 0.6 0.6   ALKPHOS 59 61   AST 24 21   ALT 14 12   ANIONGAP 7* 9     Cardiac Markers:   Recent Labs   Lab 07/28/24  0430   *       Significant Imaging: I have reviewed all pertinent imaging results/findings within the past 24 hours.    Assessment/Plan:      * Cecal volvulus  Lead to small-bowel obstruction.  She went to the OR and had resection of affected bowel.   Off NG tube  Started clear   Hemoglobin stable white cell slightly elevated but now normalized   BNP is elevated and can not give much of the fluid    Atrial fibrillation with RVR  Patient with Paroxysmal (<7 days) atrial fibrillation which is uncontrolled currently with Digoxin. Patient is currently in atrial fibrillation.[id number] Score: 4. HASBLED Score: 2. Anticoagulation indicated. Anticoagulation done with lovenox, cardiology consulted. Recent colectomy so would defer heparin drip to cardiology, continue lovenox for now .    UTI (urinary tract infection)  Transitioned to nitrofurantoin per Cx for 5 day course    Small bowel obstruction  Due to cecal volvulus.  She went to the OR and underwent resection of the cecum and ascending colon.  Anastomosis was created.    Follow surgeon    Ischemia, bowel  Ruled out      Chronic systolic (congestive) heart failure  Echo from two years ago showed preserved EF.  Does not appear edematous however overnight became tachypneic and CXR showed bilateral interstitial edema.  Fluids were stopped and IV Lasix given once, considering p.o. versus IV for tomorrow.  Lasix was held postop until patient was eating but she had not been eating much.  Update TTE.  BNP elevated    Coronary artery disease of native artery of native heart with stable angina pectoris  Stable.  When able to take oral medications, usual cardiac  meds.    Essential hypertension  Blood pressure controlled.  Holding antihypertensives while NPO.  Monitor blood pressure  Use IV vasodilators as needed.    Hypothyroid  Stable.  Resumed levothyroxine         VTE Risk Mitigation (From admission, onward)           Ordered     enoxaparin injection 40 mg  Every 24 hours         07/27/24 1444     IP VTE HIGH RISK PATIENT  Once         07/23/24 1733     Place sequential compression device  Until discontinued         07/23/24 1733                    Discharge Planning   BOGDAN: 8/2/2024     Code Status: Full Code   Is the patient medically ready for discharge?:     Reason for patient still in hospital (select all that apply): Patient unstable, Treatment, Consult recommendations, and PT / OT recommendations  Discharge Plan A: Home Health                  Erick Collier DO  Department of Hospital Medicine   Formerly [facility]

## 2024-07-28 NOTE — PT/OT/SLP PROGRESS
Physical Therapy      Patient Name:  Glenda Gimenez   MRN:  [id number]    Patient not seen today secondary to Nurse/ LILIAN hold (first attempt RN hold due to A-fib RVR, 2nd attempt 2pm pt remains in A-fib RVR.). Will follow-up 7/29/24.

## 2024-07-28 NOTE — SUBJECTIVE & OBJECTIVE
Interval History:  Patient seen and examined, no acute complaints.    Review of Systems   Constitutional:  Negative for chills and fever.   Respiratory:  Negative for chest tightness, shortness of breath and wheezing.    Cardiovascular:  Negative for chest pain and palpitations.   Gastrointestinal:  Negative for constipation, diarrhea, nausea and vomiting.   Genitourinary:  Negative for dysuria and hematuria.   Musculoskeletal:  Negative for gait problem.   Neurological:  Negative for dizziness, speech difficulty and numbness.     Objective:     Vital Signs (Most Recent):  Temp: 98.1 °F (36.7 °C) (07/28/24 1145)  Pulse: (!) 132 (07/28/24 1351)  Resp: (!) 21 (07/28/24 1351)  BP: 104/67 (07/28/24 1351)  SpO2: (!) 94 % (07/28/24 1351) Vital Signs (24h Range):  Temp:  [97.8 °F (36.6 °C)-98.5 °F (36.9 °C)] 98.1 °F (36.7 °C)  Pulse:  [] 132  Resp:  [18-34] 21  SpO2:  [94 %-98 %] 94 %  BP: (104-168)/(67-84) 104/67     Weight: 80.9 kg (178 lb 5.6 oz)  Body mass index is 30.61 kg/m².    Intake/Output Summary (Last 24 hours) at 7/28/2024 1420  Last data filed at 7/28/2024 1226  Gross per 24 hour   Intake 600 ml   Output 1200 ml   Net -600 ml         Physical Exam  Vitals and nursing note reviewed.   HENT:      Head: Normocephalic and atraumatic.      Nose: Nose normal.   Eyes:      Conjunctiva/sclera: Conjunctivae normal.   Neck:      Vascular: No JVD.   Cardiovascular:      Rate and Rhythm: Tachycardia present. Rhythm irregular.      Heart sounds: Normal heart sounds.   Pulmonary:      Effort: Pulmonary effort is normal.      Breath sounds: Normal breath sounds.   Abdominal:      General: Abdomen is flat.      Palpations: Abdomen is soft.   Musculoskeletal:         General: Normal range of motion.   Skin:     General: Skin is warm.   Neurological:      Mental Status: She is alert and oriented to person, place, and time.   Psychiatric:         Mood and Affect: Mood normal.             Significant Labs: All pertinent  labs within the past 24 hours have been reviewed.  CBC:   Recent Labs   Lab 07/27/24  0515 07/28/24  0430 07/28/24  0516   WBC 12.31 10.05  --    HGB 9.7* 11.5*  --    HCT 30.9* 35.5* 37    320  --      CMP:   Recent Labs   Lab 07/27/24  0515 07/28/24  0430    142   K 3.6 3.2*    109   CO2 24 24   GLU 75 120*   BUN 18 14   CREATININE 0.7 0.7   CALCIUM 8.6* 9.1   PROT 5.5* 6.2   ALBUMIN 2.6* 2.9*   BILITOT 0.6 0.6   ALKPHOS 59 61   AST 24 21   ALT 14 12   ANIONGAP 7* 9     Cardiac Markers:   Recent Labs   Lab 07/28/24  0430   *       Significant Imaging: I have reviewed all pertinent imaging results/findings within the past 24 hours.

## 2024-07-29 ENCOUNTER — CLINICAL SUPPORT (OUTPATIENT)
Dept: CARDIOLOGY | Facility: HOSPITAL | Age: 89
End: 2024-07-29
Attending: STUDENT IN AN ORGANIZED HEALTH CARE EDUCATION/TRAINING PROGRAM
Payer: MEDICARE

## 2024-07-29 LAB
ALBUMIN SERPL BCP-MCNC: 2.9 G/DL (ref 3.5–5.2)
ALP SERPL-CCNC: 53 U/L (ref 55–135)
ALT SERPL W/O P-5'-P-CCNC: 11 U/L (ref 10–44)
ANION GAP SERPL CALC-SCNC: 7 MMOL/L (ref 8–16)
AORTIC ROOT ANNULUS: 3.4 CM
AORTIC VALVE CUSP SEPERATION: 2.1 CM
APICAL FOUR CHAMBER EJECTION FRACTION: 74 %
AST SERPL-CCNC: 18 U/L (ref 10–40)
AV INDEX (PROSTH): 0.4
AV MEAN GRADIENT: 11 MMHG
AV PEAK GRADIENT: 20 MMHG
AV VALVE AREA BY VELOCITY RATIO: 1.21 CM²
AV VALVE AREA: 1.25 CM²
AV VELOCITY RATIO: 0.38
BACTERIA BLD CULT: NORMAL
BASOPHILS # BLD AUTO: 0.09 K/UL (ref 0–0.2)
BASOPHILS NFR BLD: 0.9 % (ref 0–1.9)
BILIRUB SERPL-MCNC: 0.4 MG/DL (ref 0.1–1)
BSA FOR ECHO PROCEDURE: 1.91 M2
BUN SERPL-MCNC: 18 MG/DL (ref 10–30)
CALCIUM SERPL-MCNC: 9.5 MG/DL (ref 8.7–10.5)
CHLORIDE SERPL-SCNC: 108 MMOL/L (ref 95–110)
CO2 SERPL-SCNC: 27 MMOL/L (ref 23–29)
CREAT SERPL-MCNC: 0.8 MG/DL (ref 0.5–1.4)
CRP SERPL-MCNC: 8.5 MG/DL
CV ECHO LV RWT: 0.57 CM
DIFFERENTIAL METHOD BLD: ABNORMAL
DOP CALC AO PEAK VEL: 2.21 M/S
DOP CALC AO VTI: 44.1 CM
DOP CALC LVOT AREA: 3.1 CM2
DOP CALC LVOT DIAMETER: 2 CM
DOP CALC LVOT PEAK VEL: 0.85 M/S
DOP CALC LVOT STROKE VOLUME: 54.95 CM3
DOP CALC MV VTI: 26.2 CM
DOP CALCLVOT PEAK VEL VTI: 17.5 CM
E WAVE DECELERATION TIME: 201 MSEC
E/A RATIO: 0.54
E/E' RATIO: 7.53 M/S
ECHO LV POSTERIOR WALL: 1.2 CM (ref 0.6–1.1)
EOSINOPHIL # BLD AUTO: 0.6 K/UL (ref 0–0.5)
EOSINOPHIL NFR BLD: 5.5 % (ref 0–8)
ERYTHROCYTE [DISTWIDTH] IN BLOOD BY AUTOMATED COUNT: 13.2 % (ref 11.5–14.5)
EST. GFR  (NO RACE VARIABLE): >60 ML/MIN/1.73 M^2
FRACTIONAL SHORTENING: 38 % (ref 28–44)
GLUCOSE SERPL-MCNC: 122 MG/DL (ref 70–110)
HCT VFR BLD AUTO: 36.5 % (ref 37–48.5)
HGB BLD-MCNC: 11.6 G/DL (ref 12–16)
IMM GRANULOCYTES # BLD AUTO: 0.13 K/UL (ref 0–0.04)
IMM GRANULOCYTES NFR BLD AUTO: 1.3 % (ref 0–0.5)
INTERVENTRICULAR SEPTUM: 1.1 CM (ref 0.6–1.1)
LEFT INTERNAL DIMENSION IN SYSTOLE: 2.6 CM (ref 2.1–4)
LEFT VENTRICLE DIASTOLIC VOLUME INDEX: 42.25 ML/M2
LEFT VENTRICLE DIASTOLIC VOLUME: 78.58 ML
LEFT VENTRICLE END DIASTOLIC VOLUME APICAL 4 CHAMBER: 91.7 ML
LEFT VENTRICLE MASS INDEX: 90 G/M2
LEFT VENTRICLE SYSTOLIC VOLUME INDEX: 13.2 ML/M2
LEFT VENTRICLE SYSTOLIC VOLUME: 24.61 ML
LEFT VENTRICULAR INTERNAL DIMENSION IN DIASTOLE: 4.2 CM (ref 3.5–6)
LEFT VENTRICULAR MASS: 167.45 G
LV LATERAL E/E' RATIO: 5.82 M/S
LV SEPTAL E/E' RATIO: 10.67 M/S
LVED V (TEICH): 78.58 ML
LVES V (TEICH): 24.61 ML
LVOT MG: 2 MMHG
LVOT MV: 0.6 CM/S
LYMPHOCYTES # BLD AUTO: 1.1 K/UL (ref 1–4.8)
LYMPHOCYTES NFR BLD: 11.2 % (ref 18–48)
MAGNESIUM SERPL-MCNC: 1.6 MG/DL (ref 1.6–2.6)
MCH RBC QN AUTO: 30.4 PG (ref 27–31)
MCHC RBC AUTO-ENTMCNC: 31.8 G/DL (ref 32–36)
MCV RBC AUTO: 96 FL (ref 82–98)
MONOCYTES # BLD AUTO: 1.1 K/UL (ref 0.3–1)
MONOCYTES NFR BLD: 11.1 % (ref 4–15)
MV MEAN GRADIENT: 3 MMHG
MV PEAK A VEL: 1.19 M/S
MV PEAK E VEL: 0.64 M/S
MV PEAK GRADIENT: 8 MMHG
MV STENOSIS PRESSURE HALF TIME: 27 MS
MV VALVE AREA BY CONTINUITY EQUATION: 2.1 CM2
MV VALVE AREA P 1/2 METHOD: 8.15 CM2
NEUTROPHILS # BLD AUTO: 7.1 K/UL (ref 1.8–7.7)
NEUTROPHILS NFR BLD: 70 % (ref 38–73)
NRBC BLD-RTO: 0 /100 WBC
PISA MRMAX VEL: 4.81 M/S
PISA TR MAX VEL: 2.89 M/S
PLATELET # BLD AUTO: 355 K/UL (ref 150–450)
PMV BLD AUTO: 10.4 FL (ref 9.2–12.9)
POTASSIUM SERPL-SCNC: 4 MMOL/L (ref 3.5–5.1)
PROT SERPL-MCNC: 6.1 G/DL (ref 6–8.4)
PV MV: 0.67 M/S
PV PEAK GRADIENT: 4 MMHG
PV PEAK VELOCITY: 1.01 M/S
RA PRESSURE ESTIMATED: 3 MMHG
RBC # BLD AUTO: 3.81 M/UL (ref 4–5.4)
RV TB RVSP: 6 MMHG
RV TISSUE DOPPLER FREE WALL SYSTOLIC VELOCITY 1 (APICAL 4 CHAMBER VIEW): 13.8 CM/S
SODIUM SERPL-SCNC: 142 MMOL/L (ref 136–145)
TDI LATERAL: 0.11 M/S
TDI SEPTAL: 0.06 M/S
TDI: 0.09 M/S
TR MAX PG: 33 MMHG
TRICUSPID ANNULAR PLANE SYSTOLIC EXCURSION: 1.15 CM
TV REST PULMONARY ARTERY PRESSURE: 36 MMHG
WBC # BLD AUTO: 10.07 K/UL (ref 3.9–12.7)
Z-SCORE OF LEFT VENTRICULAR DIMENSION IN END DIASTOLE: -2.09
Z-SCORE OF LEFT VENTRICULAR DIMENSION IN END SYSTOLE: -1.62

## 2024-07-29 PROCEDURE — 25000003 PHARM REV CODE 250: Performed by: HOSPITALIST

## 2024-07-29 PROCEDURE — 63600175 PHARM REV CODE 636 W HCPCS: Performed by: INTERNAL MEDICINE

## 2024-07-29 PROCEDURE — 99900031 HC PATIENT EDUCATION (STAT)

## 2024-07-29 PROCEDURE — 93306 TTE W/DOPPLER COMPLETE: CPT

## 2024-07-29 PROCEDURE — 97110 THERAPEUTIC EXERCISES: CPT | Mod: CQ

## 2024-07-29 PROCEDURE — 80053 COMPREHEN METABOLIC PANEL: CPT | Performed by: INTERNAL MEDICINE

## 2024-07-29 PROCEDURE — 27000221 HC OXYGEN, UP TO 24 HOURS

## 2024-07-29 PROCEDURE — 97535 SELF CARE MNGMENT TRAINING: CPT

## 2024-07-29 PROCEDURE — 63600175 PHARM REV CODE 636 W HCPCS: Performed by: FAMILY MEDICINE

## 2024-07-29 PROCEDURE — 99900035 HC TECH TIME PER 15 MIN (STAT)

## 2024-07-29 PROCEDURE — 86140 C-REACTIVE PROTEIN: CPT | Performed by: STUDENT IN AN ORGANIZED HEALTH CARE EDUCATION/TRAINING PROGRAM

## 2024-07-29 PROCEDURE — 85025 COMPLETE CBC W/AUTO DIFF WBC: CPT | Performed by: INTERNAL MEDICINE

## 2024-07-29 PROCEDURE — 25000003 PHARM REV CODE 250: Performed by: FAMILY MEDICINE

## 2024-07-29 PROCEDURE — 83735 ASSAY OF MAGNESIUM: CPT | Performed by: INTERNAL MEDICINE

## 2024-07-29 PROCEDURE — 21400001 HC TELEMETRY ROOM

## 2024-07-29 PROCEDURE — 25000003 PHARM REV CODE 250: Performed by: INTERNAL MEDICINE

## 2024-07-29 PROCEDURE — 94761 N-INVAS EAR/PLS OXIMETRY MLT: CPT

## 2024-07-29 PROCEDURE — 93306 TTE W/DOPPLER COMPLETE: CPT | Mod: 26,,, | Performed by: INTERNAL MEDICINE

## 2024-07-29 PROCEDURE — 97530 THERAPEUTIC ACTIVITIES: CPT | Mod: CQ

## 2024-07-29 PROCEDURE — 36415 COLL VENOUS BLD VENIPUNCTURE: CPT | Performed by: INTERNAL MEDICINE

## 2024-07-29 RX ADMIN — FUROSEMIDE 40 MG: 40 TABLET ORAL at 08:07

## 2024-07-29 RX ADMIN — QUETIAPINE FUMARATE 25 MG: 25 TABLET ORAL at 08:07

## 2024-07-29 RX ADMIN — DONEPEZIL HYDROCHLORIDE 5 MG: 5 TABLET, FILM COATED ORAL at 09:07

## 2024-07-29 RX ADMIN — ISOSORBIDE MONONITRATE 30 MG: 30 TABLET, EXTENDED RELEASE ORAL at 08:07

## 2024-07-29 RX ADMIN — LOSARTAN POTASSIUM 12.5 MG: 25 TABLET, FILM COATED ORAL at 08:07

## 2024-07-29 RX ADMIN — LEVOTHYROXINE SODIUM 88 MCG: 0.09 TABLET ORAL at 06:07

## 2024-07-29 RX ADMIN — Medication 6 MG: at 09:07

## 2024-07-29 RX ADMIN — PANTOPRAZOLE SODIUM 40 MG: 40 INJECTION, POWDER, FOR SOLUTION INTRAVENOUS at 08:07

## 2024-07-29 RX ADMIN — Medication 800 MG: at 05:07

## 2024-07-29 RX ADMIN — MUPIROCIN 1 G: 20 OINTMENT TOPICAL at 08:07

## 2024-07-29 RX ADMIN — MUPIROCIN 1 G: 20 OINTMENT TOPICAL at 09:07

## 2024-07-29 RX ADMIN — ENOXAPARIN SODIUM 40 MG: 40 INJECTION SUBCUTANEOUS at 05:07

## 2024-07-29 NOTE — SUBJECTIVE & OBJECTIVE
Interval History:  Patient seen and examined. She reports poor appetite and generalized weakness. Son at bedside.  LBM yesterday  Labs reviewed wbc 10.7, hgb 11.6, K 4.0    Confirmed living will. She is a full code.    Review of Systems   Constitutional:  Negative for chills and fever.   Respiratory:  Negative for chest tightness, shortness of breath and wheezing.    Cardiovascular:  Negative for chest pain and palpitations.   Gastrointestinal:  Negative for constipation, diarrhea, nausea and vomiting.   Neurological:  Negative for dizziness, speech difficulty and numbness.     Objective:     Vital Signs (Most Recent):  Temp: 98.1 °F (36.7 °C) (07/29/24 0748)  Pulse: 87 (07/29/24 0748)  Resp: 19 (07/29/24 0748)  BP: 132/72 (07/29/24 0748)  SpO2: 96 % (07/29/24 0748) Vital Signs (24h Range):  Temp:  [97.7 °F (36.5 °C)-98.2 °F (36.8 °C)] 98.1 °F (36.7 °C)  Pulse:  [] 87  Resp:  [16-21] 19  SpO2:  [94 %-96 %] 96 %  BP: ()/(62-74) 132/72     Weight: 80.9 kg (178 lb 5.6 oz)  Body mass index is 30.61 kg/m².    Intake/Output Summary (Last 24 hours) at 7/29/2024 1220  Last data filed at 7/29/2024 0930  Gross per 24 hour   Intake 600 ml   Output 200 ml   Net 400 ml         Physical Exam  Vitals and nursing note reviewed.   Neck:      Vascular: No JVD.   Cardiovascular:      Rate and Rhythm: Rhythm irregular.      Heart sounds: Murmur heard.   Pulmonary:      Effort: Pulmonary effort is normal.      Breath sounds: Normal breath sounds.   Abdominal:      General: Abdomen is flat. There is distension.      Palpations: Abdomen is soft.      Tenderness: There is no abdominal tenderness.      Comments: Surgical dressing intact. + distention (normal for patient)   Skin:     General: Skin is warm.   Neurological:      Mental Status: She is alert and oriented to person, place, and time.   Psychiatric:         Mood and Affect: Mood normal.             Significant Labs: All pertinent labs within the past 24 hours have been  reviewed.  CBC:   Recent Labs   Lab 07/28/24  0430 07/28/24  0516 07/29/24  0443   WBC 10.05  --  10.07   HGB 11.5*  --  11.6*   HCT 35.5* 37 36.5*     --  355     CMP:   Recent Labs   Lab 07/28/24  0430 07/29/24  0443    142   K 3.2* 4.0    108   CO2 24 27   * 122*   BUN 14 18   CREATININE 0.7 0.8   CALCIUM 9.1 9.5   PROT 6.2 6.1   ALBUMIN 2.9* 2.9*   BILITOT 0.6 0.4   ALKPHOS 61 53*   AST 21 18   ALT 12 11   ANIONGAP 9 7*     Cardiac Markers:   Recent Labs   Lab 07/28/24  0430   *       Significant Imaging: I have reviewed all pertinent imaging results/findings within the past 24 hours.

## 2024-07-29 NOTE — ASSESSMENT & PLAN NOTE
Patient with Paroxysmal (<7 days) atrial fibrillation which is uncontrolled currently with Digoxin. Patient is currently in atrial fibrillation.[id number] Score: 4. HASBLED Score: 2. Anticoagulation indicated. Anticoagulation done with lovenox, cardiology consulted. Recent colectomy so would defer heparin drip to cardiology, continue lovenox for now .    HR controlled

## 2024-07-29 NOTE — PT/OT/SLP PROGRESS
Physical Therapy Treatment    Patient Name:  Glenda Gimenez   MRN:  [id number]    Recommendations:     Discharge Recommendations: Moderate Intensity Therapy (Moderate Intensity vs Low Intensity with close supervision)  Discharge Equipment Recommendations: none  Barriers to discharge: Decreased caregiver support and decreased mobility from baseline    Assessment:     Glenda Gimenez is a 89 y.o. female admitted with a medical diagnosis of Cecal volvulus.  She presents with the following impairments/functional limitations: impaired endurance, weakness, impaired functional mobility, pain, decreased upper extremity function, decreased lower extremity function.    1st attempt - unavailable due to bedside testing    2nd attempt -   Pt required much encouragement to participate; son at bedside and assisted in encouragement. Pt declined gait or transfer to chair, but transferred to/from EOB with modA. Completed 10 minutes of seated UE and LE exercises with occasional rest breaks when becoming notably winded from exertion. 2L NC in place throughout.    Performed seated side scooting at EOB with CGA and VC. After return to supine, noted that pt's brief and bedpad were soiled with BM, of which pt seemed unaware. Nursing staff made aware of status and participation.    Pt is currently requiring more physical assist for mobility than baseline, along with decreased awareness of toileting needs. Discharge recommendation changed to Low Intensity with close supervision vs Moderate Intensity for post-acute therapy to improve mobility independence prior to return home.     Rehab Prognosis: Good and Fair; patient would benefit from acute skilled PT services to address these deficits and reach maximum level of function.    Recent Surgery: Procedure(s) (LRB):  LAPAROTOMY, EXPLORATORY (N/A)  HEMICOLECTOMY, RIGHT (Right)  COLECTOMY, WITH ILEUM TO COLON ANASTOMOSIS 6 Days Post-Op    Plan:     During this hospitalization, patient to be  seen daily to address the identified rehab impairments via gait training, therapeutic activities, therapeutic exercises and progress toward the following goals:    Plan of Care Expires:  08/25/24    Subjective     Chief Complaint: fatigue, SOB with exertion  Patient/Family Comments/goals: rest  Pain/Comfort:  Pain Rating 1: other (see comments) (did not rate)  Location 1: abdomen  Pain Addressed 1: Reposition, Distraction  Pain Rating Post-Intervention 1: 0/10      Objective:     Communicated with nurse prior to session.  Patient found HOB elevated with bed alarm, oxygen, peripheral IV, izaguirre catheter upon PT entry to room.     General Precautions: Standard, fall  Orthopedic Precautions: N/A  Braces: N/A  Respiratory Status: Nasal cannula, flow 2 L/min     Functional Mobility:  Bed Mobility:     Scooting: seated scoots at EOB, CGA and VC  Supine Scoot toward HOB with modA at drawsheet, and VC for BLE Bridging  Supine to Sit: moderate assistance and via logroll, with VC  Sit to Supine: modA and with poor command-following leading to poor positioning in bed       AM-PAC 6 CLICK MOBILITY        Treatment & Education:  -Pt educ: benefits of participation in therapy to prevent functional decline during acute care stay, fall prevention, call light  -Bed mobility training  -Seated EOB x 10 minutes  -BUE and BLE therex at EOB with rest breaks as required: bicep curls 2x5, sh abduction 2x5, LAQs 2x5, hip ab/adduction 2x5, heel/toe raises 2x5; alternating UE punches to pillow 2x10, alternating LE kicks to pillow 2x10    Patient left HOB elevated with all lines intact, call button in reach, bed alarm on, nursing staff notified, and son present..    GOALS:   Multidisciplinary Problems       Physical Therapy Goals          Problem: Physical Therapy    Goal Priority Disciplines Outcome Goal Variances Interventions   Physical Therapy Goal     PT, PT/OT Progressing     Description: Goals to be met by: 8/22/24     Patient will  increase functional independence with mobility by performin. Supine to sit with Supervision  2. Sit to stand transfer with Supervision  3. Bed to chair transfer with Supervision using Rolling Walker  4. Gait  x 150 feet with Supervision using Rolling Walker.                              Time Tracking:     PT Received On: 24  PT Start Time: 1026     PT Stop Time: 1050  PT Total Time (min): 24 min     Billable Minutes: Therapeutic Activity 12 and Therapeutic Exercise 12    Treatment Type: Treatment  PT/PTA: PTA     Number of PTA visits since last PT visit: 2024

## 2024-07-29 NOTE — PROGRESS NOTES
Patient seen and examined.  Not tolerating much in the way of p.o. and does not have a significant appetite.  She is having bowel function.      Vitals are stable   Afebrile  Abdomen soft, appropriately tender   Incision is clean and dry   Labs reviewed     No significant changes from surgical perspective.  Okay for diet as tolerated.  Suspect she has some degree of slow motility related to age if nothing else.  Dispo planning

## 2024-07-29 NOTE — NURSING
Abd dressing changed as previous dressing was soiled from stool.   Abd incision is clean, dry and proximated with staples.  No drainage or odor noted from site.   New [place] dressing initialed and dated.

## 2024-07-29 NOTE — PLAN OF CARE
[facility] sent SNF referrals in a 25 mile radius.       07/29/24 1502   Post-Acute Status   Post-Acute Authorization Placement   Discharge Plan   Discharge Plan A Skilled Nursing Facility     1513- LOCANIA called and RYAN faxed to the Office of Aging. ([phone]).

## 2024-07-29 NOTE — CARE UPDATE
07/28/24 2300   Patient Assessment/Suction   Level of Consciousness (AVPU) alert   Respiratory Effort Normal;Unlabored   Expansion/Accessory Muscles/Retractions no use of accessory muscles   PRE-TX-O2   Device (Oxygen Therapy) nasal cannula   $ Is the patient on Low Flow Oxygen? Yes   Flow (L/min) (Oxygen Therapy) 2   SpO2 (!) 94 %   Pulse Oximetry Type Intermittent   $ Pulse Oximetry - Multiple Charge Pulse Oximetry - Multiple   Pulse 102   Resp 16   Positioning   Head of Bed (HOB) Positioning HOB elevated   Positioning/Transfer Devices pillows;in use   Aerosol Therapy   $ Aerosol Therapy Charges PRN treatment not required   Education   $ Education Bronchodilator;15 min

## 2024-07-29 NOTE — PROGRESS NOTES
[facility] Medicine  Progress Note    Patient Name: Glenda Gimenez  MRN: [id number]  Patient Class: IP- Inpatient   Admission Date: 7/23/2024  Length of Stay: 6 days  Attending Physician: Arsen Funk DO  Primary Care Provider: William Olivia MD        Subjective:     Principal Problem:Cecal volvulus        HPI:  89 year old pt getting admitted with cecal volvulus/SBO/bowel ischemia  Pt has been suffering from abdominal pain for past 1 week  Also she lost her appetite   Finally family took the pt to PCP and pt had KUB which were concerning  She was referred to ER and had CT scan done  Surgeon was called and pt will have emergent OR visist soon  History mainly from family because pt has dementia    Overview/Hospital Course:  89 year old pt getting admitted with cecal volvulus/SBO/bowel ischemia  Surgeon reviewed and later exploratory laparotomy was done and Right hemicolectomy. Ileocolic anastomosis  was done .  Patient was doing well postop and later surgeon discontinued the NG tube and started some liquid diet, but poor po intake. Will attempt to advance and keep gentle fluids until intake improves. UA did not show infection. PVR was 400, straight cath x1 and second PVR was 100.  As slightly increased fluids due to low urine output but patient became fluid overloaded at night and fluids had to be stopped him was given Lasix.  This a.m. went into AFib with RVR.  Reportedly she does have a history of AFib but is no longer on beta-blocker or anticoagulation possibly due to age and stability.  Attempted to give diltiazem but not able to give on this floor so patient was given digoxin 500 mcg IV, cardiology consulted, transferred to telemetry floor.    Interval History:  Patient seen and examined. She reports poor appetite and generalized weakness. Son at bedside.  LBM yesterday  Labs reviewed wbc 10.7, hgb 11.6, K 4.0  Awaiting PT evaluation. May benefit from rehab    Confirmed living  will. She is a full code.    Review of Systems   Constitutional:  Negative for chills and fever.   Respiratory:  Negative for chest tightness, shortness of breath and wheezing.    Cardiovascular:  Negative for chest pain and palpitations.   Gastrointestinal:  Negative for constipation, diarrhea, nausea and vomiting.   Neurological:  Negative for dizziness, speech difficulty and numbness.     Objective:     Vital Signs (Most Recent):  Temp: 98.1 °F (36.7 °C) (07/29/24 0748)  Pulse: 87 (07/29/24 0748)  Resp: 19 (07/29/24 0748)  BP: 132/72 (07/29/24 0748)  SpO2: 96 % (07/29/24 0748) Vital Signs (24h Range):  Temp:  [97.7 °F (36.5 °C)-98.2 °F (36.8 °C)] 98.1 °F (36.7 °C)  Pulse:  [] 87  Resp:  [16-21] 19  SpO2:  [94 %-96 %] 96 %  BP: ()/(62-74) 132/72     Weight: 80.9 kg (178 lb 5.6 oz)  Body mass index is 30.61 kg/m².    Intake/Output Summary (Last 24 hours) at 7/29/2024 1220  Last data filed at 7/29/2024 0930  Gross per 24 hour   Intake 600 ml   Output 200 ml   Net 400 ml         Physical Exam  Vitals and nursing note reviewed.   Neck:      Vascular: No JVD.   Cardiovascular:      Rate and Rhythm: Rhythm irregular.      Heart sounds: Murmur heard.   Pulmonary:      Effort: Pulmonary effort is normal.      Breath sounds: Normal breath sounds.   Abdominal:      General: Abdomen is flat. There is distension.      Palpations: Abdomen is soft.      Tenderness: There is no abdominal tenderness.      Comments: Surgical dressing intact. + distention (normal for patient)   Skin:     General: Skin is warm.   Neurological:      Mental Status: She is alert and oriented to person, place, and time.   Psychiatric:         Mood and Affect: Mood normal.             Significant Labs: All pertinent labs within the past 24 hours have been reviewed.  CBC:   Recent Labs   Lab 07/28/24  0430 07/28/24  0516 07/29/24  0443   WBC 10.05  --  10.07   HGB 11.5*  --  11.6*   HCT 35.5* 37 36.5*     --  355     CMP:   Recent Labs    Lab 07/28/24  0430 07/29/24  0443    142   K 3.2* 4.0    108   CO2 24 27   * 122*   BUN 14 18   CREATININE 0.7 0.8   CALCIUM 9.1 9.5   PROT 6.2 6.1   ALBUMIN 2.9* 2.9*   BILITOT 0.6 0.4   ALKPHOS 61 53*   AST 21 18   ALT 12 11   ANIONGAP 9 7*     Cardiac Markers:   Recent Labs   Lab 07/28/24  0430   *       Significant Imaging: I have reviewed all pertinent imaging results/findings within the past 24 hours.    Assessment/Plan:      * Cecal volvulus  Lead to small-bowel obstruction.  She went to the OR and had resection of affected bowel.   Off NG tube  Started clear   Hemoglobin stable white cell slightly elevated but now normalized   BNP is elevated and can not give much of the fluid    Atrial fibrillation with RVR  Patient with Paroxysmal (<7 days) atrial fibrillation which is uncontrolled currently with Digoxin. Patient is currently in atrial fibrillation.[id number] Score: 4. HASBLED Score: 2. Anticoagulation indicated. Anticoagulation done with lovenox, cardiology consulted. Recent colectomy so would defer heparin drip to cardiology, continue lovenox for now .    HR controlled    UTI (urinary tract infection)  Transitioned to nitrofurantoin per Cx for 5 day course. completed    Small bowel obstruction  Due to cecal volvulus.  She went to the OR and underwent resection of the cecum and ascending colon.  Anastomosis was created.    Follow surgeon    Ischemia, bowel  Ruled out      Chronic systolic (congestive) heart failure  Echo from two years ago showed preserved EF.  Does not appear edematous however overnight became tachypneic and CXR showed bilateral interstitial edema.  Fluids were stopped and IV Lasix given once, considering p.o. versus IV for tomorrow.  Lasix was held postop until patient was eating but she had not been eating much.  Update TTE.  BNP elevated    Coronary artery disease of native artery of native heart with stable angina pectoris  Stable.  When able to take oral  medications, usual cardiac meds.    Essential hypertension  Blood pressure controlled.  Holding antihypertensives while NPO.  Monitor blood pressure  Use IV vasodilators as needed.    Hypothyroid  Stable.  Resumed levothyroxine         VTE Risk Mitigation (From admission, onward)           Ordered     enoxaparin injection 40 mg  Every 24 hours         07/27/24 1444     IP VTE HIGH RISK PATIENT  Once         07/23/24 1733     Place sequential compression device  Until discontinued         07/23/24 1733                    Discharge Planning   BOGDAN: 8/2/2024     Code Status: Full Code   Is the patient medically ready for discharge?:     Reason for patient still in hospital (select all that apply): Consult recommendations and PT / OT recommendations  Discharge Plan A: Home Health                  Ann Guido NP  Department of Hospital Medicine   [facility]

## 2024-07-29 NOTE — PT/OT/SLP PROGRESS
Occupational Therapy   Treatment    Name: Glenda Gimenez  MRN: [id number]  Admitting Diagnosis:  Cecal volvulus  6 Days Post-Op    Recommendations:     Discharge Recommendations: Moderate Intensity Therapy  Discharge Equipment Recommendations:  none  Barriers to discharge:  Other (Comment) (needs increased assistance with ADLs; limited caregiver support)    Assessment:     Glenda Gimenez is a 89 y.o. female with a medical diagnosis of Cecal volvulus.  Performance deficits affecting function are weakness, impaired endurance, impaired self care skills, impaired functional mobility, gait instability, impaired balance, pain, decreased safety awareness.     Pt presented supine and agreed to participate in OT session; when covers were removed pt was found to be soiled with loose BM (pt reports she was unaware).  Pt was assisted with bed level toileting/hygiene.  She was limited by fatigue, SOB, and abdominal discomfort.     Rehab Prognosis:  Fair; patient would benefit from acute skilled OT services to address these deficits and reach maximum level of function.       Plan:     Patient to be seen 5 x/week to address the above listed problems via self-care/home management, therapeutic activities, therapeutic exercises  Plan of Care Expires: 08/26/24  Plan of Care Reviewed with: patient, son    Subjective     Pain/Comfort:  Pain Rating 1:  (not rated)  Pain Rating Post-Intervention 1:  (not rated)    Objective:     Communicated with: nurse prior to session.  Patient found HOB elevated with telemetry, izaguirre catheter upon OT entry to room.    General Precautions: Standard, fall    Orthopedic Precautions:N/A  Braces: N/A  Respiratory Status: Room air; sp02 93%; pt SOB at times with exertion     Occupational Performance:     Bed Mobility:    Patient completed Rolling/Turning to Left with  minimum assistance to  moderate assistance with fatigue, and with side rail  Patient completed Rolling/Turning to Right with minimum  assistance to  moderate assistance with fatigue, and with side rail     Activities of Daily Living:  Lower Body Dressing: total assistance bed level brief change  Toileting: total assistance bed level hygiene; loose/liquid-like BM; nurse notified   Bathing: Max A at bed level; pt assisted as able but was limited by fatigue, abdominal discomfort, and limited lower body reach     Patient left HOB elevated with all lines intact, call button in reach, and bed alarm on    GOALS:   Multidisciplinary Problems       Occupational Therapy Goals          Problem: Occupational Therapy    Goal Priority Disciplines Outcome Interventions   Occupational Therapy Goal     OT, PT/OT     Description: Goals to be met by: 8/26/24     Patient will increase functional independence with ADLs by performing:    UE Dressing with Modified [place] while seated.  LE Dressing with Minimal Assistance while seated.  Grooming while seated with Set-up Assistance.  Toileting from toilet with Minimal Assistance for hygiene and clothing management.   Toilet transfer to toilet with Supervision using RW.                         Time Tracking:     OT Date of Treatment: 07/28/24  OT Start Time: 1343  OT Stop Time: 1421  OT Total Time (min): 38 min    Billable Minutes:Self Care/Home Management 38    OT/TREVOR: OT          7/29/2024

## 2024-07-29 NOTE — PROGRESS NOTES
"[facility]  Adult Nutrition   Progress Note (Follow-Up)    SUMMARY     Recommendations  1. Once medically feasible advance to soft bland diet with texture per speech SLP.   2. If unable to advance diet within 48 hrs, consider nutrition support. Clinimix @ 75 ml/hr (76.5 gm protein, 612 kcal)   3. RD has added Ensure HP to meal trays.  4. RD following.    Goals:   Advance diet and tolerate intake to meet > 50% of estimated needs by RD follow up.    Nutrition Goal Status: progressing towards goal    Nutrition Diagnosis PES Statement: Inadequate oral intake related to poor intake as evidenced by 0% PO intake.     Dietitian Rounds Brief  F/U Nutrition Note: Pt is on a full liquid diet at this time so Ensure HP has been added. She says she will try to drink it. She seems to be feeling better today. She is also fecally incontinent. Will follow up with PO intake.    Nutrition Related Social Determinants of Health: SDOH: None Identified    Diet order:   Current Diet Order: Full Liquid      Evaluation of Received Nutrient/Fluid Intake  Energy Calories Required: not meeting needs  Protein Required: not meeting needs  Fluid Required: meeting needs  Tolerance: other (see comments)     % Intake of Estimated Energy Needs: 0%  % Meal Intake: 0 - 25 %      Intake/Output Summary (Last 24 hours) at 7/29/2024 1331  Last data filed at 7/29/2024 0930  Gross per 24 hour   Intake 480 ml   Output 200 ml   Net 280 ml        Anthropometrics  Temp: 98.1 °F (36.7 °C)  Height Method: Stated  Height: 5' 4" (162.6 cm)  Height (inches): 64 in  Weight Method: Bed Scale  Weight: 80.9 kg (178 lb 5.6 oz)  Weight (lb): 178.35 lb  Ideal Body Weight (IBW), Female: 120 lb  % Ideal Body Weight, Female (lb): 148.63 %  BMI (Calculated): 30.6  BMI Grade: 30 - 34.9- obesity - grade I       Estimated/Assessed Needs  Weight Used For Calorie Calculations: 80.9 kg (178 lb 5.6 oz)  Energy Calorie Requirements (kcal): 9289-1918 kcal (20-25 kcal/ kg " bw)  Energy Need Method: Kcal/kg  Protein Requirements:  gm (1.5-2.0 gm/ kg bw)  Weight Used For Protein Calculations: 55 kg (121 lb 4.1 oz)     Estimated Fluid Requirement Method: RDA Method  RDA Method (mL): 1618       Reason for Assessment  Reason For Assessment: RD follow-up  Diagnosis: other (see comments) (cecal volvulus)  Relevant Medical History: Hypothyroid, Essential hypertension, Coronary artery disease, Chronic systolic (congestive) heart failure, Ischemia, bowel, Small bowel obstruction, UTI (urinary tract infection), Atrial fibrillation with RVR  Interdisciplinary Rounds: did not attend  Nutrition Discharge Planning: pending    Nutrition/Diet History  Spiritual, Cultural Beliefs, Tenriism Practices, Values that Affect Care: no  Food Allergies: NKFA  Factors Affecting Nutritional Intake: decreased appetite    Nutrition Risk Screen  Nutrition Risk Screen: no indicators present     MST Score: 0  Have you recently lost weight without trying?: No  Weight loss score: 0  Have you been eating poorly because of a decreased appetite?: No  Appetite score: 0       Weight History:  Wt Readings from Last 10 Encounters:   07/23/24 80.9 kg (178 lb 5.6 oz)   07/23/24 80.7 kg (177 lb 14.6 oz)   05/30/24 80.4 kg (177 lb 4 oz)   02/20/24 80.7 kg (178 lb)   02/09/24 79.4 kg (175 lb)   11/24/23 79.7 kg (175 lb 11.3 oz)   08/15/23 81.4 kg (179 lb 7.3 oz)   07/11/23 80.4 kg (177 lb 4 oz)   06/12/23 80.8 kg (178 lb 2.1 oz)   05/04/23 77.1 kg (170 lb)        Lab/Procedures/Meds: Pertinent Labs/Meds Reviewed    Medications:Pertinent Medications Reviewed  Scheduled Meds:   donepeziL  5 mg Oral QHS    enoxparin  40 mg Subcutaneous Q24H (prophylaxis, 1700)    furosemide  40 mg Oral Daily    isosorbide mononitrate  30 mg Oral Daily    levothyroxine  88 mcg Oral Before breakfast    losartan  12.5 mg Oral Daily    mupirocin   Nasal BID    pantoprazole  40 mg Intravenous Daily    QUEtiapine  25 mg Oral Daily     Continuous  Infusions:  PRN Meds:.  Current Facility-Administered Medications:     acetaminophen, 650 mg, Oral, Q4H PRN    albuterol-ipratropium, 3 mL, Nebulization, Q8H PRN    aluminum-magnesium hydroxide-simethicone, 30 mL, Oral, QID PRN    HYDROcodone-acetaminophen, 1 tablet, Oral, Q6H PRN    HYDROmorphone, 0.5 mg, Intravenous, Q6H PRN    magnesium oxide, 800 mg, Oral, PRN    magnesium oxide, 800 mg, Oral, PRN    magnesium sulfate IVPB, 2 g, Intravenous, PRN    magnesium sulfate IVPB, 4 g, Intravenous, PRN    melatonin, 6 mg, Oral, Nightly PRN    naloxone, 0.02 mg, Intravenous, PRN    ondansetron, 4 mg, Intravenous, Q6H PRN    potassium bicarbonate, 35 mEq, Oral, PRN    potassium bicarbonate, 50 mEq, Oral, PRN    potassium bicarbonate, 60 mEq, Oral, PRN    potassium, sodium phosphates, 2 packet, Oral, PRN    potassium, sodium phosphates, 2 packet, Oral, PRN    potassium, sodium phosphates, 2 packet, Oral, PRN    Labs: Pertinent Labs Reviewed  Clinical Chemistry:  Recent Labs   Lab 07/23/24  1151 07/23/24  1423 07/24/24  0329 07/27/24  0515 07/28/24  0430 07/29/24  0443    139   < > 140 142 142   K 3.7 3.7   < > 3.6 3.2* 4.0    102   < > 109 109 108   CO2 23 26   < > 24 24 27   * 157*   < > 75 120* 122*   BUN 37* 40*   < > 18 14 18   CREATININE 1.4 1.4   < > 0.7 0.7 0.8   CALCIUM 10.6* 10.4   < > 8.6* 9.1 9.5   PROT 7.9 7.8   < > 5.5* 6.2 6.1   ALBUMIN 3.6 4.1   < > 2.6* 2.9* 2.9*   BILITOT 0.8 0.8   < > 0.6 0.6 0.4   ALKPHOS 86 79   < > 59 61 53*   AST 48* 44*   < > 24 21 18   ALT 38 35   < > 14 12 11   ANIONGAP 13 11   < > 7* 9 7*   MG  --   --    < > 1.6 1.4* 1.6   PHOS  --   --   --   --  2.4*  --    LIPASE 10 7  --   --   --   --     < > = values in this interval not displayed.     CBC:   Recent Labs   Lab 07/29/24  0443   WBC 10.07   RBC 3.81*   HGB 11.6*   HCT 36.5*      MCV 96   MCH 30.4   MCHC 31.8*     Cardiac Profile:  Recent Labs   Lab 07/24/24  0329 07/28/24  0430   * 755*      Inflammatory Labs:  Recent Labs   Lab 07/27/24  0515 07/28/24  0430 07/29/24  0443   CRP 11.90* 11.20* 8.50*       Thyroid & Parathyroid:  Recent Labs   Lab 07/28/24  1119   TSH 1.935       Monitor and Evaluation  Food and Nutrient Intake: energy intake, food and beverage intake  Food and Nutrient Adminstration: diet order  Knowledge/Beliefs/Attitudes: food and nutrition knowledge/skill, beliefs and attitudes  Physical Activity and Function: factors affecting access to physical activity, nutrition-related ADLs and IADLs  Anthropometric Measurements: weight, weight change, body mass index  Biochemical Data, Medical Tests and Procedures: electrolyte and renal panel, lipid profile, gastrointestinal profile, glucose/endocrine profile     Nutrition Risk  Level of Risk/Frequency of Follow-up: high     Nutrition Follow-Up  RD Follow-up?: Yes      Gina Butler RD 07/29/2024 1:31 PM

## 2024-07-30 ENCOUNTER — PATIENT OUTREACH (OUTPATIENT)
Dept: ADMINISTRATIVE | Facility: OTHER | Age: 89
End: 2024-07-30
Payer: MEDICARE

## 2024-07-30 PROBLEM — R33.8 ACUTE URINARY RETENTION: Status: ACTIVE | Noted: 2024-07-30

## 2024-07-30 LAB
ALBUMIN SERPL BCP-MCNC: 2.8 G/DL (ref 3.5–5.2)
ALP SERPL-CCNC: 47 U/L (ref 55–135)
ALT SERPL W/O P-5'-P-CCNC: 10 U/L (ref 10–44)
ANION GAP SERPL CALC-SCNC: 10 MMOL/L (ref 8–16)
AST SERPL-CCNC: 17 U/L (ref 10–40)
BASOPHILS # BLD AUTO: 0.07 K/UL (ref 0–0.2)
BASOPHILS NFR BLD: 0.7 % (ref 0–1.9)
BILIRUB SERPL-MCNC: 0.4 MG/DL (ref 0.1–1)
BUN SERPL-MCNC: 16 MG/DL (ref 10–30)
CALCIUM SERPL-MCNC: 9.2 MG/DL (ref 8.7–10.5)
CHLORIDE SERPL-SCNC: 107 MMOL/L (ref 95–110)
CO2 SERPL-SCNC: 26 MMOL/L (ref 23–29)
CREAT SERPL-MCNC: 0.7 MG/DL (ref 0.5–1.4)
CRP SERPL-MCNC: 3.8 MG/DL
DIFFERENTIAL METHOD BLD: ABNORMAL
EOSINOPHIL # BLD AUTO: 0.6 K/UL (ref 0–0.5)
EOSINOPHIL NFR BLD: 5.5 % (ref 0–8)
ERYTHROCYTE [DISTWIDTH] IN BLOOD BY AUTOMATED COUNT: 13.2 % (ref 11.5–14.5)
EST. GFR  (NO RACE VARIABLE): >60 ML/MIN/1.73 M^2
GLUCOSE SERPL-MCNC: 112 MG/DL (ref 70–110)
HCT VFR BLD AUTO: 35.4 % (ref 37–48.5)
HGB BLD-MCNC: 11.3 G/DL (ref 12–16)
IMM GRANULOCYTES # BLD AUTO: 0.17 K/UL (ref 0–0.04)
IMM GRANULOCYTES NFR BLD AUTO: 1.7 % (ref 0–0.5)
LYMPHOCYTES # BLD AUTO: 1.7 K/UL (ref 1–4.8)
LYMPHOCYTES NFR BLD: 16.3 % (ref 18–48)
MAGNESIUM SERPL-MCNC: 1.4 MG/DL (ref 1.6–2.6)
MCH RBC QN AUTO: 30.1 PG (ref 27–31)
MCHC RBC AUTO-ENTMCNC: 31.9 G/DL (ref 32–36)
MCV RBC AUTO: 94 FL (ref 82–98)
MONOCYTES # BLD AUTO: 1 K/UL (ref 0.3–1)
MONOCYTES NFR BLD: 9.7 % (ref 4–15)
NEUTROPHILS # BLD AUTO: 6.7 K/UL (ref 1.8–7.7)
NEUTROPHILS NFR BLD: 66.1 % (ref 38–73)
NRBC BLD-RTO: 0 /100 WBC
OHS QRS DURATION: 88 MS
OHS QTC CALCULATION: 459 MS
PLATELET # BLD AUTO: 376 K/UL (ref 150–450)
PMV BLD AUTO: 10.7 FL (ref 9.2–12.9)
POTASSIUM SERPL-SCNC: 3.5 MMOL/L (ref 3.5–5.1)
PROT SERPL-MCNC: 5.8 G/DL (ref 6–8.4)
RBC # BLD AUTO: 3.76 M/UL (ref 4–5.4)
SODIUM SERPL-SCNC: 143 MMOL/L (ref 136–145)
WBC # BLD AUTO: 10.17 K/UL (ref 3.9–12.7)

## 2024-07-30 PROCEDURE — 25000003 PHARM REV CODE 250: Performed by: FAMILY MEDICINE

## 2024-07-30 PROCEDURE — 99900031 HC PATIENT EDUCATION (STAT)

## 2024-07-30 PROCEDURE — 80053 COMPREHEN METABOLIC PANEL: CPT | Performed by: INTERNAL MEDICINE

## 2024-07-30 PROCEDURE — 25000003 PHARM REV CODE 250: Performed by: INTERNAL MEDICINE

## 2024-07-30 PROCEDURE — 63600175 PHARM REV CODE 636 W HCPCS: Performed by: INTERNAL MEDICINE

## 2024-07-30 PROCEDURE — 97530 THERAPEUTIC ACTIVITIES: CPT | Mod: CQ

## 2024-07-30 PROCEDURE — 25000003 PHARM REV CODE 250: Performed by: NURSE PRACTITIONER

## 2024-07-30 PROCEDURE — 85025 COMPLETE CBC W/AUTO DIFF WBC: CPT | Performed by: INTERNAL MEDICINE

## 2024-07-30 PROCEDURE — 99900035 HC TECH TIME PER 15 MIN (STAT)

## 2024-07-30 PROCEDURE — 63600175 PHARM REV CODE 636 W HCPCS: Performed by: FAMILY MEDICINE

## 2024-07-30 PROCEDURE — 21400001 HC TELEMETRY ROOM

## 2024-07-30 PROCEDURE — 97530 THERAPEUTIC ACTIVITIES: CPT

## 2024-07-30 PROCEDURE — 36415 COLL VENOUS BLD VENIPUNCTURE: CPT | Performed by: INTERNAL MEDICINE

## 2024-07-30 PROCEDURE — 86140 C-REACTIVE PROTEIN: CPT | Performed by: STUDENT IN AN ORGANIZED HEALTH CARE EDUCATION/TRAINING PROGRAM

## 2024-07-30 PROCEDURE — 83735 ASSAY OF MAGNESIUM: CPT | Performed by: INTERNAL MEDICINE

## 2024-07-30 PROCEDURE — 94761 N-INVAS EAR/PLS OXIMETRY MLT: CPT

## 2024-07-30 RX ORDER — TAMSULOSIN HYDROCHLORIDE 0.4 MG/1
0.4 CAPSULE ORAL DAILY
Status: DISCONTINUED | OUTPATIENT
Start: 2024-07-30 | End: 2024-08-01 | Stop reason: HOSPADM

## 2024-07-30 RX ADMIN — QUETIAPINE FUMARATE 25 MG: 25 TABLET ORAL at 08:07

## 2024-07-30 RX ADMIN — ENOXAPARIN SODIUM 40 MG: 40 INJECTION SUBCUTANEOUS at 05:07

## 2024-07-30 RX ADMIN — PANTOPRAZOLE SODIUM 40 MG: 40 INJECTION, POWDER, FOR SOLUTION INTRAVENOUS at 09:07

## 2024-07-30 RX ADMIN — Medication 6 MG: at 08:07

## 2024-07-30 RX ADMIN — ISOSORBIDE MONONITRATE 30 MG: 30 TABLET, EXTENDED RELEASE ORAL at 08:07

## 2024-07-30 RX ADMIN — DONEPEZIL HYDROCHLORIDE 5 MG: 5 TABLET, FILM COATED ORAL at 08:07

## 2024-07-30 RX ADMIN — LOSARTAN POTASSIUM 12.5 MG: 25 TABLET, FILM COATED ORAL at 08:07

## 2024-07-30 RX ADMIN — TAMSULOSIN HYDROCHLORIDE 0.4 MG: 0.4 CAPSULE ORAL at 05:07

## 2024-07-30 RX ADMIN — LEVOTHYROXINE SODIUM 88 MCG: 0.09 TABLET ORAL at 06:07

## 2024-07-30 RX ADMIN — Medication 800 MG: at 08:07

## 2024-07-30 RX ADMIN — FUROSEMIDE 40 MG: 40 TABLET ORAL at 08:07

## 2024-07-30 RX ADMIN — POTASSIUM BICARBONATE 50 MEQ: 977.5 TABLET, EFFERVESCENT ORAL at 08:07

## 2024-07-30 NOTE — PT/OT/SLP PROGRESS
Occupational Therapy   Treatment    Name: Glenda Gimenez  MRN: [id number]  Admitting Diagnosis:  Cecal volvulus  7 Days Post-Op    Recommendations:     Discharge Recommendations: Moderate Intensity Therapy  Discharge Equipment Recommendations:  none  Barriers to discharge:  Decreased caregiver support    Assessment:     Glenda Gimenez is a 89 y.o. female with a medical diagnosis of Cecal volvulus.   Performance deficits affecting function are weakness, impaired endurance, impaired self care skills, impaired functional mobility, gait instability, impaired balance, pain, impaired cardiopulmonary response to activity.     Rehab Prognosis:  Fair; patient would benefit from acute skilled OT services to address these deficits and reach maximum level of function.       Plan:     Patient to be seen 5 x/week to address the above listed problems via self-care/home management, therapeutic activities, therapeutic exercises  Plan of Care Expires: 08/26/24  Plan of Care Reviewed with: patient, son    Subjective     Pain/Comfort:  Pain Rating 1:  (not rated)  Pain Rating Post-Intervention 1:  (not rated)    Objective:     Communicated with: nurse prior to session.  Patient found supine with telemetry upon OT entry to room.    General Precautions: Standard, fall    Orthopedic Precautions:N/A  Braces: N/A  Respiratory Status: Room air     Occupational Performance:     Bed Mobility:    Patient completed Supine to Sit with moderate assistance     Functional Mobility/Transfers:  Patient completed Sit <> Stand Transfer with minimum assistance  with  rolling walker   Patient completed Bed <> Chair Transfer using Stand Pivot technique with minimum assistance with rolling walker; pt needed cues for safety/RW management     Activities of Daily Living:  Feeding:  set-up assist with lunch tray      Treatment & Education:  Therapist provided facilitation and instruction of proper body mechanics and fall prevention strategies during tasks  listed above.   Instructed patient to sit in bedside chair as tolerated daily to increase OOB/activity tolerance.   Instructed patient to use call light to have nursing staff assist with needs/transfers.   Discussed OT POC and answered all questions within OT scope of practice.    Patient left up in chair with all lines intact, call button in reach, chair alarm on, and son present    GOALS:   Multidisciplinary Problems       Occupational Therapy Goals          Problem: Occupational Therapy    Goal Priority Disciplines Outcome Interventions   Occupational Therapy Goal     OT, PT/OT Progressing    Description: Goals to be met by: 8/26/24     Patient will increase functional independence with ADLs by performing:    UE Dressing with Modified [place] while seated.  LE Dressing with Minimal Assistance while seated.  Grooming while seated with Set-up Assistance.  Toileting from toilet with Minimal Assistance for hygiene and clothing management.   Toilet transfer to toilet with Supervision using RW.                         Time Tracking:     OT Date of Treatment: 07/30/24  OT Start Time: 1226  OT Stop Time: 1234  OT Total Time (min): 8 min    Billable Minutes:Therapeutic Activity 08    OT/TREVOR: OT          7/30/2024

## 2024-07-30 NOTE — ASSESSMENT & PLAN NOTE
Patient with Paroxysmal (<7 days) atrial fibrillation which is better controlled currently.  Not on beta blocker are antiarrhythmic. Patient is currently in sinus rhythm.[id number] Score: 4. HASBLED Score: 2.     Cardiology consulted and evaluated:   Received IV digoxin x1 7/28.  Holding off of aggressive anticoagulation at this time due to bleed risk.

## 2024-07-30 NOTE — ASSESSMENT & PLAN NOTE
Chronic, controlled. Latest blood pressure and vitals reviewed-     Temp:  [96.1 °F (35.6 °C)-98.1 °F (36.7 °C)]   Pulse:  []   Resp:  [18-20]   BP: (114-140)/(65-73)   SpO2:  [90 %-95 %] .   Home meds for hypertension were reviewed and noted below.   Hypertension Medications               furosemide (LASIX) 20 MG tablet TAKE 1 TABLET BY MOUTH EVERY DAY    isosorbide mononitrate (IMDUR) 30 MG 24 hr tablet TAKE 1 TABLET BY MOUTH EVERY DAY    nitroGLYCERIN (NITROSTAT) 0.4 MG SL tablet Place 1 tablet (0.4 mg total) under the tongue every 5 (five) minutes as needed for Chest pain.    olmesartan (BENICAR) 5 MG Tab Take 1 tablet (5 mg total) by mouth once daily.            While in the hospital, will manage blood pressure as follows; Continue home antihypertensive regimen    Will utilize p.r.n. blood pressure medication only if patient's blood pressure greater than 180/110 and she develops symptoms such as worsening chest pain or shortness of breath.

## 2024-07-30 NOTE — ASSESSMENT & PLAN NOTE
General surgery consulted: S/P exploratory lap with right hemicolectomy on 07/23/2024  Advanced to soft, bland diet  Continue current pain regimen

## 2024-07-30 NOTE — PLAN OF CARE
Problem: Occupational Therapy  Goal: Occupational Therapy Goal  Description: Goals to be met by: 8/26/24     Patient will increase functional independence with ADLs by performing:    UE Dressing with Modified [place] while seated.  LE Dressing with Minimal Assistance while seated.  Grooming while seated with Set-up Assistance.  Toileting from toilet with Minimal Assistance for hygiene and clothing management.   Toilet transfer to toilet with Supervision using RW.    Outcome: Progressing

## 2024-07-30 NOTE — SUBJECTIVE & OBJECTIVE
Interval History:  Sitting up in chair, awake and alert.  Denies pain or acute issues currently.  Requests a solid food and Diet advanced to soft /bland this morning. Still having poor oral intake. RD following.  Awaiting  placement.     Review of Systems   Constitutional:  Positive for appetite change.   HENT:  Positive for hearing loss.    Genitourinary:         Retention   Skin:         Surgical incision   Neurological:  Positive for weakness (Generalized).     Objective:     Vital Signs (Most Recent):  Temp: 97.6 °F (36.4 °C) (07/30/24 0718)  Pulse: 86 (07/30/24 0718)  Resp: 20 (07/30/24 0718)  BP: 134/73 (07/30/24 0718)  SpO2: (!) 94 % (07/30/24 0718) Vital Signs (24h Range):  Temp:  [97.6 °F (36.4 °C)-98.1 °F (36.7 °C)] 97.6 °F (36.4 °C)  Pulse:  [86-99] 86  Resp:  [18-20] 20  SpO2:  [90 %-95 %] 94 %  BP: (110-140)/(61-73) 134/73     Weight: 84.5 kg (186 lb 4.6 oz)  Body mass index is 31.98 kg/m².    Intake/Output Summary (Last 24 hours) at 7/30/2024 1120  Last data filed at 7/30/2024 0541  Gross per 24 hour   Intake 200 ml   Output 850 ml   Net -650 ml         Physical Exam  Constitutional:       General: She is not in acute distress.  HENT:      Ears:      Comments: Hard of hearing  Neck:      Vascular: No JVD.   Cardiovascular:      Rate and Rhythm: Normal rate and regular rhythm.      Heart sounds: Murmur heard.   Pulmonary:      Effort: Pulmonary effort is normal. No respiratory distress.      Breath sounds: Normal breath sounds.   Abdominal:      General: Bowel sounds are normal. There is no distension.      Palpations: Abdomen is soft.      Tenderness: There is no abdominal tenderness.      Comments: Surgical dressing intact. + distention (normal for patient)   Genitourinary:     Comments: Cabrera catheter intact draining clear yellow urine  Musculoskeletal:         General: No swelling. Normal range of motion.      Cervical back: Normal range of motion and neck supple.   Skin:     General: Skin is warm  and dry.      Comments: Abdominal dressing CDI.   Neurological:      General: No focal deficit present.      Mental Status: She is alert and oriented to person, place, and time.   Psychiatric:         Mood and Affect: Mood normal.             Significant Labs: All pertinent labs within the past 24 hours have been reviewed.  CBC:   Recent Labs   Lab 07/29/24 0443 07/30/24  0415   WBC 10.07 10.17   HGB 11.6* 11.3*   HCT 36.5* 35.4*    376     CMP:   Recent Labs   Lab 07/29/24 0443 07/30/24  0415    143   K 4.0 3.5    107   CO2 27 26   * 112*   BUN 18 16   CREATININE 0.8 0.7   CALCIUM 9.5 9.2   PROT 6.1 5.8*   ALBUMIN 2.9* 2.8*   BILITOT 0.4 0.4   ALKPHOS 53* 47*   AST 18 17   ALT 11 10   ANIONGAP 7* 10     Lab Results   Component Value Date    CRP 3.80 (H) 07/30/2024     Microbiology Results (last 7 days)       Procedure Component Value Units Date/Time    Blood culture [[id number]] Collected: 07/24/24 0005    Order Status: Completed Specimen: Blood from Peripheral, Antecubital, Right Updated: 07/29/24 0232     Blood Culture, Routine No growth after 5 days.    Urine culture [[id number]]  (Abnormal)  (Susceptibility) Collected: 07/23/24 1438    Order Status: Completed Specimen: Urine Updated: 07/25/24 0712     Urine Culture, Routine ESCHERICHIA COLI  10,000 - 49,999 cfu/ml      Narrative:      Specimen Source->Urine          Echo    Left Ventricle: The left ventricle is normal in size. Mildly increased   wall thickness. There is mild concentric hypertrophy. Normal wall motion.   Septal motion is consistent with bundle branch block. There is normal   systolic function with a visually estimated ejection fraction of 65 - 70%.   There is diastolic dysfunction.    Right Ventricle: Normal right ventricular cavity size. Wall thickness   is normal. Systolic function is normal.    Aortic Valve: The aortic valve is a trileaflet valve. There is mild   aortic valve sclerosis.    Mitral Valve: There is  mild regurgitation with a centrally directed   jet.    Tricuspid Valve: There is mild regurgitation with a centrally directed   jet.    Pulmonary Artery: There is borderline elevated pulmonary hypertension.   The estimated pulmonary artery systolic pressure is 36 mmHg.    IVC/SVC: Normal venous pressure at 3 mmHg.        Significant Imaging: I have reviewed all pertinent imaging results/findings within the past 24 hours.

## 2024-07-30 NOTE — PT/OT/SLP PROGRESS
"Physical Therapy Treatment    Patient Name:  Glenda Gimenez   MRN:  [id number]    Recommendations:     Discharge Recommendations: Moderate Intensity Therapy (Moderate Intensity vs Low Intensity with close supervision)  Discharge Equipment Recommendations: none  Barriers to discharge:  winded with exertion, decreased mobility from baseline    Assessment:     Glenda Gimenez is a 89 y.o. female admitted with a medical diagnosis of Cecal volvulus.  She presents with the following impairments/functional limitations: weakness, impaired endurance, impaired functional mobility, gait instability, impaired cardiopulmonary response to activity, impaired skin.    Improved participation this date. ModA for supine to sit. Transferred with stand pivot, RW, and Faye EOB > chair >< BSC for BM. Winded with exertion, but 93% sats on RA with spot checks. Extended time needed on BSC to pass BM. Good participation and tolerance.     Rehab Prognosis: Good; patient would benefit from acute skilled PT services to address these deficits and reach maximum level of function.    Recent Surgery: Procedure(s) (LRB):  LAPAROTOMY, EXPLORATORY (N/A)  HEMICOLECTOMY, RIGHT (Right)  COLECTOMY, WITH ILEUM TO COLON ANASTOMOSIS 7 Days Post-Op    Plan:     During this hospitalization, patient to be seen daily to address the identified rehab impairments via gait training, therapeutic activities, therapeutic exercises and progress toward the following goals:    Plan of Care Expires:  08/25/24    Subjective     Chief Complaint: "I think I might need to use the bathroom"  Patient/Family Comments/goals: none expressed  Pain/Comfort:  Pain Rating 1: 0/10  Pain Rating Post-Intervention 1: 0/10      Objective:     Communicated with nurse prior to session.  Patient found HOB elevated with telemetry, bed alarm, izaguirre catheter upon PT entry to room.     General Precautions: Standard, fall  Orthopedic Precautions: N/A  Braces: N/A  Respiratory Status: Room air   "   Functional Mobility:  Bed Mobility:     Supine to Sit: moderate assistance  Transfers:     Sit to Stand:  minimum assistance with rolling walker  Bed to Chair: minimum assistance with  rolling walker  using  Stand Pivot  Toilet Transfer: minimum assistance with  rolling walker  using  Stand Pivot      AM-PAC 6 CLICK MOBILITY          Treatment & Education:  -Pt educ: benefits of participation with therapy, OOB to chair during the day and for all meals, frequent mobility, use of BSC rather than bedpan, fall prevention, call light    Patient left up in chair with all lines intact, call button in reach, chair alarm on, nurse notified, and son present..    GOALS:   Multidisciplinary Problems       Physical Therapy Goals          Problem: Physical Therapy    Goal Priority Disciplines Outcome Goal Variances Interventions   Physical Therapy Goal     PT, PT/OT Progressing     Description: Goals to be met by: 24     Patient will increase functional independence with mobility by performin. Supine to sit with Supervision  2. Sit to stand transfer with Supervision  3. Bed to chair transfer with Supervision using Rolling Walker  4. Gait  x 150 feet with Supervision using Rolling Walker.                              Time Tracking:     PT Received On: 24  PT Start Time: 927     PT Stop Time: 958  PT Total Time (min): 31 min     Billable Minutes: Therapeutic Activity 31    Treatment Type: Treatment  PT/PTA: PTA     Number of PTA visits since last PT visit: 2     2024

## 2024-07-30 NOTE — ASSESSMENT & PLAN NOTE
Patient is identified as having Systolic (HFrEF) heart failure that is Chronic. CHF is currently controlled. Latest ECHO performed and demonstrates- Results for orders placed during the hospital encounter of 07/23/24    Echo    Interpretation Summary    Left Ventricle: The left ventricle is normal in size. Mildly increased wall thickness. There is mild concentric hypertrophy. Normal wall motion. Septal motion is consistent with bundle branch block. There is normal systolic function with a visually estimated ejection fraction of 65 - 70%. There is diastolic dysfunction.    Right Ventricle: Normal right ventricular cavity size. Wall thickness is normal. Systolic function is normal.    Aortic Valve: The aortic valve is a trileaflet valve. There is mild aortic valve sclerosis.    Mitral Valve: There is mild regurgitation with a centrally directed jet.    Tricuspid Valve: There is mild regurgitation with a centrally directed jet.    Pulmonary Artery: There is borderline elevated pulmonary hypertension. The estimated pulmonary artery systolic pressure is 36 mmHg.    IVC/SVC: Normal venous pressure at 3 mmHg.  . Continue Furosemide Nitrate/Vasodilator and monitor clinical status closely. Monitor on telemetry. Patient is off CHF pathway.  Monitor strict Is&Os and daily weights.  Place on fluid restriction of 1.5 L. Cardiology is consulted. Continue to stress to patient importance of self efficacy and  on diet for CHF. Last BNP reviewed- and noted below   Recent Labs   Lab 07/28/24  0430   *   .  Stable.  Was given IV Lasix x1 for volume overload  7/28 and continued on oral Lasix daily

## 2024-07-30 NOTE — ASSESSMENT & PLAN NOTE
Patient has chronic hypothyroidism. TFTs reviewed-   Lab Results   Component Value Date    TSH 1.935 07/28/2024   . Will continue chronic levothyroxine and adjust for and clinical changes.

## 2024-07-30 NOTE — ASSESSMENT & PLAN NOTE
Likely multifactorial:  Due to anesthesia and underlying UTI  Completed course of antibiotics for UTI  Start Flomax  Remove Cabrera in a.m. for void trial

## 2024-07-30 NOTE — PROGRESS NOTES
CHW - Outreach Attempt    Community Health Worker unable to leave a voicemail message for 1st attempt to contact patient regarding:  Pt is admitted to the hopital at this time   Community Health Worker to attempt to contact patient on:  8/7/24

## 2024-07-30 NOTE — PROGRESS NOTES
[facility] Medicine  Progress Note    Patient Name: Glenda Gimenez  MRN: [id number]  Patient Class: IP- Inpatient   Admission Date: 7/23/2024  Length of Stay: 7 days  Attending Physician: Arsen Funk DO  Primary Care Provider: William Olivia MD        Subjective:     Principal Problem:Cecal volvulus        HPI:  89 year old pt getting admitted with cecal volvulus/SBO/bowel ischemia  Pt has been suffering from abdominal pain for past 1 week  Also she lost her appetite   Finally family took the pt to PCP and pt had KUB which were concerning  She was referred to ER and had CT scan done  Surgeon was called and pt will have emergent OR visist soon  History mainly from family because pt has dementia    Overview/Hospital Course:  89 year old pt getting admitted with cecal volvulus/SBO/bowel ischemia  Surgeon reviewed and later exploratory laparotomy was done and Right hemicolectomy. Ileocolic anastomosis  was done .  Patient was doing well postop and later surgeon discontinued the NG tube and started some liquid diet, but poor po intake. Will attempt to advance and keep gentle fluids until intake improves. UA did not show infection. PVR was 400, straight cath x1 and second PVR was 100.  As slightly increased fluids due to low urine output but patient became fluid overloaded at night and fluids had to be stopped him was given Lasix.  This a.m. went into AFib with RVR.  Reportedly she does have a history of AFib but is no longer on beta-blocker or anticoagulation possibly due to age and stability.  Attempted to give diltiazem but not able to give on this floor so patient was given digoxin 500 mcg IV, cardiology consulted, transferred to telemetry floor.    Interval History:  Sitting up in chair, awake and alert.  Denies pain or acute issues currently.  Requests a solid food and Diet advanced to soft /bland this morning. Still having poor oral intake. RD following.  Awaiting  placement.      Review of Systems   Constitutional:  Positive for appetite change.   HENT:  Positive for hearing loss.    Genitourinary:         Retention   Skin:         Surgical incision   Neurological:  Positive for weakness (Generalized).     Objective:     Vital Signs (Most Recent):  Temp: 97.6 °F (36.4 °C) (07/30/24 0718)  Pulse: 86 (07/30/24 0718)  Resp: 20 (07/30/24 0718)  BP: 134/73 (07/30/24 0718)  SpO2: (!) 94 % (07/30/24 0718) Vital Signs (24h Range):  Temp:  [97.6 °F (36.4 °C)-98.1 °F (36.7 °C)] 97.6 °F (36.4 °C)  Pulse:  [86-99] 86  Resp:  [18-20] 20  SpO2:  [90 %-95 %] 94 %  BP: (110-140)/(61-73) 134/73     Weight: 84.5 kg (186 lb 4.6 oz)  Body mass index is 31.98 kg/m².    Intake/Output Summary (Last 24 hours) at 7/30/2024 1120  Last data filed at 7/30/2024 0541  Gross per 24 hour   Intake 200 ml   Output 850 ml   Net -650 ml         Physical Exam  Constitutional:       General: She is not in acute distress.  HENT:      Ears:      Comments: Hard of hearing  Neck:      Vascular: No JVD.   Cardiovascular:      Rate and Rhythm: Normal rate and regular rhythm.      Heart sounds: Murmur heard.   Pulmonary:      Effort: Pulmonary effort is normal. No respiratory distress.      Breath sounds: Normal breath sounds.   Abdominal:      General: Bowel sounds are normal. There is no distension.      Palpations: Abdomen is soft.      Tenderness: There is no abdominal tenderness.      Comments: Surgical dressing intact. + distention (normal for patient)   Genitourinary:     Comments: Cabrera catheter intact draining clear yellow urine  Musculoskeletal:         General: No swelling. Normal range of motion.      Cervical back: Normal range of motion and neck supple.   Skin:     General: Skin is warm and dry.      Comments: Abdominal dressing CDI.   Neurological:      General: No focal deficit present.      Mental Status: She is alert and oriented to person, place, and time.   Psychiatric:         Mood and Affect: Mood normal.              Significant Labs: All pertinent labs within the past 24 hours have been reviewed.  CBC:   Recent Labs   Lab 07/29/24 0443 07/30/24  0415   WBC 10.07 10.17   HGB 11.6* 11.3*   HCT 36.5* 35.4*    376     CMP:   Recent Labs   Lab 07/29/24  0443 07/30/24  0415    143   K 4.0 3.5    107   CO2 27 26   * 112*   BUN 18 16   CREATININE 0.8 0.7   CALCIUM 9.5 9.2   PROT 6.1 5.8*   ALBUMIN 2.9* 2.8*   BILITOT 0.4 0.4   ALKPHOS 53* 47*   AST 18 17   ALT 11 10   ANIONGAP 7* 10     Lab Results   Component Value Date    CRP 3.80 (H) 07/30/2024     Microbiology Results (last 7 days)       Procedure Component Value Units Date/Time    Blood culture [[id number]] Collected: 07/24/24 0005    Order Status: Completed Specimen: Blood from Peripheral, Antecubital, Right Updated: 07/29/24 0232     Blood Culture, Routine No growth after 5 days.    Urine culture [[id number]]  (Abnormal)  (Susceptibility) Collected: 07/23/24 1438    Order Status: Completed Specimen: Urine Updated: 07/25/24 0712     Urine Culture, Routine ESCHERICHIA COLI  10,000 - 49,999 cfu/ml      Narrative:      Specimen Source->Urine          Echo    Left Ventricle: The left ventricle is normal in size. Mildly increased   wall thickness. There is mild concentric hypertrophy. Normal wall motion.   Septal motion is consistent with bundle branch block. There is normal   systolic function with a visually estimated ejection fraction of 65 - 70%.   There is diastolic dysfunction.    Right Ventricle: Normal right ventricular cavity size. Wall thickness   is normal. Systolic function is normal.    Aortic Valve: The aortic valve is a trileaflet valve. There is mild   aortic valve sclerosis.    Mitral Valve: There is mild regurgitation with a centrally directed   jet.    Tricuspid Valve: There is mild regurgitation with a centrally directed   jet.    Pulmonary Artery: There is borderline elevated pulmonary hypertension.   The estimated pulmonary  artery systolic pressure is 36 mmHg.    IVC/SVC: Normal venous pressure at 3 mmHg.        Significant Imaging: I have reviewed all pertinent imaging results/findings within the past 24 hours.    Assessment/Plan:      * Cecal volvulus  General surgery consulted: S/P exploratory lap with right hemicolectomy on 07/23/2024  Advanced to soft, bland diet  Continue current pain regimen    Small bowel obstruction  Due to cecal volvulus.  See cecal volvulus a/P    Acute urinary retention  Likely multifactorial:  Due to anesthesia and underlying UTI  Completed course of antibiotics for UTI  Start Flomax  Remove Cabrera in a.m. for void trial      Atrial fibrillation with RVR  Patient with Paroxysmal (<7 days) atrial fibrillation which is better controlled currently.  Not on beta blocker are antiarrhythmic. Patient is currently in sinus rhythm.[id number] Score: 4. HASBLED Score: 2.     Cardiology consulted and evaluated:   Received IV digoxin x1 7/28.  Holding off of aggressive anticoagulation at this time due to bleed risk.    UTI (urinary tract infection)  Resolved.  Urine culture  grew out highly sensitive E coli.  Completed course of antibiotics    Chronic systolic (congestive) heart failure  Patient is identified as having Systolic (HFrEF) heart failure that is Chronic. CHF is currently controlled. Latest ECHO performed and demonstrates- Results for orders placed during the hospital encounter of 07/23/24    Echo    Interpretation Summary    Left Ventricle: The left ventricle is normal in size. Mildly increased wall thickness. There is mild concentric hypertrophy. Normal wall motion. Septal motion is consistent with bundle branch block. There is normal systolic function with a visually estimated ejection fraction of 65 - 70%. There is diastolic dysfunction.    Right Ventricle: Normal right ventricular cavity size. Wall thickness is normal. Systolic function is normal.    Aortic Valve: The aortic valve is a trileaflet valve.  There is mild aortic valve sclerosis.    Mitral Valve: There is mild regurgitation with a centrally directed jet.    Tricuspid Valve: There is mild regurgitation with a centrally directed jet.    Pulmonary Artery: There is borderline elevated pulmonary hypertension. The estimated pulmonary artery systolic pressure is 36 mmHg.    IVC/SVC: Normal venous pressure at 3 mmHg.  . Continue Furosemide Nitrate/Vasodilator and monitor clinical status closely. Monitor on telemetry. Patient is off CHF pathway.  Monitor strict Is&Os and daily weights.  Place on fluid restriction of 1.5 L. Cardiology is consulted. Continue to stress to patient importance of self efficacy and  on diet for CHF. Last BNP reviewed- and noted below   Recent Labs   Lab 07/28/24  0430   *   .  Stable.  Was given IV Lasix x1 for volume overload  7/28 and continued on oral Lasix daily      Coronary artery disease of native artery of native heart with stable angina pectoris  Stable.  Holding aspirin due to recent surgery.  Not on statin.    Essential hypertension  Chronic, controlled. Latest blood pressure and vitals reviewed-     Temp:  [96.1 °F (35.6 °C)-98.1 °F (36.7 °C)]   Pulse:  []   Resp:  [18-20]   BP: (114-140)/(65-73)   SpO2:  [90 %-95 %] .   Home meds for hypertension were reviewed and noted below.   Hypertension Medications               furosemide (LASIX) 20 MG tablet TAKE 1 TABLET BY MOUTH EVERY DAY    isosorbide mononitrate (IMDUR) 30 MG 24 hr tablet TAKE 1 TABLET BY MOUTH EVERY DAY    nitroGLYCERIN (NITROSTAT) 0.4 MG SL tablet Place 1 tablet (0.4 mg total) under the tongue every 5 (five) minutes as needed for Chest pain.    olmesartan (BENICAR) 5 MG Tab Take 1 tablet (5 mg total) by mouth once daily.            While in the hospital, will manage blood pressure as follows; Continue home antihypertensive regimen    Will utilize p.r.n. blood pressure medication only if patient's blood pressure greater than 180/110 and she  develops symptoms such as worsening chest pain or shortness of breath.      Hypothyroid  Patient has chronic hypothyroidism. TFTs reviewed-   Lab Results   Component Value Date    TSH 1.935 07/28/2024   . Will continue chronic levothyroxine and adjust for and clinical changes.        VTE Risk Mitigation (From admission, onward)           Ordered     enoxaparin injection 40 mg  Every 24 hours         07/27/24 1444     IP VTE HIGH RISK PATIENT  Once         07/23/24 1733     Place sequential compression device  Until discontinued         07/23/24 1733                    Discharge Planning   BOGDAN: 8/1/2024     Code Status: Full Code   Is the patient medically ready for discharge?:     Reason for patient still in hospital (select all that apply): Patient new problem, Treatment, and Pending disposition  Discharge Plan A: Skilled Nursing Facility                  Naomi Feldman NP  Department of Hospital Medicine   [facility]

## 2024-07-31 PROBLEM — R53.81 DEBILITY: Status: ACTIVE | Noted: 2020-03-16

## 2024-07-31 LAB
ALBUMIN SERPL BCP-MCNC: 2.8 G/DL (ref 3.5–5.2)
ALP SERPL-CCNC: 42 U/L (ref 55–135)
ALT SERPL W/O P-5'-P-CCNC: 10 U/L (ref 10–44)
ANION GAP SERPL CALC-SCNC: 8 MMOL/L (ref 8–16)
AST SERPL-CCNC: 18 U/L (ref 10–40)
BASOPHILS # BLD AUTO: 0.06 K/UL (ref 0–0.2)
BASOPHILS NFR BLD: 0.7 % (ref 0–1.9)
BILIRUB SERPL-MCNC: 0.3 MG/DL (ref 0.1–1)
BUN SERPL-MCNC: 15 MG/DL (ref 10–30)
CALCIUM SERPL-MCNC: 9.1 MG/DL (ref 8.7–10.5)
CHLORIDE SERPL-SCNC: 105 MMOL/L (ref 95–110)
CO2 SERPL-SCNC: 29 MMOL/L (ref 23–29)
CREAT SERPL-MCNC: 0.6 MG/DL (ref 0.5–1.4)
CRP SERPL-MCNC: 1.8 MG/DL
DIFFERENTIAL METHOD BLD: ABNORMAL
EOSINOPHIL # BLD AUTO: 0.6 K/UL (ref 0–0.5)
EOSINOPHIL NFR BLD: 6.6 % (ref 0–8)
ERYTHROCYTE [DISTWIDTH] IN BLOOD BY AUTOMATED COUNT: 13.2 % (ref 11.5–14.5)
EST. GFR  (NO RACE VARIABLE): >60 ML/MIN/1.73 M^2
GLUCOSE SERPL-MCNC: 124 MG/DL (ref 70–110)
HCT VFR BLD AUTO: 33.3 % (ref 37–48.5)
HGB BLD-MCNC: 10.7 G/DL (ref 12–16)
IMM GRANULOCYTES # BLD AUTO: 0.15 K/UL (ref 0–0.04)
IMM GRANULOCYTES NFR BLD AUTO: 1.8 % (ref 0–0.5)
LYMPHOCYTES # BLD AUTO: 1.7 K/UL (ref 1–4.8)
LYMPHOCYTES NFR BLD: 20.4 % (ref 18–48)
MAGNESIUM SERPL-MCNC: 2.5 MG/DL (ref 1.6–2.6)
MCH RBC QN AUTO: 29.8 PG (ref 27–31)
MCHC RBC AUTO-ENTMCNC: 32.1 G/DL (ref 32–36)
MCV RBC AUTO: 93 FL (ref 82–98)
MONOCYTES # BLD AUTO: 0.8 K/UL (ref 0.3–1)
MONOCYTES NFR BLD: 9.7 % (ref 4–15)
NEUTROPHILS # BLD AUTO: 5.2 K/UL (ref 1.8–7.7)
NEUTROPHILS NFR BLD: 60.8 % (ref 38–73)
NRBC BLD-RTO: 0 /100 WBC
PLATELET # BLD AUTO: 348 K/UL (ref 150–450)
PMV BLD AUTO: 10.9 FL (ref 9.2–12.9)
POTASSIUM SERPL-SCNC: 3.1 MMOL/L (ref 3.5–5.1)
PROT SERPL-MCNC: 5.7 G/DL (ref 6–8.4)
RBC # BLD AUTO: 3.59 M/UL (ref 4–5.4)
SODIUM SERPL-SCNC: 142 MMOL/L (ref 136–145)
WBC # BLD AUTO: 8.53 K/UL (ref 3.9–12.7)

## 2024-07-31 PROCEDURE — 25000003 PHARM REV CODE 250: Performed by: INTERNAL MEDICINE

## 2024-07-31 PROCEDURE — 93005 ELECTROCARDIOGRAM TRACING: CPT | Performed by: INTERNAL MEDICINE

## 2024-07-31 PROCEDURE — 63600175 PHARM REV CODE 636 W HCPCS: Performed by: INTERNAL MEDICINE

## 2024-07-31 PROCEDURE — 99900035 HC TECH TIME PER 15 MIN (STAT)

## 2024-07-31 PROCEDURE — 97535 SELF CARE MNGMENT TRAINING: CPT

## 2024-07-31 PROCEDURE — 36415 COLL VENOUS BLD VENIPUNCTURE: CPT | Performed by: INTERNAL MEDICINE

## 2024-07-31 PROCEDURE — 99900031 HC PATIENT EDUCATION (STAT)

## 2024-07-31 PROCEDURE — 93010 ELECTROCARDIOGRAM REPORT: CPT | Mod: ,,, | Performed by: INTERNAL MEDICINE

## 2024-07-31 PROCEDURE — 85025 COMPLETE CBC W/AUTO DIFF WBC: CPT | Performed by: INTERNAL MEDICINE

## 2024-07-31 PROCEDURE — 97530 THERAPEUTIC ACTIVITIES: CPT | Mod: CQ

## 2024-07-31 PROCEDURE — 63600175 PHARM REV CODE 636 W HCPCS: Performed by: FAMILY MEDICINE

## 2024-07-31 PROCEDURE — 21400001 HC TELEMETRY ROOM

## 2024-07-31 PROCEDURE — 25000003 PHARM REV CODE 250: Performed by: NURSE PRACTITIONER

## 2024-07-31 PROCEDURE — 86140 C-REACTIVE PROTEIN: CPT | Performed by: STUDENT IN AN ORGANIZED HEALTH CARE EDUCATION/TRAINING PROGRAM

## 2024-07-31 PROCEDURE — 94761 N-INVAS EAR/PLS OXIMETRY MLT: CPT

## 2024-07-31 PROCEDURE — 80053 COMPREHEN METABOLIC PANEL: CPT | Performed by: INTERNAL MEDICINE

## 2024-07-31 PROCEDURE — 83735 ASSAY OF MAGNESIUM: CPT | Performed by: INTERNAL MEDICINE

## 2024-07-31 PROCEDURE — 30200315 PPD INTRADERMAL TEST REV CODE 302: Performed by: FAMILY MEDICINE

## 2024-07-31 PROCEDURE — 25000003 PHARM REV CODE 250: Performed by: FAMILY MEDICINE

## 2024-07-31 PROCEDURE — 86580 TB INTRADERMAL TEST: CPT | Performed by: FAMILY MEDICINE

## 2024-07-31 RX ADMIN — POTASSIUM BICARBONATE 35 MEQ: 391 TABLET, EFFERVESCENT ORAL at 05:07

## 2024-07-31 RX ADMIN — PANTOPRAZOLE SODIUM 40 MG: 40 INJECTION, POWDER, FOR SOLUTION INTRAVENOUS at 09:07

## 2024-07-31 RX ADMIN — TUBERCULIN PURIFIED PROTEIN DERIVATIVE 5 UNITS: 5 INJECTION INTRADERMAL at 05:07

## 2024-07-31 RX ADMIN — ENOXAPARIN SODIUM 40 MG: 40 INJECTION SUBCUTANEOUS at 05:07

## 2024-07-31 RX ADMIN — MAGNESIUM SULFATE HEPTAHYDRATE 4 G: 40 INJECTION, SOLUTION INTRAVENOUS at 12:07

## 2024-07-31 RX ADMIN — ISOSORBIDE MONONITRATE 30 MG: 30 TABLET, EXTENDED RELEASE ORAL at 08:07

## 2024-07-31 RX ADMIN — LOSARTAN POTASSIUM 12.5 MG: 25 TABLET, FILM COATED ORAL at 08:07

## 2024-07-31 RX ADMIN — DONEPEZIL HYDROCHLORIDE 5 MG: 5 TABLET, FILM COATED ORAL at 09:07

## 2024-07-31 RX ADMIN — QUETIAPINE FUMARATE 25 MG: 25 TABLET ORAL at 08:07

## 2024-07-31 RX ADMIN — POTASSIUM BICARBONATE 35 MEQ: 391 TABLET, EFFERVESCENT ORAL at 08:07

## 2024-07-31 RX ADMIN — FUROSEMIDE 40 MG: 40 TABLET ORAL at 08:07

## 2024-07-31 RX ADMIN — TAMSULOSIN HYDROCHLORIDE 0.4 MG: 0.4 CAPSULE ORAL at 08:07

## 2024-07-31 RX ADMIN — LEVOTHYROXINE SODIUM 88 MCG: 0.09 TABLET ORAL at 05:07

## 2024-07-31 NOTE — SUBJECTIVE & OBJECTIVE
Interval History:  Lying in bed, awake and alert.  States stomach is tender. Heart rate dropped into 30s overnight while sleeping and rebounded when awakened.  Tolerating solid food.  Only ate a small amount of lunch. Cabrera catheter removed this morning and patient has voided since.  Awaiting SNF placement.    Review of Systems   Constitutional:  Positive for appetite change.   HENT:  Positive for hearing loss.    Gastrointestinal:  Positive for abdominal pain (Post-op tenderness).   Genitourinary:         Retention   Skin:         Surgical incision   Neurological:  Positive for weakness (Generalized).     Objective:     Vital Signs (Most Recent):  Temp: 97.6 °F (36.4 °C) (07/31/24 1121)  Pulse: 91 (07/31/24 1121)  Resp: 20 (07/31/24 1121)  BP: (!) 113/59 (07/31/24 1121)  SpO2: 95 % (07/31/24 1121) Vital Signs (24h Range):  Temp:  [97.3 °F (36.3 °C)-98.1 °F (36.7 °C)] 97.6 °F (36.4 °C)  Pulse:  [] 91  Resp:  [18-20] 20  SpO2:  [91 %-95 %] 95 %  BP: (113-138)/(59-70) 113/59     Weight: 84.5 kg (186 lb 4.6 oz)  Body mass index is 31.98 kg/m².    Intake/Output Summary (Last 24 hours) at 7/31/2024 1434  Last data filed at 7/31/2024 0933  Gross per 24 hour   Intake 660 ml   Output 550 ml   Net 110 ml         Physical Exam  Constitutional:       General: She is not in acute distress.  HENT:      Ears:      Comments: Hard of hearing  Neck:      Vascular: No JVD.   Cardiovascular:      Rate and Rhythm: Normal rate and regular rhythm.      Heart sounds: Murmur heard.   Pulmonary:      Effort: Pulmonary effort is normal. No respiratory distress.      Breath sounds: Normal breath sounds.   Abdominal:      General: Bowel sounds are normal. There is no distension.      Palpations: Abdomen is soft.      Tenderness: There is abdominal tenderness (Postop tenderness, mild).      Comments: Surgical dressing intact. + distention (normal for patient)   Musculoskeletal:         General: No swelling. Normal range of motion.       Cervical back: Normal range of motion and neck supple.   Skin:     General: Skin is warm and dry.      Comments: Abdominal dressing CDI.   Neurological:      General: No focal deficit present.      Mental Status: She is alert and oriented to person, place, and time.      Motor: Weakness (Generalized) present.   Psychiatric:         Mood and Affect: Mood normal.             Significant Labs: All pertinent labs within the past 24 hours have been reviewed.  CBC:   Recent Labs   Lab 07/30/24 0415 07/31/24  0329   WBC 10.17 8.53   HGB 11.3* 10.7*   HCT 35.4* 33.3*    348     CMP:   Recent Labs   Lab 07/30/24 0415 07/31/24  0329    142   K 3.5 3.1*    105   CO2 26 29   * 124*   BUN 16 15   CREATININE 0.7 0.6   CALCIUM 9.2 9.1   PROT 5.8* 5.7*   ALBUMIN 2.8* 2.8*   BILITOT 0.4 0.3   ALKPHOS 47* 42*   AST 17 18   ALT 10 10   ANIONGAP 10 8     Lab Results   Component Value Date    CRP 1.80 (H) 07/31/2024     Microbiology Results (last 7 days)       Procedure Component Value Units Date/Time    Blood culture [[id number]] Collected: 07/24/24 0005    Order Status: Completed Specimen: Blood from Peripheral, Antecubital, Right Updated: 07/29/24 0232     Blood Culture, Routine No growth after 5 days.    Urine culture [[id number]]  (Abnormal)  (Susceptibility) Collected: 07/23/24 1438    Order Status: Completed Specimen: Urine Updated: 07/25/24 0712     Urine Culture, Routine ESCHERICHIA COLI  10,000 - 49,999 cfu/ml      Narrative:      Specimen Source->Urine                Significant Imaging:  None

## 2024-07-31 NOTE — ASSESSMENT & PLAN NOTE
Patient is identified as having Systolic (HFrEF) heart failure that is Chronic. CHF is currently controlled. Latest ECHO performed and demonstrates- Results for orders placed during the hospital encounter of 07/23/24    Echo    Interpretation Summary    Left Ventricle: The left ventricle is normal in size. Mildly increased wall thickness. There is mild concentric hypertrophy. Normal wall motion. Septal motion is consistent with bundle branch block. There is normal systolic function with a visually estimated ejection fraction of 65 - 70%. There is diastolic dysfunction.    Right Ventricle: Normal right ventricular cavity size. Wall thickness is normal. Systolic function is normal.    Aortic Valve: The aortic valve is a trileaflet valve. There is mild aortic valve sclerosis.    Mitral Valve: There is mild regurgitation with a centrally directed jet.    Tricuspid Valve: There is mild regurgitation with a centrally directed jet.    Pulmonary Artery: There is borderline elevated pulmonary hypertension. The estimated pulmonary artery systolic pressure is 36 mmHg.    IVC/SVC: Normal venous pressure at 3 mmHg.  . Continue Furosemide Nitrate/Vasodilator and monitor clinical status closely. Monitor on telemetry. Patient is off CHF pathway.  Monitor strict Is&Os and daily weights.  Place on fluid restriction of 1.5 L. Cardiology is consulted. Continue to stress to patient importance of self efficacy and  on diet for CHF. Last BNP reviewed- and noted below   Recent Labs   Lab 07/28/24  0430   *     .  Stable.  Was given IV Lasix x1 for volume overload  7/28 and continued on oral Lasix daily

## 2024-07-31 NOTE — PLAN OF CARE
Pt up to bedside commode X2. AAOx2 and able to re-oriented for a short period of time. Pt is asking for her son.    Problem: Adult Inpatient Plan of Care  Goal: Plan of Care Review  Outcome: Progressing  Goal: Optimal Comfort and Wellbeing  Outcome: Progressing     Problem: Fall Injury Risk  Goal: Absence of Fall and Fall-Related Injury  Outcome: Progressing     Problem: Wound  Goal: Skin Health and Integrity  Outcome: Progressing     Problem: Skin Injury Risk Increased  Goal: Skin Health and Integrity  Outcome: Progressing

## 2024-07-31 NOTE — NURSING
Per monitor room HR has dropped down to low 30s and monitor showing what appears to be a second degree heart block. Pt HR came back up and sustaining NSR 60-70. EKG obtained. Dr Crain notified. Will monitor pt.

## 2024-07-31 NOTE — PT/OT/SLP PROGRESS
Occupational Therapy   Treatment    Name: Glenda Gimenez  MRN: [id number]  Admitting Diagnosis:  Cecal volvulus  8 Days Post-Op    Recommendations:     Discharge Recommendations: Moderate Intensity Therapy  Discharge Equipment Recommendations:  none  Barriers to discharge:  Decreased caregiver support    Assessment:     Glenda Gimenez is a 89 y.o. female with a medical diagnosis of Cecal volvulus.   Performance deficits affecting function are weakness, impaired endurance, impaired self care skills, impaired functional mobility, gait instability, impaired balance, decreased safety awareness, pain.     Rehab Prognosis:  Fair; patient would benefit from acute skilled OT services to address these deficits and reach maximum level of function.       Plan:     Patient to be seen 5 x/week to address the above listed problems via self-care/home management, therapeutic activities, therapeutic exercises  Plan of Care Expires: 08/26/24  Plan of Care Reviewed with: patient    Subjective     Pain/Comfort:  Pain Rating 1:  (not rated)  Pain Rating Post-Intervention 1:  (not rated)    Objective:     Communicated with: nurse prior to session.  Patient found supine with telemetry upon OT entry to room.    General Precautions: Standard, fall    Orthopedic Precautions:N/A  Braces: N/A  Respiratory Status: Room air     Occupational Performance:     Bed Mobility:    Patient completed Supine to Sit with moderate assistance     Functional Mobility/Transfers:  Patient completed Sit <> Stand Transfer with minimum assistance  with  rolling walker   Patient completed Toilet Transfer Stand Pivot technique with minimum assistance with  rolling walker and bedside commode; needed assist for RW management and safety cues  After completing toileting, pt completed a stand pivot transfer from the bedside commode to the bedside chair with Min A using RW; pt then stood from the chair and walked ~10ft Min A with RW    Activities of Daily  Living:  Toileting: total assistance for hygiene/clothing management; completed on bedside commode; (+) BM    Treatment & Education:  Therapist provided facilitation and instruction of proper body mechanics and fall prevention strategies during tasks listed above.   Instructed patient to sit in bedside chair as tolerated daily to increase OOB/activity tolerance.   Instructed patient to use call light to have nursing staff assist with needs/transfers.   Discussed OT POC and answered all questions within OT scope of practice.    Patient left up in chair with all lines intact, call button in reach, and chair alarm on    GOALS:   Multidisciplinary Problems       Occupational Therapy Goals          Problem: Occupational Therapy    Goal Priority Disciplines Outcome Interventions   Occupational Therapy Goal     OT, PT/OT Progressing    Description: Goals to be met by: 8/26/24     Patient will increase functional independence with ADLs by performing:    UE Dressing with Modified [place] while seated.  LE Dressing with Minimal Assistance while seated.  Grooming while seated with Set-up Assistance.  Toileting from toilet with Minimal Assistance for hygiene and clothing management.   Toilet transfer to toilet with Supervision using RW.                         Time Tracking:     OT Date of Treatment: 07/31/24  OT Start Time: 1038  OT Stop Time: 1054  OT Total Time (min): 16 min    Billable Minutes:Self Care/Home Management 08  Therapeutic Activity 08    OT/TREVOR: OT          7/31/2024

## 2024-07-31 NOTE — ASSESSMENT & PLAN NOTE
Patient with Paroxysmal (<7 days) atrial fibrillation which is better controlled currently.  Not on beta blocker are antiarrhythmic. Patient is currently in sinus rhythm.[id number] Score: 4. HASBLED Score: 2.     Cardiology consulted and evaluated:   Received IV digoxin x1 7/28.  Holding off of aggressive anticoagulation at this time due to bleed risk.

## 2024-07-31 NOTE — ASSESSMENT & PLAN NOTE
Patient with Acute debility due to age-related physical debility and surgery, hospitalization . Plan includes progressive mobility protocol initated, PT/OT consulted, fall precautions in place, and awaiting SNF placement .

## 2024-07-31 NOTE — CARE UPDATE
07/30/24 1940   Patient Assessment/Suction   Level of Consciousness (AVPU) alert   Respiratory Effort Normal;Unlabored   Expansion/Accessory Muscles/Retractions no use of accessory muscles   PRE-TX-O2   Device (Oxygen Therapy) room air   SpO2 (!) 93 %   Pulse Oximetry Type Intermittent   $ Pulse Oximetry - Multiple Charge Pulse Oximetry - Multiple   Pulse 94   Resp 20   Positioning HOB elevated 30 degrees   Positioning   Head of Bed (HOB) Positioning HOB elevated   Positioning/Transfer Devices pillows;in use   Aerosol Therapy   $ Aerosol Therapy Charges PRN treatment not required   Education   $ Education Bronchodilator;15 min

## 2024-07-31 NOTE — PLAN OF CARE
1:10pm - SW called pt's son, Shola and informed him of accepting facilities, as of today.  Son preferred SNF is [facility].      1:12pm - Called [facility], [phone], spoke to Ann pt accepted and will submit for auth, today.     07/31/24 1309   Post-Acute Status   Post-Acute Authorization Placement   Post-Acute Placement Status Pending payor review/awaiting authorization (if required)   Discharge Plan   Discharge Plan A Skilled Nursing Facility   Discharge Plan B Skilled Nursing Facility

## 2024-07-31 NOTE — PROGRESS NOTES
[facility] Medicine  Progress Note    Patient Name: Glenda Gimenez  MRN: [id number]  Patient Class: IP- Inpatient   Admission Date: 7/23/2024  Length of Stay: 8 days  Attending Physician: Arsen Funk DO  Primary Care Provider: William Olivia MD        Subjective:     Principal Problem:Cecal volvulus        HPI:  89 year old pt getting admitted with cecal volvulus/SBO/bowel ischemia  Pt has been suffering from abdominal pain for past 1 week  Also she lost her appetite   Finally family took the pt to PCP and pt had KUB which were concerning  She was referred to ER and had CT scan done  Surgeon was called and pt will have emergent OR visist soon  History mainly from family because pt has dementia    Overview/Hospital Course:  89 year old pt getting admitted with cecal volvulus/SBO/bowel ischemia  Surgeon reviewed and later exploratory laparotomy was done and Right hemicolectomy. Ileocolic anastomosis  was done .  Patient was doing well postop and later surgeon discontinued the NG tube and started some liquid diet, but poor po intake. Will attempt to advance and keep gentle fluids until intake improves. UA did not show infection. PVR was 400, straight cath x1 and second PVR was 100.  As slightly increased fluids due to low urine output but patient became fluid overloaded at night and fluids had to be stopped him was given Lasix.  This a.m. went into AFib with RVR.  Reportedly she does have a history of AFib but is no longer on beta-blocker or anticoagulation possibly due to age and stability.  Attempted to give diltiazem but not able to give on this floor so patient was given digoxin 500 mcg IV, cardiology consulted, transferred to telemetry floor.    Interval History:  Lying in bed, awake and alert.  States stomach is tender. Heart rate dropped into 30s overnight while sleeping and rebounded when awakened.  Tolerating solid food.  Only ate a small amount of lunch. Caberra catheter removed  this morning and patient has voided since.  Awaiting SNF placement.    Review of Systems   Constitutional:  Positive for appetite change.   HENT:  Positive for hearing loss.    Gastrointestinal:  Positive for abdominal pain (Post-op tenderness).   Genitourinary:         Retention   Skin:         Surgical incision   Neurological:  Positive for weakness (Generalized).     Objective:     Vital Signs (Most Recent):  Temp: 97.6 °F (36.4 °C) (07/31/24 1121)  Pulse: 91 (07/31/24 1121)  Resp: 20 (07/31/24 1121)  BP: (!) 113/59 (07/31/24 1121)  SpO2: 95 % (07/31/24 1121) Vital Signs (24h Range):  Temp:  [97.3 °F (36.3 °C)-98.1 °F (36.7 °C)] 97.6 °F (36.4 °C)  Pulse:  [] 91  Resp:  [18-20] 20  SpO2:  [91 %-95 %] 95 %  BP: (113-138)/(59-70) 113/59     Weight: 84.5 kg (186 lb 4.6 oz)  Body mass index is 31.98 kg/m².    Intake/Output Summary (Last 24 hours) at 7/31/2024 1434  Last data filed at 7/31/2024 0933  Gross per 24 hour   Intake 660 ml   Output 550 ml   Net 110 ml         Physical Exam  Constitutional:       General: She is not in acute distress.  HENT:      Ears:      Comments: Hard of hearing  Neck:      Vascular: No JVD.   Cardiovascular:      Rate and Rhythm: Normal rate and regular rhythm.      Heart sounds: Murmur heard.   Pulmonary:      Effort: Pulmonary effort is normal. No respiratory distress.      Breath sounds: Normal breath sounds.   Abdominal:      General: Bowel sounds are normal. There is no distension.      Palpations: Abdomen is soft.      Tenderness: There is abdominal tenderness (Postop tenderness, mild).      Comments: Surgical dressing intact. + distention (normal for patient)   Musculoskeletal:         General: No swelling. Normal range of motion.      Cervical back: Normal range of motion and neck supple.   Skin:     General: Skin is warm and dry.      Comments: Abdominal dressing CDI.   Neurological:      General: No focal deficit present.      Mental Status: She is alert and oriented to  person, place, and time.      Motor: Weakness (Generalized) present.   Psychiatric:         Mood and Affect: Mood normal.             Significant Labs: All pertinent labs within the past 24 hours have been reviewed.  CBC:   Recent Labs   Lab 07/30/24 0415 07/31/24 0329   WBC 10.17 8.53   HGB 11.3* 10.7*   HCT 35.4* 33.3*    348     CMP:   Recent Labs   Lab 07/30/24 0415 07/31/24 0329    142   K 3.5 3.1*    105   CO2 26 29   * 124*   BUN 16 15   CREATININE 0.7 0.6   CALCIUM 9.2 9.1   PROT 5.8* 5.7*   ALBUMIN 2.8* 2.8*   BILITOT 0.4 0.3   ALKPHOS 47* 42*   AST 17 18   ALT 10 10   ANIONGAP 10 8     Lab Results   Component Value Date    CRP 1.80 (H) 07/31/2024     Microbiology Results (last 7 days)       Procedure Component Value Units Date/Time    Blood culture [[id number]] Collected: 07/24/24 0005    Order Status: Completed Specimen: Blood from Peripheral, Antecubital, Right Updated: 07/29/24 0232     Blood Culture, Routine No growth after 5 days.    Urine culture [[id number]]  (Abnormal)  (Susceptibility) Collected: 07/23/24 1438    Order Status: Completed Specimen: Urine Updated: 07/25/24 0712     Urine Culture, Routine ESCHERICHIA COLI  10,000 - 49,999 cfu/ml      Narrative:      Specimen Source->Urine                Significant Imaging:  None    Assessment/Plan:      * Cecal volvulus  General surgery consulted: S/P exploratory lap with right hemicolectomy on 07/23/2024  Continue soft, bland diet  Continue current pain regimen    Small bowel obstruction  Due to cecal volvulus.  See cecal volvulus a/P    Acute urinary retention  Likely multifactorial:  Due to anesthesia and underlying UTI  Completed course of antibiotics for UTI  Cabrera catheter removed this morning for void trial  Continue Flomax    Atrial fibrillation with RVR  Patient with Paroxysmal (<7 days) atrial fibrillation which is better controlled currently.  Not on beta blocker are antiarrhythmic. Patient is currently in  sinus rhythm.[id number] Score: 4. HASBLED Score: 2.     Cardiology consulted and evaluated:   Received IV digoxin x1 7/28.  Holding off of aggressive anticoagulation at this time due to bleed risk.    UTI (urinary tract infection)  Resolved.  Urine culture  grew out highly sensitive E coli.  Completed course of antibiotics    Acute on  Chronic systolic (congestive) heart failure  Patient is identified as having Systolic (HFrEF) heart failure that is Chronic. CHF is currently controlled. Latest ECHO performed and demonstrates- Results for orders placed during the hospital encounter of 07/23/24    Echo    Interpretation Summary    Left Ventricle: The left ventricle is normal in size. Mildly increased wall thickness. There is mild concentric hypertrophy. Normal wall motion. Septal motion is consistent with bundle branch block. There is normal systolic function with a visually estimated ejection fraction of 65 - 70%. There is diastolic dysfunction.    Right Ventricle: Normal right ventricular cavity size. Wall thickness is normal. Systolic function is normal.    Aortic Valve: The aortic valve is a trileaflet valve. There is mild aortic valve sclerosis.    Mitral Valve: There is mild regurgitation with a centrally directed jet.    Tricuspid Valve: There is mild regurgitation with a centrally directed jet.    Pulmonary Artery: There is borderline elevated pulmonary hypertension. The estimated pulmonary artery systolic pressure is 36 mmHg.    IVC/SVC: Normal venous pressure at 3 mmHg.  . Continue Furosemide Nitrate/Vasodilator and monitor clinical status closely. Monitor on telemetry. Patient is off CHF pathway.  Monitor strict Is&Os and daily weights.  Place on fluid restriction of 1.5 L. Cardiology is consulted. Continue to stress to patient importance of self efficacy and  on diet for CHF. Last BNP reviewed- and noted below   Recent Labs   Lab 07/28/24  0430   *     .  Stable.  Was given IV Lasix x1 for  volume overload  7/28 and continued on oral Lasix daily      Coronary artery disease of native artery of native heart with stable angina pectoris  Stable.  Holding aspirin due to recent surgery.  Not on statin.    Debility  Patient with Acute debility due to age-related physical debility and surgery, hospitalization . Plan includes progressive mobility protocol initated, PT/OT consulted, fall precautions in place, and awaiting SNF placement .    Essential hypertension  Chronic, controlled. Latest blood pressure and vitals reviewed-     Temp:  [97.3 °F (36.3 °C)-98.1 °F (36.7 °C)]   Pulse:  []   Resp:  [18-20]   BP: (113-138)/(59-70)   SpO2:  [91 %-95 %] .   Home meds for hypertension were reviewed and noted below.   Hypertension Medications               furosemide (LASIX) 20 MG tablet TAKE 1 TABLET BY MOUTH EVERY DAY    isosorbide mononitrate (IMDUR) 30 MG 24 hr tablet TAKE 1 TABLET BY MOUTH EVERY DAY    nitroGLYCERIN (NITROSTAT) 0.4 MG SL tablet Place 1 tablet (0.4 mg total) under the tongue every 5 (five) minutes as needed for Chest pain.    olmesartan (BENICAR) 5 MG Tab Take 1 tablet (5 mg total) by mouth once daily.            While in the hospital, will manage blood pressure as follows; Continue home antihypertensive regimen    Will utilize p.r.n. blood pressure medication only if patient's blood pressure greater than 180/110 and she develops symptoms such as worsening chest pain or shortness of breath.    Hypokalemia  Patient's most recent potassium results are listed below.   Recent Labs     07/29/24  0443 07/30/24  0415 07/31/24  0329   K 4.0 3.5 3.1*     Plan  - Replete potassium per protocol  - Monitor potassium Daily  - Patient's hypokalemia is  acute    Hypothyroid  Patient has chronic hypothyroidism. TFTs reviewed-   Lab Results   Component Value Date    TSH 1.935 07/28/2024   . Will continue chronic levothyroxine and adjust for and clinical changes.      VTE Risk Mitigation (From admission,  onward)           Ordered     enoxaparin injection 40 mg  Every 24 hours         07/27/24 1444     IP VTE HIGH RISK PATIENT  Once         07/23/24 1733     Place sequential compression device  Until discontinued         07/23/24 1733                    Discharge Planning   BOGDAN: 8/2/2024     Code Status: Full Code   Is the patient medically ready for discharge?:     Reason for patient still in hospital (select all that apply): Patient trending condition, Treatment, and Pending disposition  Discharge Plan A: Skilled Nursing Facility                  Naomi Feldman NP  Department of Hospital Medicine   [facility]

## 2024-07-31 NOTE — ASSESSMENT & PLAN NOTE
General surgery consulted: S/P exploratory lap with right hemicolectomy on 07/23/2024  Continue soft, bland diet  Continue current pain regimen

## 2024-07-31 NOTE — PT/OT/SLP PROGRESS
Physical Therapy Treatment    Patient Name:  Glenda Gimenez   MRN:  [id number]    Recommendations:     Discharge Recommendations: Moderate Intensity Therapy (Moderate Intensity vs Low Intensity with close supervision)  Discharge Equipment Recommendations: none  Barriers to discharge:  decreased mobility from baseline    Assessment:     Glenda Gimenze is a 89 y.o. female admitted with a medical diagnosis of Cecal volvulus.  She presents with the following impairments/functional limitations: weakness, impaired endurance, impaired functional mobility, gait instability, impaired cardiopulmonary response to activity, impaired skin.    First attempt  - working with OT.    Second attempt - pt up in chair x several hours and requesting assist to return to bed. Ambulated 4' with RW before requesting return to sit due to fatigue. Returned to supine with modA.     Improved alertness, mood, and affect from prior days. Remains limited by fatigue. Good overall participation and tolerance to session.     Rehab Prognosis: Good; patient would benefit from acute skilled PT services to address these deficits and reach maximum level of function.    Recent Surgery: Procedure(s) (LRB):  LAPAROTOMY, EXPLORATORY (N/A)  HEMICOLECTOMY, RIGHT (Right)  COLECTOMY, WITH ILEUM TO COLON ANASTOMOSIS 8 Days Post-Op    Plan:     During this hospitalization, patient to be seen daily to address the identified rehab impairments via gait training, therapeutic activities, therapeutic exercises and progress toward the following goals:    Plan of Care Expires:  08/25/24    Subjective     Chief Complaint: fatigue from up in chair so long  Patient/Family Comments/goals: get back to bed  Pain/Comfort:  Pain Rating 1: 0/10  Pain Rating Post-Intervention 1: 0/10      Objective:     Communicated with nurse prior to session.  Patient found up in chair with chair check, telemetry, peripheral IV upon PT entry to room.     General Precautions: Standard,  fall  Orthopedic Precautions: N/A  Braces: N/A  Respiratory Status: Room air     Functional Mobility:  Bed Mobility:     Supine scooting to HOB: modAx2 and VC for BLE bridging  Sit to Supine: moderate assistance  Transfers:     Sit to Stand:  minimum assistance with rolling walker and VC for push from armrest  Chair to Bed: minimum assistance with  rolling walker  using  Step Transfer  Gait: 4' RW Faye (pt instructed to walk around foot of bed to other side, but became fatigued at 4' and requested to sit)      AM-PAC 6 CLICK MOBILITY          Treatment & Education:  -Pt educ: benefits of participation with therapy, OOB to chair during the day and for all meals as tolerated, fall prevention, call light  -Bed mobility training     Patient left HOB elevated with all lines intact, call button in reach, bed alarm on, nurse notified.    GOALS:   Multidisciplinary Problems       Physical Therapy Goals          Problem: Physical Therapy    Goal Priority Disciplines Outcome Goal Variances Interventions   Physical Therapy Goal     PT, PT/OT Progressing     Description: Goals to be met by: 24     Patient will increase functional independence with mobility by performin. Supine to sit with Supervision  2. Sit to stand transfer with Supervision  3. Bed to chair transfer with Supervision using Rolling Walker  4. Gait  x 150 feet with Supervision using Rolling Walker.                              Time Tracking:     PT Received On: 24  PT Start Time: 1330     PT Stop Time: 1344  PT Total Time (min): 14 min     Billable Minutes: Therapeutic Activity 14    Treatment Type: Treatment  PT/PTA: PTA     Number of PTA visits since last PT visit: 3     2024

## 2024-07-31 NOTE — ASSESSMENT & PLAN NOTE
Patient's most recent potassium results are listed below.   Recent Labs     07/29/24  0443 07/30/24  0415 07/31/24  0329   K 4.0 3.5 3.1*     Plan  - Replete potassium per protocol  - Monitor potassium Daily  - Patient's hypokalemia is  acute

## 2024-07-31 NOTE — ASSESSMENT & PLAN NOTE
Likely multifactorial:  Due to anesthesia and underlying UTI  Completed course of antibiotics for UTI  Cabrera catheter removed this morning for void trial  Continue Flomax

## 2024-08-01 VITALS
DIASTOLIC BLOOD PRESSURE: 60 MMHG | SYSTOLIC BLOOD PRESSURE: 118 MMHG | HEIGHT: 64 IN | BODY MASS INDEX: 31.81 KG/M2 | TEMPERATURE: 98 F | WEIGHT: 186.31 LBS | RESPIRATION RATE: 20 BRPM | OXYGEN SATURATION: 91 % | HEART RATE: 96 BPM

## 2024-08-01 PROBLEM — N39.0 UTI (URINARY TRACT INFECTION): Status: RESOLVED | Noted: 2024-07-25 | Resolved: 2024-08-01

## 2024-08-01 PROBLEM — I50.23 ACUTE ON CHRONIC SYSTOLIC HEART FAILURE: Status: ACTIVE | Noted: 2024-02-20

## 2024-08-01 PROBLEM — R63.8 DECREASED ORAL INTAKE: Status: ACTIVE | Noted: 2024-08-01

## 2024-08-01 LAB
ALBUMIN SERPL BCP-MCNC: 2.7 G/DL (ref 3.5–5.2)
ALP SERPL-CCNC: 39 U/L (ref 55–135)
ALT SERPL W/O P-5'-P-CCNC: 12 U/L (ref 10–44)
ANION GAP SERPL CALC-SCNC: 6 MMOL/L (ref 8–16)
AST SERPL-CCNC: 23 U/L (ref 10–40)
BASOPHILS # BLD AUTO: 0.06 K/UL (ref 0–0.2)
BASOPHILS NFR BLD: 0.8 % (ref 0–1.9)
BILIRUB SERPL-MCNC: 0.3 MG/DL (ref 0.1–1)
BUN SERPL-MCNC: 13 MG/DL (ref 10–30)
CALCIUM SERPL-MCNC: 8.7 MG/DL (ref 8.7–10.5)
CHLORIDE SERPL-SCNC: 104 MMOL/L (ref 95–110)
CO2 SERPL-SCNC: 32 MMOL/L (ref 23–29)
CREAT SERPL-MCNC: 0.8 MG/DL (ref 0.5–1.4)
CRP SERPL-MCNC: 1.1 MG/DL
DIFFERENTIAL METHOD BLD: ABNORMAL
EOSINOPHIL # BLD AUTO: 0.4 K/UL (ref 0–0.5)
EOSINOPHIL NFR BLD: 5.5 % (ref 0–8)
ERYTHROCYTE [DISTWIDTH] IN BLOOD BY AUTOMATED COUNT: 13.2 % (ref 11.5–14.5)
EST. GFR  (NO RACE VARIABLE): >60 ML/MIN/1.73 M^2
GLUCOSE SERPL-MCNC: 106 MG/DL (ref 70–110)
HCT VFR BLD AUTO: 29.6 % (ref 37–48.5)
HGB BLD-MCNC: 9.6 G/DL (ref 12–16)
IMM GRANULOCYTES # BLD AUTO: 0.07 K/UL (ref 0–0.04)
IMM GRANULOCYTES NFR BLD AUTO: 0.9 % (ref 0–0.5)
LYMPHOCYTES # BLD AUTO: 2.2 K/UL (ref 1–4.8)
LYMPHOCYTES NFR BLD: 28.4 % (ref 18–48)
MAGNESIUM SERPL-MCNC: 2 MG/DL (ref 1.6–2.6)
MCH RBC QN AUTO: 30.1 PG (ref 27–31)
MCHC RBC AUTO-ENTMCNC: 32.4 G/DL (ref 32–36)
MCV RBC AUTO: 93 FL (ref 82–98)
MONOCYTES # BLD AUTO: 0.6 K/UL (ref 0.3–1)
MONOCYTES NFR BLD: 7.9 % (ref 4–15)
NEUTROPHILS # BLD AUTO: 4.4 K/UL (ref 1.8–7.7)
NEUTROPHILS NFR BLD: 56.5 % (ref 38–73)
NRBC BLD-RTO: 0 /100 WBC
OHS QRS DURATION: 72 MS
OHS QRS DURATION: 78 MS
OHS QTC CALCULATION: 461 MS
OHS QTC CALCULATION: 463 MS
PLATELET # BLD AUTO: 368 K/UL (ref 150–450)
PMV BLD AUTO: 10.5 FL (ref 9.2–12.9)
POTASSIUM SERPL-SCNC: 3.3 MMOL/L (ref 3.5–5.1)
PROT SERPL-MCNC: 5.5 G/DL (ref 6–8.4)
RBC # BLD AUTO: 3.19 M/UL (ref 4–5.4)
SODIUM SERPL-SCNC: 142 MMOL/L (ref 136–145)
WBC # BLD AUTO: 7.7 K/UL (ref 3.9–12.7)

## 2024-08-01 PROCEDURE — S0179 MEGESTROL 20 MG: HCPCS | Performed by: NURSE PRACTITIONER

## 2024-08-01 PROCEDURE — 86140 C-REACTIVE PROTEIN: CPT | Performed by: STUDENT IN AN ORGANIZED HEALTH CARE EDUCATION/TRAINING PROGRAM

## 2024-08-01 PROCEDURE — 63600175 PHARM REV CODE 636 W HCPCS: Performed by: FAMILY MEDICINE

## 2024-08-01 PROCEDURE — 25000003 PHARM REV CODE 250: Performed by: INTERNAL MEDICINE

## 2024-08-01 PROCEDURE — 97530 THERAPEUTIC ACTIVITIES: CPT | Mod: CQ

## 2024-08-01 PROCEDURE — 36415 COLL VENOUS BLD VENIPUNCTURE: CPT | Performed by: INTERNAL MEDICINE

## 2024-08-01 PROCEDURE — 85025 COMPLETE CBC W/AUTO DIFF WBC: CPT | Performed by: INTERNAL MEDICINE

## 2024-08-01 PROCEDURE — 80053 COMPREHEN METABOLIC PANEL: CPT | Performed by: INTERNAL MEDICINE

## 2024-08-01 PROCEDURE — 97535 SELF CARE MNGMENT TRAINING: CPT

## 2024-08-01 PROCEDURE — 25000003 PHARM REV CODE 250: Performed by: NURSE PRACTITIONER

## 2024-08-01 PROCEDURE — 63600175 PHARM REV CODE 636 W HCPCS: Performed by: INTERNAL MEDICINE

## 2024-08-01 PROCEDURE — 99900035 HC TECH TIME PER 15 MIN (STAT)

## 2024-08-01 PROCEDURE — 25000003 PHARM REV CODE 250: Performed by: FAMILY MEDICINE

## 2024-08-01 PROCEDURE — 94761 N-INVAS EAR/PLS OXIMETRY MLT: CPT

## 2024-08-01 PROCEDURE — 83735 ASSAY OF MAGNESIUM: CPT | Performed by: INTERNAL MEDICINE

## 2024-08-01 RX ORDER — ONDANSETRON 4 MG/1
4 TABLET, FILM COATED ORAL EVERY 8 HOURS PRN
Start: 2024-08-01

## 2024-08-01 RX ORDER — ISOSORBIDE MONONITRATE 30 MG/1
30 TABLET, EXTENDED RELEASE ORAL DAILY
Start: 2024-08-01

## 2024-08-01 RX ORDER — TALC
6 POWDER (GRAM) TOPICAL NIGHTLY PRN
Start: 2024-08-01

## 2024-08-01 RX ORDER — QUETIAPINE FUMARATE 25 MG/1
25 TABLET, FILM COATED ORAL DAILY
Qty: 30 TABLET | Refills: 11 | Status: SHIPPED | OUTPATIENT
Start: 2024-08-02 | End: 2025-08-02

## 2024-08-01 RX ORDER — FUROSEMIDE 40 MG/1
40 TABLET ORAL DAILY
Qty: 30 TABLET | Refills: 11 | Status: SHIPPED | OUTPATIENT
Start: 2024-08-02 | End: 2025-08-02

## 2024-08-01 RX ORDER — PANTOPRAZOLE SODIUM 40 MG/1
40 TABLET, DELAYED RELEASE ORAL DAILY
Qty: 90 TABLET | Refills: 3 | Status: SHIPPED | OUTPATIENT
Start: 2024-08-01 | End: 2025-08-01

## 2024-08-01 RX ORDER — MEGESTROL ACETATE 40 MG/ML
200 SUSPENSION ORAL DAILY
Status: DISCONTINUED | OUTPATIENT
Start: 2024-08-01 | End: 2024-08-01 | Stop reason: HOSPADM

## 2024-08-01 RX ORDER — TAMSULOSIN HYDROCHLORIDE 0.4 MG/1
0.4 CAPSULE ORAL DAILY
Qty: 30 CAPSULE | Refills: 11 | Status: SHIPPED | OUTPATIENT
Start: 2024-08-02 | End: 2025-08-02

## 2024-08-01 RX ORDER — HYDROCODONE BITARTRATE AND ACETAMINOPHEN 5; 325 MG/1; MG/1
1 TABLET ORAL EVERY 8 HOURS PRN
Qty: 21 TABLET | Refills: 0 | Status: SHIPPED | OUTPATIENT
Start: 2024-08-01

## 2024-08-01 RX ADMIN — FUROSEMIDE 40 MG: 40 TABLET ORAL at 09:08

## 2024-08-01 RX ADMIN — ISOSORBIDE MONONITRATE 30 MG: 30 TABLET, EXTENDED RELEASE ORAL at 09:08

## 2024-08-01 RX ADMIN — DONEPEZIL HYDROCHLORIDE 5 MG: 5 TABLET, FILM COATED ORAL at 08:08

## 2024-08-01 RX ADMIN — MEGESTROL ACETATE 200 MG: 400 SUSPENSION ORAL at 12:08

## 2024-08-01 RX ADMIN — ENOXAPARIN SODIUM 40 MG: 40 INJECTION SUBCUTANEOUS at 04:08

## 2024-08-01 RX ADMIN — LOSARTAN POTASSIUM 12.5 MG: 25 TABLET, FILM COATED ORAL at 09:08

## 2024-08-01 RX ADMIN — LEVOTHYROXINE SODIUM 88 MCG: 0.09 TABLET ORAL at 05:08

## 2024-08-01 RX ADMIN — TAMSULOSIN HYDROCHLORIDE 0.4 MG: 0.4 CAPSULE ORAL at 09:08

## 2024-08-01 RX ADMIN — QUETIAPINE FUMARATE 25 MG: 25 TABLET ORAL at 09:08

## 2024-08-01 RX ADMIN — POTASSIUM BICARBONATE 35 MEQ: 391 TABLET, EFFERVESCENT ORAL at 06:08

## 2024-08-01 NOTE — PLAN OF CARE
Sent snf order and other information to [facility] via [email or web address] and called Amber [phone], informed her orders sent     08/01/24 8430   Post-Acute Status   Post-Acute Authorization Placement   Discharge Plan   Discharge Plan A Skilled Nursing Facility   Discharge Plan B Skilled Nursing Facility

## 2024-08-01 NOTE — PT/OT/SLP PROGRESS
Physical Therapy Treatment    Patient Name:  Glenda Gimenez   MRN:  [id number]    Recommendations:     Discharge Recommendations: Moderate Intensity Therapy (Moderate Intensity vs Low Intensity with close supervision)  Discharge Equipment Recommendations: none  Barriers to discharge: Decreased caregiver support and SOB with exertion    Assessment:     Glenda Gimenez is a 89 y.o. female admitted with a medical diagnosis of Cecal volvulus.  She presents with the following impairments/functional limitations: weakness, impaired endurance, impaired functional mobility, gait instability, decreased safety awareness, impaired cardiopulmonary response to activity.    Pt ambulated 8' x 2 with RW, Faye, and winded with exertion, requiring seated rest btwn trials. One incident of attempting to sit prior to completion of turn, requiring modA for safe descent.     Agreed to perform standing exercises, but with final stand became abruptly dizzy and returned to sit. Completed UE and LE exercises in seated and was left with son present, and needs at hand.     Rehab Prognosis: Good; patient would benefit from acute skilled PT services to address these deficits and reach maximum level of function.    Recent Surgery: Procedure(s) (LRB):  LAPAROTOMY, EXPLORATORY (N/A)  HEMICOLECTOMY, RIGHT (Right)  COLECTOMY, WITH ILEUM TO COLON ANASTOMOSIS 9 Days Post-Op    Plan:     During this hospitalization, patient to be seen daily to address the identified rehab impairments via gait training, therapeutic activities, therapeutic exercises and progress toward the following goals:    Plan of Care Expires:  08/25/24    Subjective     Chief Complaint: dizzy on final stand  Patient/Family Comments/goals: improve endurance  Pain/Comfort:  Pain Rating 1: 0/10  Pain Rating Post-Intervention 1: 0/10      Objective:     Communicated with nurse prior to session.  Patient found up in chair with telemetry, chair check, peripheral IV upon PT entry to room.      General Precautions: Standard, fall  Orthopedic Precautions: N/A  Braces: N/A  Respiratory Status: Room air     Functional Mobility:  Transfers:     Sit to Stand:  minimum assistance with rolling walker  Gait: 8' x 2, RW ,Faye, winded; seated rest btwn trials      AM-PAC 6 CLICK MOBILITY          Treatment & Education:  -Pt educ: benefits of participation with therapy, OOB to chair during the day and for all meals a tolerated, fall prevention, call light  -Gait training with AD  -Seated exercises w/ VC/TC for proper form and pace: LAQ, heel/toe raises, isometric adductor squeezes, alternating UE pillow punches, alternating UE rotational cross-midline pillow punches all x10 reps    Patient left up in chair with all lines intact, call button in reach, chair alarm on, nurse notified, and son present..    GOALS:   Multidisciplinary Problems       Physical Therapy Goals          Problem: Physical Therapy    Goal Priority Disciplines Outcome Goal Variances Interventions   Physical Therapy Goal     PT, PT/OT Progressing     Description: Goals to be met by: 24     Patient will increase functional independence with mobility by performin. Supine to sit with Supervision  2. Sit to stand transfer with Supervision  3. Bed to chair transfer with Supervision using Rolling Walker  4. Gait  x 150 feet with Supervision using Rolling Walker.                              Time Tracking:     PT Received On: 24  PT Start Time: 1129     PT Stop Time: 1148  PT Total Time (min): 19 min     Billable Minutes: Therapeutic Activity 19    Treatment Type: Treatment  PT/PTA: PTA     Number of PTA visits since last PT visit: 4     2024

## 2024-08-01 NOTE — DISCHARGE SUMMARY
[facility]  Discharge Summary      Patient Name: Glenda Gimenez  MRN: [id number]  KAREL: [id number]  Patient Class: IP- Inpatient  Admission Date: 7/23/2024  Hospital Length of Stay: 9 days  Discharge Date and Time:  08/01/2024 11:04 AM  Attending Physician: Arsen Funk DO   Discharging Provider: Naomi Feldman NP  Primary Care Provider: William Olivia MD    Primary Care Team: Networked reference to record PCT     HPI:   89 year old pt getting admitted with cecal volvulus/SBO/bowel ischemia  Pt has been suffering from abdominal pain for past 1 week  Also she lost her appetite   Finally family took the pt to PCP and pt had KUB which were concerning  She was referred to ER and had CT scan done  Surgeon was called and pt will have emergent OR visist soon  History mainly from family because pt has dementia    Procedure(s) (LRB):  LAPAROTOMY, EXPLORATORY (N/A)  HEMICOLECTOMY, RIGHT (Right)  COLECTOMY, WITH ILEUM TO COLON ANASTOMOSIS      Hospital Course:    was admitted for  cecal volvulus/SBO/bowel ischemia.  While here, her labs and vital signs were monitored.  General surgery was consulted and she underwent exploratory lap with right hemicolectomy on 07/23.  She had an NG tube postoperatively, which was removed when she was started on liquid diet.  She did have poor oral intake and remained on IV fluid hydration until this improved.  She then developed acute HF and volume overload requiring discontinuing the IV fluids and being given IV Lasix and initiation of daily oral Lasix.  She went into AFib with RVR.  She had a noted history of atrial fib was no longer on a beta blocker anticoagulation, which was suspected to be due to her age and fragility.  She was given IV digoxin and Cardiology was consulted.  She was then transferred to the telemetry unit, and remained on telemetry monitoring.  She remained in NSR following the dose of IV digoxin.  She required placement  of Cabrera catheter due to underlying UTI and urinary retention.  She was treated with IV Cipro and then transitioned to oral Macrobid based on her urine culture, which grew out E coli.  Her diet was advanced to soft bland, which she tolerated.  She continued to have poor oral intake, and was started on a nutritional supplement.  PT/OT were consulted and followed her.  Case management was consulted for discharge planning.  Her Cabrera catheter was discontinued for void trial, and she voided without issues.  Her overall condition has improved and she has been discharged to SNF today.  She will follow up with General surgery, Cardiology, and her PCP.  She will be started on Megace to improve her oral intake.     Goals of Care Treatment Preferences:  Code Status: Full Code    Living Will: Yes     What is most important right now is to focus on spending time at home, avoiding the hospital, remaining as independent as possible, symptom/pain control, comfort and QOL .  Accordingly, we have decided that the best plan to meet the patient's goals includes continuing with treatment.         Consults:   Consults (From admission, onward)          Status Ordering Provider     Inpatient consult to   Once        Provider:  (Not yet assigned)    Acknowledged MIKEY AMAYA     Inpatient consult to Cardiology  Once        Provider:  Mattie Moulton MD    Completed JENELLE SHARPE     Inpatient consult to Registered Dietitian/Nutritionist  Once        Provider:  (Not yet assigned)    Completed RIYA SCOTT     Inpatient consult to General Surgery  Once        Provider:  Bahman Drummond MD    Completed MARCELA GUTIÉRREZ              Final Active Diagnoses:    Diagnosis Date Noted POA    PRINCIPAL PROBLEM:  Cecal volvulus [K56.2] 07/23/2024 Yes    Small bowel obstruction [K56.609] 07/23/2024 Yes    Acute urinary retention [R33.8] 07/30/2024 Yes    Atrial fibrillation with RVR [I48.91] 07/28/2024 No    Decreased oral  intake [R63.8] 08/01/2024 Yes    Acute on chronic systolic heart failure [I50.23] 02/20/2024 Yes    Coronary artery disease of native artery of native heart with stable angina pectoris [I25.118] 05/11/2021 Yes    Debility [R53.81] 03/16/2020 Yes    Essential hypertension [I10] 01/22/2018 Yes    Hypokalemia [E87.6] 05/23/2013 Yes    Hypothyroid [E03.9] 11/02/2012 Yes      Problems Resolved During this Admission:    Diagnosis Date Noted Date Resolved POA    UTI (urinary tract infection) [N39.0] 07/25/2024 08/01/2024 Yes       Discharged Condition: stable    Disposition: Skilled Nursing Facility    Follow Up:   Follow-up Information       Bahman Drummond MD Follow up in 2 week(s).    Specialty: General Surgery  Contact information:  [address] [facility] [place] [address]  [phone]               William Olivia MD Follow up in 2 week(s).    Specialty: Family Medicine  Contact information:  [address] [facility]  [place] [address]  [phone]               Mattie Moulton MD Follow up in 2 week(s).    Specialty: Cardiology  Contact information:  [address] [facility]  [address]  [place] [address]  [phone]                           Patient Instructions:      Ambulatory referral/consult to Outpatient Case Management   Referral Priority: Routine Referral Type: Consultation   Referral Reason: Specialty Services Required   Number of Visits Requested: 1     Diet Dysphagia Mechanical Soft   Order Comments: Chuckey     Activity as tolerated       Significant Diagnostic Studies: Labs: CMP   Recent Labs   Lab 07/31/24  0329 08/01/24 0447    142   K 3.1* 3.3*    104   CO2 29 32*   * 106   BUN 15 13   CREATININE 0.6 0.8   CALCIUM 9.1 8.7   PROT 5.7* 5.5*   ALBUMIN 2.8* 2.7*   BILITOT 0.3 0.3   ALKPHOS 42* 39*   AST 18 23   ALT 10 12   ANIONGAP 8 6*   , CBC   Recent Labs   Lab 07/31/24  0329 08/01/24 0447   WBC 8.53 7.70   HGB 10.7* 9.6*   HCT 33.3* 29.6*    368   , and All labs within the past 24 hours  have been reviewed  Microbiology:   Microbiology Results (last 7 days)       Procedure Component Value Units Date/Time    Blood culture [[id number]] Collected: 07/24/24 0005    Order Status: Completed Specimen: Blood from Peripheral, Antecubital, Right Updated: 07/29/24 0232     Blood Culture, Routine No growth after 5 days.            Radiology: Echo    Left Ventricle: The left ventricle is normal in size. Mildly increased   wall thickness. There is mild concentric hypertrophy. Normal wall motion.   Septal motion is consistent with bundle branch block. There is normal   systolic function with a visually estimated ejection fraction of 65 - 70%.   There is diastolic dysfunction.    Right Ventricle: Normal right ventricular cavity size. Wall thickness   is normal. Systolic function is normal.    Aortic Valve: The aortic valve is a trileaflet valve. There is mild   aortic valve sclerosis.    Mitral Valve: There is mild regurgitation with a centrally directed   jet.    Tricuspid Valve: There is mild regurgitation with a centrally directed   jet.    Pulmonary Artery: There is borderline elevated pulmonary hypertension.   The estimated pulmonary artery systolic pressure is 36 mmHg.    IVC/SVC: Normal venous pressure at 3 mmHg.        Pending Diagnostic Studies:       Procedure Component Value Units Date/Time    EKG 12-lead [[id number]] Collected: 07/31/24 0302    Order Status: Sent Lab Status: In process Updated: 08/01/24 0514     QRS Duration 88 ms      OHS QTC Calculation 448 ms     Narrative:      Test Reason : R07.9,    Vent. Rate : 082 BPM     Atrial Rate : 082 BPM     P-R Int : 226 ms          QRS Dur : 088 ms      QT Int : 384 ms       P-R-T Axes : 058 013 083 degrees     QTc Int : 448 ms    Sinus rhythm with 1st degree A-V block  Cannot rule out Anterior infarct ,age undetermined  Abnormal ECG  When compared with ECG of 28-JUL-2024 12:58,  Sinus rhythm has replaced Atrial fibrillation  Vent. rate has decreased BY   56 BPM    Referred By: AAAREFABRAN   SELF           Confirmed By:     EKG 12-lead [[id number]] Collected: 07/28/24 1258    Order Status: Sent Lab Status: In process Updated: 07/28/24 1318     QRS Duration 72 ms      OHS QTC Calculation 463 ms     Narrative:      Test Reason : R07.9,    Vent. Rate : 138 BPM     Atrial Rate : 000 BPM     P-R Int : 000 ms          QRS Dur : 072 ms      QT Int : 306 ms       P-R-T Axes : 000 -10 059 degrees     QTc Int : 463 ms    Atrial fibrillation with rapid ventricular response with premature  ventricular or aberrantly conducted complexes  Possible Inferior infarct ,age undetermined  Abnormal ECG  When compared with ECG of 28-JUL-2024 10:11,  No significant change was found    Referred By: AAAREFABRAN   SELF           Confirmed By:     EKG 12-lead [[id number]] Collected: 07/28/24 1011    Order Status: Sent Lab Status: In process Updated: 07/28/24 1132     QRS Duration 78 ms      OHS QTC Calculation 461 ms     Narrative:      Test Reason : R07.9,    Vent. Rate : 122 BPM     Atrial Rate : 000 BPM     P-R Int : 000 ms          QRS Dur : 078 ms      QT Int : 324 ms       P-R-T Axes : 000 -03 073 degrees     QTc Int : 461 ms    Atrial fibrillation with rapid ventricular response with premature  ventricular or aberrantly conducted complexes  Nonspecific ST and T wave abnormality  Abnormal ECG  When compared with ECG of 23-JUL-2024 17:11,  Atrial fibrillation has replaced Sinus rhythm  Vent. rate has increased BY  42 BPM  Nonspecific T wave abnormality no longer evident in Inferior leads    Referred By: TING   SELF           Confirmed By:            Medications:  Reconciled Home Medications:      Medication List        START taking these medications      HYDROcodone-acetaminophen 5-325 mg per tablet  Commonly known as: NORCO  Take 1 tablet by mouth every 8 (eight) hours as needed for Pain.     melatonin 3 mg tablet  Commonly known as: MELATIN  Take 2 tablets (6 mg total) by mouth nightly  as needed for Insomnia.     pantoprazole 40 MG tablet  Commonly known as: PROTONIX  Take 1 tablet (40 mg total) by mouth once daily.     tamsulosin 0.4 mg Cap  Commonly known as: FLOMAX  Take 1 capsule (0.4 mg total) by mouth once daily.  Start taking on: August 2, 2024            CHANGE how you take these medications      donepeziL 5 MG tablet  Commonly known as: ARICEPT  TAKE 1 TABLET BY MOUTH EVERY EVENING  What changed:   how much to take  how to take this  when to take this     furosemide 40 MG tablet  Commonly known as: LASIX  Take 1 tablet (40 mg total) by mouth once daily.  Start taking on: August 2, 2024  What changed:   medication strength  how much to take  when to take this     potassium chloride SA 20 MEQ tablet  Commonly known as: K-DUR,KLOR-CON  TAKE 1 TABLET(20 MEQ) BY MOUTH EVERY DAY  What changed:   how much to take  how to take this     QUEtiapine 25 MG Tab  Commonly known as: SEROQUEL  Take 1 tablet (25 mg total) by mouth once daily.  Start taking on: August 2, 2024  What changed:   medication strength  how much to take  when to take this            CONTINUE taking these medications      aspirin 81 MG EC tablet  Commonly known as: ECOTRIN  Take 81 mg by mouth once daily.     busPIRone 10 MG tablet  Commonly known as: BUSPAR  Take 1 tablet (10 mg total) by mouth 3 (three) times daily.     famotidine 20 MG tablet  Commonly known as: PEPCID  Take 1 tablet (20 mg total) by mouth 2 (two) times daily.     fluticasone propionate 50 mcg/actuation nasal spray  Commonly known as: FLONASE  1 spray (50 mcg total) by Each Nostril route twice a week.     ICAPS AREDS ORAL  Take 1 capsule by mouth 2 (two) times a day.     isosorbide mononitrate 30 MG 24 hr tablet  Commonly known as: IMDUR  Take 1 tablet (30 mg total) by mouth once daily.     krill oil 500 mg Cap  Take 1 capsule by mouth once daily.     levothyroxine 88 MCG tablet  Commonly known as: SYNTHROID  Take 1 tablet (88 mcg total) by mouth once daily.      nitroGLYCERIN 0.4 MG SL tablet  Commonly known as: NITROSTAT  Place 1 tablet (0.4 mg total) under the tongue every 5 (five) minutes as needed for Chest pain.     olmesartan 5 MG Tab  Commonly known as: BENICAR  Take 1 tablet (5 mg total) by mouth once daily.     ondansetron 4 MG tablet  Commonly known as: ZOFRAN  Take 1 tablet (4 mg total) by mouth every 8 (eight) hours as needed for Nausea.     sertraline 50 MG tablet  Commonly known as: ZOLOFT  Take 1 tablet (50 mg total) by mouth once daily.     SYSTANE HYDRATION (PF) 0.4-0.3 % Drop  Generic drug: peg 400-propylene glycol (PF)  Place 1 drop into both eyes once daily.     vitamin D 1000 units Tab  Commonly known as: VITAMIN D3  Take 1,000 Units by mouth once daily. Patient is taking 2000 units per day              Indwelling Lines/Drains at time of discharge:   Lines/Drains/Airways       None                   Time spent on the discharge of patient:  36 minutes         Naomi Feldman NP  Department of Hospital Medicine  Count includes the [facility]

## 2024-08-01 NOTE — PT/OT/SLP PROGRESS
Occupational Therapy   Treatment    Name: Glenda Gimenez  MRN: [id number]  Admitting Diagnosis:  Cecal volvulus  9 Days Post-Op    Recommendations:     Discharge Recommendations: Moderate Intensity Therapy  Discharge Equipment Recommendations:  none  Barriers to discharge:  Decreased caregiver support    Assessment:     Glenda Gimenez is a 89 y.o. female with a medical diagnosis of Cecal volvulus.  Performance deficits affecting function are weakness, impaired endurance, impaired self care skills, impaired functional mobility, gait instability, impaired balance, pain, decreased safety awareness.     Pt continues to progress towards goals.    Rehab Prognosis:  Good; patient would benefit from acute skilled OT services to address these deficits and reach maximum level of function.       Plan:     Patient to be seen 5 x/week to address the above listed problems via self-care/home management, therapeutic activities, therapeutic exercises  Plan of Care Expires: 08/26/24  Plan of Care Reviewed with: patient    Subjective     Pain/Comfort:  Pain Rating 1: 0/10  Pain Rating Post-Intervention 1: 0/10    Objective:     Communicated with: nurse prior to session.  Patient found supine with telemetry upon OT entry to room.    General Precautions: Standard, fall    Orthopedic Precautions:N/A  Braces: N/A  Respiratory Status: Room air     Occupational Performance:     Bed Mobility:    Patient completed Supine to Sit with minimum assistance     Functional Mobility/Transfers:  Patient completed Sit <> Stand Transfer with minimum assistance  with  rolling walker   Patient completed Toilet Transfer Stand Pivot technique with minimum assistance with  rolling walker  Functional Mobility: Min A with RW EOB to bathroom and bathroom to bedside chair    Activities of Daily Living:  Grooming: contact guard assistance standing at sink to wash hands  Lower Body Dressing: maximal assistance to don brief  Toileting: maximal assistance for  hygiene/clothing management; pt was able to assist with seated hygiene today     Treatment & Education:  Therapist provided facilitation and instruction of proper body mechanics and fall prevention strategies during tasks listed above.   Instructed patient to sit in bedside chair as tolerated daily to increase OOB/activity tolerance.   Instructed patient to use call light to have nursing staff assist with needs/transfers.   Discussed OT POC and answered all questions within OT scope of practice.    Patient left up in chair with all lines intact, call button in reach, and chair alarm on    GOALS:   Multidisciplinary Problems       Occupational Therapy Goals          Problem: Occupational Therapy    Goal Priority Disciplines Outcome Interventions   Occupational Therapy Goal     OT, PT/OT Progressing    Description: Goals to be met by: 8/26/24     Patient will increase functional independence with ADLs by performing:    UE Dressing with Modified [place] while seated.  LE Dressing with Minimal Assistance while seated.  Grooming while seated with Set-up Assistance.  Toileting from toilet with Minimal Assistance for hygiene and clothing management.   Toilet transfer to toilet with Supervision using RW.                         Time Tracking:     OT Date of Treatment: 08/01/24  OT Start Time: 1024  OT Stop Time: 1047  OT Total Time (min): 23 min    Billable Minutes:Self Care/Home Management 23    OT/TREVOR: OT          8/1/2024

## 2024-08-01 NOTE — PLAN OF CARE
Problem: Occupational Therapy  Goal: Occupational Therapy Goal  Description: Goals to be met by: 8/26/24     Patient will increase functional independence with ADLs by performing:    UE Dressing with Modified [place] while seated.  LE Dressing with Minimal Assistance while seated.  Grooming while seated with Set-up Assistance.  Toileting from toilet with Minimal Assistance for hygiene and clothing management.   Toilet transfer to toilet with Supervision using RW.    Outcome: Progressing

## 2024-08-01 NOTE — PLAN OF CARE
Pt cleared for discharge from case management to [facility]    Call report to [phone], ask for Leon and SNF will pickup pt. Nava, nurse notified.     08/01/24 8701   Final Note   Assessment Type Final Discharge Note   Anticipated Discharge Disposition SNF   Post-Acute Status   Post-Acute Authorization Placement   Post-Acute Placement Status Set-up Complete/Auth obtained

## 2024-08-01 NOTE — PLAN OF CARE
Problem: Adult Inpatient Plan of Care  Goal: Plan of Care Review  Outcome: Progressing  Goal: Optimal Comfort and Wellbeing  Outcome: Progressing     Problem: Fall Injury Risk  Goal: Absence of Fall and Fall-Related Injury  Outcome: Progressing     Problem: Wound  Goal: Skin Health and Integrity  Outcome: Progressing

## 2024-08-01 NOTE — CARE UPDATE
07/31/24 1925   Patient Assessment/Suction   Level of Consciousness (AVPU) alert   Respiratory Effort Normal   Expansion/Accessory Muscles/Retractions no use of accessory muscles   PRE-TX-O2   Device (Oxygen Therapy) room air   SpO2 97 %   Pulse Oximetry Type Intermittent   $ Pulse Oximetry - Multiple Charge Pulse Oximetry - Multiple   Pulse 89   Resp 18   Aerosol Therapy   $ Aerosol Therapy Charges PRN treatment not required   Education   $ Education 15 min   Respiratory Evaluation   $ Care Plan Tech Time 15 min

## 2024-08-01 NOTE — DISCHARGE INSTRUCTIONS
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]  Yes    Debility [R53.81]  Yes    Essential hypertension [I10]  Yes    Hypokalemia [E87.6]  Yes    Hypothyroid [E03.9]  Yes      Resolved Hospital Problems    Diagnosis Date Resolved POA    UTI (urinary tract infection) [N39.0] 08/01/2024 Yes     Allergies:   Review of patient's allergies indicates:   Allergen Reactions    Amlodipine Swelling     Other reaction(s): Angioedema    Atenolol      Other reaction(s): itch    Betamethasone sodium phosphate      Other reaction(s): Flushing (skin)    Cortisone      Other reaction(s): blurry vision  Other reaction(s): Rash    Lisinopril      Other reaction(s): COUGH    Naproxen      Other reaction(s): body shut down    Paxlovid [nirmatrelvir-ritonavir] Other (See Comments)     "Reacted strangely to it in the past" behavior changes described    Pcn [penicillins] Other (See Comments)     Unknown reaction "I was just told a long time ago I was allergic to penicillin."    Prednisone     Triamterene-hydrochlorothiazid      Other reaction(s): itch       Code Status: Full    Vitals: Routine       Diet: soft diet and [place]    Activity: Activity as tolerated    Nursing Precautions: Fall    Bed/Surface: Low Air Loss    Consults: PT to evaluate and treat- 5 times a week, OT to evaluate and treat- 5 times a week, Wound Care, and Nutrition to evaluate and recommend diet    Oxygen: room air    Dialysis: Patient is not on dialysis. "     Labs:   CBL and BMP, Mg, Phos Monday   Pending Diagnostic Studies:       Procedure Component Value Units Date/Time    EKG 12-lead [[id number]] Collected: 07/31/24 0302    Order Status: Sent Lab Status: In process Updated: 08/01/24 0514     QRS Duration 88 ms      OHS QTC Calculation 448 ms     Narrative:      Test Reason : R07.9,    Vent. Rate : 082 BPM     Atrial Rate : 082 BPM     P-R Int : 226 ms          QRS Dur : 088 ms      QT Int : 384 ms       P-R-T Axes : 058 013 083 degrees     QTc Int : 448 ms    Sinus rhythm with 1st degree A-V block  Cannot rule out Anterior infarct ,age undetermined  Abnormal ECG  When compared with ECG of 28-JUL-2024 12:58,  Sinus rhythm has replaced Atrial fibrillation  Vent. rate has decreased BY  56 BPM    Referred By: [email or web address]   SELF           Confirmed By:     EKG 12-lead [[id number]] Collected: 07/28/24 1258    Order Status: Sent Lab Status: In process Updated: 07/28/24 1318     QRS Duration 72 ms      OHS QTC Calculation 463 ms     Narrative:      Test Reason : R07.9,    Vent. Rate : 138 BPM     Atrial Rate : 000 BPM     P-R Int : 000 ms          QRS Dur : 072 ms      QT Int : 306 ms       P-R-T Axes : 000 -10 059 degrees     QTc Int : 463 ms    Atrial fibrillation with rapid ventricular response with premature  ventricular or aberrantly conducted complexes  Possible Inferior infarct ,age undetermined  Abnormal ECG  When compared with ECG of 28-JUL-2024 10:11,  No significant change was found    Referred By: [email or web address]   SELF           Confirmed By:     EKG 12-lead [[id number]] Collected: 07/28/24 1011    Order Status: Sent Lab Status: In process Updated: 07/28/24 1132     QRS Duration 78 ms      OHS QTC Calculation 461 ms     Narrative:      Test Reason : R07.9,    Vent. Rate : 122 BPM     Atrial Rate : 000 BPM     P-R Int : 000 ms          QRS Dur : 078 ms      QT Int : 324 ms       P-R-T Axes : 000 -03 073 degrees     QTc Int : 461 ms    Atrial fibrillation with rapid  ventricular response with premature  ventricular or aberrantly conducted complexes  Nonspecific ST and T wave abnormality  Abnormal ECG  When compared with ECG of 23-JUL-2024 17:11,  Atrial fibrillation has replaced Sinus rhythm  Vent. rate has increased BY  42 BPM  Nonspecific T wave abnormality no longer evident in Inferior leads    Referred By: AAAREFABRAN   SELF           Confirmed By:           Imaging:  none    Miscellaneous Care:   Wound Care:  Change abdominal surgical incision dressing daily    IV Access:  none     Medications: Discontinue all previous medication orders, if any. See new list below.  Current Discharge Medication List        START taking these medications    Details   HYDROcodone-acetaminophen (NORCO) 5-325 mg per tablet Take 1 tablet by mouth every 8 (eight) hours as needed for Pain.  Qty: 21 tablet, Refills: 0    Comments: Quantity prescribed more than 7 day supply? No      melatonin (MELATIN) 3 mg tablet Take 2 tablets (6 mg total) by mouth nightly as needed for Insomnia.      pantoprazole (PROTONIX) 40 MG tablet Take 1 tablet (40 mg total) by mouth once daily.  Qty: 90 tablet, Refills: 3      tamsulosin (FLOMAX) 0.4 mg Cap Take 1 capsule (0.4 mg total) by mouth once daily.  Qty: 30 capsule, Refills: 11           CONTINUE these medications which have CHANGED    Details   furosemide (LASIX) 40 MG tablet Take 1 tablet (40 mg total) by mouth once daily.  Qty: 30 tablet, Refills: 11      isosorbide mononitrate (IMDUR) 30 MG 24 hr tablet Take 1 tablet (30 mg total) by mouth once daily.      ondansetron (ZOFRAN) 4 MG tablet Take 1 tablet (4 mg total) by mouth every 8 (eight) hours as needed for Nausea.      QUEtiapine (SEROQUEL) 25 MG Tab Take 1 tablet (25 mg total) by mouth once daily.  Qty: 30 tablet, Refills: 11           CONTINUE these medications which have NOT CHANGED    Details   aspirin (ECOTRIN) 81 MG EC tablet Take 81 mg by mouth once daily.      busPIRone (BUSPAR) 10 MG tablet Take 1  tablet (10 mg total) by mouth 3 (three) times daily.  Qty: 270 tablet, Refills: 4    Associated Diagnoses: Anxiety      donepeziL (ARICEPT) 5 MG tablet TAKE 1 TABLET BY MOUTH EVERY EVENING  Qty: 90 tablet, Refills: 3    Associated Diagnoses: Vascular dementia with behavior disturbance      famotidine (PEPCID) 20 MG tablet Take 1 tablet (20 mg total) by mouth 2 (two) times daily.  Qty: 180 tablet, Refills: 3    Associated Diagnoses: Gastroesophageal reflux disease without esophagitis      fluticasone propionate (FLONASE) 50 mcg/actuation nasal spray 1 spray (50 mcg total) by Each Nostril route twice a week.  Qty: 16 g, Refills: 0    Associated Diagnoses: Chronic rhinitis      krill oil 500 mg Cap Take 1 capsule by mouth once daily.      levothyroxine (SYNTHROID) 88 MCG tablet Take 1 tablet (88 mcg total) by mouth once daily.  Qty: 90 tablet, Refills: 3    Associated Diagnoses: Acquired hypothyroidism      nitroGLYCERIN (NITROSTAT) 0.4 MG SL tablet Place 1 tablet (0.4 mg total) under the tongue every 5 (five) minutes as needed for Chest pain.  Qty: 25 tablet, Refills: 3    Associated Diagnoses: Burning chest pain      olmesartan (BENICAR) 5 MG Tab Take 1 tablet (5 mg total) by mouth once daily.  Qty: 90 tablet, Refills: 3    Comments: .  Associated Diagnoses: Hypertension, unspecified type      peg 400-propylene glycol, PF, (SYSTANE HYDRATION PF) 0.4-0.3 % Drop Place 1 drop into both eyes once daily.      potassium chloride SA (K-DUR,KLOR-CON) 20 MEQ tablet TAKE 1 TABLET(20 MEQ) BY MOUTH EVERY DAY  Qty: 90 tablet, Refills: 3    Associated Diagnoses: Hypokalemia      sertraline (ZOLOFT) 50 MG tablet Take 1 tablet (50 mg total) by mouth once daily.  Qty: 90 tablet, Refills: 4    Associated Diagnoses: Moderate episode of recurrent major depressive disorder      vit A/vit C/vit E/zinc/copper (ICAPS AREDS ORAL) Take 1 capsule by mouth 2 (two) times a day.      vitamin D (VITAMIN D3) 1000 units Tab Take 1,000 Units by mouth  once daily. Patient is taking 2000 units per day           Follow up:    Follow-up Information       Bahman Drummond MD Follow up in 2 week(s).    Specialty: General Surgery  Contact information:  [address]  [place] [address]  [phone]               William Olivia MD Follow up in 2 week(s).    Specialty: Family Medicine  Contact information:  [address]  [place] [address]  [phone]               Mattie Moulton MD Follow up in 2 week(s).    Specialty: Cardiology  Contact information:  [address]  [address]  [place] [address]  [phone]                                 Immunizations Administered as of 8/1/2024       Name Date Dose VIS Date Route Exp Date    COVID-19, MRNA, LN-S, PF (Pfizer) (Purple Cap) 2/17/2021  2:30 PM 0.3 mL 12/12/2020 Intramuscular 5/31/2021    Site: Left deltoid     Given By: Abdias Latif LPN     : [employer]     Lot: [id number]     COVID-19, MRNA, LN-S, PF (Pfizer) (Purple Cap) 1/27/2021  9:03 AM 0.3 mL 12/12/2020 Intramuscular 5/31/2021    Site: Left deltoid     Given By: Danna Acosta LPN     : [employer]     Lot: [id number]                 Some patients may experience side effects after vaccination.  These may include fever, headache, muscle or joint aches.  Most symptoms resolve with 24-48 hours and do not require urgent medical evaluation unless they persist for more than 72 hours or symptoms are concerning for an unrelated medical condition.          Naomi Feldman, NP

## 2024-08-01 NOTE — PLAN OF CARE
Problem: Infection  Goal: Absence of Infection Signs and Symptoms  Outcome: Progressing     Problem: Adult Inpatient Plan of Care  Goal: Plan of Care Review  Outcome: Progressing  Goal: Patient-Specific Goal (Individualized)  Outcome: Progressing  Goal: Absence of Hospital-Acquired Illness or Injury  Outcome: Progressing  Goal: Optimal Comfort and Wellbeing  Outcome: Progressing  Goal: Readiness for Transition of Care  Outcome: Progressing     Problem: Fall Injury Risk  Goal: Absence of Fall and Fall-Related Injury  Outcome: Progressing     Problem: Wound  Goal: Optimal Coping  Outcome: Progressing  Goal: Optimal Functional Ability  Outcome: Progressing  Goal: Absence of Infection Signs and Symptoms  Outcome: Progressing  Goal: Improved Oral Intake  Outcome: Progressing  Goal: Optimal Pain Control and Function  Outcome: Progressing  Goal: Skin Health and Integrity  Outcome: Progressing  Goal: Optimal Wound Healing  Outcome: Progressing     Problem: Skin Injury Risk Increased  Goal: Skin Health and Integrity  Outcome: Progressing     Problem: Oral Intake Inadequate  Goal: Improved Oral Intake  Outcome: Progressing     Problem: Dementia Signs/Symptoms  Goal: Improved Behavioral Control (Dementia Signs/Symptoms)  Outcome: Progressing  Goal: Improved Mood Symptoms (Dementia Signs/Symptoms)  Outcome: Progressing  Goal: Optimized Cognitive Function (Dementia Signs/Symptoms)  Outcome: Progressing  Goal: Optimized Oral Intake (Dementia Signs/Symptoms)  Outcome: Progressing  Goal: Improved Sleep (Dementia Signs/Symptoms)  Outcome: Progressing  Goal: Enhanced Social or Functional Skills and Ability (Dementia Signs/Symptoms)  Outcome: Progressing

## 2024-08-02 ENCOUNTER — TELEPHONE (OUTPATIENT)
Dept: SURGERY | Facility: CLINIC | Age: 89
End: 2024-08-02
Payer: MEDICARE

## 2024-08-02 NOTE — NURSING
Patient is leaving with transport and son to Rehab facility. Patient in wheelchair, iv's discontinued and monitor taken off.

## 2024-08-02 NOTE — TELEPHONE ENCOUNTER
----- Message from Eliu Trevizo sent at 8/2/2024 10:35 AM CDT -----  Contact: Self  Type: Needs Medical Advice  Who Called:  Shola/Son    Best Call Back Number: [phone]  Additional Information: Shola states that the patient had a procedure on July 23 and he has some concerns and is requesting a call back

## 2024-08-02 NOTE — TELEPHONE ENCOUNTER
Spoke with son earlier this morning, patient was discharged from the hospital yesterday and was sent to [facility] but he refused to leave her there.  He thought she was going to the [facility].  He iw requesting a referral to that facility.  Advised that this nurse does not know how to handle that but I believe it will have to go thru case management that set it up when she left the hospital.  Advised I will reach out to  them and see what they can do to help.

## 2024-08-03 ENCOUNTER — NURSE TRIAGE (OUTPATIENT)
Dept: ADMINISTRATIVE | Facility: CLINIC | Age: 89
End: 2024-08-03
Payer: MEDICARE

## 2024-08-03 NOTE — TELEPHONE ENCOUNTER
Pts son calling in reporting recent discharge/major colon surgery. Pts BM's are still really loose. Asking if there is anything they can give her and if normal following surgery. 3-4 BM's yesterday like diarrhea. Drinking water but not a lot.    Dispo- call surgeon now. Successfully reached on call provider, dr. Drummond who advised diarrhea is normal. Suggested fiber food and supplements as well as OTC pepto bismol if pt having any discomfort related to diarrhea. Caller advised and VU.  Reason for Disposition   [1] Caller has URGENT question AND [2] triager unable to answer question    Additional Information   Negative: Sounds like a life-threatening emergency to the triager   Negative: [1] Widespread rash AND [2] bright red, sunburn-like   Negative: [1] SEVERE headache AND [2] after spinal (epidural) anesthesia   Negative: [1] Vomiting AND [2] persists > 4 hours   Negative: [1] Vomiting AND [2] abdomen looks much more swollen than usual   Negative: [1] Drinking very little AND [2] dehydration suspected (e.g., no urine > 12 hours, very dry mouth, very lightheaded)   Negative: Patient sounds very sick or weak to the triager   Negative: Sounds like a serious complication to the triager   Negative: Fever > 100.4 F (38.0 C)   Negative: [1] SEVERE post-op pain (e.g., excruciating, pain scale 8-10) AND [2] not controlled with pain medications    Protocols used: Post-Op Symptoms and [id number]

## 2024-08-05 ENCOUNTER — TELEPHONE (OUTPATIENT)
Dept: FAMILY MEDICINE | Facility: CLINIC | Age: 89
End: 2024-08-05
Payer: MEDICARE

## 2024-08-05 ENCOUNTER — PATIENT MESSAGE (OUTPATIENT)
Dept: SURGERY | Facility: CLINIC | Age: 89
End: 2024-08-05
Payer: MEDICARE

## 2024-08-05 DIAGNOSIS — K56.2 CECAL VOLVULUS: Primary | ICD-10-CM

## 2024-08-05 DIAGNOSIS — Z74.09 OTHER REDUCED MOBILITY: ICD-10-CM

## 2024-08-05 DIAGNOSIS — K56.609 SMALL BOWEL OBSTRUCTION: Primary | ICD-10-CM

## 2024-08-05 LAB
OHS QRS DURATION: 88 MS
OHS QTC CALCULATION: 448 MS

## 2024-08-06 NOTE — PLAN OF CARE
[facility]  Called son, Shola and has no preference. Sent HH order to [facility]  via [employer].     08/06/24 3714   Post-Acute Status   Post-Acute Authorization Home Health   Home Health Status Referrals Sent

## 2024-08-08 ENCOUNTER — TELEPHONE (OUTPATIENT)
Dept: FAMILY MEDICINE | Facility: CLINIC | Age: 89
End: 2024-08-08
Payer: MEDICARE

## 2024-08-09 ENCOUNTER — HOSPITAL ENCOUNTER (OUTPATIENT)
Dept: RADIOLOGY | Facility: HOSPITAL | Age: 89
Discharge: HOME | End: 2024-08-09
Attending: STUDENT IN AN ORGANIZED HEALTH CARE EDUCATION/TRAINING PROGRAM
Payer: MEDICARE

## 2024-08-09 ENCOUNTER — OFFICE VISIT (OUTPATIENT)
Dept: FAMILY MEDICINE | Facility: CLINIC | Age: 89
End: 2024-08-09
Payer: MEDICARE

## 2024-08-09 ENCOUNTER — HOSPITAL ENCOUNTER (EMERGENCY)
Facility: HOSPITAL | Age: 89
Discharge: HOME | End: 2024-08-09
Attending: STUDENT IN AN ORGANIZED HEALTH CARE EDUCATION/TRAINING PROGRAM
Payer: MEDICARE

## 2024-08-09 VITALS
DIASTOLIC BLOOD PRESSURE: 60 MMHG | WEIGHT: 175.69 LBS | OXYGEN SATURATION: 95 % | RESPIRATION RATE: 18 BRPM | SYSTOLIC BLOOD PRESSURE: 124 MMHG | BODY MASS INDEX: 29.99 KG/M2 | HEART RATE: 93 BPM | HEIGHT: 64 IN

## 2024-08-09 VITALS
SYSTOLIC BLOOD PRESSURE: 137 MMHG | OXYGEN SATURATION: 97 % | HEART RATE: 83 BPM | DIASTOLIC BLOOD PRESSURE: 70 MMHG | BODY MASS INDEX: 28.66 KG/M2 | WEIGHT: 172 LBS | TEMPERATURE: 98 F | HEIGHT: 65 IN | RESPIRATION RATE: 18 BRPM

## 2024-08-09 DIAGNOSIS — R60.9 EDEMA, UNSPECIFIED TYPE: ICD-10-CM

## 2024-08-09 DIAGNOSIS — R40.0 SOMNOLENCE: ICD-10-CM

## 2024-08-09 DIAGNOSIS — I82.441 ACUTE DEEP VEIN THROMBOSIS (DVT) OF TIBIAL VEIN OF RIGHT LOWER EXTREMITY: Primary | ICD-10-CM

## 2024-08-09 DIAGNOSIS — I82.4Z2 ACUTE DEEP VEIN THROMBOSIS (DVT) OF DISTAL VEIN OF LEFT LOWER EXTREMITY: ICD-10-CM

## 2024-08-09 DIAGNOSIS — R60.9 EDEMA, UNSPECIFIED TYPE: Primary | ICD-10-CM

## 2024-08-09 DIAGNOSIS — Z98.890 HX OF ABDOMINAL SURGERY: ICD-10-CM

## 2024-08-09 LAB
INR PPP: 1.1 (ref 0.8–1.2)
PROTHROMBIN TIME: 12.5 SEC (ref 9–12.5)

## 2024-08-09 PROCEDURE — 99999 PR PBB SHADOW E&M-EST. PATIENT-LVL V: CPT | Mod: PBBFAC,HCNC,, | Performed by: STUDENT IN AN ORGANIZED HEALTH CARE EDUCATION/TRAINING PROGRAM

## 2024-08-09 PROCEDURE — 99283 EMERGENCY DEPT VISIT LOW MDM: CPT | Mod: 25

## 2024-08-09 PROCEDURE — 71046 X-RAY EXAM CHEST 2 VIEWS: CPT | Mod: TC

## 2024-08-09 PROCEDURE — 93970 EXTREMITY STUDY: CPT | Mod: TC

## 2024-08-09 PROCEDURE — 25000003 PHARM REV CODE 250: Performed by: STUDENT IN AN ORGANIZED HEALTH CARE EDUCATION/TRAINING PROGRAM

## 2024-08-09 PROCEDURE — 71046 X-RAY EXAM CHEST 2 VIEWS: CPT | Mod: 26,,, | Performed by: RADIOLOGY

## 2024-08-09 PROCEDURE — 85610 PROTHROMBIN TIME: CPT | Performed by: PHYSICIAN ASSISTANT

## 2024-08-09 PROCEDURE — 93970 EXTREMITY STUDY: CPT | Mod: 26,,, | Performed by: RADIOLOGY

## 2024-08-09 RX ORDER — NITROFURANTOIN 25; 75 MG/1; MG/1
100 CAPSULE ORAL 2 TIMES DAILY
Qty: 10 CAPSULE | Refills: 0 | Status: SHIPPED | OUTPATIENT
Start: 2024-08-09 | End: 2024-08-14

## 2024-08-09 RX ORDER — APIXABAN 5 MG (74)
KIT ORAL
Qty: 74 TABLET | Refills: 0 | Status: SHIPPED | OUTPATIENT
Start: 2024-08-09

## 2024-08-09 RX ADMIN — APIXABAN 10 MG: 5 TABLET, FILM COATED ORAL at 08:08

## 2024-08-09 NOTE — FIRST PROVIDER EVALUATION
" Emergency Department TeleTriage Encounter Note      CHIEF COMPLAINT    Chief Complaint   Patient presents with    Leg Swelling     Ultrasound of R leg showed deep nonocclusive clot       VITAL SIGNS   Initial Vitals [08/09/24 1723]   BP Pulse Resp Temp SpO2   137/70 83 18 98.1 °F (36.7 °C) 97 %      MAP       --            ALLERGIES    Review of patient's allergies indicates:   Allergen Reactions    Amlodipine Swelling     Other reaction(s): Angioedema    Atenolol      Other reaction(s): itch    Betamethasone sodium phosphate      Other reaction(s): Flushing (skin)    Cortisone      Other reaction(s): blurry vision  Other reaction(s): Rash    Lisinopril      Other reaction(s): COUGH    Naproxen      Other reaction(s): body shut down    Paxlovid [nirmatrelvir-ritonavir] Other (See Comments)     "Reacted strangely to it in the past" behavior changes described    Pcn [penicillins] Other (See Comments)     Unknown reaction "I was just told a long time ago I was allergic to penicillin."    Prednisone     Triamterene-hydrochlorothiazid      Other reaction(s): itch       PROVIDER TRIAGE NOTE  Patient was diagnosed with DVT to the right leg on ultrasound today. Her PCP advised her to come to the ED for treatment because of her age. She had cbc, cmp, bnp and other labs drawn this afternoon.       ORDERS  Labs Reviewed - No data to display    ED Orders (720h ago, onward)      None              Virtual Visit Note: The provider triage portion of this emergency department evaluation and documentation was performed via [employer], a HIPAA-compliant telemedicine application, in concert with a tele-presenter in the room. A face to face patient evaluation with one of my colleagues will occur once the patient is placed in an emergency department room.      DISCLAIMER: This note was prepared with Work[employer] voice recognition transcription software. Garbled syntax, mangled pronouns, and other bizarre constructions may be attributed to " that software system.

## 2024-08-10 NOTE — DISCHARGE INSTRUCTIONS
Medication has been called into pharmacy by other provider Eliquis starter, 10 mg twice a day for 7 days then 5 mg twice a day for the remainder of prescription.  Follow up with Dr. Olivia next week for re-evaluation

## 2024-08-10 NOTE — ED PROVIDER NOTES
"Encounter Date: 8/9/2024       History     Chief Complaint   Patient presents with    Leg Swelling     Ultrasound of R leg showed deep nonocclusive clot     89-year-old female presents with son for evaluation of DVT.  Patient seen by primary care physician earlier today.  Workup initiated demonstrated right lower extremity nonocclusive DVT with normal creatinine.  Patient denies any shortness a breath chest pain.  Son reports no significant shortness a breath or chest pain.  Reports shortness a breath since surgery for which he attributes exertional shortness a breath to.        Review of patient's allergies indicates:   Allergen Reactions    Amlodipine Swelling     Other reaction(s): Angioedema    Atenolol      Other reaction(s): itch    Betamethasone sodium phosphate      Other reaction(s): Flushing (skin)    Cortisone      Other reaction(s): blurry vision  Other reaction(s): Rash    Lisinopril      Other reaction(s): COUGH    Naproxen      Other reaction(s): body shut down    Paxlovid [nirmatrelvir-ritonavir] Other (See Comments)     "Reacted strangely to it in the past" behavior changes described    Pcn [penicillins] Other (See Comments)     Unknown reaction "I was just told a long time ago I was allergic to penicillin."    Prednisone     Triamterene-hydrochlorothiazid      Other reaction(s): itch     Past Medical History:   Diagnosis Date    Anticoagulant long-term use     Arthritis     Dislocation of right shoulder joint     Hypertension     Hypothyroidism     Shoulder disorder     right    Thyroid disease      Past Surgical History:   Procedure Laterality Date    COLECTOMY, WITH ILEUM TO COLON ANASTOMOSIS  7/23/2024    Procedure: COLECTOMY, WITH ILEUM TO COLON ANASTOMOSIS;  Surgeon: Bahman Drummond MD;  Location: [facility] OR;  Service: General;;    HYSTERECTOMY      LAPAROTOMY, EXPLORATORY N/A 7/23/2024    Procedure: LAPAROTOMY, EXPLORATORY;  Surgeon: Bahman Drummond MD;  Location: [facility] OR;  Service: General;  " Laterality: N/A;    PARTIAL HYSTERECTOMY      ARTUR, ovaries in place, uterine prolapse    RIGHT HEMICOLECTOMY Right 7/23/2024    Procedure: HEMICOLECTOMY, RIGHT;  Surgeon: Bahman Drummond MD;  Location: [facility] OR;  Service: General;  Laterality: Right;     Family History   Problem Relation Name Age of Onset    Breast cancer Mother      Cancer Brother          lung cancer    Cancer Maternal Aunt          unknown cancer    Cancer Maternal Uncle          pancreatic cancer    Heart disease Neg Hx       Social History     Tobacco Use    Smoking status: Never    Smokeless tobacco: Never   Substance Use Topics    Alcohol use: Yes     Alcohol/week: 2.0 standard drinks of alcohol     Types: 2 Glasses of wine per week     Comment: 2 small glasses of red wine with dinner    Drug use: No     Review of Systems   All other systems reviewed and are negative.      Physical Exam     Initial Vitals [08/09/24 1723]   BP Pulse Resp Temp SpO2   137/70 83 18 98.1 °F (36.7 °C) 97 %      MAP       --         Physical Exam    Nursing note and vitals reviewed.  Constitutional: No distress.   HENT:   Head: Normocephalic.   Eyes: No scleral icterus.   Cardiovascular:  Normal rate.           Pulmonary/Chest: Effort normal. No stridor. No respiratory distress.   Abdominal: There is no guarding.   Musculoskeletal:      Comments: Left lower extremity asymmetrical swelling compared to left, left larger than right     Neurological: She is alert.   Skin: No rash noted. No erythema.         ED Course   Procedures  Labs Reviewed   PROTIME-INR       Result Value    Prothrombin Time 12.5      INR 1.1            Imaging Results    None          Medications   apixaban tablet 10 mg (10 mg Oral Given 8/9/24 2033)     Medical Decision Making  89-year-old female presents for evaluation of nonocclusive right lower extremity DVT.  Some shortness a breath with exertion since surgery per son.  Collateral history obtained from son and medical record review.  Lab work  reviewed, normal creatinine, ultrasound report reviewed, right lower extremity DVT and tibial vein, nonocclusive, no left lower extremity DVT, on exam left lower extremity larger than right lower extremity, distal pulses palpable.  PCP no reviewed, reports patient sent to ED to have CTA chest to rule out PE.  Discussion had with patient and son and they do not want to stay for PE protocol.  They have capacity to make their decision understands risks and benefits including death. .  Informed patient and son that medication has already been sent in by primary care physician to [facility].  Also provided prescription handout for Eliquis starter pack.  We went through thoroughly on how to take medication appropriately and risks and benefits of missing doses and for re-evaluation possible falls and risk of bleeding.  Informed him that it is within differential diagnosis to have a pulmonary embolism that was my recommendation to get the CT scan however they declined.  Patient does not hypoxemic or tachypneic here in ED. Stable vitals.  Lab work reviewed, normal creatinine, BNP improved from baseline, no troponin ordered.    Amount and/or Complexity of Data Reviewed  Independent Historian: caregiver     Details: Son  External Data Reviewed: labs, radiology and notes.    Risk  Prescription drug management.               ED Course as of 08/09/24 2053   Fri Aug 09, 2024   2010 INR: 1.1 [KB]   2011 Impression:     Short segment of nonocclusive thrombus in a deep vein of the right calf.  No other evidence of deep vein thrombosis in the legs bilaterally.     This report was flagged in Epic as abnormal.        Electronically signed by:Thomas Burroughs MD  Date:                                            08/09/2024  Time:                                           15:35   [KB]      ED Course User Index  [KB] Ramses Reyes Jr., DO                           Clinical Impression:  Final diagnoses:  [I82.441] Acute deep vein thrombosis  (DVT) of tibial vein of right lower extremity (Primary)  [Z98.890] Hx of abdominal surgery          ED Disposition Condition    Discharge Stable          ED Prescriptions       Medication Sig Dispense Start Date End Date Auth. Provider    apixaban (ELIQUIS DVT-PE TREAT 30D START) 5 mg (74 tabs) DsPk For the first 7 days take two 5 mg tablets twice daily.  After 7 days take one 5 mg tablet twice daily. 74 tablet 8/9/2024 -- Ramses Reyes Jr., DO          Follow-up Information       Follow up With Specialties Details Why Contact Info    William Olivia MD Family Medicine On 8/12/2024  [address] [facility] [address]  [phone]               Ramses Reyes Jr.,   08/09/24 1214

## 2024-08-10 NOTE — PHYSICIAN QUERY
Question: Please clarify the Cardiac diagnosis.    Provider Query Response:  NSTEMI/Myocardial Infarction Type 2 due to (please specify): demand ischemia

## 2024-08-11 ENCOUNTER — NURSE TRIAGE (OUTPATIENT)
Dept: ADMINISTRATIVE | Facility: CLINIC | Age: 89
End: 2024-08-11
Payer: MEDICARE

## 2024-08-11 NOTE — TELEPHONE ENCOUNTER
"Reason for Disposition   Vomiting a prescription medication    Additional Information   Negative: Sounds like a life-threatening emergency to the triager   Negative: Chest pain   Negative: Difficulty breathing   Negative: [1] Widespread rash AND [2] bright red, sunburn-like   Negative: [1] SEVERE headache AND [2] after spinal (epidural) anesthesia   Negative: [1] Vomiting AND [2] persists > 4 hours   Negative: [1] Vomiting AND [2] abdomen looks much more swollen than usual   Negative: [1] Drinking very little AND [2] dehydration suspected (e.g., no urine > 12 hours, very dry mouth, very lightheaded)   Negative: Patient sounds very sick or weak to the triager   Negative: Sounds like a serious complication to the triager   Negative: Fever > 100.4 F (38.0 C)   Negative: [1] SEVERE post-op pain (e.g., excruciating, pain scale 8-10) AND [2] not controlled with pain medications   Negative: [1] Caller has URGENT question AND [2] triager unable to answer question   Negative: [1] MILD-MODERATE headache (e.g., pain scale 1-7) AND [2] after spinal (epidural) anesthesia   Negative: Fever present > 3 days (72 hours)   Negative: [1] MILD-MODERATE post-op pain (e.g., pain scale 1-7) AND [2] not controlled with pain medications   Negative: [1] Caller has NON-URGENT question AND [2] triager unable to answer question   Negative: Shock suspected (e.g., cold/pale/clammy skin, too weak to stand, low BP, rapid pulse)   Negative: Difficult to awaken or acting confused (e.g., disoriented, slurred speech)   Negative: Sounds like a life-threatening emergency to the triager   Negative: [1] Vomiting AND [2] contains red blood or black ("coffee ground") material  (Exception: Few red streaks in vomit that only happened once.)   Negative: Severe pain in one eye   Negative: Recent head injury (within last 3 days)   Negative: Recent abdominal injury (within last 3 days)   Negative: [1] Insulin-dependent diabetes (Type I) AND [2] glucose > 400 mg/dL " (22 mmol/L)   Negative: [1] Vomiting AND [2] hernia is more painful or swollen than usual   Negative: [1] SEVERE vomiting (e.g., 6 or more times/day) AND [2] present > 8 hours (Exception: Patient sounds well, is drinking liquids, does not sound dehydrated, and vomiting has lasted less than 24 hours.)   Negative: [1] MODERATE vomiting (e.g., 3 - 5 times/day) AND [2] age > 60 years   Negative: Severe headache (e.g., excruciating)  (Exception: Similar to previous migraines.)   Negative: High-risk adult (e.g., diabetes mellitus, brain tumor, V-P shunt, hernia)   Negative: [1] Drinking very little AND [2] dehydration suspected (e.g., no urine > 12 hours, very dry mouth, very lightheaded)   Negative: Patient sounds very sick or weak to the triager   Negative: [1] Vomiting AND [2] abdomen looks much more swollen than usual   Negative: [1] Vomiting AND [2] contains bile (green color)   Negative: [1] Constant abdominal pain AND [2] present > 2 hours   Negative: [1] Fever > 103 F (39.4 C) AND [2] not able to get the fever down using Fever Care Advice   Negative: [1] Fever > 101 F (38.3 C) AND [2] age > 60 years   Negative: [1] Fever > 100 F (37.8 C) AND [2] bedridden (e.g., CVA, chronic illness, recovering from surgery)   Negative: [1] Fever > 100 F (37.8 C) AND [2] weak immune system (e.g., HIV positive, cancer chemo, splenectomy, organ transplant, chronic steroids)   Negative: Taking any of the following medications: digoxin (Lanoxin), lithium, theophylline, phenytoin (Dilantin)   Negative: [1] MILD or MODERATE vomiting AND [2] present > 48 hours (2 days)  (Exception: Mild vomiting with associated diarrhea.)   Negative: Fever present > 3 days (72 hours)    Protocols used: Post-Op Symptoms and Pktqapxop-Y-WG, Vomiting-A-AH  Patient's son Shola states the patient had surgery on her  colon on 7/23/24. States she recently was diagnosed with a blood clot and started on eliquis. States the patient vomited clear liquids twice.  Denies any post operative symptoms. Advised per the triage protocol for vomiting. Instructed to call [facility] back if symptoms worsen or new symptoms develop. Advised a message will be sent to the patient's PCP and office staff to follow up when the office opens tomorrow. Also advised of [facility] visits if he would like patient to see a doctor. Caller verbalized understanding and has no further questions or concerns.

## 2024-08-12 ENCOUNTER — NURSE TRIAGE (OUTPATIENT)
Dept: ADMINISTRATIVE | Facility: CLINIC | Age: 89
End: 2024-08-12
Payer: MEDICARE

## 2024-08-12 ENCOUNTER — PATIENT MESSAGE (OUTPATIENT)
Dept: FAMILY MEDICINE | Facility: CLINIC | Age: 89
End: 2024-08-12
Payer: MEDICARE

## 2024-08-12 ENCOUNTER — TELEPHONE (OUTPATIENT)
Dept: FAMILY MEDICINE | Facility: CLINIC | Age: 89
End: 2024-08-12
Payer: MEDICARE

## 2024-08-12 DIAGNOSIS — R07.9 CHEST PAIN, UNSPECIFIED TYPE: Primary | ICD-10-CM

## 2024-08-12 NOTE — TELEPHONE ENCOUNTER
I left a voicemail for son (Shola) to return a call to the clinic regarding his questions about his mothers care.

## 2024-08-12 NOTE — TELEPHONE ENCOUNTER
Pt's son calling in, pt was in hospital on Friday due to blood clot in right calf. Was released. Was feeling nauseous before that. Today having nausea again. Had ex-lap on 7/23. Having abdominal pain that comes and goes. Pain rated 7-8/10. Dispo is ED now. Daughter verbalized understanding. Hung up before I could ask if anything else.   Reason for Disposition   Taking prescription medication that could cause nausea (e.g., narcotics/opiates, antibiotics, OCPs, many others)   [1] SEVERE pain AND [2] age > 60 years    Additional Information   Negative: Shock suspected (e.g., cold/pale/clammy skin, too weak to stand, low BP, rapid pulse)   Negative: Sounds like a life-threatening emergency to the triager   Negative: Unable to walk, or can only walk with assistance (e.g., requires support)   Negative: Difficulty breathing   Negative: [1] Insulin-dependent diabetes (Type I) AND [2] glucose > 400 mg/dL (22 mmol/L)   Negative: [1] Drinking very little AND [2] dehydration suspected (e.g., no urine > 12 hours, very dry mouth, very lightheaded)   Negative: Patient sounds very sick or weak to the triager   Negative: Fever > 104 F (40 C)   Negative: [1] Fever > 101 F (38.3 C) AND [2] age > 60 years   Negative: [1] Fever > 100 F (37.8 C) AND [2] bedridden (e.g., CVA, chronic illness, recovering from surgery)   Negative: [1] Fever > 100 F (37.8 C) AND [2] diabetes mellitus or weak immune system (e.g., HIV positive, cancer chemo, splenectomy, organ transplant, chronic steroids)   Negative: Taking any of the following medications: digoxin (Lanoxin), lithium, theophylline, phenytoin (Dilantin)   Negative: Yellowish color of the skin or white of the eye (i.e., jaundice)   Negative: Fever present > 3 days (72 hours)   Negative: Receiving cancer chemotherapy medication   Negative: Shock suspected (e.g., cold/pale/clammy skin, too weak to stand, low BP, rapid pulse)   Negative: Difficult to awaken or acting confused (e.g., disoriented,  slurred speech)   Negative: Passed out (e.g., fainted, lost consciousness, blacked out and was not responding)   Negative: Sounds like a life-threatening emergency to the triager   Negative: [1] SEVERE pain (e.g., excruciating) AND [2] present > 1 hour    Protocols used: Nausea-A-AH, Abdominal Pain - Female-A-AH

## 2024-08-12 NOTE — TELEPHONE ENCOUNTER
----- Message from Ludivina Mccarthy sent at 8/12/2024  9:06 AM CDT -----  Type: Needs Medical Advice  Who Called:  Shola Schulte  Symptoms (please be specific):  Hosp F/u  How long has patient had these symptoms:  8/9  Pharmacy name and phone #:    Best Call Back Number: [phone]  Additional Information: Son would like to speak to staff in regards to hosp f/u and CT scan  Please advise  Thank you

## 2024-08-13 ENCOUNTER — TELEPHONE (OUTPATIENT)
Dept: SURGERY | Facility: CLINIC | Age: 89
End: 2024-08-13
Payer: MEDICARE

## 2024-08-13 ENCOUNTER — CARE AT HOME (OUTPATIENT)
Dept: HOME HEALTH SERVICES | Facility: CLINIC | Age: 89
End: 2024-08-13
Payer: MEDICARE

## 2024-08-13 ENCOUNTER — PATIENT MESSAGE (OUTPATIENT)
Dept: SURGERY | Facility: CLINIC | Age: 89
End: 2024-08-13
Payer: MEDICARE

## 2024-08-13 VITALS
RESPIRATION RATE: 20 BRPM | OXYGEN SATURATION: 97 % | TEMPERATURE: 99 F | HEART RATE: 85 BPM | SYSTOLIC BLOOD PRESSURE: 130 MMHG | DIASTOLIC BLOOD PRESSURE: 70 MMHG

## 2024-08-13 DIAGNOSIS — K56.2 CECAL VOLVULUS: ICD-10-CM

## 2024-08-13 DIAGNOSIS — K56.609 SMALL BOWEL OBSTRUCTION: ICD-10-CM

## 2024-08-13 DIAGNOSIS — F01.518 VASCULAR DEMENTIA WITH BEHAVIOR DISTURBANCE: ICD-10-CM

## 2024-08-13 DIAGNOSIS — R53.81 DEBILITY: Primary | ICD-10-CM

## 2024-08-13 PROCEDURE — 1159F MED LIST DOCD IN RCRD: CPT | Mod: CPTII,S$GLB,, | Performed by: NURSE PRACTITIONER

## 2024-08-13 PROCEDURE — 1160F RVW MEDS BY RX/DR IN RCRD: CPT | Mod: CPTII,S$GLB,, | Performed by: NURSE PRACTITIONER

## 2024-08-13 PROCEDURE — 99349 HOME/RES VST EST MOD MDM 40: CPT | Mod: S$GLB,,, | Performed by: NURSE PRACTITIONER

## 2024-08-13 PROCEDURE — 1125F AMNT PAIN NOTED PAIN PRSNT: CPT | Mod: CPTII,S$GLB,, | Performed by: NURSE PRACTITIONER

## 2024-08-13 PROCEDURE — 1111F DSCHRG MED/CURRENT MED MERGE: CPT | Mod: CPTII,S$GLB,, | Performed by: NURSE PRACTITIONER

## 2024-08-13 PROCEDURE — 3288F FALL RISK ASSESSMENT DOCD: CPT | Mod: CPTII,S$GLB,, | Performed by: NURSE PRACTITIONER

## 2024-08-13 PROCEDURE — 1100F PTFALLS ASSESS-DOCD GE2>/YR: CPT | Mod: CPTII,S$GLB,, | Performed by: NURSE PRACTITIONER

## 2024-08-13 PROCEDURE — 1157F ADVNC CARE PLAN IN RCRD: CPT | Mod: CPTII,S$GLB,, | Performed by: NURSE PRACTITIONER

## 2024-08-13 NOTE — TELEPHONE ENCOUNTER
Patients son called and asked if she has to come in on Thursday, 8/15/24 @ 10am since the Home Health Nurse was going to remove the staples on Friday.  He stated that the surgical site looks good.  I informed him that I'll ask Dr. Drummond and call him back.  Favio

## 2024-08-13 NOTE — TELEPHONE ENCOUNTER
RHONDA @ 3:17pm to inform patients son that I scheduled his Mother to see Dr. Drummond on Thursday, 8/15/24 @ 10am @ [address].  MyChart message sent.  Favio

## 2024-08-13 NOTE — TELEPHONE ENCOUNTER
I called and left a message for patients son to inform him that Dr. Drummond said that the Home Health Nurse can remove the staples on Friday.  He stated hat he doesn't need to see her back in the office unless needed.  Favio

## 2024-08-14 ENCOUNTER — OUTPATIENT CASE MANAGEMENT (OUTPATIENT)
Dept: ADMINISTRATIVE | Facility: OTHER | Age: 89
End: 2024-08-14
Payer: MEDICARE

## 2024-08-14 ENCOUNTER — HOSPITAL ENCOUNTER (OUTPATIENT)
Dept: RADIOLOGY | Facility: HOSPITAL | Age: 89
Discharge: HOME | End: 2024-08-14
Attending: STUDENT IN AN ORGANIZED HEALTH CARE EDUCATION/TRAINING PROGRAM
Payer: MEDICARE

## 2024-08-14 DIAGNOSIS — R07.9 CHEST PAIN, UNSPECIFIED TYPE: ICD-10-CM

## 2024-08-14 PROCEDURE — 71275 CT ANGIOGRAPHY CHEST: CPT | Mod: 26,,, | Performed by: RADIOLOGY

## 2024-08-14 PROCEDURE — 25500020 PHARM REV CODE 255: Performed by: STUDENT IN AN ORGANIZED HEALTH CARE EDUCATION/TRAINING PROGRAM

## 2024-08-14 PROCEDURE — 71275 CT ANGIOGRAPHY CHEST: CPT | Mod: TC

## 2024-08-14 RX ADMIN — IOHEXOL 100 ML: 350 INJECTION, SOLUTION INTRAVENOUS at 03:08

## 2024-08-14 NOTE — ASSESSMENT & PLAN NOTE
Chronic, oriented to person only today. Pt has had significant physical decline over last 3 weeks regarding SBO/exp lap. Frequent falling, profound weakness/debility.   Son helping patient walk with rollator.  Behavior stable on seroquel, buspar, aricept, sertraline.   Monitor.

## 2024-08-14 NOTE — PROGRESS NOTES
"08/14/24-Visit with [facility], NP on 08/13/24. Son requesting a "consult" for hospice-info sent to [facility]. He still wants to proceed with CTA recommended by PCP .     "

## 2024-08-14 NOTE — ASSESSMENT & PLAN NOTE
Stable. Still having intermittent N/V. Last BM yesterday. Poor appetite x 3 weeks now.  Son requesting Hospice consult for information on services.  MLA surgical staples still in place, intact, without s/s infection. Home Health to remove staples later this week. Has pending surgeon follow up visit 8/19/24. Son is not sure she is strong enough to go in to that appt however.

## 2024-08-14 NOTE — ASSESSMENT & PLAN NOTE
Stable. Still having intermittent N/V. Last BM yesterday. Poor appetite x 3 weeks now.  Son requesting Hospice consult for information on services.

## 2024-08-14 NOTE — PROGRESS NOTES
"[facility]  Medical Chronic Care Home Visit    Encounter Provider: Lesley Tamez   PCP: William Olivia MD  Consult Requested By: No ref. provider found    HISTORY OF PRESENT ILLNESS      Patient ID: Glenda Gimenez is a 89 y.o. female is being seen in the home due to physical debility that presents a taxing effort to leave the home, to mitigate high risk of hospital readmission and/or due to the limited availability of reliable or safe options for transportation to the point of access to the provider. Prior to treatment on this visit the chart was reviewed and patient verbal consent was obtained.    Chronic medical conditions synopsis:  Mercedes is a 89 year old female with a PMHX of hypothyroidism, hypertension, primary hyperparathyroidism, COPD, chronic diastolic CHF, obesity, dementia and sepsis pneumonia in 2020.       Looks weak, frail today falling asleep during visit-has not done this before. Son, Shola, reports patient has been intermittently c/o epigastric pain, family gave her one hydrocodone last pm. S/p exploratory lab for cecal volvulus 7/23/24 (Dr. Drummond) and subsequent right lower leg DVT, now on eliquis. CTA scheduled for tomorrow, outpatient. Not eating much in 3 weeks, intermittent N/V.BM last evening. Pt fell in bathroom this morning due to weakness, has abrasion to mid back. Son and daughter in law picked her up.   Son requesting a "consult" for hospice-info sent to [facility]. He still wants to proceed with CTA recommended by PCP for tomorrow but he is tearful today acknowledging how much decline patient has had over last several weeks.   Currently being treated for UTI on nitrofurantoin.       PCP is William Olivia MD-has seen recently. Plan to follow up q 3 months and/or as needed to decrease risk and/or frequency of hospitalizations. [facility] contact information available in home.       DECISION MAKING TODAY       Assessment & Plan:  1. " Debility  Assessment & Plan:  Chronic but significant decline noted by family over last 3 weeks. Frequent falls now, decreased eating, recent SBO requiring exp lap, subsequent DVT. Son requesting Hospice consult. Contact info sent to [facility]. Son tearful at patient's decline. Emotional support provided.     Orders:  -     Ambulatory referral/consult to Hospice; Future; Expected date: 2024    2. Vascular dementia with behavior disturbance  Assessment & Plan:  Chronic, oriented to person only today. Pt has had significant physical decline over last 3 weeks regarding SBO/exp lap. Frequent falling, profound weakness/debility.   Son helping patient walk with rollator.  Behavior stable on seroquel, buspar, aricept, sertraline.   Monitor.      3. Small bowel obstruction  Overview:  S/P-exploratory lap with right hemicolectomy on 2024 Dr. Drummond     Assessment & Plan:  Stable. Still having intermittent N/V. Last BM yesterday. Poor appetite x 3 weeks now.  Son requesting Hospice consult for information on services.  MLA surgical staples still in place, intact, without s/s infection. Home Health to remove staples later this week. Has pending surgeon follow up visit 24. Son is not sure she is strong enough to go in to that appt however.       4. Cecal volvulus  Overview:  S/P-exploratory lap with right hemicolectomy on 2024 Dr. Drummond    Assessment & Plan:  Stable. Still having intermittent N/V. Last BM yesterday. Poor appetite x 3 weeks now.  Son requesting Hospice consult for information on services.             ENVIRONMENT OF CARE      Family and/or Caregiver present at visit?  Yes  Name of Caregiver: sonShola, daughter in law, Mary  History provided by: caregiver    4Ms for Medical Decision-Making in Older Adults    Last Completed EAWV: 2023    MOBILITY:  Get Up and Go:      2023    10:41 AM   Get Up and Go   Trial 1 10 seconds     Activities of Daily Livin/4/2023     10:47 AM   Activities of Daily Living   Ambulation Assistance Required   Ambulation Assistance Walker (wheeled)   Dressing Independent   Transfers Independent   Toileting Continent of bladder;Continent of bowel   Feeding Independent   Cleaning home/Chores Assistance Required   Telephone use Assistance Required   Shopping Assistance Required   Paying bills Assistance Required   Taking meds Assistance Required     Whisper Test:       No data to display              Disability Status:      2023    10:49 AM   Disability Status   Are you deaf or do you have serious difficulty hearing? N   Are you blind or do you have serious difficulty seeing, even when wearing glasses? N   Because of a physical, mental, or emotional condition, do you have serious difficulty concentrating, remembering, or making decisions? Y   Do you have serious difficulty walking or climbing stairs? Y   Do you have difficulty dressing or bathing? N   Because of a physical, mental, or emotional condition, do you have difficulty doing errands alone such as visiting a doctor's office or shopping? Y     Nutrition Screenin/4/2023    10:41 AM   Nutrition Screening   Has food intake declined over the past three months due to loss of appetite, digestive problems, chewing or swallowing difficulties? No decrease in food intake   Involuntary weight loss during the last 3 months? No weight loss   Mobility? Goes out   Has the patient suffered psychological stress or acute disease in the past three months? No   Body Mass Index (BMI)?  BMI 23 or greater    Screening Score: 0-7 Malnourished, 8-11 At Risk, 12-14 Normal        MENTATION:   Depression Patient Health Questionnaire:      2024    10:30 AM   Depression Patient Health Questionnaire   Over the last two weeks how often have you been bothered by little interest or pleasure in doing things Not at all   Over the last two weeks how often have you been bothered by feeling down, depressed or  hopeless Not at all   PHQ-2 Total Score 0     Has Dementia Dx: Yes    Cognitive Function Screening:       No data to display              Cognitive Function Screening Total - Less than 4 = Abnormal,  Greater than or equal to 4 = Normal        MEDICATIONS:  High Risk Medications:  Total Active Medications: 3  HYDROcodone-acetaminophen - 5-325 mg  QUEtiapine Tab - 25 MG  sertraline - 50 MG    WHAT MATTERS MOST:  Advance Care Planning   ACP Status:   Patient has had an ACP conversation  Living Will: Yes  Power of : Yes  [place]: No    What is most important right now is to focus on spending time at home, avoiding the hospital, symptom/pain control, and comfort and QOL     Accordingly, we have decided that the best plan to meet the patient's goals includes enrolling in hospice care      What matters most to patient today is: patient unable to state           Is patient hospice appropriate: Yes  (If needed, use PPS <30 or FAST score >7)  Was referral to hospice placed: Yes    Impression upon entering the home:  Physical Dwelling: single family home   Appearance of home environment: cleaniness: clean, walking pathways: clear, lighting: adequate, and home structure: sound structure  Functional Status: max assistance  Mobility:  using rollator very unsteadily/weakly, son is assisting patient with walking  Nutritional access: available food but inadequate intake  Home Health: Yes,  Agency [facility]     DME/Supplies: rolling walker and wheelchair     Diagnostic tests reviewed/disposition: No diagnosic tests pending after this hospitalization.  Disease/illness education:  UTI, physical decline, recent SBO/exp lap, appearing hospice appropriate  Establishment or re-establishment of referral orders for community resources: No other necessary community resources.   Discussion with other health care providers: No discussion with other health care providers necessary.   Does patient have a PCP at [facility]? Yes   Repatriation  plan with PCP? [facility] reason: mobility   Does patient have an ostomy (ileostomy, colostomy, suprapubic catheter, nephrostomy tube, tracheostomy, PEG tube, pleurex catheter, cholecystostomy, etc)? No  Were BPAs reviewed? Yes    Social History     Socioeconomic History    Marital status:     Number of children: 2   Tobacco Use    Smoking status: Never    Smokeless tobacco: Never   Substance and Sexual Activity    Alcohol use: Yes     Alcohol/week: 2.0 standard drinks of alcohol     Types: 2 Glasses of wine per week     Comment: 2 small glasses of red wine with dinner    Drug use: No    Sexual activity: Not Currently     Social Determinants of Health     Financial Resource Strain: Low Risk  (7/22/2024)    Overall Financial Resource Strain (CARDIA)     Difficulty of Paying Living Expenses: Not very hard   Food Insecurity: No Food Insecurity (7/22/2024)    Hunger Vital Sign     Worried About Running Out of Food in the Last Year: Never true     Ran Out of Food in the Last Year: Never true   Transportation Needs: No Transportation Needs (7/10/2023)    PRAPARE - Transportation     Lack of Transportation (Medical): No     Lack of Transportation (Non-Medical): No   Physical Activity: Unknown (7/22/2024)    Exercise Vital Sign     Days of Exercise per Week: 0 days   Stress: No Stress Concern Present (7/22/2024)    [employer] [place] [employer] - Occupational Stress Questionnaire     Feeling of Stress : Not at all   Housing Stability: Unknown (7/22/2024)    Housing Stability Vital Sign     Unable to Pay for Housing in the Last Year: No         OBJECTIVE:     Vital Signs:  Vitals:    08/13/24 1100   BP: 130/70   Pulse: 85   Resp: 20   Temp: 98.7 °F (37.1 °C)     Review of Systems   Constitutional:  Positive for activity change, appetite change.   Respiratory:  Negative for chest tightness, shortness of breath and wheezing.     Cardiovascular:  Negative for chest pain, palpitations and leg swelling.    Gastrointestinal:  Positive for abdominal pain, nausea, vomiting, poor appetite. Negative for constipation and diarrhea.   Genitourinary:  Negative for difficulty urinating and hematuria.   Musculoskeletal:  Positive for gait problem. Negative for joint swelling.   Skin:  Negative for color change and wound.   Neurological:  Positive for weakness. Negative for tremors, seizures and numbness.   Psychiatric/Behavioral:  Positive for confusion.     Physical Exam:  Physical Exam  Constitutional:       Appearance: weak, frail, elderly female, falls asleep during visit while sitting up.   HENT:      Head: Normocephalic and atraumatic.   Cardiovascular:      Rate and Rhythm: Normal rate and regular rhythm.      Pulses: Normal pulses.      Heart sounds: Distant heart sounds.   Pulmonary:      Effort: Pulmonary effort is normal.      Breath sounds: Decreased breath sounds throughout.   Occasional dry cough noted.  Abdominal:      General: Bowel sounds are hyperactive.       Palpations: Abdomen is soft, distended (not firm), non-tender. MLA staples in place, D&I.   Musculoskeletal:      Cervical back: Normal range of motion and neck supple.      Comments: Ambulatory with rollator, very unsteady.  Skin:     General: Skin is warm and dry , pale. Mid right back with small abrasion from a fall this morning.   Neurological:      General: No focal deficit present.      Mental Status: She is oriented to person only.      Comments: Confused  Psychiatric:         Minimally interacts.           INSTRUCTIONS FOR PATIENT:     Scheduled Follow-up, Appts Reviewed with Modifications if Needed: Yes  Future Appointments   Date Time Provider Department Center   11/29/2024 10:00 AM ERIC OROZCO [facility] [place]   12/3/2024  9:30 AM William Olivia MD [facility] [place]         Current Outpatient Medications:     apixaban (ELIQUIS DVT-PE TREAT 30D START) 5 mg (74 tabs) DsPk, For the first 7 days take two 5 mg tablets twice daily.  After 7  days take one 5 mg tablet twice daily., Disp: 74 tablet, Rfl: 0    apixaban (ELIQUIS) 5 mg Tab, Take 2 tablets (10 mg total) by mouth 2 (two) times daily for 7 days, THEN 1 tablet (5 mg total) 2 (two) times daily., Disp: 194 tablet, Rfl: 0    aspirin (ECOTRIN) 81 MG EC tablet, Take 81 mg by mouth once daily., Disp: , Rfl:     busPIRone (BUSPAR) 10 MG tablet, Take 1 tablet (10 mg total) by mouth 3 (three) times daily., Disp: 270 tablet, Rfl: 4    donepeziL (ARICEPT) 5 MG tablet, TAKE 1 TABLET BY MOUTH EVERY EVENING (Patient taking differently: Take 5 mg by mouth every evening. TAKE 1 TABLET BY MOUTH EVERY EVENING), Disp: 90 tablet, Rfl: 3    famotidine (PEPCID) 20 MG tablet, Take 1 tablet (20 mg total) by mouth 2 (two) times daily., Disp: 180 tablet, Rfl: 3    fluticasone propionate (FLONASE) 50 mcg/actuation nasal spray, 1 spray (50 mcg total) by Each Nostril route twice a week., Disp: 16 g, Rfl: 0    furosemide (LASIX) 40 MG tablet, Take 1 tablet (40 mg total) by mouth once daily., Disp: 30 tablet, Rfl: 11    HYDROcodone-acetaminophen (NORCO) 5-325 mg per tablet, Take 1 tablet by mouth every 8 (eight) hours as needed for Pain., Disp: 21 tablet, Rfl: 0    isosorbide mononitrate (IMDUR) 30 MG 24 hr tablet, Take 1 tablet (30 mg total) by mouth once daily., Disp: , Rfl:     krill oil 500 mg Cap, Take 1 capsule by mouth once daily., Disp: , Rfl:     levothyroxine (SYNTHROID) 88 MCG tablet, Take 1 tablet (88 mcg total) by mouth once daily., Disp: 90 tablet, Rfl: 3    melatonin (MELATIN) 3 mg tablet, Take 2 tablets (6 mg total) by mouth nightly as needed for Insomnia., Disp: , Rfl:     nitrofurantoin, macrocrystal-monohydrate, (MACROBID) 100 MG capsule, Take 1 capsule (100 mg total) by mouth 2 (two) times daily. for 5 days, Disp: 10 capsule, Rfl: 0    nitroGLYCERIN (NITROSTAT) 0.4 MG SL tablet, Place 1 tablet (0.4 mg total) under the tongue every 5 (five) minutes as needed for Chest pain., Disp: 25 tablet, Rfl: 3     ondansetron (ZOFRAN) 4 MG tablet, Take 1 tablet (4 mg total) by mouth every 8 (eight) hours as needed for Nausea., Disp: , Rfl:     pantoprazole (PROTONIX) 40 MG tablet, Take 1 tablet (40 mg total) by mouth once daily., Disp: 90 tablet, Rfl: 3    peg 400-propylene glycol, PF, (SYSTANE HYDRATION PF) 0.4-0.3 % Drop, Place 1 drop into both eyes once daily., Disp: , Rfl:     potassium chloride SA (K-DUR,KLOR-CON) 20 MEQ tablet, TAKE 1 TABLET(20 MEQ) BY MOUTH EVERY DAY (Patient taking differently: Take 20 mEq by mouth. TAKE 1 TABLET(20 MEQ) BY MOUTH EVERY DAY), Disp: 90 tablet, Rfl: 3    QUEtiapine (SEROQUEL) 25 MG Tab, Take 1 tablet (25 mg total) by mouth once daily., Disp: 30 tablet, Rfl: 11    sertraline (ZOLOFT) 50 MG tablet, Take 1 tablet (50 mg total) by mouth once daily., Disp: 90 tablet, Rfl: 4    tamsulosin (FLOMAX) 0.4 mg Cap, Take 1 capsule (0.4 mg total) by mouth once daily., Disp: 30 capsule, Rfl: 11    vit A/vit C/vit E/zinc/copper (ICAPS AREDS ORAL), Take 1 capsule by mouth 2 (two) times a day., Disp: , Rfl:     vitamin D (VITAMIN D3) 1000 units Tab, Take 1,000 Units by mouth once daily. Patient is taking 2000 units per day, Disp: , Rfl:   No current facility-administered medications for this visit.    Medication Reconciliation:  Were medications changed during this appointment? No  Were medications in the home? Yes  Is the patient taking the medications as directed? Yes  Does the patient/caregiver understand the medications and changes? Yes  Does updated med list accurately reflects meds patient is currently taking? Yes    I spent a total of 35 minutes on the day of the visit.This includes face to face time and non-face to face time preparing to see the patient (eg, review of tests), obtaining and/or reviewing separately obtained history, documenting clinical information in the electronic or other health record, independently interpreting results and communicating results to the patient/family/caregiver,  or care coordinator.        Signature: Lesley Tamez NP

## 2024-08-15 ENCOUNTER — OUTPATIENT CASE MANAGEMENT (OUTPATIENT)
Dept: ADMINISTRATIVE | Facility: OTHER | Age: 89
End: 2024-08-15
Payer: MEDICARE

## 2024-08-15 ENCOUNTER — OFFICE VISIT (OUTPATIENT)
Dept: FAMILY MEDICINE | Facility: CLINIC | Age: 89
End: 2024-08-15
Payer: MEDICARE

## 2024-08-15 ENCOUNTER — TELEPHONE (OUTPATIENT)
Dept: FAMILY MEDICINE | Facility: CLINIC | Age: 89
End: 2024-08-15
Payer: MEDICARE

## 2024-08-15 DIAGNOSIS — K92.2 LOWER GI BLEED: Primary | ICD-10-CM

## 2024-08-15 DIAGNOSIS — J21.9 BRONCHIOLITIS: ICD-10-CM

## 2024-08-15 PROCEDURE — 99499 UNLISTED E&M SERVICE: CPT | Mod: 95,,, | Performed by: STUDENT IN AN ORGANIZED HEALTH CARE EDUCATION/TRAINING PROGRAM

## 2024-08-15 NOTE — TELEPHONE ENCOUNTER
Call placed to patient. Call answered by patient's daughter-in-law (Mary).  Virtual appointment scheduled.  Mrs. Hernandez agreed to virtual appointment date & time.

## 2024-08-15 NOTE — PROGRESS NOTES
The patient location is: [place], [place]  The chief complaint leading to consultation is: test results    Visit type: audiovisual    Face to Face time with patient: 0 minutes  15 minutes of total time spent on the encounter, which includes face to face time and non-face to face time preparing to see the patient (eg, review of tests), Obtaining and/or reviewing separately obtained history, Documenting clinical information in the electronic or other health record, Independently interpreting results (not separately reported) and communicating results to the patient/family/caregiver, or Care coordination (not separately reported).         Each patient to whom he or she provides medical services by telemedicine is:  (1) informed of the relationship between the physician and patient and the respective role of any other health care provider with respect to management of the patient; and (2) notified that he or she may decline to receive medical services by telemedicine and may withdraw from such care at any time.    Notes:      [facility] CLINIC NOTE    Patient Name: Glenda Gimenez  YOB: 1927    PRESENTING HISTORY     History of Present Illness:  Ms. Glenda Gimenez is a 89 y.o. female here for f/u after testing.     Spoke with patient's son. She was not present for visit.     Pt going to bathroom during night.   After one movement few drops of blood.   Has been having more indigestion, no obvious abdominal pain.   Having 1 bm per day.   No fevers.     CTA chest with ?bronchiolitis vs pulm edema.   Has some dyspnea but no cough, fever. No obvious pna symptoms.     She is not eating, even sweets, ice cream which she would normally like.       She is now enrolling in hospice care, they came by today. Equipment being delivered.         ROS      OBJECTIVE:   Vital Signs:  There were no vitals filed for this visit.       Physical Exam: None    Physical Exam    ASSESSMENT & PLAN:     Lower  GI bleed    Bronchiolitis        Difficult to say if GI bleed is important or not.     No obvious infectious symptoms.     Given overall decline I think we should hold off on intervening for either of these problems. Agree with hospice care. We are available if they need anything, but hospice will most likely take over care from here.         William Olivia  Internal Medicine        Patient not seen. No LOS

## 2024-08-15 NOTE — PROGRESS NOTES
08/15/24-Patient to be enrolled in hospice and equipment being delivered today. [facility] Case Closure.

## 2024-08-15 NOTE — TELEPHONE ENCOUNTER
----- Message from William Olivia MD sent at 8/15/2024  6:38 AM CDT -----  Can they get VV with me today?  ----- Message -----  From: Katarina Rad Results In  Sent: 8/14/2024   4:33 PM CDT  To: William Olivia MD

## 2024-08-18 NOTE — PHYSICIAN QUERY
Please further specify the heart failure diagnosis.       Other: Acute on  Chronic combined  (congestive) heart failure

## 2024-08-25 ENCOUNTER — TELEPHONE (OUTPATIENT)
Dept: HOME HEALTH SERVICES | Facility: CLINIC | Age: 89
End: 2024-08-25
Payer: MEDICARE

## 2024-08-25 NOTE — TELEPHONE ENCOUNTER
Patient son's, Shola, notified this provider that his mother/patient passed away this morning 8/25/24 peacefully on hospice care. He verbalized gratitude for her care from [facility].

## 2024-11-06 NOTE — PROGRESS NOTES
[facility]  Medical Chronic Care Home Visit    Encounter Provider: Lesley Tamez   PCP: William Olivia MD  Consult Requested By: No ref. provider found    HISTORY OF PRESENT ILLNESS      Patient ID: Glenda Gimenez is a 89 y.o. female is being seen in the home due to physical debility that presents a taxing effort to leave the home, to mitigate high risk of hospital readmission and/or due to the limited availability of reliable or safe options for transportation to the point of access to the provider. Prior to treatment on this visit the chart was reviewed and patient verbal consent was obtained.    Chronic medical conditions synopsis:  Mercedes is a 89 year old female with a PMHX of hypothyroidism, hypertension, primary hyperparathyroidism, COPD, chronic diastolic CHF, obesity, dementia and sepsis pneumonia in 2020.      Greeted by patient's son, Shola and daughter in law Hernandez for visit today. Patient is found sitting on sofa, alert and oriented to person and place with forgetfulness.She interacts some, speaks only when asked a question. Caregivers report no significant changes in behavior lately. No anxiety reported or exhibited today. Patient ambulates to the bathroom using her walker with no human assistance. She does bathe with help. Gets out of the house every now and then and goes to [employer], picks out her own groceries. Patient has no complaints today. Eating well. No N/V/D.      Patient ambulates around the home using her rollator. No reports of wandering or agitation reported. Dementia symptoms are controlled with sertraline, buspar, aricept and seroquel.     VSS. No distress observed.      PCP is William Olivia MD. Plan to follow up q 3 months and/or as needed to decrease risk and/or frequency of hospitalizations. [facility] contact information available in home.         DECISION MAKING TODAY       Assessment & Plan:  1. Vascular dementia with behavior  disturbance  Assessment & Plan:  Chronic, stable.  Still functioning with some independence.  Behavior stable on seroquel, buspar, aricept, sertraline.   Continue current regimen.  Monitor.      2. Essential hypertension  Assessment & Plan:  Chronic, stable.  Continue olemesartan 5mg tab.  Monitor.  Follow up with PCP.          3. Advanced age  Assessment & Plan:  Follow up in 3 months.              ENVIRONMENT OF CARE      Family and/or Caregiver present at visit?  Yes  Name of Caregiver: son, Shola, daughter in law, Mary  History provided by: caregiver    4Ms for Medical Decision-Making in Older Adults    Last Completed EAWV: 2023    MOBILITY:  Get Up and Go:      2023    10:41 AM   Get Up and Go   Trial 1 10 seconds     Activities of Daily Livin/4/2023    10:47 AM   Activities of Daily Living   Ambulation Assistance Required   Ambulation Assistance Walker (wheeled)   Dressing Independent   Transfers Independent   Toileting Continent of bladder;Continent of bowel   Feeding Independent   Cleaning home/Chores Assistance Required   Telephone use Assistance Required   Shopping Assistance Required   Paying bills Assistance Required   Taking meds Assistance Required     Whisper Test:       No data to display              Disability Status:      2023    10:49 AM   Disability Status   Are you deaf or do you have serious difficulty hearing? N   Are you blind or do you have serious difficulty seeing, even when wearing glasses? N   Because of a physical, mental, or emotional condition, do you have serious difficulty concentrating, remembering, or making decisions? Y   Do you have serious difficulty walking or climbing stairs? Y   Do you have difficulty dressing or bathing? N   Because of a physical, mental, or emotional condition, do you have difficulty doing errands alone such as visiting a doctor's office or shopping? Y     Nutrition Screenin/4/2023    10:41 AM   Nutrition Screening   Has  "food intake declined over the past three months due to loss of appetite, digestive problems, chewing or swallowing difficulties? No decrease in food intake   Involuntary weight loss during the last 3 months? No weight loss   Mobility? Goes out   Has the patient suffered psychological stress or acute disease in the past three months? No   Body Mass Index (BMI)?  BMI 23 or greater    Screening Score: 0-7 Malnourished, 8-11 At Risk, 12-14 Normal    MENTATION:   Depression Patient Health Questionnaire:      5/4/2023    10:39 AM   Depression Patient Health Questionnaire   Over the last two weeks how often have you been bothered by little interest or pleasure in doing things Not at all   Over the last two weeks how often have you been bothered by feeling down, depressed or hopeless Not at all   PHQ-2 Total Score 0     Has Dementia Dx: Yes    Cognitive Function Screening:       No data to display              Cognitive Function Screening Total - Less than 4 = Abnormal,  Greater than or equal to 4 = Normal    MEDICATIONS:  High Risk Medications:  Total Active Medications: 2  QUEtiapine - 50 MG  sertraline - 50 MG    WHAT MATTERS MOST:  Advance Care Planning   ACP Status:   Patient has had an ACP conversation  Living Will: Yes  Power of : Yes  [place]: No    What is most important right now is to focus on spending time at homeavoiding the hospitalremaining as independent as possiblesymptom/pain controlcomfort and QOL     Accordingly, we have decided that the best plan to meet the patient's goals includes continuing with treatment      What matters most to patient today is: "to stay here with my son"           Is patient hospice appropriate: No  (If needed, use PPS <30 or FAST score >7)  Was referral to hospice placed: No    Impression upon entering the home:  Physical Dwelling: single family home   Appearance of home environment: cleaniness: clean, walking pathways: clear, lighting: adequate, and home structure: sound " structure  Functional Status: minimal assistance  Mobility: ambulatory with device  Nutritional access: adequate intake and access  Home Health: No, and does not need it at this time   DME/Supplies: rolling walker     Diagnostic tests reviewed/disposition: No diagnosic tests pending after this hospitalization.  Disease/illness education:  dementia, fall precautions/safety  Establishment or re-establishment of referral orders for community resources: No other necessary community resources.   Discussion with other health care providers: No discussion with other health care providers necessary.   Does patient have a PCP at [facility]? Yes   Repatriation plan with PCP? follow-up with PCP within 90d   Does patient have an ostomy (ileostomy, colostomy, suprapubic catheter, nephrostomy tube, tracheostomy, PEG tube, pleurex catheter, cholecystostomy, etc)? No  Were BPAs reviewed? Yes    Social History     Socioeconomic History    Marital status:     Number of children: 2   Tobacco Use    Smoking status: Never    Smokeless tobacco: Never   Substance and Sexual Activity    Alcohol use: Yes     Alcohol/week: 2.0 standard drinks of alcohol     Types: 2 Glasses of wine per week     Comment: 2 small glasses of red wine with dinner    Drug use: No    Sexual activity: Not Currently     Social Determinants of Health     Financial Resource Strain: Patient Declined (7/10/2023)    Overall Financial Resource Strain (CARDIA)     Difficulty of Paying Living Expenses: Patient declined   Food Insecurity: Patient Declined (7/10/2023)    Hunger Vital Sign     Worried About Running Out of Food in the Last Year: Patient declined     Ran Out of Food in the Last Year: Patient declined   Transportation Needs: No Transportation Needs (7/10/2023)    PRAPARE - Transportation     Lack of Transportation (Medical): No     Lack of Transportation (Non-Medical): No   Physical Activity: Inactive (7/10/2023)    Exercise Vital Sign     Days of Exercise per  Week: 0 days     Minutes of Exercise per Session: 0 min   Stress: Stress Concern Present (7/10/2023)    [employer] [place] [employer] - Occupational Stress Questionnaire     Feeling of Stress : Rather much   Social Connections: Unknown (7/10/2023)    Social Connection and Isolation Panel [NHANES]     Frequency of Communication with Friends and Family: Patient declined     Frequency of Social Gatherings with Friends and Family: Three times a week     Attends Alevism Services: Never     Active Member of Clubs or Organizations: Patient declined     Attends Club or Organization Meetings: Patient declined     Marital Status:    Recent Concern: Social Connections - Socially Isolated (5/4/2023)    Social Connection and Isolation Panel [NHANES]     Frequency of Communication with Friends and Family: More than three times a week     Frequency of Social Gatherings with Friends and Family: More than three times a week     Attends Alevism Services: Never     Active Member of Clubs or Organizations: No     Attends Club or Organization Meetings: Never     Marital Status:    Housing Stability: Unknown (7/10/2023)    Housing Stability Vital Sign     Unable to Pay for Housing in the Last Year: Patient refused     Unstable Housing in the Last Year: No         OBJECTIVE:     Vital Signs:  Vitals:    04/23/24 1100   BP: 128/68   Pulse: 70   Resp: 16   Temp: 98.5 °F (36.9 °C)       Review of Systems   Constitutional:  Negative for activity change, appetite change and fever.   Respiratory:  Negative for chest tightness, shortness of breath and wheezing. Positive for intermittent dry cough over last 2 weeks.    Cardiovascular:  Negative for chest pain, palpitations and leg swelling.   Gastrointestinal:  Negative for abdominal pain, constipation and diarrhea.   Genitourinary:  Negative for difficulty urinating and hematuria.   Musculoskeletal:  Positive for gait problem. Negative for joint swelling.   Skin:   Negative for color change and wound.   Neurological:  Positive for weakness. Negative for tremors, seizures and numbness.   Psychiatric/Behavioral:  Negative for confusion.      Physical Exam:  Physical Exam  Constitutional:       Appearance: Normal appearance.   HENT:      Head: Normocephalic and atraumatic.   Cardiovascular:      Rate and Rhythm: Normal rate and regular rhythm.      Pulses: Normal pulses.      Heart sounds: Normal heart sounds.   Pulmonary:      Effort: Pulmonary effort is normal.      Breath sounds: Normal breath sounds.   Occasional dry cough noted.  Abdominal:      General: Bowel sounds are normal.      Palpations: Abdomen is soft.   Musculoskeletal:      Cervical back: Normal range of motion and neck supple.      Comments: Ambulatory with rollator   Skin:     General: Skin is warm and dry.   Neurological:      General: No focal deficit present.      Mental Status: She is alert and oriented to person, place, and time.      Comments: Confused at times   Psychiatric:         Mood and Affect: Mood normal.         Behavior: Behavior normal.     INSTRUCTIONS FOR PATIENT:     Scheduled Follow-up, Appts Reviewed with Modifications if Needed: Yes  Future Appointments   Date Time Provider Department Center   5/20/2024  9:30 AM ERIC OROZCO [facility] [place]   5/30/2024 10:30 AM William Olivia MD [facility] MED [place]         Current Outpatient Medications:     acetaminophen (TYLENOL) 325 MG tablet, Take 325 mg by mouth every 6 (six) hours as needed for Pain., Disp: , Rfl:     aspirin (ECOTRIN) 81 MG EC tablet, Take 81 mg by mouth once daily., Disp: , Rfl:     busPIRone (BUSPAR) 10 MG tablet, Take 1 tablet (10 mg total) by mouth 3 (three) times daily., Disp: 270 tablet, Rfl: 4    donepeziL (ARICEPT) 5 MG tablet, TAKE 1 TABLET BY MOUTH EVERY EVENING, Disp: 90 tablet, Rfl: 3    famotidine (PEPCID) 20 MG tablet, Take 1 tablet (20 mg total) by mouth 2 (two) times daily., Disp: 180 tablet, Rfl: 3     fluticasone propionate (FLONASE) 50 mcg/actuation nasal spray, 1 spray (50 mcg total) by Each Nostril route twice a week., Disp: 16 g, Rfl: 0    furosemide (LASIX) 20 MG tablet, Take 1 tablet (20 mg total) by mouth once daily., Disp: 90 tablet, Rfl: 2    isosorbide mononitrate (IMDUR) 30 MG 24 hr tablet, Take 30 mg by mouth., Disp: , Rfl:     krill oil 500 mg Cap, Take by mouth., Disp: , Rfl:     levothyroxine (SYNTHROID) 88 MCG tablet, Take 1 tablet (88 mcg total) by mouth once daily., Disp: 90 tablet, Rfl: 3    olmesartan (BENICAR) 5 MG Tab, Take 1 tablet (5 mg total) by mouth once daily., Disp: 90 tablet, Rfl: 3    ondansetron (ZOFRAN) 4 MG tablet, Take 1 tablet (4 mg total) by mouth every 8 (eight) hours as needed., Disp: 15 tablet, Rfl: 0    peg 400-propylene glycol, PF, (SYSTANE HYDRATION PF) 0.4-0.3 % Drop, 1 drops DAILY (route: ophthalmic (eye)), Disp: , Rfl:     potassium chloride SA (K-DUR,KLOR-CON) 20 MEQ tablet, TAKE 1 TABLET(20 MEQ) BY MOUTH EVERY DAY, Disp: 90 tablet, Rfl: 3    QUEtiapine (SEROQUEL) 50 MG tablet, TAKE 1 TABLET(50 MG) BY MOUTH EVERY EVENING, Disp: 30 tablet, Rfl: 11    sertraline (ZOLOFT) 50 MG tablet, Take 1 tablet (50 mg total) by mouth once daily., Disp: 90 tablet, Rfl: 4    vit A/vit C/vit E/zinc/copper (ICAPS AREDS ORAL), 1 capsule 2 TIMES DAILY (route: oral), Disp: , Rfl:     vitamin D (VITAMIN D3) 1000 units Tab, Take 1,000 Units by mouth once daily. Patient is taking 2000 units per day, Disp: , Rfl:     ibandronate (BONIVA) 150 mg tablet, TAKE 1 TABLET(150 MG) BY MOUTH EVERY 30 DAYS (Patient not taking: Reported on 2/20/2024), Disp: 3 tablet, Rfl: 3    nirmatrelvir-ritonavir (PAXLOVID) 300 mg (150 mg x 2)-100 mg copackaged tablets (EUA), Take 3 tablets by mouth 2 (two) times daily. Each dose contains 2 nirmatrelvir (pink tablets) and 1 ritonavir (white tablet). Take all 3 tablets together (Patient not taking: Reported on 2/20/2024), Disp: 30 tablet, Rfl: 0    nitroGLYCERIN  (NITROSTAT) 0.4 MG SL tablet, Place 1 tablet (0.4 mg total) under the tongue every 5 (five) minutes as needed for Chest pain. (Patient not taking: Reported on 2/20/2024), Disp: 25 tablet, Rfl: 3    Medication Reconciliation:  Were medications changed during this appointment? No  Were medications in the home? Yes  Is the patient taking the medications as directed? Yes  Does the patient/caregiver understand the medications and changes? Yes  Does updated med list accurately reflects meds patient is currently taking? Yes    Signature: Lesley Tamez NP   2 = A lot of assistance
